# Patient Record
Sex: FEMALE | Race: WHITE | NOT HISPANIC OR LATINO | URBAN - METROPOLITAN AREA
[De-identification: names, ages, dates, MRNs, and addresses within clinical notes are randomized per-mention and may not be internally consistent; named-entity substitution may affect disease eponyms.]

---

## 2017-01-24 ENCOUNTER — OUTPATIENT (OUTPATIENT)
Dept: OUTPATIENT SERVICES | Facility: HOSPITAL | Age: 77
LOS: 1 days | End: 2017-01-24
Payer: MEDICARE

## 2017-01-24 DIAGNOSIS — Z90.710 ACQUIRED ABSENCE OF BOTH CERVIX AND UTERUS: Chronic | ICD-10-CM

## 2017-01-24 PROCEDURE — 71250 CT THORAX DX C-: CPT | Mod: 26

## 2017-01-24 PROCEDURE — 71250 CT THORAX DX C-: CPT

## 2017-01-27 ENCOUNTER — OUTPATIENT (OUTPATIENT)
Dept: OUTPATIENT SERVICES | Facility: HOSPITAL | Age: 77
LOS: 1 days | End: 2017-01-27
Payer: MEDICARE

## 2017-01-27 DIAGNOSIS — Z90.710 ACQUIRED ABSENCE OF BOTH CERVIX AND UTERUS: Chronic | ICD-10-CM

## 2017-01-27 DIAGNOSIS — R06.02 SHORTNESS OF BREATH: ICD-10-CM

## 2017-01-27 PROCEDURE — 93306 TTE W/DOPPLER COMPLETE: CPT

## 2017-01-27 PROCEDURE — 93306 TTE W/DOPPLER COMPLETE: CPT | Mod: 26

## 2017-04-18 ENCOUNTER — APPOINTMENT (OUTPATIENT)
Dept: PULMONOLOGY | Facility: CLINIC | Age: 77
End: 2017-04-18

## 2017-04-27 ENCOUNTER — RESULT REVIEW (OUTPATIENT)
Age: 77
End: 2017-04-27

## 2017-04-27 ENCOUNTER — INPATIENT (INPATIENT)
Facility: HOSPITAL | Age: 77
LOS: 5 days | Discharge: HOME | DRG: 810 | End: 2017-05-03
Attending: INTERNAL MEDICINE | Admitting: INTERNAL MEDICINE
Payer: MEDICARE

## 2017-04-27 VITALS
SYSTOLIC BLOOD PRESSURE: 119 MMHG | RESPIRATION RATE: 20 BRPM | OXYGEN SATURATION: 96 % | DIASTOLIC BLOOD PRESSURE: 82 MMHG | HEART RATE: 90 BPM | TEMPERATURE: 98 F

## 2017-04-27 DIAGNOSIS — R63.8 OTHER SYMPTOMS AND SIGNS CONCERNING FOOD AND FLUID INTAKE: ICD-10-CM

## 2017-04-27 DIAGNOSIS — Z90.710 ACQUIRED ABSENCE OF BOTH CERVIX AND UTERUS: Chronic | ICD-10-CM

## 2017-04-27 DIAGNOSIS — D46.9 MYELODYSPLASTIC SYNDROME, UNSPECIFIED: ICD-10-CM

## 2017-04-27 DIAGNOSIS — Z29.9 ENCOUNTER FOR PROPHYLACTIC MEASURES, UNSPECIFIED: ICD-10-CM

## 2017-04-27 DIAGNOSIS — R53.1 WEAKNESS: ICD-10-CM

## 2017-04-27 DIAGNOSIS — I26.99 OTHER PULMONARY EMBOLISM WITHOUT ACUTE COR PULMONALE: ICD-10-CM

## 2017-04-27 DIAGNOSIS — I10 ESSENTIAL (PRIMARY) HYPERTENSION: ICD-10-CM

## 2017-04-27 LAB
ALBUMIN SERPL ELPH-MCNC: 3.5 G/DL — SIGNIFICANT CHANGE UP (ref 3.4–5)
ALP SERPL-CCNC: 82 U/L — SIGNIFICANT CHANGE UP (ref 40–120)
ALT FLD-CCNC: 22 U/L — SIGNIFICANT CHANGE UP (ref 12–42)
ANION GAP SERPL CALC-SCNC: 11 MMOL/L — SIGNIFICANT CHANGE UP (ref 9–16)
APTT BLD: 27.1 SEC — LOW (ref 27.5–37.4)
AST SERPL-CCNC: 27 U/L — SIGNIFICANT CHANGE UP (ref 15–37)
BASOPHILS NFR BLD AUTO: 0.9 % — SIGNIFICANT CHANGE UP (ref 0–2)
BILIRUB DIRECT SERPL-MCNC: 0.26 MG/DL — HIGH
BILIRUB SERPL-MCNC: 1.8 MG/DL — HIGH (ref 0.2–1.2)
BLD GP AB SCN SERPL QL: NEGATIVE — SIGNIFICANT CHANGE UP
BUN SERPL-MCNC: 15 MG/DL — SIGNIFICANT CHANGE UP (ref 7–23)
CALCIUM SERPL-MCNC: 8.9 MG/DL — SIGNIFICANT CHANGE UP (ref 8.5–10.5)
CHLORIDE SERPL-SCNC: 104 MMOL/L — SIGNIFICANT CHANGE UP (ref 96–108)
CK MB CFR SERPL CALC: <1 NG/ML — SIGNIFICANT CHANGE UP (ref 0.5–3.6)
CO2 SERPL-SCNC: 25 MMOL/L — SIGNIFICANT CHANGE UP (ref 22–31)
CREAT SERPL-MCNC: 0.59 MG/DL — SIGNIFICANT CHANGE UP (ref 0.5–1.3)
D DIMER BLD IA.RAPID-MCNC: 443 NG/ML DDU — HIGH
EOSINOPHIL NFR BLD AUTO: 0.3 % — SIGNIFICANT CHANGE UP (ref 0–6)
GLUCOSE SERPL-MCNC: 132 MG/DL — HIGH (ref 70–99)
HAPTOGLOB SERPL-MCNC: <8 MG/DL — LOW (ref 31–207)
HCT VFR BLD CALC: 26.3 % — LOW (ref 34.5–45)
HGB BLD-MCNC: 9.1 G/DL — LOW (ref 11.5–15.5)
INR BLD: 1.11 — SIGNIFICANT CHANGE UP (ref 0.88–1.16)
LDH SERPL L TO P-CCNC: 418 U/L — HIGH (ref 84–246)
LYMPHOCYTES # BLD AUTO: 31.4 % — SIGNIFICANT CHANGE UP (ref 13–44)
MCHC RBC-ENTMCNC: 34.6 G/DL — SIGNIFICANT CHANGE UP (ref 32–36)
MCHC RBC-ENTMCNC: 39.4 PG — HIGH (ref 27–34)
MCV RBC AUTO: 113.9 FL — HIGH (ref 80–100)
MONOCYTES NFR BLD AUTO: 5.1 % — SIGNIFICANT CHANGE UP (ref 2–14)
NEUTROPHILS NFR BLD AUTO: 62.3 % — SIGNIFICANT CHANGE UP (ref 43–77)
PLATELET # BLD AUTO: 93 K/UL — LOW (ref 150–400)
POTASSIUM SERPL-MCNC: 3.3 MMOL/L — LOW (ref 3.5–5.3)
POTASSIUM SERPL-SCNC: 3.3 MMOL/L — LOW (ref 3.5–5.3)
PROT SERPL-MCNC: 6.9 G/DL — SIGNIFICANT CHANGE UP (ref 6.4–8.2)
PROTHROM AB SERPL-ACNC: 12.3 SEC — SIGNIFICANT CHANGE UP (ref 9.8–12.7)
RBC # BLD: 2.31 M/UL — LOW (ref 3.8–5.2)
RBC # BLD: 2.34 M/UL — LOW (ref 3.8–5.2)
RBC # FLD: 15.1 % — SIGNIFICANT CHANGE UP (ref 10.3–16.9)
RETICS/RBC NFR: 1.6 % — SIGNIFICANT CHANGE UP (ref 0.5–2.5)
RH IG SCN BLD-IMP: POSITIVE — SIGNIFICANT CHANGE UP
SODIUM SERPL-SCNC: 140 MMOL/L — SIGNIFICANT CHANGE UP (ref 135–145)
TROPONIN I SERPL-MCNC: <0.015 NG/ML — SIGNIFICANT CHANGE UP (ref 0.01–0.04)
TSH SERPL-MCNC: 0.86 UIU/ML — SIGNIFICANT CHANGE UP (ref 0.35–4.94)
WBC # BLD: 3.3 K/UL — LOW (ref 3.8–10.5)
WBC # FLD AUTO: 3.3 K/UL — LOW (ref 3.8–10.5)

## 2017-04-27 PROCEDURE — 93010 ELECTROCARDIOGRAM REPORT: CPT

## 2017-04-27 PROCEDURE — 71010: CPT | Mod: 26

## 2017-04-27 PROCEDURE — 99285 EMERGENCY DEPT VISIT HI MDM: CPT | Mod: 25

## 2017-04-27 RX ORDER — SODIUM CHLORIDE 9 MG/ML
500 INJECTION INTRAMUSCULAR; INTRAVENOUS; SUBCUTANEOUS ONCE
Qty: 0 | Refills: 0 | Status: COMPLETED | OUTPATIENT
Start: 2017-04-27 | End: 2017-04-27

## 2017-04-27 RX ORDER — POTASSIUM CHLORIDE 20 MEQ
10 PACKET (EA) ORAL
Qty: 0 | Refills: 0 | Status: COMPLETED | OUTPATIENT
Start: 2017-04-27 | End: 2017-04-28

## 2017-04-27 RX ORDER — POTASSIUM CHLORIDE 20 MEQ
40 PACKET (EA) ORAL ONCE
Qty: 0 | Refills: 0 | Status: COMPLETED | OUTPATIENT
Start: 2017-04-27 | End: 2017-04-27

## 2017-04-27 RX ORDER — POLYETHYLENE GLYCOL 3350 17 G/17G
17 POWDER, FOR SOLUTION ORAL AT BEDTIME
Qty: 0 | Refills: 0 | Status: DISCONTINUED | OUTPATIENT
Start: 2017-04-27 | End: 2017-05-03

## 2017-04-27 RX ADMIN — Medication 100 MILLIEQUIVALENT(S): at 22:54

## 2017-04-27 RX ADMIN — SODIUM CHLORIDE 500 MILLILITER(S): 9 INJECTION INTRAMUSCULAR; INTRAVENOUS; SUBCUTANEOUS at 15:55

## 2017-04-27 NOTE — ED ADULT NURSE NOTE - CHPI ED SYMPTOMS NEG
no tingling/no decreased eating/drinking/no chills/no pain/no fever/no vomiting/no nausea/no dizziness/no numbness

## 2017-04-27 NOTE — H&P ADULT - NSHPPHYSICALEXAM_GEN_ALL_CORE
VITAL SIGNS:  T(C): 36.3, Max: 36.8 (04-27 @ 15:32)  T(F): 97.3, Max: 98.3 (04-27 @ 15:32)  HR: 76 (74 - 90)  BP: 146/90 (119/82 - 146/90)  BP(mean): --  RR: 20 (18 - 20)  SpO2: 100% (96% - 100%)  Wt(kg): --    PHYSICAL EXAM:    Constitutional: WDWN resting comfortably in bed; NAD  Head: NC/AT, head bobbing tremor  Eyes: PERRL, EOMI, anicteric sclera  ENT: no nasal discharge; uvula midline, no oropharyngeal erythema or exudates; MMM, dental caries, no abscess or pus seen in teeth, no parotid gland tenderness  Neck: supple; no JVD or thyromegaly  Respiratory: CTA B/L; no W/R/R, no retractions  Cardiac: +S1/S2; RRR; no M/R/G  Gastrointestinal: soft, NT/ND; no rebound or guarding; +BSx4  Genitourinary: normal external genitalia  Back: spine midline, no bony tenderness or step-offs; no CVAT B/L  Extremities: WWP, no clubbing or cyanosis; no peripheral edema  Musculoskeletal: NROM x4; no joint swelling, tenderness or erythema  Vascular: 2+ radial DP/PT pulses B/L  Dermatologic: skin warm, dry and intact; no rashes, wounds, or scars  Lymphatic: no submandibular or cervical LAD  Neurologic: AAOx3; CNII-XII grossly intact; no focal deficits  Psychiatric: affect and characteristics of appearance, verbalizations, behaviors are appropriate

## 2017-04-27 NOTE — H&P ADULT - HISTORY OF PRESENT ILLNESS
· Chief Complaint: The patient is a 76y Female complaining of weakness.	  · HPI Objective Statement: feeling generally weak for a few weeks, last few days having a hard time getting up out of bed, does not have energy.  no pain.  no cough, fever, dysuria today some sob with exertion	  · Presenting Symptoms: WEAKNESS	  · Negative Findings: no fever, no nausea, no pain, no vomiting	  · Timing: gradual onset	  · Duration: week(s), 2	  · Severity: MODERATE	  · Aggravating Factors: walking	    PAST MEDICAL/SURGICAL/FAMILY/SOCIAL HISTORY:   Past Medical History:  Myelodysplasia (myelodysplastic syndrome)    Tremor    Vestibular disequilibrium.    Tobacco Usage:  · Tobacco Usage Never smoker		    · Attestation Comment: I have reviewed and confirmed nurses' notes for patient's medications, allergies, medical history, and surgical history.	    ALLERGIES AND HOME MEDICATIONS:   Allergies:        Allergies:  	No Known Allergies:     Home Medications:   * Patient Currently Takes Medications as of 29-Oct-2015 16:25 documented in Order   · 	rivaroxaban 15 mg oral tablet: 1 tab(s) orally 2 times a day  · 	hydroxyurea 500 mg oral capsule: 2 cap(s) orally Monday through Friday  · 	hydroxyurea 500 mg oral capsule: 3 cap(s) orally 2 times a week on saturday and sunday  · 	anagrelide 0.5 mg oral capsule: 1 cap(s) orally Monday, Wednesday, and Friday  · 	atorvastatin 20 mg oral tablet: 1 tab(s) orally once a day (at bedtime)  · 	mirtazapine 30 mg oral tablet: 1 tab(s) orally once a day (at bedtime)    REVIEW OF SYSTEMS:   Review of Systems:  · CONSTITUTIONAL: no fever and no chills.	  · CARDIOVASCULAR: normal rate and rhythm, no chest pain and no edema.	  · RESPIRATORY: - - -	  · Respiratory [+]: EXERTIONAL DYSPNEA	  · GASTROINTESTINAL: no abdominal pain, no bloating, no constipation, no diarrhea, no nausea and no vomiting.	  · GENITOURINARY: no dysuria, no frequency, and no hematuria.	  · MUSCULOSKELETAL: no back pain, no gout, no musculoskeletal pain, no neck pain, and no weakness.	  · SKIN: no abrasions, no jaundice, no lesions, no pruritis, and no rashes.	  · NEURO: no loss of consciousness, no gait abnormality, no headache, no sensory deficits, and no weakness.	  · ENDOCRINE: no diabetes and no thyroid trouble.	  · ALLERGIC/IMMUNOLOGIC: no dermatitis, no environmental allergies, no food allergies, no immunosuppressive disorder, and no pruritus.	    VITAL SIGNS( Pullset):   Vital Signs:  ,,ED ADULT Flow Sheet:    27-Apr-2017 13:36	  · Temp (F): 97.6	  · Temp (C) Temp (C): 36.4	  · Temp site Temp Site: oral	  · Heart Rate Heart Rate (beats/min): 90	  · BP Systolic Systolic: 119	  · BP Diastolic Diastolic (mm Hg): 82	  · Respiration Rate (breaths/min) Respiration Rate (breaths/min): 20	  · SpO2 (%) SpO2 (%): 96	  · O2 delivery Patient On: room air	  · Presence of Pain: denies pain/discomfort	  · Pain Rating (0-10): Rest: 0	  · Pain Rating (0-10): Activity: 0	  · SpO2 (%) SpO2 (%): 96	  · O2 delivery Patient On: room air	  · Preferred Language to Address Healthcare Preferred Language to Address Healthcare: English	    PHYSICAL EXAM:   · CONSTITUTIONAL: - - -	  · Appearance: ILL APPEARING, mildly ill appearing	  · Distress: no apparent	  · Mentation: awake, alert	  · CARDIAC: Normal rate, regular rhythm.  Heart sounds S1, S2.  No murmurs, rubs or gallops.	  · RESPIRATORY: - - -	  · Respiratory Distress: no, mild tachypnea	  · Breath Sounds: normal	  · GASTROINTESTINAL: Abdomen soft, non-tender, no guarding.	  · MUSCULOSKELETAL: Spine appears normal, range of motion is not limited, no muscle or joint tenderness	  · NEUROLOGICAL: Alert and oriented, no focal deficits, no motor or sensory deficits.	  · SKIN: Skin normal color for race, warm, dry and intact. No evidence of rash.	  · PSYCHIATRIC: Alert and oriented to person, place, time/situation. normal mood and affect. no apparent risk to self or others.	    DISPOSITION:   Care Plan - Instructions:  Principal Discharge DX:	Myelodysplasia (myelodysplastic syndrome)  Secondary Diagnosis:	Weakness generalizedPrincipal Discharge DX:	Myelodysplasia (myelodysplastic syndrome)  Secondary Diagnosis:	Weakness generalized.  Impression:  Principal Discharge Dx Myelodysplasia (myelodysplastic syndrome).     Secondary Discharge Dx Weakness generalized.    · Medical Decision Making Details: 75 yo female with hx of mds co general weakness for 2 weeks, getting worse over past 2 days.   	    - Hold Hydrea and Anegrelide @ this time  - Monitor CBC closely and transfuse PRBCs to keep Hb > 7 g/dl & platelets > 15k or if bleeding  - for possible bone marrow biopsy 77yo F with MDS, PE 2.5 years ago presents with weakness x 1 week. Weakness is generalized and insiduous in onset, associated with decreased appetite, reduced PO intake and SOB. Patient is normally able to walk 2- blocks with a cane/walker but has been feeling fatigued and SOB even when walking to the restroom. Patient reports she felt well about 10 days ago when she was able to go to the dentist for tooth pain in the L upper molar. The dentist referred her to an oral surgeon for tooth removal, but the surgeon said that she was too weak for the procedure and prescribed Amoxicillin 7 day course, which she completed on Tuesday. She denies fever, chills,chest pain, orthopnea, LE edema, nausea, vomiting, diarrhea, abdominal pain, dysuria, increased urinary frequency, joint pain or rashes. No sick contacts or recent travel.     In the ED, VS: 97.6 119/82 90 20 896% RA  Given NS 500cc bolus 77yo F with MDS, PE 2.5 years ago, HTN presents with weakness x 1 week. Weakness is generalized and insiduous in onset, associated with decreased appetite, reduced PO intake and SOB. Patient is normally able to walk 2- blocks with a cane/walker but has been feeling fatigued and SOB even when walking to the restroom. Patient reports she felt well about 10 days ago when she was able to go to the dentist for tooth pain in the L upper molar. The dentist referred her to an oral surgeon for tooth removal, but the surgeon said that she was too weak for the procedure and prescribed Amoxicillin 7 day course, which she completed on Tuesday. She denies fever, chills,chest pain, orthopnea, LE edema, nausea, vomiting, diarrhea, abdominal pain, dysuria, increased urinary frequency, joint pain or rashes. Patient denies melena or hematochezia, last c-scope 4 years ago, reportedly normal. Does report constipation with a small BM yesterday, which she attributes to decreased PO intake. No sick contacts or recent travel.     In the ED, VS: 97.6 119/82 90 20 896% RA  Given NS 500cc bolus 75yo F with MDS, PE 2.5 years ago, HTN presents with weakness x 1 week. Weakness is generalized and insiduous in onset, associated with decreased appetite, reduced PO intake and SOB. Patient is normally able to walk 2- blocks with a cane/walker but has been feeling fatigued and SOB even when walking to the restroom. Patient reports she felt well about 10 days ago when she was able to go to the dentist for tooth pain in the L upper molar. The dentist referred her to an oral surgeon for tooth removal, but the surgeon said that she was too weak for the procedure and prescribed Amoxicillin 7 day course, which she completed on Tuesday. She denies fever, chills,chest pain, orthopnea, LE edema, nausea, vomiting, diarrhea, abdominal pain, dysuria, increased urinary frequency, joint pain or rashes. Patient denies melena or hematochezia, last c-scope 4 years ago, reportedly normal. Does report constipation with a small BM yesterday, which she attributes to decreased PO intake. No sick contacts or recent travel.     In the ED, VS: 97.6 119/82 90 20 96% RA  Given NS 500cc bolus

## 2017-04-27 NOTE — H&P ADULT - NSHPLABSRESULTS_GEN_ALL_CORE
.  LABS:                         9.1    3.3   )-----------( 93       ( 27 Apr 2017 14:18 )             26.3     04-27    140  |  104  |  15  ----------------------------<  132<H>  3.3<L>   |  25  |  0.59    Ca    8.9      27 Apr 2017 14:18    TPro  6.9  /  Alb  3.5  /  TBili  1.8<H>  /  DBili  0.26<H>  /  AST  27  /  ALT  22  /  AlkPhos  82  04-27    PT/INR - ( 27 Apr 2017 14:18 )   PT: 12.3 sec;   INR: 1.11          PTT - ( 27 Apr 2017 14:18 )  PTT:27.1 sec    CARDIAC MARKERS ( 27 Apr 2017 14:18 )  <0.015 ng/mL / x     / 26 U/L / x     / <1.0 ng/mL            RADIOLOGY, EKG & ADDITIONAL TESTS: Reviewed.

## 2017-04-27 NOTE — H&P ADULT - PMH
Hypertension    Myelodysplasia (myelodysplastic syndrome)    PE (pulmonary thromboembolism)  2015  Tremor    Vestibular disequilibrium

## 2017-04-27 NOTE — ED ADULT TRIAGE NOTE - CHIEF COMPLAINT QUOTE
BIBA from home c/o generalized weakness x 2 dyas and unable to stand up since yesterday. Denies f/c or any other complaints.

## 2017-04-27 NOTE — H&P ADULT - NSHPSOCIALHISTORY_GEN_ALL_CORE
Lives alone, . Does not have any children.  Prior history of cigarette use from age [age] about 1ppd. No etoh use or illicit drug use.

## 2017-04-27 NOTE — H&P ADULT - NSHPOUTPATIENTPROVIDERS_GEN_ALL_CORE
Dr. Resendiz (onc), Dr. Groves (neuro), Dr. Lockett (pulm), Se. Parrish (PMD) Dr. Resendiz (onc), Dr. Groves (neuro), Dr. Lockett (pulm), Dr. Parrish (PMD)

## 2017-04-27 NOTE — H&P ADULT - NSHPREVIEWOFSYSTEMS_GEN_ALL_CORE
CONSTITUTIONAL: + weakness, no fevers or chills  EYES/ENT: No visual changes;  No vertigo or throat pain   NECK: No pain or stiffness  RESPIRATORY: No cough, wheezing, hemoptysis; + SOB  CARDIOVASCULAR: No chest pain or palpitations  GASTROINTESTINAL: No abdominal or epigastric pain. No nausea, vomiting, or hematemesis; No diarrhea No melena or hematochezia. + constipation  GENITOURINARY: No dysuria, frequency or hematuria  NEUROLOGICAL: No numbness  SKIN: No itching, burning, rashes, or lesions   All other review of systems is negative unless indicated above.

## 2017-04-27 NOTE — ED ADULT NURSE NOTE - OBJECTIVE STATEMENT
Pt came to ED complaining of weakness x2 days, worsening today. Pt states she feels too weak to walk today. Pt denies LOC, SOB, abd pain, /GI symptoms, chest pain, D/N/V.  Pt is alert and oriented x3, baseline tremors of hands. Lung sounds clear bilaterally, strong and equal handgrips, positive PMS x4 extremities. Abd is SSNTx4.

## 2017-04-27 NOTE — ED PROVIDER NOTE - MEDICAL DECISION MAKING DETAILS
77 yo female with hx of mds co general weakness for 2 weeks, getting worse over past 2 days.  no pain or fever.  will check labs, ekg, cxr and call Dr Resendiz 77 yo female with hx of mds co general weakness for 2 weeks, getting worse over past 2 days.  no pain or fever.  will check labs, ekg, cxr and call Dr Jacinto.  pt has pancytopenia, this is new since feb.  dr jacinto thinks migth be worsening mds

## 2017-04-27 NOTE — ED PROVIDER NOTE - CONSTITUTIONAL DISTRESS
[address] [facility], [address] [facility], [address] [place] [address] [address] ,[address], [place] [address] [facility] [phone]        Ira Ruelas MD  • Jimbo Escoto MD • Lakia Izquierdo MD • DO Pablito Becker PA-C • CARLEE Weems no apparent

## 2017-04-27 NOTE — H&P ADULT - PROBLEM SELECTOR PLAN 3
History of PE in 2015, treated with ? surgical clot removal and Xarelto for ~ 6 months. Patient is states she has been more SOB for the past week in the setting of weakness and thrombocytopenia. Low suspicion for PE as not tachycardic, no LE pain or swelling but does have a malignancy and prior PE as risk factors. Well's score = 1.5  -obtain collateral from Dr. Lockett in the AM  -will check D-dimer

## 2017-04-27 NOTE — ED PROVIDER NOTE - CARE PLAN
Principal Discharge DX:	Myelodysplasia (myelodysplastic syndrome)  Secondary Diagnosis:	Weakness generalized

## 2017-04-27 NOTE — H&P ADULT - PROBLEM SELECTOR PLAN 1
· Medical Decision Making Details: 75 yo female with hx of mds co general weakness for 2 weeks, getting worse over past 2 days.       - Hold Hydrea and Anegrelide @ this time  - Monitor CBC closely and transfuse PRBCs to keep Hb > 7 g/dl & platelets > 15k or if bleeding  - for possible bone marrow biopsy Generalized weakness of unclear etiology. May be 2/2 anemia (Hb was 13 in Feb was per Dr. Resendiz) vs. exacerbation of MDS vs. deconditioning.   -will f/u Dr. Resendiz for possible bone marrow biopsy  -PT consult Patient with MDS for 15 years, has been stable on Hydrea and Anergrelide. As per Dr. Resendiz, in February WBC 8.5, Hb 13, platelets 207. Currently with pancytopenia, unclear etiology but may be 2/2 MDS exacerbation.   -As per Dr. Turk, hold Hydroxyurea and Anegrelide at this time  -As per Dr. Turk: monitor CBC closely and transfuse PRBCs to keep Hb > 7 g/dl & platelets > 15k or if bleeding  -for possible bone marrow biopsy  -will check reticulocyte count to assess bone marrow function, LDH/haptoglobin for possible hemolysis, Vitamin B12 / folate / TSH (in the setting of macrocytic anemia), FOBT, iron studies

## 2017-04-27 NOTE — ED PROVIDER NOTE - OBJECTIVE STATEMENT
feeling generally weak for a few weeks, last few days having a hard time getting up out of bed, does not have energy.  no pain.  no cough, fever, dysuria  today some sob with exertion

## 2017-04-27 NOTE — H&P ADULT - PROBLEM SELECTOR PLAN 2
Patient with MDS for 15 years, has been stable on Hydrea and Anergrelide. As per Dr. Resendiz, in February WBC 8.5, Hb 13, platelets 207. Currently with pancytopenia, unclear etiology but may be 2/2 MDS exacerbation.   -As per Dr. Turk, hold Hydroxyurea and Anegrelide at this time  -As per Dr. Turk: monitor CBC closely and transfuse PRBCs to keep Hb > 7 g/dl & platelets > 15k or if bleeding  -for possible bone marrow biopsy  -will check reticulocyte count to assess bone marrow function, LDH/haptoglobin for possible hemolysis, Vitamin B12 / folate / TSH (in the setting of macrocytic anemia), FOBT, iron studies Generalized weakness of unclear etiology. May be 2/2 anemia (Hb was 13 in Feb was per Dr. Resendiz) vs. exacerbation of MDS vs. deconditioning.   -PT consult

## 2017-04-28 ENCOUNTER — RESULT REVIEW (OUTPATIENT)
Age: 77
End: 2017-04-28

## 2017-04-28 DIAGNOSIS — D61.818 OTHER PANCYTOPENIA: ICD-10-CM

## 2017-04-28 DIAGNOSIS — D47.1 CHRONIC MYELOPROLIFERATIVE DISEASE: ICD-10-CM

## 2017-04-28 LAB
ANION GAP SERPL CALC-SCNC: 10 MMOL/L — SIGNIFICANT CHANGE UP (ref 9–16)
APPEARANCE UR: CLEAR — SIGNIFICANT CHANGE UP
BACTERIA # UR AUTO: PRESENT /HPF
BILIRUB UR-MCNC: NEGATIVE — SIGNIFICANT CHANGE UP
BUN SERPL-MCNC: 10 MG/DL — SIGNIFICANT CHANGE UP (ref 7–23)
CALCIUM SERPL-MCNC: 8.9 MG/DL — SIGNIFICANT CHANGE UP (ref 8.5–10.5)
CHLORIDE SERPL-SCNC: 108 MMOL/L — SIGNIFICANT CHANGE UP (ref 96–108)
CO2 SERPL-SCNC: 24 MMOL/L — SIGNIFICANT CHANGE UP (ref 22–31)
COLOR SPEC: YELLOW — SIGNIFICANT CHANGE UP
COMMENT - URINE: SIGNIFICANT CHANGE UP
COMMENT - URINE: SIGNIFICANT CHANGE UP
CREAT SERPL-MCNC: 0.56 MG/DL — SIGNIFICANT CHANGE UP (ref 0.5–1.3)
DAT POLY-SP REAG RBC QL: NEGATIVE — SIGNIFICANT CHANGE UP
DIFF PNL FLD: NEGATIVE — SIGNIFICANT CHANGE UP
EPI CELLS # UR: (no result) /HPF
ERYTHROCYTE [SEDIMENTATION RATE] IN BLOOD: 65 MM/HR — HIGH
FERRITIN SERPL-MCNC: 512.3 NG/ML — HIGH (ref 8–252)
GLUCOSE SERPL-MCNC: 101 MG/DL — HIGH (ref 70–99)
GLUCOSE UR QL: NEGATIVE — SIGNIFICANT CHANGE UP
IRON SATN MFR SERPL: 144 UG/DL — SIGNIFICANT CHANGE UP (ref 50–170)
IRON SATN MFR SERPL: 53 % — HIGH (ref 20–38)
KETONES UR-MCNC: (no result) MG/DL
LEUKOCYTE ESTERASE UR-ACNC: (no result)
MAGNESIUM SERPL-MCNC: 2.4 MG/DL — SIGNIFICANT CHANGE UP (ref 1.6–2.4)
NITRITE UR-MCNC: NEGATIVE — SIGNIFICANT CHANGE UP
PH UR: 7 — SIGNIFICANT CHANGE UP (ref 5–8)
POTASSIUM SERPL-MCNC: 3.9 MMOL/L — SIGNIFICANT CHANGE UP (ref 3.5–5.3)
POTASSIUM SERPL-SCNC: 3.9 MMOL/L — SIGNIFICANT CHANGE UP (ref 3.5–5.3)
PROT UR-MCNC: NEGATIVE MG/DL — SIGNIFICANT CHANGE UP
RBC CASTS # UR COMP ASSIST: < 5 /HPF — SIGNIFICANT CHANGE UP
SODIUM SERPL-SCNC: 142 MMOL/L — SIGNIFICANT CHANGE UP (ref 135–145)
SP GR SPEC: 1.01 — SIGNIFICANT CHANGE UP (ref 1–1.03)
TIBC SERPL-MCNC: 271 UG/DL — SIGNIFICANT CHANGE UP (ref 250–450)
URATE SERPL-MCNC: 2.7 MG/DL — SIGNIFICANT CHANGE UP (ref 2.6–6)
UROBILINOGEN FLD QL: 1 E.U./DL — SIGNIFICANT CHANGE UP
VIT B12 SERPL-MCNC: 1298 PG/ML — HIGH (ref 243–894)
WBC UR QL: (no result) /HPF

## 2017-04-28 PROCEDURE — 99233 SBSQ HOSP IP/OBS HIGH 50: CPT | Mod: GC

## 2017-04-28 RX ORDER — POTASSIUM CHLORIDE 20 MEQ
20 PACKET (EA) ORAL ONCE
Qty: 0 | Refills: 0 | Status: COMPLETED | OUTPATIENT
Start: 2017-04-28 | End: 2017-04-28

## 2017-04-28 RX ORDER — LIDOCAINE HCL 20 MG/ML
50 VIAL (ML) INJECTION ONCE
Qty: 0 | Refills: 0 | Status: COMPLETED | OUTPATIENT
Start: 2017-04-28 | End: 2017-04-28

## 2017-04-28 RX ADMIN — Medication 50 MILLILITER(S): at 12:49

## 2017-04-28 RX ADMIN — Medication 100 MILLIEQUIVALENT(S): at 00:57

## 2017-04-28 RX ADMIN — Medication 100 MILLIEQUIVALENT(S): at 02:54

## 2017-04-28 NOTE — PHYSICAL THERAPY INITIAL EVALUATION ADULT - ADDITIONAL COMMENTS
Patient lives alone in an elevator apartment with a ramp to enter. Prior to admission patient was independent for all functional mobility, and states on a good day she would walk 20 blocks with a straight cane. Has a home health aide 7 hours a day, 1 day a week who assists patient with cleaning and shopping.

## 2017-04-28 NOTE — CONSULT NOTE ADULT - PROBLEM SELECTOR RECOMMENDATION 2
- Differential includes progression of P. Vera to worsening MPN (myelofibrosis/leukemia) vs. Medication (hydrea/angaralide w/ possible interaction with abx.)  - S/p bone marrow biopsy today will follow up results.   - check B12, Folate, SPEP, FLC, IF, US Spleen, Retic

## 2017-04-28 NOTE — CONSULT NOTE ADULT - SUBJECTIVE AND OBJECTIVE BOX
Hematology/Oncology Consult Note (Dr Resendiz)    Patient is a 76y male pmhx significant for PE and MPN (P. Vera) diagnosed in 2005 currently on Hydrea and Anagrelide (normal counts Feb 2017) now presenting with 1-2 week history of weakness, increase fatigue, decrease appetite and weight loss. The patient was recently seen by a dentist and was noted to have tooth infection, she was given abx. and since then has began to feel worse. The patient has otherwise denies any recent travel or sick contacts, has been compliant with her medications and has not taken any new medications. Otherwise she has no other complaints denies any SOB, CP, N/V/D    ROS is otherwise negative.    PAST MEDICAL & SURGICAL HISTORY:  Hypertension  PE (pulmonary thromboembolism): 2015  Tremor  Vestibular disequilibrium  S/P hysterectomy  MPN    Social History: No etoh/drugs/smoking    FAMILY HISTORY:  No pertinent family history in first degree relatives      MEDICATIONS  (STANDING):    MEDICATIONS  (PRN):  polyethylene glycol 3350 17Gram(s) Oral at bedtime PRN Constipation      PHYSICAL EXAM:    T(F): 98.3, Max: 98.5 (04-28 @ 05:45)  HR: 70 (70 - 90)  BP: 122/75 (119/82 - 146/90)  RR: 16 (16 - 20)  SpO2: 96% (96% - 100%)  Wt(kg): --    Daily     Daily     Gen: well developed, well nourished, comfortable  HEENT: no cervical adenopathy, or thrush  Neck: supple, no masses, no JVD  Cardiovascular: RR, nl S1S2, no murmurs/rubs/gallops  Respiratory: clear air entry b/l  Gastrointestinal: BS+, soft, NT/ND, no splenomegaly appreciated   Extremities: no clubbing/cyanosis, no edema, no calf tenderness  Vascular:  DP/PT 2+ b/l  Neurological: CN 2-12 grossly intact, no focal deficits  Skin: no rash on visible skin  Lymph Nodes:  no cervical/supraclavicular LAD, no axillary/groin LAD      CBC Full  -  ( 27 Apr 2017 14:18 )  WBC Count : 3.3 K/uL  Hemoglobin : 9.1 g/dL  Hematocrit : 26.3 %  Platelet Count - Automated : 93 K/uL  Mean Cell Volume : 113.9 fL  Mean Cell Hemoglobin : 39.4 pg  Mean Cell Hemoglobin Concentration : 34.6 g/dL  Auto Neutrophil # : x  Auto Lymphocyte # : x  Auto Monocyte # : x  Auto Eosinophil # : x  Auto Basophil # : x  Auto Neutrophil % : 62.3 %  Auto Lymphocyte % : 31.4 %  Auto Monocyte % : 5.1 %  Auto Eosinophil % : 0.3 %  Auto Basophil % : 0.9 %      04-28    142  |  108  |  10  ----------------------------<  101<H>  3.9   |  24  |  0.56    Ca    8.9      28 Apr 2017 07:09  Mg     2.4     04-28    TPro  6.9  /  Alb  3.5  /  TBili  1.8<H>  /  DBili  0.26<H>  /  AST  27  /  ALT  22  /  AlkPhos  82  04-27      PT/INR - ( 27 Apr 2017 14:18 )   PT: 12.3 sec;   INR: 1.11          PTT - ( 27 Apr 2017 14:18 )  PTT:27.1 sec

## 2017-04-28 NOTE — PHYSICAL THERAPY INITIAL EVALUATION ADULT - MANUAL MUSCLE TESTING RESULTS, REHAB EVAL
Strength grossly at least 3/5 based on functional assessment of bed mobility, transfers, and ambulation

## 2017-04-28 NOTE — PROGRESS NOTE ADULT - SUBJECTIVE AND OBJECTIVE BOX
History of Present Illness:  Chief Complaint/Reason for Admission: weakness	  History of Present Illness: 	  77yo F with MDS, PE 2.5 years ago, HTN presents with weakness x 1 week. Weakness is generalized and insiduous in onset, associated with decreased appetite, reduced PO intake and SOB. Patient is normally able to walk 2- blocks with a cane/walker but has been feeling fatigued and SOB even when walking to the restroom. Patient reports she felt well about 10 days ago when she was able to go to the dentist for tooth pain in the L upper molar. The dentist referred her to an oral surgeon for tooth removal, but the surgeon said that she was too weak for the procedure and prescribed Amoxicillin 7 day course, which she completed on Tuesday. She denies fever, chills,chest pain, orthopnea, LE edema, nausea, vomiting, diarrhea, abdominal pain, dysuria, increased urinary frequency, joint pain or rashes. Patient denies melena or hematochezia, last c-scope 4 years ago, reportedly normal. Does report constipation with a small BM yesterday, which she attributes to decreased PO intake. No sick contacts or recent travel.   77yo F with MDS, PE 2.5 years ago, HTN presents with weakness x 1 week. Weakness is generalized and insiduous in onset, associated with decreased appetite, reduced PO intake and SOB. Patient is normally able to walk 2- blocks with a cane/walker but has been feeling fatigued and SOB even when walking to the restroom. Patient reports she felt well about 10 days ago when she was able to go to the dentist for tooth pain in the L upper molar. The dentist referred her to an oral surgeon for tooth removal, but the surgeon said that she was too weak for the procedure and prescribed Amoxicillin 7 day course, which she completed on Tuesday. She denies fever, chills,chest pain, orthopnea, LE edema, nausea, vomiting, diarrhea, abdominal pain, dysuria, increased urinary frequency, joint pain or rashes. Patient denies melena or hematochezia, last c-scope 4 years ago, reportedly normal. Does report constipation with a small BM yesterday, which she attributes to decreased PO intake. No sick contacts or recent travel.     In the ED, VS: 97.6 119/82 90 20 896% RA  Given NS 500cc bolus    Review of Systems:  Review of Systems: CONSTITUTIONAL: + weakness, no fevers or chills EYES/ENT: No visual changes;  No vertigo or throat pain  NECK: No pain or stiffness RESPIRATORY: No cough, wheezing, hemoptysis; + SOB CARDIOVASCULAR: No chest pain or palpitations GASTROINTESTINAL: No abdominal or epigastric pain. No nausea, vomiting, or hematemesis; No diarrhea No melena or hematochezia. + constipation GENITOURINARY: No dysuria, frequency or hematuria NEUROLOGICAL: No numbness SKIN: No itching, burning, rashes, or lesions  All other review of systems is negative unless indicated above.	  Other Review of Systems: All other review of systems negative, except as noted in HPI	      Allergies and Intolerances:        Allergies:  	No Known Allergies:     Home Medications:   * Patient Currently Takes Medications as of 27-Apr-2017 18:50 documented in Prescription Writer  · 	hydroxyurea 500 mg oral capsule: 3 cap(s) orally Monday, Wednesday, and Friday, Last Dose Taken:    · 	hydroxyurea 500 mg oral capsule: 2 cap(s) orally tuesday, thursday, saturday and sunday, Last Dose Taken:    · 	Norvasc 5 mg oral tablet: 1 tab(s) orally once a day, Last Dose Taken:    · 	anagrelide 0.5 mg oral capsule: 1 cap(s) orally Monday, Wednesday, and Friday, Last Dose Taken:      Patient History:   Past Medical History:  Hypertension    Myelodysplasia (myelodysplastic syndrome)    PE (pulmonary thromboembolism)  2015  Tremor    Vestibular disequilibrium.    Past Surgical History:  S/P hysterectomy.    Family History:  No pertinent family history in first degree relatives.    Social History:  Social History (marital status, living situation, occupation, tobacco use, alcohol and drug use, and sexual history): Lives alone, . Does not have any children. Prior history of cigarette use from age [age] about 1ppd. No etoh use or illicit drug use.	    Tobacco Screening:  · Core Measure Site	No	  · Has the patient used tobacco in the past 30 days?	No	    Risk Assessment:   Present on Admission:  Deep Venous Thrombosis	no	  Pulmonary Embolus	no	  Urinary Catheter	no	  Central Venous Catheter/PICC Line	no	  Surgical Site Incision	no	  Pressure Ulcer(s)	no	    Heart Failure:  Does this patient have a history of or has been diagnosed with heart failure? no.      Physical Exam:  Physical Exam: VITAL SIGNS: T(C): 36.3, Max: 36.8 (04-27 @ 15:32) T(F): 97.3, Max: 98.3 (04-27 @ 15:32) HR: 76 (74 - 90) BP: 146/90 (119/82 - 146/90) BP(mean): -- RR: 20 (18 - 20) SpO2: 100% (96% - 100%) Wt(kg): --  PHYSICAL EXAM: Constitutional: WDWN resting comfortably in bed; NAD Head: NC/AT, head bobbing tremor Eyes: PERRL, EOMI, anicteric sclera ENT: no nasal discharge; uvula midline, no oropharyngeal erythema or exudates; MMM, dental caries, no abscess or pus seen in teeth, no parotid gland tenderness Neck: supple; no JVD or thyromegaly Respiratory: CTA B/L; no W/R/R, no retractions Cardiac: +S1/S2; RRR; no M/R/G Gastrointestinal: soft, NT/ND; no rebound or guarding; +BSx4 Genitourinary: normal external genitalia Back: spine midline, no bony tenderness or step-offs; no CVAT B/L Extremities: WWP, no clubbing or cyanosis; no peripheral edema Musculoskeletal: NROM x4; no joint swelling, tenderness or erythema Vascular: 2+ radial DP/PT pulses B/L Dermatologic: skin warm, dry and intact; no rashes, wounds, or scars Lymphatic: no submandibular or cervical LAD Neurologic: AAOx3; CNII-XII grossly intact; no focal deficits Psychiatric: affect and characteristics of appearance, verbalizations, behaviors are appropriate	      Labs and Results:  Labs, Radiology, Cardiology, and Other Results: . LABS:                       9.1   3.3   )-----------( 93       ( 27 Apr 2017 14:18 )            26.3   04-27  140  |  104  |  15 ----------------------------<  132<H> 3.3<L>   |  25  |  0.59  Ca    8.9      27 Apr 2017 14:18  TPro  6.9  /  Alb  3.5  /  TBili  1.8<H>  /  DBili  0.26<H>  /  AST  27  /  ALT  22  /  AlkPhos  82  04-27  PT/INR - ( 27 Apr 2017 14:18 )   PT: 12.3 sec;   INR: 1.11       PTT - ( 27 Apr 2017 14:18 )  PTT:27.1 sec  CARDIAC MARKERS ( 27 Apr 2017 14:18 ) <0.015 ng/mL / x     / 26 U/L / x     / <1.0 ng/mL  RADIOLOGY, EKG & ADDITIONAL TESTS: Reviewed.	    Assessment and Plan:   Assessment:  · Assessment		  77yo F with MDS, PE 2.5 years ago, HTN presents with weakness x 1 week admitted for pancytopenia.     Problem/Plan - 1:  ·  Problem: Myelodysplasia (myelodysplastic syndrome).  Plan: Patient with MDS for 15 years, has been stable on Hydrea and Anergrelide. In February WBC 8.5, Hb 13, platelets 207. Currently with pancytopenia, unclear etiology but may be 2/2 MDS or myelofibrosis.  -Hold Hydroxyurea and Anegrelide at this time  -Monitor CBC closely and transfuse PRBCs to keep Hb > 7 g/dl & platelets > 15k or if bleeding  -Await bone marrow biopsy results  -Check reticulocyte count to assess bone marrow function, LDH/haptoglobin for possible hemolysis, Vitamin B12 / folate / TSH (in the setting of macrocytic anemia), FOBT, iron studies.     Problem/Plan - 2:  ·  Problem: Weakness generalized.  Plan: Generalized weakness of unclear etiology. May be 2/2 anemia (Hb was 13 in Feb was per Dr. Resendiz) vs. exacerbation of MDS vs. deconditioning.   -PT consult.     Problem/Plan - 3:  ·  Problem: PE (pulmonary thromboembolism).  Plan: History of PE in 2015, treated with ? surgical clot removal and Xarelto for ~ 6 months. Patient is states she has been more SOB for the past week in the setting of weakness and thrombocytopenia. Low suspicion for PE as not tachycardic, no LE pain or swelling but does have a malignancy and prior PE as risk factors. Well's score = 1.5  -obtain collateral from Dr. Lockett in the AM  -will check D-dimer.     Problem/Plan - 4:  ·  Problem: Hypertension.  Plan: Normotensive, holding Norvasc.     Problem/Plan - 5:  ·  Problem: Nutrition, metabolism, and development symptoms.  Plan: regular diet.     Problem/Plan - 6:  Problem: Need for prophylactic measure. Plan: Miralax PRN for constipation  SCDs (no HSQ with thrombocytopenia).

## 2017-04-28 NOTE — PHYSICAL THERAPY INITIAL EVALUATION ADULT - GENERAL OBSERVATIONS, REHAB EVAL
Received semi-supine in bed with daughter present, +hep lock, on room air, in no apparent distress. Patient denies pain at rest.

## 2017-04-28 NOTE — CONSULT NOTE ADULT - PROBLEM SELECTOR RECOMMENDATION 9
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 Is now presenting with pancytopenia   - Concern for progression to either myelofibrosis and less likely leukemia   - S/p Bone marrow biopsy this morning to evaluate.   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 Is now presenting with pancytopenia   - Concern for progression to either myelofibrosis and less likely leukemia   - S/p Bone marrow biopsy this morning to evaluate.   - Will get U/S of the abdomen to evaluate for splenomegaly  - will check peripheral smear for signs of leukoerythoblasts picture (nucleated RBC and tear drops)  - Please hold Hydrea and Angaralide   - Continue with low dose aspirin 81mg

## 2017-04-28 NOTE — PROGRESS NOTE ADULT - SUBJECTIVE AND OBJECTIVE BOX
Interval Events: reviewed  Patient seen and examined at bedside.    Patient is a 76y old  Female who presents with a chief complaint of weakness (2017 17:37)  she is not feeling well and she came to the hospital    PAST MEDICAL & SURGICAL HISTORY:  Hypertension  PE (pulmonary thromboembolism):   Tremor  Vestibular disequilibrium  Myelodysplasia (myelodysplastic syndrome)  S/P hysterectomy      MEDICATIONS:  Pulmonary:    Antimicrobials:    Anticoagulants:    Cardiac:      Allergies    No Known Allergies    Intolerances        Vital Signs Last 24 Hrs  T(C): 36.6, Max: 36.7 ( @ 21:33)  T(F): 97.8, Max: 98 ( @ 21:33)  HR: 80 (80 - 80)  BP: 121/71 (120/78 - 128/81)  BP(mean): --  RR: 18 (15 - 18)  SpO2: 95% (94% - 95%)        LABS:      CBC Full  -  ( 2017 12:36 )  WBC Count : 3.4 K/uL  Hemoglobin : 8.2 g/dL  Hematocrit : 24.4 %  Platelet Count - Automated : 67 K/uL  Mean Cell Volume : 115.1 fL  Mean Cell Hemoglobin : 38.7 pg  Mean Cell Hemoglobin Concentration : 33.6 g/dL  Auto Neutrophil # : x  Auto Lymphocyte # : x  Auto Monocyte # : x  Auto Eosinophil # : x  Auto Basophil # : x  Auto Neutrophil % : x  Auto Lymphocyte % : x  Auto Monocyte % : x  Auto Eosinophil % : x  Auto Basophil % : x        141  |  109<H>  |  14  ----------------------------<  123<H>  3.7   |  24  |  0.59    Ca    9.2      2017 12:36  Mg     2.2         TPro  6.9  /  Alb  3.5  /  TBili  1.8<H>  /  DBili  0.26<H>  /  AST  27  /  ALT  22  /  AlkPhos  82      PT/INR - ( 2017 14:18 )   PT: 12.3 sec;   INR: 1.11          PTT - ( 2017 14:18 )  PTT:27.1 sec      Urinalysis Basic - ( 2017 06:40 )    Color: Yellow / Appearance: Clear / S.010 / pH: x  Gluc: x / Ketone: Trace mg/dL  / Bili: NEGATIVE / Urobili: 1.0 E.U./dL   Blood: x / Protein: NEGATIVE mg/dL / Nitrite: NEGATIVE     EXAM:  XR CHEST-FRONTAL                          PROCEDURE DATE:  2017                     INTERPRETATION:  Chest X-Ray dated 2017 2:46 PM    Indication: Shortness of Breath    Comparison studies: Chest dated 2016    A frontal view of the chest demonstrate the heart and mediastinal contour   to be normal. No consolidation is evident. The previously seen left upper   lobe density is faintly visualized over the left anterior second rib. No   large pleural effusions are noted.      IMPRESSION:  Left upper lobe density, decreased since 2016.    EXAM:  CT CHEST                           PROCEDURE DATE:  2017                 INTERPRETATION:  CT of the CHEST without intravenous contrast dated   2017 1:26 PM    INDICATION: submassive PE follow up/ f/u pulmoanry nodule    TECHNIQUE:CT of the chest was performed without intravenous contrast.    Intravenous contrast was not used     Axial and coronal and sagittal   images were produced and reviewed. CT pulmonary angiogram could not be   performed due to high creatinine.    PRIOR STUDIES: CT chest 2016.    FINDINGS: The heart is normal in size. Coronary artery calcification.   Aortic valve calcification. No pericardial effusion is seen.  Evaluation   of the vasculature is limited without intravenous contrast, but there is   borderline dilatation of the ascending aorta measuring 4.0 cm diameter..    Evaluation for adenopathy is limited without intravenous contrast. Within   that limitation, no mediastinal or hilar or axillary lymphadenopathy is   seen. The main pulmonary artery measures 2.8 cm diameter. No evidence of   pulmonary arterial hypertension.    No pleural effusions are seen. Evaluation of the pulmonary parenchyma   demonstrates centrilobular emphysema particularly in the bilateral upper   lobes. A prior 2.5 cm oval nodule/opacity in the anterior segment left   upper lobe has virtually resolved. However there is a contiguous 1.1 x   1.7 cm oval nodule/with a pleural tag which is unchanged. Unchanged 0.1   cm solid nodule left upper lobe image 89 series3. 0.3 cm solid nodule   left upper lobe image 97 series 3, not seen on prior study. Subsegmental   atelectasis right lower lobe as before. 0.3 cm solid nodule right upper   lobe image 138 series 3, unchanged. 0.8 cm groundglass nodule right upper   lobe image 119 series 3, unchanged. 0.3 cm solid nodule right upper lobe   image 83 series 3 unchanged. Subtle small airways disease in the anterior   segment right upper lobe similar to prior study. Subtle small airways   disease in the anterior segment left upper lobe similar to prior study.    Limited evaluation of the upper abdomen demonstrates unchanged 0.8 cm   hypodensity in segment 6 of liver. 0.6 cm hypodensity in segment 1 of   liver, unchanged.    Evaluation of the osseous structures demonstrates degenerative changes.      IMPRESSION:    1. Emphysema.  2. Prior 2.5 cm oval nodule/opacity in the anterior segment left upper   lobe has virtually resolved however a contiguous 1.7 x 1.1 cm solid   nodule associated with a pleural tag is unchanged. The majority of the   lung nodules are unchanged. Follow-up CT chest in one year is recommended   for the groundglass nodule in the right upper lobe. Recommend follow-up   CT chest for the 1.7 cm left upper lobe nodule in 6 months.  Leuk Esterase: Small / RBC: < 5 /HPF / WBC 5-10 /HPF   Sq Epi: x / Non Sq Epi: Moderate /HPF / Bacteria: Present /HPF                  RADIOLOGY & ADDITIONAL STUDIES (The following images were personally reviewed):  Resendiz:                                    No  Urine output:               Yes          DVT prophylaxis:         Yes         Flattus:                          Yes         Bowel movement:       Yes

## 2017-04-28 NOTE — PHYSICAL THERAPY INITIAL EVALUATION ADULT - DISCHARGE DISPOSITION, PT EVAL
patient's daughter state that they will pay for the [facility]HA to come 2x/week so that she can assist patient with getting to her outpatient PT appointments/home w/ assist/home/outpatient PT

## 2017-04-28 NOTE — PHYSICAL THERAPY INITIAL EVALUATION ADULT - PLANNED THERAPY INTERVENTIONS, PT EVAL
balance training/bed mobility training/strengthening/postural re-education/gait training/transfer training

## 2017-04-28 NOTE — PROGRESS NOTE ADULT - ASSESSMENT
Patient is a 75 yo F with MDS, PEx2 5 years ago, HTN and benign familial palsy (head bobbing tremor) presents with weakness x 1 week in setting of tooth infection s/p 7 day course of amoxicillin, admitted for pancytopenia

## 2017-04-28 NOTE — CONSULT NOTE ADULT - PROBLEM SELECTOR RECOMMENDATION 3
- Patient with increase LDH and Dec haptglobin   - concerned for myelofibrosis vs hemolytic anemia   - please check teresa, retic, will evaluate peripheral smear for spherocytes

## 2017-04-28 NOTE — PROGRESS NOTE ADULT - PROBLEM SELECTOR PLAN 2
Prior Hgb ~13 At Dr. Resendiz office, will vs. exacerbation of MDS vs. deconditioning.   -PT consult. Prior Hgb ~13 At Dr. Resendiz office, will vs. exacerbation of MDS vs. deconditioning.   -PT consult

## 2017-04-28 NOTE — PHYSICAL THERAPY INITIAL EVALUATION ADULT - CRITERIA FOR SKILLED THERAPEUTIC INTERVENTIONS
functional limitations in following categories/rehab potential/therapy frequency/risk reduction/prevention/impairments found/anticipated discharge recommendation

## 2017-04-28 NOTE — PROGRESS NOTE ADULT - SUBJECTIVE AND OBJECTIVE BOX
INTERVAL HPI/OVERNIGHT EVENTS:  Patient was seen and examined at bedside. As per nurse and patient, no o/n events, patient resting comfortably. Patient only states that potassium repletion kept her up due to burning in the IV. Patient furthermore states that she urinated more than usual last night, most likely due to IVF. Patient denies: fever, chills, dizziness, HA, Changes in vision, CP, palpitations, SOB, cough, N/V/D/C, dysuria, changes in bowel movements, LE edema. ROS otherwise negative.    VITAL SIGNS:  T(F): 98.5  HR: 72  BP: 124/83  RR: 16  SpO2: 96%  Wt(kg): --    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, PERRL, EOMI, sclera non-icteric, neck supple, trachea midline, no masses, no JVD, MMM, good dentition  Respiratory: CTA b/l, good air entry b/l, no wheezing, no rhonchi, no rales, without accessory muscle use and no intercostal retractions  Cardiovascular: RRR, normal S1S2, no M/R/G  Gastrointestinal: soft, NTND, no masses palpable, BS normal  Extremities: Warm, well perfused, pulses equal bilateral upper and lower extremities, no edema, no clubbing  Neurological: AAOx3, CN Grossly intact  Skin: Normal temperature, warm, dry    MEDICATIONS  (STANDING):  potassium chloride    Tablet ER 20milliEquivalent(s) Oral once    MEDICATIONS  (PRN):  polyethylene glycol 3350 17Gram(s) Oral at bedtime PRN Constipation      Allergies    No Known Allergies    Intolerances        LABS:                        9.1    3.3   )-----------( 93       ( 2017 14:18 )             26.3     -    142  |  108  |  10  ----------------------------<  101<H>  3.9   |  24  |  0.56    Ca    8.9      2017 07:09  Mg     2.4         TPro  6.9  /  Alb  3.5  /  TBili  1.8<H>  /  DBili  0.26<H>  /  AST  27  /  ALT  22  /  AlkPhos  82  04-27    PT/INR - ( 2017 14:18 )   PT: 12.3 sec;   INR: 1.11          PTT - ( 2017 14:18 )  PTT:27.1 sec  Urinalysis Basic - ( 2017 06:40 )    Color: Yellow / Appearance: Clear / S.010 / pH: x  Gluc: x / Ketone: Trace mg/dL  / Bili: NEGATIVE / Urobili: 1.0 E.U./dL   Blood: x / Protein: NEGATIVE mg/dL / Nitrite: NEGATIVE   Leuk Esterase: Small / RBC: < 5 /HPF / WBC 5-10 /HPF   Sq Epi: x / Non Sq Epi: Moderate /HPF / Bacteria: Present /HPF        RADIOLOGY & ADDITIONAL TESTS:  Reviewed

## 2017-04-28 NOTE — PROGRESS NOTE ADULT - SUBJECTIVE AND OBJECTIVE BOX
Heme-Onc note :    New onset of pancytopenia in a patient with longstanding myeloproiferative disease (MPD) on Hydrea in the setting of fatigue, malaise of recent onset.     Dr Dumont will see the patient and we will consider bone marrow aspiration and biopsy.      Dr Resendiz for Dr Dumont

## 2017-04-29 DIAGNOSIS — R91.1 SOLITARY PULMONARY NODULE: ICD-10-CM

## 2017-04-29 LAB
ALBUMIN SERPL ELPH-MCNC: 3.3 G/DL — LOW (ref 3.4–5)
ALP SERPL-CCNC: 76 U/L — SIGNIFICANT CHANGE UP (ref 40–120)
ALT FLD-CCNC: 21 U/L — SIGNIFICANT CHANGE UP (ref 12–42)
ANION GAP SERPL CALC-SCNC: 8 MMOL/L — LOW (ref 9–16)
AST SERPL-CCNC: 18 U/L — SIGNIFICANT CHANGE UP (ref 15–37)
BILIRUB DIRECT SERPL-MCNC: 0.27 MG/DL — HIGH
BILIRUB INDIRECT FLD-MCNC: 0.8 MG/DL — SIGNIFICANT CHANGE UP (ref 0.2–1)
BILIRUB SERPL-MCNC: 1.1 MG/DL — SIGNIFICANT CHANGE UP (ref 0.2–1.2)
BUN SERPL-MCNC: 14 MG/DL — SIGNIFICANT CHANGE UP (ref 7–23)
CALCIUM SERPL-MCNC: 9.2 MG/DL — SIGNIFICANT CHANGE UP (ref 8.5–10.5)
CHLORIDE SERPL-SCNC: 109 MMOL/L — HIGH (ref 96–108)
CO2 SERPL-SCNC: 24 MMOL/L — SIGNIFICANT CHANGE UP (ref 22–31)
CREAT SERPL-MCNC: 0.59 MG/DL — SIGNIFICANT CHANGE UP (ref 0.5–1.3)
FOLATE SERPL-MCNC: 6.8 NG/ML — SIGNIFICANT CHANGE UP (ref 4.8–24.2)
GLUCOSE SERPL-MCNC: 123 MG/DL — HIGH (ref 70–99)
HAPTOGLOB SERPL-MCNC: <8 MG/DL — LOW (ref 31–207)
HCT VFR BLD CALC: 24.4 % — LOW (ref 34.5–45)
HGB BLD-MCNC: 8.2 G/DL — LOW (ref 11.5–15.5)
LDH SERPL L TO P-CCNC: 369 U/L — HIGH (ref 84–246)
MAGNESIUM SERPL-MCNC: 2.2 MG/DL — SIGNIFICANT CHANGE UP (ref 1.6–2.4)
MCHC RBC-ENTMCNC: 33.6 G/DL — SIGNIFICANT CHANGE UP (ref 32–36)
MCHC RBC-ENTMCNC: 38.7 PG — HIGH (ref 27–34)
MCV RBC AUTO: 115.1 FL — HIGH (ref 80–100)
PLATELET # BLD AUTO: 67 K/UL — LOW (ref 150–400)
POTASSIUM SERPL-MCNC: 3.7 MMOL/L — SIGNIFICANT CHANGE UP (ref 3.5–5.3)
POTASSIUM SERPL-SCNC: 3.7 MMOL/L — SIGNIFICANT CHANGE UP (ref 3.5–5.3)
PROT SERPL-MCNC: 6.4 G/DL — SIGNIFICANT CHANGE UP (ref 6.4–8.2)
RBC # BLD: 2.12 M/UL — LOW (ref 3.8–5.2)
RBC # BLD: 2.12 M/UL — LOW (ref 3.8–5.2)
RBC # FLD: 15.6 % — SIGNIFICANT CHANGE UP (ref 10.3–16.9)
RETICS/RBC NFR: 1.8 % — SIGNIFICANT CHANGE UP (ref 0.5–2.5)
SODIUM SERPL-SCNC: 141 MMOL/L — SIGNIFICANT CHANGE UP (ref 135–145)
WBC # BLD: 3.4 K/UL — LOW (ref 3.8–10.5)
WBC # FLD AUTO: 3.4 K/UL — LOW (ref 3.8–10.5)

## 2017-04-29 PROCEDURE — 76700 US EXAM ABDOM COMPLETE: CPT | Mod: 26

## 2017-04-29 PROCEDURE — 99232 SBSQ HOSP IP/OBS MODERATE 35: CPT | Mod: GC

## 2017-04-29 RX ORDER — FOLIC ACID 0.8 MG
2 TABLET ORAL DAILY
Qty: 0 | Refills: 0 | Status: DISCONTINUED | OUTPATIENT
Start: 2017-04-29 | End: 2017-05-03

## 2017-04-29 RX ORDER — POTASSIUM CHLORIDE 20 MEQ
40 PACKET (EA) ORAL ONCE
Qty: 0 | Refills: 0 | Status: COMPLETED | OUTPATIENT
Start: 2017-04-29 | End: 2017-04-29

## 2017-04-29 RX ADMIN — Medication 40 MILLIEQUIVALENT(S): at 13:53

## 2017-04-29 RX ADMIN — Medication 2 MILLIGRAM(S): at 13:53

## 2017-04-29 NOTE — PROGRESS NOTE ADULT - SUBJECTIVE AND OBJECTIVE BOX
History of Present Illness:  Chief Complaint/Reason for Admission: weakness	  History of Present Illness: 	  75yo F with MDS, PE 2.5 years ago, HTN presents with weakness x 1 week. Weakness is generalized and insiduous in onset, associated with decreased appetite, reduced PO intake and SOB. Patient is normally able to walk 2- blocks with a cane/walker but has been feeling fatigued and SOB even when walking to the restroom. Patient reports she felt well about 10 days ago when she was able to go to the dentist for tooth pain in the L upper molar. The dentist referred her to an oral surgeon for tooth removal, but the surgeon said that she was too weak for the procedure and prescribed Amoxicillin 7 day course, which she completed on Tuesday. She denies fever, chills,chest pain, orthopnea, LE edema, nausea, vomiting, diarrhea, abdominal pain, dysuria, increased urinary frequency, joint pain or rashes. Patient denies melena or hematochezia, last c-scope 4 years ago, reportedly normal. Does report constipation with a small BM yesterday, which she attributes to decreased PO intake. No sick contacts or recent travel.   75yo F with MDS, PE 2.5 years ago, HTN presents with weakness x 1 week. Weakness is generalized and insiduous in onset, associated with decreased appetite, reduced PO intake and SOB. Patient is normally able to walk 2- blocks with a cane/walker but has been feeling fatigued and SOB even when walking to the restroom. Patient reports she felt well about 10 days ago when she was able to go to the dentist for tooth pain in the L upper molar. The dentist referred her to an oral surgeon for tooth removal, but the surgeon said that she was too weak for the procedure and prescribed Amoxicillin 7 day course, which she completed on Tuesday. She denies fever, chills,chest pain, orthopnea, LE edema, nausea, vomiting, diarrhea, abdominal pain, dysuria, increased urinary frequency, joint pain or rashes. Patient denies melena or hematochezia, last c-scope 4 years ago, reportedly normal. Does report constipation with a small BM yesterday, which she attributes to decreased PO intake. No sick contacts or recent travel.     In the ED, VS: 97.6 119/82 90 20 896% RA  Given NS 500cc bolus    Review of Systems:  Review of Systems: CONSTITUTIONAL: + weakness, no fevers or chills EYES/ENT: No visual changes;  No vertigo or throat pain  NECK: No pain or stiffness RESPIRATORY: No cough, wheezing, hemoptysis; + SOB CARDIOVASCULAR: No chest pain or palpitations GASTROINTESTINAL: No abdominal or epigastric pain. No nausea, vomiting, or hematemesis; No diarrhea No melena or hematochezia. + constipation GENITOURINARY: No dysuria, frequency or hematuria NEUROLOGICAL: No numbness SKIN: No itching, burning, rashes, or lesions  All other review of systems is negative unless indicated above.	  Other Review of Systems: All other review of systems negative, except as noted in HPI	      Allergies and Intolerances:        Allergies:  	No Known Allergies:     Home Medications:   * Patient Currently Takes Medications as of 27-Apr-2017 18:50 documented in Prescription Writer  · 	hydroxyurea 500 mg oral capsule: 3 cap(s) orally Monday, Wednesday, and Friday, Last Dose Taken:    · 	hydroxyurea 500 mg oral capsule: 2 cap(s) orally tuesday, thursday, saturday and sunday, Last Dose Taken:    · 	Norvasc 5 mg oral tablet: 1 tab(s) orally once a day, Last Dose Taken:    · 	anagrelide 0.5 mg oral capsule: 1 cap(s) orally Monday, Wednesday, and Friday, Last Dose Taken:      Patient History:   Past Medical History:  Hypertension    Myelodysplasia (myelodysplastic syndrome)    PE (pulmonary thromboembolism)  2015  Tremor    Vestibular disequilibrium.    Past Surgical History:  S/P hysterectomy.    Family History:  No pertinent family history in first degree relatives.    Social History:  Social History (marital status, living situation, occupation, tobacco use, alcohol and drug use, and sexual history): Lives alone, . Does not have any children. Prior history of cigarette use from age [age] about 1ppd. No etoh use or illicit drug use.	    Tobacco Screening:  · Core Measure Site	No	  · Has the patient used tobacco in the past 30 days?	No	    Risk Assessment:   Present on Admission:  Deep Venous Thrombosis	no	  Pulmonary Embolus	no	  Urinary Catheter	no	  Central Venous Catheter/PICC Line	no	  Surgical Site Incision	no	  Pressure Ulcer(s)	no	    Heart Failure:  Does this patient have a history of or has been diagnosed with heart failure? no.      Physical Exam:  Physical Exam: VITAL SIGNS: T(C): 36.3, Max: 36.8 (04-27 @ 15:32) T(F): 97.3, Max: 98.3 (04-27 @ 15:32) HR: 76 (74 - 90) BP: 146/90 (119/82 - 146/90) BP(mean): -- RR: 20 (18 - 20) SpO2: 100% (96% - 100%) Wt(kg): --  PHYSICAL EXAM: Constitutional: WDWN resting comfortably in bed; NAD Head: NC/AT, head bobbing tremor Eyes: PERRL, EOMI, anicteric sclera ENT: no nasal discharge; uvula midline, no oropharyngeal erythema or exudates; MMM, dental caries, no abscess or pus seen in teeth, no parotid gland tenderness Neck: supple; no JVD or thyromegaly Respiratory: CTA B/L; no W/R/R, no retractions Cardiac: +S1/S2; RRR; no M/R/G Gastrointestinal: soft, NT/ND; no rebound or guarding; +BSx4 Genitourinary: normal external genitalia Back: spine midline, no bony tenderness or step-offs; no CVAT B/L Extremities: WWP, no clubbing or cyanosis; no peripheral edema Musculoskeletal: NROM x4; no joint swelling, tenderness or erythema Vascular: 2+ radial DP/PT pulses B/L Dermatologic: skin warm, dry and intact; no rashes, wounds, or scars Lymphatic: no submandibular or cervical LAD Neurologic: AAOx3; CNII-XII grossly intact; no focal deficits Psychiatric: affect and characteristics of appearance, verbalizations, behaviors are appropriate	      Labs and Results:  Labs, Radiology, Cardiology, and Other Results: . LABS:                       9.1   3.3   )-----------( 93       ( 27 Apr 2017 14:18 )            26.3   04-27  140  |  104  |  15 ----------------------------<  132<H> 3.3<L>   |  25  |  0.59  Ca    8.9      27 Apr 2017 14:18  TPro  6.9  /  Alb  3.5  /  TBili  1.8<H>  /  DBili  0.26<H>  /  AST  27  /  ALT  22  /  AlkPhos  82  04-27  PT/INR - ( 27 Apr 2017 14:18 )   PT: 12.3 sec;   INR: 1.11       PTT - ( 27 Apr 2017 14:18 )  PTT:27.1 sec  CARDIAC MARKERS ( 27 Apr 2017 14:18 ) <0.015 ng/mL / x     / 26 U/L / x     / <1.0 ng/mL  RADIOLOGY, EKG & ADDITIONAL TESTS: Reviewed.	    Assessment and Plan:   Assessment:  · Assessment		  75yo F with MDS, PE 2.5 years ago, HTN presents with weakness x 1 week admitted for pancytopenia.     Problem/Plan - 1:  ·  Problem: Myelodysplasia (myelodysplastic syndrome).  Plan: Patient with MDS for 15 years, has been stable on Hydrea and Anergrelide. In February WBC 8.5, Hb 13, platelets 207. Currently with pancytopenia, unclear etiology but may be 2/2 MDS or myelofibrosis.  -Hold Hydroxyurea and Anegrelide at this time  -Monitor CBC closely and transfuse PRBCs to keep Hb > 7 g/dl & platelets > 15k or if bleeding  -Await bone marrow biopsy results  -Check reticulocyte count to assess bone marrow function, LDH/haptoglobin for possible hemolysis, Vitamin B12 / folate / TSH (in the setting of macrocytic anemia), FOBT, iron studies.     Problem/Plan - 2:  ·  Problem: Weakness generalized.  Plan: Generalized weakness of unclear etiology. May be 2/2 anemia (Hb was 13 in Feb was per Dr. Resendiz) vs. exacerbation of MDS vs. deconditioning.   -PT consult.     Problem/Plan - 3:  ·  Problem: PE (pulmonary thromboembolism).  Plan: History of PE in 2015, treated with ? surgical clot removal and Xarelto for ~ 6 months. Patient is states she has been more SOB for the past week in the setting of weakness and thrombocytopenia. Low suspicion for PE as not tachycardic, no LE pain or swelling but does have a malignancy and prior PE as risk factors. Well's score = 1.5  -obtain collateral from Dr. Lockett in the AM  -will check D-dimer.     Problem/Plan - 4:  ·  Problem: Hypertension.  Plan: Normotensive, holding Norvasc.     Problem/Plan - 5:  ·  Problem: Nutrition, metabolism, and development symptoms.  Plan: regular diet.     Problem/Plan - 6:  Problem: Need for prophylactic measure. Plan: Miralax PRN for constipation  SCDs (no HSQ with thrombocytopenia).

## 2017-04-30 DIAGNOSIS — K02.9 DENTAL CARIES, UNSPECIFIED: ICD-10-CM

## 2017-04-30 LAB
ANION GAP SERPL CALC-SCNC: 7 MMOL/L — LOW (ref 9–16)
BUN SERPL-MCNC: 15 MG/DL — SIGNIFICANT CHANGE UP (ref 7–23)
CALCIUM SERPL-MCNC: 8.9 MG/DL — SIGNIFICANT CHANGE UP (ref 8.5–10.5)
CHLORIDE SERPL-SCNC: 110 MMOL/L — HIGH (ref 96–108)
CO2 SERPL-SCNC: 24 MMOL/L — SIGNIFICANT CHANGE UP (ref 22–31)
CREAT SERPL-MCNC: 0.56 MG/DL — SIGNIFICANT CHANGE UP (ref 0.5–1.3)
GLUCOSE SERPL-MCNC: 107 MG/DL — HIGH (ref 70–99)
HCT VFR BLD CALC: 22.8 % — LOW (ref 34.5–45)
HGB BLD-MCNC: 7.8 G/DL — LOW (ref 11.5–15.5)
MAGNESIUM SERPL-MCNC: 2.2 MG/DL — SIGNIFICANT CHANGE UP (ref 1.6–2.4)
MCHC RBC-ENTMCNC: 34.2 G/DL — SIGNIFICANT CHANGE UP (ref 32–36)
MCHC RBC-ENTMCNC: 40 PG — HIGH (ref 27–34)
MCV RBC AUTO: 116.9 FL — HIGH (ref 80–100)
PLATELET # BLD AUTO: 63 K/UL — LOW (ref 150–400)
POTASSIUM SERPL-MCNC: 3.9 MMOL/L — SIGNIFICANT CHANGE UP (ref 3.5–5.3)
POTASSIUM SERPL-SCNC: 3.9 MMOL/L — SIGNIFICANT CHANGE UP (ref 3.5–5.3)
RBC # BLD: 1.95 M/UL — LOW (ref 3.8–5.2)
RBC # FLD: 16.1 % — SIGNIFICANT CHANGE UP (ref 10.3–16.9)
SODIUM SERPL-SCNC: 141 MMOL/L — SIGNIFICANT CHANGE UP (ref 135–145)
WBC # BLD: 3.1 K/UL — LOW (ref 3.8–10.5)
WBC # FLD AUTO: 3.1 K/UL — LOW (ref 3.8–10.5)

## 2017-04-30 RX ORDER — POTASSIUM CHLORIDE 20 MEQ
20 PACKET (EA) ORAL ONCE
Qty: 0 | Refills: 0 | Status: COMPLETED | OUTPATIENT
Start: 2017-04-30 | End: 2017-04-30

## 2017-04-30 RX ADMIN — Medication 20 MILLIEQUIVALENT(S): at 12:28

## 2017-04-30 RX ADMIN — Medication 2 MILLIGRAM(S): at 12:29

## 2017-04-30 NOTE — PROGRESS NOTE ADULT - PROBLEM SELECTOR PLAN 2
Prior Hgb ~13 At Dr. Resendiz office, will vs. exacerbation of MDS vs. deconditioning.   -PT consult

## 2017-04-30 NOTE — PROGRESS NOTE ADULT - SUBJECTIVE AND OBJECTIVE BOX
INTERVAL HPI/OVERNIGHT EVENTS:  Patient was seen and examined at bedside. As per nurse and patient, no o/n events, patient resting comfortably. No complaints at this time. Continues to preseverate on tooth as cause of her low blood count despite multiple explanations that a tooth could not cause all of what is going on. Patient denies: fever, chills, dizziness, weakness, HA, Changes in vision, CP, palpitations, SOB, cough, N/V/D/C, dysuria, changes in bowel movements, LE edema. ROS otherwise negative.    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, PERRL, EOMI, sclera non-icteric, neck supple, trachea midline, no masses, no JVD, MMM, good dentition  Respiratory: CTA b/l, good air entry b/l, no wheezing, no rhonchi, no rales, without accessory muscle use and no intercostal retractions  Cardiovascular: RRR, normal S1S2, no M/R/G  Gastrointestinal: soft, NTND, no masses palpable, BS normal  Extremities: Warm, well perfused, pulses equal bilateral upper and lower extremities, no edema, no clubbing  Neurological: AAOx3, CN Grossly intact  Skin: Normal temperature, warm, dry      MEDICATIONS  (STANDING):  folic acid 2milliGRAM(s) Oral daily  potassium chloride    Tablet ER 20milliEquivalent(s) Oral once    MEDICATIONS  (PRN):  polyethylene glycol 3350 17Gram(s) Oral at bedtime PRN Constipation      Allergies    No Known Allergies    Intolerances        LABS:                        7.8    3.1   )-----------( 63       ( 30 Apr 2017 07:33 )             22.8     04-30    141  |  110<H>  |  15  ----------------------------<  107<H>  3.9   |  24  |  0.56    Ca    8.9      30 Apr 2017 07:33  Mg     2.2     04-30    TPro  5.7<L>  /  Alb  x   /  TBili  x   /  DBili  x   /  AST  x   /  ALT  x   /  AlkPhos  x   04-29          RADIOLOGY & ADDITIONAL TESTS:  Reviewed

## 2017-04-30 NOTE — PROGRESS NOTE ADULT - SUBJECTIVE AND OBJECTIVE BOX
History of Present Illness:  Chief Complaint/Reason for Admission: weakness	  History of Present Illness: 	  77yo F with MDS, PE 2.5 years ago, HTN presents with weakness x 1 week. Weakness is generalized and insiduous in onset, associated with decreased appetite, reduced PO intake and SOB. Patient is normally able to walk 2- blocks with a cane/walker but has been feeling fatigued and SOB even when walking to the restroom. Patient reports she felt well about 10 days ago when she was able to go to the dentist for tooth pain in the L upper molar. The dentist referred her to an oral surgeon for tooth removal, but the surgeon said that she was too weak for the procedure and prescribed Amoxicillin 7 day course, which she completed on Tuesday. She denies fever, chills,chest pain, orthopnea, LE edema, nausea, vomiting, diarrhea, abdominal pain, dysuria, increased urinary frequency, joint pain or rashes. Patient denies melena or hematochezia, last c-scope 4 years ago, reportedly normal. Does report constipation with a small BM yesterday, which she attributes to decreased PO intake. No sick contacts or recent travel.   77yo F with MDS, PE 2.5 years ago, HTN presents with weakness x 1 week. Weakness is generalized and insiduous in onset, associated with decreased appetite, reduced PO intake and SOB. Patient is normally able to walk 2- blocks with a cane/walker but has been feeling fatigued and SOB even when walking to the restroom. Patient reports she felt well about 10 days ago when she was able to go to the dentist for tooth pain in the L upper molar. The dentist referred her to an oral surgeon for tooth removal, but the surgeon said that she was too weak for the procedure and prescribed Amoxicillin 7 day course, which she completed on Tuesday. She denies fever, chills,chest pain, orthopnea, LE edema, nausea, vomiting, diarrhea, abdominal pain, dysuria, increased urinary frequency, joint pain or rashes. Patient denies melena or hematochezia, last c-scope 4 years ago, reportedly normal. Does report constipation with a small BM yesterday, which she attributes to decreased PO intake. No sick contacts or recent travel.     In the ED, VS: 97.6 119/82 90 20 896% RA  Given NS 500cc bolus    Review of Systems:  Review of Systems: CONSTITUTIONAL: + weakness, no fevers or chills EYES/ENT: No visual changes;  No vertigo or throat pain  NECK: No pain or stiffness RESPIRATORY: No cough, wheezing, hemoptysis; + SOB CARDIOVASCULAR: No chest pain or palpitations GASTROINTESTINAL: No abdominal or epigastric pain. No nausea, vomiting, or hematemesis; No diarrhea No melena or hematochezia. + constipation GENITOURINARY: No dysuria, frequency or hematuria NEUROLOGICAL: No numbness SKIN: No itching, burning, rashes, or lesions  All other review of systems is negative unless indicated above.	  Other Review of Systems: All other review of systems negative, except as noted in HPI	      Allergies and Intolerances:        Allergies:  	No Known Allergies:     Home Medications:   * Patient Currently Takes Medications as of 27-Apr-2017 18:50 documented in Prescription Writer  · 	hydroxyurea 500 mg oral capsule: 3 cap(s) orally Monday, Wednesday, and Friday, Last Dose Taken:    · 	hydroxyurea 500 mg oral capsule: 2 cap(s) orally tuesday, thursday, saturday and sunday, Last Dose Taken:    · 	Norvasc 5 mg oral tablet: 1 tab(s) orally once a day, Last Dose Taken:    · 	anagrelide 0.5 mg oral capsule: 1 cap(s) orally Monday, Wednesday, and Friday, Last Dose Taken:      Patient History:   Past Medical History:  Hypertension    Myelodysplasia (myelodysplastic syndrome)    PE (pulmonary thromboembolism)  2015  Tremor    Vestibular disequilibrium.    Past Surgical History:  S/P hysterectomy.    Family History:  No pertinent family history in first degree relatives.    Social History:  Social History (marital status, living situation, occupation, tobacco use, alcohol and drug use, and sexual history): Lives alone, . Does not have any children. Prior history of cigarette use from age [age] about 1ppd. No etoh use or illicit drug use.	    Tobacco Screening:  · Core Measure Site	No	  · Has the patient used tobacco in the past 30 days?	No	    Risk Assessment:   Present on Admission:  Deep Venous Thrombosis	no	  Pulmonary Embolus	no	  Urinary Catheter	no	  Central Venous Catheter/PICC Line	no	  Surgical Site Incision	no	  Pressure Ulcer(s)	no	    Heart Failure:  Does this patient have a history of or has been diagnosed with heart failure? no.      Physical Exam:  Physical Exam: VITAL SIGNS: T(C): 36.3, Max: 36.8 (04-27 @ 15:32) T(F): 97.3, Max: 98.3 (04-27 @ 15:32) HR: 76 (74 - 90) BP: 146/90 (119/82 - 146/90) BP(mean): -- RR: 20 (18 - 20) SpO2: 100% (96% - 100%) Wt(kg): --  PHYSICAL EXAM: Constitutional: WDWN resting comfortably in bed; NAD Head: NC/AT, head bobbing tremor Eyes: PERRL, EOMI, anicteric sclera ENT: no nasal discharge; uvula midline, no oropharyngeal erythema or exudates; MMM, dental caries, no abscess or pus seen in teeth, no parotid gland tenderness Neck: supple; no JVD or thyromegaly Respiratory: CTA B/L; no W/R/R, no retractions Cardiac: +S1/S2; RRR; no M/R/G Gastrointestinal: soft, NT/ND; no rebound or guarding; +BSx4 Genitourinary: normal external genitalia Back: spine midline, no bony tenderness or step-offs; no CVAT B/L Extremities: WWP, no clubbing or cyanosis; no peripheral edema Musculoskeletal: NROM x4; no joint swelling, tenderness or erythema Vascular: 2+ radial DP/PT pulses B/L Dermatologic: skin warm, dry and intact; no rashes, wounds, or scars Lymphatic: no submandibular or cervical LAD Neurologic: AAOx3; CNII-XII grossly intact; no focal deficits Psychiatric: affect and characteristics of appearance, verbalizations, behaviors are appropriate	      Labs and Results:  Labs, Radiology, Cardiology, and Other Results: . LABS:                         7.8   3.1   )-----------( 63       ( 30 Apr 2017 07:33 )            22.8  RADIOLOGY, EKG & ADDITIONAL TESTS: Reviewed.	    Assessment and Plan:   Assessment:  · Assessment		  77yo F with MDS, PE 2.5 years ago, HTN presents with weakness x 1 week admitted for pancytopenia.     Problem/Plan - 1:  ·  Problem: Myelodysplasia (myelodysplastic syndrome).  Plan: Patient with MDS for 15 years, has been stable on Hydrea and Anergrelide. In February WBC 8.5, Hb 13, platelets 207. Currently with pancytopenia, unclear etiology but may be 2/2 MDS or myelofibrosis.  -Hold Hydroxyurea and Anegrelide at this time  -Monitor CBC closely and transfuse PRBCs to keep Hb > 7 g/dl & platelets > 15k or if bleeding  -Await bone marrow biopsy results  -Check reticulocyte count to assess bone marrow function, LDH/haptoglobin for possible hemolysis, Vitamin B12 / folate / TSH (in the setting of macrocytic anemia), FOBT, iron studies.     Problem/Plan - 2:  ·  Problem: Weakness generalized.  Plan: Generalized weakness of unclear etiology. May be 2/2 anemia (Hb was 13 in Feb was per Dr. Resendiz) vs. exacerbation of MDS vs. deconditioning.   -PT consult.     Problem/Plan - 3:  ·  Problem: PE (pulmonary thromboembolism).  Plan: History of PE in 2015, treated with ? surgical clot removal and Xarelto for ~ 6 months. Patient is states she has been more SOB for the past week in the setting of weakness and thrombocytopenia. Low suspicion for PE as not tachycardic, no LE pain or swelling but does have a malignancy and prior PE as risk factors. Well's score = 1.5  -obtain collateral from Dr. Lockett in the AM  -will check D-dimer.     Problem/Plan - 4:  ·  Problem: Hypertension.  Plan: Normotensive, holding Norvasc.     Problem/Plan - 5:  ·  Problem: Nutrition, metabolism, and development symptoms.  Plan: regular diet.     Problem/Plan - 6:  Problem: Need for prophylactic measure. Plan: Miralax PRN for constipation  SCDs (no HSQ with thrombocytopenia).

## 2017-04-30 NOTE — PROGRESS NOTE ADULT - ASSESSMENT
Patient is a 75 yo F with MDS, PEx2 5 years ago, HTN and benign familial palsy (head bobbing tremor) presents with weakness x 1 week in setting of tooth infection s/p 7 day course of amoxicillin, admitted for pancytopenia 4/27 no s/p bone marrow aspiration

## 2017-05-01 RX ORDER — GLYCERIN ADULT
1 SUPPOSITORY, RECTAL RECTAL ONCE
Qty: 0 | Refills: 0 | Status: COMPLETED | OUTPATIENT
Start: 2017-05-01 | End: 2017-05-01

## 2017-05-01 RX ORDER — POTASSIUM CHLORIDE 20 MEQ
20 PACKET (EA) ORAL ONCE
Qty: 0 | Refills: 0 | Status: COMPLETED | OUTPATIENT
Start: 2017-05-01 | End: 2017-05-01

## 2017-05-01 RX ADMIN — Medication 20 MILLIEQUIVALENT(S): at 11:29

## 2017-05-01 RX ADMIN — Medication 1 SUPPOSITORY(S): at 15:51

## 2017-05-01 RX ADMIN — Medication 2 MILLIGRAM(S): at 11:29

## 2017-05-01 NOTE — PROGRESS NOTE ADULT - ASSESSMENT
Patient is a 77 yo F with MDS, PEx2 5 years ago, HTN and benign familial palsy (head bobbing tremor) presents with weakness x 1 week in setting of tooth infection s/p 7 day course of amoxicillin, admitted for pancytopenia 4/27 no s/p bone marrow aspiration

## 2017-05-01 NOTE — PROGRESS NOTE ADULT - SUBJECTIVE AND OBJECTIVE BOX
Patient was seen and examined at bedside. As per nurse and patient, no o/n events, patient resting comfortably. No complaints at this time, awaiting results, awaiting oral surgeon. Patient denies: fever, chills, dizziness, weakness, HA, Changes in vision, CP, palpitations, SOB, cough, N/V/D/C, dysuria, changes in bowel movements, LE edema. ROS otherwise negative.  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, PERRL, EOMI, sclera non-icteric, neck supple, trachea midline, no masses, no JVD, MMM, good dentition  Respiratory: CTA b/l, good air entry b/l, no wheezing, no rhonchi, no rales, without accessory muscle use and no intercostal retractions  Cardiovascular: RRR, normal S1S2, no M/R/G  Gastrointestinal: soft, NTND, no masses palpable, BS normal  Extremities: Warm, well perfused, pulses equal bilateral upper and lower extremities, no edema, no clubbing  Neurological: AAOx3, CN Grossly intact  Skin: Normal temperature, warm, dry    ICU Vital Signs Last 24 Hrs  T(C): 36.3, Max: 36.9 (05-01 @ 05:43)  T(F): 97.4, Max: 98.4 (05-01 @ 05:43)  HR: 81 (76 - 81)  BP: 122/81 (116/70 - 137/86)  BP(mean): --  ABP: --  ABP(mean): --  RR: 17 (16 - 17)  SpO2: 98% (97% - 98%)          MEDICATIONS  (STANDING):  folic acid 2milliGRAM(s) Oral daily  potassium chloride    Tablet ER 20milliEquivalent(s) Oral once    MEDICATIONS  (PRN):  polyethylene glycol 3350 17Gram(s) Oral at bedtime PRN Constipation      Allergies    No Known Allergies    Intolerances        LABS:                        7.6    3.5   )-----------( 65       ( 01 May 2017 06:41 )             22.4     05-01    139  |  107  |  14  ----------------------------<  106<H>  3.8   |  22  |  0.60    Ca    8.5      01 May 2017 06:41  Mg     2.1     05-01    TPro  6.0<L>  /  Alb  3.0<L>  /  TBili  1.1  /  DBili  x   /  AST  16  /  ALT  19  /  AlkPhos  66  05-01          RADIOLOGY & ADDITIONAL TESTS:  Reviewed

## 2017-05-01 NOTE — PROGRESS NOTE ADULT - SUBJECTIVE AND OBJECTIVE BOX
INTERVAL HPI/OVERNIGHT EVENTS:  Patient was seen and examined at bedside. As per nurse and patient, no o/n events, patient resting comfortably. No complaints at this time, awaiting results, awaiting  Patient denies: fever, chills, dizziness, weakness, HA, Changes in vision, CP, palpitations, SOB, cough, N/V/D/C, dysuria, changes in bowel movements, LE edema. ROS otherwise negative.    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, PERRL, EOMI, sclera non-icteric, neck supple, trachea midline, no masses, no JVD, MMM, good dentition  Respiratory: CTA b/l, good air entry b/l, no wheezing, no rhonchi, no rales, without accessory muscle use and no intercostal retractions  Cardiovascular: RRR, normal S1S2, no M/R/G  Gastrointestinal: soft, NTND, no masses palpable, BS normal  Extremities: Warm, well perfused, pulses equal bilateral upper and lower extremities, no edema, no clubbing  Neurological: AAOx3, CN Grossly intact  Skin: Normal temperature, warm, dry    MEDICATIONS  (STANDING):  folic acid 2milliGRAM(s) Oral daily  potassium chloride    Tablet ER 20milliEquivalent(s) Oral once    MEDICATIONS  (PRN):  polyethylene glycol 3350 17Gram(s) Oral at bedtime PRN Constipation      Allergies    No Known Allergies    Intolerances        LABS:                        7.6    3.5   )-----------( 65       ( 01 May 2017 06:41 )             22.4     05-01    139  |  107  |  14  ----------------------------<  106<H>  3.8   |  22  |  0.60    Ca    8.5      01 May 2017 06:41  Mg     2.1     05-01    TPro  6.0<L>  /  Alb  3.0<L>  /  TBili  1.1  /  DBili  x   /  AST  16  /  ALT  19  /  AlkPhos  66  05-01          RADIOLOGY & ADDITIONAL TESTS:  Reviewed INTERVAL HPI/OVERNIGHT EVENTS:  Patient was seen and examined at bedside. As per nurse and patient, no o/n events, patient resting comfortably. No complaints at this time, awaiting results, awaiting oral surgeon. Patient denies: fever, chills, dizziness, weakness, HA, Changes in vision, CP, palpitations, SOB, cough, N/V/D/C, dysuria, changes in bowel movements, LE edema. ROS otherwise negative.    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, PERRL, EOMI, sclera non-icteric, neck supple, trachea midline, no masses, no JVD, MMM, good dentition  Respiratory: CTA b/l, good air entry b/l, no wheezing, no rhonchi, no rales, without accessory muscle use and no intercostal retractions  Cardiovascular: RRR, normal S1S2, no M/R/G  Gastrointestinal: soft, NTND, no masses palpable, BS normal  Extremities: Warm, well perfused, pulses equal bilateral upper and lower extremities, no edema, no clubbing  Neurological: AAOx3, CN Grossly intact  Skin: Normal temperature, warm, dry    MEDICATIONS  (STANDING):  folic acid 2milliGRAM(s) Oral daily  potassium chloride    Tablet ER 20milliEquivalent(s) Oral once    MEDICATIONS  (PRN):  polyethylene glycol 3350 17Gram(s) Oral at bedtime PRN Constipation      Allergies    No Known Allergies    Intolerances        LABS:                        7.6    3.5   )-----------( 65       ( 01 May 2017 06:41 )             22.4     05-01    139  |  107  |  14  ----------------------------<  106<H>  3.8   |  22  |  0.60    Ca    8.5      01 May 2017 06:41  Mg     2.1     05-01    TPro  6.0<L>  /  Alb  3.0<L>  /  TBili  1.1  /  DBili  x   /  AST  16  /  ALT  19  /  AlkPhos  66  05-01          RADIOLOGY & ADDITIONAL TESTS:  Reviewed

## 2017-05-01 NOTE — CONSULT NOTE ADULT - ASSESSMENT
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
Assessment:  non-restorable caries #15 with periodontitis    Plan:  No need for surgical intervention as an inpatient until workup for MDS completed and can follow-up as outpatient for extraction tooth #15.  Please call office at [phone] to follow-up as outpatient.    Horacio Howell DDS, MD  Oral Surgeon, [facility]  Address: [address] [place] [address]

## 2017-05-01 NOTE — CONSULT NOTE ADULT - SUBJECTIVE AND OBJECTIVE BOX
75 yo F admitted for work-up MDS consulted oral surgery 2/2 tooth pain for 10 days prior to admission to hospital, was placed on amoxicillin for 7 days which pt reports led to worsening fatigue and eventual hospitalization. Pt presented to Dr. Pham, her general dentist where an x-ray was taken and diagnosed tooth #15 required extraction. Pt was then subsequently referred to an oral surgeon who placed patient on a 7 day course of amoxicillin but denied extraction at the time. Pt denies any history of tooth pain from the same site, no fevers, chills, nausea/vomiting.    Exam:   No facial swelling, asymmetry, tooth #15 with mild mobility, visible carious lesion but no tenderness to palpation,  airway patent, uvula midline, no trismus, all other dentition in good condition

## 2017-05-01 NOTE — PROGRESS NOTE ADULT - SUBJECTIVE AND OBJECTIVE BOX
This note is written for procedure performed on 4/28/17    Hematology/Oncology Procedure Note (Dr Resendiz)    Bone Marrow Aspiration/Biopsy    Indication: pancytopenia    Bone marrow aspiration and biopsy procedure description, risks, and benefits were discussed in detail with the patient.  All questions were answered.  Informed consent was obtained and time-out performed.      The area of the LEFT posterior iliac crest was prepared with Chlorhexidine and lidocaine 2% was injected for local anesthesia.    Bone marrow aspiration was performed using sterile technique.  Specimens were obtained for microscopic evaluation/cytogenetics/flow cytometry/culture.    Bone marrow biopsy was also performed and specimen collected for pathologic evaluation.    The procedure was well tolerated and no local bleeding or other complications were observed.  Pressure was applied to the procedure site and a wound dressing was placed.  The patient and nursing staff were advised that the patient is to lie flat for 30 minutes.  Tylenol may be used if no contraindications for pain at the procedure site.

## 2017-05-02 ENCOUNTER — TRANSCRIPTION ENCOUNTER (OUTPATIENT)
Age: 77
End: 2017-05-02

## 2017-05-02 LAB
ANION GAP SERPL CALC-SCNC: 9 MMOL/L — SIGNIFICANT CHANGE UP (ref 9–16)
ANISOCYTOSIS BLD QL: SLIGHT — SIGNIFICANT CHANGE UP
BASOPHILS NFR BLD AUTO: 1.4 % — SIGNIFICANT CHANGE UP (ref 0–2)
BLD GP AB SCN SERPL QL: NEGATIVE — SIGNIFICANT CHANGE UP
BUN SERPL-MCNC: 15 MG/DL — SIGNIFICANT CHANGE UP (ref 7–23)
CALCIUM SERPL-MCNC: 9.1 MG/DL — SIGNIFICANT CHANGE UP (ref 8.5–10.5)
CHLORIDE SERPL-SCNC: 109 MMOL/L — HIGH (ref 96–108)
CO2 SERPL-SCNC: 23 MMOL/L — SIGNIFICANT CHANGE UP (ref 22–31)
CREAT SERPL-MCNC: 0.62 MG/DL — SIGNIFICANT CHANGE UP (ref 0.5–1.3)
DACRYOCYTES BLD QL SMEAR: SLIGHT — SIGNIFICANT CHANGE UP
EOSINOPHIL NFR BLD AUTO: 1.7 % — SIGNIFICANT CHANGE UP (ref 0–6)
GLUCOSE SERPL-MCNC: 101 MG/DL — HIGH (ref 70–99)
HAPTOGLOB SERPL-MCNC: <8 MG/DL — LOW (ref 31–207)
HCT VFR BLD CALC: 22.3 % — LOW (ref 34.5–45)
HEMATOPATHOLOGY REPORT: SIGNIFICANT CHANGE UP
HGB BLD-MCNC: 7.4 G/DL — LOW (ref 11.5–15.5)
HOWELL-JOLLY BOD BLD QL SMEAR: SIGNIFICANT CHANGE UP
HYPOCHROMIA BLD QL: SIGNIFICANT CHANGE UP
LDH SERPL L TO P-CCNC: 321 U/L — HIGH (ref 84–246)
LYMPHOCYTES # BLD AUTO: 29 % — SIGNIFICANT CHANGE UP (ref 13–44)
LYMPHOCYTES # BLD AUTO: 32.8 % — SIGNIFICANT CHANGE UP (ref 13–44)
MACROCYTES BLD QL: SIGNIFICANT CHANGE UP
MAGNESIUM SERPL-MCNC: 2.2 MG/DL — SIGNIFICANT CHANGE UP (ref 1.6–2.4)
MANUAL DIF COMMENT BLD-IMP: SIGNIFICANT CHANGE UP
MANUAL SMEAR VERIFICATION: SIGNIFICANT CHANGE UP
MCHC RBC-ENTMCNC: 33.2 G/DL — SIGNIFICANT CHANGE UP (ref 32–36)
MCHC RBC-ENTMCNC: 39.4 PG — HIGH (ref 27–34)
MCV RBC AUTO: 118.6 FL — HIGH (ref 80–100)
MICROCYTES BLD QL: SLIGHT — SIGNIFICANT CHANGE UP
MONOCYTES NFR BLD AUTO: 6 % — SIGNIFICANT CHANGE UP (ref 2–14)
MONOCYTES NFR BLD AUTO: 8 % — SIGNIFICANT CHANGE UP (ref 2–14)
NEUTROPHILS NFR BLD AUTO: 56.6 % — SIGNIFICANT CHANGE UP (ref 43–77)
NEUTROPHILS NFR BLD AUTO: 64 % — SIGNIFICANT CHANGE UP (ref 43–77)
NEUTS BAND # BLD: 1 % — SIGNIFICANT CHANGE UP
NEUTS HYPERSEG # BLD: PRESENT — SIGNIFICANT CHANGE UP
OVALOCYTES BLD QL SMEAR: SIGNIFICANT CHANGE UP
PLAT MORPH BLD: NORMAL — SIGNIFICANT CHANGE UP
PLATELET # BLD AUTO: 63 K/UL — LOW (ref 150–400)
PLATELET COUNT - ESTIMATE: (no result)
POTASSIUM SERPL-MCNC: 3.9 MMOL/L — SIGNIFICANT CHANGE UP (ref 3.5–5.3)
POTASSIUM SERPL-SCNC: 3.9 MMOL/L — SIGNIFICANT CHANGE UP (ref 3.5–5.3)
RBC # BLD: 1.88 M/UL — LOW (ref 3.8–5.2)
RBC # FLD: 18.2 % — HIGH (ref 10.3–16.9)
RBC BLD AUTO: (no result)
RETICS/RBC NFR: 5.1 % — HIGH (ref 0.5–2.5)
RH IG SCN BLD-IMP: POSITIVE — SIGNIFICANT CHANGE UP
SODIUM SERPL-SCNC: 141 MMOL/L — SIGNIFICANT CHANGE UP (ref 135–145)
SPHEROCYTES BLD QL SMEAR: SLIGHT — SIGNIFICANT CHANGE UP
WBC # BLD: 3.5 K/UL — LOW (ref 3.8–10.5)
WBC # FLD AUTO: 3.5 K/UL — LOW (ref 3.8–10.5)

## 2017-05-02 PROCEDURE — 99232 SBSQ HOSP IP/OBS MODERATE 35: CPT | Mod: GC

## 2017-05-02 RX ORDER — POTASSIUM CHLORIDE 20 MEQ
20 PACKET (EA) ORAL ONCE
Qty: 0 | Refills: 0 | Status: COMPLETED | OUTPATIENT
Start: 2017-05-02 | End: 2017-05-02

## 2017-05-02 RX ADMIN — Medication 20 MILLIEQUIVALENT(S): at 11:11

## 2017-05-02 RX ADMIN — Medication 2 MILLIGRAM(S): at 11:11

## 2017-05-02 NOTE — DISCHARGE NOTE ADULT - HOSPITAL COURSE
Patient is a 77 yo F with MDS, PEx2 5 years ago, HTN and benign familial palsy (head bobbing tremor) presents with weakness x 1 week in setting of tooth infection s/p 7 day course of amoxicillin, admitted on 4/27 for pancytopenia. Patient is s/p bone marrow biopsy, awaiting results while holding home medications as per Dr. Castanon. Dentist saw patient while inpatient, no current infection, will need tooth #15 extraction as an outpatient. No other acute events noted, vital signs stable. Patient will be discharged and follow up with Dr. Castanon as an outpaitent for results. Patient is a 77 yo F with MDS, PEx2 5 years ago, HTN and benign familial palsy (head bobbing tremor) presents with weakness x 1 week in setting of tooth infection s/p 7 day course of amoxicillin, admitted on 4/27 for pancytopenia. Patient is s/p bone marrow biopsy, awaiting results while holding home medications as per Dr. Castanon. Dentist saw patient while inpatient, no current infection, will need tooth #15 extraction as an outpatient. No other acute events noted, vital signs stable. Pancytopenia likely 2/2 to medications for myelofibrosis, will dc meds on discharge. Patient given 1 unit of pRBC prior to ddischarge for Hgb 7.8. Patient will be discharged and follow up with Dr. Castanon as an outpaitent for results.

## 2017-05-02 NOTE — DISCHARGE NOTE ADULT - CARE PLAN
Principal Discharge DX:	Pancytopenia  Goal:	You came in because of fatigue after tooth infection and antibiotic use and were found to have pancytopenia, meaning that the levels of the elements in your blood that are important for functioning are low. You will need to follow up with Dr. Castanon in her office to get the results of all of the tests performed in the hospital.  Instructions for follow-up, activity and diet:	Please follow up with Dr. Castanon in one to two weeks  If you have increasing fatigue, feel faint, or have a fall please return to the ED  Secondary Diagnosis:	Hypertension  Goal:	at goal Blood pressure  Instructions for follow-up, activity and diet:	Please continue on your previously prescribed medications   Please follow up with your PCP in one to two weeks  Secondary Diagnosis:	Tooth decay  Instructions for follow-up, activity and diet:	You had a tooth that needed to come out outside of the hospital, and a dentist saw you here in the hospital that you can follow up with if you so choose, or follow up with your previous dentist. The dentist's number is as below:   Please follow-up as outpatient for extraction tooth #15.  Please call office at [phone] to follow-up as outpatient.    Horacio Howell DDS, MD  Oral Surgeon, [facility]  Address: [address] [place] [address] Principal Discharge DX:	Pancytopenia  Goal:	You came in because of fatigue after tooth infection and antibiotic use and were found to have pancytopenia, meaning that the levels of the elements in your blood that are important for functioning are low. You will need to follow up with Dr. Castanon in her office to get the results of all of the tests performed in the hospital.  Instructions for follow-up, activity and diet:	Please follow up with Dr. Castanon in one to two weeks. Your low blood count resolved and you were given a unit of blood. PLEASE STOP HYDROXYUREA  AND ANAGRELIDE as these medications may have caused your blood counts to be low. Please speak with Dr. Castanon about restarting.   If you have increasing fatigue, feel faint, or have a fall please return to the ED  Secondary Diagnosis:	Hypertension  Goal:	at goal Blood pressure  Instructions for follow-up, activity and diet:	Please continue on your previously prescribed medications   Please follow up with your PCP in one to two weeks  Secondary Diagnosis:	Tooth decay  Instructions for follow-up, activity and diet:	You had a tooth that needed to come out outside of the hospital, and a dentist saw you here in the hospital that you can follow up with if you so choose, or follow up with your previous dentist. The dentist's number is as below:   Please follow-up as outpatient for extraction tooth #15.  Please call office at [phone] to follow-up as outpatient.    Horacio Howell DDS, MD  Oral Surgeon, [facility]  Address: [address] [place] [address] Principal Discharge DX:	Pancytopenia  Goal:	You came in because of fatigue after tooth infection and antibiotic use and were found to have pancytopenia, meaning that the levels of the elements in your blood that are important for functioning are low. You will need to follow up with Dr. Castanon in her office to get the results of all of the tests performed in the hospital.  Instructions for follow-up, activity and diet:	Please follow up with Dr. Castanon in one to two weeks. Your low blood count resolved and you were given a unit of blood. PLEASE STOP HYDROXYUREA  AND ANAGRELIDE as these medications may have caused your blood counts to be low. Please speak with Dr. Castanon about restarting.   If you have increasing fatigue, feel faint, or have a fall please return to the ED  Secondary Diagnosis:	Hypertension  Goal:	at goal Blood pressure  Instructions for follow-up, activity and diet:	Please continue on your previously prescribed medications   Please follow up with your PCP in one to two weeks  Secondary Diagnosis:	Tooth decay  Instructions for follow-up, activity and diet:	You had a tooth that needed to come out outside of the hospital, and a dentist saw you here in the hospital that you can follow up with if you so choose, or follow up with your previous dentist. The dentist's number is as below:   Please follow-up as outpatient for extraction tooth #15.  Please call office at [phone] to follow-up as outpatient.    Horacio Howell DDS, MD  Oral Surgeon, [facility]  Address: [address] [place] [address]

## 2017-05-02 NOTE — PROGRESS NOTE ADULT - PROBLEM SELECTOR PROBLEM 3
PE (pulmonary thromboembolism)
R/O PE (pulmonary thromboembolism)
PE (pulmonary thromboembolism)

## 2017-05-02 NOTE — PROGRESS NOTE ADULT - PROBLEM SELECTOR PROBLEM 2
Weakness generalized
PE (pulmonary thromboembolism)

## 2017-05-02 NOTE — DISCHARGE NOTE ADULT - NS AS ACTIVITY OBS
Driving allowed/Stairs allowed/Please increase your activity as tolerated/Walking-Indoors allowed/Bathing allowed/Sex allowed/Walking-Outdoors allowed

## 2017-05-02 NOTE — PROGRESS NOTE ADULT - PROBLEM SELECTOR PLAN 4
Normotensive, holding home Norvasc

## 2017-05-02 NOTE — PROGRESS NOTE ADULT - PROBLEM SELECTOR PLAN 7
SCDs, No HSQ due to thrombocytopenia  Miralax PRN for constipation, Patient requesting glycerin suppository    FULL CODE    Dispo: Pending clinical resolution, continue care/management on F
SCDs, No HSQ due to thrombocytopenia  Miralax PRN for constipation, Patient requesting glycerin suppository    FULL CODE    Dispo: Pending clinical resolution, continue care/management on F
SCDs, No HSQ due to thrombocytopenia  Miralax PRN for constipation, glycerin suppository    FULL CODE    Dispo: Pending clinical resolution, continue care/management on F
SCDs, No HSQ due to thrombocytopenia  Miralax PRN for constipation    FULL CODE    Dispo: Pending clinical resolution, continue care/management on RMF

## 2017-05-02 NOTE — DISCHARGE NOTE ADULT - PATIENT PORTAL LINK FT
“You can access the Follow[facility] Patient Portal, offered by [facility], by registering with the following website: [email or web address]”

## 2017-05-02 NOTE — PROGRESS NOTE ADULT - SUBJECTIVE AND OBJECTIVE BOX
INTERVAL HPI/OVERNIGHT EVENTS:  Patient was seen and examined at bedside. As per nurse and patient, no o/n events, patient resting comfortably. No complaints at this time, awaiting results. Patient denies: fever, chills, dizziness, weakness, HA, Changes in vision, CP, palpitations, SOB, cough, N/V/D/C, dysuria, changes in bowel movements, LE edema. ROS otherwise negative.    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, PERRL, EOMI, sclera non-icteric, neck supple, trachea midline, no masses, no JVD, MMM, good dentition  Respiratory: CTA b/l, good air entry b/l, no wheezing, no rhonchi, no rales, without accessory muscle use and no intercostal retractions  Cardiovascular: RRR, normal S1S2, no M/R/G  Gastrointestinal: soft, NTND, no masses palpable, BS normal  Extremities: Warm, well perfused, pulses equal bilateral upper and lower extremities, no edema, no clubbing  Neurological: AAOx3, CN Grossly intact  Skin: Normal temperature, warm, dry    MEDICATIONS  (STANDING):  folic acid 2milliGRAM(s) Oral daily    MEDICATIONS  (PRN):  polyethylene glycol 3350 17Gram(s) Oral at bedtime PRN Constipation      Allergies    No Known Allergies    Intolerances        LABS:                        7.4    3.5   )-----------( 63       ( 02 May 2017 06:22 )             22.3     05-02    141  |  109<H>  |  15  ----------------------------<  101<H>  3.9   |  23  |  0.62    Ca    9.1      02 May 2017 06:22  Mg     2.2     05-02    TPro  6.0<L>  /  Alb  3.0<L>  /  TBili  1.1  /  DBili  x   /  AST  16  /  ALT  19  /  AlkPhos  66  05-01          RADIOLOGY & ADDITIONAL TESTS:  Reviewed INTERVAL HPI/OVERNIGHT EVENTS:  Patient was seen and examined at bedside. As per nurse and patient, no o/n events, patient resting comfortably. No complaints at this time, awaiting results. Patient denies: fever, chills, dizziness, weakness, HA, Changes in vision, CP, palpitations, SOB, cough, N/V/D/C, dysuria, changes in bowel movements, LE edema. ROS otherwise negative.    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, PERRL, EOMI, sclera non-icteric, neck supple, trachea midline, no masses, no JVD, MMM, good dentition  Respiratory: CTA b/l, good air entry b/l, no wheezing, no rhonchi, no rales, without accessory muscle use and no intercostal retractions  Cardiovascular: RRR, normal S1S2, no M/R/G  Gastrointestinal: soft, NTND, no masses palpable, BS normal  Extremities: Warm, well perfused, pulses equal bilateral upper and lower extremities, no edema, no clubbing  Neurological: AAOx3, CN Grossly intact  Skin: Normal temperature, warm, dry    MEDICATIONS  (STANDING):  folic acid 2milliGRAM(s) Oral daily    MEDICATIONS  (PRN):  polyethylene glycol 3350 17Gram(s) Oral at bedtime PRN Constipation      Allergies    No Known Allergies    Intolerances        LABS:                        7.4    3.5   )-----------( 63       ( 02 May 2017 06:22 )             22.3     05-02    141  |  109<H>  |  15  ----------------------------<  101<H>  3.9   |  23  |  0.62    Ca    9.1      02 May 2017 06:22  Mg     2.2     05-02    TPro  6.0<L>  /  Alb  3.0<L>  /  TBili  1.1  /  DBili  x   /  AST  16  /  ALT  19  /  AlkPhos  66  05-01  Stable at present---await followup counts in the AM and this will help decide whether transfusion and/or repeat BM is to be performed.            RADIOLOGY & ADDITIONAL TESTS:  Reviewed

## 2017-05-02 NOTE — PROGRESS NOTE ADULT - PROBLEM SELECTOR PROBLEM 1
Pancytopenia
Myelodysplasia (myelodysplastic syndrome)
Pulmonary nodule

## 2017-05-02 NOTE — PROGRESS NOTE ADULT - SUBJECTIVE AND OBJECTIVE BOX
History of Present Illness:  Chief Complaint/Reason for Admission: weakness	  History of Present Illness: 	  75yo F with MDS, PE 2.5 years ago, HTN presents with weakness x 1 week. Weakness is generalized and insiduous in onset, associated with decreased appetite, reduced PO intake and SOB. Patient is normally able to walk 2- blocks with a cane/walker but has been feeling fatigued and SOB even when walking to the restroom. Patient reports she felt well about 10 days ago when she was able to go to the dentist for tooth pain in the L upper molar. The dentist referred her to an oral surgeon for tooth removal, but the surgeon said that she was too weak for the procedure and prescribed Amoxicillin 7 day course, which she completed on Tuesday. She denies fever, chills,chest pain, orthopnea, LE edema, nausea, vomiting, diarrhea, abdominal pain, dysuria, increased urinary frequency, joint pain or rashes. Patient denies melena or hematochezia, last c-scope 4 years ago, reportedly normal. Does report constipation with a small BM yesterday, which she attributes to decreased PO intake. No sick contacts or recent travel.   75yo F with MDS, PE 2.5 years ago, HTN presents with weakness x 1 week. Weakness is generalized and insiduous in onset, associated with decreased appetite, reduced PO intake and SOB. Patient is normally able to walk 2- blocks with a cane/walker but has been feeling fatigued and SOB even when walking to the restroom. Patient reports she felt well about 10 days ago when she was able to go to the dentist for tooth pain in the L upper molar. The dentist referred her to an oral surgeon for tooth removal, but the surgeon said that she was too weak for the procedure and prescribed Amoxicillin 7 day course, which she completed on Tuesday. She denies fever, chills,chest pain, orthopnea, LE edema, nausea, vomiting, diarrhea, abdominal pain, dysuria, increased urinary frequency, joint pain or rashes. Patient denies melena or hematochezia, last c-scope 4 years ago, reportedly normal. Does report constipation with a small BM yesterday, which she attributes to decreased PO intake. No sick contacts or recent travel.     In the ED, VS: 97.6 119/82 90 20 896% RA  Given NS 500cc bolus    Review of Systems:  Review of Systems: CONSTITUTIONAL: + weakness, no fevers or chills EYES/ENT: No visual changes;  No vertigo or throat pain  NECK: No pain or stiffness RESPIRATORY: No cough, wheezing, hemoptysis; + SOB CARDIOVASCULAR: No chest pain or palpitations GASTROINTESTINAL: No abdominal or epigastric pain. No nausea, vomiting, or hematemesis; No diarrhea No melena or hematochezia. + constipation GENITOURINARY: No dysuria, frequency or hematuria NEUROLOGICAL: No numbness SKIN: No itching, burning, rashes, or lesions  All other review of systems is negative unless indicated above.	  Other Review of Systems: All other review of systems negative, except as noted in HPI	      Allergies and Intolerances:        Allergies:  	No Known Allergies:     Home Medications:   * Patient Currently Takes Medications as of 27-Apr-2017 18:50 documented in Prescription Writer  · 	hydroxyurea 500 mg oral capsule: 3 cap(s) orally Monday, Wednesday, and Friday, Last Dose Taken:    · 	hydroxyurea 500 mg oral capsule: 2 cap(s) orally tuesday, thursday, saturday and sunday, Last Dose Taken:    · 	Norvasc 5 mg oral tablet: 1 tab(s) orally once a day, Last Dose Taken:    · 	anagrelide 0.5 mg oral capsule: 1 cap(s) orally Monday, Wednesday, and Friday, Last Dose Taken:      Patient History:   Past Medical History:  Hypertension    Myeloproliferative disorder  PE (pulmonary thromboembolism)  2015  Tremor    Vestibular disequilibrium.    Past Surgical History:  S/P hysterectomy.    Family History:  No pertinent family history in first degree relatives.    Social History:  Social History (marital status, living situation, occupation, tobacco use, alcohol and drug use, and sexual history): Lives alone, . Does not have any children. Prior history of cigarette use from age [age] about 1ppd. No etoh use or illicit drug use.	    Tobacco Screening:  · Core Measure Site	No	  · Has the patient used tobacco in the past 30 days?	No	    Risk Assessment:   Present on Admission:  Deep Venous Thrombosis	no	  Pulmonary Embolus	no	  Urinary Catheter	no	  Central Venous Catheter/PICC Line	no	  Surgical Site Incision	no	  Pressure Ulcer(s)	no	    Heart Failure:  Does this patient have a history of or has been diagnosed with heart failure? no.      Physical Exam:  Physical Exam: VITAL SIGNS: T(C): 36.3, Max: 36.8 (04-27 @ 15:32) T(F): 97.3, Max: 98.3 (04-27 @ 15:32) HR: 76 (74 - 90) BP: 146/90 (119/82 - 146/90) BP(mean): -- RR: 20 (18 - 20) SpO2: 100% (96% - 100%) Wt(kg): --  PHYSICAL EXAM: Constitutional: WDWN resting comfortably in bed; NAD Head: NC/AT, head bobbing tremor Eyes: PERRL, EOMI, anicteric sclera ENT: no nasal discharge; uvula midline, no oropharyngeal erythema or exudates; MMM, dental caries, no abscess or pus seen in teeth, no parotid gland tenderness Neck: supple; no JVD or thyromegaly Respiratory: CTA B/L; no W/R/R, no retractions Cardiac: +S1/S2; RRR; no M/R/G Gastrointestinal: soft, NT/ND; no rebound or guarding; +BSx4 Genitourinary: normal external genitalia Back: spine midline, no bony tenderness or step-offs; no CVAT B/L Extremities: WWP, no clubbing or cyanosis; no peripheral edema Musculoskeletal: NROM x4; no joint swelling, tenderness or erythema Vascular: 2+ radial DP/PT pulses B/L Dermatologic: skin warm, dry and intact; no rashes, wounds, or scars Lymphatic: no submandibular or cervical LAD Neurologic: AAOx3; CNII-XII grossly intact; no focal deficits Psychiatric: affect and characteristics of appearance, verbalizations, behaviors are appropriate	      Labs and Results:  Labs, Radiology, Cardiology, and Other Results: . LABS:                        7.4   3.5   )-----------( 63       ( 02 May 2017 06:22 )            22.3             RADIOLOGY, EKG & ADDITIONAL TESTS: Reviewed.	    Assessment and Plan:   Assessment:  · Assessment		  75yo F with MDS, PE 2.5 years ago, HTN presents with weakness x 1 week admitted for pancytopenia.     Problem/Plan - 1:  ·  Problem: Myeloproliferative disorder.  Plan: Patient with Myeloproliferative disorder for 15 years, has been stable on Hydrea and Anergrelide. In February WBC 8.5, Hb 13, platelets 207. Currently with pancytopenia, unclear etiology but may be 2/2 MDS or myelofibrosis.  -Hold Hydroxyurea and Anegrelide at this time  -Monitor CBC closely and transfuse PRBCs to keep Hb > 7 g/dl & platelets > 15k or if bleeding  -Await bone marrow biopsy results  -Check reticulocyte count to assess bone marrow function, LDH/haptoglobin for possible hemolysis, Vitamin B12 / folate / TSH (in the setting of macrocytic anemia), FOBT, iron studies.     Problem/Plan - 2:  ·  Problem: Weakness generalized.  Plan: Generalized weakness of unclear etiology. May be 2/2 anemia (Hb was 13 in Feb was per Dr. Resendiz) vs. deconditioning.   -PT consult.     Problem/Plan - 3:  ·  Problem: PE (pulmonary thromboembolism).  Plan: History of PE in 2015, treated with ? surgical clot removal and Xarelto for ~ 6 months. Patient is states she has been more SOB for the past week in the setting of weakness and thrombocytopenia. Low suspicion for PE as not tachycardic, no LE pain or swelling but does have a malignancy and prior PE as risk factors. Well's score = 1.5  -obtain collateral from Dr. Lockett in the AM  -will check D-dimer.     Problem/Plan - 4:  ·  Problem: Hypertension.  Plan: Normotensive, holding Norvasc.     Problem/Plan - 5:  ·  Problem: Nutrition, metabolism, and development symptoms.  Plan: regular diet.     Problem/Plan - 6:  Problem: Need for prophylactic measure. Plan: Miralax PRN for constipation  SCDs (no HSQ with thrombocytopenia).

## 2017-05-02 NOTE — PROGRESS NOTE ADULT - PROBLEM SELECTOR PLAN 3
Patient with SOB, fatigue, history of PE in 2015 treated with xarelto for 6 months  - D-Dimer WNL for age adjusted limit, however unclear how much MDS can affect levels  - Continue to monitor clinically, patient not SOB now saturating well on RA without tachycardia  - As per pulmonology, when stable from heme/onc perspective will need VTE prophylaxis given high risk of PE
Patient with SOB, fatigue, history of PE in 2015 treated with xarelto for 6 months  - D-Dimer WNL for age adjusted limit, however unclear how much MDS can affect levels  - Continue to monitor clinically, patient not SOB now saturating well on RA without tachycardia

## 2017-05-02 NOTE — PROGRESS NOTE ADULT - PROBLEM SELECTOR PLAN 5
As per patient, Dr. Castanon to consult oral surgery, will follow up consult and recs
Dentist saw patient, no acute infection noted will need outpatient follow up.
Regular Diet  Replete lytes PRN

## 2017-05-02 NOTE — DISCHARGE NOTE ADULT - MEDICATION SUMMARY - MEDICATIONS TO STOP TAKING
I will STOP taking the medications listed below when I get home from the hospital:    anagrelide 0.5 mg oral capsule  -- 1 cap(s) by mouth Monday, Wednesday, and Friday    hydroxyurea 500 mg oral capsule  -- 3 cap(s) by mouth Monday, Wednesday, and Friday    hydroxyurea 500 mg oral capsule  -- 2 cap(s) by mouth tuesday, thursday, saturday and sunday

## 2017-05-02 NOTE — DISCHARGE NOTE ADULT - PLAN OF CARE
You came in because of fatigue after tooth infection and antibiotic use and were found to have pancytopenia, meaning that the levels of the elements in your blood that are important for functioning are low. You will need to follow up with Dr. Castanon in her office to get the results of all of the tests performed in the hospital. Please follow up with Dr. Castanon in one to two weeks  If you have increasing fatigue, feel faint, or have a fall please return to the ED Please continue on your previously prescribed medications   Please follow up with your PCP in one to two weeks at goal Blood pressure You had a tooth that needed to come out outside of the hospital, and a dentist saw you here in the hospital that you can follow up with if you so choose, or follow up with your previous dentist. The dentist's number is as below:   Please follow-up as outpatient for extraction tooth #[id number].  Please call office at [phone] to follow-up as outpatient.    Horacio Howell DDS, MD  Oral Surgeon, [facility]  Address: [address] [place] [address] Please follow up with Dr. Castanon in one to two weeks. Your low blood count resolved and you were given a unit of blood. PLEASE STOP HYDROXYUREA  AND ANAGRELIDE as these medications may have caused your blood counts to be low. Please speak with Dr. Castanon about restarting.   If you have increasing fatigue, feel faint, or have a fall please return to the ED

## 2017-05-02 NOTE — PROGRESS NOTE ADULT - PROBLEM SELECTOR PLAN 6
Regular Diet  Replete lytes PRN
SCDs, No HSQ due to thrombocytopenia  Miralax PRN for constipation    FULL CODE    Dispo: Pending clinical resolution

## 2017-05-02 NOTE — DIETITIAN INITIAL EVALUATION ADULT. - OTHER INFO
Pt admitted with weakness; pancytopenia.  Pt endorses fair appetite at this time; improving from PTA.  ~30% meal intake observed.  Preferences obtained.  Pt denies oral nutrition supplementation.  Pt denies GI distress or pain.  NKFA.  Skin: intact.

## 2017-05-02 NOTE — PROGRESS NOTE ADULT - SUBJECTIVE AND OBJECTIVE BOX
Interval Events: reviewed  Patient seen and examined at bedside.    Patient is a 76y old  Female who presents with a chief complaint of weakness (02 May 2017 15:11)  she is doing well and was seen by hematology and might need repeat bone marrow    PAST MEDICAL & SURGICAL HISTORY:  Hypertension  PE (pulmonary thromboembolism): 2015  Tremor  Vestibular disequilibrium  Myelodysplasia (myelodysplastic syndrome)  S/P hysterectomy      MEDICATIONS:  Pulmonary:    Antimicrobials:    Anticoagulants:    Cardiac:      Allergies    No Known Allergies    Intolerances        Vital Signs Last 24 Hrs  T(C): 36.7, Max: 36.8 (05-02 @ 05:40)  T(F): 98.1, Max: 98.2 (05-02 @ 05:40)  HR: 82 (82 - 84)  BP: 112/77 (112/77 - 120/81)  BP(mean): --  RR: 15 (14 - 15)  SpO2: 97% (97% - 97%)        LABS:      CBC Full  -  ( 02 May 2017 10:17 )  WBC Count : x  Hemoglobin : x  Hematocrit : x  Platelet Count - Automated : x  Mean Cell Volume : x  Mean Cell Hemoglobin : x  Mean Cell Hemoglobin Concentration : x  Auto Neutrophil # : x  Auto Lymphocyte # : x  Auto Monocyte # : x  Auto Eosinophil # : x  Auto Basophil # : x  Auto Neutrophil % : 64.0 %  Auto Lymphocyte % : 29.0 %  Auto Monocyte % : 6.0 %  Auto Eosinophil % : x  Auto Basophil % : x    05-02    141  |  109<H>  |  15  ----------------------------<  101<H>  3.9   |  23  |  0.62    Ca    9.1      02 May 2017 06:22  Mg     2.2     05-02    TPro  6.0<L>  /  Alb  3.0<L>  /  TBili  1.1  /  DBili  x   /  AST  16  /  ALT  19  /  AlkPhos  66  05-01                        RADIOLOGY & ADDITIONAL STUDIES (The following images were personally reviewed):  Resendiz:                                   No  Urine output:               Yes          DVT prophylaxis:         Yes          Flattus:                          Yes          Bowel movement:       Yes

## 2017-05-02 NOTE — PROGRESS NOTE ADULT - PROBLEM SELECTOR PROBLEM 6
Nutrition, metabolism, and development symptoms
Need for prophylactic measure

## 2017-05-02 NOTE — PROGRESS NOTE ADULT - PROBLEM SELECTOR PLAN 1
Patient with new onset fatigue in setting of long standing MDS. 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 Is now presenting with pancytopenia   - f/u heme/onc, recs appreciated  - continue to monitor CBC  - holding home medications at this time as per mega  - As per Dr. Turk: monitor CBC closely and transfuse PRBCs to keep Hb > 7 g/dl & platelets > 15k or if bleeding  - Concern for progression to either myelofibrosis and less likely leukemia   - U/S of abdomen negative for splenomegaly
Patient with new onset fatigue in setting of long standing MDS. 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 Is now presenting with pancytopenia   - f/u heme/onc, recs appreciated  - continue to monitor CBC  - holding home medications at this time as per mega  - As per Dr. Turk: monitor CBC closely and transfuse PRBCs to keep Hb > 7 g/dl & platelets > 15k or if bleeding  - Concern for progression to either myelofibrosis and less likely leukemia   - U/S of abdomen negative for splenomegaly  - will check peripheral smear for signs of leukoerythoblasts picture (nucleated RBC and tear drops)
the low scan of the chest which did report was attached showed some improvement and stability of the pulmonary nodules. Patient is scheduled to have a repeat CT scan of the chest in July. no need to repeat a CT scan of the chest
the low scan of the chest which did report was attached showed some improvement and stability of the pulmonary nodules. Patient is scheduled to have a repeat CT scan of the chest in July. no need to repeat a CT scan of the chest dullness to patient is undergoing scanning for underlying acute
Patient with new onset fatigue in setting of long standing MDS. Dr. Hickey to see patient and evaluate need for bone marrow biopsy   - f/u heme/onc, recs appreciated  - continue to monitor CBC  - holding home medications at this time as per mega  - As per Dr. Turk: monitor CBC closely and transfuse PRBCs to keep Hb > 7 g/dl & platelets > 15k or if bleeding
the low scan of the chest which did report was attached showed some improvement and stability of the pulmonary nodules. Patient is scheduled to have a repeat CT scan of the chest in July.

## 2017-05-03 VITALS
RESPIRATION RATE: 16 BRPM | TEMPERATURE: 97 F | HEART RATE: 75 BPM | DIASTOLIC BLOOD PRESSURE: 81 MMHG | SYSTOLIC BLOOD PRESSURE: 143 MMHG | OXYGEN SATURATION: 95 %

## 2017-05-03 LAB
ALBUMIN SERPL ELPH-MCNC: 3 G/DL — LOW (ref 3.4–5)
ALP SERPL-CCNC: 72 U/L — SIGNIFICANT CHANGE UP (ref 40–120)
ALT FLD-CCNC: 18 U/L — SIGNIFICANT CHANGE UP (ref 12–42)
ANION GAP SERPL CALC-SCNC: 10 MMOL/L — SIGNIFICANT CHANGE UP (ref 9–16)
AST SERPL-CCNC: 17 U/L — SIGNIFICANT CHANGE UP (ref 15–37)
BASOPHILS NFR BLD AUTO: 1.3 % — SIGNIFICANT CHANGE UP (ref 0–2)
BILIRUB SERPL-MCNC: 1 MG/DL — SIGNIFICANT CHANGE UP (ref 0.2–1.2)
BUN SERPL-MCNC: 13 MG/DL — SIGNIFICANT CHANGE UP (ref 7–23)
CALCIUM SERPL-MCNC: 8.6 MG/DL — SIGNIFICANT CHANGE UP (ref 8.5–10.5)
CHLORIDE SERPL-SCNC: 107 MMOL/L — SIGNIFICANT CHANGE UP (ref 96–108)
CO2 SERPL-SCNC: 22 MMOL/L — SIGNIFICANT CHANGE UP (ref 22–31)
CREAT SERPL-MCNC: 0.55 MG/DL — SIGNIFICANT CHANGE UP (ref 0.5–1.3)
EOSINOPHIL NFR BLD AUTO: 1.6 % — SIGNIFICANT CHANGE UP (ref 0–6)
GLUCOSE SERPL-MCNC: 101 MG/DL — HIGH (ref 70–99)
HAPTOGLOB SERPL-MCNC: <8 MG/DL — LOW (ref 31–207)
HCT VFR BLD CALC: 23.2 % — LOW (ref 34.5–45)
HGB BLD-MCNC: 7.8 G/DL — LOW (ref 11.5–15.5)
LDH SERPL L TO P-CCNC: 362 U/L — HIGH (ref 84–246)
LYMPHOCYTES # BLD AUTO: 29.1 % — SIGNIFICANT CHANGE UP (ref 13–44)
MAGNESIUM SERPL-MCNC: 2.1 MG/DL — SIGNIFICANT CHANGE UP (ref 1.6–2.4)
MCHC RBC-ENTMCNC: 33.6 G/DL — SIGNIFICANT CHANGE UP (ref 32–36)
MCHC RBC-ENTMCNC: 41.1 PG — HIGH (ref 27–34)
MCV RBC AUTO: 122.1 FL — HIGH (ref 80–100)
MONOCYTES NFR BLD AUTO: 10.9 % — SIGNIFICANT CHANGE UP (ref 2–14)
NEUTROPHILS NFR BLD AUTO: 57.1 % — SIGNIFICANT CHANGE UP (ref 43–77)
PLATELET # BLD AUTO: 79 K/UL — LOW (ref 150–400)
POTASSIUM SERPL-MCNC: 3.8 MMOL/L — SIGNIFICANT CHANGE UP (ref 3.5–5.3)
POTASSIUM SERPL-SCNC: 3.8 MMOL/L — SIGNIFICANT CHANGE UP (ref 3.5–5.3)
PROT SERPL-MCNC: 6.1 G/DL — LOW (ref 6.4–8.2)
RBC # BLD: 1.89 M/UL — LOW (ref 3.8–5.2)
RBC # BLD: 1.9 M/UL — LOW (ref 3.8–5.2)
RBC # FLD: 17.5 % — HIGH (ref 10.3–16.9)
RETICS/RBC NFR: 6.2 % — HIGH (ref 0.5–2.5)
SODIUM SERPL-SCNC: 139 MMOL/L — SIGNIFICANT CHANGE UP (ref 135–145)
WBC # BLD: 3.8 K/UL — SIGNIFICANT CHANGE UP (ref 3.8–10.5)
WBC # FLD AUTO: 3.8 K/UL — SIGNIFICANT CHANGE UP (ref 3.8–10.5)

## 2017-05-03 PROCEDURE — 76700 US EXAM ABDOM COMPLETE: CPT

## 2017-05-03 PROCEDURE — 85379 FIBRIN DEGRADATION QUANT: CPT

## 2017-05-03 PROCEDURE — 86850 RBC ANTIBODY SCREEN: CPT

## 2017-05-03 PROCEDURE — 88313 SPECIAL STAINS GROUP 2: CPT

## 2017-05-03 PROCEDURE — 97162 PT EVAL MOD COMPLEX 30 MIN: CPT

## 2017-05-03 PROCEDURE — 83010 ASSAY OF HAPTOGLOBIN QUANT: CPT

## 2017-05-03 PROCEDURE — 88300 SURGICAL PATH GROSS: CPT

## 2017-05-03 PROCEDURE — 97116 GAIT TRAINING THERAPY: CPT

## 2017-05-03 PROCEDURE — 86334 IMMUNOFIX E-PHORESIS SERUM: CPT

## 2017-05-03 PROCEDURE — 83550 IRON BINDING TEST: CPT

## 2017-05-03 PROCEDURE — 85045 AUTOMATED RETICULOCYTE COUNT: CPT

## 2017-05-03 PROCEDURE — 88341 IMHCHEM/IMCYTCHM EA ADD ANTB: CPT

## 2017-05-03 PROCEDURE — 85652 RBC SED RATE AUTOMATED: CPT

## 2017-05-03 PROCEDURE — 80053 COMPREHEN METABOLIC PANEL: CPT

## 2017-05-03 PROCEDURE — 82746 ASSAY OF FOLIC ACID SERUM: CPT

## 2017-05-03 PROCEDURE — 82607 VITAMIN B-12: CPT

## 2017-05-03 PROCEDURE — 80048 BASIC METABOLIC PNL TOTAL CA: CPT

## 2017-05-03 PROCEDURE — 86923 COMPATIBILITY TEST ELECTRIC: CPT

## 2017-05-03 PROCEDURE — 81001 URINALYSIS AUTO W/SCOPE: CPT

## 2017-05-03 PROCEDURE — 86880 COOMBS TEST DIRECT: CPT

## 2017-05-03 PROCEDURE — 80076 HEPATIC FUNCTION PANEL: CPT

## 2017-05-03 PROCEDURE — 84550 ASSAY OF BLOOD/URIC ACID: CPT

## 2017-05-03 PROCEDURE — 83615 LACTATE (LD) (LDH) ENZYME: CPT

## 2017-05-03 PROCEDURE — 99285 EMERGENCY DEPT VISIT HI MDM: CPT | Mod: 25

## 2017-05-03 PROCEDURE — 82550 ASSAY OF CK (CPK): CPT

## 2017-05-03 PROCEDURE — 84443 ASSAY THYROID STIM HORMONE: CPT

## 2017-05-03 PROCEDURE — 84165 PROTEIN E-PHORESIS SERUM: CPT

## 2017-05-03 PROCEDURE — 83735 ASSAY OF MAGNESIUM: CPT

## 2017-05-03 PROCEDURE — 86901 BLOOD TYPING SEROLOGIC RH(D): CPT

## 2017-05-03 PROCEDURE — 82728 ASSAY OF FERRITIN: CPT

## 2017-05-03 PROCEDURE — 85027 COMPLETE CBC AUTOMATED: CPT

## 2017-05-03 PROCEDURE — 85730 THROMBOPLASTIN TIME PARTIAL: CPT

## 2017-05-03 PROCEDURE — 85025 COMPLETE CBC W/AUTO DIFF WBC: CPT

## 2017-05-03 PROCEDURE — P9016: CPT

## 2017-05-03 PROCEDURE — 82248 BILIRUBIN DIRECT: CPT

## 2017-05-03 PROCEDURE — 82553 CREATINE MB FRACTION: CPT

## 2017-05-03 PROCEDURE — 88305 TISSUE EXAM BY PATHOLOGIST: CPT

## 2017-05-03 PROCEDURE — 86900 BLOOD TYPING SEROLOGIC ABO: CPT

## 2017-05-03 PROCEDURE — 84484 ASSAY OF TROPONIN QUANT: CPT

## 2017-05-03 PROCEDURE — 84155 ASSAY OF PROTEIN SERUM: CPT

## 2017-05-03 PROCEDURE — 93005 ELECTROCARDIOGRAM TRACING: CPT

## 2017-05-03 PROCEDURE — 71045 X-RAY EXAM CHEST 1 VIEW: CPT

## 2017-05-03 PROCEDURE — 36415 COLL VENOUS BLD VENIPUNCTURE: CPT

## 2017-05-03 PROCEDURE — 36430 TRANSFUSION BLD/BLD COMPNT: CPT

## 2017-05-03 PROCEDURE — 85097 BONE MARROW INTERPRETATION: CPT

## 2017-05-03 PROCEDURE — 85610 PROTHROMBIN TIME: CPT

## 2017-05-03 RX ORDER — POTASSIUM CHLORIDE 20 MEQ
20 PACKET (EA) ORAL ONCE
Qty: 0 | Refills: 0 | Status: COMPLETED | OUTPATIENT
Start: 2017-05-03 | End: 2017-05-03

## 2017-05-03 RX ORDER — POLYETHYLENE GLYCOL 3350 17 G/17G
17 POWDER, FOR SOLUTION ORAL
Qty: 0 | Refills: 0 | Status: DISCONTINUED | OUTPATIENT
Start: 2017-05-03 | End: 2017-05-03

## 2017-05-03 RX ORDER — HYDROXYUREA 500 MG/1
2 CAPSULE ORAL
Qty: 0 | Refills: 0 | COMMUNITY

## 2017-05-03 RX ORDER — FOLIC ACID 0.8 MG
2 TABLET ORAL
Qty: 60 | Refills: 0 | OUTPATIENT
Start: 2017-05-03 | End: 2017-06-02

## 2017-05-03 RX ORDER — HYDROXYUREA 500 MG/1
3 CAPSULE ORAL
Qty: 0 | Refills: 0 | COMMUNITY

## 2017-05-03 RX ADMIN — Medication 60 MILLIGRAM(S): at 11:49

## 2017-05-03 RX ADMIN — Medication 2 MILLIGRAM(S): at 11:19

## 2017-05-08 DIAGNOSIS — K05.30 CHRONIC PERIODONTITIS, UNSPECIFIED: ICD-10-CM

## 2017-05-08 DIAGNOSIS — T50.995A ADVERSE EFFECT OF OTHER DRUGS, MEDICAMENTS AND BIOLOGICAL SUBSTANCES, INITIAL ENCOUNTER: ICD-10-CM

## 2017-05-08 DIAGNOSIS — R91.8 OTHER NONSPECIFIC ABNORMAL FINDING OF LUNG FIELD: ICD-10-CM

## 2017-05-08 DIAGNOSIS — Z86.711 PERSONAL HISTORY OF PULMONARY EMBOLISM: ICD-10-CM

## 2017-05-08 DIAGNOSIS — I10 ESSENTIAL (PRIMARY) HYPERTENSION: ICD-10-CM

## 2017-05-08 DIAGNOSIS — Z87.891 PERSONAL HISTORY OF NICOTINE DEPENDENCE: ICD-10-CM

## 2017-05-08 DIAGNOSIS — G83.9 PARALYTIC SYNDROME, UNSPECIFIED: ICD-10-CM

## 2017-05-08 DIAGNOSIS — D46.9 MYELODYSPLASTIC SYNDROME, UNSPECIFIED: ICD-10-CM

## 2017-05-08 DIAGNOSIS — R53.1 WEAKNESS: ICD-10-CM

## 2017-05-08 DIAGNOSIS — R62.7 ADULT FAILURE TO THRIVE: ICD-10-CM

## 2017-05-08 DIAGNOSIS — K02.9 DENTAL CARIES, UNSPECIFIED: ICD-10-CM

## 2017-05-08 DIAGNOSIS — D61.811 OTHER DRUG-INDUCED PANCYTOPENIA: ICD-10-CM

## 2017-06-07 ENCOUNTER — APPOINTMENT (OUTPATIENT)
Dept: NEUROLOGY | Facility: CLINIC | Age: 77
End: 2017-06-07

## 2017-06-07 VITALS
WEIGHT: 112 LBS | DIASTOLIC BLOOD PRESSURE: 98 MMHG | HEART RATE: 95 BPM | HEIGHT: 65 IN | TEMPERATURE: 97.4 F | SYSTOLIC BLOOD PRESSURE: 142 MMHG | BODY MASS INDEX: 18.66 KG/M2 | OXYGEN SATURATION: 94 %

## 2017-06-07 DIAGNOSIS — M54.16 RADICULOPATHY, LUMBAR REGION: ICD-10-CM

## 2017-06-16 ENCOUNTER — APPOINTMENT (OUTPATIENT)
Dept: NEUROLOGY | Facility: CLINIC | Age: 77
End: 2017-06-16

## 2017-06-16 ENCOUNTER — EMERGENCY (EMERGENCY)
Facility: HOSPITAL | Age: 77
LOS: 1 days | Discharge: HOME | End: 2017-06-16
Attending: EMERGENCY MEDICINE | Admitting: EMERGENCY MEDICINE
Payer: MEDICARE

## 2017-06-16 VITALS
SYSTOLIC BLOOD PRESSURE: 145 MMHG | RESPIRATION RATE: 18 BRPM | TEMPERATURE: 98 F | HEART RATE: 88 BPM | WEIGHT: 113.98 LBS | DIASTOLIC BLOOD PRESSURE: 92 MMHG | OXYGEN SATURATION: 96 %

## 2017-06-16 VITALS
OXYGEN SATURATION: 95 % | HEIGHT: 65 IN | TEMPERATURE: 97.7 F | BODY MASS INDEX: 19.16 KG/M2 | HEART RATE: 89 BPM | DIASTOLIC BLOOD PRESSURE: 96 MMHG | SYSTOLIC BLOOD PRESSURE: 143 MMHG | WEIGHT: 115 LBS

## 2017-06-16 DIAGNOSIS — Z90.710 ACQUIRED ABSENCE OF BOTH CERVIX AND UTERUS: Chronic | ICD-10-CM

## 2017-06-16 DIAGNOSIS — I82.411 ACUTE EMBOLISM AND THROMBOSIS OF RIGHT FEMORAL VEIN: ICD-10-CM

## 2017-06-16 DIAGNOSIS — Z79.01 LONG TERM (CURRENT) USE OF ANTICOAGULANTS: ICD-10-CM

## 2017-06-16 DIAGNOSIS — Z79.899 OTHER LONG TERM (CURRENT) DRUG THERAPY: ICD-10-CM

## 2017-06-16 DIAGNOSIS — M79.661 PAIN IN RIGHT LOWER LEG: ICD-10-CM

## 2017-06-16 DIAGNOSIS — I10 ESSENTIAL (PRIMARY) HYPERTENSION: ICD-10-CM

## 2017-06-16 DIAGNOSIS — I80.9 PHLEBITIS AND THROMBOPHLEBITIS OF UNSPECIFIED SITE: ICD-10-CM

## 2017-06-16 DIAGNOSIS — M79.604 PAIN IN RIGHT LEG: ICD-10-CM

## 2017-06-16 LAB
ALBUMIN SERPL ELPH-MCNC: 4.2 G/DL — SIGNIFICANT CHANGE UP (ref 3.3–5)
ALP SERPL-CCNC: 95 U/L — SIGNIFICANT CHANGE UP (ref 40–120)
ALT FLD-CCNC: 11 U/L — SIGNIFICANT CHANGE UP (ref 10–45)
ANION GAP SERPL CALC-SCNC: 14 MMOL/L — SIGNIFICANT CHANGE UP (ref 5–17)
APTT BLD: 29.4 SEC — SIGNIFICANT CHANGE UP (ref 27.5–37.4)
AST SERPL-CCNC: 17 U/L — SIGNIFICANT CHANGE UP (ref 10–40)
BILIRUB SERPL-MCNC: 0.6 MG/DL — SIGNIFICANT CHANGE UP (ref 0.2–1.2)
BUN SERPL-MCNC: 12 MG/DL — SIGNIFICANT CHANGE UP (ref 7–23)
CALCIUM SERPL-MCNC: 9.8 MG/DL — SIGNIFICANT CHANGE UP (ref 8.4–10.5)
CHLORIDE SERPL-SCNC: 102 MMOL/L — SIGNIFICANT CHANGE UP (ref 96–108)
CO2 SERPL-SCNC: 24 MMOL/L — SIGNIFICANT CHANGE UP (ref 22–31)
CREAT SERPL-MCNC: 0.7 MG/DL — SIGNIFICANT CHANGE UP (ref 0.5–1.3)
GLUCOSE SERPL-MCNC: 105 MG/DL — HIGH (ref 70–99)
HCT VFR BLD CALC: 47 % — HIGH (ref 34.5–45)
HGB BLD-MCNC: 15.5 G/DL — SIGNIFICANT CHANGE UP (ref 11.5–15.5)
INR BLD: 1.11 — SIGNIFICANT CHANGE UP (ref 0.88–1.16)
MCHC RBC-ENTMCNC: 31.2 PG — SIGNIFICANT CHANGE UP (ref 27–34)
MCHC RBC-ENTMCNC: 33 G/DL — SIGNIFICANT CHANGE UP (ref 32–36)
MCV RBC AUTO: 94.6 FL — SIGNIFICANT CHANGE UP (ref 80–100)
PLATELET # BLD AUTO: 294 K/UL — SIGNIFICANT CHANGE UP (ref 150–400)
POTASSIUM SERPL-MCNC: 3.2 MMOL/L — LOW (ref 3.5–5.3)
POTASSIUM SERPL-SCNC: 3.2 MMOL/L — LOW (ref 3.5–5.3)
PROT SERPL-MCNC: 7.4 G/DL — SIGNIFICANT CHANGE UP (ref 6–8.3)
PROTHROM AB SERPL-ACNC: 12.3 SEC — SIGNIFICANT CHANGE UP (ref 9.8–12.7)
RBC # BLD: 4.97 M/UL — SIGNIFICANT CHANGE UP (ref 3.8–5.2)
RBC # FLD: 15.7 % — SIGNIFICANT CHANGE UP (ref 10.3–16.9)
SODIUM SERPL-SCNC: 140 MMOL/L — SIGNIFICANT CHANGE UP (ref 135–145)
WBC # BLD: 13.3 K/UL — HIGH (ref 3.8–10.5)
WBC # FLD AUTO: 13.3 K/UL — HIGH (ref 3.8–10.5)

## 2017-06-16 PROCEDURE — 85730 THROMBOPLASTIN TIME PARTIAL: CPT

## 2017-06-16 PROCEDURE — 99284 EMERGENCY DEPT VISIT MOD MDM: CPT | Mod: 25

## 2017-06-16 PROCEDURE — 99284 EMERGENCY DEPT VISIT MOD MDM: CPT

## 2017-06-16 PROCEDURE — 80053 COMPREHEN METABOLIC PANEL: CPT

## 2017-06-16 PROCEDURE — 85610 PROTHROMBIN TIME: CPT

## 2017-06-16 PROCEDURE — 36415 COLL VENOUS BLD VENIPUNCTURE: CPT

## 2017-06-16 PROCEDURE — 85027 COMPLETE CBC AUTOMATED: CPT

## 2017-06-16 PROCEDURE — 93971 EXTREMITY STUDY: CPT | Mod: 26,RT

## 2017-06-16 PROCEDURE — 93971 EXTREMITY STUDY: CPT

## 2017-06-16 RX ORDER — FONDAPARINUX SODIUM 2.5 MG/.5ML
1 INJECTION, SOLUTION SUBCUTANEOUS
Qty: 42 | Refills: 0 | OUTPATIENT
Start: 2017-06-16 | End: 2017-07-07

## 2017-06-16 RX ORDER — RIVAROXABAN 15 MG-20MG
15 KIT ORAL ONCE
Qty: 0 | Refills: 0 | Status: COMPLETED | OUTPATIENT
Start: 2017-06-16 | End: 2017-06-16

## 2017-06-16 RX ORDER — POTASSIUM CHLORIDE 20 MEQ
40 PACKET (EA) ORAL ONCE
Qty: 0 | Refills: 0 | Status: COMPLETED | OUTPATIENT
Start: 2017-06-16 | End: 2017-06-16

## 2017-06-16 RX ADMIN — RIVAROXABAN 15 MILLIGRAM(S): KIT at 20:34

## 2017-06-16 RX ADMIN — Medication 40 MILLIEQUIVALENT(S): at 19:37

## 2017-06-16 NOTE — ED PROVIDER NOTE - MUSCULOSKELETAL, MLM
Range of motion is not limited. asymmetric edema of the RLE compared to the LLE. + calf ttp. + isolated area of erythema / warmth to anterior-medial calf, possible superificial phlebitis

## 2017-06-16 NOTE — ED PROVIDER NOTE - MEDICAL DECISION MAKING DETAILS
Pt with PMHx PE, not currently AC p/w asymmetric leg swelling, spotting erythema. superficial phlebitis, r/o DVT. Doppler study. Dispo pending w/u and clinical status

## 2017-06-16 NOTE — ED PROVIDER NOTE - OBJECTIVE STATEMENT
Pt with PMHx HTN, PE s/p thrombectomy not currently on AC, MDS p/w 5 days of RLE swelling, pain, and redness. No CP/SOB. No f/c. Pt was seeing her neurologist Dr Ruff today, who sent pt in for doppler study to r/o DVT Pt with PMHx HTN, PE s/p thrombectomy not currently on AC, MDS p/w 5 days of RLE swelling, pain, and redness. No CP/SOB. No f/c. Pt was seeing her neurologist Dr Ruff today, who sent pt in for doppler study to r/o DVT. No f/c. No recent travel. No other complaints.

## 2017-06-16 NOTE — ED PROVIDER NOTE - CARE PLAN
Principal Discharge DX:	Acute deep vein thrombosis (DVT) of femoral vein of right lower extremity  Secondary Diagnosis:	Superficial phlebitis

## 2017-06-29 ENCOUNTER — APPOINTMENT (OUTPATIENT)
Dept: NEUROLOGY | Facility: CLINIC | Age: 77
End: 2017-06-29

## 2017-07-26 ENCOUNTER — APPOINTMENT (OUTPATIENT)
Dept: NEUROLOGY | Facility: CLINIC | Age: 77
End: 2017-07-26

## 2017-07-26 VITALS
OXYGEN SATURATION: 96 % | HEIGHT: 65 IN | WEIGHT: 113 LBS | TEMPERATURE: 97.5 F | HEART RATE: 92 BPM | DIASTOLIC BLOOD PRESSURE: 92 MMHG | BODY MASS INDEX: 18.83 KG/M2 | SYSTOLIC BLOOD PRESSURE: 132 MMHG

## 2017-07-27 RX ORDER — ASPIRIN 81 MG
81 TABLET,CHEWABLE ORAL DAILY
Refills: 0 | Status: ACTIVE | COMMUNITY

## 2017-07-27 RX ORDER — FOLIC ACID 1 MG/1
1 TABLET ORAL
Qty: 90 | Refills: 0 | Status: ACTIVE | COMMUNITY
Start: 2017-05-04

## 2017-07-27 RX ORDER — RIVAROXABAN 20 MG/1
20 TABLET, FILM COATED ORAL
Refills: 0 | Status: ACTIVE | COMMUNITY

## 2017-08-03 LAB — ZINC SERPL-MCNC: 71 UG/DL

## 2017-08-07 DIAGNOSIS — G62.9 POLYNEUROPATHY, UNSPECIFIED: ICD-10-CM

## 2017-08-07 DIAGNOSIS — R53.1 WEAKNESS: ICD-10-CM

## 2017-08-07 DIAGNOSIS — Z00.00 ENCOUNTER FOR GENERAL ADULT MEDICAL EXAMINATION W/OUT ABNORMAL FINDINGS: ICD-10-CM

## 2017-08-07 DIAGNOSIS — G72.9 MYOPATHY, UNSPECIFIED: ICD-10-CM

## 2017-08-07 LAB
MISCELLANEOUS TEST: NORMAL
PROC NAME: NORMAL

## 2017-08-10 PROBLEM — G72.9 MYOPATHY: Status: ACTIVE | Noted: 2017-06-07

## 2017-08-10 PROBLEM — R53.1 WEAKNESS: Status: ACTIVE | Noted: 2017-06-07

## 2017-08-10 PROBLEM — G62.9 NEUROPATHY: Status: ACTIVE | Noted: 2017-06-07

## 2017-08-16 ENCOUNTER — LABORATORY RESULT (OUTPATIENT)
Age: 77
End: 2017-08-16

## 2017-08-17 LAB
24R-OH-CALCIDIOL SERPL-MCNC: 86.7 PG/ML
25(OH)D3 SERPL-MCNC: 20 NG/ML
ALBUMIN SERPL ELPH-MCNC: 4 G/DL
ALP BLD-CCNC: 71 U/L
ALT SERPL-CCNC: 7 U/L
ANION GAP SERPL CALC-SCNC: 17 MMOL/L
AST SERPL-CCNC: 11 U/L
BASOPHILS # BLD AUTO: 0.01 K/UL
BASOPHILS NFR BLD AUTO: 0.3 %
BILIRUB SERPL-MCNC: 0.9 MG/DL
BUN SERPL-MCNC: 26 MG/DL
CALCIUM SERPL-MCNC: 9.1 MG/DL
CHLORIDE SERPL-SCNC: 103 MMOL/L
CK SERPL-CCNC: 31 U/L
CO2 SERPL-SCNC: 22 MMOL/L
CREAT SERPL-MCNC: 0.75 MG/DL
EOSINOPHIL # BLD AUTO: 0.02 K/UL
EOSINOPHIL NFR BLD AUTO: 0.6 %
ERYTHROCYTE [SEDIMENTATION RATE] IN BLOOD BY WESTERGREN METHOD: 11 MM/HR
FOLATE SERPL-MCNC: 9.3 NG/ML
GLUCOSE SERPL-MCNC: 111 MG/DL
HCT VFR BLD CALC: 30.7 %
HGB BLD-MCNC: 9.8 G/DL
HIV1+2 AB SPEC QL IA.RAPID: NONREACTIVE
IMM GRANULOCYTES NFR BLD AUTO: 0.3 %
LYMPHOCYTES # BLD AUTO: 0.89 K/UL
LYMPHOCYTES NFR BLD AUTO: 27.6 %
MAN DIFF?: NORMAL
MCHC RBC-ENTMCNC: 28.8 PG
MCHC RBC-ENTMCNC: 31.9 GM/DL
MCV RBC AUTO: 90.3 FL
MONOCYTES # BLD AUTO: 0.22 K/UL
MONOCYTES NFR BLD AUTO: 6.8 %
NEUTROPHILS # BLD AUTO: 2.07 K/UL
NEUTROPHILS NFR BLD AUTO: 64.4 %
PLATELET # BLD AUTO: 98 K/UL
POTASSIUM SERPL-SCNC: 3.2 MMOL/L
PROT SERPL-MCNC: 6.1 G/DL
RBC # BLD: 3.4 M/UL
RBC # FLD: 20.5 %
SODIUM SERPL-SCNC: 142 MMOL/L
TSH SERPL-ACNC: 1.06 UIU/ML
VIT B12 SERPL-MCNC: 677 PG/ML
WBC # FLD AUTO: 3.22 K/UL

## 2017-08-18 LAB
ACE BLD-CCNC: 36 U/L
ALBUMIN MFR SERPL ELPH: 64.2 %
ALBUMIN SERPL-MCNC: 3.9 G/DL
ALBUMIN/GLOB SERPL: 1.8 RATIO
ALDOLASE SERPL-CCNC: 6.2 U/L
ALPHA1 GLOB MFR SERPL ELPH: 4.7 %
ALPHA1 GLOB SERPL ELPH-MCNC: 0.3 G/DL
ALPHA2 GLOB MFR SERPL ELPH: 9.3 %
ALPHA2 GLOB SERPL ELPH-MCNC: 0.6 G/DL
ANA PAT FLD IF-IMP: ABNORMAL
ANACR T: ABNORMAL
B-GLOBULIN MFR SERPL ELPH: 11.6 %
B-GLOBULIN SERPL ELPH-MCNC: 0.7 G/DL
GAMMA GLOB FLD ELPH-MCNC: 0.6 G/DL
GAMMA GLOB MFR SERPL ELPH: 10.2 %
INTERPRETATION SERPL IEP-IMP: NORMAL
M PROTEIN SPEC IFE-MCNC: NORMAL
PROT SERPL-MCNC: 6.1 G/DL
PROT SERPL-MCNC: 6.1 G/DL

## 2017-08-21 LAB
ACRM BINDING ANTIBODY: 0 NMOL/L
COPPER SERPL-MCNC: 152 UG/DL
ENA SS-A AB SER IA-ACNC: <0.2 AL
ENA SS-B AB SER IA-ACNC: <0.2 AL
HTLV I+II AB SER QL: NORMAL

## 2017-08-22 ENCOUNTER — OTHER (OUTPATIENT)
Age: 77
End: 2017-08-22

## 2017-08-25 LAB
HOMOCYSTEINE LEVEL: 13.8 UMOL/L
METHYLMALONIC ACID LEVEL: 160 NMOL/L

## 2017-09-06 ENCOUNTER — INPATIENT (INPATIENT)
Facility: HOSPITAL | Age: 77
LOS: 11 days | Discharge: NURSING, REHAB OR LONG-TERM CARE | DRG: 811 | End: 2017-09-18
Attending: INTERNAL MEDICINE | Admitting: INTERNAL MEDICINE
Payer: MEDICARE

## 2017-09-06 VITALS
OXYGEN SATURATION: 99 % | TEMPERATURE: 98 F | RESPIRATION RATE: 16 BRPM | SYSTOLIC BLOOD PRESSURE: 120 MMHG | DIASTOLIC BLOOD PRESSURE: 79 MMHG | HEART RATE: 83 BPM

## 2017-09-06 DIAGNOSIS — Z90.710 ACQUIRED ABSENCE OF BOTH CERVIX AND UTERUS: Chronic | ICD-10-CM

## 2017-09-06 DIAGNOSIS — R63.8 OTHER SYMPTOMS AND SIGNS CONCERNING FOOD AND FLUID INTAKE: ICD-10-CM

## 2017-09-06 DIAGNOSIS — D47.1 CHRONIC MYELOPROLIFERATIVE DISEASE: ICD-10-CM

## 2017-09-06 DIAGNOSIS — I82.511 CHRONIC EMBOLISM AND THROMBOSIS OF RIGHT FEMORAL VEIN: ICD-10-CM

## 2017-09-06 DIAGNOSIS — Z29.9 ENCOUNTER FOR PROPHYLACTIC MEASURES, UNSPECIFIED: ICD-10-CM

## 2017-09-06 DIAGNOSIS — E87.6 HYPOKALEMIA: ICD-10-CM

## 2017-09-06 DIAGNOSIS — I10 ESSENTIAL (PRIMARY) HYPERTENSION: ICD-10-CM

## 2017-09-06 LAB
ALBUMIN SERPL ELPH-MCNC: 4 G/DL — SIGNIFICANT CHANGE UP (ref 3.3–5)
ALP SERPL-CCNC: 69 U/L — SIGNIFICANT CHANGE UP (ref 40–120)
ALT FLD-CCNC: 8 U/L — LOW (ref 10–45)
ANION GAP SERPL CALC-SCNC: 17 MMOL/L — SIGNIFICANT CHANGE UP (ref 5–17)
APPEARANCE UR: CLEAR — SIGNIFICANT CHANGE UP
AST SERPL-CCNC: 13 U/L — SIGNIFICANT CHANGE UP (ref 10–40)
BASOPHILS NFR BLD AUTO: 0.4 % — SIGNIFICANT CHANGE UP (ref 0–2)
BILIRUB SERPL-MCNC: 1.4 MG/DL — HIGH (ref 0.2–1.2)
BILIRUB UR-MCNC: NEGATIVE — SIGNIFICANT CHANGE UP
BLD GP AB SCN SERPL QL: NEGATIVE — SIGNIFICANT CHANGE UP
BUN SERPL-MCNC: 19 MG/DL — SIGNIFICANT CHANGE UP (ref 7–23)
CALCIUM SERPL-MCNC: 9.4 MG/DL — SIGNIFICANT CHANGE UP (ref 8.4–10.5)
CHLORIDE SERPL-SCNC: 101 MMOL/L — SIGNIFICANT CHANGE UP (ref 96–108)
CO2 SERPL-SCNC: 22 MMOL/L — SIGNIFICANT CHANGE UP (ref 22–31)
COLOR SPEC: YELLOW — SIGNIFICANT CHANGE UP
CREAT SERPL-MCNC: 0.7 MG/DL — SIGNIFICANT CHANGE UP (ref 0.5–1.3)
DIFF PNL FLD: (no result)
EOSINOPHIL NFR BLD AUTO: 0.4 % — SIGNIFICANT CHANGE UP (ref 0–6)
EXTRA BLUE TOP TUBE: SIGNIFICANT CHANGE UP
GLUCOSE SERPL-MCNC: 118 MG/DL — HIGH (ref 70–99)
GLUCOSE UR QL: NEGATIVE — SIGNIFICANT CHANGE UP
HAPTOGLOB SERPL-MCNC: <10 MG/DL — LOW (ref 34–200)
HCT VFR BLD CALC: 17.6 % — CRITICAL LOW (ref 34.5–45)
HGB BLD-MCNC: 6 G/DL — CRITICAL LOW (ref 11.5–15.5)
KETONES UR-MCNC: NEGATIVE — SIGNIFICANT CHANGE UP
LEUKOCYTE ESTERASE UR-ACNC: (no result)
LYMPHOCYTES # BLD AUTO: 47 % — HIGH (ref 13–44)
MAGNESIUM SERPL-MCNC: 2.3 MG/DL — SIGNIFICANT CHANGE UP (ref 1.6–2.6)
MCHC RBC-ENTMCNC: 30.8 PG — SIGNIFICANT CHANGE UP (ref 27–34)
MCHC RBC-ENTMCNC: 34.1 G/DL — SIGNIFICANT CHANGE UP (ref 32–36)
MCV RBC AUTO: 90.3 FL — SIGNIFICANT CHANGE UP (ref 80–100)
MONOCYTES NFR BLD AUTO: 5.9 % — SIGNIFICANT CHANGE UP (ref 2–14)
NEUTROPHILS NFR BLD AUTO: 46.3 % — SIGNIFICANT CHANGE UP (ref 43–77)
NITRITE UR-MCNC: POSITIVE
PH UR: 7 — SIGNIFICANT CHANGE UP (ref 5–8)
PHOSPHATE SERPL-MCNC: 3.8 MG/DL — SIGNIFICANT CHANGE UP (ref 2.5–4.5)
PLATELET # BLD AUTO: 60 K/UL — LOW (ref 150–400)
POTASSIUM SERPL-MCNC: 3.1 MMOL/L — LOW (ref 3.5–5.3)
POTASSIUM SERPL-SCNC: 3.1 MMOL/L — LOW (ref 3.5–5.3)
PROT SERPL-MCNC: 6.5 G/DL — SIGNIFICANT CHANGE UP (ref 6–8.3)
PROT UR-MCNC: (no result) MG/DL
RBC # BLD: 1.95 M/UL — LOW (ref 3.8–5.2)
RBC # BLD: 1.97 M/UL — LOW (ref 3.8–5.2)
RBC # FLD: 26.3 % — HIGH (ref 10.3–16.9)
RETICS/RBC NFR: 2.4 % — SIGNIFICANT CHANGE UP (ref 0.5–2.5)
RH IG SCN BLD-IMP: POSITIVE — SIGNIFICANT CHANGE UP
SODIUM SERPL-SCNC: 140 MMOL/L — SIGNIFICANT CHANGE UP (ref 135–145)
SP GR SPEC: 1.01 — SIGNIFICANT CHANGE UP (ref 1–1.03)
UROBILINOGEN FLD QL: 1 E.U./DL — SIGNIFICANT CHANGE UP
WBC # BLD: 2.5 K/UL — LOW (ref 3.8–10.5)
WBC # FLD AUTO: 2.5 K/UL — LOW (ref 3.8–10.5)

## 2017-09-06 PROCEDURE — 71010: CPT | Mod: 26

## 2017-09-06 PROCEDURE — 93010 ELECTROCARDIOGRAM REPORT: CPT

## 2017-09-06 PROCEDURE — 99285 EMERGENCY DEPT VISIT HI MDM: CPT | Mod: 25

## 2017-09-06 RX ORDER — CEFTRIAXONE 500 MG/1
1 INJECTION, POWDER, FOR SOLUTION INTRAMUSCULAR; INTRAVENOUS EVERY 24 HOURS
Qty: 0 | Refills: 0 | Status: DISCONTINUED | OUTPATIENT
Start: 2017-09-07 | End: 2017-09-07

## 2017-09-06 RX ORDER — CEFTRIAXONE 500 MG/1
INJECTION, POWDER, FOR SOLUTION INTRAMUSCULAR; INTRAVENOUS
Qty: 0 | Refills: 0 | Status: DISCONTINUED | OUTPATIENT
Start: 2017-09-06 | End: 2017-09-07

## 2017-09-06 RX ORDER — RIVAROXABAN 15 MG-20MG
20 KIT ORAL EVERY 24 HOURS
Qty: 0 | Refills: 0 | Status: DISCONTINUED | OUTPATIENT
Start: 2017-09-06 | End: 2017-09-06

## 2017-09-06 RX ORDER — POTASSIUM CHLORIDE 20 MEQ
40 PACKET (EA) ORAL ONCE
Qty: 0 | Refills: 0 | Status: COMPLETED | OUTPATIENT
Start: 2017-09-06 | End: 2017-09-06

## 2017-09-06 RX ORDER — SODIUM CHLORIDE 9 MG/ML
1000 INJECTION INTRAMUSCULAR; INTRAVENOUS; SUBCUTANEOUS
Qty: 0 | Refills: 0 | Status: DISCONTINUED | OUTPATIENT
Start: 2017-09-06 | End: 2017-09-09

## 2017-09-06 RX ORDER — CEFTRIAXONE 500 MG/1
1 INJECTION, POWDER, FOR SOLUTION INTRAMUSCULAR; INTRAVENOUS ONCE
Qty: 0 | Refills: 0 | Status: COMPLETED | OUTPATIENT
Start: 2017-09-06 | End: 2017-09-06

## 2017-09-06 RX ORDER — SODIUM CHLORIDE 9 MG/ML
500 INJECTION INTRAMUSCULAR; INTRAVENOUS; SUBCUTANEOUS ONCE
Qty: 0 | Refills: 0 | Status: COMPLETED | OUTPATIENT
Start: 2017-09-06 | End: 2017-09-06

## 2017-09-06 RX ORDER — RIVAROXABAN 15 MG-20MG
20 KIT ORAL EVERY 24 HOURS
Qty: 0 | Refills: 0 | Status: DISCONTINUED | OUTPATIENT
Start: 2017-09-06 | End: 2017-09-08

## 2017-09-06 RX ORDER — FOLIC ACID 0.8 MG
1 TABLET ORAL DAILY
Qty: 0 | Refills: 0 | Status: DISCONTINUED | OUTPATIENT
Start: 2017-09-06 | End: 2017-09-18

## 2017-09-06 RX ADMIN — Medication 1 MILLIGRAM(S): at 19:10

## 2017-09-06 RX ADMIN — CEFTRIAXONE 100 GRAM(S): 500 INJECTION, POWDER, FOR SOLUTION INTRAMUSCULAR; INTRAVENOUS at 23:40

## 2017-09-06 RX ADMIN — RIVAROXABAN 20 MILLIGRAM(S): KIT at 19:10

## 2017-09-06 RX ADMIN — SODIUM CHLORIDE 125 MILLILITER(S): 9 INJECTION INTRAMUSCULAR; INTRAVENOUS; SUBCUTANEOUS at 16:52

## 2017-09-06 RX ADMIN — SODIUM CHLORIDE 1000 MILLILITER(S): 9 INJECTION INTRAMUSCULAR; INTRAVENOUS; SUBCUTANEOUS at 15:00

## 2017-09-06 RX ADMIN — SODIUM CHLORIDE 125 MILLILITER(S): 9 INJECTION INTRAMUSCULAR; INTRAVENOUS; SUBCUTANEOUS at 15:57

## 2017-09-06 RX ADMIN — Medication 40 MILLIEQUIVALENT(S): at 16:10

## 2017-09-06 NOTE — ED PROVIDER NOTE - PROGRESS NOTE DETAILS
D/w Dr Dumont. Transfuse pRBCs and she will see the pt. Admit to [facility]. D/w w/ Dr Tapia, aware of admission

## 2017-09-06 NOTE — H&P ADULT - NSHPREVIEWOFSYSTEMS_GEN_ALL_CORE
CONSTITUTIONAL: No fevers or chills  EYES/ENT: No visual changes;  No vertigo or throat pain   NECK: No pain or stiffness  RESPIRATORY: No cough, wheezing, hemoptysis; No shortness of breath  CARDIOVASCULAR: No chest pain or palpitations  GASTROINTESTINAL: No abdominal or epigastric pain. No nausea, vomiting, or hematemesis; No diarrhea  No melena or hematochezia.  GENITOURINARY: No dysuria, frequency or hematuria  NEUROLOGICAL: No numbness   SKIN: No itching, burning, rashes, or lesions   All other review of systems is negative unless indicated above.

## 2017-09-06 NOTE — ED PROVIDER NOTE - OBJECTIVE STATEMENT
Pt with PMHx HTN, myeloproliferative disorder (on hydroxyurea and anagrelide), HTN, PE s/p thrombectomy, pancytopenia, anemia, tremor p/w LE weakness. Pt reports the weakness is limited to the legs and has been for the past few days. Pt reports when she attempts to walk she is able to bear weight on her legs, but feels too shaky, and states it has worsened in the past 24 hours. No UE weakness. No back pain. No paresthesias. No hx spinal stenosis or herniated disks. No fecal or urinary incontinence or retention. No f/c. No URI, , or  sx. No HA, or confusion. No dizziness, CP, or SOB. No falls. Pt normally ambulates w/ a cane in her home, and w/ a walker when going out. Pt reports she lives alone, has no family nearby, and has HHA services once a week only. Pt with PMHx HTN, myeloproliferative disorder (on hydroxyurea 500 mg BID), HTN, PE s/p thrombectomy, recent DVT dx on 6/16 restarted on Xarelto, pancytopenia, anemia, tremor p/w LE weakness. Pt reports the weakness is limited to the legs and has been for the past few days. Pt reports when she attempts to walk she is able to bear weight on her legs, but feels too shaky, and states it has worsened in the past 24 hours. No UE weakness. No back pain. No paresthesias. No hx spinal stenosis or herniated disks. No fecal or urinary incontinence or retention. No f/c. No URI, , or  sx. No HA, or confusion. No dizziness, CP, or SOB. No falls. Pt normally ambulates w/ a cane in her home, and w/ a walker when going out. Pt reports she lives alone, has no family nearby, and has HHA services once a week only.

## 2017-09-06 NOTE — ED PROVIDER NOTE - MEDICAL DECISION MAKING DETAILS
Pt p/w weakness w/o other complaints. no neuro deficits on exam. Pt with mild weakness against resistance only, full strength against gravity, normal sensation, normal rectal tone, no back pain. Pt previously admitted w/ similar sx and pancytopenic at that time. Likely same etiology. DDx includes anemia, pancytopenia, electrolyte imbalance, dehydration, infection, other pathology. Check labs, UA, CXR. Dispo pending w/u and clinical status

## 2017-09-06 NOTE — H&P ADULT - PROBLEM SELECTOR PLAN 3
Patient currently normotensive, does not take any medications at home.  -Will continue to monitor, question as to whether she takes Norvasc 5mg at home, if patient becomes HTN will add Norvasc

## 2017-09-06 NOTE — H&P ADULT - NSHPPHYSICALEXAM_GEN_ALL_CORE
VITALS  Vital Signs Last 24 Hrs  T(C): 36.8 (06 Sep 2017 16:32), Max: 36.8 (06 Sep 2017 16:32)  T(F): 98.3 (06 Sep 2017 16:32), Max: 98.3 (06 Sep 2017 16:32)  HR: 83 (06 Sep 2017 16:32) (80 - 83)  BP: 131/71 (06 Sep 2017 16:32) (100/64 - 131/71)  BP(mean): --  RR: 16 (06 Sep 2017 16:32) (16 - 16)  SpO2: 96% (06 Sep 2017 16:32) (95% - 99%)    I&O's Summary      CAPILLARY BLOOD GLUCOSE  117 (06 Sep 2017 14:55)    PHYSICAL EXAM  General: A&Ox3; NAD  Head: NC/AT; PERRL; EOMI; anicteric sclera  Neck: Supple; no JVD  Respiratory: CTA B/L; no wheezes/crackles/rales auscultated w/ good air movement  Cardiovascular: Regular rhythm/rate; S1/S2; no gallops or murmurs auscultated  Gastrointestinal: Normal Bowel Sounds, Non-distended. Tender in epigastric region  Extremities: WWP; no edema or cyanosis; radial/pedal pulses palpable  Neurological:  CNII-XII grossly intact; no obvious focal deficits

## 2017-09-06 NOTE — H&P ADULT - PROBLEM SELECTOR PLAN 5
F: Patient will get 1 unit pRBC and NS bolus, no fluids indicated following  E: Replete K>4, Mg>2, s/p 40mEq KCl  N: Reg. Diet

## 2017-09-06 NOTE — H&P ADULT - ASSESSMENT
77F with PMH of MDS, previous PE, HTN who presents with weakness and fatigue for 1 week duration. Found to have pancytopenia on CBC most likely 2/2 to chronic MDS.

## 2017-09-06 NOTE — H&P ADULT - PROBLEM SELECTOR PLAN 4
Patient was seen in ED in June and found to have right femoral and right popiteal DVT. Started on Xarelto then.   -Will continue Xarelto as inpatient

## 2017-09-06 NOTE — ED ADULT NURSE NOTE - OBJECTIVE STATEMENT
Pt BIBA to ED A&Ox3 accompanied by home aide, c/o b/l lower leg weakness x 4 days. pt states she has been feeling weak over the past few months but worsening. pt states she normally ambulates with a cane or walker, but has only been able to get OOB to the bathroom the past 4 days. pt denies fever, dizziness, cp/sob, numbness, n/v/d.

## 2017-09-06 NOTE — H&P ADULT - HISTORY OF PRESENT ILLNESS
77F with PMH of MDS, previous PE, HTN who presents with weakness and fatigue for 1 week duration. Weakness and fatigue insidious in onset, unable to give a definitive time she last felt well. States lately she has not had energy to even get out of bed to eat or get her medications. It has been getting progressively worse.  At baseline patient is able to walk with use of cane about 1-2blocks. Currently she feels SOB and fatigued when she stands from bed.     She denies fever, chills, CP, orthopnea, LE edema, nausea, vomiting, diarrhea, abdominal pain, dysuria, increased urinary frequency. Patient denies melena or hematochezia. Patient states she has not had a BM since Monday. Her typical schedule is every day to every other day. She denies any recent illness or sick contacts.    In ED T97.7, HR83, /79, RR19 99%. Patient was found to be pancytopenic on CBC, WBC 2.5, H/H 6/17.6, Plt 60. BMP showed hypokalemia with K 3.1. She was given 40mEq KCl, and a bolus of NS. She was ordered for 1 unit pRBC.

## 2017-09-06 NOTE — ED ADULT NURSE NOTE - PMH
Hypertension    Myeloproliferative disorder    PE (pulmonary thromboembolism)  2015  Tremor    Vestibular disequilibrium

## 2017-09-06 NOTE — ED ADULT NURSE REASSESSMENT NOTE - NS ED NURSE REASSESS COMMENT FT1
type and screen sent x 2, no results at this time. 7wjennifer goins aware. pt awaiting transport to floor.

## 2017-09-06 NOTE — H&P ADULT - PROBLEM SELECTOR PLAN 6
P: Patient High Risk, currently on Xarelto, will continue Xarelto  C: FULL CODE  D: Admit patient for pancytopenia work up under Dr. Tapia

## 2017-09-06 NOTE — ED ADULT TRIAGE NOTE - CHIEF COMPLAINT QUOTE
Pt BIBA from home c/o weakness to both legs for the past 4 months, states she's been unable to walk for the past 3 days. Denies pain.

## 2017-09-06 NOTE — H&P ADULT - PROBLEM SELECTOR PLAN 1
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 Last CBC in June showed WBC 13.3, H/H 15.5/47.0, . Patient current pancytopenia most likely exacerbation of MPD as she has been admitted in past for this.  -Currently awaiting 1 unit pRBC, will follow up AM CBC to determine H/H  -Follow up on labs: Reticulocyte Count, Peripheral Smear, LDH, Haptoglobin, Folate, B12, TSH  -Transfuse Hgb <7.0  -Transfuse Plt <15k unless actively bleeding  -Dr. Dumont is OPD Heme/Onc, she is aware of patient, will follow up with rec's

## 2017-09-06 NOTE — ED ADULT NURSE NOTE - CHPI ED SYMPTOMS NEG
no fever/no tingling/no nausea/no numbness/no dizziness/no pain/no vomiting/no chills/no decreased eating/drinking

## 2017-09-06 NOTE — ED PROVIDER NOTE - MUSCULOSKELETAL, MLM
Spine appears normal, range of motion is not limited, no midline or paraspinal mm ttp throughout the spine. pelvis stable, non tender. FROM all joints in b/l LE w/o pain, dec ROM, or signs of trauma

## 2017-09-06 NOTE — ED PROVIDER NOTE - CRANIAL NERVE AND PUPILLARY EXAM
4/5 muscle strength in b/l LE. neg Babinski. normal sensation in b/l LE. no saddle anesthesia. normal rectal tone/central and peripheral vision intact/tongue is midline/cranial nerves 2-12 intact/extra-ocular movements intact

## 2017-09-06 NOTE — ED PROVIDER NOTE - DIAGNOSTIC INTERPRETATION
ER Physician: Karime Gregorio, DO  CHEST XRAY INTERPRETATION: lungs clear, heart shadow normal, bony structures intact

## 2017-09-06 NOTE — H&P ADULT - NSHPLABSRESULTS_GEN_ALL_CORE
LABS                        6.0    2.5   )-----------( 60       ( 06 Sep 2017 14:38 )             17.6     09-06    140  |  101  |  19  ----------------------------<  118<H>  3.1<L>   |  22  |  0.70    Ca    9.4      06 Sep 2017 14:38  Phos  3.8     09-06  Mg     2.3     09-06    TPro  6.5  /  Alb  4.0  /  TBili  1.4<H>  /  DBili  x   /  AST  13  /  ALT  8<L>  /  AlkPhos  69  09-06

## 2017-09-06 NOTE — ED PROVIDER NOTE - GASTROINTESTINAL, MLM
Abd soft, NT, ND, NABS. No guarding, rebound, or rigidity. No pulsatile abdominal masses. No organomegaly appreciated. Stool color normal. Guaiac neg.

## 2017-09-06 NOTE — H&P ADULT - PROBLEM SELECTOR PLAN 2
Patient K on admission 3.1, given 40mEq in ED. Repeat BMP in morning, will include Phos. Determine need for further repletion at that time  -Follow up AM BMP

## 2017-09-06 NOTE — H&P ADULT - NSHPSOCIALHISTORY_GEN_ALL_CORE
Lives alone, . Does not have any children.  Prior history of cigarette use 35 pack year history. No alcohol use or illicit drug use

## 2017-09-07 LAB
ANION GAP SERPL CALC-SCNC: 12 MMOL/L — SIGNIFICANT CHANGE UP (ref 5–17)
ANION GAP SERPL CALC-SCNC: 15 MMOL/L — SIGNIFICANT CHANGE UP (ref 5–17)
BASOPHILS NFR BLD AUTO: 0.5 % — SIGNIFICANT CHANGE UP (ref 0–2)
BUN SERPL-MCNC: 13 MG/DL — SIGNIFICANT CHANGE UP (ref 7–23)
BUN SERPL-MCNC: 14 MG/DL — SIGNIFICANT CHANGE UP (ref 7–23)
CALCIUM SERPL-MCNC: 8.7 MG/DL — SIGNIFICANT CHANGE UP (ref 8.4–10.5)
CALCIUM SERPL-MCNC: 9 MG/DL — SIGNIFICANT CHANGE UP (ref 8.4–10.5)
CHLORIDE SERPL-SCNC: 104 MMOL/L — SIGNIFICANT CHANGE UP (ref 96–108)
CHLORIDE SERPL-SCNC: 105 MMOL/L — SIGNIFICANT CHANGE UP (ref 96–108)
CO2 SERPL-SCNC: 21 MMOL/L — LOW (ref 22–31)
CO2 SERPL-SCNC: 22 MMOL/L — SIGNIFICANT CHANGE UP (ref 22–31)
CREAT SERPL-MCNC: 0.6 MG/DL — SIGNIFICANT CHANGE UP (ref 0.5–1.3)
CREAT SERPL-MCNC: 0.6 MG/DL — SIGNIFICANT CHANGE UP (ref 0.5–1.3)
EOSINOPHIL NFR BLD AUTO: 0.5 % — SIGNIFICANT CHANGE UP (ref 0–6)
GLUCOSE SERPL-MCNC: 108 MG/DL — HIGH (ref 70–99)
GLUCOSE SERPL-MCNC: 128 MG/DL — HIGH (ref 70–99)
HCT VFR BLD CALC: 21.5 % — LOW (ref 34.5–45)
HGB BLD-MCNC: 7.2 G/DL — LOW (ref 11.5–15.5)
LYMPHOCYTES # BLD AUTO: 41.2 % — SIGNIFICANT CHANGE UP (ref 13–44)
MAGNESIUM SERPL-MCNC: 2.1 MG/DL — SIGNIFICANT CHANGE UP (ref 1.6–2.6)
MCHC RBC-ENTMCNC: 30 PG — SIGNIFICANT CHANGE UP (ref 27–34)
MCHC RBC-ENTMCNC: 33.5 G/DL — SIGNIFICANT CHANGE UP (ref 32–36)
MCV RBC AUTO: 89.6 FL — SIGNIFICANT CHANGE UP (ref 80–100)
MONOCYTES NFR BLD AUTO: 5.5 % — SIGNIFICANT CHANGE UP (ref 2–14)
NEUTROPHILS NFR BLD AUTO: 52.3 % — SIGNIFICANT CHANGE UP (ref 43–77)
PHOSPHATE SERPL-MCNC: 2.9 MG/DL — SIGNIFICANT CHANGE UP (ref 2.5–4.5)
PLATELET # BLD AUTO: 58 K/UL — LOW (ref 150–400)
POTASSIUM SERPL-MCNC: 3.1 MMOL/L — LOW (ref 3.5–5.3)
POTASSIUM SERPL-MCNC: 3.9 MMOL/L — SIGNIFICANT CHANGE UP (ref 3.5–5.3)
POTASSIUM SERPL-SCNC: 3.1 MMOL/L — LOW (ref 3.5–5.3)
POTASSIUM SERPL-SCNC: 3.9 MMOL/L — SIGNIFICANT CHANGE UP (ref 3.5–5.3)
RBC # BLD: 2.4 M/UL — LOW (ref 3.8–5.2)
RBC # FLD: 22.2 % — HIGH (ref 10.3–16.9)
SODIUM SERPL-SCNC: 139 MMOL/L — SIGNIFICANT CHANGE UP (ref 135–145)
SODIUM SERPL-SCNC: 140 MMOL/L — SIGNIFICANT CHANGE UP (ref 135–145)
TSH SERPL-MCNC: 1.92 UIU/ML — SIGNIFICANT CHANGE UP (ref 0.35–4.94)
WBC # BLD: 2 K/UL — LOW (ref 3.8–10.5)
WBC # FLD AUTO: 2 K/UL — LOW (ref 3.8–10.5)

## 2017-09-07 RX ORDER — POTASSIUM CHLORIDE 20 MEQ
40 PACKET (EA) ORAL EVERY 4 HOURS
Qty: 0 | Refills: 0 | Status: DISCONTINUED | OUTPATIENT
Start: 2017-09-07 | End: 2017-09-07

## 2017-09-07 RX ORDER — POTASSIUM CHLORIDE 20 MEQ
40 PACKET (EA) ORAL EVERY 4 HOURS
Qty: 0 | Refills: 0 | Status: COMPLETED | OUTPATIENT
Start: 2017-09-07 | End: 2017-09-07

## 2017-09-07 RX ADMIN — Medication 40 MILLIEQUIVALENT(S): at 13:15

## 2017-09-07 RX ADMIN — Medication 40 MILLIEQUIVALENT(S): at 17:39

## 2017-09-07 RX ADMIN — RIVAROXABAN 20 MILLIGRAM(S): KIT at 17:39

## 2017-09-07 RX ADMIN — Medication 1 MILLIGRAM(S): at 11:20

## 2017-09-07 NOTE — PHYSICAL THERAPY INITIAL EVALUATION ADULT - MODALITIES TREATMENT COMMENTS
FIM: 1| Patient initially reported no SOB or dizziness at start of ambulation, however after 5 ft before beginning to hyperventilate and reported difficulty breathing, sp02 was 72% on RA. Once returned to sitting and with deep breathing exercises, sp02 improved to 93% on RA. However once attempted to stand up with deep breathing exercises, sp02 decreased to 82 on RA. Patient returned to semi-supine where sp02 increased to 95% on RA, however patient reported feeling mild dyspnea. RN and MD made and were present at time. Patient reports that the shortness of breath during ambulate was new.

## 2017-09-07 NOTE — PHYSICAL THERAPY INITIAL EVALUATION ADULT - ADDITIONAL COMMENTS
Patient lives in elevator apartment, no steps to enter. Patient denies history of falls in past 6 months. Patient has  who assists with cleaning and shopping 1x a week. Patient denies home health assistance.

## 2017-09-07 NOTE — PHYSICAL THERAPY INITIAL EVALUATION ADULT - PERTINENT HX OF CURRENT PROBLEM, REHAB EVAL
77F with PMH of MDS, previous PE, HTN who presents with weakness and fatigue for 1 week duration. Weakness and fatigue insidious in onset, unable to give a definitive time she last felt well. States lately she has not had energy to even get out of bed to eat or get her medications. It has been getting progressively worse

## 2017-09-07 NOTE — PHYSICAL THERAPY INITIAL EVALUATION ADULT - GAIT DEVIATIONS NOTED, PT EVAL
decreased stride length/decreased step length/decreased gait speed, unsteady, BUE tremors. Balance worsened with desaturation.

## 2017-09-07 NOTE — PROGRESS NOTE ADULT - SUBJECTIVE AND OBJECTIVE BOX
77F with PMH of MDS, previous PE, HTN who presents with weakness and fatigue for 1 week duration. Found to have pancytopenia on CBC most likely 2/2 to chronic MDS.     O/N Events: KAREN  Subjective/ROS: Denies HA, CP, SOB, n/v, changes in bowel/urinary habits.  12pt ROS otherwise negative.    VITALS  Vital Signs Last 24 Hrs  T(C): 36.7 (07 Sep 2017 05:42), Max: 36.8 (06 Sep 2017 16:32)  T(F): 98.1 (07 Sep 2017 05:42), Max: 98.3 (06 Sep 2017 16:32)  HR: 77 (07 Sep 2017 05:42) (77 - 83)  BP: 173/91 (07 Sep 2017 05:42) (100/64 - 173/91)  BP(mean): --  RR: 16 (07 Sep 2017 05:42) (16 - 16)  SpO2: 95% (07 Sep 2017 05:42) (95% - 99%)    CAPILLARY BLOOD GLUCOSE  117 (06 Sep 2017 14:55)          PHYSICAL EXAM  General: A&Ox3; NAD  Head: NC/AT; MMM; PERRL; EOMI;  Neck: Supple; no JVD  Respiratory: CTA B/L; no wheezes/crackles   Cardiovascular: Regular rhythm/rate; S1/S2   Gastrointestinal: Soft; NTND; normoactive BS  Extremities: WWP; no edema/cyanosis  Neurological:  CNII-XII grossly intact; no obvious focal deficits    MEDICATIONS  (STANDING):  sodium chloride 0.9%. 1000 milliLiter(s) (125 mL/Hr) IV Continuous <Continuous>  folic acid 1 milliGRAM(s) Oral daily  rivaroxaban 20 milliGRAM(s) Oral every 24 hours  potassium chloride   Powder 40 milliEquivalent(s) Oral every 4 hours    MEDICATIONS  (PRN):      No Known Allergies      LABS                        7.2    2.0   )-----------( 58       ( 07 Sep 2017 06:37 )             21.5     09-07    140  |  104  |  14  ----------------------------<  108<H>  3.1<L>   |  21<L>  |  0.60    Ca    9.0      07 Sep 2017 06:37  Phos  2.9     09-07  Mg     2.1         TPro  6.5  /  Alb  4.0  /  TBili  1.4<H>  /  DBili  x   /  AST  13  /  ALT  8<L>  /  AlkPhos  69        Urinalysis Basic - ( 06 Sep 2017 22:01 )    Color: Yellow / Appearance: Hazy / S.015 / pH: x  Gluc: x / Ketone: NEGATIVE  / Bili: NEGATIVE / Urobili: 1.0 E.U./dL   Blood: x / Protein: Trace mg/dL / Nitrite: POSITIVE   Leuk Esterase: Small / RBC: > 10 /HPF / WBC Many /HPF   Sq Epi: x / Non Sq Epi: Few /HPF / Bacteria: x      Haptoglobin, Serum: <10 mg/dL (17 @ 14:38)    Reticulocyte Count (17 @ 14:38)    RBC Count: 1.97 M/uL    Reticulocyte Percent: 2.4 %    Reticulocyte Count (17 @ 14:38)    RBC Count: 1.97 M/uL    Reticulocyte Percent: 2.4 %

## 2017-09-08 DIAGNOSIS — R91.1 SOLITARY PULMONARY NODULE: ICD-10-CM

## 2017-09-08 DIAGNOSIS — R06.00 DYSPNEA, UNSPECIFIED: ICD-10-CM

## 2017-09-08 DIAGNOSIS — I26.99 OTHER PULMONARY EMBOLISM WITHOUT ACUTE COR PULMONALE: ICD-10-CM

## 2017-09-08 DIAGNOSIS — I82.409 ACUTE EMBOLISM AND THROMBOSIS OF UNSPECIFIED DEEP VEINS OF UNSPECIFIED LOWER EXTREMITY: ICD-10-CM

## 2017-09-08 LAB
ANION GAP SERPL CALC-SCNC: 10 MMOL/L — SIGNIFICANT CHANGE UP (ref 5–17)
BUN SERPL-MCNC: 15 MG/DL — SIGNIFICANT CHANGE UP (ref 7–23)
CALCIUM SERPL-MCNC: 9.2 MG/DL — SIGNIFICANT CHANGE UP (ref 8.4–10.5)
CHLORIDE SERPL-SCNC: 107 MMOL/L — SIGNIFICANT CHANGE UP (ref 96–108)
CO2 SERPL-SCNC: 23 MMOL/L — SIGNIFICANT CHANGE UP (ref 22–31)
CREAT SERPL-MCNC: 0.7 MG/DL — SIGNIFICANT CHANGE UP (ref 0.5–1.3)
FOLATE SERPL-MCNC: 6 NG/ML — SIGNIFICANT CHANGE UP (ref 4.8–24.2)
GLUCOSE SERPL-MCNC: 106 MG/DL — HIGH (ref 70–99)
HCT VFR BLD CALC: 23 % — LOW (ref 34.5–45)
HGB BLD-MCNC: 8 G/DL — LOW (ref 11.5–15.5)
MAGNESIUM SERPL-MCNC: 2.2 MG/DL — SIGNIFICANT CHANGE UP (ref 1.6–2.6)
MCHC RBC-ENTMCNC: 30.4 PG — SIGNIFICANT CHANGE UP (ref 27–34)
MCHC RBC-ENTMCNC: 34.8 G/DL — SIGNIFICANT CHANGE UP (ref 32–36)
MCV RBC AUTO: 87.5 FL — SIGNIFICANT CHANGE UP (ref 80–100)
PLATELET # BLD AUTO: 39 K/UL — LOW (ref 150–400)
POTASSIUM SERPL-MCNC: 4.6 MMOL/L — SIGNIFICANT CHANGE UP (ref 3.5–5.3)
POTASSIUM SERPL-SCNC: 4.6 MMOL/L — SIGNIFICANT CHANGE UP (ref 3.5–5.3)
RBC # BLD: 2.63 M/UL — LOW (ref 3.8–5.2)
RBC # BLD: 2.63 M/UL — LOW (ref 3.8–5.2)
RBC # FLD: 21 % — HIGH (ref 10.3–16.9)
RETICS/RBC NFR: 1.6 % — SIGNIFICANT CHANGE UP (ref 0.5–2.5)
SODIUM SERPL-SCNC: 140 MMOL/L — SIGNIFICANT CHANGE UP (ref 135–145)
VIT B12 SERPL-MCNC: 607 PG/ML — SIGNIFICANT CHANGE UP (ref 243–894)
WBC # BLD: 2.3 K/UL — LOW (ref 3.8–10.5)
WBC # FLD AUTO: 2.3 K/UL — LOW (ref 3.8–10.5)

## 2017-09-08 PROCEDURE — 78582 LUNG VENTILAT&PERFUS IMAGING: CPT | Mod: 26

## 2017-09-08 PROCEDURE — 99223 1ST HOSP IP/OBS HIGH 75: CPT | Mod: GC

## 2017-09-08 RX ADMIN — Medication 1 MILLIGRAM(S): at 17:36

## 2017-09-08 NOTE — PROGRESS NOTE ADULT - SUBJECTIVE AND OBJECTIVE BOX
History of Present Illness:  Chief Complaint/Reason for Admission: Weakness and Fatigue	  History of Present Illness: 	  77F with PMH of MDS, previous PE, HTN who presents with weakness and fatigue for 1 week duration. Weakness and fatigue insidious in onset, unable to give a definitive time she last felt well. States lately she has not had energy to even get out of bed to eat or get her medications. It has been getting progressively worse.  At baseline patient is able to walk with use of cane about 1-2 blocks. Currently she feels SOB and fatigued when she stands from bed.     She denies fever, chills, CP, orthopnea, LE edema, nausea, vomiting, diarrhea, abdominal pain, dysuria, increased urinary frequency. Patient denies melena or hematochezia. Patient states she has not had a BM since Monday. Her typical schedule is every day to every other day. She denies any recent illness or sick contacts.  Now s/p 2 units PRBCs    Review of Systems:  Review of Systems: CONSTITUTIONAL: No fevers or chills EYES/ENT: No visual changes;  No vertigo or throat pain  NECK: No pain or stiffness RESPIRATORY: No cough, wheezing, hemoptysis; No shortness of breath CARDIOVASCULAR: No chest pain or palpitations GASTROINTESTINAL: No abdominal or epigastric pain. No nausea, vomiting, or hematemesis; No diarrhea  No melena or hematochezia. GENITOURINARY: No dysuria, frequency or hematuria NEUROLOGICAL: No numbness  SKIN: No itching, burning, rashes, or lesions  All other review of systems is negative unless indicated above.	      Allergies and Intolerances:        Allergies:  	No Known Allergies:     Home Medications:   * Patient Currently Takes Medications as of 06-Sep-2017 16:44 documented in Prescription Writer  · 	Xarelto 15 mg oral tablet: 1 tab(s) orally twice, Last Dose Taken:    · 	folic acid 1 mg oral tablet: 2 tab(s) orally once a day, Last Dose Taken:    · 	Aspirin Enteric Coated 81 mg oral delayed release tablet: 1 tab(s) orally once a day, Last Dose Taken:    · 	hydroxyurea 500 mg oral capsule: 3 cap(s) orally Monday, Wednesday, and Friday, Last Dose Taken:      . .  Patient History:   Past Medical History:  Hypertension    Myeloproliferative disorder    PE (pulmonary thromboembolism)  2015  Tremor    Vestibular disequilibrium.    Past Surgical History:  S/P hysterectomy.    Family History:  No pertinent family history in first degree relatives.    Social History:  Social History (marital status, living situation, occupation, tobacco use, alcohol and drug use, and sexual history): Lives alone, . Does not have any children. Prior history of cigarette use 35 pack year history. No alcohol use or illicit drug use	    Tobacco Screening:  · Core Measure Site	No	  · Has the patient used tobacco in the past 30 days?	No	    Risk Assessment:   Present on Admission:  Deep Venous Thrombosis	no	  Pulmonary Embolus	no	  Urinary Catheter	no	  Central Venous Catheter/PICC Line	no	  Surgical Site Incision	no	  Pressure Ulcer(s)	no	      Physical Exam:  Physical Exam: VITALS Vital Signs Last 24 Hrs T(C): 37.1 (07 Sep 2017 16:37), Max: 37.1 (07 Sep 2017 16:37) T(F): 98.7 (07 Sep 2017 16:37), Max: 98.7 (07 Sep 2017 16:37) HR: 88 (07 Sep 2017 16:37) (77 - 88) BP: 106/71 (07 Sep 2017 16:37) (106/71 - 173/91) BP(mean): -- RR: 15 (07 Sep 2017 16:37) (15 - 16) SpO2: 99% (07 Sep 2017 16:37) (94% - 99%)  PHYSICAL EXAM General: A&Ox3; NAD Head: NC/AT; PERRL; EOMI; anicteric sclera Neck: Supple; no JVD Respiratory: CTA B/L; no wheezes/crackles/rales auscultated w/ good air movement Cardiovascular: Regular rhythm/rate; S1/S2; no gallops or murmurs auscultated Gastrointestinal: Normal Bowel Sounds, Non-distended. Tender in epigastric region Extremities: WWP; no edema or cyanosis; radial/pedal pulses palpable Neurological:  CNII-XII grossly intact; no obvious focal deficits	      Labs and Results:  Labs, Radiology, Cardiology, and Other Results: LABS                             7.2   2.0   )-----------( 58       ( 07 Sep 2017 06:37 )            21.5    	    Assessment and Plan:   Assessment:  · Assessment		  77F with PMH of MDS, previous PE, DVT, HTN who presents with weakness and fatigue for 1 week duration. Found to have pancytopenia on CBC most likely 2/2 to chronic MDS.     Problem/Plan - 1:  ·  Problem: Myeloproliferative disorder.  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 Last CBC in June showed WBC 13.3, H/H 15.5/47.0, . Patient current pancytopenia most likely exacerbation of MPD as she has been admitted in past for this.  -Currently awaiting 1 unit pRBC, will follow up AM CBC to determine H/H  -Follow up on labs: Reticulocyte Count, Peripheral Smear, LDH, Haptoglobin, Folate, B12, TSH  -Transfuse Hgb <7.0  -Transfuse Plt <15k unless actively bleeding  -Hold Hydroxyurea for now    Problem/Plan - 2:  ·  Problem: Hypokalemia.  Plan: Patient K on admission 3.1, given 40mEq in ED. Repeat BMP in morning, will include Phos. Determine need for further repletion at that time  -Follow up AM BMP.     Problem/Plan - 3:  ·  Problem: Essential hypertension.  Plan: Patient currently normotensive, does not take any medications at home.  -Will continue to monitor, question as to whether she takes Norvasc 5mg at home, if patient becomes HTN will add Norvasc.     Problem/Plan - 4:  ·  Problem: Chronic deep vein thrombosis (DVT) of femoral vein of right lower extremity.  Plan: Patient was seen in ED in June and found to have right femoral and right popiteal DVT. Started on Xarelto then.   -Will continue Xarelto as inpatient.     Problem/Plan - 5:  ·  Problem: Nutrition, metabolism, and development symptoms.  Plan: F: Patient will get 1 unit pRBC and NS bolus, no fluids indicated following  E: Replete K>4, Mg>2, s/p 40mEq KCl  N: Reg. Diet.     Problem/Plan - 6:  Problem: Need for prophylactic measure. Plan: P: Patient High Risk, currently on Xarelto, will continue Xarelto  -f/u LE Dopplers

## 2017-09-08 NOTE — CONSULT NOTE ADULT - ASSESSMENT
77F with PMH of MDS, previous PE, HTN who presents with weakness and fatigue for 1 week duration. Found to have pancytopenia on CBC most likely 2/2 to chronic MDS.     Problem/Plan - 1:  ·  Problem: Myeloproliferative disorder.  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 Last CBC in June showed WBC 13.3, H/H 15.5/47.0, . Patient current pancytopenia most likely exacerbation of MPD as she has been admitted in past for this.  -S/P Transfusion of 1 unit pRBC, Hgb up to 7.2. Will hold any future transfusions pending Heme/Onc Recommendations.  -Patient has increased Reticulocyte count, Decreased Haptoglobin and Increased bilirubin most likely showing hemolytic picture. Will continue to monitor anemia labs as they come in.  -Transfuse Hgb <7.0  -Transfuse Plt <15k unless actively bleeding  -Dr. Dumnot is OPD Heme/Onc, she is aware of patient, will follow up with rec's.     Problem/Plan - 2:  ·  Problem: Hypokalemia.  Plan: Patient K on admission 3.1, given 40mEq in ED. Repeat BMP in morning showed continued hypokalemia. Given 2 runs of 40mEq KLOR.  -Follow up BMP.

## 2017-09-08 NOTE — PROGRESS NOTE ADULT - SUBJECTIVE AND OBJECTIVE BOX
77F with PMH of MDS, previous PE, HTN who presents with weakness and fatigue for 1 week duration. Found to have pancytopenia on CBC most likely 2/2 to chronic MDS.     O/N Events: Patient had episode of desaturation down to 75% last night while working with PT. After lying in bed saturation returned to normal.   Subjective/ROS: Patient is agitated today and refusing any intervention. She states she was told she has new clots in her leg and was unaware of these clots previously. Denies HA, CP, SOB, n/v, changes in bowel/urinary habits.  12pt ROS otherwise negative.    VITALS  Vital Signs Last 24 Hrs  T(C): 36.9 (08 Sep 2017 09:51), Max: 37.1 (07 Sep 2017 16:37)  T(F): 98.4 (08 Sep 2017 09:51), Max: 98.7 (07 Sep 2017 16:37)  HR: 71 (08 Sep 2017 09:51) (71 - 88)  BP: 125/78 (08 Sep 2017 09:51) (106/71 - 132/78)  BP(mean): --  RR: 16 (08 Sep 2017 09:51) (15 - 16)  SpO2: 96% (08 Sep 2017 09:51) (95% - 99%)    CAPILLARY BLOOD GLUCOSE    PHYSICAL EXAM  General: A&Ox3; NAD  Head: NC/AT; MMM; PERRL; EOMI;  Neck: Supple; no JVD  Respiratory: CTA B/L; no wheezes/crackles   Cardiovascular: Regular rhythm/rate; S1/S2   Gastrointestinal: Soft; NTND; normoactive BS  Extremities: WWP; no edema/cyanosis  Neurological:  CNII-XII grossly intact; no obvious focal deficits    MEDICATIONS  (STANDING):  sodium chloride 0.9%. 1000 milliLiter(s) (125 mL/Hr) IV Continuous <Continuous>  folic acid 1 milliGRAM(s) Oral daily    MEDICATIONS  (PRN):      No Known Allergies      LABS                        8.0    2.3   )-----------( 39       ( 08 Sep 2017 08:25 )             23.0     09-08    140  |  107  |  15  ----------------------------<  106<H>  4.6   |  23  |  0.70    Ca    9.2      08 Sep 2017 08:25  Phos  2.9     09-  Mg     2.2     -08    TPro  6.5  /  Alb  4.0  /  TBili  1.4<H>  /  DBili  x   /  AST  13  /  ALT  8<L>  /  AlkPhos  69  -      Urinalysis Basic - ( 06 Sep 2017 22:01 )    Color: Yellow / Appearance: Hazy / S.015 / pH: x  Gluc: x / Ketone: NEGATIVE  / Bili: NEGATIVE / Urobili: 1.0 E.U./dL   Blood: x / Protein: Trace mg/dL / Nitrite: POSITIVE   Leuk Esterase: Small / RBC: > 10 /HPF / WBC Many /HPF   Sq Epi: x / Non Sq Epi: Few /HPF / Bacteria: x            IMAGING/EKG/ETC  EKG:  Xray:  CT:  MRI:

## 2017-09-08 NOTE — CONSULT NOTE ADULT - PROBLEM SELECTOR RECOMMENDATION 4
patient had a CT scan biopsy previously and was consistent with granuloma. She is due for repeat CT scan of the chest in November

## 2017-09-09 ENCOUNTER — TRANSCRIPTION ENCOUNTER (OUTPATIENT)
Age: 77
End: 2017-09-09

## 2017-09-09 DIAGNOSIS — R33.9 RETENTION OF URINE, UNSPECIFIED: ICD-10-CM

## 2017-09-09 LAB
-  AMPICILLIN/SULBACTAM: SIGNIFICANT CHANGE UP
-  AMPICILLIN: SIGNIFICANT CHANGE UP
-  CEFAZOLIN: SIGNIFICANT CHANGE UP
-  CEFTRIAXONE: SIGNIFICANT CHANGE UP
-  CIPROFLOXACIN: SIGNIFICANT CHANGE UP
-  GENTAMICIN: SIGNIFICANT CHANGE UP
-  NITROFURANTOIN: SIGNIFICANT CHANGE UP
-  PIPERACILLIN/TAZOBACTAM: SIGNIFICANT CHANGE UP
-  TOBRAMYCIN: SIGNIFICANT CHANGE UP
-  TRIMETHOPRIM/SULFAMETHOXAZOLE: SIGNIFICANT CHANGE UP
ANION GAP SERPL CALC-SCNC: 12 MMOL/L — SIGNIFICANT CHANGE UP (ref 5–17)
BUN SERPL-MCNC: 14 MG/DL — SIGNIFICANT CHANGE UP (ref 7–23)
CALCIUM SERPL-MCNC: 9.1 MG/DL — SIGNIFICANT CHANGE UP (ref 8.4–10.5)
CHLORIDE SERPL-SCNC: 106 MMOL/L — SIGNIFICANT CHANGE UP (ref 96–108)
CO2 SERPL-SCNC: 22 MMOL/L — SIGNIFICANT CHANGE UP (ref 22–31)
CREAT SERPL-MCNC: 0.6 MG/DL — SIGNIFICANT CHANGE UP (ref 0.5–1.3)
CULTURE RESULTS: SIGNIFICANT CHANGE UP
GLUCOSE SERPL-MCNC: 99 MG/DL — SIGNIFICANT CHANGE UP (ref 70–99)
HCT VFR BLD CALC: 23.2 % — LOW (ref 34.5–45)
HGB BLD-MCNC: 7.8 G/DL — LOW (ref 11.5–15.5)
MAGNESIUM SERPL-MCNC: 2.1 MG/DL — SIGNIFICANT CHANGE UP (ref 1.6–2.6)
MCHC RBC-ENTMCNC: 29.7 PG — SIGNIFICANT CHANGE UP (ref 27–34)
MCHC RBC-ENTMCNC: 33.6 G/DL — SIGNIFICANT CHANGE UP (ref 32–36)
MCV RBC AUTO: 88.2 FL — SIGNIFICANT CHANGE UP (ref 80–100)
METHOD TYPE: SIGNIFICANT CHANGE UP
ORGANISM # SPEC MICROSCOPIC CNT: SIGNIFICANT CHANGE UP
ORGANISM # SPEC MICROSCOPIC CNT: SIGNIFICANT CHANGE UP
PLATELET # BLD AUTO: 46 K/UL — LOW (ref 150–400)
POTASSIUM SERPL-MCNC: 3.8 MMOL/L — SIGNIFICANT CHANGE UP (ref 3.5–5.3)
POTASSIUM SERPL-SCNC: 3.8 MMOL/L — SIGNIFICANT CHANGE UP (ref 3.5–5.3)
RBC # BLD: 2.63 M/UL — LOW (ref 3.8–5.2)
RBC # FLD: 21.4 % — HIGH (ref 10.3–16.9)
SODIUM SERPL-SCNC: 140 MMOL/L — SIGNIFICANT CHANGE UP (ref 135–145)
SPECIMEN SOURCE: SIGNIFICANT CHANGE UP
WBC # BLD: 2.1 K/UL — LOW (ref 3.8–10.5)
WBC # FLD AUTO: 2.1 K/UL — LOW (ref 3.8–10.5)

## 2017-09-09 PROCEDURE — 99232 SBSQ HOSP IP/OBS MODERATE 35: CPT

## 2017-09-09 RX ORDER — RIVAROXABAN 15 MG-20MG
20 KIT ORAL EVERY 24 HOURS
Qty: 0 | Refills: 0 | Status: DISCONTINUED | OUTPATIENT
Start: 2017-09-09 | End: 2017-09-18

## 2017-09-09 RX ORDER — SENNA PLUS 8.6 MG/1
2 TABLET ORAL AT BEDTIME
Qty: 0 | Refills: 0 | Status: DISCONTINUED | OUTPATIENT
Start: 2017-09-09 | End: 2017-09-18

## 2017-09-09 RX ORDER — DOCUSATE SODIUM 100 MG
100 CAPSULE ORAL THREE TIMES A DAY
Qty: 0 | Refills: 0 | Status: DISCONTINUED | OUTPATIENT
Start: 2017-09-09 | End: 2017-09-18

## 2017-09-09 RX ADMIN — SENNA PLUS 2 TABLET(S): 8.6 TABLET ORAL at 21:39

## 2017-09-09 RX ADMIN — Medication 1 MILLIGRAM(S): at 12:05

## 2017-09-09 RX ADMIN — Medication 100 MILLIGRAM(S): at 14:20

## 2017-09-09 RX ADMIN — RIVAROXABAN 20 MILLIGRAM(S): KIT at 17:57

## 2017-09-09 RX ADMIN — Medication 100 MILLIGRAM(S): at 21:39

## 2017-09-09 NOTE — DISCHARGE NOTE ADULT - CARE PROVIDERS DIRECT ADDRESSES
,[email or web address]

## 2017-09-09 NOTE — DISCHARGE NOTE ADULT - PATIENT PORTAL LINK FT
“You can access the Follow[employer] Patient Portal, offered by [facility], by registering with the following website: [email or web address]”

## 2017-09-09 NOTE — DISCHARGE NOTE ADULT - CARE PLAN
Principal Discharge DX:	Pancytopenia  Goal:	To control  Instructions for follow-up, activity and diet:	You were admitted due to a condition called pancytopenia, or having low blood cell counts. you were given 2 units of packed red blood cells. During your admission your counts stabilized. Please continue to follow up with Dr. Dumont as an outpatient for continued work up of possible causes.  Secondary Diagnosis:	Acute deep vein thrombosis (DVT) of femoral vein of right lower extremity  Instructions for follow-up, activity and diet:	During your admission you had a lower extremity doppler. This doppler showed no change to previous DVT and formation of new DVTs. You should continue Xarelto as an outpatient and get a referral from your PCP for a vascular surgeon to place an IVC filter.  Secondary Diagnosis:	Acute cystitis without hematuria  Instructions for follow-up, activity and diet:	During your admission you were found to have a complicated urinary tract infection. You were started on Bactrim 1 pill twice a day. Please continue to take this antibiotic for 3 more days as an outpatient. Principal Discharge DX:	Pancytopenia  Goal:	To control  Instructions for follow-up, activity and diet:	You were admitted due to a condition called pancytopenia, or having low blood cell counts. you were given 2 units of packed red blood cells. During your admission your counts stabilized. Please continue to follow up with Dr. Dumont as an outpatient for continued work up of possible causes.  Secondary Diagnosis:	Acute deep vein thrombosis (DVT) of femoral vein of right lower extremity  Instructions for follow-up, activity and diet:	During your admission you had a lower extremity doppler. This doppler showed no change to previous DVT and formation of new DVTs. You should continue Xarelto as an outpatient and get a referral from your PCP for a vascular surgeon to place an IVC filter.  Secondary Diagnosis:	Acute cystitis without hematuria  Instructions for follow-up, activity and diet:	During your admission you were found to have a complicated urinary tract infection. You were started on Bactrim 1 pill twice a day. Please continue to take this antibiotic for 3 more days as an outpatient.  Secondary Diagnosis:	Sepsis due to undetermined organism  Instructions for follow-up, activity and diet:	During your stay you developed signs of sepsis most likely secondary to decreased neutrophil count (a type of cell in your blood). You were treated with Filgrastim, Cefepime, Vancomycin, and Flagyl with resolution of your fevers and signs of infection. Please follow up with Dr. Dumont as an outpatient. Principal Discharge DX:	Pancytopenia  Goal:	To control  Instructions for follow-up, activity and diet:	You were admitted due to a condition called pancytopenia, or having low blood cell counts. you were given 2 units of packed red blood cells. During your admission your counts stabilized. Please continue to follow up with Dr. Dumont as an outpatient for continued work up of possible causes.  Secondary Diagnosis:	Acute deep vein thrombosis (DVT) of femoral vein of right lower extremity  Instructions for follow-up, activity and diet:	During your admission you had a lower extremity doppler. This doppler showed no change to previous DVT and formation of new DVTs. You should continue Xarelto as an outpatient and get a referral from your PCP for a vascular surgeon to place an IVC filter.  Secondary Diagnosis:	Acute cystitis without hematuria  Instructions for follow-up, activity and diet:	During your admission you were found to have a complicated urinary tract infection. You were started on Bactrim 1 pill twice a day. Please continue to take this antibiotic for 3 more days as an outpatient.  Secondary Diagnosis:	Sepsis due to undetermined organism  Instructions for follow-up, activity and diet:	During your stay you developed signs of sepsis most likely secondary to decreased neutrophil count (a type of cell in your blood). You were treated with Filgrastim, Cefepime, Vancomycin, and Flagyl with resolution of your fevers and signs of infection. Please follow up with Dr. Dumont as an outpatient.  Secondary Diagnosis:	Depression, unspecified depression type  Instructions for follow-up, activity and diet:	You were initially started on Escitalopram as an inpatient but due to risks for it to lower Platelet counts you were changed to Buproprion. Please continue Buproprion 37.5 mg BID as an outpatient.

## 2017-09-09 NOTE — DISCHARGE NOTE ADULT - CARE PROVIDER_API CALL
Carmen Dumont (MD), Medicine  [address] [address]  Phone: [phone]  Fax: [phone] Carmen Dumont (MD), Medicine  [address] [address]  Phone: [phone]  Fax: [phone]    Mike Ireland (MD), Urology  [address] [place] [address]  Phone: [phone]  Fax: [phone]

## 2017-09-09 NOTE — DISCHARGE NOTE ADULT - MEDICATION SUMMARY - MEDICATIONS TO TAKE
I will START or STAY ON the medications listed below when I get home from the hospital:    Aspirin Enteric Coated 81 mg oral delayed release tablet  -- 1 tab(s) by mouth once a day  -- Indication: For DVT (deep venous thrombosis)    Xarelto 15 mg oral tablet  -- 1 tab(s) by mouth twice  -- Check with your doctor before becoming pregnant.  It is very important that you take or use this exactly as directed.  Do not skip doses or discontinue unless directed by your doctor.  Obtain medical advice before taking any non-prescription drugs as some may affect the action of this medication.  Take with food.    -- Indication: For DVT (deep venous thrombosis)    hydroxyurea 500 mg oral capsule  -- 3 cap(s) by mouth Monday, Wednesday, and Friday  -- Indication: For Pancytopenia    Norvasc 5 mg oral tablet  -- 1 tab(s) by mouth once a day  -- Indication: For Essential hypertension    sulfamethoxazole-trimethoprim 800 mg-160 mg oral tablet  -- 1 tab(s) by mouth 2 times a day  -- Indication: For Acute cystitis without hematuria    folic acid 1 mg oral tablet  -- 2 tab(s) by mouth once a day  -- Indication: For Pancytopenia I will START or STAY ON the medications listed below when I get home from the hospital:    Aspirin Enteric Coated 81 mg oral delayed release tablet  -- 1 tab(s) by mouth once a day  -- Indication: For DVT (deep venous thrombosis)    Xarelto 15 mg oral tablet  -- 1 tab(s) by mouth twice  -- Check with your doctor before becoming pregnant.  It is very important that you take or use this exactly as directed.  Do not skip doses or discontinue unless directed by your doctor.  Obtain medical advice before taking any non-prescription drugs as some may affect the action of this medication.  Take with food.    -- Indication: For DVT (deep venous thrombosis)    escitalopram 5 mg oral tablet  -- 1 tab(s) by mouth once a day  -- Indication: For Depression    hydroxyurea 500 mg oral capsule  -- 3 cap(s) by mouth Monday, Wednesday, and Friday  -- Indication: For Pancytopenia    Norvasc 5 mg oral tablet  -- 1 tab(s) by mouth once a day  -- Indication: For Essential hypertension    sulfamethoxazole-trimethoprim 800 mg-160 mg oral tablet  -- 1 tab(s) by mouth 2 times a day  -- Indication: For Acute cystitis without hematuria    folic acid 1 mg oral tablet  -- 2 tab(s) by mouth once a day  -- Indication: For Pancytopenia I will START or STAY ON the medications listed below when I get home from the hospital:    Aspirin Enteric Coated 81 mg oral delayed release tablet  -- 1 tab(s) by mouth once a day  -- Indication: For DVT (deep venous thrombosis)    Xarelto 15 mg oral tablet  -- 1 tab(s) by mouth twice  -- Check with your doctor before becoming pregnant.  It is very important that you take or use this exactly as directed.  Do not skip doses or discontinue unless directed by your doctor.  Obtain medical advice before taking any non-prescription drugs as some may affect the action of this medication.  Take with food.    -- Indication: For DVT (deep venous thrombosis)    hydroxyurea 500 mg oral capsule  -- 3 cap(s) by mouth Monday, Wednesday, and Friday  -- Indication: For Pancytopenia    Norvasc 5 mg oral tablet  -- 1 tab(s) by mouth once a day  -- Indication: For Essential hypertension    sulfamethoxazole-trimethoprim 800 mg-160 mg oral tablet  -- 1 tab(s) by mouth 2 times a day  -- Indication: For Acute cystitis without hematuria    buPROPion 75 mg oral tablet  -- 1 tab(s) by mouth once a day  -- Indication: For Depression    folic acid 1 mg oral tablet  -- 2 tab(s) by mouth once a day  -- Indication: For Pancytopenia

## 2017-09-09 NOTE — DISCHARGE NOTE ADULT - ADDITIONAL INSTRUCTIONS
Please follow up with Dr. Dumont within 2 weeks of discharge for follow up regarding your pancytopenia. Please follow up with your PCP within 7-10 days.

## 2017-09-09 NOTE — DISCHARGE NOTE ADULT - HOSPITAL COURSE
77F with PMH of MDS, previous PE, HTN who presents with weakness and fatigue for 1 week duration. She was found to be pancytopenic. She was given 1 unit pRBC with an appropriate response in hemoglobin. Repeat Lower Extremity Doppler showed formation of new DVTs in right leg while patient was on Xarelto. Due to DVT on OAC and thrombocytopenia patient was instructed to get IVC filter but refused treatment at [facility]. Patient also had episode of desaturation when initially worked with PT. Decision was made to give an additional unit of blood as patient was borderline at Hgb 7.0 and having continued symptoms. Following that unit patient was able to ambulate with PT. On day of discharge patient was hemodynamically stable, with stable blood counts, was able to ambulate with aid. The decision was made to discharge her to home with PT for further treatment with plans to follow up with Dr. Dumont. 77F with PMH of MDS, previous PE, HTN who presents with weakness and fatigue for 1 week duration. She was found to be pancytopenic. She was given 1 unit pRBC with an appropriate response in hemoglobin. Repeat Lower Extremity Doppler showed formation of new DVTs in right leg while patient was on Xarelto. Due to DVT on OAC and thrombocytopenia patient was instructed to get IVC filter but refused treatment at [facility]. Patient also had episode of desaturation when initially worked with PT. Decision was made to give an additional unit of blood as patient was borderline at Hgb 7.0 and having continued symptoms. Following that unit patient was able to ambulate with PT. Patient also had urinary retention secondary to UTI and deconditioning. Two attempts at TOV failed and plans were made for discharge with [facility] and plans to follow up with Urology as an outpatient. On day of discharge patient was hemodynamically stable, with stable blood counts, was able to ambulate with aid. The decision was made to discharge her to home with PT for further treatment with plans to follow up with Dr. Dumotn. 77F with PMH of MDS, previous PE, HTN who presents with weakness and fatigue for 1 week duration. She was found to be pancytopenic. She was given 1 unit pRBC with an appropriate response in hemoglobin. Repeat Lower Extremity Doppler showed formation of new DVTs in right leg while patient was on Xarelto. Due to DVT on OAC and thrombocytopenia patient was instructed to get IVC filter but refused treatment at [facility]. Patient also had episode of desaturation when initially worked with PT. Decision was made to give an additional unit of blood as patient was borderline at Hgb 7.0 and having continued symptoms. Following that unit patient was able to ambulate with PT. Patient also had urinary retention secondary to UTI and deconditioning. Two attempts at TOV failed and plans were made for discharge with [facility] and plans to follow up with Urology as an outpatient.     Subsequently patient developed neutropenic fever/sepsis from unknown organism. She was started on Flagyl 500mg q8h, Cefepime 2g q12h, Vancomycin q24h. She was given 1 dose of 480mcg Filgrastim. Her white count improved and her ANC was up trending on day of discharge. On day of discharge patient was hemodynamically stable, with stable blood counts, was able to ambulate with aid. The decision was made to discharge her to [facility] with plans to follow up with Dr. Dumont.

## 2017-09-09 NOTE — DISCHARGE NOTE ADULT - SECONDARY DIAGNOSIS.
Acute deep vein thrombosis (DVT) of femoral vein of right lower extremity Acute cystitis without hematuria Sepsis due to undetermined organism Depression, unspecified depression type

## 2017-09-09 NOTE — DISCHARGE NOTE ADULT - PLAN OF CARE
To control You were admitted due to a condition called pancytopenia, or having low blood cell counts. you were given 2 units of packed red blood cells. During your admission your counts stabilized. Please continue to follow up with Dr. Dumont as an outpatient for continued work up of possible causes. During your admission you had a lower extremity doppler. This doppler showed no change to previous DVT and formation of new DVTs. You should continue Xarelto as an outpatient and get a referral from your PCP for a vascular surgeon to place an IVC filter. During your admission you were found to have a complicated urinary tract infection. You were started on Bactrim 1 pill twice a day. Please continue to take this antibiotic for 3 more days as an outpatient. During your stay you developed signs of sepsis most likely secondary to decreased neutrophil count (a type of cell in your blood). You were treated with Filgrastim, Cefepime, Vancomycin, and Flagyl with resolution of your fevers and signs of infection. Please follow up with Dr. Dumont as an outpatient. You were initially started on Escitalopram as an inpatient but due to risks for it to lower Platelet counts you were changed to Buproprion. Please continue Buproprion 37.5 mg BID as an outpatient.

## 2017-09-09 NOTE — PROGRESS NOTE ADULT - SUBJECTIVE AND OBJECTIVE BOX
History of Present Illness:  Chief Complaint/Reason for Admission: Weakness and Fatigue	  History of Present Illness: 	  77F with PMH of MDS, previous PE, HTN who presents with weakness and fatigue for 1 week duration. Weakness and fatigue insidious in onset, unable to give a definitive time she last felt well. States lately she has not had energy to even get out of bed to eat or get her medications. It has been getting progressively worse.  At baseline patient is able to walk with use of cane about 1-2 blocks. Currently she feels SOB and fatigued when she stands from bed.     She denies fever, chills, CP, orthopnea, LE edema, nausea, vomiting, diarrhea, abdominal pain, dysuria, increased urinary frequency. Patient denies melena or hematochezia. Patient states she has not had a BM since Monday. Her typical schedule is every day to every other day. She denies any recent illness or sick contacts.  Now s/p 2 units PRBCs    Review of Systems:  Review of Systems: CONSTITUTIONAL: No fevers or chills EYES/ENT: No visual changes;  No vertigo or throat pain  NECK: No pain or stiffness RESPIRATORY: No cough, wheezing, hemoptysis; No shortness of breath CARDIOVASCULAR: No chest pain or palpitations GASTROINTESTINAL: No abdominal or epigastric pain. No nausea, vomiting, or hematemesis; No diarrhea  No melena or hematochezia. GENITOURINARY: No dysuria, frequency or hematuria NEUROLOGICAL: No numbness  SKIN: No itching, burning, rashes, or lesions  All other review of systems is negative unless indicated above.	      Allergies and Intolerances:        Allergies:  	No Known Allergies:     Home Medications:   * Patient Currently Takes Medications as of 06-Sep-2017 16:44 documented in Prescription Writer  · 	Xarelto 15 mg oral tablet: 1 tab(s) orally twice, Last Dose Taken:    · 	folic acid 1 mg oral tablet: 2 tab(s) orally once a day, Last Dose Taken:    · 	Aspirin Enteric Coated 81 mg oral delayed release tablet: 1 tab(s) orally once a day, Last Dose Taken:    · 	hydroxyurea 500 mg oral capsule: 3 cap(s) orally Monday, Wednesday, and Friday, Last Dose Taken:      . .  Patient History:   Past Medical History:  Hypertension    Myeloproliferative disorder    PE (pulmonary thromboembolism)  2015  Tremor    Vestibular disequilibrium.    Past Surgical History:  S/P hysterectomy.    Family History:  No pertinent family history in first degree relatives.    Social History:  Social History (marital status, living situation, occupation, tobacco use, alcohol and drug use, and sexual history): Lives alone, . Does not have any children. Prior history of cigarette use 35 pack year history. No alcohol use or illicit drug use	    Tobacco Screening:  · Core Measure Site	No	  · Has the patient used tobacco in the past 30 days?	No	    Risk Assessment:   Present on Admission:  Deep Venous Thrombosis	no	  Pulmonary Embolus	no	  Urinary Catheter	no	  Central Venous Catheter/PICC Line	no	  Surgical Site Incision	no	  Pressure Ulcer(s)	no	      Physical Exam:  Physical Exam: VITALS Vital Signs Last 24 Hrs T(C): 37.1 (07 Sep 2017 16:37), Max: 37.1 (07 Sep 2017 16:37) T(F): 98.7 (07 Sep 2017 16:37), Max: 98.7 (07 Sep 2017 16:37) HR: 88 (07 Sep 2017 16:37) (77 - 88) BP: 106/71 (07 Sep 2017 16:37) (106/71 - 173/91) BP(mean): -- RR: 15 (07 Sep 2017 16:37) (15 - 16) SpO2: 99% (07 Sep 2017 16:37) (94% - 99%)  PHYSICAL EXAM General: A&Ox3; NAD Head: NC/AT; PERRL; EOMI; anicteric sclera Neck: Supple; no JVD Respiratory: CTA B/L; no wheezes/crackles/rales auscultated w/ good air movement Cardiovascular: Regular rhythm/rate; S1/S2; no gallops or murmurs auscultated Gastrointestinal: Normal Bowel Sounds, Non-distended. Tender in epigastric region Extremities: WWP; no edema or cyanosis; radial/pedal pulses palpable Neurological:  CNII-XII grossly intact; no obvious focal deficits	      Labs and Results:  Labs, Radiology, Cardiology, and Other Results: LABS                         7.8   2.1   )-----------( 46       ( 09 Sep 2017 07:34 )            23.2                   	    Assessment and Plan:   Assessment:  · Assessment		  77F with PMH of MDS, previous PE, DVT, HTN who presents with weakness and fatigue for 1 week duration. Found to have pancytopenia on CBC most likely 2/2 to chronic MDS.     Problem/Plan - 1:  ·  Problem: Myeloproliferative disorder.  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 Last CBC in June showed WBC 13.3, H/H 15.5/47.0, . Patient current pancytopenia most likely exacerbation of MPD as she has been admitted in past for this.  -Currently awaiting 1 unit pRBC, will follow up AM CBC to determine H/H  -Follow up on labs: Reticulocyte Count, Peripheral Smear, LDH, Haptoglobin, Folate, B12, TSH  -Transfuse Hgb <7.0  -Transfuse Plt <15k unless actively bleeding  -Hold Hydroxyurea for now    Problem/Plan - 2:  ·  Problem: Hypokalemia.  Plan: Patient K on admission 3.1, given 40mEq in ED. Repeat BMP in morning, will include Phos. Determine need for further repletion at that time  -Follow up AM BMP.     Problem/Plan - 3:  ·  Problem: Essential hypertension.  Plan: Patient currently normotensive, does not take any medications at home.  -Will continue to monitor, question as to whether she takes Norvasc 5mg at home, if patient becomes HTN will add Norvasc.     Problem/Plan - 4:  ·  Problem: Chronic deep vein thrombosis (DVT) of femoral vein of right lower extremity.  Plan: Patient was seen in ED in June and found to have right femoral and right popiteal DVT. Started on Xarelto then.   -Will continue Xarelto as inpatient.   -Await LE Dopplers    Problem/Plan - 5:  ·  Problem: Nutrition, metabolism, and development symptoms.  Plan: F: Patient will get 1 unit pRBC and NS bolus, no fluids indicated following  E: Replete K>4, Mg>2, s/p 40mEq KCl  N: Reg. Diet.     Problem/Plan - 6:  Problem: Need for prophylactic measure. Plan: P: Patient High Risk, currently on Xarelto, will continue Xarelto  -f/u LE Dopplers

## 2017-09-09 NOTE — PROGRESS NOTE ADULT - SUBJECTIVE AND OBJECTIVE BOX
Interval Events:  Patient seen and examined at bedside. Feels well, tired and is w/o SOB.    MEDICATIONS:  Pulmonary:    Antimicrobials:    Anticoagulants:  rivaroxaban 20 milliGRAM(s) Oral every 24 hours    Cardiac:      Allergies    No Known Allergies    Intolerances        Vital Signs Last 24 Hrs  T(C): 36.7 (09 Sep 2017 20:22), Max: 36.7 (09 Sep 2017 06:00)  T(F): 98.1 (09 Sep 2017 20:22), Max: 98.1 (09 Sep 2017 06:00)  HR: 78 (09 Sep 2017 20:22) (70 - 84)  BP: 130/74 (09 Sep 2017 20:22) (120/74 - 133/82)  BP(mean): --  RR: 15 (09 Sep 2017 20:22) (14 - 16)  SpO2: 99% (09 Sep 2017 20:22) (95% - 99%)    09-08 @ 07:01  -  09-09 @ 07:00  --------------------------------------------------------  IN: 250 mL / OUT: 900 mL / NET: -650 mL    09-09 @ 07:01  -  09-09 @ 21:59  --------------------------------------------------------  IN: 0 mL / OUT: 300 mL / NET: -300 mL          PHYSICAL EXAM:  General: Well developed; well nourished; in no acute distress  HEENT: PERRL, EOMI, non icteric  Neck: Supple; no masses  Respiratory: Clear to auscultation bilaterally, no wheezing or crackles  Cardiovascular: Regular rate and rhythm. S1 and S2 Normal; 3.6 systolic murmur over LSB   Gastrointestinal: Soft non-tender non-distended;  Extremities:WWP, no edema, no cyanosis  Neurological: Alert and oriented x3  Skin: Warm and dry. No obvious rash    LABS:      CBC Full  -  ( 09 Sep 2017 07:34 )  WBC Count : 2.1 K/uL  Hemoglobin : 7.8 g/dL  Hematocrit : 23.2 %  Platelet Count - Automated : 46 K/uL  Mean Cell Volume : 88.2 fL  Mean Cell Hemoglobin : 29.7 pg  Mean Cell Hemoglobin Concentration : 33.6 g/dL  Auto Neutrophil # : x  Auto Lymphocyte # : x  Auto Monocyte # : x  Auto Eosinophil # : x  Auto Basophil # : x  Auto Neutrophil % : x  Auto Lymphocyte % : x  Auto Monocyte % : x  Auto Eosinophil % : x  Auto Basophil % : x    09-09    140  |  106  |  14  ----------------------------<  99  3.8   |  22  |  0.60    Ca    9.1      09 Sep 2017 07:34  Mg     2.1     09-09                        RADIOLOGY imaging reviewed

## 2017-09-09 NOTE — PROGRESS NOTE ADULT - SUBJECTIVE AND OBJECTIVE BOX
77F with PMH of MDS, previous PE, HTN who presents with weakness and fatigue for 1 week duration. Found to have pancytopenia on CBC most likely 2/2 to chronic MDS.     O/N Events: KAREN  Subjective/ROS: Patient states she was able to walk yesterday with PT and felt better. Still adamant that she is refusing IVC filter placement. Discussed risks and benefits again with her. States understanding but still does not wish to have filter placed. Denies HA, CP, SOB, n/v, changes in bowel/urinary habits.  12pt ROS otherwise negative.    VITALS  Vital Signs Last 24 Hrs  T(C): 36.5 (08 Sep 2017 21:00), Max: 36.5 (08 Sep 2017 21:00)  T(F): 97.7 (08 Sep 2017 21:00), Max: 97.7 (08 Sep 2017 21:00)  HR: 82 (08 Sep 2017 21:00) (82 - 82)  BP: 114/75 (08 Sep 2017 21:00) (114/75 - 114/75)  BP(mean): --  RR: 15 (08 Sep 2017 21:00) (15 - 15)  SpO2: 95% (08 Sep 2017 21:00) (95% - 95%)    CAPILLARY BLOOD GLUCOSE    PHYSICAL EXAM  General: A&Ox3; NAD  Head: NC/AT; MMM; PERRL; EOMI;  Neck: Supple; no JVD  Respiratory: CTA B/L; no wheezes/crackles   Cardiovascular: Regular rhythm/rate; S1/S2   Gastrointestinal: Soft; NTND; normoactive BS  Extremities: WWP; no edema/cyanosis  Neurological:  CNII-XII grossly intact; no obvious focal deficits    MEDICATIONS  (STANDING):  sodium chloride 0.9%. 1000 milliLiter(s) (125 mL/Hr) IV Continuous <Continuous>  folic acid 1 milliGRAM(s) Oral daily    MEDICATIONS  (PRN):      No Known Allergies      LABS                        7.8    2.1   )-----------( 46       ( 09 Sep 2017 07:34 )             23.2     09-09    140  |  106  |  14  ----------------------------<  99  3.8   |  22  |  0.60    Ca    9.1      09 Sep 2017 07:34  Mg     2.1     09-09              NM Pulmonary Ventilation/Perfusion Scan (09.08.17 @ 16:14)  Numerous matching and mismatching defects throughout both lungs, similar to prior study dated 10/27/2015. This could represent chronic changes but acute pulmonary emboli cannot be ruled out. Recommend a CT PE protocol if patient is symptomatic.

## 2017-09-10 LAB
ANION GAP SERPL CALC-SCNC: 11 MMOL/L — SIGNIFICANT CHANGE UP (ref 5–17)
BUN SERPL-MCNC: 20 MG/DL — SIGNIFICANT CHANGE UP (ref 7–23)
CALCIUM SERPL-MCNC: 9.2 MG/DL — SIGNIFICANT CHANGE UP (ref 8.4–10.5)
CHLORIDE SERPL-SCNC: 105 MMOL/L — SIGNIFICANT CHANGE UP (ref 96–108)
CO2 SERPL-SCNC: 23 MMOL/L — SIGNIFICANT CHANGE UP (ref 22–31)
CREAT SERPL-MCNC: 0.6 MG/DL — SIGNIFICANT CHANGE UP (ref 0.5–1.3)
GLUCOSE SERPL-MCNC: 103 MG/DL — HIGH (ref 70–99)
HCT VFR BLD CALC: 23.1 % — LOW (ref 34.5–45)
HGB BLD-MCNC: 7.8 G/DL — LOW (ref 11.5–15.5)
MAGNESIUM SERPL-MCNC: 2.2 MG/DL — SIGNIFICANT CHANGE UP (ref 1.6–2.6)
MCHC RBC-ENTMCNC: 29.7 PG — SIGNIFICANT CHANGE UP (ref 27–34)
MCHC RBC-ENTMCNC: 33.8 G/DL — SIGNIFICANT CHANGE UP (ref 32–36)
MCV RBC AUTO: 87.8 FL — SIGNIFICANT CHANGE UP (ref 80–100)
PLATELET # BLD AUTO: 44 K/UL — LOW (ref 150–400)
POTASSIUM SERPL-MCNC: 3.7 MMOL/L — SIGNIFICANT CHANGE UP (ref 3.5–5.3)
POTASSIUM SERPL-SCNC: 3.7 MMOL/L — SIGNIFICANT CHANGE UP (ref 3.5–5.3)
RBC # BLD: 2.63 M/UL — LOW (ref 3.8–5.2)
RBC # FLD: 21.4 % — HIGH (ref 10.3–16.9)
SODIUM SERPL-SCNC: 139 MMOL/L — SIGNIFICANT CHANGE UP (ref 135–145)
WBC # BLD: 2.2 K/UL — LOW (ref 3.8–10.5)
WBC # FLD AUTO: 2.2 K/UL — LOW (ref 3.8–10.5)

## 2017-09-10 PROCEDURE — 93970 EXTREMITY STUDY: CPT | Mod: 26

## 2017-09-10 RX ADMIN — Medication 1 MILLIGRAM(S): at 11:21

## 2017-09-10 RX ADMIN — Medication 100 MILLIGRAM(S): at 17:26

## 2017-09-10 RX ADMIN — Medication 1 TABLET(S): at 11:20

## 2017-09-10 RX ADMIN — RIVAROXABAN 20 MILLIGRAM(S): KIT at 17:26

## 2017-09-10 RX ADMIN — SENNA PLUS 2 TABLET(S): 8.6 TABLET ORAL at 21:37

## 2017-09-10 RX ADMIN — Medication 1 TABLET(S): at 21:37

## 2017-09-10 RX ADMIN — Medication 100 MILLIGRAM(S): at 21:37

## 2017-09-10 RX ADMIN — Medication 100 MILLIGRAM(S): at 11:20

## 2017-09-10 NOTE — PROGRESS NOTE ADULT - SUBJECTIVE AND OBJECTIVE BOX
77F with PMH of MDS, previous PE, HTN who presents with weakness and fatigue for 1 week duration. Weakness and fatigue insidious in onset, unable to give a definitive time she last felt well. States lately she has not had energy to even get out of bed to eat or get her medications. It has been getting progressively worse.  At baseline patient is able to walk with use of cane about 1-2blocks. Currently she feels SOB and fatigued when she stands from bed.     She denies fever, chills, CP, orthopnea, LE edema, nausea, vomiting, diarrhea, abdominal pain, dysuria, increased urinary frequency. Patient denies melena or hematochezia. Patient states she has not had a BM since Monday. Her typical schedule is every day to every other day. She denies any recent illness or sick contacts.    In ED T97.7, HR83, /79, RR19 99%. Patient was found to be pancytopenic on CBC, WBC 2.5, H/H 6/17.6, Plt 60. BMP showed hypokalemia with K 3.1. She was given 40mEq KCl, and a bolus of NS. She was ordered for 1 unit pRBC.          	  MEDICATIONS:    trimethoprim  160 mG/sulfamethoxazole 800 mG 1 Tablet(s) Oral two times a day        docusate sodium 100 milliGRAM(s) Oral three times a day  senna 2 Tablet(s) Oral at bedtime      folic acid 1 milliGRAM(s) Oral daily  rivaroxaban 20 milliGRAM(s) Oral every 24 hours      Complaint:     Otherwise 12 point review of systems is normal.    PHYSICAL EXAM:  ICU Vital Signs Last 24 Hrs  T(C): 36.6 (10 Sep 2017 21:09), Max: 36.6 (10 Sep 2017 21:09)  T(F): 97.9 (10 Sep 2017 21:09), Max: 97.9 (10 Sep 2017 21:09)  HR: 80 (10 Sep 2017 21:09) (74 - 82)  BP: 128/83 (10 Sep 2017 21:09) (109/76 - 148/89)  BP(mean): --  ABP: --  ABP(mean): --  RR: 16 (10 Sep 2017 21:09) (14 - 16)  SpO2: 96% (10 Sep 2017 21:09) (96% - 97%)      ECG:      CHEST X RAY    CT    MRI    MRA    CT ANGIO    CAROTID DUPLEX    DUPLEX   < from: US Duplex Venous Lower Ext Complete, Bilateral (09.10.17 @ 13:44) >  EXAM:  US DPLX LWR EXT VEINS COMPL BI                          PROCEDURE DATE:  09/10/2017               ******PRELIMINARY REPORT******    ******PRELIMINARY REPORT******          INTERPRETATION:    VENOUS DUPLEX DOPPLER OF BOTH LOWER EXTREMITIES dated 9/10/2017 1:44 PM    INDICATION: History of DVT. Rule out DVT.    TECHNIQUE: Duplex Doppler evaluation including gray-scale ultrasound   imaging, color flow Doppler imaging, and Doppler spectral analysis of the   veins of both lower extremities was performed.     COMPARISON: Right lower venous extremity duplex ultrasound 6/16/2017.    FINDINGS:    Thigh veins: The previously visualized deep venous stenosis of the   proximal femoral and popliteal veins is again noted.   New deep venous thrombosis involving the mid and distal femoral vein. The   left common femoral, femoral, proximal greater saphenous, and proximal   deep femoral veins are patent and free of thrombus.    Calf veins: The paired peroneal and posterior tibial calf veins are   patent bilaterally.    Thrombosed mid calf right varicocele veins.      IMPRESSION:  Previously visualized deep venous thrombosis involving the right proximal   femoral andpopliteal vein are again noted. New deep venous thrombosis   involving the right mid and distal femoral vein. Thrombosed mid calf   right varicose veins also noted.          Echocardiogram    Catheterization:    Stress Test:     LABS:	 	  CARDIAC MARKERS:                              7.8    2.2   )-----------( 44       ( 10 Sep 2017 06:45 )             23.1     09-10    139  |  105  |  20  ----------------------------<  103<H>  3.7   |  23  |  0.60    Ca    9.2      10 Sep 2017 06:45  Mg     2.2     09-10        ASSESSMENT/PLAN: 	  77F with PMH of MDS, previous PE, DVT, HTN who presents with weakness and fatigue for 1 week duration. Found to have pancytopenia on CBC most likely 2/2 to chronic MDS.     Problem/Plan - 1:  ·  Problem: Myeloproliferative disorder.  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 Last CBC in June showed WBC 13.3, H/H 15.5/47.0, . Patient current pancytopenia most likely exacerbation of MPD as she has been admitted in past for this.  -Currently awaiting 1 unit pRBC, will follow up AM CBC to determine H/H  -Follow up on labs: Reticulocyte Count, Peripheral Smear, LDH, Haptoglobin, Folate, B12, TSH  -Transfuse Hgb <7.0  -Transfuse Plt <15k unless actively bleeding  -Hold Hydroxyurea for now    Problem/Plan - 2:  ·  Problem: Hypokalemia.  Plan: Patient K on admission 3.1, given 40mEq in ED. Repeat BMP in morning, will include Phos. Determine need for further repletion at that time  -Follow up AM BMP.     Problem/Plan - 3:  ·  Problem: Essential hypertension.  Plan: Patient currently normotensive, does not take any medications at home.  -Will continue to monitor, question as to whether she takes Norvasc 5mg at home, if patient becomes HTN will add Norvasc.     Problem/Plan - 4:  ·  Problem: Chronic deep vein thrombosis (DVT) of femoral vein of right lower extremity.  Plan: Patient was seen in ED in June and found to have right femoral and right popiteal DVT. Started on Xarelto then.   -Will continue Xarelto as inpatient.   -Await LE Dopplers    Problem/Plan - 5:  ·  Problem: Nutrition, metabolism, and development symptoms.  Plan: F: Patient will get 1 unit pRBC and NS bolus, no fluids indicated following  E: Replete K>4, Mg>2, s/p 40mEq KCl  N: Reg. Diet.     Problem/Plan - 6:  Problem: Need for prophylactic measure. Plan: P: Patient High Risk, currently on Xarelto, will continue Xarelto

## 2017-09-10 NOTE — PROGRESS NOTE ADULT - SUBJECTIVE AND OBJECTIVE BOX
History of Present Illness:  Chief Complaint/Reason for Admission: Weakness and Fatigue	  History of Present Illness: 	  77F with PMH of MDS, previous PE, HTN who presents with weakness and fatigue for 1 week duration. Weakness and fatigue insidious in onset, unable to give a definitive time she last felt well. States lately she has not had energy to even get out of bed to eat or get her medications. It has been getting progressively worse.  At baseline patient is able to walk with use of cane about 1-2 blocks. Currently she feels SOB and fatigued when she stands from bed.     She denies fever, chills, CP, orthopnea, LE edema, nausea, vomiting, diarrhea, abdominal pain, dysuria, increased urinary frequency. Patient denies melena or hematochezia. Patient states she has not had a BM since Monday. Her typical schedule is every day to every other day. She denies any recent illness or sick contacts.  Now s/p 2 units PRBCs    Review of Systems:  Review of Systems: CONSTITUTIONAL: No fevers or chills EYES/ENT: No visual changes;  No vertigo or throat pain  NECK: No pain or stiffness RESPIRATORY: No cough, wheezing, hemoptysis; No shortness of breath CARDIOVASCULAR: No chest pain or palpitations GASTROINTESTINAL: No abdominal or epigastric pain. No nausea, vomiting, or hematemesis; No diarrhea  No melena or hematochezia. GENITOURINARY: No dysuria, frequency or hematuria NEUROLOGICAL: No numbness  SKIN: No itching, burning, rashes, or lesions  All other review of systems is negative unless indicated above.	      Allergies and Intolerances:        Allergies:  	No Known Allergies:     Home Medications:   * Patient Currently Takes Medications as of 06-Sep-2017 16:44 documented in Prescription Writer  · 	Xarelto 15 mg oral tablet: 1 tab(s) orally twice, Last Dose Taken:    · 	folic acid 1 mg oral tablet: 2 tab(s) orally once a day, Last Dose Taken:    · 	Aspirin Enteric Coated 81 mg oral delayed release tablet: 1 tab(s) orally once a day, Last Dose Taken:    · 	hydroxyurea 500 mg oral capsule: 3 cap(s) orally Monday, Wednesday, and Friday, Last Dose Taken:      . .  Patient History:   Past Medical History:  Hypertension    Myeloproliferative disorder    PE (pulmonary thromboembolism)  2015  Tremor    Vestibular disequilibrium.    Past Surgical History:  S/P hysterectomy.    Family History:  No pertinent family history in first degree relatives.    Social History:  Social History (marital status, living situation, occupation, tobacco use, alcohol and drug use, and sexual history): Lives alone, . Does not have any children. Prior history of cigarette use 35 pack year history. No alcohol use or illicit drug use	    Tobacco Screening:  · Core Measure Site	No	  · Has the patient used tobacco in the past 30 days?	No	    Risk Assessment:   Present on Admission:  Deep Venous Thrombosis	no	  Pulmonary Embolus	no	  Urinary Catheter	no	  Central Venous Catheter/PICC Line	no	  Surgical Site Incision	no	  Pressure Ulcer(s)	no	      Physical Exam:  Physical Exam: VITALS Vital Signs Last 24 Hrs T(C): 37.1 (07 Sep 2017 16:37), Max: 37.1 (07 Sep 2017 16:37) T(F): 98.7 (07 Sep 2017 16:37), Max: 98.7 (07 Sep 2017 16:37) HR: 88 (07 Sep 2017 16:37) (77 - 88) BP: 106/71 (07 Sep 2017 16:37) (106/71 - 173/91) BP(mean): -- RR: 15 (07 Sep 2017 16:37) (15 - 16) SpO2: 99% (07 Sep 2017 16:37) (94% - 99%)  PHYSICAL EXAM General: A&Ox3; NAD Head: NC/AT; PERRL; EOMI; anicteric sclera Neck: Supple; no JVD Respiratory: CTA B/L; no wheezes/crackles/rales auscultated w/ good air movement Cardiovascular: Regular rhythm/rate; S1/S2; no gallops or murmurs auscultated Gastrointestinal: Normal Bowel Sounds, Non-distended. Tender in epigastric region Extremities: WWP; no edema or cyanosis; radial/pedal pulses palpable Neurological:  CNII-XII grossly intact; no obvious focal deficits	      Labs and Results:  Labs, Radiology, Cardiology, and Other Results: LABS                           7.8   2.2   )-----------( 44       ( 10 Sep 2017 06:45 )            23.1             	    Assessment and Plan:   Assessment:  · Assessment		  77F with PMH of MDS, previous PE, DVT, HTN who presents with weakness and fatigue for 1 week duration. Found to have pancytopenia on CBC most likely 2/2 to chronic MDS.     Problem/Plan - 1:  ·  Problem: Myeloproliferative disorder.  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 Last CBC in June showed WBC 13.3, H/H 15.5/47.0, . Patient current pancytopenia most likely exacerbation of MPD as she has been admitted in past for this.  -Currently awaiting 1 unit pRBC, will follow up AM CBC to determine H/H  -Follow up on labs: Reticulocyte Count, Peripheral Smear, LDH, Haptoglobin, Folate, B12, TSH  -Transfuse Hgb <7.0  -Transfuse Plt <15k unless actively bleeding  -Hold Hydroxyurea for now    Problem/Plan - 2:  ·  Problem: Hypokalemia.  Plan: Patient K on admission 3.1, given 40mEq in ED. Repeat BMP in morning, will include Phos. Determine need for further repletion at that time  -Follow up AM BMP.     Problem/Plan - 3:  ·  Problem: Essential hypertension.  Plan: Patient currently normotensive, does not take any medications at home.  -Will continue to monitor, question as to whether she takes Norvasc 5mg at home, if patient becomes HTN will add Norvasc.     Problem/Plan - 4:  ·  Problem: Chronic deep vein thrombosis (DVT) of femoral vein of right lower extremity.  Plan: Patient was seen in ED in June and found to have right femoral and right popiteal DVT. Started on Xarelto then.   -Will continue Xarelto as inpatient.   -Await LE Dopplers    Problem/Plan - 5:  ·  Problem: Nutrition, metabolism, and development symptoms.  Plan: F: Patient will get 1 unit pRBC and NS bolus, no fluids indicated following  E: Replete K>4, Mg>2, s/p 40mEq KCl  N: Reg. Diet.     Problem/Plan - 6:  Problem: Need for prophylactic measure. Plan: P: Patient High Risk, currently on Xarelto, will continue Xarelto  -f/u LE Dopplers

## 2017-09-11 DIAGNOSIS — N30.00 ACUTE CYSTITIS WITHOUT HEMATURIA: ICD-10-CM

## 2017-09-11 LAB
HCT VFR BLD CALC: 23.2 % — LOW (ref 34.5–45)
HGB BLD-MCNC: 7.9 G/DL — LOW (ref 11.5–15.5)
MCHC RBC-ENTMCNC: 30.4 PG — SIGNIFICANT CHANGE UP (ref 27–34)
MCHC RBC-ENTMCNC: 34.1 G/DL — SIGNIFICANT CHANGE UP (ref 32–36)
MCV RBC AUTO: 89.2 FL — SIGNIFICANT CHANGE UP (ref 80–100)
PLATELET # BLD AUTO: 53 K/UL — LOW (ref 150–400)
RBC # BLD: 2.6 M/UL — LOW (ref 3.8–5.2)
RBC # FLD: 22.4 % — HIGH (ref 10.3–16.9)
T4 AB SER-ACNC: 6.09 UG/DL — SIGNIFICANT CHANGE UP (ref 3.17–11.72)
TSH SERPL-MCNC: 2.25 UIU/ML — SIGNIFICANT CHANGE UP (ref 0.35–4.94)
WBC # BLD: 2.2 K/UL — LOW (ref 3.8–10.5)
WBC # FLD AUTO: 2.2 K/UL — LOW (ref 3.8–10.5)

## 2017-09-11 PROCEDURE — 99232 SBSQ HOSP IP/OBS MODERATE 35: CPT

## 2017-09-11 RX ORDER — MINERAL OIL
133 OIL (ML) MISCELLANEOUS ONCE
Qty: 0 | Refills: 0 | Status: COMPLETED | OUTPATIENT
Start: 2017-09-11 | End: 2017-09-11

## 2017-09-11 RX ADMIN — Medication 133 MILLILITER(S): at 22:39

## 2017-09-11 RX ADMIN — Medication 1 MILLIGRAM(S): at 11:50

## 2017-09-11 RX ADMIN — Medication 10 MILLIGRAM(S): at 14:43

## 2017-09-11 RX ADMIN — SENNA PLUS 2 TABLET(S): 8.6 TABLET ORAL at 21:37

## 2017-09-11 RX ADMIN — RIVAROXABAN 20 MILLIGRAM(S): KIT at 17:57

## 2017-09-11 RX ADMIN — Medication 100 MILLIGRAM(S): at 14:43

## 2017-09-11 RX ADMIN — Medication 1 TABLET(S): at 06:06

## 2017-09-11 RX ADMIN — Medication 100 MILLIGRAM(S): at 06:06

## 2017-09-11 RX ADMIN — Medication 1 TABLET(S): at 17:57

## 2017-09-11 RX ADMIN — Medication 100 MILLIGRAM(S): at 21:37

## 2017-09-11 NOTE — PROGRESS NOTE ADULT - SUBJECTIVE AND OBJECTIVE BOX
Neurology Follow up note    Name  ANDREW STUART    HPI:  77F with PMH of MDS, previous PE, HTN who presents with weakness and fatigue for 1 week duration. Weakness and fatigue insidious in onset, unable to give a definitive time she last felt well. States lately she has not had energy to even get out of bed to eat or get her medications. It has been getting progressively worse.  At baseline patient is able to walk with use of cane about 1-2blocks. Currently she feels SOB and fatigued when she stands from bed.     She denies fever, chills, CP, orthopnea, LE edema, nausea, vomiting, diarrhea, abdominal pain, dysuria, increased urinary frequency. Patient denies melena or hematochezia. Patient states she has not had a BM since Monday. Her typical schedule is every day to every other day. She denies any recent illness or sick contacts.    In ED T97.7, HR83, /79, RR19 99%. Patient was found to be pancytopenic on CBC, WBC 2.5, H/H 6/17.6, Plt 60. BMP showed hypokalemia with K 3.1. She was given 40mEq KCl, and a bolus of NS. She was ordered for 1 unit pRBC. (06 Sep 2017 16:34)      Interval History - no new neuro deficits- no headaches or dizziness        REVIEW OF SYSTEMS    Vital Signs Last 24 Hrs  T(C): 36.8 (11 Sep 2017 06:05), Max: 36.8 (11 Sep 2017 06:05)  T(F): 98.3 (11 Sep 2017 06:05), Max: 98.3 (11 Sep 2017 06:05)  HR: 66 (11 Sep 2017 06:05) (66 - 82)  BP: 121/76 (11 Sep 2017 06:05) (109/76 - 128/83)  BP(mean): --  RR: 15 (11 Sep 2017 06:05) (15 - 16)  SpO2: 97% (11 Sep 2017 06:05) (96% - 97%)    Physical Exam-  awake and alert    Mental Status- gross intact    Cranial Nerves- full    Gait and station- unsteady    Motor- moves all 4 extremities     decreased sensation      Coordination- no tremors    Vascular -    Medications  folic acid 1 milliGRAM(s) Oral daily  rivaroxaban 20 milliGRAM(s) Oral every 24 hours  docusate sodium 100 milliGRAM(s) Oral three times a day  senna 2 Tablet(s) Oral at bedtime  trimethoprim  160 mG/sulfamethoxazole 800 mG 1 Tablet(s) Oral two times a day      Lab      Radiology    Assessment-  No evidence of new neuro deficits    Plan- will follow- no neuro testing indicated

## 2017-09-11 NOTE — PROGRESS NOTE ADULT - ASSESSMENT
77F with PMH of MDS, previous PE, HTN who presents with weakness and fatigue for 1 week duration. Found to have pancytopenia on CBC most likely 2/2 to chronic MDS.     Problem/Plan - 1:  ·  Problem: Myeloproliferative disorder.  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 Last CBC in June showed WBC 13.3, H/H 15.5/47.0, . Patient current pancytopenia most likely exacerbation of MPD as she has been admitted in past for this.  -S/P Transfusion of 2 unit pRBC, Hgb up to 8.0. Will hold any future transfusions pending Heme/Onc Recommendations.  -Patient has increased Reticulocyte count, Decreased Haptoglobin and Increased bilirubin most likely showing hemolytic picture. Will continue to monitor anemia labs as they come in.  -Transfuse Hgb <7.0  -Transfuse Plt <15k unless actively bleeding  -Patient was scheduled for Vascular intervention with placement of IVC Filter 2/2 to thrombocytopenia. Would want to stop Xarelto during thrombocytopenia.   -Dr. Lockett: Requested Lower Extremity Doppler and V/Q Scan to determine if there is resolution or improvement in clots. V/Q scan shows no interval change from previous scan. Still awaiting Lower Extremity Doppler.   -Dr. Dumont is [facility] Heme/Onc, she is aware of patient, will follow up with rec's.     Problem/Plan - 2:  ·  Problem: Acute cystitis without hematuria.  Plan: Patient came in with positive UA, UCx sent showing growth of Klebsiella. In setting of urinary retention plan is to treat for uncomplicated acute cystitis.   -Continue Bactrim BID for total of 5-7d.     Problem/Plan - 3:  ·  Problem: Hypokalemia.  Plan: Patient K on admission 3.1, given 40mEq in ED. Repeat BMP showed K 3.9.  -Resolved.     Problem/Plan - 4:  ·  Problem: Urinary retention.  Plan: Most likely 2/2 to constipation.  -Will start Colace 100mg TID and Senna Qd today. If patient does not have BM today will consider adding Miralax tomorrow to further aid in constipation  -Patient to be OOB and ambulating with aid as much as possible  -Treat UTI as above

## 2017-09-11 NOTE — PROGRESS NOTE ADULT - SUBJECTIVE AND OBJECTIVE BOX
77F with PMH of MDS, previous PE, HTN who presents with weakness and fatigue for 1 week duration. Found to have pancytopenia on CBC most likely 2/2 to chronic MDS.     O/N Events: KAREN  Subjective/ROS: Patient states she is doing well. Has anxiety about not knowing the cause of what is happening to her. Denies HA, CP, SOB, n/v, changes in bowel/urinary habits.  12pt ROS otherwise negative.    VITALS  Vital Signs Last 24 Hrs  T(C): 36.8 (11 Sep 2017 06:05), Max: 36.8 (11 Sep 2017 06:05)  T(F): 98.3 (11 Sep 2017 06:05), Max: 98.3 (11 Sep 2017 06:05)  HR: 66 (11 Sep 2017 06:05) (66 - 82)  BP: 121/76 (11 Sep 2017 06:05) (109/76 - 128/83)  BP(mean): --  RR: 15 (11 Sep 2017 06:05) (15 - 16)  SpO2: 97% (11 Sep 2017 06:05) (96% - 97%)    CAPILLARY BLOOD GLUCOSE    PHYSICAL EXAM  General: A&Ox3; NAD  Head: NC/AT; MMM; PERRL; EOMI;  Neck: Supple; no JVD  Respiratory: CTA B/L; no wheezes/crackles   Cardiovascular: Regular rhythm/rate; S1/S2   Gastrointestinal: Soft; NTND; normoactive BS  Extremities: WWP; no edema/cyanosis  Neurological:  CNII-XII grossly intact; no obvious focal deficits    MEDICATIONS  (STANDING):  folic acid 1 milliGRAM(s) Oral daily  rivaroxaban 20 milliGRAM(s) Oral every 24 hours  docusate sodium 100 milliGRAM(s) Oral three times a day  senna 2 Tablet(s) Oral at bedtime  trimethoprim  160 mG/sulfamethoxazole 800 mG 1 Tablet(s) Oral two times a day    MEDICATIONS  (PRN):      No Known Allergies      LABS                        7.9    2.2   )-----------( 53       ( 11 Sep 2017 07:13 )             23.2     09-10    139  |  105  |  20  ----------------------------<  103<H>  3.7   |  23  |  0.60    Ca    9.2      10 Sep 2017 06:45  Mg     2.2     09-10        Imaging:  NM Pulmonary Ventilation/Perfusion Scan (09.08.17 @ 16:14)  Numerous matching and mismatching defects throughout both lungs, similar to prior study dated 10/27/2015. This could represent chronic changes but acute pulmonary emboli cannot be ruled out. Recommend a CT PE protocol if patient is symptomatic.    US Duplex Venous Lower Ext Complete, Bilateral (09.10.17 @ 13:44)   Previously visualized deep venous thrombosis involving the right proximal femoral and popliteal vein is again noted. New deep venous thrombosis involving the right mid and distal femoral vein. No deep venous thrombosis visualized in the left lower extremity.

## 2017-09-11 NOTE — PROGRESS NOTE ADULT - SUBJECTIVE AND OBJECTIVE BOX
Interval Events:  Patient seen and examined at bedside. No acute events overnight. She does not report shortness of breath and chest pain.    MEDICATIONS:  Pulmonary:    Antimicrobials:  trimethoprim  160 mG/sulfamethoxazole 800 mG 1 Tablet(s) Oral two times a day    Anticoagulants:  rivaroxaban 20 milliGRAM(s) Oral every 24 hours    Cardiac:    Endocrine:    Allergies    No Known Allergies    Intolerances        Vital Signs Last 24 Hrs  T(C): 36.4 (11 Sep 2017 11:53), Max: 36.8 (11 Sep 2017 06:05)  T(F): 97.6 (11 Sep 2017 11:53), Max: 98.3 (11 Sep 2017 06:05)  HR: 80 (11 Sep 2017 11:53) (66 - 82)  BP: 105/71 (11 Sep 2017 11:53) (105/71 - 128/83)  BP(mean): --  RR: 16 (11 Sep 2017 11:53) (15 - 16)  SpO2: 95% (11 Sep 2017 11:53) (95% - 97%)    09-10 @ 07:01  -  09-11 @ 07:00  --------------------------------------------------------  IN: 0 mL / OUT: 850 mL / NET: -850 mL    PHYSICAL EXAM:  General: Well developed; well nourished; in no acute distress  HEENT: PERRL, EOMI, non icteric  Neck: Supple; no masses  Respiratory: Clear to auscultation bilaterally, no wheezing or crackles  Cardiovascular: Regular rate and rhythm. S1 and S2 Normal; 3.6 systolic murmur over LSB   Gastrointestinal: Soft non-tender non-distended;  Extremities:WWP, no edema, no cyanosis  Neurological: Alert and oriented x3  Skin: Warm and dry. No obvious rash    LABS:      CBC Full  -  ( 11 Sep 2017 07:13 )  WBC Count : 2.2 K/uL  Hemoglobin : 7.9 g/dL  Hematocrit : 23.2 %  Platelet Count - Automated : 53 K/uL  Mean Cell Volume : 89.2 fL  Mean Cell Hemoglobin : 30.4 pg  Mean Cell Hemoglobin Concentration : 34.1 g/dL  Auto Neutrophil # : x  Auto Lymphocyte # : x  Auto Monocyte # : x  Auto Eosinophil # : x  Auto Basophil # : x  Auto Neutrophil % : x  Auto Lymphocyte % : x  Auto Monocyte % : x  Auto Eosinophil % : x  Auto Basophil % : x    09-10    139  |  105  |  20  ----------------------------<  103<H>  3.7   |  23  |  0.60    Ca    9.2      10 Sep 2017 06:45  Mg     2.2     09-10                        RADIOLOGY & ADDITIONAL STUDIES (The following images were personally reviewed):

## 2017-09-11 NOTE — PROGRESS NOTE ADULT - SUBJECTIVE AND OBJECTIVE BOX
Physical Medicine and Rehabilitation Progress Note:    Patient is a 77y old  Female who presents with a chief complaint of Weakness and Fatigue (09 Sep 2017 14:55)      HPI:  77F with PMH of MDS, previous PE, HTN who presents with weakness and fatigue for 1 week duration. Weakness and fatigue insidious in onset, unable to give a definitive time she last felt well. States lately she has not had energy to even get out of bed to eat or get her medications. It has been getting progressively worse.  At baseline patient is able to walk with use of cane about 1-2blocks. Currently she feels SOB and fatigued when she stands from bed.     She denies fever, chills, CP, orthopnea, LE edema, nausea, vomiting, diarrhea, abdominal pain, dysuria, increased urinary frequency. Patient denies melena or hematochezia. Patient states she has not had a BM since Monday. Her typical schedule is every day to every other day. She denies any recent illness or sick contacts.    In ED T97.7, HR83, /79, RR19 99%. Patient was found to be pancytopenic on CBC, WBC 2.5, H/H 6/17.6, Plt 60. BMP showed hypokalemia with K 3.1. She was given 40mEq KCl, and a bolus of NS. She was ordered for 1 unit pRBC. (06 Sep 2017 16:34)                            7.9    2.2   )-----------( 53       ( 11 Sep 2017 07:13 )             23.2       09-10    139  |  105  |  20  ----------------------------<  103<H>  3.7   |  23  |  0.60    Ca    9.2      10 Sep 2017 06:45  Mg     2.2     09-10      Vital Signs Last 24 Hrs  T(C): 36.4 (11 Sep 2017 11:53), Max: 36.8 (11 Sep 2017 06:05)  T(F): 97.6 (11 Sep 2017 11:53), Max: 98.3 (11 Sep 2017 06:05)  HR: 80 (11 Sep 2017 11:53) (66 - 82)  BP: 105/71 (11 Sep 2017 11:53) (105/71 - 128/83)  BP(mean): --  RR: 16 (11 Sep 2017 11:53) (15 - 16)  SpO2: 95% (11 Sep 2017 11:53) (95% - 97%)    MEDICATIONS  (STANDING):  folic acid 1 milliGRAM(s) Oral daily  rivaroxaban 20 milliGRAM(s) Oral every 24 hours  docusate sodium 100 milliGRAM(s) Oral three times a day  senna 2 Tablet(s) Oral at bedtime  trimethoprim  160 mG/sulfamethoxazole 800 mG 1 Tablet(s) Oral two times a day    MEDICATIONS  (PRN):  bisacodyl Suppository 10 milliGRAM(s) Rectal daily PRN Constipation    Currently Undergoing Physical Therapy at bedside.    Initial Functional Status Assessment:    Previous Level of Function:   · Ambulation Skills	independent; needs device; Straight cane	  · Transfer Skills	independent	  · ADL Skills	independent	  · Work/Leisure Activity	independent	  · Additional Comments	Patient lives in elevator apartment, no steps to enter. Patient denies history of falls in past 6 months. Patient has  who assists with cleaning and shopping 1x a week. Patient denies home health assistance.	    Cognitive Status Examination:   · Orientation	oriented to person, place, time and situation	  · Level of Consciousness	alert	  · Follows Commands and Answers Questions	100% of the time	  · Personal Safety and Judgment	intact	    Range of Motion Exam:   · Active Range of Motion Examination	bilateral upper extremity Active ROM was WFL (within functional limits); bilateral  lower extremity Active ROM was WFL (within functional limits)	    Manual Muscle Testing:   · Manual Muscle Testing Results	B Elbow flexion,  strength: 4/5	    MMT Hip:   · Hip Flexion	(4) good, left, (4) good, right	    MMT Knee:   · Knee Flexion	(4) good, left, (4) good, right	  · Knee Extension	(4) good, left, (4) good, right	    MMT Ankle:   · Ankle Dorsiflexion	(5) normal, left, (5) normal, right	    Bed Mobility: Sit to Supine:   · Level of [place]	independent	    Bed Mobility: Supine to Sit:   · Level of [place]	independent	    Transfer: Sit to Stand:   · Level of [place]	contact guard	  · Physical Assist/Nonphysical Assist	1 person assist	  · Assistive Device	straight cane	    Transfer: Stand to Sit:   · Level of [place]	contact guard	  · Physical Assist/Nonphysical Assist	1 person assist	  · Assistive Device	straight cane	    Sit/Stand Transfer Safety Analysis:   · Transfer Safety Concerns Noted	losing balance	  · Impairments Contributing to Impaired Transfers	impaired balance; decreased strength	    Gait Skills:   · Level of [place]	contact guard	  · Physical Assist/Nonphysical Assist	1 person assist	  · Assistive Device	straight cane	  · Gait Distance	5 ft x 2 secondary to desaturation	    Gait Analysis:   · Gait Pattern Used	swing-through gait	  · Gait Deviations Noted	decreased step length; decreased stride length; decreased gait speed, unsteady, BUE tremors. Balance worsened with desaturation.	  · Impairments Contributing to Gait Deviations	impaired balance; decreased strength	    Balance Skills Assessment:   · Sitting Balance: Static	good balance	  · Sitting Balance: Dynamic	good balance	  · Sit-to-Stand Balance	good balance	  · Standing Balance: Static	poor balance	  · Standing Balance: Dynamic	poor balance	  · Systems Impairment Contributing to Balance Disturbance	musculoskeletal	  · Identified Impairments Contributing to Balance Disturbance	decreased strength; endurance	    Sensory Examination:   Sensory Examination:  Light Touch Sensation:   · Left LE	within normal limits	  · Right LE	within normal limits	      Treatment Location:   · Comments	FIM: 1| Patient initially reported no SOB or dizziness at start of ambulation, however after 5 ft before beginning to hyperventilate and reported difficulty breathing, sp02 was 72% on RA. Once returned to sitting and with deep breathing exercises, sp02 improved to 93% on RA. However once attempted to stand up with deep breathing exercises, sp02 decreased to 82 on RA. Patient returned to semi-supine where sp02 increased to 95% on RA, however patient reported feeling mild dyspnea. RN and MD made and were present at time. Patient reports that the shortness of breath during ambulate was new.	    Clinical Impressions:   · Criteria for Skilled Therapeutic Interventions	impairments found; functional limitations in following categories; rehab potential; therapy frequency	  · Impairments Found (describe specific impairments)	aerobic capacity/endurance; gait, locomotion, and balance; muscle strength	  · Functional Limitations in Following Categories (describe specific limitations)	self-care; home management; community/leisure	  · Rehab Potential	good, to achieve stated therapy goals	  · Therapy Frequency	2-3x/week	    PM&R Impression: as above    Disposition Plan Recommendations:  pending functional progress

## 2017-09-11 NOTE — PROGRESS NOTE ADULT - SUBJECTIVE AND OBJECTIVE BOX
History of Present Illness:  Chief Complaint/Reason for Admission: Weakness and Fatigue	  History of Present Illness: 	  77F with PMH of MDS, previous PE, HTN who presents with weakness and fatigue for 1 week duration. Weakness and fatigue insidious in onset, unable to give a definitive time she last felt well. States lately she has not had energy to even get out of bed to eat or get her medications. It has been getting progressively worse.  At baseline patient is able to walk with use of cane about 1-2 blocks. Currently she feels SOB and fatigued when she stands from bed.     She denies fever, chills, CP, orthopnea, LE edema, nausea, vomiting, diarrhea, abdominal pain, dysuria, increased urinary frequency. Patient denies melena or hematochezia. Patient states she has not had a BM since Monday. Her typical schedule is every day to every other day. She denies any recent illness or sick contacts.  Now s/p 2 units PRBCs.  Results of V/Q scan and LE U/S noted.    Review of Systems:  Review of Systems: CONSTITUTIONAL: No fevers or chills EYES/ENT: No visual changes;  No vertigo or throat pain  NECK: No pain or stiffness RESPIRATORY: No cough, wheezing, hemoptysis; No shortness of breath CARDIOVASCULAR: No chest pain or palpitations GASTROINTESTINAL: No abdominal or epigastric pain. No nausea, vomiting, or hematemesis; No diarrhea  No melena or hematochezia. GENITOURINARY: No dysuria, frequency or hematuria NEUROLOGICAL: No numbness  SKIN: No itching, burning, rashes, or lesions  All other review of systems is negative unless indicated above.	      Allergies and Intolerances:        Allergies:  	No Known Allergies:     Home Medications:   * Patient Currently Takes Medications as of 06-Sep-2017 16:44 documented in Prescription Writer  · 	Xarelto 15 mg oral tablet: 1 tab(s) orally twice, Last Dose Taken:    · 	folic acid 1 mg oral tablet: 2 tab(s) orally once a day, Last Dose Taken:    · 	Aspirin Enteric Coated 81 mg oral delayed release tablet: 1 tab(s) orally once a day, Last Dose Taken:    · 	hydroxyurea 500 mg oral capsule: 3 cap(s) orally Monday, Wednesday, and Friday, Last Dose Taken:      . .  Patient History:   Past Medical History:  Hypertension    Myeloproliferative disorder    PE (pulmonary thromboembolism)  2015  Tremor    Vestibular disequilibrium.    Past Surgical History:  S/P hysterectomy.    Family History:  No pertinent family history in first degree relatives.    Social History:  Social History (marital status, living situation, occupation, tobacco use, alcohol and drug use, and sexual history): Lives alone, . Does not have any children. Prior history of cigarette use 35 pack year history. No alcohol use or illicit drug use	    Tobacco Screening:  · Core Measure Site	No	  · Has the patient used tobacco in the past 30 days?	No	    Risk Assessment:   Present on Admission:  Deep Venous Thrombosis	no	  Pulmonary Embolus	no	  Urinary Catheter	no	  Central Venous Catheter/PICC Line	no	  Surgical Site Incision	no	  Pressure Ulcer(s)	no	      Physical Exam:  Physical Exam: VITALS Vital Signs Last 24 Hrs T(C): 36.7 (11 Sep 2017 20:51), Max: 36.8 (11 Sep 2017 06:05) T(F): 98 (11 Sep 2017 20:51), Max: 98.3 (11 Sep 2017 06:05) HR: 78 (11 Sep 2017 20:51) (66 - 80) BP: 132/70 (11 Sep 2017 20:51) (105/71 - 132/70) RR: 15 (11 Sep 2017 20:51) (15 - 16) SpO2: 96% (11 Sep 2017 20:51) (95% - 97%)  PHYSICAL EXAM General: A&Ox3; NAD Head: NC/AT; PERRL; EOMI; anicteric sclera Neck: Supple; no JVD Respiratory: CTA B/L; no wheezes/crackles/rales auscultated w/ good air movement Cardiovascular: Regular rhythm/rate; S1/S2; no gallops or murmurs auscultated Gastrointestinal: Normal Bowel Sounds, Non-distended. Tender in epigastric region Extremities: WWP; no edema or cyanosis; radial/pedal pulses palpable Neurological:  CNII-XII grossly intact; no obvious focal deficits	      Labs and Results:  Labs, Radiology, Cardiology, and Other Results: LABS                              7.9   2.2   )-----------( 53       ( 11 Sep 2017 07:13 )            23.2                       	    Assessment and Plan:   Assessment:  · Assessment		  77F with PMH of MDS, previous PE, DVT, HTN who presents with weakness and fatigue for 1 week duration. Found to have pancytopenia on CBC most likely 2/2 to chronic MDS.     Problem/Plan - 1:  ·  Problem: Myeloproliferative disorder.  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 Last CBC in June showed WBC 13.3, H/H 15.5/47.0, . Patient current pancytopenia most likely exacerbation of MPD as she has been admitted in past for this.  -Currently awaiting 1 unit pRBC, will follow up AM CBC to determine H/H  -Follow up on labs: Reticulocyte Count, Peripheral Smear, LDH, Haptoglobin, Folate, B12, TSH  -Transfuse Hgb <7.0  -Transfuse Plt <15k unless actively bleeding  -Hold Hydroxyurea for now    Problem/Plan - 2:  ·  Problem: Hypokalemia.  Plan: Patient K on admission 3.1, given 40mEq in ED. Repeat BMP in morning, will include Phos. Determine need for further repletion at that time  -Follow up AM BMP.     Problem/Plan - 3:  ·  Problem: Essential hypertension.  Plan: Patient currently normotensive, does not take any medications at home.  -Will continue to monitor, question as to whether she takes Norvasc 5mg at home, if patient becomes HTN will add Norvasc.     Problem/Plan - 4:  ·  Problem: Chronic deep vein thrombosis (DVT) of femoral vein of right lower extremity.  Plan: Patient was seen in ED in June and found to have right femoral and right popiteal DVT. Started on Xarelto then.   -Will continue Xarelto as inpatient.     Problem/Plan - 5:  ·  Problem: Nutrition, metabolism, and development symptoms.  Plan: F: Patient will get 1 unit pRBC and NS bolus, no fluids indicated following  E: Replete K>4, Mg>2, s/p 40mEq KCl  N: Reg. Diet.     Problem/Plan - 6:  Problem: Need for prophylactic measure. Plan: P: Patient High Risk, currently on Xarelto, will continue Xarelto

## 2017-09-11 NOTE — DIETITIAN INITIAL EVALUATION ADULT. - OTHER INFO
Pt seen for length of stay. Pt reports appetite has been fair ~50-75% of meals. Suspect poor oral intake PTA secondary to fatigue. Skin is intact.

## 2017-09-12 PROCEDURE — 71010: CPT | Mod: 26

## 2017-09-12 RX ADMIN — Medication 1 TABLET(S): at 17:22

## 2017-09-12 RX ADMIN — Medication 1 MILLIGRAM(S): at 11:51

## 2017-09-12 RX ADMIN — RIVAROXABAN 20 MILLIGRAM(S): KIT at 17:22

## 2017-09-12 RX ADMIN — Medication 100 MILLIGRAM(S): at 05:38

## 2017-09-12 RX ADMIN — Medication 1 TABLET(S): at 05:38

## 2017-09-12 NOTE — PROGRESS NOTE ADULT - SUBJECTIVE AND OBJECTIVE BOX
O/N Events: Patient failed TOV overnight.   Subjective/ROS: Patient is concerned about being discharged to Rehab Facility and does not wish to be discharged home with [facility]. Discussed at length that [facility] is only temporary measure. Patient states she does not feel able to go home and take care of herself. Denies HA, CP, SOB, n/v, changes in bowel/urinary habits.  12pt ROS otherwise negative.    VITALS  Vital Signs Last 24 Hrs  T(C): 36.9 (12 Sep 2017 11:36), Max: 36.9 (12 Sep 2017 05:09)  T(F): 98.4 (12 Sep 2017 11:36), Max: 98.4 (12 Sep 2017 05:09)  HR: 83 (12 Sep 2017 11:36) (77 - 83)  BP: 113/77 (12 Sep 2017 11:36) (113/77 - 132/70)  BP(mean): --  RR: 16 (12 Sep 2017 11:36) (15 - 16)  SpO2: 98% (12 Sep 2017 11:36) (96% - 98%)    CAPILLARY BLOOD GLUCOSE          PHYSICAL EXAM  General: A&Ox3; NAD  Head: NC/AT; MMM; PERRL; EOMI;  Neck: Supple; no JVD  Respiratory: CTA B/L; no wheezes/crackles   Cardiovascular: Regular rhythm/rate; S1/S2   Gastrointestinal: Soft; NTND; normoactive BS  Extremities: WWP; no edema/cyanosis  Neurological:  CNII-XII grossly intact; no obvious focal deficits    MEDICATIONS  (STANDING):  folic acid 1 milliGRAM(s) Oral daily  rivaroxaban 20 milliGRAM(s) Oral every 24 hours  docusate sodium 100 milliGRAM(s) Oral three times a day  senna 2 Tablet(s) Oral at bedtime  trimethoprim  160 mG/sulfamethoxazole 800 mG 1 Tablet(s) Oral two times a day    MEDICATIONS  (PRN):  bisacodyl Suppository 10 milliGRAM(s) Rectal every 12 hours PRN Constipation      No Known Allergies      LABS                        7.9    2.2   )-----------( 53       ( 11 Sep 2017 07:13 )             23.2

## 2017-09-12 NOTE — PROGRESS NOTE ADULT - SUBJECTIVE AND OBJECTIVE BOX
77F with PMH of MDS, previous PE, HTN who presents with weakness and fatigue for 1 week duration. Weakness and fatigue insidious in onset, unable to give a definitive time she last felt well. States lately she has not had energy to even get out of bed to eat or get her medications. It has been getting progressively worse.  At baseline patient is able to walk with use of cane about 1-2blocks. Currently she feels SOB and fatigued when she stands from bed.     She denies fever, chills, CP, orthopnea, LE edema, nausea, vomiting, diarrhea, abdominal pain, dysuria, increased urinary frequency. Patient denies melena or hematochezia. Patient states she has not had a BM since Monday. Her typical schedule is every day to every other day. She denies any recent illness or sick contacts.     : Patient is concerned about being discharged to Rehab Facility and does not wish to be discharged home with [facility]. Discussed at length that [facility] is only temporary measure. Patient states she does not feel able to go home and take care of herself. Denies HA, CP, SOB, n/v, changes in bowel/urinary habits.  12pt ROS otherwise negative.  ICU Vital Signs Last 24 Hrs  T(C): 36.9 (12 Sep 2017 11:36), Max: 36.9 (12 Sep 2017 05:09)  T(F): 98.4 (12 Sep 2017 11:36), Max: 98.4 (12 Sep 2017 05:09)  HR: 83 (12 Sep 2017 11:36) (77 - 83)  BP: 113/77 (12 Sep 2017 11:36) (113/77 - 132/70)  BP(mean): --  ABP: --  ABP(mean): --  RR: 16 (12 Sep 2017 11:36) (15 - 16)  SpO2: 98% (12 Sep 2017 11:36) (96% - 98%)    CAPILLARY BLOOD GLUCOSE          PHYSICAL EXAM  General: A&Ox3; NAD  Head: NC/AT; MMM; PERRL; EOMI;  Neck: Supple; no JVD  Respiratory: CTA B/L; no wheezes/crackles   Cardiovascular: Regular rhythm/rate; S1/S2   Gastrointestinal: Soft; NTND; normoactive BS  Extremities: WWP; no edema/cyanosis      MEDICATIONS  (STANDING):  folic acid 1 milliGRAM(s) Oral daily  rivaroxaban 20 milliGRAM(s) Oral every 24 hours  docusate sodium 100 milliGRAM(s) Oral three times a day  senna 2 Tablet(s) Oral at bedtime  trimethoprim  160 mG/sulfamethoxazole 800 mG 1 Tablet(s) Oral two times a day    MEDICATIONS  (PRN):  bisacodyl Suppository 10 milliGRAM(s) Rectal every 12 hours PRN Constipation      No Known Allergies      LABS                        7.9    2.2   )-----------( 53       ( 11 Sep 2017 07:13 )             23.2

## 2017-09-13 DIAGNOSIS — A41.9 SEPSIS, UNSPECIFIED ORGANISM: ICD-10-CM

## 2017-09-13 LAB
ALBUMIN SERPL ELPH-MCNC: 3.9 G/DL — SIGNIFICANT CHANGE UP (ref 3.3–5)
ALP SERPL-CCNC: 66 U/L — SIGNIFICANT CHANGE UP (ref 40–120)
ALT FLD-CCNC: 7 U/L — LOW (ref 10–45)
ANION GAP SERPL CALC-SCNC: 16 MMOL/L — SIGNIFICANT CHANGE UP (ref 5–17)
ANISOCYTOSIS BLD QL: SLIGHT — SIGNIFICANT CHANGE UP
APPEARANCE UR: (no result)
AST SERPL-CCNC: 15 U/L — SIGNIFICANT CHANGE UP (ref 10–40)
BASOPHILS NFR BLD AUTO: 1 % — SIGNIFICANT CHANGE UP (ref 0–2)
BILIRUB SERPL-MCNC: 0.8 MG/DL — SIGNIFICANT CHANGE UP (ref 0.2–1.2)
BILIRUB UR-MCNC: NEGATIVE — SIGNIFICANT CHANGE UP
BUN SERPL-MCNC: 15 MG/DL — SIGNIFICANT CHANGE UP (ref 7–23)
CALCIUM SERPL-MCNC: 8.7 MG/DL — SIGNIFICANT CHANGE UP (ref 8.4–10.5)
CHLORIDE SERPL-SCNC: 96 MMOL/L — SIGNIFICANT CHANGE UP (ref 96–108)
CO2 SERPL-SCNC: 20 MMOL/L — LOW (ref 22–31)
COLOR SPEC: YELLOW — SIGNIFICANT CHANGE UP
CREAT SERPL-MCNC: 0.8 MG/DL — SIGNIFICANT CHANGE UP (ref 0.5–1.3)
CRP SERPL-MCNC: 6.6 MG/DL — HIGH (ref 0–0.4)
DIFF PNL FLD: (no result)
EOSINOPHIL NFR BLD AUTO: 2 % — SIGNIFICANT CHANGE UP (ref 0–6)
GLUCOSE SERPL-MCNC: 117 MG/DL — HIGH (ref 70–99)
GLUCOSE UR QL: NEGATIVE — SIGNIFICANT CHANGE UP
HCT VFR BLD CALC: 23 % — LOW (ref 34.5–45)
HGB BLD-MCNC: 7.8 G/DL — LOW (ref 11.5–15.5)
KETONES UR-MCNC: (no result) MG/DL
LEUKOCYTE ESTERASE UR-ACNC: NEGATIVE — SIGNIFICANT CHANGE UP
LYMPHOCYTES # BLD AUTO: 14 % — SIGNIFICANT CHANGE UP (ref 13–44)
MACROCYTES BLD QL: SLIGHT — SIGNIFICANT CHANGE UP
MANUAL DIF COMMENT BLD-IMP: SIGNIFICANT CHANGE UP
MANUAL SMEAR VERIFICATION: SIGNIFICANT CHANGE UP
MCHC RBC-ENTMCNC: 30.8 PG — SIGNIFICANT CHANGE UP (ref 27–34)
MCHC RBC-ENTMCNC: 33.9 G/DL — SIGNIFICANT CHANGE UP (ref 32–36)
MCV RBC AUTO: 90.9 FL — SIGNIFICANT CHANGE UP (ref 80–100)
MICROCYTES BLD QL: SLIGHT — SIGNIFICANT CHANGE UP
MONOCYTES NFR BLD AUTO: 21 % — HIGH (ref 2–14)
NEUTROPHILS NFR BLD AUTO: 55 % — SIGNIFICANT CHANGE UP (ref 43–77)
NEUTS BAND # BLD: 7 % — SIGNIFICANT CHANGE UP
NITRITE UR-MCNC: NEGATIVE — SIGNIFICANT CHANGE UP
NRBC # BLD: 2 /100 WBCS — HIGH
OVALOCYTES BLD QL SMEAR: SLIGHT — SIGNIFICANT CHANGE UP
PH UR: 6 — SIGNIFICANT CHANGE UP (ref 5–8)
PLAT MORPH BLD: NORMAL — SIGNIFICANT CHANGE UP
PLATELET # BLD AUTO: 64 K/UL — LOW (ref 150–400)
POLYCHROMASIA BLD QL SMEAR: SLIGHT — SIGNIFICANT CHANGE UP
POTASSIUM SERPL-MCNC: 3.6 MMOL/L — SIGNIFICANT CHANGE UP (ref 3.5–5.3)
POTASSIUM SERPL-SCNC: 3.6 MMOL/L — SIGNIFICANT CHANGE UP (ref 3.5–5.3)
PROT SERPL-MCNC: 6.3 G/DL — SIGNIFICANT CHANGE UP (ref 6–8.3)
PROT UR-MCNC: NEGATIVE MG/DL — SIGNIFICANT CHANGE UP
RAPID RVP RESULT: SIGNIFICANT CHANGE UP
RBC # BLD: 2.53 M/UL — LOW (ref 3.8–5.2)
RBC # FLD: 25.3 % — HIGH (ref 10.3–16.9)
RBC BLD AUTO: (no result)
SODIUM SERPL-SCNC: 132 MMOL/L — LOW (ref 135–145)
SP GR SPEC: >=1.03 — SIGNIFICANT CHANGE UP (ref 1–1.03)
SPHEROCYTES BLD QL SMEAR: SLIGHT — SIGNIFICANT CHANGE UP
UROBILINOGEN FLD QL: 1 E.U./DL — SIGNIFICANT CHANGE UP
WBC # BLD: 2 K/UL — LOW (ref 3.8–10.5)
WBC # FLD AUTO: 2 K/UL — LOW (ref 3.8–10.5)

## 2017-09-13 PROCEDURE — 99232 SBSQ HOSP IP/OBS MODERATE 35: CPT | Mod: GC

## 2017-09-13 RX ORDER — CEFEPIME 1 G/1
1000 INJECTION, POWDER, FOR SOLUTION INTRAMUSCULAR; INTRAVENOUS EVERY 12 HOURS
Qty: 0 | Refills: 0 | Status: DISCONTINUED | OUTPATIENT
Start: 2017-09-14 | End: 2017-09-14

## 2017-09-13 RX ORDER — ACETAMINOPHEN 500 MG
650 TABLET ORAL ONCE
Qty: 0 | Refills: 0 | Status: COMPLETED | OUTPATIENT
Start: 2017-09-13 | End: 2017-09-13

## 2017-09-13 RX ORDER — VANCOMYCIN HCL 1 G
1000 VIAL (EA) INTRAVENOUS ONCE
Qty: 0 | Refills: 0 | Status: COMPLETED | OUTPATIENT
Start: 2017-09-13 | End: 2017-09-13

## 2017-09-13 RX ORDER — CEFEPIME 1 G/1
1000 INJECTION, POWDER, FOR SOLUTION INTRAMUSCULAR; INTRAVENOUS ONCE
Qty: 0 | Refills: 0 | Status: COMPLETED | OUTPATIENT
Start: 2017-09-13 | End: 2017-09-13

## 2017-09-13 RX ORDER — ACETAMINOPHEN 500 MG
650 TABLET ORAL EVERY 6 HOURS
Qty: 0 | Refills: 0 | Status: DISCONTINUED | OUTPATIENT
Start: 2017-09-13 | End: 2017-09-15

## 2017-09-13 RX ORDER — VANCOMYCIN HCL 1 G
1000 VIAL (EA) INTRAVENOUS EVERY 24 HOURS
Qty: 0 | Refills: 0 | Status: DISCONTINUED | OUTPATIENT
Start: 2017-09-13 | End: 2017-09-15

## 2017-09-13 RX ADMIN — Medication 100 MILLIGRAM(S): at 22:21

## 2017-09-13 RX ADMIN — Medication 1 MILLIGRAM(S): at 12:40

## 2017-09-13 RX ADMIN — CEFEPIME 100 MILLIGRAM(S): 1 INJECTION, POWDER, FOR SOLUTION INTRAMUSCULAR; INTRAVENOUS at 15:12

## 2017-09-13 RX ADMIN — Medication 250 MILLIGRAM(S): at 12:41

## 2017-09-13 RX ADMIN — RIVAROXABAN 20 MILLIGRAM(S): KIT at 17:41

## 2017-09-13 RX ADMIN — SENNA PLUS 2 TABLET(S): 8.6 TABLET ORAL at 22:21

## 2017-09-13 RX ADMIN — Medication 1 TABLET(S): at 05:31

## 2017-09-13 RX ADMIN — Medication 650 MILLIGRAM(S): at 00:51

## 2017-09-13 RX ADMIN — Medication 650 MILLIGRAM(S): at 17:41

## 2017-09-13 NOTE — CONSULT NOTE ADULT - CONSULT REQUESTED DATE/TIME
08-Sep-2017 06:20
08-Sep-2017 12:32
13-Sep-2017 20:26
06-Sep-2017 20:08
08-Sep-2017
08-Sep-2017 12:03

## 2017-09-13 NOTE — CONSULT NOTE ADULT - ATTENDING COMMENTS
Patient seen and examined and evaluation completed by me in person
I discussed the case with the team and Dr. Tapia.  There is concern about thrombocytopenia. He is no indication for inferior vena cava filter unless hematology indicates contraindications for anticoagulation for recurrent thromboembolic disease. Option is to decrease the dose to prophylaxis if there is resolution of the pulmonary emboli and a venous Doppler. base line oxygen saturation is 98%

## 2017-09-13 NOTE — PROGRESS NOTE ADULT - SUBJECTIVE AND OBJECTIVE BOX
O/N Events: Patient had a 102.6 Rectal Temp overnight. BCX, UA, CXR were done.   Subjective/ROS: Patient states she is not feeling well. She affirms to filling weak, chills, and feverish. Denies HA, CP, SOB, n/v, changes in bowel/urinary habits.  12pt ROS otherwise negative.    VITALS  Vital Signs Last 24 Hrs  T(C): 37.7 (13 Sep 2017 10:06), Max: 39.2 (12 Sep 2017 22:27)  T(F): 99.9 (13 Sep 2017 10:06), Max: 102.6 (12 Sep 2017 22:27)  HR: 94 (13 Sep 2017 10:06) (83 - 94)  BP: 112/78 (13 Sep 2017 10:06) (103/66 - 136/65)  BP(mean): --  RR: 16 (13 Sep 2017 10:06) (15 - 17)  SpO2: 97% (13 Sep 2017 10:06) (94% - 98%)    CAPILLARY BLOOD GLUCOSE    PHYSICAL EXAM  General: A&Ox3; NAD  Head: NC/AT; MMM; PERRL; EOMI;  Neck: Supple; no JVD  Respiratory: CTA B/L; no wheezes/crackles   Cardiovascular: Regular rhythm/rate; S1/S2   Gastrointestinal: Soft; NTND; normoactive BS  Extremities: WWP; no edema/cyanosis  Neurological:  CNII-XII grossly intact; no obvious focal deficits    MEDICATIONS  (STANDING):  folic acid 1 milliGRAM(s) Oral daily  rivaroxaban 20 milliGRAM(s) Oral every 24 hours  docusate sodium 100 milliGRAM(s) Oral three times a day  senna 2 Tablet(s) Oral at bedtime  trimethoprim  160 mG/sulfamethoxazole 800 mG 1 Tablet(s) Oral two times a day    MEDICATIONS  (PRN):  bisacodyl Suppository 10 milliGRAM(s) Rectal every 12 hours PRN Constipation      No Known Allergies      LABS                        7.8    2.0   )-----------( 64       ( 13 Sep 2017 07:32 )             23.0             Urinalysis Basic - ( 13 Sep 2017 00:33 )    Color: Yellow / Appearance: Hazy / SG: >=1.030 / pH: x  Gluc: x / Ketone: Trace mg/dL  / Bili: NEGATIVE / Urobili: 1.0 E.U./dL   Blood: x / Protein: NEGATIVE mg/dL / Nitrite: NEGATIVE   Leuk Esterase: NEGATIVE / RBC: < 5 /HPF / WBC < 5 /HPF   Sq Epi: x / Non Sq Epi: Few /HPF / Bacteria: Present /HPF

## 2017-09-13 NOTE — PROGRESS NOTE ADULT - SUBJECTIVE AND OBJECTIVE BOX
Physical Medicine and Rehabilitation Progress Note:    Patient is a 77y old  Female who presents with a chief complaint of Weakness and Fatigue (09 Sep 2017 14:55)      HPI:  77F with PMH of MDS, previous PE, HTN who presents with weakness and fatigue for 1 week duration. Weakness and fatigue insidious in onset, unable to give a definitive time she last felt well. States lately she has not had energy to even get out of bed to eat or get her medications. It has been getting progressively worse.  At baseline patient is able to walk with use of cane about 1-2blocks. Currently she feels SOB and fatigued when she stands from bed.     She denies fever, chills, CP, orthopnea, LE edema, nausea, vomiting, diarrhea, abdominal pain, dysuria, increased urinary frequency. Patient denies melena or hematochezia. Patient states she has not had a BM since Monday. Her typical schedule is every day to every other day. She denies any recent illness or sick contacts.    In ED T97.7, HR83, /79, RR19 99%. Patient was found to be pancytopenic on CBC, WBC 2.5, H/H 6/17.6, Plt 60. BMP showed hypokalemia with K 3.1. She was given 40mEq KCl, and a bolus of NS. She was ordered for 1 unit pRBC. (06 Sep 2017 16:34)                            7.8    2.0   )-----------( 64       ( 13 Sep 2017 07:32 )             23.0           132<L>  |  96  |  15  ----------------------------<  117<H>  3.6   |  20<L>  |  0.80    Ca    8.7      13 Sep 2017 13:10    TPro  6.3  /  Alb  3.9  /  TBili  0.8  /  DBili  x   /  AST  15  /  ALT  7<L>  /  AlkPhos  66      Vital Signs Last 24 Hrs  T(C): 37.7 (13 Sep 2017 10:06), Max: 39.2 (12 Sep 2017 22:27)  T(F): 99.9 (13 Sep 2017 10:06), Max: 102.6 (12 Sep 2017 22:27)  HR: 94 (13 Sep 2017 10:06) (86 - 94)  BP: 112/78 (13 Sep 2017 10:06) (103/66 - 136/65)  BP(mean): --  RR: 16 (13 Sep 2017 10:06) (15 - 17)  SpO2: 97% (13 Sep 2017 10:06) (94% - 97%)    MEDICATIONS  (STANDING):  folic acid 1 milliGRAM(s) Oral daily  rivaroxaban 20 milliGRAM(s) Oral every 24 hours  docusate sodium 100 milliGRAM(s) Oral three times a day  senna 2 Tablet(s) Oral at bedtime  cefepime  IVPB 1000 milliGRAM(s) IV Intermittent once    MEDICATIONS  (PRN):  bisacodyl Suppository 10 milliGRAM(s) Rectal every 12 hours PRN Constipation    Currently Undergoing Physical Therapy at bedside.    Initial Functional Status Assessment:    Bed Mobility Training:                                                                                                                                                  - Rolling/Turnin person minimum assist  - Scootin person moderate assist                                                                     - Sit to Supine:              1 person minimum assist                                                                   - Supine to Sit:              1 person moderate assist    Transfer Training:  - Sit to Stand:                1 person minimum assist                                                                     - Stand to Sit:                1 person contact guard assist    Gait Training:                    PM&R Impression: as above    Disposition Plan Recommendations: refusing [facility]

## 2017-09-13 NOTE — PROGRESS NOTE ADULT - SUBJECTIVE AND OBJECTIVE BOX
Neurology Follow up note    Name  ANDREW STUART    HPI:  77F with PMH of MDS, previous PE, HTN who presents with weakness and fatigue for 1 week duration. Weakness and fatigue insidious in onset, unable to give a definitive time she last felt well. States lately she has not had energy to even get out of bed to eat or get her medications. It has been getting progressively worse.  At baseline patient is able to walk with use of cane about 1-2blocks. Currently she feels SOB and fatigued when she stands from bed.     She denies fever, chills, CP, orthopnea, LE edema, nausea, vomiting, diarrhea, abdominal pain, dysuria, increased urinary frequency. Patient denies melena or hematochezia. Patient states she has not had a BM since Monday. Her typical schedule is every day to every other day. She denies any recent illness or sick contacts.    In ED T97.7, HR83, /79, RR19 99%. Patient was found to be pancytopenic on CBC, WBC 2.5, H/H 6/17.6, Plt 60. BMP showed hypokalemia with K 3.1. She was given 40mEq KCl, and a bolus of NS. She was ordered for 1 unit pRBC. (06 Sep 2017 16:34)      Interval History - no headaches- no new numbness or tingling        REVIEW OF SYSTEMS    Vital Signs Last 24 Hrs  T(C): 37.2 (13 Sep 2017 05:32), Max: 39.2 (12 Sep 2017 22:27)  T(F): 98.9 (13 Sep 2017 05:32), Max: 102.6 (12 Sep 2017 22:27)  HR: 87 (13 Sep 2017 05:32) (83 - 94)  BP: 118/70 (13 Sep 2017 05:32) (103/66 - 136/65)  BP(mean): --  RR: 15 (13 Sep 2017 05:32) (15 - 17)  SpO2: 94% (13 Sep 2017 05:32) (94% - 98%)    Physical Exam-     Mental Status- awake    Cranial Nerves-no diplopia    Gait and station-n/a    Motor-no focal weakness    Reflexes- decreased    Sensation-    Coordination- tremors    Vascular -    Medications  folic acid 1 milliGRAM(s) Oral daily  rivaroxaban 20 milliGRAM(s) Oral every 24 hours  docusate sodium 100 milliGRAM(s) Oral three times a day  senna 2 Tablet(s) Oral at bedtime  trimethoprim  160 mG/sulfamethoxazole 800 mG 1 Tablet(s) Oral two times a day  bisacodyl Suppository 10 milliGRAM(s) Rectal every 12 hours PRN      Lab      Radiology    Assessment- No new focal findings    Plan Mild peripheral neuropathy

## 2017-09-13 NOTE — CONSULT NOTE ADULT - ASSESSMENT
MDS with pancytopenia and neutropenia  Fever without clear focus  At risk for invasive infection but also drug fever due to TMP-SMX in ddx  Cultures pending    RECOMMEND  D/C Bactrim  Start IV Vancomycin 1 gm daily and Cefepime 1 gm q 12 hours while awaiting cultures report  Check procalcitonin level  Check CMV PCR

## 2017-09-13 NOTE — PROGRESS NOTE ADULT - SUBJECTIVE AND OBJECTIVE BOX
Interval Events: reviewed  Patient seen and examined at bedside.    Patient is a 77y old  Female who presents with a chief complaint of Weakness and Fatigue (09 Sep 2017 14:55)    she is ired and not getting OOB.  She is SOB  PAST MEDICAL & SURGICAL HISTORY:  Myeloproliferative disorder  Hypertension  PE (pulmonary thromboembolism): 2015  Tremor  Vestibular disequilibrium  S/P hysterectomy      MEDICATIONS:  Pulmonary:    Antimicrobials:  vancomycin  IVPB 1000 milliGRAM(s) IV Intermittent every 24 hours    Anticoagulants:  rivaroxaban 20 milliGRAM(s) Oral every 24 hours    Cardiac:      Allergies    No Known Allergies    Intolerances        Vital Signs Last 24 Hrs  T(C): 37.3 (13 Sep 2017 19:08), Max: 39.2 (12 Sep 2017 22:27)  T(F): 99.1 (13 Sep 2017 19:08), Max: 102.6 (12 Sep 2017 22:27)  HR: 87 (13 Sep 2017 19:08) (86 - 97)  BP: 97/62 (13 Sep 2017 19:08) (97/62 - 136/65)  BP(mean): --  RR: 16 (13 Sep 2017 19:08) (15 - 17)  SpO2: 95% (13 Sep 2017 19:08) (94% - 97%)    09-12 @ 07:01 - 09-13 @ 07:00  --------------------------------------------------------  IN: 400 mL / OUT: 1350 mL / NET: -950 mL    09-13 @ 07:01 - 09-13 @ 20:47  --------------------------------------------------------  IN: 0 mL / OUT: 200 mL / NET: -200 mL          LABS:      CBC Full  -  ( 13 Sep 2017 07:32 )  WBC Count : 2.0 K/uL  Hemoglobin : 7.8 g/dL  Hematocrit : 23.0 %  Platelet Count - Automated : 64 K/uL  Mean Cell Volume : 90.9 fL  Mean Cell Hemoglobin : 30.8 pg  Mean Cell Hemoglobin Concentration : 33.9 g/dL  Auto Neutrophil # : x  Auto Lymphocyte # : x  Auto Monocyte # : x  Auto Eosinophil # : x  Auto Basophil # : x  Auto Neutrophil % : 55.0 %  Auto Lymphocyte % : 14.0 %  Auto Monocyte % : 21.0 %  Auto Eosinophil % : 2.0 %  Auto Basophil % : 1.0 %    09-13    132<L>  |  96  |  15  ----------------------------<  117<H>  3.6   |  20<L>  |  0.80    Ca    8.7      13 Sep 2017 13:10    TPro  6.3  /  Alb  3.9  /  TBili  0.8  /  DBili  x   /  AST  15  /  ALT  7<L>  /  AlkPhos  66  09-13          Urinalysis Basic - ( 13 Sep 2017 00:33 )    Color: Yellow / Appearance: Hazy / SG: >=1.030 / pH: x  Gluc: x / Ketone: Trace mg/dL  / Bili: NEGATIVE / Urobili: 1.0 E.U./dL   Blood: x / Protein: NEGATIVE mg/dL / Nitrite: NEGATIVE   Leuk Esterase: NEGATIVE / RBC: < 5 /HPF / WBC < 5 /HPF   Sq Epi: x / Non Sq Epi: Few /HPF / Bacteria: Present /HPF                  RADIOLOGY & ADDITIONAL STUDIES (The following images were personally reviewed):  Resendiz:                                 No  Urine output:               Yes          DVT prophylaxis:         Yes        Flattus:                          Yes          Bowel movement:       Yes

## 2017-09-13 NOTE — PROGRESS NOTE ADULT - ASSESSMENT
77F with PMH of MDS, previous PE, HTN who presents with weakness and fatigue for 1 week duration. Found to have pancytopenia on CBC most likely 2/2 to chronic MDS.     Problem/Plan - 1:  ·  Problem: Sepsis due to undetermined organism.  Plan: Patient had elevated T102.6, HR in the 90s, with low WBC meeting criteria for sepsis. No signs of poor perfusion or end organ damage. Patient recently being treated for UTI with Bactrim.   -Low WBC chronic most likely 2/2 to pancytopenia. Cannot rule out Neutropenic causes of infection. Will follow up manual dif.   -Bactrim was started 2 days ago, patient spiked a fever while on bactrim will need to consider broadening coverage or source control.  -UA now negative for signs of bacteria less likely to be 2/2 to UTI  -CXR is clear  -Consulted Dr. Mercado for ID consult  -Pending results of BCx and/or ID recs would consider starting patient on Cefepime +/- Vancomycin.     Problem/Plan - 2:  ·  Problem: Myeloproliferative disorder.  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 Last CBC in June showed WBC 13.3, H/H 15.5/47.0, . Patient current pancytopenia most likely exacerbation of MPD as she has been admitted in past for this.  -S/P Transfusion of 2 unit pRBC, Hgb up to 8.0. Will hold any future transfusions pending Heme/Onc Recommendations.  -Patient has increased Reticulocyte count, Decreased Haptoglobin and Increased bilirubin most likely showing hemolytic picture. Will continue to monitor anemia labs as they come in.  -Transfuse Hgb <7.0  -Transfuse Plt <15k unless actively bleeding  -Patient was scheduled for Vascular intervention with placement of IVC Filter 2/2 to thrombocytopenia. Would want to stop Xarelto during thrombocytopenia.   -Dr. Lockett: Requested Lower Extremity Doppler and V/Q Scan to determine if there is resolution or improvement in clots. V/Q scan shows no interval change from previous scan. Lower extremity doppler shows new signs of DVTs  -Dr. Dumont is [facility] Heme/Onc, she is aware of patient, will follow up with rec's.     Problem/Plan - 3:  ·  Problem: Acute cystitis without hematuria.  Plan: Patient came in with positive UA, UCx sent showing growth of Klebsiella. In setting of urinary retention plan is to treat for uncomplicated acute cystitis.   -Continue Bactrim BID for total of 5-7d.     Problem/Plan - 4:  ·  Problem: Hypokalemia.  Plan: Patient K on admission 3.1, given 40mEq in ED. Repeat BMP showed K 3.9.  -Resolved.     Problem/Plan - 5:  ·  Problem: Urinary retention.  Plan: Most likely 2/2 to constipation.  -Will start Colace 100mg TID and Senna Qd today. If patient does not have BM today will consider adding Miralax tomorrow to further aid in constipation  -Patient to be OOB and ambulating with aid as much as possible  -Treat UTI as above  -Patient failed TOV today, replaced Resendiz. Patient will follow up with Dr. Ireland as an outpatient.

## 2017-09-13 NOTE — CONSULT NOTE ADULT - SUBJECTIVE AND OBJECTIVE BOX
History of Present Illness:  Chief Complaint/Reason for Admission: Weakness and Fatigue	  History of Present Illness: 	  77F with PMH of MDS, previous PE, HTN who presents with weakness and fatigue for 1 week duration. Weakness and fatigue insidious in onset, unable to give a definitive time she last felt well. States lately she has not had energy to even get out of bed to eat or get her medications. It has been getting progressively worse.  At baseline patient is able to walk with use of cane about 1-2blocks. Currently she feels SOB and fatigued when she stands from bed.     She denies fever, chills, CP, orthopnea, LE edema, nausea, vomiting, diarrhea, abdominal pain, dysuria, increased urinary frequency. Patient denies melena or hematochezia. Patient states she has not had a BM since Monday. Her typical schedule is every day to every other day. She denies any recent illness or sick contacts.    In ED T97.7, HR83, /79, RR19 99%. Patient was found to be pancytopenic on CBC, WBC 2.5, H/H 6/17.6, Plt 60. BMP showed hypokalemia with K 3.1. She was given 40mEq KCl, and a bolus of NS. She was ordered for 1 unit pRBC.      Review of Systems:  Review of Systems: CONSTITUTIONAL: No fevers or chills EYES/ENT: No visual changes;  No vertigo or throat pain  NECK: No pain or stiffness RESPIRATORY: No cough, wheezing, hemoptysis; No shortness of breath CARDIOVASCULAR: No chest pain or palpitations GASTROINTESTINAL: No abdominal or epigastric pain. No nausea, vomiting, or hematemesis; No diarrhea  No melena or hematochezia. GENITOURINARY: No dysuria, frequency or hematuria NEUROLOGICAL: No numbness  SKIN: No itching, burning, rashes, or lesions  All other review of systems is negative unless indicated above.	      Allergies and Intolerances:        Allergies:  	No Known Allergies:     Home Medications:   * Patient Currently Takes Medications as of 06-Sep-2017 16:44 documented in Prescription Writer  · 	Xarelto 15 mg oral tablet: 1 tab(s) orally twice, Last Dose Taken:    · 	folic acid 1 mg oral tablet: 2 tab(s) orally once a day, Last Dose Taken:    · 	Aspirin Enteric Coated 81 mg oral delayed release tablet: 1 tab(s) orally once a day, Last Dose Taken:    · 	hydroxyurea 500 mg oral capsule: 3 cap(s) orally Monday, Wednesday, and Friday, Last Dose Taken:      . .    Patient History:   Past Medical History:  Hypertension    Myeloproliferative disorder    PE (pulmonary thromboembolism)  2015  Tremor    Vestibular disequilibrium.    Past Surgical History:  S/P hysterectomy.    Family History:  No pertinent family history in first degree relatives.    Social History:  Social History (marital status, living situation, occupation, tobacco use, alcohol and drug use, and sexual history): Lives alone, . Does not have any children. Prior history of cigarette use 35 pack year history. No alcohol use or illicit drug use	    Tobacco Screening:  · Core Measure Site	No	  · Has the patient used tobacco in the past 30 days?	No	    Risk Assessment:   Present on Admission:  Deep Venous Thrombosis	no	  Pulmonary Embolus	no	  Urinary Catheter	no	  Central Venous Catheter/PICC Line	no	  Surgical Site Incision	no	  Pressure Ulcer(s)	no	    Heart Failure:  Does this patient have a history of or has been diagnosed with heart failure? no.      Physical Exam:  Physical Exam: VITALS Vital Signs Last 24 Hrs T(C): 36.8 (06 Sep 2017 16:32), Max: 36.8 (06 Sep 2017 16:32) T(F): 98.3 (06 Sep 2017 16:32), Max: 98.3 (06 Sep 2017 16:32) HR: 83 (06 Sep 2017 16:32) (80 - 83) BP: 131/71 (06 Sep 2017 16:32) (100/64 - 131/71) BP(mean): -- RR: 16 (06 Sep 2017 16:32) (16 - 16) SpO2: 96% (06 Sep 2017 16:32) (95% - 99%)  I&O's Summary   CAPILLARY BLOOD GLUCOSE 117 (06 Sep 2017 14:55)  PHYSICAL EXAM General: A&Ox3; NAD Head: NC/AT; PERRL; EOMI; anicteric sclera Neck: Supple; no JVD Respiratory: CTA B/L; no wheezes/crackles/rales auscultated w/ good air movement Cardiovascular: Regular rhythm/rate; S1/S2; no gallops or murmurs auscultated Gastrointestinal: Normal Bowel Sounds, Non-distended. Tender in epigastric region Extremities: WWP; no edema or cyanosis; radial/pedal pulses palpable Neurological:  CNII-XII grossly intact; no obvious focal deficits	      Labs and Results:  Labs, Radiology, Cardiology, and Other Results: LABS                      6.0   2.5   )-----------( 60       ( 06 Sep 2017 14:38 )            17.6   09-06  140  |  101  |  19 ----------------------------<  118<H> 3.1<L>   |  22  |  0.70  Ca    9.4      06 Sep 2017 14:38 Phos  3.8     09-06 Mg     2.3     09-06  TPro  6.5  /  Alb  4.0  /  TBili  1.4<H>  /  DBili  x   /  AST  13  /  ALT  8<L>  /  AlkPhos  69  09-06	    Assessment and Plan:   Assessment:  · Assessment		  77F with PMH of MDS, previous PE, HTN who presents with weakness and fatigue for 1 week duration. Found to have pancytopenia on CBC most likely 2/2 to chronic MDS.     Problem/Plan - 1:  ·  Problem: Myeloproliferative disorder.  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 Last CBC in June showed WBC 13.3, H/H 15.5/47.0, . Patient current pancytopenia most likely exacerbation of MPD as she has been admitted in past for this.  -Currently awaiting 1 unit pRBC, will follow up AM CBC to determine H/H  -Follow up on labs: Reticulocyte Count, Peripheral Smear, LDH, Haptoglobin, Folate, B12, TSH  -Transfuse Hgb <7.0  -Transfuse Plt <15k unless actively bleeding  -Hold Hydroxyurea for now    Problem/Plan - 2:  ·  Problem: Hypokalemia.  Plan: Patient K on admission 3.1, given 40mEq in ED. Repeat BMP in morning, will include Phos. Determine need for further repletion at that time  -Follow up AM BMP.     Problem/Plan - 3:  ·  Problem: Essential hypertension.  Plan: Patient currently normotensive, does not take any medications at home.  -Will continue to monitor, question as to whether she takes Norvasc 5mg at home, if patient becomes HTN will add Norvasc.     Problem/Plan - 4:  ·  Problem: Chronic deep vein thrombosis (DVT) of femoral vein of right lower extremity.  Plan: Patient was seen in ED in June and found to have right femoral and right popiteal DVT. Started on Xarelto then.   -Will continue Xarelto as inpatient.     Problem/Plan - 5:  ·  Problem: Nutrition, metabolism, and development symptoms.  Plan: F: Patient will get 1 unit pRBC and NS bolus, no fluids indicated following  E: Replete K>4, Mg>2, s/p 40mEq KCl  N: Reg. Diet.     Problem/Plan - 6:  Problem: Need for prophylactic measure. Plan: P: Patient High Risk, currently on Xarelto, will continue Xarelto
Patient is a 77y old  Female who presents with a chief complaint of Weakness and Fatigue (06 Sep 2017 16:34)      HPI:  77F with PMH of MDS, previous PE, HTN who presents with weakness and fatigue for 1 week duration. Weakness and fatigue insidious in onset, unable to give a definitive time she last felt well. States lately she has not had energy to even get out of bed to eat or get her medications. It has been getting progressively worse.  At baseline patient is able to walk with use of cane about 1-2blocks. Currently she feels SOB and fatigued when she stands from bed.     She denies fever, chills, CP, orthopnea, LE edema, nausea, vomiting, diarrhea, abdominal pain, dysuria, increased urinary frequency. Patient denies melena or hematochezia. Patient states she has not had a BM since Monday. Her typical schedule is every day to every other day. She denies any recent illness or sick contacts.    In ED T97.7, HR83, /79, RR19 99%. Patient was found to be pancytopenic on CBC, WBC 2.5, H/H 6/17.6, Plt 60. BMP showed hypokalemia with K 3.1. She was given 40mEq KCl, and a bolus of NS. She was ordered for 1 unit pRBC. (06 Sep 2017 16:34)      PAST MEDICAL & SURGICAL HISTORY:  Myeloproliferative disorder  Hypertension  PE (pulmonary thromboembolism):   Tremor  Vestibular disequilibrium  S/P hysterectomy      FAMILY HISTORY:  No pertinent family history in first degree relatives      SOCIAL HISTORY:  Smoking Status: [ ] Current, [ ] Former, [ ] Never  Pack Years:    MEDICATIONS:  Pulmonary:    Antimicrobials:    Anticoagulants:    Onc:    GI/:    Endocrine:    Cardiac:    Other Medications:  sodium chloride 0.9%. 1000 milliLiter(s) IV Continuous <Continuous>  folic acid 1 milliGRAM(s) Oral daily      Allergies    No Known Allergies    Intolerances        Vital Signs Last 24 Hrs  T(C): 36.9 (08 Sep 2017 09:51), Max: 37.1 (07 Sep 2017 16:37)  T(F): 98.4 (08 Sep 2017 09:51), Max: 98.7 (07 Sep 2017 16:37)  HR: 71 (08 Sep 2017 09:51) (71 - 88)  BP: 125/78 (08 Sep 2017 09:51) (106/71 - 132/78)  BP(mean): --  RR: 16 (08 Sep 2017 09:51) (15 - 16)  SpO2: 96% (08 Sep 2017 09:51) (95% - 99%)     @ 07:01  -  09 @ 07:00  --------------------------------------------------------  IN: 2000 mL / OUT: 1200 mL / NET: 800 mL          LABS:      CBC Full  -  ( 08 Sep 2017 08:25 )  WBC Count : 2.3 K/uL  Hemoglobin : 8.0 g/dL  Hematocrit : 23.0 %  Platelet Count - Automated : 39 K/uL  Mean Cell Volume : 87.5 fL  Mean Cell Hemoglobin : 30.4 pg  Mean Cell Hemoglobin Concentration : 34.8 g/dL  Auto Neutrophil # : x  Auto Lymphocyte # : x  Auto Monocyte # : x  Auto Eosinophil # : x  Auto Basophil # : x  Auto Neutrophil % : x  Auto Lymphocyte % : x  Auto Monocyte % : x  Auto Eosinophil % : x  Auto Basophil % : x        140  |  107  |  15  ----------------------------<  106<H>  4.6   |  23  |  0.70    Ca    9.2      08 Sep 2017 08:25  Phos  2.9     -07  Mg     2.2     -08    TPro  6.5  /  Alb  4.0  /  TBili  1.4<H>  /  DBili  x   /  AST  13  /  ALT  8<L>  /  AlkPhos  69  09-06          Urinalysis Basic - ( 06 Sep 2017 22:01 )    Color: Yellow / Appearance: Hazy / S.015 / pH: x  Gluc: x / Ketone: NEGATIVE  / Bili: NEGATIVE / Urobili: 1.0 E.U./dL   Blood: x / Protein: Trace mg/dL / Nitrite: POSITIVE   Leuk Esterase: Small / RBC: > 10 /HPF / WBC Many /HPF   Sq Epi: x / Non Sq Epi: Few /HPF / Bacteria: x                  RADIOLOGY & ADDITIONAL STUDIES (The following images were personally reviewed):
 CONSULT NOTE:    HPI:  77F with PMHx of MDS, previous PE, HTN who presented to [facility] ER 2 days ago with weakness and fatigue for 1 week duration. Asked to see patient secondary to urinary retention. She denies fever, chills, nausea, vomiting, abdominal pain, flank pain, dysuria, increased urinary frequency or hematuria. Patient states she has never had any episodes of urinary retention in the past. Patient does complain of constipation, states she has not had a BM since Monday. Her typical schedule is every day to every other day. Patient further states she has not been ambulating as much as she normally does. .      Vital Signs Last 24 Hrs  T(C): 36.9 (08 Sep 2017 09:51), Max: 37.1 (07 Sep 2017 16:37)  T(F): 98.4 (08 Sep 2017 09:51), Max: 98.7 (07 Sep 2017 16:37)  HR: 71 (08 Sep 2017 09:51) (71 - 88)  BP: 125/78 (08 Sep 2017 09:51) (106/71 - 132/78)  BP(mean): --  RR: 16 (08 Sep 2017 09:51) (15 - 16)  SpO2: 96% (08 Sep 2017 09:51) (95% - 99%)  I&O's Summary    07 Sep 2017 07:01  -  08 Sep 2017 07:00  --------------------------------------------------------  IN: 2000 mL / OUT: 1200 mL / NET: 800 mL        PE:  Gen: NAD  Abd: soft, ND, NT, no CVA TTP   : hutson catheter draining clear urine      LABS:                        8.0    2.3   )-----------( 39       ( 08 Sep 2017 08:25 )             23.0     09-08    140  |  107  |  15  ----------------------------<  106<H>  4.6   |  23  |  0.70    Ca    9.2      08 Sep 2017 08:25  Phos  2.9     09-07  Mg     2.2     09-08    TPro  6.5  /  Alb  4.0  /  TBili  1.4<H>  /  DBili  x   /  AST  13  /  ALT  8<L>  /  AlkPhos  69  09-06      Cultures      A/P 78 yo female with urinary retention and UTI  1) f/u cultures  2) cont abx  3) ambulation  4) bowel reg  5) Can TOV after above are met  6) discussed with  attending
HPI:  77F with PMH of MDS, previous PE, HTN who presents with weakness and fatigue for 1 week duration. Weakness and fatigue insidious in onset, unable to give a definitive time she last felt well. States lately she has not had energy to even get out of bed to eat or get her medications. It has been getting progressively worse.  At baseline patient is able to walk with use of cane about 1-2blocks. Currently she feels SOB and fatigued when she stands from bed.     She denies fever, chills, CP, orthopnea, LE edema, nausea, vomiting, diarrhea, abdominal pain, dysuria, increased urinary frequency. Patient denies melena or hematochezia. Patient states she has not had a BM since Monday. Her typical schedule is every day to every other day. She denies any recent illness or sick contacts.    In ED T97.7, HR83, /79, RR19 99%. Patient was found to be pancytopenic on CBC, WBC 2.5, H/H 6/17.6, Plt 60. BMP showed hypokalemia with K 3.1. She was given 40mEq KCl, and a bolus of NS. She was ordered for 1 unit pRBC. (06 Sep 2017 16:34)    Interim HX  Patient has been on Bactrim for UTI  New fever last 24 hours without localizing symptoms or signs   ROS negative otherwise    PAST MEDICAL & SURGICAL HISTORY:  Myeloproliferative disorder  Hypertension  PE (pulmonary thromboembolism): 2015  Tremor  Vestibular disequilibrium  S/P hysterectomy  	    MEDICATIONS  (STANDING):  folic acid 1 milliGRAM(s) Oral daily  rivaroxaban 20 milliGRAM(s) Oral every 24 hours  docusate sodium 100 milliGRAM(s) Oral three times a day  senna 2 Tablet(s) Oral at bedtime  vancomycin  IVPB 1000 milliGRAM(s) IV Intermittent every 24 hours    MEDICATIONS  (PRN):  bisacodyl Suppository 10 milliGRAM(s) Rectal every 12 hours PRN Constipation  acetaminophen   Tablet 650 milliGRAM(s) Oral every 6 hours PRN For Temp greater than 38 C (100.4 F)      Allergies    No Known Allergies    SOCIAL HISTORY:  Lives at home    FAMILY HISTORY:  No pertinent family history in first degree relatives    EXAM  Vital Signs Last 24 Hrs  T(C): 37.3 (13 Sep 2017 19:08), Max: 39.2 (12 Sep 2017 22:27)  T(F): 99.1 (13 Sep 2017 19:08), Max: 102.6 (12 Sep 2017 22:27)  HR: 87 (13 Sep 2017 19:08) (86 - 97)  BP: 97/62 (13 Sep 2017 19:08) (97/62 - 136/65)  BP(mean): --  RR: 16 (13 Sep 2017 19:08) (15 - 17)  SpO2: 95% (13 Sep 2017 19:08) (94% - 97%)  Awakens but groggy. Frail  On RA without respiratory distress  Pale  No rash  Neck Supple  No cervical adenopathy  Oral mucos dry  RRR  Chest clear  Abd soft NT  LE no edema  Resendiz cath in place    LABS:                        7.8    2.0   )-----------( 64       ( 13 Sep 2017 07:32 )             23.0       Manual Differential (09.13.17 @ 07:32)    Spherocytes: Slight    Polychromasia: Slight    Auto Neutrophil %: 55.0: Please note that absolute numbers are not reported at [facility]. %    Auto Monocyte %: 21.0: Please note that absolute numbers are not reported at [facility]. %    Ovalocytes: Slight    Microcytosis: Slight    Auto Lymphocyte %: 14.0: Please note that absolute numbers are not reported at [facility]. %    Macrocytosis: Slight    Anisocytosis: Slight    Auto Basophil %: 1.0: Please note that absolute numbers are not reported at [facility]. %    Auto Eosinophil %: 2.0: Please note that absolute numbers are not reported at [facility]. %    Red Cell Morphology: Abnormal    Platelet Morphology: Normal    Comment - Hematology: manual diff performed    Manual Smear Verification: Performed    Band Neutrophils %: 7.0 %    Nucleated RBC: 2 /100 WBCs      09-13    132<L>  |  96  |  15  ----------------------------<  117<H>  3.6   |  20<L>  |  0.80    Ca    8.7      13 Sep 2017 13:10    TPro  6.3  /  Alb  3.9  /  TBili  0.8  /  DBili  x   /  AST  15  /  ALT  7<L>  /  AlkPhos  66  09-13      Urinalysis Basic - ( 13 Sep 2017 00:33 )    Color: Yellow / Appearance: Hazy / SG: >=1.030 / pH: x  Gluc: x / Ketone: Trace mg/dL  / Bili: NEGATIVE / Urobili: 1.0 E.U./dL   Blood: x / Protein: NEGATIVE mg/dL / Nitrite: NEGATIVE   Leuk Esterase: NEGATIVE / RBC: < 5 /HPF / WBC < 5 /HPF   Sq Epi: x / Non Sq Epi: Few /HPF / Bacteria: Present /HPF    Culture - Blood (09.13.17 @ 01:26)    Specimen Source: .Blood Blood    Culture Results:   No growth at 12 hours    Culture - Blood (09.13.17 @ 01:26)    Specimen Source: .Blood Blood    Culture Results:   No growth at 12 hours    Culture - Urine (09.07.17 @ 09:01)    -  Ampicillin: R >16    -  Ampicillin/Sulbactam: S <=8/4    -  Cefazolin: S <=8    -  Ceftriaxone: S <=1    -  Ciprofloxacin: S <=1    -  Gentamicin: S <=4    -  Nitrofurantoin: S <=32    -  Piperacillin/Tazobactam: S <=16    -  Tobramycin: S <=4    -  Trimethoprim/Sulfamethoxazole: S <=2/38    Specimen Source: .Urine Clean Catch (Midstream)    Culture Results:   >100,000 CFU/ml Klebsiella pneumoniae    Organism Identification: Klebsiella pneumoniae    Organism: Klebsiella pneumoniae    Method Type: [facility]      Rapid Respiratory Viral Panel (09.13.17 @ 14:23)    Rapid RVP Result: NotDetec: The [employer] RVP Rapid uses polymerase chain reaction (PCR) and melt  curve analysis to screen for adenovirus; coronavirus HKU1, NL63, 229E,  OC43; human metapneumovirus (hMPV); human enterovirus/rhinovirus  (Entero/RV); influenza A; influenza A/H1;NotDetec: influenza A/H3; influenza  A/H1-2009; influenza B; parainfluenza viruses 1, 2, 3, 4; respiratory  syncytial virus; Bordetella pertussis; Mycoplasma pneumoniae; and  Chlamydophila pneumoniae.          RADIOLOGY & ADDITIONAL STUDIES:    Xray Chest 1 View AP- PORTABLE-Urgent (09.12.17 @ 23:15) >    EXAM:  XR CHEST 1 VIEW PORT URGENT                          PROCEDURE DATE:  09/12/2017         INTERPRETATION:  CLINICAL INDICATION: 77-year-old with weakness.      FINDINGS: Portable view of the chest is compared to 9/6/2017 and   demonstrates mild hyperaeration. Midline trachea. Normal heart size.   Discoid atelectasis within the right lower lobe. No consolidation. No   pleural effusion. Osteopenia.
Patient is a 77y old  Female who presents with a chief complaint of Weakness and Fatigue (06 Sep 2017 16:34)      HPI:  77F with PMH of MDS, previous PE, HTN who presents with weakness and fatigue for 1 week duration. Weakness and fatigue insidious in onset, unable to give a definitive time she last felt well. States lately she has not had energy to even get out of bed to eat or get her medications. It has been getting progressively worse.  At baseline patient is able to walk with use of cane about 1-2blocks. Currently she feels SOB and fatigued when she stands from bed.     She denies fever, chills, CP, orthopnea, LE edema, nausea, vomiting, diarrhea, abdominal pain, dysuria, increased urinary frequency. Patient denies melena or hematochezia. Patient states she has not had a BM since Monday. Her typical schedule is every day to every other day. She denies any recent illness or sick contacts.    In ED T97.7, HR83, /79, RR19 99%. Patient was found to be pancytopenic on CBC, WBC 2.5, H/H 6/17.6, Plt 60. BMP showed hypokalemia with K 3.1. She was given 40mEq KCl, and a bolus of NS. She was ordered for 1 unit pRBC. (06 Sep 2017 16:34)      PAST MEDICAL & SURGICAL HISTORY:  Myeloproliferative disorder  Hypertension  PE (pulmonary thromboembolism):   Tremor  Vestibular disequilibrium  S/P hysterectomy      MEDICATIONS  (STANDING):  sodium chloride 0.9%. 1000 milliLiter(s) (125 mL/Hr) IV Continuous <Continuous>  folic acid 1 milliGRAM(s) Oral daily  rivaroxaban 20 milliGRAM(s) Oral every 24 hours    MEDICATIONS  (PRN):      Social History: , no children, lives alone in an elevator accessible apartment building, no home care services    Functional Level Prior to Admission: ADL independent, walks with a cane    FAMILY HISTORY:  No pertinent family history in first degree relatives      CBC Full  -  ( 08 Sep 2017 08:25 )  WBC Count : 2.3 K/uL  Hemoglobin : 8.0 g/dL  Hematocrit : 23.0 %  Platelet Count - Automated : 39 K/uL  Mean Cell Volume : 87.5 fL  Mean Cell Hemoglobin : 30.4 pg  Mean Cell Hemoglobin Concentration : 34.8 g/dL  Auto Neutrophil # : x  Auto Lymphocyte # : x  Auto Monocyte # : x  Auto Eosinophil # : x  Auto Basophil # : x  Auto Neutrophil % : x  Auto Lymphocyte % : x  Auto Monocyte % : x  Auto Eosinophil % : x  Auto Basophil % : x          140  |  107  |  15  ----------------------------<  106<H>  4.6   |  23  |  0.70    Ca    9.2      08 Sep 2017 08:25  Phos  2.9       Mg     2.2         TPro  6.5  /  Alb  4.0  /  TBili  1.4<H>  /  DBili  x   /  AST  13  /  ALT  8<L>  /  AlkPhos  69        Urinalysis Basic - ( 06 Sep 2017 22:01 )    Color: Yellow / Appearance: Hazy / S.015 / pH: x  Gluc: x / Ketone: NEGATIVE  / Bili: NEGATIVE / Urobili: 1.0 E.U./dL   Blood: x / Protein: Trace mg/dL / Nitrite: POSITIVE   Leuk Esterase: Small / RBC: > 10 /HPF / WBC Many /HPF   Sq Epi: x / Non Sq Epi: Few /HPF / Bacteria: x          Radiology:    < from: Xray Chest 1 View AP-PORTABLE IMMEDIATE (17 @ 14:41) >  EXAM:  XR CHEST 1 VIEW PORT IMMEDIATE                          PROCEDURE DATE:  2017                     INTERPRETATION:  CLINICAL INDICATION: 77-year-old with weakness.      FINDINGS: Portable frontal view of the chest is compared to 2017 and   demonstrates mild hyperaeration. Midline trachea. Normal heart size. No   consolidation. No pleural effusion. Osteopenia. No active chest disease.   No significant change.               Vital Signs Last 24 Hrs  T(C): 36.9 (08 Sep 2017 06:16), Max: 37.1 (07 Sep 2017 16:37)  T(F): 98.4 (08 Sep 2017 06:16), Max: 98.7 (07 Sep 2017 16:37)  HR: 80 (08 Sep 2017 06:16) (80 - 88)  BP: 132/78 (08 Sep 2017 06:16) (106/71 - 146/84)  BP(mean): --  RR: 15 (08 Sep 2017 06:16) (15 - 16)  SpO2: 95% (08 Sep 2017 06:16) (94% - 99%)    REVIEW OF SYSTEMS:    CONSTITUTIONAL: No fever, weight loss, or fatigue  EYES: No eye pain, visual disturbances, or discharge  ENMT:  No difficulty hearing, tinnitus, vertigo; No sinus or throat pain  NECK: No pain or stiffness  BREASTS: No pain, masses, or nipple discharge  RESPIRATORY: No cough, wheezing, chills or hemoptysis; No shortness of breath  CARDIOVASCULAR: No chest pain, palpitations, dizziness, or leg swelling  GASTROINTESTINAL: No abdominal or epigastric pain. No nausea, vomiting, or hematemesis; No diarrhea or constipation. No melena or hematochezia.  GENITOURINARY: No dysuria, frequency, hematuria, or incontinence  NEUROLOGICAL: No headaches, memory loss, loss of strength, numbness, or tremors  SKIN: No itching, burning, rashes, or lesions   LYMPH NODES: No enlarged glands  ENDOCRINE: No heat or cold intolerance; No hair loss  MUSCULOSKELETAL: No joint pain or swelling; No muscle, back, or extremity pain  PSYCHIATRIC: No depression, anxiety, mood swings, or difficulty sleeping  HEME/LYMPH: No easy bruising, or bleeding gums  ALLERGY AND IMMUNOLOGIC: No hives or eczema      Physical Exam: WDWN  woman lying in bed, feels "better"    HEENT: normocephalic/ atraumatic, anicteric    Neck: supple, negative JVD, negative carotid bruits,    Chest: CTA bilaterally, neg wheeze, rhonchi, rales, crackles, egophany    Cardiovascular: regular rate and rhythm, neg murmurs/rubs/gallops    Abdomen: soft, non distended, non tender, negative rebound/guarding, normal bowel sounds, neg hepatosplenomegaly    Extremities: WWP, neg cyanosis/clubbing/edema, negative calf tenderness to palpation, negative Benita's sign    :     Neurologic Exam:    Alert and oriented to person, place, date/year, speech fluent w/o dysarthria, repetition intact, comprehension intact,     Cranial Nerves:     II:                       pupils equal, round and reactive to light, visual fields intact   III/ IV/VI:             extraocular movements intact, neg nystagmus, ptosis  V:                      facial sensation intact, V1-3 normal  VII:                     face symmetric, no droop, normal eye closure and smile  VIII:                    hearing intact to finger rub bilaterally  IX/ X:                  soft palate rise symmetrical  XI:                      head turning, shoulder shrug normal  XII:                     tongue midline    Motor Exam:    Bilateral UE:        5/5 /intrinsics                            5/5 biceps/triceps/wrist extensors-flexors/deltoid                            negative pronator drift      Bilateral LE:        4+/5 hip flexors/adductors/abductors                            5/5 quadriceps/hamstrings                            5/5 dorsiflexors/plantar flexors/invertors-evertors    Sensory: intact to LT/PP in all UE/LE dermatomes    DTR:        = biceps/     triceps/     brachioradialis                 = patella/   medial hamstring/    ankle                 neg clonus                 neg Babinski                 neg Hoffmans    Finger to Nose: wnl    Heel to Shin:      wnl    Rapid Alternating movements:  wnl    Joint Position Sense:  intact    Romberg: NT    Tandem Walking: NT    Gait:  NT        PM&R Impression:    1) deconditioned  2) no focal weakness      Recommendations:    1) Physical therapy focusing on therapeutic exercises, bed mobility/transfer out of bed evaluation, progressive ambulation with assistive devices.    2) Disposition Plan: anticipate d/c home
Attending:  Dr. Garza    HPI:  77F with PMH of MDS, previous PE, HTN who presents with weakness and fatigue for 1 week duration. Weakness and fatigue insidious in onset, unable to give a definitive time she last felt well. States lately she has not had energy to even get out of bed to eat or get her medications. It has been getting progressively worse.  At baseline patient is able to walk with use of cane about 1-2blocks. Currently she feels SOB and fatigued when she stands from bed.     She denies fever, chills, CP, orthopnea, LE edema, nausea, vomiting, diarrhea, abdominal pain, dysuria, increased urinary frequency. Patient denies melena or hematochezia. Patient states she has not had a BM since Monday. Her typical schedule is every day to every other day. She denies any recent illness or sick contacts.    In ED T97.7, HR83, /79, RR19 99%. Patient was found to be pancytopenic on CBC, WBC 2.5, H/H 6/17.6, Plt 60. BMP showed hypokalemia with K 3.1. She was given 40mEq KCl, and a bolus of NS. She was ordered for 1 unit pRBC. (06 Sep 2017 16:34)    Vascular addendum:  Consulted for possible IVC filter in setting of anemia while being on Xarelto for chronic RLE DVT and h/o PE. Patient is frustrated with care she has been receiving and also with doctors "not knowing what is going on with me."    Unable to gather RoS due to patient refusal.      PAST MEDICAL & SURGICAL HISTORY:  Myeloproliferative disorder  Hypertension  PE (pulmonary thromboembolism): 2015  Tremor  Vestibular disequilibrium  S/P hysterectomy      MEDICATIONS  (STANDING):  folic acid 1 milliGRAM(s) Oral daily  rivaroxaban 20 milliGRAM(s) Oral every 24 hours  docusate sodium 100 milliGRAM(s) Oral three times a day  senna 2 Tablet(s) Oral at bedtime    MEDICATIONS  (PRN):      Allergies    No Known Allergies    Intolerances          FAMILY HISTORY:  No pertinent family history in first degree relatives        Vital Signs Last 24 Hrs  T(C): 36.1 (09 Sep 2017 11:00), Max: 36.7 (09 Sep 2017 06:00)  T(F): 97 (09 Sep 2017 11:00), Max: 98.1 (09 Sep 2017 06:00)  HR: 70 (09 Sep 2017 11:00) (70 - 82)  BP: 133/82 (09 Sep 2017 11:00) (114/75 - 133/82)  BP(mean): --  RR: 14 (09 Sep 2017 11:00) (14 - 15)  SpO2: 95% (09 Sep 2017 11:00) (95% - 96%)    I&O's Summary    08 Sep 2017 07:01  -  09 Sep 2017 07:00  --------------------------------------------------------  IN: 250 mL / OUT: 900 mL / NET: -650 mL        Physical Exam: patient refused    LABS:                        7.8    2.1   )-----------( 46       ( 09 Sep 2017 07:34 )             23.2     09-09    140  |  106  |  14  ----------------------------<  99  3.8   |  22  |  0.60    Ca    9.1      09 Sep 2017 07:34  Mg     2.1     09-09          RADIOLOGY & ADDITIONAL STUDIES:  < from: US Duplex Venous Lower Ext Ltd, Right (06.16.17 @ 18:50) >  IMPRESSION:  Occlusive deep venous thrombosis of the proximal right femoral vein and   right popliteal vein.    Thrombosed varicose veins in the right calf compatible with superficial   vein thrombosis.    < end of copied text >

## 2017-09-13 NOTE — CONSULT NOTE ADULT - CONSULT REASON
Fever  Pancytopenia
PM&R evaluation
urinary retention
Contraindication to anticoagulation, possible IVC filter
Pancytopenia
sob

## 2017-09-13 NOTE — PROGRESS NOTE ADULT - SUBJECTIVE AND OBJECTIVE BOX
History of Present Illness:  Chief Complaint/Reason for Admission: Weakness and Fatigue	  History of Present Illness: 	  77F with PMH of MDS, previous PE, HTN who presents with weakness and fatigue for 1 week duration. Weakness and fatigue insidious in onset, unable to give a definitive time she last felt well. States lately she has not had energy to even get out of bed to eat or get her medications. It has been getting progressively worse.  At baseline patient is able to walk with use of cane about 1-2 blocks. Currently she feels SOB and fatigued when she stands from bed.     She denies fever, chills, CP, orthopnea, LE edema, nausea, vomiting, diarrhea, abdominal pain, dysuria, increased urinary frequency. Patient denies melena or hematochezia. Patient states she has not had a BM since Monday. Her typical schedule is every day to every other day. She denies any recent illness or sick contacts.  Now s/p 2 units PRBCs.  Results of V/Q scan and LE U/S noted.    Review of Systems:  Review of Systems: CONSTITUTIONAL: No fevers or chills EYES/ENT: No visual changes;  No vertigo or throat pain  NECK: No pain or stiffness RESPIRATORY: No cough, wheezing, hemoptysis; No shortness of breath CARDIOVASCULAR: No chest pain or palpitations GASTROINTESTINAL: No abdominal or epigastric pain. No nausea, vomiting, or hematemesis; No diarrhea  No melena or hematochezia. GENITOURINARY: No dysuria, frequency or hematuria NEUROLOGICAL: No numbness  SKIN: No itching, burning, rashes, or lesions  All other review of systems is negative unless indicated above.	      Allergies and Intolerances:        Allergies:  	No Known Allergies:     Home Medications:   * Patient Currently Takes Medications as of 06-Sep-2017 16:44 documented in Prescription Writer  · 	Xarelto 15 mg oral tablet: 1 tab(s) orally twice, Last Dose Taken:    · 	folic acid 1 mg oral tablet: 2 tab(s) orally once a day, Last Dose Taken:    · 	Aspirin Enteric Coated 81 mg oral delayed release tablet: 1 tab(s) orally once a day, Last Dose Taken:    · 	hydroxyurea 500 mg oral capsule: 3 cap(s) orally Monday, Wednesday, and Friday, Last Dose Taken:      . .  Patient History:   Past Medical History:  Hypertension    Myeloproliferative disorder    PE (pulmonary thromboembolism)  2015  Tremor    Vestibular disequilibrium.    Past Surgical History:  S/P hysterectomy.    Family History:  No pertinent family history in first degree relatives.    Social History:  Social History (marital status, living situation, occupation, tobacco use, alcohol and drug use, and sexual history): Lives alone, . Does not have any children. Prior history of cigarette use 35 pack year history. No alcohol use or illicit drug use	    Tobacco Screening:  · Core Measure Site	No	  · Has the patient used tobacco in the past 30 days?	No	    Risk Assessment:   Present on Admission:  Deep Venous Thrombosis	no	  Pulmonary Embolus	no	  Urinary Catheter	no	  Central Venous Catheter/PICC Line	no	  Surgical Site Incision	no	  Pressure Ulcer(s)	no	      Physical Exam:  Physical Exam: VITALS Vital Signs Last 24 Hrs T(C): 39.2 (13 Sep 2017 17:17), Max: 39.2 (12 Sep 2017 22:27) T(F): 102.5 (13 Sep 2017 17:17), Max: 102.6 (12 Sep 2017 22:27) HR: 95 (13 Sep 2017 17:17) (86 - 97) BP: 124/66 (13 Sep 2017 17:17) (103/66 - 136/65) BP(mean): -- RR: 16 (13 Sep 2017 17:17) (15 - 17) SpO2: 96% (13 Sep 2017 17:17) (94% - 97%)  PHYSICAL EXAM General: A&Ox3; NAD Head: NC/AT; PERRL; EOMI; anicteric sclera Neck: Supple; no JVD Respiratory: CTA B/L; no wheezes/crackles/rales auscultated w/ good air movement Cardiovascular: Regular rhythm/rate; S1/S2; no gallops or murmurs auscultated Gastrointestinal: Normal Bowel Sounds, Non-distended. Tender in epigastric region Extremities: WWP; no edema or cyanosis; radial/pedal pulses palpable Neurological:  CNII-XII grossly intact; no obvious focal deficits	      Labs and Results:  Labs, Radiology, Cardiology, and Other Results: LABS              7.8   2.0   )-----------( 64       ( 13 Sep 2017 07:32 )            23.0   132<L>  |  96  |  15 ----------------------------<  117<H> 3.6   |  20<L>  |  0.80  Ca    8.7      13 Sep 2017 13:10  TPro  6.3  /  Alb  3.9  /  TBili  0.8  /  DBili  x   /  AST  15  /  ALT  7<L>  /  AlkPhos  66  09-13                            	    Assessment and Plan:   Assessment:  · Assessment		  77F with PMH of MDS, previous PE, DVT, HTN who presents with weakness and fatigue for 1 week duration. Found to have pancytopenia on CBC most likely 2/2 to chronic MDS.     Problem/Plan - 1:  ·  Problem: Myeloproliferative disorder.  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 Last CBC in June showed WBC 13.3, H/H 15.5/47.0, . Patient current pancytopenia most likely exacerbation of MPD as she has been admitted in past for this.  -Currently awaiting 1 unit pRBC, will follow up AM CBC to determine H/H  -Follow up on labs: Reticulocyte Count, Peripheral Smear, LDH, Haptoglobin, Folate, B12, TSH  -Transfuse Hgb <7.0  -Transfuse Plt <15k unless actively bleeding  -Hold Hydroxyurea for now    Problem/Plan - 2:  ·  Problem: Hypokalemia.  Plan: Patient K on admission 3.1, given 40mEq in ED. Repeat BMP in morning, will include Phos. Determine need for further repletion at that time  -Follow up AM BMP.     Problem/Plan - 3:  ·  Problem: Essential hypertension.  Plan: Patient currently normotensive, does not take any medications at home.  -Will continue to monitor, question as to whether she takes Norvasc 5mg at home, if patient becomes HTN will add Norvasc.     Problem/Plan - 4:  ·  Problem: Chronic deep vein thrombosis (DVT) of femoral vein of right lower extremity.  Plan: Patient was seen in ED in June and found to have right femoral and right popiteal DVT. Started on Xarelto then.   -Will continue Xarelto as inpatient.     Problem/Plan - 5:  ·  Problem: Nutrition, metabolism, and development symptoms.  Plan: F: Patient will get 1 unit pRBC and NS bolus, no fluids indicated following  E: Replete K>4, Mg>2, s/p 40mEq KCl  N: Reg. Diet.     Problem/Plan - 6:  Problem: Need for prophylactic measure. Plan: P: Patient High Risk, currently on Xarelto, will continue Xarelto

## 2017-09-14 LAB
ANION GAP SERPL CALC-SCNC: 16 MMOL/L — SIGNIFICANT CHANGE UP (ref 5–17)
ANISOCYTOSIS BLD QL: SLIGHT — SIGNIFICANT CHANGE UP
BASOPHILS NFR BLD AUTO: 0 % — SIGNIFICANT CHANGE UP (ref 0–2)
BUN SERPL-MCNC: 16 MG/DL — SIGNIFICANT CHANGE UP (ref 7–23)
CALCIUM SERPL-MCNC: 8.6 MG/DL — SIGNIFICANT CHANGE UP (ref 8.4–10.5)
CHLORIDE SERPL-SCNC: 96 MMOL/L — SIGNIFICANT CHANGE UP (ref 96–108)
CO2 SERPL-SCNC: 19 MMOL/L — LOW (ref 22–31)
CREAT SERPL-MCNC: 0.7 MG/DL — SIGNIFICANT CHANGE UP (ref 0.5–1.3)
EOSINOPHIL NFR BLD AUTO: 5 % — SIGNIFICANT CHANGE UP (ref 0–6)
GLUCOSE SERPL-MCNC: 104 MG/DL — HIGH (ref 70–99)
HCT VFR BLD CALC: 21.4 % — LOW (ref 34.5–45)
HGB BLD-MCNC: 7.2 G/DL — LOW (ref 11.5–15.5)
HYPOCHROMIA BLD QL: SIGNIFICANT CHANGE UP
LYMPHOCYTES # BLD AUTO: 16 % — SIGNIFICANT CHANGE UP (ref 13–44)
MAGNESIUM SERPL-MCNC: 2.2 MG/DL — SIGNIFICANT CHANGE UP (ref 1.6–2.6)
MANUAL SMEAR VERIFICATION: SIGNIFICANT CHANGE UP
MCHC RBC-ENTMCNC: 30.6 PG — SIGNIFICANT CHANGE UP (ref 27–34)
MCHC RBC-ENTMCNC: 33.6 G/DL — SIGNIFICANT CHANGE UP (ref 32–36)
MCV RBC AUTO: 91.1 FL — SIGNIFICANT CHANGE UP (ref 80–100)
MONOCYTES NFR BLD AUTO: 14 % — SIGNIFICANT CHANGE UP (ref 2–14)
NEUTROPHILS NFR BLD AUTO: 53 % — SIGNIFICANT CHANGE UP (ref 43–77)
NEUTS BAND # BLD: 12 % — SIGNIFICANT CHANGE UP
OVALOCYTES BLD QL SMEAR: SLIGHT — SIGNIFICANT CHANGE UP
PLAT MORPH BLD: NORMAL — SIGNIFICANT CHANGE UP
PLATELET # BLD AUTO: 74 K/UL — LOW (ref 150–400)
POTASSIUM SERPL-MCNC: 3.3 MMOL/L — LOW (ref 3.5–5.3)
POTASSIUM SERPL-SCNC: 3.3 MMOL/L — LOW (ref 3.5–5.3)
PROCALCITONIN SERPL-MCNC: <0.05 NG/ML — SIGNIFICANT CHANGE UP (ref 0–0.04)
RBC # BLD: 2.35 M/UL — LOW (ref 3.8–5.2)
RBC # FLD: 26.4 % — HIGH (ref 10.3–16.9)
RBC BLD AUTO: (no result)
SODIUM SERPL-SCNC: 131 MMOL/L — LOW (ref 135–145)
SPHEROCYTES BLD QL SMEAR: SLIGHT — SIGNIFICANT CHANGE UP
WBC # BLD: 1.1 K/UL — LOW (ref 3.8–10.5)
WBC # FLD AUTO: 1.1 K/UL — LOW (ref 3.8–10.5)

## 2017-09-14 PROCEDURE — 71260 CT THORAX DX C+: CPT | Mod: 26

## 2017-09-14 PROCEDURE — 74177 CT ABD & PELVIS W/CONTRAST: CPT | Mod: 26

## 2017-09-14 RX ORDER — IBUPROFEN 200 MG
400 TABLET ORAL ONCE
Qty: 0 | Refills: 0 | Status: COMPLETED | OUTPATIENT
Start: 2017-09-14 | End: 2017-09-14

## 2017-09-14 RX ORDER — CEFEPIME 1 G/1
2000 INJECTION, POWDER, FOR SOLUTION INTRAMUSCULAR; INTRAVENOUS EVERY 12 HOURS
Qty: 0 | Refills: 0 | Status: DISCONTINUED | OUTPATIENT
Start: 2017-09-14 | End: 2017-09-15

## 2017-09-14 RX ORDER — IOHEXOL 300 MG/ML
50 INJECTION, SOLUTION INTRAVENOUS ONCE
Qty: 0 | Refills: 0 | Status: COMPLETED | OUTPATIENT
Start: 2017-09-14 | End: 2017-09-14

## 2017-09-14 RX ORDER — SODIUM CHLORIDE 9 MG/ML
500 INJECTION INTRAMUSCULAR; INTRAVENOUS; SUBCUTANEOUS ONCE
Qty: 0 | Refills: 0 | Status: COMPLETED | OUTPATIENT
Start: 2017-09-14 | End: 2017-09-14

## 2017-09-14 RX ORDER — FILGRASTIM 480MCG/1.6
480 VIAL (ML) INJECTION ONCE
Qty: 0 | Refills: 0 | Status: COMPLETED | OUTPATIENT
Start: 2017-09-14 | End: 2017-09-14

## 2017-09-14 RX ORDER — POTASSIUM CHLORIDE 20 MEQ
40 PACKET (EA) ORAL EVERY 4 HOURS
Qty: 0 | Refills: 0 | Status: COMPLETED | OUTPATIENT
Start: 2017-09-14 | End: 2017-09-14

## 2017-09-14 RX ADMIN — SODIUM CHLORIDE 2000 MILLILITER(S): 9 INJECTION INTRAMUSCULAR; INTRAVENOUS; SUBCUTANEOUS at 22:08

## 2017-09-14 RX ADMIN — Medication 650 MILLIGRAM(S): at 06:01

## 2017-09-14 RX ADMIN — Medication 400 MILLIGRAM(S): at 19:15

## 2017-09-14 RX ADMIN — Medication 650 MILLIGRAM(S): at 15:41

## 2017-09-14 RX ADMIN — SODIUM CHLORIDE 2000 MILLILITER(S): 9 INJECTION INTRAMUSCULAR; INTRAVENOUS; SUBCUTANEOUS at 22:30

## 2017-09-14 RX ADMIN — Medication 40 MILLIEQUIVALENT(S): at 15:03

## 2017-09-14 RX ADMIN — Medication 400 MILLIGRAM(S): at 18:41

## 2017-09-14 RX ADMIN — RIVAROXABAN 20 MILLIGRAM(S): KIT at 18:41

## 2017-09-14 RX ADMIN — Medication 250 MILLIGRAM(S): at 12:48

## 2017-09-14 RX ADMIN — Medication 1 MILLIGRAM(S): at 11:38

## 2017-09-14 RX ADMIN — IOHEXOL 50 MILLILITER(S): 300 INJECTION, SOLUTION INTRAVENOUS at 13:25

## 2017-09-14 RX ADMIN — Medication 100 MILLIGRAM(S): at 06:02

## 2017-09-14 RX ADMIN — CEFEPIME 100 MILLIGRAM(S): 1 INJECTION, POWDER, FOR SOLUTION INTRAMUSCULAR; INTRAVENOUS at 15:15

## 2017-09-14 RX ADMIN — Medication 480 MICROGRAM(S): at 13:28

## 2017-09-14 RX ADMIN — CEFEPIME 100 MILLIGRAM(S): 1 INJECTION, POWDER, FOR SOLUTION INTRAMUSCULAR; INTRAVENOUS at 03:37

## 2017-09-14 NOTE — PROGRESS NOTE ADULT - SUBJECTIVE AND OBJECTIVE BOX
77F with PMH of MDS, previous PE, HTN who presents with weakness and fatigue for 1 week duration. Found to have pancytopenia on CBC most likely 2/2 to chronic MDS. Now with new onset of fevers and decreasing Absolute Neutrophil Count.     O/N Events: Patient spike a fever of 101.3 overnight.   Subjective/ROS: Denies HA, CP, SOB, n/v, changes in bowel/urinary habits.  12pt ROS otherwise negative.    VITALS  Vital Signs Last 24 Hrs  T(C): 36.6 (14 Sep 2017 10:06), Max: 39.2 (13 Sep 2017 17:17)  T(F): 97.9 (14 Sep 2017 10:06), Max: 102.5 (13 Sep 2017 17:17)  HR: 73 (14 Sep 2017 10:06) (73 - 97)  BP: 96/66 (14 Sep 2017 10:06) (96/66 - 132/76)  BP(mean): --  RR: 15 (14 Sep 2017 10:06) (15 - 16)  SpO2: 96% (14 Sep 2017 10:06) (95% - 98%)    CAPILLARY BLOOD GLUCOSE          PHYSICAL EXAM  General: A&Ox3; NAD  Head: NC/AT; MMM; PERRL; EOMI;  Neck: Supple; no JVD  Respiratory: CTA B/L; no wheezes/crackles   Cardiovascular: Regular rhythm/rate; S1/S2   Gastrointestinal: Soft; NTND; normoactive BS  Extremities: WWP; no edema/cyanosis  Neurological:  CNII-XII grossly intact; no obvious focal deficits    MEDICATIONS  (STANDING):  folic acid 1 milliGRAM(s) Oral daily  rivaroxaban 20 milliGRAM(s) Oral every 24 hours  docusate sodium 100 milliGRAM(s) Oral three times a day  senna 2 Tablet(s) Oral at bedtime  cefepime  IVPB 1000 milliGRAM(s) IV Intermittent every 12 hours  vancomycin  IVPB 1000 milliGRAM(s) IV Intermittent every 24 hours    MEDICATIONS  (PRN):  bisacodyl Suppository 10 milliGRAM(s) Rectal every 12 hours PRN Constipation  acetaminophen   Tablet 650 milliGRAM(s) Oral every 6 hours PRN For Temp greater than 38 C (100.4 F)      No Known Allergies      LABS                        7.2    1.1   )-----------( 74       ( 14 Sep 2017 06:56 )             21.4     Complete Blood Count in AM (09.14.17 @ 06:56)    WBC Count: 1.1 K/uL    RBC Count: 2.35 M/uL    Hemoglobin: 7.2 g/dL    Hematocrit: 21.4 %    Mean Cell Volume: 91.1 fL    Mean Cell Hemoglobin: 30.6 pg    Mean Cell Hemoglobin Conc: 33.6 g/dL    Red Cell Distrib Width: 26.4 %    Platelet Count - Automated: 74 K/uL    Differential (09.14.17 @ 06:56)    Auto Eosinophil %: 5.0: Please note that absolute numbers are not reported at [facility]. %    Auto Basophil %: 0.0: Please note that absolute numbers are not reported at [facility]. %    Auto Lymphocyte %: 16.0: Please note that absolute numbers are not reported at [facility]. %    Auto Neutrophil %: 53.0: Please note that absolute numbers are not reported at [facility]. %    Auto Monocyte %: 14.0: Please note that absolute numbers are not reported at [facility]. %    Manual Differential (09.14.17 @ 06:56)    Spherocytes: Slight    Ovalocytes: Slight    Hypochromia: Moderate    Anisocytosis: Slight    Red Cell Morphology: Abnormal    Platelet Morphology: Normal    Manual Smear Verification: Performed    Band Neutrophils %: 12.0 %    ANC: 583      09-14    131<L>  |  96  |  16  ----------------------------<  104<H>  3.3<L>   |  19<L>  |  0.70    Ca    8.6      14 Sep 2017 06:56  Mg     2.2     09-14    TPro  6.3  /  Alb  3.9  /  TBili  0.8  /  DBili  x   /  AST  15  /  ALT  7<L>  /  AlkPhos  66  09-13      Urinalysis Basic - ( 13 Sep 2017 00:33 )    Color: Yellow / Appearance: Hazy / SG: >=1.030 / pH: x  Gluc: x / Ketone: Trace mg/dL  / Bili: NEGATIVE / Urobili: 1.0 E.U./dL   Blood: x / Protein: NEGATIVE mg/dL / Nitrite: NEGATIVE   Leuk Esterase: NEGATIVE / RBC: < 5 /HPF / WBC < 5 /HPF   Sq Epi: x / Non Sq Epi: Few /HPF / Bacteria: Present /HPF

## 2017-09-14 NOTE — PROGRESS NOTE ADULT - SUBJECTIVE AND OBJECTIVE BOX
History of Present Illness:  Chief Complaint/Reason for Admission: Weakness and Fatigue	  History of Present Illness: 	  77F with PMH of MDS, previous PE, HTN who presents with weakness and fatigue for 1 week duration. Weakness and fatigue insidious in onset, unable to give a definitive time she last felt well. States lately she has not had energy to even get out of bed to eat or get her medications. It has been getting progressively worse.  At baseline patient is able to walk with use of cane about 1-2 blocks. Currently she feels SOB and fatigued when she stands from bed. Febrile overnight. Poor po intake.    She denies fever, chills, CP, orthopnea, LE edema, nausea, vomiting, diarrhea, abdominal pain, dysuria, increased urinary frequency. Patient denies melena or hematochezia. Patient states she has not had a BM since Monday. Her typical schedule is every day to every other day. She denies any recent illness or sick contacts.  Now s/p 2 units PRBCs.  Results of V/Q scan and LE U/S noted.    Review of Systems:  Review of Systems:  EYES/ENT: No visual changes;  No vertigo or throat pain  NECK: No pain or stiffness RESPIRATORY: No cough, wheezing, hemoptysis; No shortness of breath CARDIOVASCULAR: No chest pain or palpitations GASTROINTESTINAL: No abdominal or epigastric pain. No nausea, vomiting, or hematemesis; No diarrhea  No melena or hematochezia. GENITOURINARY: No dysuria, frequency or hematuria NEUROLOGICAL: No numbness  SKIN: No itching, burning, rashes, or lesions  All other review of systems is negative unless indicated above.	      Allergies and Intolerances:        Allergies:  	No Known Allergies:     Home Medications:   * Patient Currently Takes Medications as of 06-Sep-2017 16:44 documented in Prescription Writer  · 	Xarelto 15 mg oral tablet: 1 tab(s) orally twice, Last Dose Taken:    · 	folic acid 1 mg oral tablet: 2 tab(s) orally once a day, Last Dose Taken:    · 	Aspirin Enteric Coated 81 mg oral delayed release tablet: 1 tab(s) orally once a day, Last Dose Taken:    · 	hydroxyurea 500 mg oral capsule: 3 cap(s) orally Monday, Wednesday, and Friday, Last Dose Taken:      . .Patient History:   Past Medical History:  Hypertension    Myeloproliferative disorder    PE (pulmonary thromboembolism)  2015  Tremor    Vestibular disequilibrium.    Past Surgical History:  S/P hysterectomy.    Family History:  No pertinent family history in first degree relatives.      Risk Assessment:   Present on Admission:  Deep Venous Thrombosis	no	  Pulmonary Embolus	no	  Urinary Catheter	no	  Central Venous Catheter/PICC Line	no	  Surgical Site Incision	no	  Pressure Ulcer(s)	no	      Physical Exam:  Physical Exam: VITALS Vital Signs Last 24 Hrs T(C): 36.8 (14 Sep 2017 18:37), Max: 38.9 (14 Sep 2017 15:32) T(F): 98.3 (14 Sep 2017 18:37), Max: 102.1 (14 Sep 2017 15:32) HR: 81 (14 Sep 2017 18:37) (73 - 92) BP: 103/67 (14 Sep 2017 18:37) (96/66 - 118/72) BP(mean): -- RR: 16 (14 Sep 2017 18:37) (15 - 16) SpO2: 95% (14 Sep 2017 18:37) (93% - 98%)  PHYSICAL EXAM General: A&Ox3; NAD Head: NC/AT; PERRL; EOMI; anicteric sclera Neck: Supple; no JVD Respiratory: CTA B/L; no wheezes/crackles/rales auscultated w/ good air movement Cardiovascular: Regular rhythm/rate; S1/S2; no gallops or murmurs auscultated Gastrointestinal: Normal Bowel Sounds, Non-distended. Tender in epigastric region Extremities: WWP; no edema or cyanosis; radial/pedal pulses palpable Neurological:  CNII-XII grossly intact; no obvious focal deficits	      Labs and Results:  Labs, Radiology, Cardiology, and Other Results: LABS                      7.2   1.1   )-----------( 74       ( 14 Sep 2017 06:56 )            21.4   131<L>  |  96  |  16 ----------------------------<  104<H> 3.3<L>   |  19<L>  |  0.70  Ca    8.6      14 Sep 2017 06:56 Mg     2.2     09-14  TPro  6.3  /  Alb  3.9  /  TBili  0.8  /  DBili  x   /  AST  15  /  ALT  7<L>  /  AlkPhos  66  09-13                         	    Assessment and Plan:   Assessment:  · Assessment		  77F with PMH of MDS, previous PE, DVT, HTN who presents with weakness and fatigue for 1 week duration. Found to have pancytopenia on CBC most likely 2/2 to chronic MDS.     Problem/Plan - 1:  ·  Problem: Myeloproliferative disorder.  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 Last CBC in June showed WBC 13.3, H/H 15.5/47.0, . Patient current pancytopenia most likely exacerbation of MPD as she has been admitted in past for this.  -Follow up on labs: Reticulocyte Count, Peripheral Smear, LDH, Haptoglobin, Folate, B12, TSH  -Transfuse Hgb <7.0  -Transfuse Plt <15k unless actively bleeding  -Hold Hydroxyurea for now    Problem/Plan - 2:  ·  Problem: Neutropenic fever     - sq filgrastim  - Abx as per ID    Problem/Plan - 3:  ·  Problem: Essential hypertension.  Plan: Patient currently normotensive, does not take any medications at home.  -Will continue to monitor, question as to whether she takes Norvasc 5mg at home, if patient becomes HTN will add Norvasc.     Problem/Plan - 4:  ·  Problem: Chronic deep vein thrombosis (DVT) of femoral vein of right lower extremity.  Plan: Patient was seen in ED in June and found to have right femoral and right popiteal DVT. Started on Xarelto then.   -Will continue Xarelto as inpatient.     Problem/Plan - 5:  ·  Problem: Nutrition, metabolism, and development symptoms.    E: Replete K>4, Mg>2, s/p 40mEq KCl  N: Reg. Diet.     Problem/Plan - 6:  Problem: Need for prophylactic measure. Plan: P: Patient High Risk, currently on Xarelto, will continue Xarelto

## 2017-09-14 NOTE — PROGRESS NOTE ADULT - ASSESSMENT
77F with PMH of MDS, previous PE, HTN who presents with weakness and fatigue for 1 week duration. Found to have pancytopenia on CBC most likely 2/2 to chronic MDS. Now with new onset of fevers and decreasing Absolute Neutrophil Count.

## 2017-09-14 NOTE — PROGRESS NOTE ADULT - SUBJECTIVE AND OBJECTIVE BOX
INTERVAL HPI/OVERNIGHT EVENTS:    ANTIBIOTICS/RELEVANT:    MEDICATIONS  (STANDING):  folic acid 1 milliGRAM(s) Oral daily  rivaroxaban 20 milliGRAM(s) Oral every 24 hours  docusate sodium 100 milliGRAM(s) Oral three times a day  senna 2 Tablet(s) Oral at bedtime  vancomycin  IVPB 1000 milliGRAM(s) IV Intermittent every 24 hours  potassium chloride    Tablet ER 40 milliEquivalent(s) Oral every 4 hours  cefepime  IVPB 2000 milliGRAM(s) IV Intermittent every 12 hours  ibuprofen  Tablet 400 milliGRAM(s) Oral once    MEDICATIONS  (PRN):  bisacodyl Suppository 10 milliGRAM(s) Rectal every 12 hours PRN Constipation  acetaminophen   Tablet 650 milliGRAM(s) Oral every 6 hours PRN For Temp greater than 38 C (100.4 F)      Allergies    No Known Allergies    Intolerances        Vital Signs Last 24 Hrs  T(C): 38.9 (14 Sep 2017 15:32), Max: 38.9 (14 Sep 2017 15:32)  T(F): 102.1 (14 Sep 2017 15:32), Max: 102.1 (14 Sep 2017 15:32)  HR: 79 (14 Sep 2017 15:32) (73 - 92)  BP: 107/69 (14 Sep 2017 15:32) (96/66 - 118/72)  BP(mean): --  RR: 15 (14 Sep 2017 15:32) (15 - 16)  SpO2: 93% (14 Sep 2017 15:32) (93% - 98%)    PHYSICAL EXAM:      Constitutional:    Eyes:    ENMT:    Neck:    Breasts:    Back:    Respiratory:    Cardiovascular:    Gastrointestinal:    Genitourinary:    Rectal:    Extremities:    Vascular:    Neurological:    Skin:    Lymph Nodes:    Musculoskeletal:    Psychiatric:        LABS:                        7.2    1.1   )-----------( 74       ( 14 Sep 2017 06:56 )             21.4     09-14    131<L>  |  96  |  16  ----------------------------<  104<H>  3.3<L>   |  19<L>  |  0.70    Ca    8.6      14 Sep 2017 06:56  Mg     2.2     09-14    TPro  6.3  /  Alb  3.9  /  TBili  0.8  /  DBili  x   /  AST  15  /  ALT  7<L>  /  AlkPhos  66  09-13      Urinalysis Basic - ( 13 Sep 2017 00:33 )    Color: Yellow / Appearance: Hazy / SG: >=1.030 / pH: x  Gluc: x / Ketone: Trace mg/dL  / Bili: NEGATIVE / Urobili: 1.0 E.U./dL   Blood: x / Protein: NEGATIVE mg/dL / Nitrite: NEGATIVE   Leuk Esterase: NEGATIVE / RBC: < 5 /HPF / WBC < 5 /HPF   Sq Epi: x / Non Sq Epi: Few /HPF / Bacteria: Present /HPF        MICROBIOLOGY:    RADIOLOGY & ADDITIONAL STUDIES: INTERVAL HPI/OVERNIGHT EVENTS:  Reviewed    MEDICATIONS  (STANDING):  folic acid 1 milliGRAM(s) Oral daily  rivaroxaban 20 milliGRAM(s) Oral every 24 hours  docusate sodium 100 milliGRAM(s) Oral three times a day  senna 2 Tablet(s) Oral at bedtime  vancomycin  IVPB 1000 milliGRAM(s) IV Intermittent every 24 hours  potassium chloride    Tablet ER 40 milliEquivalent(s) Oral every 4 hours  cefepime  IVPB 2000 milliGRAM(s) IV Intermittent every 12 hours  ibuprofen  Tablet 400 milliGRAM(s) Oral once    MEDICATIONS  (PRN):  bisacodyl Suppository 10 milliGRAM(s) Rectal every 12 hours PRN Constipation  acetaminophen   Tablet 650 milliGRAM(s) Oral every 6 hours PRN For Temp greater than 38 C (100.4 F)      Allergies    No Known Allergies    EXAM  Vital Signs Last 24 Hrs  T(C): 38.9 (14 Sep 2017 15:32), Max: 38.9 (14 Sep 2017 15:32)  T(F): 102.1 (14 Sep 2017 15:32), Max: 102.1 (14 Sep 2017 15:32)  HR: 79 (14 Sep 2017 15:32) (73 - 92)  BP: 107/69 (14 Sep 2017 15:32) (96/66 - 118/72)  BP(mean): --  RR: 15 (14 Sep 2017 15:32) (15 - 16)  SpO2: 93% (14 Sep 2017 15:32) (93% - 98%)  Sleepy but responds appropriately  No rash  Otherwise unchanged      LABS:                        7.2    1.1   )-----------( 74       ( 14 Sep 2017 06:56 )             21.4     09-14    131<L>  |  96  |  16  ----------------------------<  104<H>  3.3<L>   |  19<L>  |  0.70    Ca    8.6      14 Sep 2017 06:56  Mg     2.2     09-14    TPro  6.3  /  Alb  3.9  /  TBili  0.8  /  DBili  x   /  AST  15  /  ALT  7<L>  /  AlkPhos  66  09-13      Urinalysis Basic - ( 13 Sep 2017 00:33 )    Color: Yellow / Appearance: Hazy / SG: >=1.030 / pH: x  Gluc: x / Ketone: Trace mg/dL  / Bili: NEGATIVE / Urobili: 1.0 E.U./dL   Blood: x / Protein: NEGATIVE mg/dL / Nitrite: NEGATIVE   Leuk Esterase: NEGATIVE / RBC: < 5 /HPF / WBC < 5 /HPF   Sq Epi: x / Non Sq Epi: Few /HPF / Bacteria: Present /HPF        MICROBIOLOGY:  Cultures negative

## 2017-09-14 NOTE — CHART NOTE - NSCHARTNOTEFT_GEN_A_CORE
Admitting Diagnosis:   Patient is a 77y old  Female who presents with a chief complaint of Weakness and Fatigue (09 Sep 2017 14:55)    PAST MEDICAL & SURGICAL HISTORY:  Myeloproliferative disorder  Hypertension  PE (pulmonary thromboembolism): 2015  Tremor  Vestibular disequilibrium  S/P hysterectomy    Current Nutrition Order: Diet, Regular        PO Intake: Good (%) [   ]  Fair (50-75%) [   ] Poor (<25%) [ x  ]    GI Issues: none    Pain: no    Skin Integrity: WNL    Labs:   09-14    131<L>  |  96  |  16  ----------------------------<  104<H>  3.3<L>   |  19<L>  |  0.70    Ca    8.6      14 Sep 2017 06:56  Mg     2.2     09-14    TPro  6.3  /  Alb  3.9  /  TBili  0.8  /  DBili  x   /  AST  15  /  ALT  7<L>  /  AlkPhos  66  09-13    CAPILLARY BLOOD GLUCOSE    Medications:  MEDICATIONS  (STANDING):  folic acid 1 milliGRAM(s) Oral daily  rivaroxaban 20 milliGRAM(s) Oral every 24 hours  docusate sodium 100 milliGRAM(s) Oral three times a day  senna 2 Tablet(s) Oral at bedtime  vancomycin  IVPB 1000 milliGRAM(s) IV Intermittent every 24 hours  iohexol 350 mG (iodine)/mL Oral Solution 50 milliLiter(s) Oral once  potassium chloride    Tablet ER 40 milliEquivalent(s) Oral every 4 hours  [id number]-sndz Injectable 480 MICROGram(s) SubCutaneous once  cefepime  IVPB 2000 milliGRAM(s) IV Intermittent every 12 hours    MEDICATIONS  (PRN):  bisacodyl Suppository 10 milliGRAM(s) Rectal every 12 hours PRN Constipation  acetaminophen   Tablet 650 milliGRAM(s) Oral every 6 hours PRN For Temp greater than 38 C (100.4 F)    Weight: 49.9kg    Estimated energy needs:    Estimated Energy Needs (calories/kg):  · Weight Used for Calculation	admission: 49.9kg      Estimated Energy Needs (25-30 calories/kg):  · Weight  (lbs)	110 lb  · Weight (kg)	49.9 kg  · From (25cal/kg)	1247  · To (30cal/kg)	1497     Other Calculation:  · Other Calculation	IBW: 125#  %IBW: 88%; BMI: 18.3kg/m2     Estimated Protein Needs (1.0-1.2 gm/kg):  · Weight  (lbs)	110  · Weight (kg)	49.8 kg  · From (1 g/kg)	49.8  · To (1.2 g/kg)	59.76     Estimated Fluid Needs (25-30 ml/kg):  · Weight  (lbs)	110  · Weight (kg)	49.8  · From (25 ml/kg)	1245  · To (30 ml/kg)	1494    Subjective: Pt reported poor intake of meals due to lack of appetite and weakess. Denies n/v/d/c or pain.     Previous Nutrition Diagnosis:   Weight: Underweight  Etiology: suspected inadequate oral intake PTA  Signs/Symptoms: BMI 18.3kg/m2    Active [  x ]  Resolved [   ]    Goal:  Meet >75% of EER via PO x 5 days    Recommendations: continue Regular diet; Order Ensure Enlive BID     Education: No. Encouraged pt to improve intake as tolerated. Offered snacks/supplements, pt did not have any food preferences at this time.     Risk Level: High [  x ] Moderate [   ] Low [   ]

## 2017-09-14 NOTE — PROGRESS NOTE ADULT - ASSESSMENT
Full note to follow    RECOMMEND  C/A/P CT  Neupogen  Continue IV Vanco and Cefepime MDS  Worse leukopenia  Acute fever  Possible increased BM suppression and fever due to Bactrim    RECOMMEND  C/A/P CT  Neupogen  Continue IV Vanco and Cefepime

## 2017-09-15 DIAGNOSIS — F32.9 MAJOR DEPRESSIVE DISORDER, SINGLE EPISODE, UNSPECIFIED: ICD-10-CM

## 2017-09-15 LAB
ANION GAP SERPL CALC-SCNC: 11 MMOL/L — SIGNIFICANT CHANGE UP (ref 5–17)
BLD GP AB SCN SERPL QL: NEGATIVE — SIGNIFICANT CHANGE UP
BUN SERPL-MCNC: 15 MG/DL — SIGNIFICANT CHANGE UP (ref 7–23)
CALCIUM SERPL-MCNC: 8.3 MG/DL — LOW (ref 8.4–10.5)
CHLORIDE SERPL-SCNC: 103 MMOL/L — SIGNIFICANT CHANGE UP (ref 96–108)
CO2 SERPL-SCNC: 21 MMOL/L — LOW (ref 22–31)
CREAT SERPL-MCNC: 0.73 MG/DL — SIGNIFICANT CHANGE UP (ref 0.5–1.3)
GLUCOSE SERPL-MCNC: 116 MG/DL — HIGH (ref 70–99)
HAPTOGLOB SERPL-MCNC: <10 MG/DL — LOW (ref 34–200)
HCT VFR BLD CALC: 21.1 % — LOW (ref 34.5–45)
HCT VFR BLD CALC: 24.9 % — LOW (ref 34.5–45)
HGB BLD-MCNC: 6.9 G/DL — CRITICAL LOW (ref 11.5–15.5)
HGB BLD-MCNC: 8.3 G/DL — LOW (ref 11.5–15.5)
LYMPHOCYTES # BLD AUTO: 13 % — SIGNIFICANT CHANGE UP (ref 13–44)
MAGNESIUM SERPL-MCNC: 2.1 MG/DL — SIGNIFICANT CHANGE UP (ref 1.6–2.6)
MCHC RBC-ENTMCNC: 30.5 PG — SIGNIFICANT CHANGE UP (ref 27–34)
MCHC RBC-ENTMCNC: 30.6 PG — SIGNIFICANT CHANGE UP (ref 27–34)
MCHC RBC-ENTMCNC: 32.7 G/DL — SIGNIFICANT CHANGE UP (ref 32–36)
MCHC RBC-ENTMCNC: 33.3 G/DL — SIGNIFICANT CHANGE UP (ref 32–36)
MCV RBC AUTO: 91.9 FL — SIGNIFICANT CHANGE UP (ref 80–100)
MCV RBC AUTO: 93.4 FL — SIGNIFICANT CHANGE UP (ref 80–100)
MONOCYTES NFR BLD AUTO: 5 % — SIGNIFICANT CHANGE UP (ref 2–14)
NEUTROPHILS NFR BLD AUTO: 51 % — SIGNIFICANT CHANGE UP (ref 43–77)
PLATELET # BLD AUTO: 57 K/UL — LOW (ref 150–400)
PLATELET # BLD AUTO: 68 K/UL — LOW (ref 150–400)
POTASSIUM SERPL-MCNC: 3.8 MMOL/L — SIGNIFICANT CHANGE UP (ref 3.5–5.3)
POTASSIUM SERPL-SCNC: 3.8 MMOL/L — SIGNIFICANT CHANGE UP (ref 3.5–5.3)
RBC # BLD: 2.26 M/UL — LOW (ref 3.8–5.2)
RBC # BLD: 2.71 M/UL — LOW (ref 3.8–5.2)
RBC # FLD: 23 % — HIGH (ref 10.3–16.9)
RBC # FLD: 26.5 % — HIGH (ref 10.3–16.9)
RH IG SCN BLD-IMP: POSITIVE — SIGNIFICANT CHANGE UP
SODIUM SERPL-SCNC: 135 MMOL/L — SIGNIFICANT CHANGE UP (ref 135–145)
WBC # BLD: 2.1 K/UL — LOW (ref 3.8–10.5)
WBC # BLD: 2.5 K/UL — LOW (ref 3.8–10.5)
WBC # FLD AUTO: 2.1 K/UL — LOW (ref 3.8–10.5)
WBC # FLD AUTO: 2.5 K/UL — LOW (ref 3.8–10.5)

## 2017-09-15 PROCEDURE — 99223 1ST HOSP IP/OBS HIGH 75: CPT

## 2017-09-15 RX ORDER — VANCOMYCIN HCL 1 G
1000 VIAL (EA) INTRAVENOUS EVERY 24 HOURS
Qty: 0 | Refills: 0 | Status: DISCONTINUED | OUTPATIENT
Start: 2017-09-15 | End: 2017-09-15

## 2017-09-15 RX ORDER — METRONIDAZOLE 500 MG
500 TABLET ORAL ONCE
Qty: 0 | Refills: 0 | Status: COMPLETED | OUTPATIENT
Start: 2017-09-15 | End: 2017-09-15

## 2017-09-15 RX ORDER — METRONIDAZOLE 500 MG
TABLET ORAL
Qty: 0 | Refills: 0 | Status: DISCONTINUED | OUTPATIENT
Start: 2017-09-15 | End: 2017-09-17

## 2017-09-15 RX ORDER — SODIUM CHLORIDE 9 MG/ML
1000 INJECTION INTRAMUSCULAR; INTRAVENOUS; SUBCUTANEOUS
Qty: 0 | Refills: 0 | Status: DISCONTINUED | OUTPATIENT
Start: 2017-09-15 | End: 2017-09-18

## 2017-09-15 RX ORDER — ESCITALOPRAM OXALATE 10 MG/1
5 TABLET, FILM COATED ORAL DAILY
Qty: 0 | Refills: 0 | Status: DISCONTINUED | OUTPATIENT
Start: 2017-09-15 | End: 2017-09-18

## 2017-09-15 RX ORDER — ACETAMINOPHEN 500 MG
650 TABLET ORAL EVERY 6 HOURS
Qty: 0 | Refills: 0 | Status: DISCONTINUED | OUTPATIENT
Start: 2017-09-15 | End: 2017-09-18

## 2017-09-15 RX ORDER — ACETAMINOPHEN 500 MG
650 TABLET ORAL ONCE
Qty: 0 | Refills: 0 | Status: COMPLETED | OUTPATIENT
Start: 2017-09-15 | End: 2017-09-15

## 2017-09-15 RX ORDER — CEFEPIME 1 G/1
1000 INJECTION, POWDER, FOR SOLUTION INTRAMUSCULAR; INTRAVENOUS EVERY 12 HOURS
Qty: 0 | Refills: 0 | Status: DISCONTINUED | OUTPATIENT
Start: 2017-09-15 | End: 2017-09-17

## 2017-09-15 RX ORDER — VANCOMYCIN HCL 1 G
1000 VIAL (EA) INTRAVENOUS EVERY 24 HOURS
Qty: 0 | Refills: 0 | Status: DISCONTINUED | OUTPATIENT
Start: 2017-09-16 | End: 2017-09-17

## 2017-09-15 RX ORDER — METRONIDAZOLE 500 MG
500 TABLET ORAL EVERY 8 HOURS
Qty: 0 | Refills: 0 | Status: DISCONTINUED | OUTPATIENT
Start: 2017-09-15 | End: 2017-09-17

## 2017-09-15 RX ADMIN — Medication 100 MILLIGRAM(S): at 22:05

## 2017-09-15 RX ADMIN — SENNA PLUS 2 TABLET(S): 8.6 TABLET ORAL at 21:11

## 2017-09-15 RX ADMIN — CEFEPIME 100 MILLIGRAM(S): 1 INJECTION, POWDER, FOR SOLUTION INTRAMUSCULAR; INTRAVENOUS at 01:03

## 2017-09-15 RX ADMIN — Medication 100 MILLIGRAM(S): at 13:13

## 2017-09-15 RX ADMIN — Medication 100 MILLIGRAM(S): at 14:31

## 2017-09-15 RX ADMIN — ESCITALOPRAM OXALATE 5 MILLIGRAM(S): 10 TABLET, FILM COATED ORAL at 18:56

## 2017-09-15 RX ADMIN — Medication 650 MILLIGRAM(S): at 22:10

## 2017-09-15 RX ADMIN — Medication 650 MILLIGRAM(S): at 21:10

## 2017-09-15 RX ADMIN — Medication 650 MILLIGRAM(S): at 06:23

## 2017-09-15 RX ADMIN — CEFEPIME 100 MILLIGRAM(S): 1 INJECTION, POWDER, FOR SOLUTION INTRAMUSCULAR; INTRAVENOUS at 15:53

## 2017-09-15 RX ADMIN — Medication 1 MILLIGRAM(S): at 13:13

## 2017-09-15 RX ADMIN — Medication 100 MILLIGRAM(S): at 21:11

## 2017-09-15 RX ADMIN — RIVAROXABAN 20 MILLIGRAM(S): KIT at 18:57

## 2017-09-15 RX ADMIN — Medication 250 MILLIGRAM(S): at 15:43

## 2017-09-15 RX ADMIN — Medication 10 MILLIGRAM(S): at 21:11

## 2017-09-15 RX ADMIN — Medication 100 MILLIGRAM(S): at 06:23

## 2017-09-15 NOTE — PROGRESS NOTE ADULT - SUBJECTIVE AND OBJECTIVE BOX
Physical Medicine and Rehabilitation Progress Note:    Patient is a 77y old  Female who presents with a chief complaint of Weakness and Fatigue (09 Sep 2017 14:55)      HPI:  77F with PMH of MDS, previous PE, HTN who presents with weakness and fatigue for 1 week duration. Weakness and fatigue insidious in onset, unable to give a definitive time she last felt well. States lately she has not had energy to even get out of bed to eat or get her medications. It has been getting progressively worse.  At baseline patient is able to walk with use of cane about 1-2blocks. Currently she feels SOB and fatigued when she stands from bed.     She denies fever, chills, CP, orthopnea, LE edema, nausea, vomiting, diarrhea, abdominal pain, dysuria, increased urinary frequency. Patient denies melena or hematochezia. Patient states she has not had a BM since Monday. Her typical schedule is every day to every other day. She denies any recent illness or sick contacts.    In ED T97.7, HR83, /79, RR19 99%. Patient was found to be pancytopenic on CBC, WBC 2.5, H/H 6/17.6, Plt 60. BMP showed hypokalemia with K 3.1. She was given 40mEq KCl, and a bolus of NS. She was ordered for 1 unit pRBC. (06 Sep 2017 16:34)                            6.9    2.1   )-----------( 68       ( 15 Sep 2017 06:24 )             21.1       09-15    135  |  103  |  15  ----------------------------<  116<H>  3.8   |  21<L>  |  0.73    Ca    8.3<L>      15 Sep 2017 06:24  Mg     2.1     09-15      Vital Signs Last 24 Hrs  T(C): 36.6 (15 Sep 2017 11:40), Max: 37.2 (14 Sep 2017 23:14)  T(F): 97.8 (15 Sep 2017 11:40), Max: 98.9 (14 Sep 2017 23:14)  HR: 70 (15 Sep 2017 11:40) (62 - 81)  BP: 97/60 (15 Sep 2017 11:40) (78/52 - 103/67)  BP(mean): --  RR: 16 (15 Sep 2017 11:40) (16 - 17)  SpO2: 96% (15 Sep 2017 11:40) (94% - 96%)    MEDICATIONS  (STANDING):  folic acid 1 milliGRAM(s) Oral daily  rivaroxaban 20 milliGRAM(s) Oral every 24 hours  docusate sodium 100 milliGRAM(s) Oral three times a day  senna 2 Tablet(s) Oral at bedtime  vancomycin  IVPB 1000 milliGRAM(s) IV Intermittent every 24 hours  sodium chloride 0.9%. 1000 milliLiter(s) (75 mL/Hr) IV Continuous <Continuous>  cefepime  IVPB 1000 milliGRAM(s) IV Intermittent every 12 hours  metroNIDAZOLE  IVPB      metroNIDAZOLE  IVPB 500 milliGRAM(s) IV Intermittent every 8 hours  escitalopram 5 milliGRAM(s) Oral daily    MEDICATIONS  (PRN):  acetaminophen   Tablet 650 milliGRAM(s) Oral every 6 hours PRN For Temp greater than 38 C (100.4 F), or Moderate Pain  bisacodyl Suppository 10 milliGRAM(s) Rectal daily PRN Constipation    Currently Undergoing Physical Therapy at bedside.    Functional Status Assessment:    Bed Mobility Training:                                                                                                                                                  - Rolling/Turnin person minimum assistance  - Scootin person moderate assistance                                                                      - Sit to Supine:             1 person minimum assistance                                                                     - Supine to Sit:             1 person moderate assistance    Transfer Training:  - Sit to Stand:               1 person minimum assistance                                                                      - Stand to Sit:               1 peson contact guard assistance    Gait Training:                    PM&R Impression: as above    Disposition Plan Recommendations: accepted to [facility]

## 2017-09-15 NOTE — BEHAVIORAL HEALTH ASSESSMENT NOTE - CASE SUMMARY
77-year-old female with remote hx of psychotherapy, sadness since death of  4 years ago, now intensified with exacerbation of medical problems. No need for C.O. Pt amenable to trial of antidepressant medication, and would also benefit from outpt psychotherapy.

## 2017-09-15 NOTE — BEHAVIORAL HEALTH ASSESSMENT NOTE - NSBHCHARTREVIEWVS_PSY_A_CORE FT
Vital Signs Last 24 Hrs  T(C): 36.5 (15 Sep 2017 14:27), Max: 37.2 (14 Sep 2017 23:14)  T(F): 97.7 (15 Sep 2017 14:27), Max: 98.9 (14 Sep 2017 23:14)  HR: 65 (15 Sep 2017 14:27) (62 - 81)  BP: 96/56 (15 Sep 2017 14:27) (78/52 - 103/67)  BP(mean): --  RR: 16 (15 Sep 2017 14:27) (16 - 17)  SpO2: 96% (15 Sep 2017 14:27) (94% - 96%)

## 2017-09-15 NOTE — PROGRESS NOTE ADULT - SUBJECTIVE AND OBJECTIVE BOX
INTERVAL HPI/OVERNIGHT EVENTS:  c/o profound weakness - generalized  Admits to some rectal discomfort and stool leaking  Neck discomfort due to a "kink"    MEDICATIONS  (STANDING):  folic acid 1 milliGRAM(s) Oral daily  rivaroxaban 20 milliGRAM(s) Oral every 24 hours  docusate sodium 100 milliGRAM(s) Oral three times a day  senna 2 Tablet(s) Oral at bedtime  vancomycin  IVPB 1000 milliGRAM(s) IV Intermittent every 24 hours  sodium chloride 0.9%. 1000 milliLiter(s) (75 mL/Hr) IV Continuous <Continuous>  cefepime  IVPB 1000 milliGRAM(s) IV Intermittent every 12 hours  metroNIDAZOLE  IVPB      metroNIDAZOLE  IVPB 500 milliGRAM(s) IV Intermittent once  metroNIDAZOLE  IVPB 500 milliGRAM(s) IV Intermittent every 8 hours    MEDICATIONS  (PRN):  bisacodyl Suppository 10 milliGRAM(s) Rectal every 12 hours PRN Constipation  acetaminophen   Tablet 650 milliGRAM(s) Oral every 6 hours PRN For Temp greater than 38 C (100.4 F)      Allergies    No Known Allergies    EXAM  Vital Signs Last 24 Hrs  T(C): 36.6 (15 Sep 2017 11:40), Max: 38.9 (14 Sep 2017 15:32)  T(F): 97.8 (15 Sep 2017 11:40), Max: 102.1 (14 Sep 2017 15:32)  HR: 70 (15 Sep 2017 11:40) (62 - 81)  BP: 97/60 (15 Sep 2017 11:40) (78/52 - 107/69)  BP(mean): --  RR: 16 (15 Sep 2017 11:40) (15 - 17)  SpO2: 96% (15 Sep 2017 11:40) (93% - 96%)  On RA  More awake  Neck supple  No oral lesions  No respiratory distress  No rash  RRR  Chest CTA   Abd soft NT  Perianal area with irritation; small amount of liquid stool but pt s/p bowel regimen      LABS:                        6.9    2.1   )-----------( 68       ( 15 Sep 2017 06:24 )             21.1     09-15    135  |  103  |  15  ----------------------------<  116<H>  3.8   |  21<L>  |  0.73    Ca    8.3<L>      15 Sep 2017 06:24  Mg     2.1     09-15    TPro  6.3  /  Alb  3.9  /  TBili  0.8  /  DBili  x   /  AST  15  /  ALT  7<L>  /  AlkPhos  66  09-13    MICROBIOLOGY:    Culture - Blood (09.14.17 @ 16:00)    Specimen Source: .Blood Blood    Culture Results:   No growth at 12 hours    Culture - Blood (09.13.17 @ 01:26)    Specimen Source: .Blood Blood    Culture Results:   No growth at 2 days.    Culture - Blood (09.13.17 @ 01:26)    Specimen Source: .Blood Blood    Culture Results:   No growth at 2 days.      RADIOLOGY & ADDITIONAL STUDIES:    < from: CT Chest w/ IV Cont (09.14.17 @ 19:56) >    EXAM:  CT CHEST IC                          EXAM:  CT ABDOMEN AND PELVIS OC IC                          PROCEDURE DATE:  09/14/2017     100  [facility] 350   7       INTERPRETATION:  CT of the CHEST, ABDOMEN and PELVIS with intravenous   contrast dated 9/14/2017 7:56 PM    INDICATION: Fever unknown source. Thrombocytopenia. History of lung   nodules.    TECHNIQUE: CT of the chest, abdomen and pelvis was performed using oral   and intravenous contrast. Axial, sagittal, and coronal images were   produced and reviewed.    PRIOR STUDIES: Multiple prior chest CTs. CT of the abdomen and pelvis   dated 7/29/2011.    FINDINGS: The heart is normal in size. Coronary artery calcifications   (especially in the left anterior descending) are noted. Mitral annulus   calcifications are noted. Aortic valvular calcifications are noted. Mild   to moderate atherosclerotic changes are seen in the thoracic aorta. Mild   stenosis of the origin of the left subclavian artery is noted. No   mediastinal, hilar or axillary lymphadenopathy is seen. Two small right   thyroid nodules are noted, largest with maximum diameter of 1.2 cm.    Emphysema is again noted. Multiple areas of scarring are noted.   Bronchiectasis, small airways disease, and mucous plugging is noted.   Several pulmonary micronodules are noted without change. Several new lung   nodules are noted including a somewhat fusiform nodule in the anterior   basal segment of the right lower lobe seen on series 4 image 165 with a   mean diameter of 0.3 cm, new since 1/12/2016; a new pulmonary micronodule   at the left apex on series 4 image 46; new pulmonary micronodules noted   at the left apex on series 4 image 59; and a new 0.4 cm lingular nodule   seen on series 4 image 208.. The previously biopsied focal masslike area   in the anterior aspect of the left upper lobe seen on series 4 image 65   has decreased slightly since 1/24/2017. A cluster of pulmonary   micronodules is well visualized on axial MIP image 33, increased since   prior study, consistent with small airways disease. No pleural effusions   are seen.    Images of the upper abdomen demonstrate several subcentimeter hepatic   hypodensities that are too small to characterize. No radiopaque stones   are seen in the gallbladder.  The CT dated 7/29/2011 demonstrate a   hypodensity in the pancreatic body. That abnormality is difficult to   visualize and characterize on the current study due to respiratory   motion.  No splenic abnormalities are seen. The spleen is normal in size,   measuring 12.3 cm AP.    The adrenal glands are unremarkable. No hydronephrosis is evident.   Subcentimeter hypodensities are noted on both kidneys that are too small   to characterize. Two probable right renal cysts are noted. Density   measurements of the cyst are inaccurate due to patient respiratory   motion.     A bilobed infrarenal abdominal aortic aneurysm is noted. The upper   component measures 2.9 cm diameter, increased from 2.5 cm on 7/29/2011.   The caudal component has a diameter 3.6 cm, previously 2.7 cm. The left   common iliac is ectatic at 1.4 cm without change. On coronal view images   28 and 29, a linear filling defect is seen in the left common iliac,   likely representing a small focal dissection without change. No  lymphadenopathy is seen.     Evaluation of the bowel demonstrate a small hiatal hernia. Mild colonic   diverticulosis is noted. Moderate circumferential wall thickening is   noted involving the rectum with marked infiltration of the perirectal   fat. These findings are consistent with proctitis. A small amount of   stool is seen in the rectum. Differential diagnosis includes infectious   proctitis or inflammatory bowel disease or stercoral proctitis if the   patient has recently been disimpacted. Trace ascites is noted.    Images of the pelvis demonstrate a Resendiz catheter in a decompressed   urinary bladder.  The uterus and ovaries are not visualized consistent   with prior hysterectomy and salpingo-oophorectomy.    Evaluation of the osseous structures demonstrates moderate multilevel   degenerative changes.      IMPRESSION:  1.  Moderate wall thickening of the rectum with infiltration of   perirectal fat consistent with proctitis. Differential diagnosis includes   infectious proctitis,inflammatory bowel disease, or stercoral proctitis.  2.  Bilobed infrarenal abdominal aortic aneurysm, increased in size since   7/29/2011. 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 Follow-up CT of the abdomen and pelvis in two years is   recommended to ensure stability.  3.  Several new pulmonary nodules measuring up to 0.4 cm. Follow-up chest   CT in one year is recommended to ensure stability.

## 2017-09-15 NOTE — PROGRESS NOTE ADULT - SUBJECTIVE AND OBJECTIVE BOX
77F with PMH of MDS, previous PE, HTN who presents with weakness and fatigue for 1 week duration. Found to have pancytopenia on CBC most likely 2/2 to chronic MDS. Now with new onset of fevers and decreasing Absolute Neutrophil Count.     O/N Events: Patient had episodes of hypotension. She was given a total of 1L NS bolus  Subjective/ROS: Patient has no complaints this morning. Denies HA, CP, SOB, n/v, changes in bowel/urinary habits.  12pt ROS otherwise negative.    VITALS  Vital Signs Last 24 Hrs  T(C): 36.3 (15 Sep 2017 05:33), Max: 38.9 (14 Sep 2017 15:32)  T(F): 97.4 (15 Sep 2017 05:33), Max: 102.1 (14 Sep 2017 15:32)  HR: 77 (15 Sep 2017 05:33) (62 - 81)  BP: 96/56 (15 Sep 2017 05:33) (78/52 - 107/69)  BP(mean): --  RR: 16 (15 Sep 2017 05:33) (15 - 17)  SpO2: 94% (15 Sep 2017 05:33) (93% - 95%)    CAPILLARY BLOOD GLUCOSE    PHYSICAL EXAM  General: A&Ox3; NAD  Head: NC/AT; MMM; PERRL; EOMI;  Neck: Supple; no JVD  Respiratory: CTA B/L; no wheezes/crackles   Cardiovascular: Regular rhythm/rate; S1/S2   Gastrointestinal: Soft; NTND; normoactive BS  Extremities: WWP; no edema/cyanosis  Neurological:  CNII-XII grossly intact; no obvious focal deficits    MEDICATIONS  (STANDING):  folic acid 1 milliGRAM(s) Oral daily  rivaroxaban 20 milliGRAM(s) Oral every 24 hours  docusate sodium 100 milliGRAM(s) Oral three times a day  senna 2 Tablet(s) Oral at bedtime  vancomycin  IVPB 1000 milliGRAM(s) IV Intermittent every 24 hours  sodium chloride 0.9%. 1000 milliLiter(s) (75 mL/Hr) IV Continuous <Continuous>  cefepime  IVPB 1000 milliGRAM(s) IV Intermittent every 12 hours  metroNIDAZOLE  IVPB        MEDICATIONS  (PRN):  bisacodyl Suppository 10 milliGRAM(s) Rectal every 12 hours PRN Constipation  acetaminophen   Tablet 650 milliGRAM(s) Oral every 6 hours PRN For Temp greater than 38 C (100.4 F)      No Known Allergies      LABS                        6.9    2.1   )-----------( 68       ( 15 Sep 2017 06:24 )             21.1     09-15    135  |  103  |  15  ----------------------------<  116<H>  3.8   |  21<L>  |  0.73    Ca    8.3<L>      15 Sep 2017 06:24  Mg     2.1     09-15    TPro  6.3  /  Alb  3.9  /  TBili  0.8  /  DBili  x   /  AST  15  /  ALT  7<L>  /  AlkPhos  66  09-13          < from: CT Abdomen and Pelvis w/ Oral Cont and w/ IV Cont (09.14.17 @ 19:56) >  1.  Moderate wall thickening of the rectum with infiltration of perirectal fat consistent with proctitis. Differential diagnosis includes infectious proctitis,inflammatory bowel disease, or stercoral proctitis.  2.  Bilobed infrarenal abdominal aortic aneurysm, increased in size since 7/29/2011. 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 Follow-up CT of the abdomen and pelvis in two years is recommended to ensure stability.  3.  Several new pulmonary nodules measuring up to 0.4 cm. Follow-up chest CT in one year is recommended to ensure stability.

## 2017-09-15 NOTE — BEHAVIORAL HEALTH ASSESSMENT NOTE - HPI (INCLUDE ILLNESS QUALITY, SEVERITY, DURATION, TIMING, CONTEXT, MODIFYING FACTORS, ASSOCIATED SIGNS AND SYMPTOMS)
76 y/o CF, , domiciled alone, retired, with no reported PPH, PMH of MDS, previous PE, and HTN who initially presented to  for evaluation of weakness and fatigue x 1 week and was found to have pancytopenia, most likely 2/2 to chronic MDS. While pt was planned for discharge earlier in the week, she began spiking fevers and is now receiving Abx. Psychiatry consult placed as pt expressed feelings of depression today to primary team, as well as feelings of amotivation and decreased desire. 78 y/o CF, , domiciled alone, retired, with no reported PPH, PMH of MDS, previous PE, and HTN who initially presented to  for evaluation of weakness and fatigue x 1 week and was found to have pancytopenia, most likely 2/2 to chronic MDS. While pt was planned for discharge earlier in the week, she began spiking fevers and is now receiving Abx. Psychiatry consult placed as pt expressed feelings of depression today to primary team, as well as feelings of amotivation and decreased desire. On evaluation, pt calm and cooperative, AAO x 3. Reports baseline levels of anhedonia prior to admission as well as multiple other neurovegetative sx of depression including amotivation, decreased energy, depressed mood, poor appetite. Denies SI. Pt states that these sx began after her  passed away 4 years ago. However, in the setting of acute medical illness without a clear source of infection currently, pt has felt as though "my health is just fading." She additionally reports worry about her cognition fading, stating her memory "isn't as sharp as it used to be." Pt reports significant social isolation, with her only contact being a friend whom she sees infrequently and her maid who comes once per week. Does not have children. Pt denies sx of anxiety, maycol, psychosis, or AH/VH/HI. She reports being amendable to both pharmacotherapy and psychotherapy, stating that she saw a therapist 20 years ago for "some sadness, and it was really helpful to talk to someone." 78 y/o CF, , domiciled alone, retired, with PPH of 5 year course of psychotherapy for "sadness," PMH of MDS, previous PE, and HTN who initially presented to  for evaluation of weakness and fatigue x 1 week and was found to have pancytopenia, most likely 2/2 to chronic MDS. While pt was planned for discharge earlier in the week, she began spiking fevers and is now receiving Abx. Psychiatry consult placed as pt expressed feelings of depression today to primary team, as well as feelings of amotivation and decreased desire. On evaluation, pt calm and cooperative, AAO x 3. Reports baseline levels of anhedonia prior to admission as well as multiple other neurovegetative sx of depression including amotivation, decreased energy, depressed mood, poor appetite. Denies SI. Pt states that these sx began after her  passed away 4 years ago. However, in the setting of acute medical illness without a clear source of infection currently, pt has felt as though "my health is just fading." She additionally reports worry about her cognition fading, stating her memory "isn't as sharp as it used to be." Pt reports significant social isolation, with her only contact being a friend whom she sees infrequently and her maid who comes once per week. Does not have children. Pt denies sx of anxiety, maycol, psychosis, or AH/VH/HI. She reports being amendable to both pharmacotherapy and psychotherapy, stating that she saw a therapist 20 years ago for "sadness, and it was really helpful to talk to someone." 78 y/o CF, , domiciled alone, retired, with PPH of 5 year course of psychotherapy for "sadness," PMH of MDS, previous PE, and HTN who initially presented to  for evaluation of weakness and fatigue x 1 week and was found to have pancytopenia, most likely 2/2 to chronic MDS. While pt was planned for discharge earlier in the week, she began spiking fevers and is now receiving Abx. Psychiatry consult placed as pt expressed feelings of depression today to primary team, as well as feelings of amotivation and decreased desire. On evaluation, pt calm and cooperative, AAO x 3. Reports baseline levels of anhedonia prior to admission as well as multiple other neurovegetative sx of depression including amotivation, decreased energy, depressed mood, poor appetite. Denies SI. Pt states that these sx began after her  passed away 4 years ago. However, in the setting of acute medical illness without a clear source of infection currently, pt has felt as though "my health is just fading." She additionally reports worry about her cognition fading, stating her memory "isn't as sharp as it used to be." Pt reports significant social isolation, with her only contact being a friend whom she sees infrequently and her maid who comes once per week. Does not have children. Pt denies sx of anxiety, maycol, psychosis, or AH/VH/HI. She reports being amenable to both pharmacotherapy and psychotherapy, stating that she saw a therapist 20 years ago for "sadness, and it was really helpful to talk to someone."

## 2017-09-15 NOTE — BEHAVIORAL HEALTH ASSESSMENT NOTE - RISK ASSESSMENT
Pt presents low imminent risk to self/others requiring either higher level of care or enhanced observation level. However, she presents chronically elevated risk in the setting of multiple risk factors including being elderly, , socially isolated, and chronic medical conditions. Protective factors include stable housing and one social connection noted to be a friend. Modifiable risk factors include mood symptoms.

## 2017-09-15 NOTE — BEHAVIORAL HEALTH ASSESSMENT NOTE - SUMMARY
76 y/o F with PMH of MDS and PPH of 76 y/o F with PMH of MDS and remote PPH of receiving individual psychotherapy for reports of "sadness" in life, now being treated for pancytopenia 2/2 MDS and sepsis. Pt's psychiatric constellation of sx are most consistent with a baseline diagnosis of MDD, sx starting after the death of pt's , with a current exacerbation of sx in the setting of acute medical illness. Pt would benefit from treatment with both an antidepressant and psychotherapy. Would avoid SSRIs given the concern for platelet dysfunction in this pt with MDS. Alternatively, given lethargy, she might benefit from an activating antidepressant such as Wellbutrin. Risks, benefits, and SE have been explained to pt and she is agreeable to tx.

## 2017-09-15 NOTE — PROGRESS NOTE ADULT - ASSESSMENT
MDS   Worse leukopenia   neutropenia  Fever  Possibly due to Bactrim  No infectious focus identified so far  Proctaitis possibly due to constipation and then bowel regimen to promote defacation    RECOMMEND  Continue abx but due to low suspicion for Pseudomonas infection and improving WBC, would keep lower dose of Cefepime at 1 gm twice a day  Continue IV Vanco  Add Metronidazole 500 mg IV q 8 hours for proctitis  Might also consider metronidazole gel (appropriate for mucosal treatment)  Check CPK  ?Redose Neupogen - would like WBC to increase to 4-5k range at least  If loose stools continue, check for Cdiff

## 2017-09-15 NOTE — PROGRESS NOTE ADULT - ASSESSMENT
77F with PMH of MDS, previous PE, HTN who presents with weakness and fatigue for 1 week duration. Found to have pancytopenia on CBC most likely 2/2 to chronic MDS. Now with new onset of fevers and uptrending Absolute Neutrophil Count.     Problem/Plan - 1:  ·  Problem: Sepsis due to undetermined organism.  Plan: Patient had elevated T102.6, HR in the 90s, with low WBC meeting criteria for sepsis. No signs of poor perfusion or end organ damage. Patient recently being treated for UTI with Bactrim.   -Low WBC chronic most likely 2/2 to pancytopenia. Cannot rule out Neutropenic causes of infection. Will follow up manual dif.   -Continue Cefepime 1g q12h, Vancomycin 1g q24h  -UA now negative for signs of bacteria less likely to be 2/2 to UTI  -CXR is clear  -CT only shows proctitis unlikely to be infectious but will add Flagyl 500mg IVPB q8h to cover for anerobes.   -Consulted Dr. Mercado for ID consult  -Pending results of BCx - Surveillance cultures today.     Problem/Plan - 2:  ·  Problem: Myeloproliferative disorder.  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 Last CBC in June showed WBC 13.3, H/H 15.5/47.0, . Patient current pancytopenia most likely exacerbation of MPD as she has been admitted in past for this.  -Patient Hgb 6.9 this morning, will transfuse 1 unit pRBC.   -Patient has increased Reticulocyte count, Decreased Haptoglobin and Increased bilirubin most likely showing hemolytic picture. Will continue to monitor anemia labs as they come in.  -Transfuse Hgb <7.0  -Transfuse Plt <15k unless actively bleeding  -Patient was scheduled for Vascular intervention with placement of IVC Filter 2/2 to thrombocytopenia. Would want to stop Xarelto during thrombocytopenia.   -Dr. Dumont is [facility] Heme/Onc, she is aware of patient, will follow up with rec's.     Problem/Plan - 3:  ·  Problem: Acute cystitis without hematuria.  Plan: Patient came in with positive UA, UCx sent showing growth of Klebsiella. In setting of urinary retention plan is to treat for uncomplicated acute cystitis.   -Continue Cefepime.     Problem/Plan - 4:  ·  Problem: Hypokalemia.  Plan: Patient K on admission 3.1, given 40mEq in ED. Repeat BMP showed K 3.8.  -Resolved.     Problem/Plan - 5:  ·  Problem: Urinary retention.  Plan: Most likely 2/2 to constipation.  -Will start Colace 100mg TID and Senna Qd today. If patient does not have BM today will consider adding Miralax tomorrow to further aid in constipation  -Patient to be OOB and ambulating with aid as much as possible  -Treat UTI as above

## 2017-09-15 NOTE — PROGRESS NOTE ADULT - ASSESSMENT
77F with PMH of MDS, previous PE, HTN who presents with weakness and fatigue for 1 week duration. Found to have pancytopenia on CBC most likely 2/2 to chronic MDS. Now with new onset of fevers and uptrending Absolute Neutrophil Count.

## 2017-09-15 NOTE — PROGRESS NOTE ADULT - SUBJECTIVE AND OBJECTIVE BOX
History of Present Illness:  Chief Complaint/Reason for Admission: Weakness and Fatigue	  History of Present Illness: 	  77F with PMH of MDS, previous PE, HTN who presents with weakness and fatigue for 1 week duration. Weakness and fatigue insidious in onset, unable to give a definitive time she last felt well. States lately she has not had energy to even get out of bed to eat or get her medications. It has been getting progressively worse.  At baseline patient is able to walk with use of cane about 1-2 blocks. Currently she feels SOB and fatigued when she stands from bed. Febrile overnight. Poor po intake.    She denies fever, chills, CP, orthopnea, LE edema, nausea, vomiting, diarrhea, abdominal pain, dysuria, increased urinary frequency. Patient denies melena or hematochezia. Patient states she has not had a BM since Monday. Her typical schedule is every day to every other day. She denies any recent illness or sick contacts.  Results of V/Q scan and LE U/S noted. Received 1 unit PRBCs today.    Review of Systems:  Review of Systems:  EYES/ENT: No visual changes;  No vertigo or throat pain  NECK: No pain or stiffness RESPIRATORY: No cough, wheezing, hemoptysis; No shortness of breath CARDIOVASCULAR: No chest pain or palpitations GASTROINTESTINAL: No abdominal or epigastric pain. No nausea, vomiting, or hematemesis; No diarrhea  No melena or hematochezia. GENITOURINARY: No dysuria, frequency or hematuria NEUROLOGICAL: No numbness  SKIN: No itching, burning, rashes, or lesions  All other review of systems is negative unless indicated above.	      Allergies and Intolerances:        Allergies:  	No Known Allergies:     Home Medications:   * Patient Currently Takes Medications as of 06-Sep-2017 16:44 documented in Prescription Writer  · 	Xarelto 15 mg oral tablet: 1 tab(s) orally twice, Last Dose Taken:    · 	folic acid 1 mg oral tablet: 2 tab(s) orally once a day, Last Dose Taken:    · 	Aspirin Enteric Coated 81 mg oral delayed release tablet: 1 tab(s) orally once a day, Last Dose Taken:    · 	hydroxyurea 500 mg oral capsule: 3 cap(s) orally Monday, Wednesday, and Friday, Last Dose Taken:      . .Patient History:   Past Medical History:  Hypertension    Myeloproliferative disorder    PE (pulmonary thromboembolism)  2015  Tremor    Vestibular disequilibrium.    Past Surgical History:  S/P hysterectomy.    Family History:  No pertinent family history in first degree relatives.      Physical Exam:  Physical Exam: VITALS Vital Signs Last 24 Hrs T(C): 36.8 (14 Sep 2017 18:37), Max: 38.9 (14 Sep 2017 15:32) T(F): 98.3 (14 Sep 2017 18:37), Max: 102.1 (14 Sep 2017 15:32) HR: 81 (14 Sep 2017 18:37) (73 - 92) BP: 103/67 (14 Sep 2017 18:37) (96/66 - 118/72) BP(mean): -- RR: 16 (14 Sep 2017 18:37) (15 - 16) SpO2: 95% (14 Sep 2017 18:37) (93% - 98%)  PHYSICAL EXAM General: A&Ox3; NAD Head: NC/AT; PERRL; EOMI; anicteric sclera Neck: Supple; no JVD Respiratory: CTA B/L; no wheezes/crackles/rales auscultated w/ good air movement Cardiovascular: Regular rhythm/rate; S1/S2; no gallops or murmurs auscultated Gastrointestinal: Normal Bowel Sounds, Non-distended. Tender in epigastric region Extremities: WWP; no edema or cyanosis; radial/pedal pulses palpable Neurological:  CNII-XII grossly intact; no obvious focal deficits	      Labs and Results:  Labs, Radiology, Cardiology, and Other Results: LABS                       8.3   2.5   )-----------( 57       ( 15 Sep 2017 16:54 )            24.9   135  |  103  |  15 ----------------------------<  116<H> 3.8   |  21<L>  |  0.73  Ca    8.3<L>      15 Sep 2017 06:24 Mg     2.1     09-15                        	    Assessment and Plan:   Assessment:  · Assessment		  77F with PMH of MDS, previous PE, DVT, HTN who presents with weakness and fatigue for 1 week duration. Found to have pancytopenia on CBC most likely 2/2 to chronic MDS.     Problem/Plan - 1:  ·  Problem: Myeloproliferative disorder.  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 Last CBC in June showed WBC 13.3, H/H 15.5/47.0, . Patient current pancytopenia most likely exacerbation of MPD as she has been admitted in past for this.  -Follow up on labs: Reticulocyte Count, Peripheral Smear, LDH, Haptoglobin, Folate, B12, TSH  -Transfuse Hgb <7.0  -Transfuse Plt <15k unless actively bleeding  -Hold Hydroxyurea for now    Problem/Plan - 2:  ·  Problem: Neutropenic fever    - Abx as per ID    Problem/Plan - 3:  ·  Problem: Essential hypertension.  Plan: Patient currently normotensive, does not take any medications at home.  -Will continue to monitor, question as to whether she takes Norvasc 5mg at home, if patient becomes HTN will add Norvasc.     Problem/Plan - 4:  ·  Problem: Chronic deep vein thrombosis (DVT) of femoral vein of right lower extremity.  Plan: Patient was seen in ED in June and found to have right femoral and right popiteal DVT. Started on Xarelto then.   -Will continue Xarelto as inpatient.     Problem/Plan - 5:  ·  Problem: Nutrition, metabolism, and development symptoms.    E: Replete K>4, Mg>2, s/p 40mEq KCl  N: Reg. Diet.     Problem/Plan - 6:  Problem: Need for prophylactic measure. Plan: P: Patient High Risk, currently on Xarelto, will continue Xarelto

## 2017-09-15 NOTE — BEHAVIORAL HEALTH ASSESSMENT NOTE - NSBHCHARTREVIEWLAB_PSY_A_CORE FT
CBC Full  -  ( 15 Sep 2017 06:24 )  WBC Count : 2.1 K/uL  Hemoglobin : 6.9 g/dL  Hematocrit : 21.1 %  Platelet Count - Automated : 68 K/uL  Mean Cell Volume : 93.4 fL  Mean Cell Hemoglobin : 30.5 pg  Mean Cell Hemoglobin Concentration : 32.7 g/dL  Auto Neutrophil # : x  Auto Lymphocyte # : x  Auto Monocyte # : x  Auto Eosinophil # : x  Auto Basophil # : x  Auto Neutrophil % : 51.0 %  Auto Lymphocyte % : 13.0 %  Auto Monocyte % : 5.0 %  Auto Eosinophil % : x  Auto Basophil % : x  09-15    135  |  103  |  15  ----------------------------<  116<H>  3.8   |  21<L>  |  0.73    Ca    8.3<L>      15 Sep 2017 06:24  Mg     2.1     09-15

## 2017-09-15 NOTE — BEHAVIORAL HEALTH ASSESSMENT NOTE - OTHER PAST PSYCHIATRIC HISTORY (INCLUDE DETAILS REGARDING ONSET, COURSE OF ILLNESS, INPATIENT/OUTPATIENT TREATMENT)
h/o of seeing a therapist for unknown duration about 20 years ago. Denies h/o psychotropic medication trials. Denies h/o SA/ SI. h/o of seeing a therapist for 5 years about 20 years ago. Discontinued treatment when pt reports "feeling better." Denies h/o psychotropic medication trials. Denies h/o SA/ SI.

## 2017-09-15 NOTE — PROGRESS NOTE ADULT - SUBJECTIVE AND OBJECTIVE BOX
Neurology Follow up note    Name  ANDREW STUART    HPI:  77F with PMH of MDS, previous PE, HTN who presents with weakness and fatigue for 1 week duration. Weakness and fatigue insidious in onset, unable to give a definitive time she last felt well. States lately she has not had energy to even get out of bed to eat or get her medications. It has been getting progressively worse.  At baseline patient is able to walk with use of cane about 1-2blocks. Currently she feels SOB and fatigued when she stands from bed.     She denies fever, chills, CP, orthopnea, LE edema, nausea, vomiting, diarrhea, abdominal pain, dysuria, increased urinary frequency. Patient denies melena or hematochezia. Patient states she has not had a BM since Monday. Her typical schedule is every day to every other day. She denies any recent illness or sick contacts.    In ED T97.7, HR83, /79, RR19 99%. Patient was found to be pancytopenic on CBC, WBC 2.5, H/H 6/17.6, Plt 60. BMP showed hypokalemia with K 3.1. She was given 40mEq KCl, and a bolus of NS. She was ordered for 1 unit pRBC. (06 Sep 2017 16:34)      Interval History - no new neuro symptoms- no headaches        REVIEW OF SYSTEMS    Vital Signs Last 24 Hrs  T(C): 36.3 (15 Sep 2017 05:33), Max: 38.9 (14 Sep 2017 15:32)  T(F): 97.4 (15 Sep 2017 05:33), Max: 102.1 (14 Sep 2017 15:32)  HR: 77 (15 Sep 2017 05:33) (62 - 81)  BP: 96/56 (15 Sep 2017 05:33) (78/52 - 107/69)  BP(mean): --  RR: 16 (15 Sep 2017 05:33) (15 - 17)  SpO2: 94% (15 Sep 2017 05:33) (93% - 96%)    Physical Exam-     Mental Status- awake and alert    Cranial Nerves-full EOM    Gait and station-n/a    Motor- generalized weakness    Reflexes- decreased    Sensation-    Coordination- no new tremors    Vascular -    Medications  folic acid 1 milliGRAM(s) Oral daily  rivaroxaban 20 milliGRAM(s) Oral every 24 hours  docusate sodium 100 milliGRAM(s) Oral three times a day  senna 2 Tablet(s) Oral at bedtime  bisacodyl Suppository 10 milliGRAM(s) Rectal every 12 hours PRN  acetaminophen   Tablet 650 milliGRAM(s) Oral every 6 hours PRN  vancomycin  IVPB 1000 milliGRAM(s) IV Intermittent every 24 hours  cefepime  IVPB 2000 milliGRAM(s) IV Intermittent every 12 hours  sodium chloride 0.9%. 1000 milliLiter(s) IV Continuous <Continuous>      Lab      Radiology    Assessment- Essential tremor- voice tremor    Plan- no neuro testing indicated

## 2017-09-16 LAB
ANION GAP SERPL CALC-SCNC: 13 MMOL/L — SIGNIFICANT CHANGE UP (ref 5–17)
BUN SERPL-MCNC: 11 MG/DL — SIGNIFICANT CHANGE UP (ref 7–23)
CALCIUM SERPL-MCNC: 8.5 MG/DL — SIGNIFICANT CHANGE UP (ref 8.4–10.5)
CHLORIDE SERPL-SCNC: 100 MMOL/L — SIGNIFICANT CHANGE UP (ref 96–108)
CO2 SERPL-SCNC: 22 MMOL/L — SIGNIFICANT CHANGE UP (ref 22–31)
CREAT SERPL-MCNC: 0.57 MG/DL — SIGNIFICANT CHANGE UP (ref 0.5–1.3)
CRP SERPL-MCNC: 1.8 MG/DL — HIGH (ref 0–0.4)
CULTURE RESULTS: NO GROWTH — SIGNIFICANT CHANGE UP
GLUCOSE SERPL-MCNC: 96 MG/DL — SIGNIFICANT CHANGE UP (ref 70–99)
HCT VFR BLD CALC: 25.6 % — LOW (ref 34.5–45)
HGB BLD-MCNC: 8.6 G/DL — LOW (ref 11.5–15.5)
MAGNESIUM SERPL-MCNC: 2 MG/DL — SIGNIFICANT CHANGE UP (ref 1.6–2.6)
MCHC RBC-ENTMCNC: 30.6 PG — SIGNIFICANT CHANGE UP (ref 27–34)
MCHC RBC-ENTMCNC: 33.6 G/DL — SIGNIFICANT CHANGE UP (ref 32–36)
MCV RBC AUTO: 91.1 FL — SIGNIFICANT CHANGE UP (ref 80–100)
PLATELET # BLD AUTO: 59 K/UL — LOW (ref 150–400)
POTASSIUM SERPL-MCNC: 3.8 MMOL/L — SIGNIFICANT CHANGE UP (ref 3.5–5.3)
POTASSIUM SERPL-SCNC: 3.8 MMOL/L — SIGNIFICANT CHANGE UP (ref 3.5–5.3)
RBC # BLD: 2.81 M/UL — LOW (ref 3.8–5.2)
RBC # FLD: 23.2 % — HIGH (ref 10.3–16.9)
SODIUM SERPL-SCNC: 135 MMOL/L — SIGNIFICANT CHANGE UP (ref 135–145)
SPECIMEN SOURCE: SIGNIFICANT CHANGE UP
VANCOMYCIN TROUGH SERPL-MCNC: 6.2 UG/ML — LOW (ref 10–20)
WBC # BLD: 2.3 K/UL — LOW (ref 3.8–10.5)
WBC # FLD AUTO: 2.3 K/UL — LOW (ref 3.8–10.5)

## 2017-09-16 RX ADMIN — Medication 250 MILLIGRAM(S): at 12:34

## 2017-09-16 RX ADMIN — RIVAROXABAN 20 MILLIGRAM(S): KIT at 18:51

## 2017-09-16 RX ADMIN — Medication 100 MILLIGRAM(S): at 05:55

## 2017-09-16 RX ADMIN — ESCITALOPRAM OXALATE 5 MILLIGRAM(S): 10 TABLET, FILM COATED ORAL at 12:34

## 2017-09-16 RX ADMIN — Medication 100 MILLIGRAM(S): at 21:54

## 2017-09-16 RX ADMIN — Medication 100 MILLIGRAM(S): at 14:26

## 2017-09-16 RX ADMIN — Medication 650 MILLIGRAM(S): at 13:47

## 2017-09-16 RX ADMIN — CEFEPIME 100 MILLIGRAM(S): 1 INJECTION, POWDER, FOR SOLUTION INTRAMUSCULAR; INTRAVENOUS at 12:35

## 2017-09-16 RX ADMIN — CEFEPIME 100 MILLIGRAM(S): 1 INJECTION, POWDER, FOR SOLUTION INTRAMUSCULAR; INTRAVENOUS at 00:54

## 2017-09-16 RX ADMIN — Medication 100 MILLIGRAM(S): at 05:56

## 2017-09-16 RX ADMIN — Medication 650 MILLIGRAM(S): at 19:51

## 2017-09-16 RX ADMIN — Medication 1 MILLIGRAM(S): at 12:34

## 2017-09-16 NOTE — PROGRESS NOTE ADULT - ASSESSMENT
MDS  Pancytopenia  Neutropenia  Fever  Possible worse leukopenia and fever due to Bactrim  Hx of Klebsiella bacteriuria and urinary retention  Proctitis on CT - S/P bowel regimen for constipation  No evidence of infection    RECOMMEND  Redose Neupogen and increase WBC/neutrophils  Recheck manual diff tomorrow  Hope to D/C antibiotics tomorrow  Check ESR and CRP - ?PMR  Check stool for Cdiff if diarrhea

## 2017-09-16 NOTE — PROGRESS NOTE ADULT - SUBJECTIVE AND OBJECTIVE BOX
History of Present Illness:  Chief Complaint/Reason for Admission: Weakness and Fatigue	  History of Present Illness: 	  77F with PMH of MDS, previous PE, HTN who presents with weakness and fatigue for 1 week duration. Weakness and fatigue insidious in onset, unable to give a definitive time she last felt well. States lately she has not had energy to even get out of bed to eat or get her medications. It has been getting progressively worse.  At baseline patient is able to walk with use of cane about 1-2 blocks. Currently she feels SOB and fatigued when she stands from bed. Febrile overnight. Poor po intake.    She denies fever, chills, CP, orthopnea, LE edema, nausea, vomiting, diarrhea, abdominal pain, dysuria, increased urinary frequency. Patient denies melena or hematochezia. Patient states she has not had a BM since Monday. Her typical schedule is every day to every other day. She denies any recent illness or sick contacts.  Results of V/Q scan and LE U/S noted. Received 1 unit PRBCs yesterday.    Review of Systems:  Review of Systems:  EYES/ENT: No visual changes;  No vertigo or throat pain  NECK: No pain or stiffness RESPIRATORY: No cough, wheezing, hemoptysis; No shortness of breath CARDIOVASCULAR: No chest pain or palpitations GASTROINTESTINAL: No abdominal or epigastric pain. No nausea, vomiting, or hematemesis; No diarrhea  No melena or hematochezia. GENITOURINARY: No dysuria, frequency or hematuria NEUROLOGICAL: No numbness  SKIN: No itching, burning, rashes, or lesions  All other review of systems is negative unless indicated above.	      Allergies and Intolerances:        Allergies:  	No Known Allergies:     Home Medications:   * Patient Currently Takes Medications as of 06-Sep-2017 16:44 documented in Prescription Writer  · 	Xarelto 15 mg oral tablet: 1 tab(s) orally twice, Last Dose Taken:    · 	folic acid 1 mg oral tablet: 2 tab(s) orally once a day, Last Dose Taken:    · 	Aspirin Enteric Coated 81 mg oral delayed release tablet: 1 tab(s) orally once a day, Last Dose Taken:    · 	hydroxyurea 500 mg oral capsule: 3 cap(s) orally Monday, Wednesday, and Friday, Last Dose Taken:      . .Patient History:   Past Medical History:  Hypertension    Myeloproliferative disorder    PE (pulmonary thromboembolism)  2015  Tremor    Vestibular disequilibrium.    Past Surgical History:  S/P hysterectomy.    Family History:  No pertinent family history in first degree relatives.      Physical Exam:  Physical Exam: VITALS Vital Signs Last 24 Hrs T(C): 36.6 (16 Sep 2017 12:01), Max: 36.7 (15 Sep 2017 21:00) T(F): 97.8 (16 Sep 2017 12:01), Max: 98 (15 Sep 2017 21:00) HR: 69 (16 Sep 2017 12:01) (63 - 71) BP: 118/73 (16 Sep 2017 12:01) (103/62 - 118/73) RR: 16 (16 Sep 2017 12:01) (15 - 16) SpO2: 94% (16 Sep 2017 12:01) (94% - 97%)  PHYSICAL EXAM General: A&Ox3; NAD Head: NC/AT; PERRL; EOMI; anicteric sclera Neck: Supple; no JVD Respiratory: CTA B/L; no wheezes/crackles/rales auscultated w/ good air movement Cardiovascular: Regular rhythm/rate; S1/S2; no gallops or murmurs auscultated Gastrointestinal: Normal Bowel Sounds, Non-distended. Tender in epigastric region Extremities: WWP; no edema or cyanosis; radial/pedal pulses palpable Neurological:  CNII-XII grossly intact; no obvious focal deficits	      Labs and Results:  Labs, Radiology, Cardiology, and Other Results: LABS                         8.6   2.3   )-----------( 59       ( 16 Sep 2017 06:43 )            25.6        135  |  100  |  11 ----------------------------<  96 3.8   |  22  |  0.57  Ca    8.5      16 Sep 2017 06:43 Mg     2.0     09-16                        	    Assessment and Plan:   Assessment:  · Assessment		  77F with PMH of MDS, previous PE, DVT, HTN who presents with weakness and fatigue for 1 week duration. Found to have pancytopenia on CBC most likely 2/2 to chronic MDS.     Problem/Plan - 1:  ·  Problem: Myeloproliferative disorder.  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 Last CBC in June showed WBC 13.3, H/H 15.5/47.0, . Patient current pancytopenia most likely exacerbation of MPD as she has been admitted in past for this.  -Follow up on labs: Reticulocyte Count, Peripheral Smear, LDH, Haptoglobin, Folate, B12, TSH  -Transfuse Hgb <7.0  -Transfuse Plt <15k unless actively bleeding  -Hold Hydroxyurea for now    Problem/Plan - 2:  ·  Problem: Neutropenic fever    - Abx as per ID    Problem/Plan - 3:  ·  Problem: Essential hypertension.  Plan: Patient currently normotensive, does not take any medications at home.  -Will continue to monitor, question as to whether she takes Norvasc 5mg at home, if patient becomes HTN will add Norvasc.     Problem/Plan - 4:  ·  Problem: Chronic deep vein thrombosis (DVT) of femoral vein of right lower extremity.  Plan: Patient was seen in ED in June and found to have right femoral and right popiteal DVT. Started on Xarelto then.   -Will continue Xarelto as inpatient.     Problem/Plan - 5:  ·  Problem: Nutrition, metabolism, and development symptoms.    E: Replete K>4, Mg>2, s/p 40mEq KCl  N: Reg. Diet.     Problem/Plan - 6:  Problem: Need for prophylactic measure. Plan: P: Patient High Risk, currently on Xarelto, will continue Xarelto

## 2017-09-16 NOTE — PROGRESS NOTE ADULT - SUBJECTIVE AND OBJECTIVE BOX
INTERVAL HISTORY:77yFemalePatient is a 77y old  Female who presents with a chief complaint of Weakness and Fatigue (09 Sep 2017 14:55)    	  MEDICATIONS:    cefepime  IVPB 1000 milliGRAM(s) IV Intermittent every 12 hours  metroNIDAZOLE  IVPB      metroNIDAZOLE  IVPB 500 milliGRAM(s) IV Intermittent every 8 hours  vancomycin  IVPB 1000 milliGRAM(s) IV Intermittent every 24 hours      acetaminophen   Tablet 650 milliGRAM(s) Oral every 6 hours PRN  escitalopram 5 milliGRAM(s) Oral daily    docusate sodium 100 milliGRAM(s) Oral three times a day  senna 2 Tablet(s) Oral at bedtime  bisacodyl Suppository 10 milliGRAM(s) Rectal daily PRN      folic acid 1 milliGRAM(s) Oral daily  rivaroxaban 20 milliGRAM(s) Oral every 24 hours  sodium chloride 0.9%. 1000 milliLiter(s) IV Continuous <Continuous>      Complaint:     Otherwise 12 point review of systems is normal.    PHYSICAL EXAM:    Constitutional: good  Eyes: PERRL, EOMI, sclera non-icteric  Neck: supple, no masses, no JVD  Respiratory: CTA b/l, good air entry b/l, no wheezing, rhonchi, rales, with normal respiratory effort and no intercostal retractions  Cardiovascular: RRR, normal S1S2, no M/R/G  Gastrointestinal: soft, NTND, no masses palpable, BS normal in all four quadrants,   Extremities:  no c/c/e  Neurological: AAOx3  Temp:Afebrile  ICU Vital Signs Last 24 Hrs  T(C): 36.5 (16 Sep 2017 05:27), Max: 36.7 (15 Sep 2017 21:00)  T(F): 97.7 (16 Sep 2017 05:27), Max: 98 (15 Sep 2017 21:00)  HR: 63 (16 Sep 2017 05:27) (63 - 76)  BP: 105/67 (16 Sep 2017 05:27) (92/56 - 105/67)  BP(mean): --  ABP: --  ABP(mean): --  RR: 15 (16 Sep 2017 05:27) (15 - 16)  SpO2: 96% (16 Sep 2017 05:27) (96% - 97%)      ECG:      CHEST X RAY    CT    MRI    MRA    CT ANGIO    CAROTID DUPLEX    DUPLEX     Echocardiogram    Catheterization:    Stress Test:     LABS:	 	  CARDIAC MARKERS:                              8.6    2.3   )-----------( 59       ( 16 Sep 2017 06:43 )             25.6     09-16    135  |  100  |  11  ----------------------------<  96  3.8   |  22  |  0.57    Ca    8.5      16 Sep 2017 06:43  Mg     2.0     09-16      proBNP:   Lipid Profile:   HgA1c:   TSH:           ASSESSMENT/PLAN:

## 2017-09-16 NOTE — PROGRESS NOTE ADULT - SUBJECTIVE AND OBJECTIVE BOX
INTERVAL HPI/OVERNIGHT EVENTS:    Still very weak  No specific issues  Not sure whether has diarrhea    MEDICATIONS  (STANDING):  folic acid 1 milliGRAM(s) Oral daily  rivaroxaban 20 milliGRAM(s) Oral every 24 hours  docusate sodium 100 milliGRAM(s) Oral three times a day  senna 2 Tablet(s) Oral at bedtime  sodium chloride 0.9%. 1000 milliLiter(s) (75 mL/Hr) IV Continuous <Continuous>  cefepime  IVPB 1000 milliGRAM(s) IV Intermittent every 12 hours  metroNIDAZOLE  IVPB      metroNIDAZOLE  IVPB 500 milliGRAM(s) IV Intermittent every 8 hours  escitalopram 5 milliGRAM(s) Oral daily  vancomycin  IVPB 1000 milliGRAM(s) IV Intermittent every 24 hours    MEDICATIONS  (PRN):  acetaminophen   Tablet 650 milliGRAM(s) Oral every 6 hours PRN For Temp greater than 38 C (100.4 F), or Moderate Pain  bisacodyl Suppository 10 milliGRAM(s) Rectal daily PRN Constipation      Allergies    No Known Allergies    EXAM  Vital Signs Last 24 Hrs  T(C): 36.6 (16 Sep 2017 12:01), Max: 36.7 (15 Sep 2017 21:00)  T(F): 97.8 (16 Sep 2017 12:01), Max: 98 (15 Sep 2017 21:00)  HR: 69 (16 Sep 2017 12:01) (63 - 71)  BP: 118/73 (16 Sep 2017 12:01) (96/56 - 118/73)  BP(mean): --  RR: 16 (16 Sep 2017 12:01) (15 - 16)  SpO2: 94% (16 Sep 2017 12:01) (94% - 97%)  More awake and alert  No rash  Pale  RRR  Chest CTA  Abd soft NT  LE no edema  With Resendiz cath    LABS:                        8.6    2.3   )-----------( 59       ( 16 Sep 2017 06:43 )             25.6     09-16    135  |  100  |  11  ----------------------------<  96  3.8   |  22  |  0.57      Ca    8.5      16 Sep 2017 06:43  Mg     2.0     09-16      Manual Differential (09.15.17 @ 06:24)    Spherocytes: Slight    Ovalocytes: Slight    Hypochromia: Slight    Anisocytosis: Slight    Red Cell Morphology: Abnormal    Platelet Morphology: Normal    Manual Smear Verification: Performed    Band Neutrophils %: 28.0 %    Metamyelocytes %: 3.0 %      MICROBIOLOGY:  Culture - Urine (09.15.17 @ 08:44)    Specimen Source: .Urine Catheterized    Culture Results:   No growth    Culture - Blood (09.14.17 @ 16:00)    Specimen Source: .Blood Blood    Culture Results:   No growth at 1 day.    Culture - Blood (09.13.17 @ 01:26)    Specimen Source: .Blood Blood    Culture Results:   No growth at 3 days.    Culture - Urine (09.07.17 @ 09:01)    -  Ampicillin: R >16    -  Ampicillin/Sulbactam: S <=8/4    -  Cefazolin: S <=8    -  Ceftriaxone: S <=1    -  Ciprofloxacin: S <=1    -  Gentamicin: S <=4    -  Nitrofurantoin: S <=32    -  Piperacillin/Tazobactam: S <=16    -  Tobramycin: S <=4    -  Trimethoprim/Sulfamethoxazole: S <=2/38    Specimen Source: .Urine Clean Catch (Midstream)    Culture Results:   >100,000 CFU/ml Klebsiella pneumoniae    Organism Identification: Klebsiella pneumoniae    Organism: Klebsiella pneumoniae    Method Type: NIKITA

## 2017-09-16 NOTE — PROGRESS NOTE ADULT - SUBJECTIVE AND OBJECTIVE BOX
Neurology Follow up note    Name  ANDREW STUART    HPI:  77F with PMH of MDS, previous PE, HTN who presents with weakness and fatigue for 1 week duration. Weakness and fatigue insidious in onset, unable to give a definitive time she last felt well. States lately she has not had energy to even get out of bed to eat or get her medications. It has been getting progressively worse.  At baseline patient is able to walk with use of cane about 1-2blocks. Currently she feels SOB and fatigued when she stands from bed.     She denies fever, chills, CP, orthopnea, LE edema, nausea, vomiting, diarrhea, abdominal pain, dysuria, increased urinary frequency. Patient denies melena or hematochezia. Patient states she has not had a BM since Monday. Her typical schedule is every day to every other day. She denies any recent illness or sick contacts.    In ED T97.7, HR83, /79, RR19 99%. Patient was found to be pancytopenic on CBC, WBC 2.5, H/H 6/17.6, Plt 60. BMP showed hypokalemia with K 3.1. She was given 40mEq KCl, and a bolus of NS. She was ordered for 1 unit pRBC. (06 Sep 2017 16:34)      Interval History - generalized weakness and fatigue        REVIEW OF SYSTEMS    Vital Signs Last 24 Hrs  T(C): 36.5 (16 Sep 2017 05:27), Max: 36.7 (15 Sep 2017 21:00)  T(F): 97.7 (16 Sep 2017 05:27), Max: 98 (15 Sep 2017 21:00)  HR: 63 (16 Sep 2017 05:27) (63 - 76)  BP: 105/67 (16 Sep 2017 05:27) (92/56 - 105/67)  BP(mean): --  RR: 15 (16 Sep 2017 05:27) (15 - 16)  SpO2: 96% (16 Sep 2017 05:27) (96% - 97%)    Physical Exam-     Mental Status- awake    Cranial Nerves- full EOM    Gait and station-n/a    Motor-no new focal weakness    Reflexes- decreased    Sensation- no sensory level    Coordination- tremors unchanged    Vascular -    Medications  folic acid 1 milliGRAM(s) Oral daily  rivaroxaban 20 milliGRAM(s) Oral every 24 hours  docusate sodium 100 milliGRAM(s) Oral three times a day  senna 2 Tablet(s) Oral at bedtime  sodium chloride 0.9%. 1000 milliLiter(s) IV Continuous <Continuous>  cefepime  IVPB 1000 milliGRAM(s) IV Intermittent every 12 hours  metroNIDAZOLE  IVPB      metroNIDAZOLE  IVPB 500 milliGRAM(s) IV Intermittent every 8 hours  acetaminophen   Tablet 650 milliGRAM(s) Oral every 6 hours PRN  bisacodyl Suppository 10 milliGRAM(s) Rectal daily PRN  escitalopram 5 milliGRAM(s) Oral daily  vancomycin  IVPB 1000 milliGRAM(s) IV Intermittent every 24 hours      Lab      Radiology    Assessment- Essential tremor    Plan- no neuro testing indicated

## 2017-09-17 LAB
ANION GAP SERPL CALC-SCNC: 13 MMOL/L — SIGNIFICANT CHANGE UP (ref 5–17)
BASOPHILS NFR BLD AUTO: 0.4 % — SIGNIFICANT CHANGE UP (ref 0–2)
BUN SERPL-MCNC: 8 MG/DL — SIGNIFICANT CHANGE UP (ref 7–23)
CALCIUM SERPL-MCNC: 8.3 MG/DL — LOW (ref 8.4–10.5)
CHLORIDE SERPL-SCNC: 102 MMOL/L — SIGNIFICANT CHANGE UP (ref 96–108)
CO2 SERPL-SCNC: 22 MMOL/L — SIGNIFICANT CHANGE UP (ref 22–31)
CREAT SERPL-MCNC: 0.48 MG/DL — LOW (ref 0.5–1.3)
EOSINOPHIL NFR BLD AUTO: 1.8 % — SIGNIFICANT CHANGE UP (ref 0–6)
GLUCOSE SERPL-MCNC: 98 MG/DL — SIGNIFICANT CHANGE UP (ref 70–99)
HCT VFR BLD CALC: 26.3 % — LOW (ref 34.5–45)
HGB BLD-MCNC: 8.7 G/DL — LOW (ref 11.5–15.5)
LYMPHOCYTES # BLD AUTO: 36.9 % — SIGNIFICANT CHANGE UP (ref 13–44)
MAGNESIUM SERPL-MCNC: 1.8 MG/DL — SIGNIFICANT CHANGE UP (ref 1.6–2.6)
MCHC RBC-ENTMCNC: 30.2 PG — SIGNIFICANT CHANGE UP (ref 27–34)
MCHC RBC-ENTMCNC: 33.1 G/DL — SIGNIFICANT CHANGE UP (ref 32–36)
MCV RBC AUTO: 91.3 FL — SIGNIFICANT CHANGE UP (ref 80–100)
MONOCYTES NFR BLD AUTO: 12.8 % — SIGNIFICANT CHANGE UP (ref 2–14)
NEUTROPHILS NFR BLD AUTO: 48.1 % — SIGNIFICANT CHANGE UP (ref 43–77)
PLATELET # BLD AUTO: 63 K/UL — LOW (ref 150–400)
POTASSIUM SERPL-MCNC: 3.3 MMOL/L — LOW (ref 3.5–5.3)
POTASSIUM SERPL-SCNC: 3.3 MMOL/L — LOW (ref 3.5–5.3)
RBC # BLD: 2.88 M/UL — LOW (ref 3.8–5.2)
RBC # FLD: 23.9 % — HIGH (ref 10.3–16.9)
SODIUM SERPL-SCNC: 137 MMOL/L — SIGNIFICANT CHANGE UP (ref 135–145)
WBC # BLD: 2.7 K/UL — LOW (ref 3.8–10.5)
WBC # FLD AUTO: 2.7 K/UL — LOW (ref 3.8–10.5)

## 2017-09-17 RX ORDER — POTASSIUM CHLORIDE 20 MEQ
10 PACKET (EA) ORAL
Qty: 0 | Refills: 0 | Status: COMPLETED | OUTPATIENT
Start: 2017-09-17 | End: 2017-09-17

## 2017-09-17 RX ORDER — ONDANSETRON 8 MG/1
4 TABLET, FILM COATED ORAL ONCE
Qty: 0 | Refills: 0 | Status: COMPLETED | OUTPATIENT
Start: 2017-09-17 | End: 2017-09-17

## 2017-09-17 RX ORDER — POTASSIUM CHLORIDE 20 MEQ
40 PACKET (EA) ORAL EVERY 4 HOURS
Qty: 0 | Refills: 0 | Status: DISCONTINUED | OUTPATIENT
Start: 2017-09-17 | End: 2017-09-17

## 2017-09-17 RX ORDER — POTASSIUM CHLORIDE 20 MEQ
40 PACKET (EA) ORAL
Qty: 0 | Refills: 0 | Status: DISCONTINUED | OUTPATIENT
Start: 2017-09-17 | End: 2017-09-17

## 2017-09-17 RX ADMIN — Medication 100 MILLIEQUIVALENT(S): at 10:29

## 2017-09-17 RX ADMIN — Medication 100 MILLIGRAM(S): at 21:51

## 2017-09-17 RX ADMIN — Medication 100 MILLIEQUIVALENT(S): at 15:41

## 2017-09-17 RX ADMIN — RIVAROXABAN 20 MILLIGRAM(S): KIT at 18:01

## 2017-09-17 RX ADMIN — CEFEPIME 100 MILLIGRAM(S): 1 INJECTION, POWDER, FOR SOLUTION INTRAMUSCULAR; INTRAVENOUS at 12:14

## 2017-09-17 RX ADMIN — SODIUM CHLORIDE 75 MILLILITER(S): 9 INJECTION INTRAMUSCULAR; INTRAVENOUS; SUBCUTANEOUS at 00:39

## 2017-09-17 RX ADMIN — Medication 100 MILLIGRAM(S): at 05:37

## 2017-09-17 RX ADMIN — Medication 100 MILLIGRAM(S): at 14:07

## 2017-09-17 RX ADMIN — Medication 650 MILLIGRAM(S): at 05:52

## 2017-09-17 RX ADMIN — Medication 250 MILLIGRAM(S): at 13:17

## 2017-09-17 RX ADMIN — CEFEPIME 100 MILLIGRAM(S): 1 INJECTION, POWDER, FOR SOLUTION INTRAMUSCULAR; INTRAVENOUS at 00:36

## 2017-09-17 RX ADMIN — Medication 100 MILLIEQUIVALENT(S): at 18:01

## 2017-09-17 RX ADMIN — Medication 1 MILLIGRAM(S): at 12:14

## 2017-09-17 RX ADMIN — SENNA PLUS 2 TABLET(S): 8.6 TABLET ORAL at 21:51

## 2017-09-17 RX ADMIN — ESCITALOPRAM OXALATE 5 MILLIGRAM(S): 10 TABLET, FILM COATED ORAL at 12:14

## 2017-09-17 NOTE — PROGRESS NOTE ADULT - PROBLEM SELECTOR PLAN 4
Most likely 2/2 to constipation.  -Will start Colace 100mg TID and Senna Qd today. If patient does not have BM today will consider adding Miralax tomorrow to further aid in constipation  -Patient to be OOB and ambulating with aid as much as possible  -Treat UTI as above  -Patient failed TOV today, replaced Resendiz. Patient will follow up with Dr. Ireland as an outpatient.
Patient was seen in ED in June and found to have right femoral and right popiteal DVT. Started on Xarelto then.   -Stopped Xarelto in setting of thrombocytopenia.
Improving. Patient K on admission 3.1, given 40mEq in ED. K of 3.3 today.   -Replete K > 4
Improving. Patient K on admission 3.1, given 40mEq in ED. K of 3.3 today.   -Replete K > 4
Most likely 2/2 to constipation.  -Will start Colace 100mg TID and Senna Qd today. If patient does not have BM today will consider adding Miralax tomorrow to further aid in constipation  -Patient to be OOB and ambulating with aid as much as possible  -Treat UTI as above  -TOV today
Patient K on admission 3.1, given 40mEq in ED. Repeat BMP showed K 3.8.  -Resolved
Patient K on admission 3.1, given 40mEq in ED. Repeat BMP showed K 3.9.  -Resolved
Patient currently normotensive, does not take any medications at home.  -Will continue to monitor, question as to whether she takes Norvasc 5mg at home, if patient becomes HTN will add Norvasc
Patient was seen in ED in June and found to have right femoral and right popiteal DVT. Started on Xarelto then.   -Will continue Xarelto as inpatient
Patient K on admission 3.1, given 40mEq in ED. Repeat BMP showed K 3.9.  -Resolved
Most likely 2/2 to constipation.  -Will start Colace 100mg TID and Senna Qd today. If patient does not have BM today will consider adding Miralax tomorrow to further aid in constipation  -Patient to be OOB and ambulating with aid as much as possible  -Treat UTI as above  -Patient failed TOV today, replaced Resnediz. Patient will follow up with Dr. Ireland as an outpatient.
follow for now

## 2017-09-17 NOTE — PROGRESS NOTE ADULT - SUBJECTIVE AND OBJECTIVE BOX
Overnight Events: KAREN  Subjective/ROS: Patient has no complaints other than her arms being cold. Patient denies headache, dizziness, lightheadedness, chest pain, shortness of breath, palpitations, abdominal pain, nausea, vomiting, diarrhea, fevers, and chills. Later in the AM, patient had nausea and 1 episode of emesis and was given zofran for her nausea.     VITAL SIGNS:  Vital Signs Last 24 Hrs  T(C): 36.4 (17 Sep 2017 10:39), Max: 36.6 (16 Sep 2017 12:01)  T(F): 97.5 (17 Sep 2017 10:39), Max: 97.8 (16 Sep 2017 12:01)  HR: 64 (17 Sep 2017 10:39) (64 - 72)  BP: 110/70 (17 Sep 2017 10:39) (106/68 - 130/80)  BP(mean): --  RR: 16 (17 Sep 2017 10:39) (14 - 16)  SpO2: 95% (17 Sep 2017 10:39) (94% - 97%)    PHYSICAL EXAM:    General: Elderly lady asleep in bed  HEENT: NC/AT; EOMI, anicteric sclera; dry mucous membranes  Neck: supple, no JVD  Cardiovascular: +S1/S2; RRR, no m/r/g  Respiratory: CTA B/L; no W/R/R  Gastrointestinal: soft, NT/ND; +BS  Extremities: WWP; no edema, clubbing or cyanosis  Vascular: 2+ radial, DP pulses B/L  Neurological: Awake, alert, and conversant; no focal deficits    MEDICATIONS:  MEDICATIONS  (STANDING):  folic acid 1 milliGRAM(s) Oral daily  rivaroxaban 20 milliGRAM(s) Oral every 24 hours  docusate sodium 100 milliGRAM(s) Oral three times a day  senna 2 Tablet(s) Oral at bedtime  sodium chloride 0.9%. 1000 milliLiter(s) (75 mL/Hr) IV Continuous <Continuous>  cefepime  IVPB 1000 milliGRAM(s) IV Intermittent every 12 hours  metroNIDAZOLE  IVPB      metroNIDAZOLE  IVPB 500 milliGRAM(s) IV Intermittent every 8 hours  escitalopram 5 milliGRAM(s) Oral daily  vancomycin  IVPB 1000 milliGRAM(s) IV Intermittent every 24 hours  ondansetron Injectable 4 milliGRAM(s) IV Push once  potassium chloride  10 mEq/100 mL IVPB 10 milliEquivalent(s) IV Intermittent every 1 hour    MEDICATIONS  (PRN):  acetaminophen   Tablet 650 milliGRAM(s) Oral every 6 hours PRN For Temp greater than 38 C (100.4 F), or Moderate Pain  bisacodyl Suppository 10 milliGRAM(s) Rectal daily PRN Constipation      ALLERGIES:  Allergies    No Known Allergies    Intolerances        LABS:                        8.7    2.7   )-----------( 63       ( 17 Sep 2017 06:17 )             26.3     09-17    137  |  102  |  8   ----------------------------<  98  3.3<L>   |  22  |  0.48<L>    Ca    8.3<L>      17 Sep 2017 06:15  Mg     1.8     09-17          CAPILLARY BLOOD GLUCOSE          RADIOLOGY & ADDITIONAL TESTS: Reviewed.

## 2017-09-17 NOTE — PROGRESS NOTE ADULT - PROBLEM SELECTOR PLAN 1
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 Last CBC in June showed WBC 13.3, H/H 15.5/47.0, . Patient current pancytopenia most likely exacerbation of MPD as she has been admitted in past for this.  -S/P Transfusion of 2 unit pRBC, Hgb up to 8.0. Will hold any future transfusions pending Heme/Onc Recommendations.  -Patient has increased Reticulocyte count, Decreased Haptoglobin and Increased bilirubin most likely showing hemolytic picture. Will continue to monitor anemia labs as they come in.  -Transfuse Hgb <7.0  -Transfuse Plt <15k unless actively bleeding  -Patient was scheduled for Vascular intervention with placement of IVC Filter 2/2 to thrombocytopenia. Would want to stop Xarelto during thrombocytopenia.   -Dr. Lockett: Requested Lower Extremity Doppler and V/Q Scan to determine if there is resolution or improvement in clots.   -Dr. Dumont is [facility] Heme/Onc, she is aware of patient, will follow up with rec's
Patient had elevated T102.6, HR in the 90s, with low WBC meeting criteria for sepsis. No signs of poor perfusion or end organ damage. Patient recently being treated for UTI with Bactrim.   -Low WBC chronic most likely 2/2 to pancytopenia. Cannot rule out Neutropenic causes of infection. Will follow up manual dif.   -Continue Cefepime 1g q12h, Vancomycin 1g q24h  -UA now negative for signs of bacteria less likely to be 2/2 to UTI  -CXR is clear  -CT only shows proctitis unlikely to be infectious but will add Flagyl 500mg IVPB q8h to cover for anerobes.   -Consulted Dr. Mercado for ID consult  -Pending results of BCx - Surveillance cultures today
Patient had elevated T102.6, HR in the 90s, with low WBC meeting criteria for sepsis. No signs of poor perfusion or end organ damage. Patient recently being treated for UTI with Bactrim.   -Low WBC chronic most likely 2/2 to pancytopenia. Cannot rule out Neutropenic causes of infection. Will follow up manual dif.   -Continue Cefepime 1g q12h, Vancomycin 1g q24h  -UA now negative for signs of bacteria less likely to be 2/2 to UTI  -CXR is clear  -Consulted Dr. Mercado for ID consult  -Pending results of BCx - Surveillance cultures today
Patient had elevated T102.6, HR in the 90s, with low WBC meeting criteria for sepsis. No signs of poor perfusion or end organ damage. Patient recently being treated for UTI with Bactrim.   -Low WBC chronic most likely 2/2 to pancytopenia. Cannot rule out Neutropenic causes of infection. Will follow up manual dif.   -Continue Cefepime 1g q12h, Vancomycin 1g q24h, and metronidazole 500mg q8h  -UA now negative for signs of bacteria less likely to be 2/2 to UTI  -CXR is clear  -CT only shows proctitis unlikely to be infectious but will add Flagyl 500mg IVPB q8h to cover for anerobes.   -Consulted Dr. Mercado for ID consult  -BCx w/NGTD
Patient had elevated T102.6, HR in the 90s, with low WBC meeting criteria for sepsis. No signs of poor perfusion or end organ damage. Patient recently being treated for UTI with Bactrim.   -Low WBC chronic most likely 2/2 to pancytopenia. Cannot rule out Neutropenic causes of infection. Will follow up manual dif.   -Continue Cefepime 1g q12h, Vancomycin 1g q24h, and metronidazole 500mg q8h  -UA now negative for signs of bacteria less likely to be 2/2 to UTI  -CXR is clear  -CT only shows proctitis unlikely to be infectious but will add Flagyl 500mg IVPB q8h to cover for anerobes.   -Consulted Dr. Mercado for ID consult  -BCx w/NGTD
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 Last CBC in June showed WBC 13.3, H/H 15.5/47.0, . Patient current pancytopenia most likely exacerbation of MPD as she has been admitted in past for this.  -S/P Transfusion of 1 unit pRBC, Hgb up to 7.2. Will hold any future transfusions pending Heme/Onc Recommendations.  -Patient has increased Reticulocyte count, Decreased Haptoglobin and Increased bilirubin most likely showing hemolytic picture. Will continue to monitor anemia labs as they come in.  -Transfuse Hgb <7.0  -Transfuse Plt <15k unless actively bleeding  -Dr. Dumont is [facility]e/Onc, she is aware of patient, will follow up with rec's
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 Last CBC in June showed WBC 13.3, H/H 15.5/47.0, . Patient current pancytopenia most likely exacerbation of MPD as she has been admitted in past for this.  -S/P Transfusion of 2 unit pRBC, Hgb up to 8.0. Will hold any future transfusions pending Heme/Onc Recommendations.  -Patient has increased Reticulocyte count, Decreased Haptoglobin and Increased bilirubin most likely showing hemolytic picture. Will continue to monitor anemia labs as they come in.  -Transfuse Hgb <7.0  -Transfuse Plt <15k unless actively bleeding  -Patient was scheduled for Vascular intervention with placement of IVC Filter 2/2 to thrombocytopenia. Would want to stop Xarelto during thrombocytopenia.   -Dr. Lockett: Requested Lower Extremity Doppler and V/Q Scan to determine if there is resolution or improvement in clots. V/Q scan shows no interval change from previous scan. Still awaiting Lower Extremity Doppler.   -Dr. Dumont is [facility]e/Onc, she is aware of patient, will follow up with rec's
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 Last CBC in June showed WBC 13.3, H/H 15.5/47.0, . Patient current pancytopenia most likely exacerbation of MPD as she has been admitted in past for this.  -S/P Transfusion of 2 unit pRBC, Hgb up to 8.0. Will hold any future transfusions pending Heme/Onc Recommendations.  -Patient has increased Reticulocyte count, Decreased Haptoglobin and Increased bilirubin most likely showing hemolytic picture. Will continue to monitor anemia labs as they come in.  -Transfuse Hgb <7.0  -Transfuse Plt <15k unless actively bleeding  -Patient was scheduled for Vascular intervention with placement of IVC Filter 2/2 to thrombocytopenia. Would want to stop Xarelto during thrombocytopenia.   -Dr. Lockett: Requested Lower Extremity Doppler and V/Q Scan to determine if there is resolution or improvement in clots. V/Q scan shows no interval change from previous scan. Still awaiting Lower Extremity Doppler.   -Dr. Dumont is [facility]e/Onc, she is aware of patient, will follow up with rec's
in the past, compliant with AC.  V/Q scan performed this admission shows an improvement in the VQ mismatch.  Dopplers show new DVT along with persistent old ones.  - Likely needs OAC.  - Can consider repeating Echo to evaluate PA pressures. Echo from 01/2017 no elevation in PA pressures.
in the past, compliant with AC.  V/Q scan performed yesterday unchanged from prior study from 2015, however unable to r/o an acute PE.   Dopplers pending.  Main concern with AC is her thrombocytopenia and risk of bleeding, however in light of abnormal V/Q, unable to r/o  - awaiting final report on V/Q - if still inconclusive, will have to discuss with radiologist as well as hematologist the probability of clots as well as the risk-benefit of AC  - f/u dopplers
Patient had elevated T102.6, HR in the 90s, with low WBC meeting criteria for sepsis. No signs of poor perfusion or end organ damage. Patient recently being treated for UTI with Bactrim.   -Low WBC chronic most likely 2/2 to pancytopenia. Cannot rule out Neutropenic causes of infection. Will follow up manual dif.   -Bactrim was started 2 days ago, patient spiked a fever while on bactrim will need to consider broadening coverage or source control.  -UA now negative for signs of bacteria less likely to be 2/2 to UTI  -CXR is clear  -Consulted Dr. Mercaod for ID consult  -Pending results of BCx and/or ID recs would consider starting patient on Cefepime +/- Vancomycin
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 Last CBC in June showed WBC 13.3, H/H 15.5/47.0, . Patient current pancytopenia most likely exacerbation of MPD as she has been admitted in past for this.  -S/P Transfusion of 2 unit pRBC, Hgb up to 8.0. Will hold any future transfusions pending Heme/Onc Recommendations.  -Patient has increased Reticulocyte count, Decreased Haptoglobin and Increased bilirubin most likely showing hemolytic picture. Will continue to monitor anemia labs as they come in.  -Transfuse Hgb <7.0  -Transfuse Plt <15k unless actively bleeding  -Patient was scheduled for Vascular intervention with placement of IVC Filter 2/2 to thrombocytopenia. Would want to stop Xarelto during thrombocytopenia.   -Dr. Lockett: Requested Lower Extremity Doppler and V/Q Scan to determine if there is resolution or improvement in clots. V/Q scan shows no interval change from previous scan. Still awaiting Lower Extremity Doppler.   -Dr. Dumont is [facility]On[facility], she is aware of patient, will follow up with rec's
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 Last CBC in June showed WBC 13.3, H/H 15.5/47.0, . Patient current pancytopenia most likely exacerbation of MPD as she has been admitted in past for this.  -S/P Transfusion of 2 unit pRBC, Hgb up to 8.0. Will hold any future transfusions pending Heme/Onc Recommendations.  -Patient has increased Reticulocyte count, Decreased Haptoglobin and Increased bilirubin most likely showing hemolytic picture. Will continue to monitor anemia labs as they come in.  -Transfuse Hgb <7.0  -Transfuse Plt <15k unless actively bleeding  -Patient was scheduled for Vascular intervention with placement of IVC Filter 2/2 to thrombocytopenia. Would want to stop Xarelto during thrombocytopenia.   -Dr. Lockett: Requested Lower Extremity Doppler and V/Q Scan to determine if there is resolution or improvement in clots. V/Q scan shows no interval change from previous scan. Still awaiting Lower Extremity Doppler.   -Dr. Dumont is [facility]e/Onc, she is aware of patient, will follow up with rec's
Repeat VQ scan revealed improvement in the perfusion indicated resolving pulmonary emboli no new defects. Patient is to continue on anticoagulation.  NO bleeding

## 2017-09-17 NOTE — PROGRESS NOTE ADULT - PROBLEM SELECTOR PLAN 5
Patient currently normotensive, does not take any medications at home.  -Will continue to monitor, question as to whether she takes Norvasc 5mg at home, if patient becomes HTN will add Norvasc
Patient currently normotensive, does not take any medications at home.  -Will continue to monitor, question as to whether she takes Norvasc 5mg at home, if patient becomes HTN will add Norvasc
F: Patient refusing fluids at this time  E: Replete K>4, Mg>2  N: Reg. Diet
F: Patient will get 1 unit pRBC and NS bolus, no fluids indicated following  E: Replete K>4, Mg>2, s/p 40mEq KCl  N: Reg. Diet
Most likely 2/2 to constipation.  -C/w colace, senna, and ducolax suppository PRN for constipation   -Patient to be OOB and ambulating with aid as much as possible  -Treat UTI as above  -Patient failed TOV. Resendiz currently in place. Patient will follow up with Dr. Ireland as an outpatient.
Most likely 2/2 to constipation.  -C/w colace, senna, and ducolax suppository PRN for constipation   -Patient to be OOB and ambulating with aid as much as possible  -Treat UTI as above  -Patient failed TOV. Resendiz currently in place. Patient will follow up with Dr. Ireland as an outpatient.
Most likely 2/2 to constipation.  -Will start Colace 100mg TID and Senna Qd today. If patient does not have BM today will consider adding Miralax tomorrow to further aid in constipation  -Patient to be OOB and ambulating with aid as much as possible  -Treat UTI as above  -Patient failed TOV today, replaced Resendiz. Patient will follow up with Dr. Ireland as an outpatient.
Most likely 2/2 to constipation.  -Will start Colace 100mg TID and Senna Qd today. If patient does not have BM today will consider adding Miralax tomorrow to further aid in constipation  -Patient to be OOB and ambulating with aid as much as possible  -Treat UTI as above  -Patient failed TOV today, replaced Resendiz. Patient will follow up with Dr. Ireland as an outpatient.
Patient currently normotensive, does not take any medications at home.  -Will continue to monitor, question as to whether she takes Norvasc 5mg at home, if patient becomes HTN will add Norvasc
Patient was seen in ED in June and found to have right femoral and right popiteal DVT. Started on Xarelto then.   -Stopped Xarelto in setting of thrombocytopenia.
Most likely 2/2 to constipation.  -Will start Colace 100mg TID and Senna Qd today. If patient does not have BM today will consider adding Miralax tomorrow to further aid in constipation  -Patient to be OOB and ambulating with aid as much as possible  -Treat UTI as above  -Patient failed TOV today, replaced Resendiz. Patient will follow up with Dr. Ireland as an outpatient.

## 2017-09-17 NOTE — PROGRESS NOTE ADULT - PROBLEM SELECTOR PROBLEM 5
Essential hypertension
Nutrition, metabolism, and development symptoms
Chronic deep vein thrombosis (DVT) of femoral vein of right lower extremity
Essential hypertension
Urinary retention
Nutrition, metabolism, and development symptoms
Urinary retention
Essential hypertension

## 2017-09-17 NOTE — PROGRESS NOTE ADULT - SUBJECTIVE AND OBJECTIVE BOX
INTERVAL HPI/OVERNIGHT EVENTS:  Overall better but still weak and depressed    MEDICATIONS  (STANDING):  folic acid 1 milliGRAM(s) Oral daily  rivaroxaban 20 milliGRAM(s) Oral every 24 hours  docusate sodium 100 milliGRAM(s) Oral three times a day  senna 2 Tablet(s) Oral at bedtime  sodium chloride 0.9%. 1000 milliLiter(s) (75 mL/Hr) IV Continuous <Continuous>  cefepime  IVPB 1000 milliGRAM(s) IV Intermittent every 12 hours  metroNIDAZOLE  IVPB      metroNIDAZOLE  IVPB 500 milliGRAM(s) IV Intermittent every 8 hours  escitalopram 5 milliGRAM(s) Oral daily  vancomycin  IVPB 1000 milliGRAM(s) IV Intermittent every 24 hours    MEDICATIONS  (PRN):  acetaminophen   Tablet 650 milliGRAM(s) Oral every 6 hours PRN For Temp greater than 38 C (100.4 F), or Moderate Pain  bisacodyl Suppository 10 milliGRAM(s) Rectal daily PRN Constipation      Allergies    No Known Allergies    EXAM  Vital Signs Last 24 Hrs  T(C): 36.4 (17 Sep 2017 10:39), Max: 36.4 (16 Sep 2017 21:30)  T(F): 97.5 (17 Sep 2017 10:39), Max: 97.5 (16 Sep 2017 21:30)  HR: 64 (17 Sep 2017 10:39) (64 - 72)  BP: 110/70 (17 Sep 2017 10:39) (106/68 - 130/80)  BP(mean): --  RR: 16 (17 Sep 2017 10:39) (14 - 16)  SpO2: 95% (17 Sep 2017 10:39) (95% - 97%)  On RA  More awake and alert  Pale but no rash  RRR  Chest clear  Resendiz cath in place  Otherwise unchanged      LABS:                        8.7    2.7   )-----------( 63       ( 17 Sep 2017 06:17 )             26.3     09-17    137  |  102  |  8   ----------------------------<  98  3.3<L>   |  22  |  0.48<L>    Ca    8.3<L>      17 Sep 2017 06:15  Mg     1.8     09-17      MICROBIOLOGY:  Culture - Urine (09.15.17 @ 08:44)    Specimen Source: .Urine Catheterized    Culture Results:   No growth    Culture - Blood (09.14.17 @ 16:00)    Specimen Source: .Blood Blood    Culture Results:   No growth at 3 days.    Culture - Blood (09.13.17 @ 01:26)    Specimen Source: .Blood Blood    Culture Results:   No growth at 4 days.

## 2017-09-17 NOTE — PROGRESS NOTE ADULT - ATTENDING COMMENTS
Pt seen and examined and evaluation completed by me in person
77 year old lady who has a h/o DVT in the past and is on Xarelto. She had a V/Q scan in 2015 which resulted in perfusion defects. A follow up V/Q scan showed significant defects. I reviewed the scan and will review it with Dr. Ochoa.   In the meantime, we will continue Xarelto.  The ECHO showed no evidence of pulmonary hypertension. We will request a repeat ECHO. If there is no evidence of PH at rest, we will consider a stress ECHO. We will also repeat a hypercoagulable w/u (aPL, Lupus anticoagulant, Factor V Leydin, homocysteine levels)
Abnormal V/Q scan, not helpful to r/o recent PE. Ideally would continue anticoagulation if her hematologic condition permits.

## 2017-09-17 NOTE — PROGRESS NOTE ADULT - PROBLEM SELECTOR PLAN 9
P: Patient High Risk, on Xarelto  C: FULL CODE  D: Patient discharge deferred as she now spiked new fever. Dispo pending source control for probable new infection.

## 2017-09-17 NOTE — PROGRESS NOTE ADULT - PROBLEM SELECTOR PROBLEM 7
Nutrition, metabolism, and development symptoms
Nutrition, metabolism, and development symptoms
Chronic deep vein thrombosis (DVT) of femoral vein of right lower extremity
Need for prophylactic measure
Nutrition, metabolism, and development symptoms
Chronic deep vein thrombosis (DVT) of femoral vein of right lower extremity

## 2017-09-17 NOTE — PROGRESS NOTE ADULT - PROBLEM SELECTOR PLAN 8
P: Patient High Risk, currently holding Xarelto pending IVC filter placement  C: FULL CODE  D: Patient is stable for Discharge with plans to follow up with Dr. Ireland and Dr. Dumont as an outpatient; pending Discharge Appeal
P: Patient High Risk, currently holding Xarelto pending IVC filter placement  C: FULL CODE  D: Patient is stable for Discharge with plans to follow up with Dr. Ireland and Dr. Dumont as an outpatient; pending Discharge Appeal
F: 75cc/h  E: Replete K>4, Mg>2  N: Reg. Diet
F: Patient refusing fluids at this time  E: Replete K>4, Mg>2  N: Reg. Diet
P: Patient High Risk, currently holding Xarelto pending IVC filter placement  C: FULL CODE  D: Continue the patient on the floor for pancytopenia work up under Dr. Tapia
F: Patient refusing fluids at this time  E: Replete K>4, Mg>2  N: Reg. Diet

## 2017-09-17 NOTE — PROGRESS NOTE ADULT - PROBLEM SELECTOR PROBLEM 6
Chronic deep vein thrombosis (DVT) of femoral vein of right lower extremity
Chronic deep vein thrombosis (DVT) of femoral vein of right lower extremity
Need for prophylactic measure
Chronic deep vein thrombosis (DVT) of femoral vein of right lower extremity
Essential hypertension
Need for prophylactic measure
Nutrition, metabolism, and development symptoms
Essential hypertension

## 2017-09-17 NOTE — PROGRESS NOTE ADULT - NSHPATTENDINGPLANDISCUSS_GEN_ALL_CORE
Dr [email or web address]
Intern
Covering Intern
Dr [email or web address]
Resident
medical team and pulmonary team
Dr [email or web address].HS
Dr Dumont
as above

## 2017-09-17 NOTE — PROGRESS NOTE ADULT - PROBLEM SELECTOR PLAN 6
Patient was seen in ED in June and found to have right femoral and right popiteal DVT. Started on Xarelto then. Has presence of new DVT on Doppler.  -Patient currently on Xarelto  -Presence of new DVT indication for IVC filter, discussed with patient again and she refuses to have filter placed in this hospital.
Patient was seen in ED in June and found to have right femoral and right popiteal DVT. Started on Xarelto then. Has presence of new DVT on Doppler.  -Patient currently on Xarelto  -Presence of new DVT indication for IVC filter, discussed with patient again and she refuses to have filter placed in this hospital.
P: Patient High Risk, currently holding Xarelto pending IVC filter placement  C: FULL CODE  D: Continue the patient on the floor for pancytopenia work up under Dr. Tapia
F: Patient refusing fluids at this time  E: Replete K>4, Mg>2  N: Reg. Diet
P: Patient High Risk, currently on Xarelto, will continue Xarelto  C: FULL CODE  D: Continue the patient on the floor for pancytopenia work up under Dr. Tapia
Patient currently normotensive, does not take any medications at home.  -Will continue to monitor, question as to whether she takes Norvasc 5mg at home, if patient becomes HTN will add Norvasc
Patient was seen in ED in June and found to have right femoral and right popiteal DVT. Started on Xarelto then. Has presence of new DVT on Doppler.  -Patient currently on Xarelto  -Presence of new DVT indication for IVC filter, discussed with patient again and she refuses to have filter placed in this hospital.
Patient currently normotensive, does not take any medications at home.  -Will continue to monitor, question as to whether she takes Norvasc 5mg at home, if patient becomes HTN will add Norvasc

## 2017-09-17 NOTE — PROGRESS NOTE ADULT - SUBJECTIVE AND OBJECTIVE BOX
History of Present Illness:  Chief Complaint/Reason for Admission: Weakness and Fatigue	  History of Present Illness: 	  77F with PMH of MDS, previous PE, HTN who presents with weakness and fatigue for 1 week duration. Weakness and fatigue insidious in onset, unable to give a definitive time she last felt well. States lately she has not had energy to even get out of bed to eat or get her medications. It has been getting progressively worse.  At baseline patient is able to walk with use of cane about 1-2 blocks. Currently she feels SOB and fatigued when she stands from bed. Febrile overnight. Poor po intake.    She denies fever, chills, CP, orthopnea, LE edema, nausea, vomiting, diarrhea, abdominal pain, dysuria, increased urinary frequency. Patient denies melena or hematochezia. Patient states she has not had a BM since Monday. Her typical schedule is every day to every other day. She denies any recent illness or sick contacts.  Results of V/Q scan and LE U/S noted. Received 1 unit PRBCs yesterday.    Review of Systems:  Review of Systems:  EYES/ENT: No visual changes;  No vertigo or throat pain  NECK: No pain or stiffness RESPIRATORY: No cough, wheezing, hemoptysis; No shortness of breath CARDIOVASCULAR: No chest pain or palpitations GASTROINTESTINAL: No abdominal or epigastric pain. No nausea, vomiting, or hematemesis; No diarrhea  No melena or hematochezia. GENITOURINARY: No dysuria, frequency or hematuria NEUROLOGICAL: No numbness  SKIN: No itching, burning, rashes, or lesions  All other review of systems is negative unless indicated above.	      Allergies and Intolerances:        Allergies:  	No Known Allergies:     Home Medications:   * Patient Currently Takes Medications as of 06-Sep-2017 16:44 documented in Prescription Writer  · 	Xarelto 15 mg oral tablet: 1 tab(s) orally twice, Last Dose Taken:    · 	folic acid 1 mg oral tablet: 2 tab(s) orally once a day, Last Dose Taken:    · 	Aspirin Enteric Coated 81 mg oral delayed release tablet: 1 tab(s) orally once a day, Last Dose Taken:    · 	hydroxyurea 500 mg oral capsule: 3 cap(s) orally Monday, Wednesday, and Friday, Last Dose Taken:      . .Patient History:   Past Medical History:  Hypertension    Myeloproliferative disorder    PE (pulmonary thromboembolism)  2015  Tremor    Vestibular disequilibrium.    Past Surgical History:  S/P hysterectomy.    Family History:  No pertinent family history in first degree relatives.      Physical Exam:  Physical Exam: VITALS Vital Signs Last 24 Hrs T(C): 36.4 (17 Sep 2017 10:39), Max: 36.4 (17 Sep 2017 10:39) T(F): 97.5 (17 Sep 2017 10:39), Max: 97.5 (17 Sep 2017 10:39) HR: 64 (17 Sep 2017 10:39) (64 - 72) BP: 110/70 (17 Sep 2017 10:39) (110/70 - 130/80) RR: 16 (17 Sep 2017 10:39) (14 - 16) SpO2: 95% (17 Sep 2017 10:39) (95% - 96%)  PHYSICAL EXAM General: A&Ox3; NAD Head: NC/AT; PERRL; EOMI; anicteric sclera Neck: Supple; no JVD Respiratory: CTA B/L; no wheezes/crackles/rales auscultated w/ good air movement Cardiovascular: Regular rhythm/rate; S1/S2; no gallops or murmurs auscultated Gastrointestinal: Normal Bowel Sounds, Non-distended. Tender in epigastric region Extremities: WWP; no edema or cyanosis; radial/pedal pulses palpable Neurological:  CNII-XII grossly intact; no obvious focal deficits	      Labs and Results:  Labs, Radiology, Cardiology, and Other Results: LABS                         8.7   2.7   )-----------( 63       ( 17 Sep 2017 06:17 )            26.3   137  |  102  |  8  ----------------------------<  98 3.3<L>   |  22  |  0.48<L>  Ca    8.3<L>      17 Sep 2017 06:15 Mg     1.8     09-17                             	    Assessment and Plan:   Assessment:  · Assessment		  77F with PMH of MDS, previous PE, DVT, HTN who presents with weakness and fatigue for 1 week duration. Found to have pancytopenia on CBC most likely 2/2 to chronic MDS.     Problem/Plan - 1:  ·  Problem: Myeloproliferative disorder.  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 Last CBC in June showed WBC 13.3, H/H 15.5/47.0, . Patient current pancytopenia most likely exacerbation of MPD as she has been admitted in past for this.  -Follow up on labs: Reticulocyte Count, Peripheral Smear, LDH, Haptoglobin, Folate, B12, TSH  -Transfuse Hgb <7.0  -Transfuse Plt <15k unless actively bleeding  -Hold Hydroxyurea for now    Problem/Plan - 2:  ·  Problem: Neutropenic fever    - Abx as per ID    Problem/Plan - 3:  ·  Problem: Essential hypertension.  Plan: Patient currently normotensive, does not take any medications at home.  -Will continue to monitor, question as to whether she takes Norvasc 5mg at home, if patient becomes HTN will add Norvasc.     Problem/Plan - 4:  ·  Problem: Chronic deep vein thrombosis (DVT) of femoral vein of right lower extremity.  Plan: Patient was seen in ED in June and found to have right femoral and right popiteal DVT. Started on Xarelto then.   -Will continue Xarelto as inpatient.     Problem/Plan - 5:  ·  Problem: Nutrition, metabolism, and development symptoms.    E: Replete K>4, Mg>2, s/p 40mEq KCl  N: Reg. Diet.     Problem/Plan - 6:  Problem: Need for prophylactic measure. Plan: P: Patient High Risk, currently on Xarelto, will continue Xarelto

## 2017-09-17 NOTE — PROGRESS NOTE ADULT - SUBJECTIVE AND OBJECTIVE BOX
Neurology Follow up note    Name  ANDREW STUART    HPI:  77F with PMH of MDS, previous PE, HTN who presents with weakness and fatigue for 1 week duration. Weakness and fatigue insidious in onset, unable to give a definitive time she last felt well. States lately she has not had energy to even get out of bed to eat or get her medications. It has been getting progressively worse.  At baseline patient is able to walk with use of cane about 1-2blocks. Currently she feels SOB and fatigued when she stands from bed.     She denies fever, chills, CP, orthopnea, LE edema, nausea, vomiting, diarrhea, abdominal pain, dysuria, increased urinary frequency. Patient denies melena or hematochezia. Patient states she has not had a BM since Monday. Her typical schedule is every day to every other day. She denies any recent illness or sick contacts.    In ED T97.7, HR83, /79, RR19 99%. Patient was found to be pancytopenic on CBC, WBC 2.5, H/H 6/17.6, Plt 60. BMP showed hypokalemia with K 3.1. She was given 40mEq KCl, and a bolus of NS. She was ordered for 1 unit pRBC. (06 Sep 2017 16:34)      Interval History - no change in neuro symptoms        REVIEW OF SYSTEMS    Vital Signs Last 24 Hrs  T(C): 36.3 (17 Sep 2017 06:23), Max: 36.6 (16 Sep 2017 12:01)  T(F): 97.4 (17 Sep 2017 06:23), Max: 97.8 (16 Sep 2017 12:01)  HR: 64 (17 Sep 2017 06:23) (64 - 72)  BP: 130/80 (17 Sep 2017 06:23) (106/68 - 130/80)  BP(mean): --  RR: 14 (17 Sep 2017 06:23) (14 - 16)  SpO2: 95% (17 Sep 2017 06:23) (94% - 97%)    Physical Exam-     Mental Status- fartigue    Cranial Nerves-no diplopia    Gait and station-n/a    Motor-no foot drop    Reflexes- decreased    Sensation-    Coordination-no change in tremors    Vascular -    Medications  folic acid 1 milliGRAM(s) Oral daily  rivaroxaban 20 milliGRAM(s) Oral every 24 hours  docusate sodium 100 milliGRAM(s) Oral three times a day  senna 2 Tablet(s) Oral at bedtime  sodium chloride 0.9%. 1000 milliLiter(s) IV Continuous <Continuous>  cefepime  IVPB 1000 milliGRAM(s) IV Intermittent every 12 hours  metroNIDAZOLE  IVPB      metroNIDAZOLE  IVPB 500 milliGRAM(s) IV Intermittent every 8 hours  acetaminophen   Tablet 650 milliGRAM(s) Oral every 6 hours PRN  bisacodyl Suppository 10 milliGRAM(s) Rectal daily PRN  escitalopram 5 milliGRAM(s) Oral daily  vancomycin  IVPB 1000 milliGRAM(s) IV Intermittent every 24 hours      Lab      Radiology    Assessment- Essential tremors    Plan- no neuro testing needed

## 2017-09-17 NOTE — PROGRESS NOTE ADULT - PROBLEM SELECTOR PROBLEM 1
Myeloproliferative disorder
PE (pulmonary thromboembolism)
PE (pulmonary thromboembolism)
Sepsis due to undetermined organism
Myeloproliferative disorder
Myeloproliferative disorder
PE (pulmonary thromboembolism)

## 2017-09-17 NOTE — PROGRESS NOTE ADULT - PROBLEM SELECTOR PLAN 2
Patient K on admission 3.1, given 40mEq in ED. Repeat BMP showed K 3.9.  -Resolved  -Follow up BMP
Patient K on admission 3.1, given 40mEq in ED. Repeat BMP in morning showed continued hypokalemia. Given 2 runs of 40mEq KLOR.  -Follow up BMP
Patient K on admission 3.1, given 40mEq in ED. Repeat BMP showed K 3.9.  -Resolved  -Follow up BMP
Patient came in with positive UA, UCx sent showing growth of Klebsiella. In setting of urinary retention plan is to treat for uncomplicated acute cystitis.   -Continue Bactrim BID for total of 5-7d
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 Last CBC in June showed WBC 13.3, H/H 15.5/47.0, . Patient current pancytopenia most likely exacerbation of MPD as she has been admitted in past for this.  -Patient Hgb 6.9 this morning, will transfuse 1 unit pRBC.   -Patient has increased Reticulocyte count, Decreased Haptoglobin and Increased bilirubin most likely showing hemolytic picture. Will continue to monitor anemia labs as they come in.  -Transfuse Hgb <7.0  -Transfuse Plt <15k unless actively bleeding  -Patient was scheduled for Vascular intervention with placement of IVC Filter 2/2 to thrombocytopenia. Would want to stop Xarelto during thrombocytopenia.   -Dr. Dumont is [facility] Heme/Onc, she is aware of patient, will follow up with rec's
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 Last CBC in June showed WBC 13.3, H/H 15.5/47.0, . Patient current pancytopenia most likely exacerbation of MPD as she has been admitted in past for this.  -S/P Transfusion of 2 unit pRBC, Hgb up to 8.0. Will hold any future transfusions pending Heme/Onc Recommendations.  -Patient has increased Reticulocyte count, Decreased Haptoglobin and Increased bilirubin most likely showing hemolytic picture. Will continue to monitor anemia labs as they come in.  -Transfuse Hgb <7.0  -Transfuse Plt <15k unless actively bleeding  -Patient was scheduled for Vascular intervention with placement of IVC Filter 2/2 to thrombocytopenia. Would want to stop Xarelto during thrombocytopenia.   -Dr. Dumont is [facility] Heme/Onc, she is aware of patient, will follow up with rec's
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 Last CBC in June showed WBC 13.3, H/H 15.5/47.0, . Patient current pancytopenia most likely exacerbation of MPD as she has been admitted in past for this. Patient Hgb 8.7 this morning  -Patient has increased Reticulocyte count, Decreased Haptoglobin and Increased bilirubin most likely showing hemolytic picture. Will continue to monitor anemia labs as they come in.  -Transfuse Hgb <7.0  -Transfuse Plt <15k unless actively bleeding  -Patient was scheduled for Vascular intervention with placement of IVC Filter 2/2 to thrombocytopenia. Would want to stop Xarelto during thrombocytopenia.   -Dr. Dumont is [facility] Heme/Onc, she is aware of patient, will follow up with rec's
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 Last CBC in June showed WBC 13.3, H/H 15.5/47.0, . Patient current pancytopenia most likely exacerbation of MPD as she has been admitted in past for this. Patient Hgb 8.7 this morning  -Patient has increased Reticulocyte count, Decreased Haptoglobin and Increased bilirubin most likely showing hemolytic picture. Will continue to monitor anemia labs as they come in.  -Transfuse Hgb <7.0  -Transfuse Plt <15k unless actively bleeding  -Patient was scheduled for Vascular intervention with placement of IVC Filter 2/2 to thrombocytopenia. Would want to stop Xarelto during thrombocytopenia.   -Dr. Dumont is [facility] Heme/Onc, she is aware of patient, will follow up with rec's
as above
as above
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 Last CBC in June showed WBC 13.3, H/H 15.5/47.0, . Patient current pancytopenia most likely exacerbation of MPD as she has been admitted in past for this.  -S/P Transfusion of 2 unit pRBC, Hgb up to 8.0. Will hold any future transfusions pending Heme/Onc Recommendations.  -Patient has increased Reticulocyte count, Decreased Haptoglobin and Increased bilirubin most likely showing hemolytic picture. Will continue to monitor anemia labs as they come in.  -Transfuse Hgb <7.0  -Transfuse Plt <15k unless actively bleeding  -Patient was scheduled for Vascular intervention with placement of IVC Filter 2/2 to thrombocytopenia. Would want to stop Xarelto during thrombocytopenia.   -Dr. Lockett: Requested Lower Extremity Doppler and V/Q Scan to determine if there is resolution or improvement in clots. V/Q scan shows no interval change from previous scan. Lower extremity doppler shows new signs of DVTs  -Dr. Dumont is [facility] Heme/Onc, she is aware of patient, will follow up with rec's
Patient came in with positive UA, UCx sent showing growth of Klebsiella. In setting of urinary retention plan is to treat for uncomplicated acute cystitis.   -Continue Bactrim BID for total of 5-7d
Patient came in with positive UA, UCx sent showing growth of Klebsiella. In setting of urinary retention plan is to treat for uncomplicated acute cystitis.   -Continue Bactrim BID for total of 5-7d
the repeat US  revealed new DVT. Continue anticoagulation, and is most likely to her hypercoagulable state

## 2017-09-17 NOTE — PROGRESS NOTE ADULT - ASSESSMENT
MDS  Pancytopenia/Neutropenia with fever - concern due to Bactrim  Urinary retention and prior UTI  No apparent other infectious process    RECOMMEND  D/C antibiotics  ?voiding trial   Investigate origin of urinary retention

## 2017-09-17 NOTE — PROGRESS NOTE ADULT - PROBLEM SELECTOR PROBLEM 3
Essential hypertension
Acute cystitis without hematuria
Dyspnea
Dyspnea
Essential hypertension
Hypokalemia
Urinary retention
Acute cystitis without hematuria
Hypokalemia
Hypokalemia
Dyspnea

## 2017-09-17 NOTE — PROGRESS NOTE ADULT - PROBLEM SELECTOR PROBLEM 2
Hypokalemia
Acute cystitis without hematuria
DVT (deep venous thrombosis)
DVT (deep venous thrombosis)
Hypokalemia
Hypokalemia
Myeloproliferative disorder
Acute cystitis without hematuria
Acute cystitis without hematuria
DVT (deep venous thrombosis)

## 2017-09-17 NOTE — PROGRESS NOTE ADULT - PROBLEM SELECTOR PLAN 7
F: Patient refusing fluids at this time  E: Replete K>4, Mg>2  N: Reg. Diet
P: Patient High Risk, currently holding Xarelto pending IVC filter placement  C: FULL CODE  D: Continue the patient on the floor for pancytopenia work up under Dr. Tapia
Patient was seen in ED in June and found to have right femoral and right popiteal DVT. Started on Xarelto then. Has presence of new DVT on Doppler.  -Patient currently on Xarelto  -Presence of new DVT indication for IVC filter, discussed with patient again and she refuses to have filter placed in this hospital.

## 2017-09-17 NOTE — PROGRESS NOTE ADULT - PROBLEM SELECTOR PLAN 3
Patient currently normotensive, does not take any medications at home.  -Will continue to monitor, question as to whether she takes Norvasc 5mg at home, if patient becomes HTN will add Norvasc
Most likely 2/2 to constipation.  -Will start Colace 100mg TID and Senna Qd today. If patient does not have BM today will consider adding Miralax tomorrow to further aid in constipation  -Patient to be OOB and ambulating with aid as much as possible  -TOV tomorrow pending ambulation and BM
Patient K on admission 3.1, given 40mEq in ED. Repeat BMP showed K 3.9.  -Resolved
Patient came in with positive UA, UCx sent showing growth of Klebsiella. In setting of urinary retention plan is to treat for uncomplicated acute cystitis.   -Continue Cefepime
Patient currently normotensive, does not take any medications at home.  -Will continue to monitor, question as to whether she takes Norvasc 5mg at home, if patient becomes HTN will add Norvasc
as above
as above
Patient came in with positive UA, UCx sent showing growth of Klebsiella. In setting of urinary retention plan is to treat for uncomplicated acute cystitis.   -Continue Bactrim BID for total of 5-7d
Patient K on admission 3.1, given 40mEq in ED. Repeat BMP showed K 3.9.  -Resolved
Patient K on admission 3.1, given 40mEq in ED. Repeat BMP showed K 3.9.  -Resolved
most likely related to her my dysplastic state

## 2017-09-17 NOTE — PROGRESS NOTE ADULT - PROBLEM SELECTOR PROBLEM 8
Need for prophylactic measure
Nutrition, metabolism, and development symptoms

## 2017-09-17 NOTE — PROGRESS NOTE ADULT - SUBJECTIVE AND OBJECTIVE BOX
77F with PMH of MDS, previous PE, HTN who presents with weakness and fatigue for 1 week duration. Weakness and fatigue insidious in onset, unable to give a definitive time she last felt well. States lately she has not had energy to even get out of bed to eat or get her medications. It has been getting progressively worse.  At baseline patient is able to walk with use of cane about 1-2blocks. Currently she feels SOB and fatigued when she stands from bed.     She denies fever, chills, CP, orthopnea, LE edema, nausea, vomiting, diarrhea, abdominal pain, dysuria, increased urinary frequency. Patient denies melena or hematochezia. Patient states she has not had a BM since Monday. Her typical schedule is every day to every other day. She denies any recent illness or sick contacts.    In ED T97.7, HR83, /79, RR19 99%. Patient was found to be pancytopenic on CBC, WBC 2.5, H/H 6/17.6, Plt 60. BMP showed hypokalemia with K 3.1. She was given 40mEq KCl, and a bolus of NS. She was ordered for 1 unit pRBC.         Patient has no complaints other than her arms being cold. Patient denies headache, dizziness, lightheadedness, chest pain, shortness of breath, palpitations, abdominal pain, nausea, vomiting, diarrhea, fevers, and chills. Later in the AM, patient had nausea and 1 episode of emesis and was given zofran for her nausea.     ICU Vital Signs Last 24 Hrs  T(C): 36.4 (17 Sep 2017 10:39), Max: 36.4 (16 Sep 2017 21:30)  T(F): 97.5 (17 Sep 2017 10:39), Max: 97.5 (16 Sep 2017 21:30)  HR: 64 (17 Sep 2017 10:39) (64 - 72)  BP: 110/70 (17 Sep 2017 10:39) (106/68 - 130/80)  BP(mean): --  ABP: --  ABP(mean): --  RR: 16 (17 Sep 2017 10:39) (14 - 16)  SpO2: 95% (17 Sep 2017 10:39) (95% - 97%)      PHYSICAL EXAM:    General: Depressed  HEENT: NC/AT; EOMI, anicteric sclera; dry mucous membranes  Neck: supple, no JVD  Cardiovascular: +S1/S2; RRR, no m/r/g  Respiratory: CTA B/L; no W/R/R  Gastrointestinal: soft, NT/ND; +BS  Extremities: WWP; no edema, clubbing or cyanosis  Vascular: 2+ radial, DP pulses B/L  Neurological: Awake, alert, and conversant; no focal deficits    MEDICATIONS:  MEDICATIONS  (STANDING):  folic acid 1 milliGRAM(s) Oral daily  rivaroxaban 20 milliGRAM(s) Oral every 24 hours  docusate sodium 100 milliGRAM(s) Oral three times a day  senna 2 Tablet(s) Oral at bedtime  sodium chloride 0.9%. 1000 milliLiter(s) (75 mL/Hr) IV Continuous <Continuous>  cefepime  IVPB 1000 milliGRAM(s) IV Intermittent every 12 hours  metroNIDAZOLE  IVPB      metroNIDAZOLE  IVPB 500 milliGRAM(s) IV Intermittent every 8 hours  escitalopram 5 milliGRAM(s) Oral daily  vancomycin  IVPB 1000 milliGRAM(s) IV Intermittent every 24 hours  ondansetron Injectable 4 milliGRAM(s) IV Push once  potassium chloride  10 mEq/100 mL IVPB 10 milliEquivalent(s) IV Intermittent every 1 hour    MEDICATIONS  (PRN):  acetaminophen   Tablet 650 milliGRAM(s) Oral every 6 hours PRN For Temp greater than 38 C (100.4 F), or Moderate Pain  bisacodyl Suppository 10 milliGRAM(s) Rectal daily PRN Constipation      ALLERGIES:  Allergies    No Known Allergies    Intolerances        LABS:                        8.7    2.7   )-----------( 63       ( 17 Sep 2017 06:17 )             26.3     09-17    137  |  102  |  8   ----------------------------<  98  3.3<L>   |  22  |  0.48<L>    Ca    8.3<L>      17 Sep 2017 06:15  Mg     1.8     09-17          CAPILLARY BLOOD GLUCOSE          RADIOLOGY & ADDITIONAL TESTS: Reviewed.

## 2017-09-18 VITALS
RESPIRATION RATE: 14 BRPM | DIASTOLIC BLOOD PRESSURE: 74 MMHG | SYSTOLIC BLOOD PRESSURE: 128 MMHG | OXYGEN SATURATION: 95 % | TEMPERATURE: 98 F | HEART RATE: 77 BPM

## 2017-09-18 LAB
ANION GAP SERPL CALC-SCNC: 12 MMOL/L — SIGNIFICANT CHANGE UP (ref 5–17)
BASOPHILS NFR BLD AUTO: 0.3 % — SIGNIFICANT CHANGE UP (ref 0–2)
BUN SERPL-MCNC: 6 MG/DL — LOW (ref 7–23)
CALCIUM SERPL-MCNC: 8.6 MG/DL — SIGNIFICANT CHANGE UP (ref 8.4–10.5)
CHLORIDE SERPL-SCNC: 104 MMOL/L — SIGNIFICANT CHANGE UP (ref 96–108)
CMV IGG FLD QL: <0.2 U/ML — SIGNIFICANT CHANGE UP
CMV IGG SERPL-IMP: NEGATIVE — SIGNIFICANT CHANGE UP
CMV IGM FLD-ACNC: <8 AU/ML — SIGNIFICANT CHANGE UP
CMV IGM SERPL QL: NEGATIVE — SIGNIFICANT CHANGE UP
CO2 SERPL-SCNC: 22 MMOL/L — SIGNIFICANT CHANGE UP (ref 22–31)
CREAT SERPL-MCNC: 0.53 MG/DL — SIGNIFICANT CHANGE UP (ref 0.5–1.3)
CULTURE RESULTS: SIGNIFICANT CHANGE UP
CULTURE RESULTS: SIGNIFICANT CHANGE UP
EOSINOPHIL NFR BLD AUTO: 1 % — SIGNIFICANT CHANGE UP (ref 0–6)
GLUCOSE SERPL-MCNC: 102 MG/DL — HIGH (ref 70–99)
HCT VFR BLD CALC: 26.2 % — LOW (ref 34.5–45)
HGB BLD-MCNC: 8.6 G/DL — LOW (ref 11.5–15.5)
LYMPHOCYTES # BLD AUTO: 38.1 % — SIGNIFICANT CHANGE UP (ref 13–44)
MAGNESIUM SERPL-MCNC: 1.9 MG/DL — SIGNIFICANT CHANGE UP (ref 1.6–2.6)
MCHC RBC-ENTMCNC: 30.3 PG — SIGNIFICANT CHANGE UP (ref 27–34)
MCHC RBC-ENTMCNC: 32.8 G/DL — SIGNIFICANT CHANGE UP (ref 32–36)
MCV RBC AUTO: 92.3 FL — SIGNIFICANT CHANGE UP (ref 80–100)
MONOCYTES NFR BLD AUTO: 16.1 % — HIGH (ref 2–14)
NEUTROPHILS NFR BLD AUTO: 44.5 % — SIGNIFICANT CHANGE UP (ref 43–77)
PLATELET # BLD AUTO: 75 K/UL — LOW (ref 150–400)
POTASSIUM SERPL-MCNC: 3.5 MMOL/L — SIGNIFICANT CHANGE UP (ref 3.5–5.3)
POTASSIUM SERPL-SCNC: 3.5 MMOL/L — SIGNIFICANT CHANGE UP (ref 3.5–5.3)
RBC # BLD: 2.84 M/UL — LOW (ref 3.8–5.2)
RBC # FLD: 25 % — HIGH (ref 10.3–16.9)
SODIUM SERPL-SCNC: 138 MMOL/L — SIGNIFICANT CHANGE UP (ref 135–145)
SPECIMEN SOURCE: SIGNIFICANT CHANGE UP
SPECIMEN SOURCE: SIGNIFICANT CHANGE UP
WBC # BLD: 3.1 K/UL — LOW (ref 3.8–10.5)
WBC # FLD AUTO: 3.1 K/UL — LOW (ref 3.8–10.5)

## 2017-09-18 RX ORDER — BUPROPION HYDROCHLORIDE 150 MG/1
1 TABLET, EXTENDED RELEASE ORAL
Qty: 0 | Refills: 0 | DISCHARGE
Start: 2017-09-18

## 2017-09-18 RX ORDER — ESCITALOPRAM OXALATE 10 MG/1
1 TABLET, FILM COATED ORAL
Qty: 0 | Refills: 0 | COMMUNITY
Start: 2017-09-18

## 2017-09-18 RX ORDER — BUPROPION HYDROCHLORIDE 150 MG/1
75 TABLET, EXTENDED RELEASE ORAL DAILY
Qty: 0 | Refills: 0 | Status: DISCONTINUED | OUTPATIENT
Start: 2017-09-18 | End: 2017-09-18

## 2017-09-18 RX ADMIN — ESCITALOPRAM OXALATE 5 MILLIGRAM(S): 10 TABLET, FILM COATED ORAL at 12:28

## 2017-09-18 RX ADMIN — Medication 1 MILLIGRAM(S): at 12:27

## 2017-09-18 RX ADMIN — SODIUM CHLORIDE 75 MILLILITER(S): 9 INJECTION INTRAMUSCULAR; INTRAVENOUS; SUBCUTANEOUS at 06:55

## 2017-09-18 NOTE — CHART NOTE - NSCHARTNOTEFT_GEN_A_CORE
Admitting Diagnosis:   Patient is a 77y old  Female who presents with a chief complaint of Weakness and Fatigue (09 Sep 2017 14:55)      PAST MEDICAL & SURGICAL HISTORY:  Myeloproliferative disorder  Hypertension  PE (pulmonary thromboembolism): 2015  Tremor  Vestibular disequilibrium  S/P hysterectomy      Current Nutrition Order: Regular        PO Intake: Good (%) [   ]  Fair (50-75%) [   ] Poor (<25%) [ X ]    GI Issues:  No n/v/d/c     Pain: Pain controlled     Skin Integrity: WNL    Labs:   09-18    138  |  104  |  6<L>  ----------------------------<  102<H>  3.5   |  22  |  0.53    Ca    8.6      18 Sep 2017 07:33  Mg     1.9     09-18      CAPILLARY BLOOD GLUCOSE          Medications:  MEDICATIONS  (STANDING):  folic acid 1 milliGRAM(s) Oral daily  rivaroxaban 20 milliGRAM(s) Oral every 24 hours  docusate sodium 100 milliGRAM(s) Oral three times a day  senna 2 Tablet(s) Oral at bedtime  sodium chloride 0.9%. 1000 milliLiter(s) (75 mL/Hr) IV Continuous <Continuous>  buPROPion . 75 milliGRAM(s) Oral daily    MEDICATIONS  (PRN):  acetaminophen   Tablet 650 milliGRAM(s) Oral every 6 hours PRN For Temp greater than 38 C (100.4 F), or Moderate Pain  bisacodyl Suppository 10 milliGRAM(s) Rectal daily PRN Constipation      Weight:49.9 kg  Daily     Daily     Weight Change:  No new wt to assess    Estimated energy needs:   Weight: 49.9kg    Estimated energy needs:    Estimated Energy Needs (calories/kg):  · Weight Used for Calculation	admission: 49.9kg      Estimated Energy Needs (25-30 calories/kg):  · Weight  (lbs)	110 lb  · Weight (kg)	49.9 kg  · From (25cal/kg)	1247  · To (30cal/kg)	1497     Other Calculation:  · Other Calculation	IBW: 125#  %IBW: 88%; BMI: 18.3kg/m2     Estimated Protein Needs (1.0-1.2 gm/kg):  · Weight  (lbs)	110  · Weight (kg)	49.8 kg  · From (1 g/kg)	49.8  · To (1.2 g/kg)	59.76     Estimated Fluid Needs (25-30 ml/kg):  · Weight  (lbs)	110  · Weight (kg)	49.8  · From (25 ml/kg)	1245  · To (30 ml/kg)	1494       Subjective: Pt reported poor intake of meals due to lack of appetite and weakness. Denies n/v/d/c or pain. Encouraged adequate PO intake and consumption of nutrient/calorie dense foods. Pt reports ~4 lb wt loss as per reported UBW of 111-112 lbs     Previous Nutrition Diagnosis: Underweight r/t suspected inadequate oral intake PTA AEB BMI 18.3kg/m2    Active [ X  ]  Resolved [   ]    If resolved, new PES:       Goal: 1.) Pt to meet 75% of needs PO     Recommendations: Rec. Ensure Enlive BID     Education: Encouraged adequate PO intake and consumption of nutrient/calorie dense foods.     Risk Level: High [ X  ] Moderate [   ] Low [   ]

## 2017-09-18 NOTE — PROGRESS NOTE ADULT - SUBJECTIVE AND OBJECTIVE BOX
Neurology Follow up note    Name  ANDREW STUART    HPI:  77F with PMH of MDS, previous PE, HTN who presents with weakness and fatigue for 1 week duration. Weakness and fatigue insidious in onset, unable to give a definitive time she last felt well. States lately she has not had energy to even get out of bed to eat or get her medications. It has been getting progressively worse.  At baseline patient is able to walk with use of cane about 1-2blocks. Currently she feels SOB and fatigued when she stands from bed.     She denies fever, chills, CP, orthopnea, LE edema, nausea, vomiting, diarrhea, abdominal pain, dysuria, increased urinary frequency. Patient denies melena or hematochezia. Patient states she has not had a BM since Monday. Her typical schedule is every day to every other day. She denies any recent illness or sick contacts.    In ED T97.7, HR83, /79, RR19 99%. Patient was found to be pancytopenic on CBC, WBC 2.5, H/H 6/17.6, Plt 60. BMP showed hypokalemia with K 3.1. She was given 40mEq KCl, and a bolus of NS. She was ordered for 1 unit pRBC. (06 Sep 2017 16:34)      Interval History - no new neuro symptoms        REVIEW OF SYSTEMS    Vital Signs Last 24 Hrs  T(C): 36.4 (18 Sep 2017 05:46), Max: 36.6 (17 Sep 2017 22:00)  T(F): 97.6 (18 Sep 2017 05:46), Max: 97.9 (17 Sep 2017 22:00)  HR: 77 (18 Sep 2017 05:46) (64 - 77)  BP: 128/74 (18 Sep 2017 05:46) (110/70 - 128/74)  BP(mean): --  RR: 14 (18 Sep 2017 05:46) (14 - 16)  SpO2: 95% (18 Sep 2017 05:46) (95% - 96%)    Physical Exam-     Mental Status- awake and alert    Cranial Nerves- full EOM    Gait and station- unsteady    Motor- moves all 4 extremities     Reflexes- decreased    Sensation-decreased    Coordination-mild tremors    Vascular -    Medications  folic acid 1 milliGRAM(s) Oral daily  rivaroxaban 20 milliGRAM(s) Oral every 24 hours  docusate sodium 100 milliGRAM(s) Oral three times a day  senna 2 Tablet(s) Oral at bedtime  sodium chloride 0.9%. 1000 milliLiter(s) IV Continuous <Continuous>  acetaminophen   Tablet 650 milliGRAM(s) Oral every 6 hours PRN  bisacodyl Suppository 10 milliGRAM(s) Rectal daily PRN  escitalopram 5 milliGRAM(s) Oral daily      Lab      Radiology    Assessment- Essential tremors     Plan- no further treatment for tremors

## 2017-09-18 NOTE — CHART NOTE - NSCHARTNOTEFT_GEN_A_CORE
Upon Nutritional Assessment by the Registered Dietitian your patient was determined to meet criteria / has evidence of the following diagnosis/diagnoses:          [ ]  Mild Protein Calorie Malnutrition        [ ]  Moderate Protein Calorie Malnutrition        [ ] Severe Protein Calorie Malnutrition        [ ] Unspecified Protein Calorie Malnutrition        [X] Underweight / BMI <19        [ ] Morbid Obesity / BMI > 40      Findings as based on:  •  Comprehensive nutrition assessment and consultation  •  Calorie counts (nutrient intake analysis)  •  Food acceptance and intake status from observations by staff  •  Follow up  •  Patient education  •  Intervention secondary to interdisciplinary rounds  •   concerns      Treatment:    The following diet has been recommended:      PROVIDER Section:     By signing this assessment you are acknowledging and agree with the diagnosis/diagnoses assigned by the Registered Dietitian    Comments:

## 2017-09-18 NOTE — PROGRESS NOTE ADULT - PROVIDER SPECIALTY LIST ADULT
Heme/Onc
Infectious Disease
Internal Medicine
Neurology
Pulmonology
Rehab Medicine
Neurology
Internal Medicine
Infectious Disease
Internal Medicine

## 2017-09-19 LAB
CULTURE RESULTS: SIGNIFICANT CHANGE UP
SPECIMEN SOURCE: SIGNIFICANT CHANGE UP

## 2017-09-20 DIAGNOSIS — I10 ESSENTIAL (PRIMARY) HYPERTENSION: ICD-10-CM

## 2017-09-20 DIAGNOSIS — A41.9 SEPSIS, UNSPECIFIED ORGANISM: ICD-10-CM

## 2017-09-20 DIAGNOSIS — E87.6 HYPOKALEMIA: ICD-10-CM

## 2017-09-20 DIAGNOSIS — D61.818 OTHER PANCYTOPENIA: ICD-10-CM

## 2017-09-20 DIAGNOSIS — I82.519 CHRONIC EMBOLISM AND THROMBOSIS OF UNSPECIFIED FEMORAL VEIN: ICD-10-CM

## 2017-09-20 DIAGNOSIS — Z86.711 PERSONAL HISTORY OF PULMONARY EMBOLISM: ICD-10-CM

## 2017-09-20 DIAGNOSIS — C94.6 MYELODYSPLASTIC DISEASE, NOT ELSEWHERE CLASSIFIED: ICD-10-CM

## 2017-09-20 DIAGNOSIS — R33.9 RETENTION OF URINE, UNSPECIFIED: ICD-10-CM

## 2017-09-20 DIAGNOSIS — R91.1 SOLITARY PULMONARY NODULE: ICD-10-CM

## 2017-09-20 DIAGNOSIS — Z90.710 ACQUIRED ABSENCE OF BOTH CERVIX AND UTERUS: ICD-10-CM

## 2017-09-20 DIAGNOSIS — H81.8X9 OTHER DISORDERS OF VESTIBULAR FUNCTION, UNSPECIFIED EAR: ICD-10-CM

## 2017-09-20 DIAGNOSIS — N30.00 ACUTE CYSTITIS WITHOUT HEMATURIA: ICD-10-CM

## 2017-09-20 DIAGNOSIS — F17.200 NICOTINE DEPENDENCE, UNSPECIFIED, UNCOMPLICATED: ICD-10-CM

## 2017-09-20 DIAGNOSIS — F32.9 MAJOR DEPRESSIVE DISORDER, SINGLE EPISODE, UNSPECIFIED: ICD-10-CM

## 2017-09-20 DIAGNOSIS — R53.1 WEAKNESS: ICD-10-CM

## 2017-09-20 DIAGNOSIS — D46.9 MYELODYSPLASTIC SYNDROME, UNSPECIFIED: ICD-10-CM

## 2017-09-21 DIAGNOSIS — Z90.710 ACQUIRED ABSENCE OF BOTH CERVIX AND UTERUS: ICD-10-CM

## 2017-09-21 DIAGNOSIS — F17.200 NICOTINE DEPENDENCE, UNSPECIFIED, UNCOMPLICATED: ICD-10-CM

## 2017-09-21 DIAGNOSIS — R33.9 RETENTION OF URINE, UNSPECIFIED: ICD-10-CM

## 2017-10-19 PROCEDURE — 97530 THERAPEUTIC ACTIVITIES: CPT

## 2017-10-19 PROCEDURE — 80053 COMPREHEN METABOLIC PANEL: CPT

## 2017-10-19 PROCEDURE — 93970 EXTREMITY STUDY: CPT

## 2017-10-19 PROCEDURE — 99285 EMERGENCY DEPT VISIT HI MDM: CPT | Mod: 25

## 2017-10-19 PROCEDURE — 97116 GAIT TRAINING THERAPY: CPT

## 2017-10-19 PROCEDURE — 87086 URINE CULTURE/COLONY COUNT: CPT

## 2017-10-19 PROCEDURE — 87186 SC STD MICRODIL/AGAR DIL: CPT

## 2017-10-19 PROCEDURE — 84145 PROCALCITONIN (PCT): CPT

## 2017-10-19 PROCEDURE — 82746 ASSAY OF FOLIC ACID SERUM: CPT

## 2017-10-19 PROCEDURE — 86140 C-REACTIVE PROTEIN: CPT

## 2017-10-19 PROCEDURE — 36430 TRANSFUSION BLD/BLD COMPNT: CPT

## 2017-10-19 PROCEDURE — 83010 ASSAY OF HAPTOGLOBIN QUANT: CPT

## 2017-10-19 PROCEDURE — 81001 URINALYSIS AUTO W/SCOPE: CPT

## 2017-10-19 PROCEDURE — 86900 BLOOD TYPING SEROLOGIC ABO: CPT

## 2017-10-19 PROCEDURE — 87798 DETECT AGENT NOS DNA AMP: CPT

## 2017-10-19 PROCEDURE — 86923 COMPATIBILITY TEST ELECTRIC: CPT

## 2017-10-19 PROCEDURE — 86850 RBC ANTIBODY SCREEN: CPT

## 2017-10-19 PROCEDURE — 93005 ELECTROCARDIOGRAM TRACING: CPT

## 2017-10-19 PROCEDURE — 84443 ASSAY THYROID STIM HORMONE: CPT

## 2017-10-19 PROCEDURE — 85045 AUTOMATED RETICULOCYTE COUNT: CPT

## 2017-10-19 PROCEDURE — 85025 COMPLETE CBC W/AUTO DIFF WBC: CPT

## 2017-10-19 PROCEDURE — 97161 PT EVAL LOW COMPLEX 20 MIN: CPT

## 2017-10-19 PROCEDURE — 86644 CMV ANTIBODY: CPT

## 2017-10-19 PROCEDURE — 83735 ASSAY OF MAGNESIUM: CPT

## 2017-10-19 PROCEDURE — 87581 M.PNEUMON DNA AMP PROBE: CPT

## 2017-10-19 PROCEDURE — P9016: CPT

## 2017-10-19 PROCEDURE — 71045 X-RAY EXAM CHEST 1 VIEW: CPT

## 2017-10-19 PROCEDURE — 87486 CHLMYD PNEUM DNA AMP PROBE: CPT

## 2017-10-19 PROCEDURE — 80202 ASSAY OF VANCOMYCIN: CPT

## 2017-10-19 PROCEDURE — 84436 ASSAY OF TOTAL THYROXINE: CPT

## 2017-10-19 PROCEDURE — 71260 CT THORAX DX C+: CPT

## 2017-10-19 PROCEDURE — 87633 RESP VIRUS 12-25 TARGETS: CPT

## 2017-10-19 PROCEDURE — 78582 LUNG VENTILAT&PERFUS IMAGING: CPT

## 2017-10-19 PROCEDURE — 80048 BASIC METABOLIC PNL TOTAL CA: CPT

## 2017-10-19 PROCEDURE — 87040 BLOOD CULTURE FOR BACTERIA: CPT

## 2017-10-19 PROCEDURE — 84100 ASSAY OF PHOSPHORUS: CPT

## 2017-10-19 PROCEDURE — 86901 BLOOD TYPING SEROLOGIC RH(D): CPT

## 2017-10-19 PROCEDURE — A9540: CPT

## 2017-10-19 PROCEDURE — 36415 COLL VENOUS BLD VENIPUNCTURE: CPT

## 2017-10-19 PROCEDURE — 85027 COMPLETE CBC AUTOMATED: CPT

## 2017-10-19 PROCEDURE — A9567: CPT

## 2017-10-19 PROCEDURE — 86645 CMV ANTIBODY IGM: CPT

## 2017-10-19 PROCEDURE — 74177 CT ABD & PELVIS W/CONTRAST: CPT

## 2017-10-19 PROCEDURE — 82607 VITAMIN B-12: CPT

## 2017-10-20 PROBLEM — D47.1 CHRONIC MYELOPROLIFERATIVE DISEASE: Chronic | Status: ACTIVE | Noted: 2017-09-06

## 2017-10-24 ENCOUNTER — APPOINTMENT (OUTPATIENT)
Dept: INFUSION THERAPY | Facility: HOSPITAL | Age: 77
End: 2017-10-24

## 2017-10-25 ENCOUNTER — APPOINTMENT (OUTPATIENT)
Dept: INFUSION THERAPY | Facility: HOSPITAL | Age: 77
End: 2017-10-25

## 2017-10-25 ENCOUNTER — APPOINTMENT (OUTPATIENT)
Dept: HEMATOLOGY ONCOLOGY | Facility: CLINIC | Age: 77
End: 2017-10-25

## 2018-01-26 VITALS
TEMPERATURE: 98 F | WEIGHT: 104.06 LBS | RESPIRATION RATE: 18 BRPM | DIASTOLIC BLOOD PRESSURE: 100 MMHG | HEART RATE: 72 BPM | SYSTOLIC BLOOD PRESSURE: 182 MMHG | OXYGEN SATURATION: 95 %

## 2018-01-26 LAB
ALBUMIN SERPL ELPH-MCNC: 3.9 G/DL — SIGNIFICANT CHANGE UP (ref 3.3–5)
ALP SERPL-CCNC: 99 U/L — SIGNIFICANT CHANGE UP (ref 40–120)
ALT FLD-CCNC: 8 U/L — LOW (ref 10–45)
ANION GAP SERPL CALC-SCNC: 13 MMOL/L — SIGNIFICANT CHANGE UP (ref 5–17)
APPEARANCE UR: (no result)
AST SERPL-CCNC: 23 U/L — SIGNIFICANT CHANGE UP (ref 10–40)
BASOPHILS NFR BLD AUTO: 0.6 % — SIGNIFICANT CHANGE UP (ref 0–2)
BILIRUB SERPL-MCNC: 0.4 MG/DL — SIGNIFICANT CHANGE UP (ref 0.2–1.2)
BILIRUB UR-MCNC: NEGATIVE — SIGNIFICANT CHANGE UP
BUN SERPL-MCNC: 13 MG/DL — SIGNIFICANT CHANGE UP (ref 7–23)
CALCIUM SERPL-MCNC: 9.3 MG/DL — SIGNIFICANT CHANGE UP (ref 8.4–10.5)
CHLORIDE SERPL-SCNC: 102 MMOL/L — SIGNIFICANT CHANGE UP (ref 96–108)
CO2 SERPL-SCNC: 24 MMOL/L — SIGNIFICANT CHANGE UP (ref 22–31)
COLOR SPEC: YELLOW — SIGNIFICANT CHANGE UP
CREAT SERPL-MCNC: 0.74 MG/DL — SIGNIFICANT CHANGE UP (ref 0.5–1.3)
DIFF PNL FLD: (no result)
EOSINOPHIL NFR BLD AUTO: 1.8 % — SIGNIFICANT CHANGE UP (ref 0–6)
GLUCOSE SERPL-MCNC: 97 MG/DL — SIGNIFICANT CHANGE UP (ref 70–99)
GLUCOSE UR QL: NEGATIVE — SIGNIFICANT CHANGE UP
HCT VFR BLD CALC: 52.2 % — HIGH (ref 34.5–45)
HCT VFR BLD CALC: 54.9 % — HIGH (ref 34.5–45)
HGB BLD-MCNC: 16.5 G/DL — HIGH (ref 11.5–15.5)
HGB BLD-MCNC: 17.9 G/DL — HIGH (ref 11.5–15.5)
KETONES UR-MCNC: NEGATIVE — SIGNIFICANT CHANGE UP
LEUKOCYTE ESTERASE UR-ACNC: (no result)
LYMPHOCYTES # BLD AUTO: 10 % — LOW (ref 13–44)
MCHC RBC-ENTMCNC: 28.4 PG — SIGNIFICANT CHANGE UP (ref 27–34)
MCHC RBC-ENTMCNC: 29.2 PG — SIGNIFICANT CHANGE UP (ref 27–34)
MCHC RBC-ENTMCNC: 31.6 G/DL — LOW (ref 32–36)
MCHC RBC-ENTMCNC: 32.6 G/DL — SIGNIFICANT CHANGE UP (ref 32–36)
MCV RBC AUTO: 89.6 FL — SIGNIFICANT CHANGE UP (ref 80–100)
MCV RBC AUTO: 89.8 FL — SIGNIFICANT CHANGE UP (ref 80–100)
MONOCYTES NFR BLD AUTO: 4.8 % — SIGNIFICANT CHANGE UP (ref 2–14)
NEUTROPHILS NFR BLD AUTO: 82.8 % — HIGH (ref 43–77)
NITRITE UR-MCNC: NEGATIVE — SIGNIFICANT CHANGE UP
PH UR: 6 — SIGNIFICANT CHANGE UP (ref 5–8)
PLATELET # BLD AUTO: 353 K/UL — SIGNIFICANT CHANGE UP (ref 150–400)
PLATELET # BLD AUTO: 367 K/UL — SIGNIFICANT CHANGE UP (ref 150–400)
POTASSIUM SERPL-MCNC: 4.4 MMOL/L — SIGNIFICANT CHANGE UP (ref 3.5–5.3)
POTASSIUM SERPL-SCNC: 4.4 MMOL/L — SIGNIFICANT CHANGE UP (ref 3.5–5.3)
PROT SERPL-MCNC: 6.7 G/DL — SIGNIFICANT CHANGE UP (ref 6–8.3)
PROT UR-MCNC: 30 MG/DL
RBC # BLD: 5.81 M/UL — HIGH (ref 3.8–5.2)
RBC # BLD: 6.13 M/UL — HIGH (ref 3.8–5.2)
RBC # FLD: 16.2 % — SIGNIFICANT CHANGE UP (ref 10.3–16.9)
RBC # FLD: 16.6 % — SIGNIFICANT CHANGE UP (ref 10.3–16.9)
SODIUM SERPL-SCNC: 139 MMOL/L — SIGNIFICANT CHANGE UP (ref 135–145)
SP GR SPEC: 1.02 — SIGNIFICANT CHANGE UP (ref 1–1.03)
UROBILINOGEN FLD QL: 0.2 E.U./DL — SIGNIFICANT CHANGE UP
WBC # BLD: 19 K/UL — HIGH (ref 3.8–10.5)
WBC # BLD: 21 K/UL — HIGH (ref 3.8–10.5)
WBC # FLD AUTO: 19 K/UL — HIGH (ref 3.8–10.5)
WBC # FLD AUTO: 21 K/UL — HIGH (ref 3.8–10.5)

## 2018-01-26 PROCEDURE — 71045 X-RAY EXAM CHEST 1 VIEW: CPT | Mod: 26

## 2018-01-26 PROCEDURE — 99285 EMERGENCY DEPT VISIT HI MDM: CPT | Mod: 25

## 2018-01-26 PROCEDURE — 93970 EXTREMITY STUDY: CPT | Mod: 26

## 2018-01-26 PROCEDURE — 93010 ELECTROCARDIOGRAM REPORT: CPT

## 2018-01-26 RX ORDER — PHENAZOPYRIDINE HCL 100 MG
1 TABLET ORAL
Qty: 6 | Refills: 0 | OUTPATIENT
Start: 2018-01-26 | End: 2018-01-27

## 2018-01-26 RX ORDER — CEFUROXIME AXETIL 250 MG
500 TABLET ORAL ONCE
Qty: 0 | Refills: 0 | Status: COMPLETED | OUTPATIENT
Start: 2018-01-26 | End: 2018-01-26

## 2018-01-26 RX ORDER — PHENAZOPYRIDINE HCL 100 MG
200 TABLET ORAL ONCE
Qty: 0 | Refills: 0 | Status: COMPLETED | OUTPATIENT
Start: 2018-01-26 | End: 2018-01-26

## 2018-01-26 RX ORDER — SODIUM CHLORIDE 9 MG/ML
1500 INJECTION INTRAMUSCULAR; INTRAVENOUS; SUBCUTANEOUS ONCE
Qty: 0 | Refills: 0 | Status: COMPLETED | OUTPATIENT
Start: 2018-01-26 | End: 2018-01-26

## 2018-01-26 RX ORDER — CEFUROXIME AXETIL 250 MG
1 TABLET ORAL
Qty: 14 | Refills: 0 | OUTPATIENT
Start: 2018-01-26 | End: 2018-02-01

## 2018-01-26 RX ORDER — CEFUROXIME AXETIL 250 MG
250 TABLET ORAL ONCE
Qty: 0 | Refills: 0 | Status: DISCONTINUED | OUTPATIENT
Start: 2018-01-26 | End: 2018-01-26

## 2018-01-26 RX ORDER — HYDROXYUREA 500 MG/1
1 CAPSULE ORAL
Qty: 7 | Refills: 0 | OUTPATIENT
Start: 2018-01-26 | End: 2018-02-01

## 2018-01-26 RX ORDER — ACETAMINOPHEN 500 MG
975 TABLET ORAL ONCE
Qty: 0 | Refills: 0 | Status: COMPLETED | OUTPATIENT
Start: 2018-01-26 | End: 2018-01-26

## 2018-01-26 RX ADMIN — Medication 200 MILLIGRAM(S): at 21:59

## 2018-01-26 RX ADMIN — Medication 500 MILLIGRAM(S): at 20:53

## 2018-01-26 RX ADMIN — Medication 975 MILLIGRAM(S): at 21:59

## 2018-01-26 RX ADMIN — SODIUM CHLORIDE 1500 MILLILITER(S): 9 INJECTION INTRAMUSCULAR; INTRAVENOUS; SUBCUTANEOUS at 18:15

## 2018-01-26 NOTE — ED ADULT NURSE NOTE - OBJECTIVE STATEMENT
77y F, A&ox3, presents to ED c/o intermittent generalized weakness. PT states was seeing pcp and had difficulty ambulating, thus went to ER. No cp, no sob, no n/v, no fever, no chills. No urinary s/s, LUngs clear bilateral. No jvd, no edema. NAD. Will continue to monitor.

## 2018-01-26 NOTE — ED PROVIDER NOTE - DIAGNOSTIC INTERPRETATION
ER Physician:  Alesia Director  CHEST XRAY INTERPRETATION: lungs clear, heart shadow normal, old clavicle fx bilat

## 2018-01-26 NOTE — PHYSICAL THERAPY INITIAL EVALUATION ADULT - ADDITIONAL COMMENTS
Ramp access to elevator building. Prior use of RW. Pt had near fall in past month having fallen into the wall but without going to floor and able to recover. Pt did also have a fall 3-4 years ago as she would go into a quick shuffle to catch her balance with no predisposing reason with a fx to her wrist.

## 2018-01-26 NOTE — ED PROVIDER NOTE - OBJECTIVE STATEMENT
77y female h/o myeloproliferative disorder, DVT/PE on xarelto, with progressively worsening lower extremity weakness causing trouble walking. No falls. Uses walker at baseline. No weakness to UEs. Was out for scheduled LE Dopplers (h/o DVTs on xarelto, no symptoms) and told her friend she could not walk anymore, came to ED. No recent fever, chills, headache, neck pain, chest pain, cough, shortness of breath, abd pain, back pain, dysuria, rash, or travel.       Progress note from 11/10/2017 from her neurologist Yobani Zhu reports h/o MRI c-spine from 10/26/16 showing C3/4, C4/5 cervical cord compression; was deemed poor surgical candidate so no intervention made. 77y female h/o myeloproliferative disorder, anemia with pancytopenia, DVT/PE on xarelto, with progressively worsening lower extremity weakness causing trouble walking since 11/2017. No falls. Uses walker at baseline. No weakness to UEs. Was out for scheduled LE Dopplers (h/o DVTs on xarelto, PMD wanted to reassess) and told her friend she could not walk anymore, came to ED. No recent fever, chills, headache, neck pain, chest pain, cough, shortness of breath, abd pain, back pain, dysuria, rash, or travel.       Progress note from 11/10/2017 from her neurologist Yobani Zhu reports h/o MRI c-spine from 10/26/16 showing C3/4, C4/5 cervical cord compression; was deemed poor surgical candidate so no intervention made.

## 2018-01-26 NOTE — PHYSICAL THERAPY INITIAL EVALUATION ADULT - MODIFIED CLINICAL TEST OF SENSORY INTEGRATION IN BALANCE TEST
Pt demonstrates a decreased enoc and poor reaction skills when challenged with addition of visual impairments all leading pt to be a risk for falling. In addition, as per health care proxy pt spends most of her time in bed and has a diagnosis of spinal stenosis. Pt is likely developing weakness as a result of prolonged immobility which may exacerbate her stenosis. Thus pt is a good candidate for outpatient skilled PT to address deficits to optimize safety with ADLs.

## 2018-01-26 NOTE — ED PROVIDER NOTE - MEDICAL DECISION MAKING DETAILS
77y female with worsening b/l LE weakness 2/2 myeloproliferative d/o, with trouble walking. HDS, well appearing with grossly normal motor exam. D/w Dr Tapia. Will obtain the LE Dopplers here at pt poorly mobile. PT consult placed, d/w case mgmt. 77y female with worsening b/l LE weakness 2/2 myeloproliferative d/o with trouble walking since 11/2017. HDS, well appearing with grossly normal motor exam. D/w Dr Tapia. Will obtain the LE Dopplers here at pt poorly mobile. PT consult placed, d/w case mgmt. Labs show leukocytosis with elevated H&H, possible hemoconcentration, given not c/w her baseilne, infectious w/up ordered with UA, CXR, RVP.

## 2018-01-26 NOTE — ED PROVIDER NOTE - PROGRESS NOTE DETAILS
Pt received from Dr Coleman; pt in u/s at time of signout.  Case, labs reviewed. Await ua, cxr, rvp, pt consult.  Repeat cbc added for after ivf for ? hemoconcentration 2/2 dehydration.  Will reassess. Pt received from Dr Coleman; pt in u/s at time of signout.  Case, labs reviewed. Await ua, cxr, pt consult.  Repeat cbc added for after ivf for ? hemoconcentration 2/2 dehydration.  Will reassess. Eval by pt - cleared for outpt pt; rx written for pt 2/9-18 per CM request.  CXR, ekg wnl.  UA pos - given abx.  DVT more extensive on L, new on R.  Repeat cbc pending ivf.  PMD paged to discuss. Pt discussed w Dr Tapia - feels pt can be discharged and fu as outpt w him; no change in dvt mgmt at this time.  Repeat cbc pending. Repeat cbc similar to initial w slight elevations in wbc, hgb.  Dr Tapia paged to discuss. Repeat cbc similar to initial w slight elevations in wbc, hgb.  Dr Tapia and pt's hematologist, Dr Dumont, paged to discuss. Pt c/o pelvic pain/cramping - will give pyridium and tylenol.  No reply from Dr Dumont Pt c/o pelvic pain/cramping - will give pyridium and tylenol.  No reply from Dr Dumont as yet.  Per Dr Tapia, if Dr Dumont recommends admit or we feel pt needs admit, pt can be admitted to him. Pt discussed w Dr Dumont who states pt swings bt low and high numbers. She feels pt can be discharged and she can see her in the office to perform pheresis, recommends pt restart her hydroxyuria 500 mg once a day and aspirin.  Pt c/o cont pain but just received meds - will sign pt out to Dr Browning and POWER Allen - if pt's pain poorly controlled, pt can be admitted to Dr Tapia. pt s/o to me pending reassessment.    pt reevaluated, reports still w/ lower abd pain, on exam, tender, given clinical history and labs, CT ordered to eval for abd pathology CT w/ colitis and possible perf, surg consulted, d/w PMD, requested Dr. Campos    d/w surg team, agree w/ abx, admitted to surg to tele

## 2018-01-26 NOTE — ED PROVIDER NOTE - NEUROLOGICAL, MLM
Alert and oriented, no focal deficits, no motor or sensory deficits. full and equal b/l LE and b/l UE motor strength.

## 2018-01-26 NOTE — PHYSICAL THERAPY INITIAL EVALUATION ADULT - PERTINENT HX OF CURRENT PROBLEM, REHAB EVAL
77 year old female with progressively worsening lower extremity weakness causing trouble walking since 11/2017. No falls. Uses walker at baseline. No weakness to UEs.

## 2018-01-27 ENCOUNTER — INPATIENT (INPATIENT)
Facility: HOSPITAL | Age: 78
LOS: 5 days | Discharge: NURSING, REHAB OR LONG-TERM CARE | DRG: 391 | End: 2018-02-02
Attending: INTERNAL MEDICINE | Admitting: INTERNAL MEDICINE
Payer: MEDICARE

## 2018-01-27 DIAGNOSIS — Z90.710 ACQUIRED ABSENCE OF BOTH CERVIX AND UTERUS: Chronic | ICD-10-CM

## 2018-01-27 DIAGNOSIS — R34 ANURIA AND OLIGURIA: ICD-10-CM

## 2018-01-27 LAB
ANION GAP SERPL CALC-SCNC: 16 MMOL/L — SIGNIFICANT CHANGE UP (ref 5–17)
BUN SERPL-MCNC: 13 MG/DL — SIGNIFICANT CHANGE UP (ref 7–23)
CALCIUM SERPL-MCNC: 9.5 MG/DL — SIGNIFICANT CHANGE UP (ref 8.4–10.5)
CHLORIDE SERPL-SCNC: 104 MMOL/L — SIGNIFICANT CHANGE UP (ref 96–108)
CO2 SERPL-SCNC: 24 MMOL/L — SIGNIFICANT CHANGE UP (ref 22–31)
CREAT SERPL-MCNC: 0.83 MG/DL — SIGNIFICANT CHANGE UP (ref 0.5–1.3)
CULTURE RESULTS: SIGNIFICANT CHANGE UP
GLUCOSE SERPL-MCNC: 113 MG/DL — HIGH (ref 70–99)
POTASSIUM SERPL-MCNC: 3.3 MMOL/L — LOW (ref 3.5–5.3)
POTASSIUM SERPL-SCNC: 3.3 MMOL/L — LOW (ref 3.5–5.3)
SODIUM SERPL-SCNC: 144 MMOL/L — SIGNIFICANT CHANGE UP (ref 135–145)
SPECIMEN SOURCE: SIGNIFICANT CHANGE UP

## 2018-01-27 PROCEDURE — 74177 CT ABD & PELVIS W/CONTRAST: CPT | Mod: 26

## 2018-01-27 RX ORDER — PIPERACILLIN AND TAZOBACTAM 4; .5 G/20ML; G/20ML
3.38 INJECTION, POWDER, LYOPHILIZED, FOR SOLUTION INTRAVENOUS EVERY 6 HOURS
Qty: 0 | Refills: 0 | Status: DISCONTINUED | OUTPATIENT
Start: 2018-01-27 | End: 2018-02-02

## 2018-01-27 RX ORDER — HYDROMORPHONE HYDROCHLORIDE 2 MG/ML
0.5 INJECTION INTRAMUSCULAR; INTRAVENOUS; SUBCUTANEOUS EVERY 4 HOURS
Qty: 0 | Refills: 0 | Status: DISCONTINUED | OUTPATIENT
Start: 2018-01-27 | End: 2018-01-31

## 2018-01-27 RX ORDER — PIPERACILLIN AND TAZOBACTAM 4; .5 G/20ML; G/20ML
3.38 INJECTION, POWDER, LYOPHILIZED, FOR SOLUTION INTRAVENOUS ONCE
Qty: 0 | Refills: 0 | Status: COMPLETED | OUTPATIENT
Start: 2018-01-27 | End: 2018-01-27

## 2018-01-27 RX ORDER — METRONIDAZOLE 500 MG
1000 TABLET ORAL EVERY 12 HOURS
Qty: 0 | Refills: 0 | Status: COMPLETED | OUTPATIENT
Start: 2018-01-27 | End: 2018-01-28

## 2018-01-27 RX ORDER — SODIUM CHLORIDE 9 MG/ML
1000 INJECTION, SOLUTION INTRAVENOUS
Qty: 0 | Refills: 0 | Status: DISCONTINUED | OUTPATIENT
Start: 2018-01-27 | End: 2018-01-29

## 2018-01-27 RX ORDER — POTASSIUM PHOSPHATE, MONOBASIC POTASSIUM PHOSPHATE, DIBASIC 236; 224 MG/ML; MG/ML
20 INJECTION, SOLUTION INTRAVENOUS ONCE
Qty: 0 | Refills: 0 | Status: COMPLETED | OUTPATIENT
Start: 2018-01-27 | End: 2018-01-28

## 2018-01-27 RX ORDER — METRONIDAZOLE 500 MG
2000 TABLET ORAL ONCE
Qty: 0 | Refills: 0 | Status: DISCONTINUED | OUTPATIENT
Start: 2018-01-27 | End: 2018-01-27

## 2018-01-27 RX ORDER — POTASSIUM PHOSPHATE, MONOBASIC POTASSIUM PHOSPHATE, DIBASIC 236; 224 MG/ML; MG/ML
15 INJECTION, SOLUTION INTRAVENOUS ONCE
Qty: 0 | Refills: 0 | Status: COMPLETED | OUTPATIENT
Start: 2018-01-27 | End: 2018-01-27

## 2018-01-27 RX ORDER — SODIUM CHLORIDE 9 MG/ML
500 INJECTION, SOLUTION INTRAVENOUS
Qty: 0 | Refills: 0 | Status: DISCONTINUED | OUTPATIENT
Start: 2018-01-27 | End: 2018-01-27

## 2018-01-27 RX ORDER — ONDANSETRON 8 MG/1
4 TABLET, FILM COATED ORAL EVERY 6 HOURS
Qty: 0 | Refills: 0 | Status: DISCONTINUED | OUTPATIENT
Start: 2018-01-27 | End: 2018-02-02

## 2018-01-27 RX ADMIN — POTASSIUM PHOSPHATE, MONOBASIC POTASSIUM PHOSPHATE, DIBASIC 62.5 MILLIMOLE(S): 236; 224 INJECTION, SOLUTION INTRAVENOUS at 22:14

## 2018-01-27 RX ADMIN — SODIUM CHLORIDE 110 MILLILITER(S): 9 INJECTION, SOLUTION INTRAVENOUS at 14:33

## 2018-01-27 RX ADMIN — PIPERACILLIN AND TAZOBACTAM 200 GRAM(S): 4; .5 INJECTION, POWDER, LYOPHILIZED, FOR SOLUTION INTRAVENOUS at 21:08

## 2018-01-27 RX ADMIN — SODIUM CHLORIDE 110 MILLILITER(S): 9 INJECTION, SOLUTION INTRAVENOUS at 05:46

## 2018-01-27 RX ADMIN — PIPERACILLIN AND TAZOBACTAM 200 GRAM(S): 4; .5 INJECTION, POWDER, LYOPHILIZED, FOR SOLUTION INTRAVENOUS at 14:33

## 2018-01-27 RX ADMIN — PIPERACILLIN AND TAZOBACTAM 200 GRAM(S): 4; .5 INJECTION, POWDER, LYOPHILIZED, FOR SOLUTION INTRAVENOUS at 09:10

## 2018-01-27 RX ADMIN — PIPERACILLIN AND TAZOBACTAM 200 GRAM(S): 4; .5 INJECTION, POWDER, LYOPHILIZED, FOR SOLUTION INTRAVENOUS at 04:19

## 2018-01-27 NOTE — H&P ADULT - ASSESSMENT
78 yo F with h/o MDS, HTN, DVT/PE on xarelto with colitis with small microperforation  -admit to telemetry  -NPO/IVF  -Zosyn  -serial abdominal exams  -HSQ/SCDs  -discussed with chief on call

## 2018-01-27 NOTE — H&P ADULT - NSHPLABSRESULTS_GEN_ALL_CORE
INTERPRETATION:  CLINICAL STATEMENT: Lower abdominal pain. Evaluate for   colitis.    TECHNIQUE: CT of the abdomen and pelvis with intravenous and oral   contrast. Axial, sagittal, and coronal images were obtained and reviewed.    COMPARISON: CT abdomen pelvis from 9/14/2017 and CT abdomen from 7/29/2011    FINDINGS:    Lower chest: Right basilar bronchiectasis and trace atelectasis.. Mitral   annular calcification    Liver: Smooth in contour. No hypodense lesions are seen in the liver   which are too small to characterize.. Portal and hepatic veins are patent.    Biliary system: Gallbladder is normal in size. No calcified gallstones.   No biliary ductal dilatation.    Pancreas: Since 7/29/2011, there is again a 12 mm hypodense lesion in the   pancreatic body with a now a new peripheral calcification anteriorly.    Spleen: Unremarkable.    Adrenal glands: Unremarkable.    Kidneys: Symmetric parenchymal enhancement. There is a 4.3 cm right renal   cyst. Multiple hypodense lesions are seen in bilateral kidneys which are   too small to characterize.. Moderate bilateral hydronephrosis. No renal   or ureteral stone.     Urinary Bladder: Moderately distended urinary bladder with several   bladder diverticula are noted. The urinary bladder wall is thickened   which may be related to chronic bladder outlet obstruction versus   cystitis. Small bladder diverticula along its left posterior wall.    Reproductive organs: Status post hysterectomy. No adnexal masses.    Bowel/Peritoneum: There is circumferential wall thickening extending from   the mid descending colon through the distal sigmoid colon, where wall   thickening is most pronounced with associated intraluminal narrowing.   There is mild adjacent pericolic fat stranding. There are scattered   colonic diverticula within this region however the long segment   involvement is most suggestive for a colitis as oppose to a   diverticulitis. Few scattered foci of gas which appear extraluminal   location within the pelvis may indicate a small microperforation. No   large intraperitoneal free air appreciated. No evidence of bowel   obstruction. Appendix is not visualized however no inflammatory changes   appreciated at the right lower quadrant. No ascites or extraluminal gas.     Lymph nodes: No lymphadenopathy.    Aorta/IVC: There is a large amount of calcified and noncalcified plaque   involving the abdominal aorta. The abdominal aorta is aneurysmal   measuring 3.4 cm in diameter, unchanged. Abdominal wall: No hernia.    Bones/Soft tissues: Moderate degenerative changes.      IMPRESSION:     1.  Circumferential wall thickening extending from the mid descending   colon through the distal sigmoid colon, where wall thickening is most   pronounced with associated intraluminal narrowing. There is mild adjacent   pericolic fat stranding. There are scattered colonic diverticula within   this region however the long segment involvement is most suggestive for a   colitis as oppose to a diverticulitis. Few scattered foci of gas which   appear extraluminal location within the pelvis may indicate a small   microperforation.  2.    3.  Distended urinary bladder with mild bilateral hydronephrosis. Bladder   wall thickening with prominent adjacent perivesicular fat stranding.   Correlate clinically for cystitis/renal infection.  4.    5.  Unchanged abdominal aortic aneurysm measuring 3.4 cm.

## 2018-01-27 NOTE — PATIENT PROFILE ADULT. - PRO ARRIVE FROM
Pt mother given discharge instructions and medication delivered to room via meds to beds, no questions at this time, verbalized understanding regarding follow up. home

## 2018-01-27 NOTE — H&P ADULT - HISTORY OF PRESENT ILLNESS
78 yo F with PMH of myelodysplastic syndrome, anemia, DVT/PE on xarelto, HTN presented to ED with 1 day h/o weakness and lower abdominal pain that began. Pt reports she was having difficulty walking and 76 yo F with PMH of myelodysplastic syndrome, DVT/PE on xarelto, HTN presented to ED with 1 day h/o weakness and lower abdominal pain that began. Pt reports she was having difficulty walking and was scheduled to see her PCP but came to ED due to weakness. Had some nausea yesterday but no vomiting and reports some loose stools yesterday. No fevers or chills. Cannot recall last colonoscopy.

## 2018-01-27 NOTE — CONSULT NOTE ADULT - PROBLEM SELECTOR RECOMMENDATION 9
Urine electrolytes  FENa (fractional excretion of sodium)  Recommend renal consult BMP  Urine electrolytes  FENa (fractional excretion of sodium)  Recommend renal consult

## 2018-01-28 DIAGNOSIS — K52.9 NONINFECTIVE GASTROENTERITIS AND COLITIS, UNSPECIFIED: ICD-10-CM

## 2018-01-28 DIAGNOSIS — N13.30 UNSPECIFIED HYDRONEPHROSIS: ICD-10-CM

## 2018-01-28 DIAGNOSIS — E86.0 DEHYDRATION: ICD-10-CM

## 2018-01-28 LAB
ANION GAP SERPL CALC-SCNC: 17 MMOL/L — SIGNIFICANT CHANGE UP (ref 5–17)
APPEARANCE UR: (no result)
BILIRUB UR-MCNC: NEGATIVE — SIGNIFICANT CHANGE UP
BUN SERPL-MCNC: 10 MG/DL — SIGNIFICANT CHANGE UP (ref 7–23)
CALCIUM SERPL-MCNC: 8.9 MG/DL — SIGNIFICANT CHANGE UP (ref 8.4–10.5)
CHLORIDE SERPL-SCNC: 108 MMOL/L — SIGNIFICANT CHANGE UP (ref 96–108)
CHLORIDE UR-SCNC: 77 MMOL/L — SIGNIFICANT CHANGE UP
CO2 SERPL-SCNC: 18 MMOL/L — LOW (ref 22–31)
COLOR SPEC: YELLOW — SIGNIFICANT CHANGE UP
CREAT ?TM UR-MCNC: 126 MG/DL — SIGNIFICANT CHANGE UP
CREAT SERPL-MCNC: 0.76 MG/DL — SIGNIFICANT CHANGE UP (ref 0.5–1.3)
DIFF PNL FLD: (no result)
GLUCOSE BLDC GLUCOMTR-MCNC: 89 MG/DL — SIGNIFICANT CHANGE UP (ref 70–99)
GLUCOSE SERPL-MCNC: 94 MG/DL — SIGNIFICANT CHANGE UP (ref 70–99)
GLUCOSE UR QL: NEGATIVE — SIGNIFICANT CHANGE UP
HCT VFR BLD CALC: 50.2 % — HIGH (ref 34.5–45)
HGB BLD-MCNC: 15.4 G/DL — SIGNIFICANT CHANGE UP (ref 11.5–15.5)
KETONES UR-MCNC: (no result) MG/DL
LEUKOCYTE ESTERASE UR-ACNC: (no result)
MAGNESIUM SERPL-MCNC: 2 MG/DL — SIGNIFICANT CHANGE UP (ref 1.6–2.6)
MCHC RBC-ENTMCNC: 28.4 PG — SIGNIFICANT CHANGE UP (ref 27–34)
MCHC RBC-ENTMCNC: 30.7 G/DL — LOW (ref 32–36)
MCV RBC AUTO: 92.6 FL — SIGNIFICANT CHANGE UP (ref 80–100)
NITRITE UR-MCNC: POSITIVE
OSMOLALITY UR: 514 MOSMOL/KG — SIGNIFICANT CHANGE UP (ref 100–650)
PH UR: 5.5 — SIGNIFICANT CHANGE UP (ref 5–8)
PHOSPHATE SERPL-MCNC: 4.9 MG/DL — HIGH (ref 2.5–4.5)
PLATELET # BLD AUTO: 300 K/UL — SIGNIFICANT CHANGE UP (ref 150–400)
POTASSIUM SERPL-MCNC: 3.6 MMOL/L — SIGNIFICANT CHANGE UP (ref 3.5–5.3)
POTASSIUM SERPL-SCNC: 3.6 MMOL/L — SIGNIFICANT CHANGE UP (ref 3.5–5.3)
PROT UR-MCNC: NEGATIVE MG/DL — SIGNIFICANT CHANGE UP
RBC # BLD: 5.42 M/UL — HIGH (ref 3.8–5.2)
RBC # FLD: 16.7 % — SIGNIFICANT CHANGE UP (ref 10.3–16.9)
SODIUM SERPL-SCNC: 143 MMOL/L — SIGNIFICANT CHANGE UP (ref 135–145)
SODIUM UR-SCNC: 94 MMOL/L — SIGNIFICANT CHANGE UP
SP GR SPEC: 1.02 — SIGNIFICANT CHANGE UP (ref 1–1.03)
UROBILINOGEN FLD QL: 0.2 E.U./DL — SIGNIFICANT CHANGE UP
UUN UR-MCNC: 398 MG/DL — SIGNIFICANT CHANGE UP
WBC # BLD: 22.1 K/UL — HIGH (ref 3.8–10.5)
WBC # FLD AUTO: 22.1 K/UL — HIGH (ref 3.8–10.5)

## 2018-01-28 RX ORDER — ENOXAPARIN SODIUM 100 MG/ML
50 INJECTION SUBCUTANEOUS
Qty: 0 | Refills: 0 | Status: COMPLETED | OUTPATIENT
Start: 2018-01-28 | End: 2018-01-28

## 2018-01-28 RX ORDER — ENOXAPARIN SODIUM 100 MG/ML
50 INJECTION SUBCUTANEOUS
Qty: 0 | Refills: 0 | Status: DISCONTINUED | OUTPATIENT
Start: 2018-01-28 | End: 2018-01-28

## 2018-01-28 RX ORDER — SODIUM CHLORIDE 9 MG/ML
1000 INJECTION INTRAMUSCULAR; INTRAVENOUS; SUBCUTANEOUS ONCE
Qty: 0 | Refills: 0 | Status: COMPLETED | OUTPATIENT
Start: 2018-01-28 | End: 2018-01-28

## 2018-01-28 RX ORDER — ENOXAPARIN SODIUM 100 MG/ML
50 INJECTION SUBCUTANEOUS
Qty: 0 | Refills: 0 | Status: DISCONTINUED | OUTPATIENT
Start: 2018-01-28 | End: 2018-01-30

## 2018-01-28 RX ORDER — POTASSIUM CHLORIDE 20 MEQ
10 PACKET (EA) ORAL
Qty: 0 | Refills: 0 | Status: COMPLETED | OUTPATIENT
Start: 2018-01-28 | End: 2018-01-28

## 2018-01-28 RX ADMIN — PIPERACILLIN AND TAZOBACTAM 200 GRAM(S): 4; .5 INJECTION, POWDER, LYOPHILIZED, FOR SOLUTION INTRAVENOUS at 22:46

## 2018-01-28 RX ADMIN — SODIUM CHLORIDE 3603.6 MILLILITER(S): 9 INJECTION INTRAMUSCULAR; INTRAVENOUS; SUBCUTANEOUS at 09:13

## 2018-01-28 RX ADMIN — SODIUM CHLORIDE 1000 MILLILITER(S): 9 INJECTION INTRAMUSCULAR; INTRAVENOUS; SUBCUTANEOUS at 02:02

## 2018-01-28 RX ADMIN — Medication 1000 MILLIGRAM(S): at 17:54

## 2018-01-28 RX ADMIN — ENOXAPARIN SODIUM 50 MILLIGRAM(S): 100 INJECTION SUBCUTANEOUS at 17:54

## 2018-01-28 RX ADMIN — PIPERACILLIN AND TAZOBACTAM 200 GRAM(S): 4; .5 INJECTION, POWDER, LYOPHILIZED, FOR SOLUTION INTRAVENOUS at 17:53

## 2018-01-28 RX ADMIN — ENOXAPARIN SODIUM 50 MILLIGRAM(S): 100 INJECTION SUBCUTANEOUS at 07:04

## 2018-01-28 RX ADMIN — PIPERACILLIN AND TAZOBACTAM 200 GRAM(S): 4; .5 INJECTION, POWDER, LYOPHILIZED, FOR SOLUTION INTRAVENOUS at 11:14

## 2018-01-28 RX ADMIN — PIPERACILLIN AND TAZOBACTAM 200 GRAM(S): 4; .5 INJECTION, POWDER, LYOPHILIZED, FOR SOLUTION INTRAVENOUS at 03:05

## 2018-01-28 RX ADMIN — POTASSIUM PHOSPHATE, MONOBASIC POTASSIUM PHOSPHATE, DIBASIC 64.17 MILLIMOLE(S): 236; 224 INJECTION, SOLUTION INTRAVENOUS at 06:50

## 2018-01-28 RX ADMIN — Medication 1000 MILLIGRAM(S): at 06:42

## 2018-01-28 RX ADMIN — Medication 100 MILLIEQUIVALENT(S): at 12:33

## 2018-01-28 NOTE — CONSULT NOTE ADULT - PROBLEM SELECTOR RECOMMENDATION 2
- now NPO for the colitis   - not with good oral intake at home in the past days (not clear for what reason)  - continue with 75 ml/hour of LR

## 2018-01-28 NOTE — PROGRESS NOTE ADULT - ASSESSMENT
78 yo F with h/o MDS, HTN, DVT/PE on xarelto with colitis with small microperforation  -admit to telemetry  -NPO/IVF  -Zosyn  -serial abdominal exams  -HSQ/SCDs  -therapeutic lovenox

## 2018-01-28 NOTE — PROGRESS NOTE ADULT - SUBJECTIVE AND OBJECTIVE BOX
o/n: cont. urine low urine open; urine electrolytes collected; ordered gonorrhea / chlamydia screen (recommended for +trich); FENA: 0.43% pre-renal; gave another bolus given  1/27: bladder scan 700; hutson placed; low uop throughout the day; 30 cc/hr; concenctrated urine; urology (Car) consulted for bilateral hydronephrosis, bladder filling, urology recommended consulting renal; repeat bmp at 8PM, small inc in Cr. 0.85 [0.74]; 500 cc bolus given w/ potassium o/n: cont. urine low urine open; urine electrolytes collected; ordered gonorrhea / chlamydia screen (recommended for +trich); FENA: 0.43% pre-renal; gave another bolus given  : bladder scan 700; hutson placed; low uop throughout the day; 30 cc/hr; concenctrated urine; urology (Car) consulted for bilateral hydronephrosis, bladder filling, urology recommended consulting renal; repeat bmp at 8PM, small inc in Cr. 0.85 [0.74]; 500 cc bolus given w/ potassium       SUBJECTIVE: Patient seen and examined bedside by chief resident. 1L NS bolus this am. Chlamydia/Gono PCR sent. Renal consulted regarding b/l hydronephrosis and low UOP. BCx collected and sent. Placed on NPO + sips of clear -- patient is tolerating w/o n/v.     enoxaparin Injectable 50 milliGRAM(s) SubCutaneous two times a day  metroNIDAZOLE    Tablet 1000 milliGRAM(s) Oral every 12 hours  piperacillin/tazobactam IVPB. 3.375 Gram(s) IV Intermittent every 6 hours      Vital Signs Last 24 Hrs  T(C): 36.6 (2018 14:27), Max: 37.6 (2018 17:21)  T(F): 97.8 (2018 14:27), Max: 99.7 (2018 17:21)  HR: 66 (2018 12:37) (64 - 86)  BP: 139/71 (2018 12:37) (122/62 - 155/75)  BP(mean): 99 (2018 12:37) (86 - 110)  RR: 17 (2018 12:37) (16 - 18)  SpO2: 94% (2018 12:37) (91% - 94%)  I&O's Detail    2018 07:01  -  2018 07:00  --------------------------------------------------------  IN:    IV PiggyBack: 300 mL    lactated ringers.: 1540 mL    Sodium Chloride 0.9% IV Bolus: 1000 mL  Total IN: 2840 mL    OUT:    Indwelling Catheter - Urethral: 1325 mL  Total OUT: 1325 mL    Total NET: 1515 mL      2018 07:  -  2018 15:07  --------------------------------------------------------  IN:    IV PiggyBack: 292 mL    lactated ringers.: 770 mL    Sodium Chloride 0.9% IV Bolus: 1000 mL  Total IN: 2062 mL    OUT:    Indwelling Catheter - Urethral: 750 mL  Total OUT: 750 mL    Total NET: 1312 mL          General: NAD, resting comfortably in bed  C/V: NSR  Pulm: Nonlabored breathing, no respiratory distress  Abd: soft, NT/ND.  Extrem: WWP, no edema, SCDs in place        LABS:                        15.4   22.1  )-----------( 300      ( 2018 07:21 )             50.2         143  |  108  |  10  ----------------------------<  94  3.6   |  18<L>  |  0.76    Ca    8.9      2018 07:21  Phos  4.9       Mg     2.0         TPro  6.7  /  Alb  3.9  /  TBili  0.4  /  DBili  x   /  AST  23  /  ALT  8<L>  /  AlkPhos  99        Urinalysis Basic - ( 2018 18:41 )    Color: Yellow / Appearance: Cloudy / S.020 / pH: x  Gluc: x / Ketone: NEGATIVE  / Bili: Negative / Urobili: 0.2 E.U./dL   Blood: x / Protein: 30 mg/dL / Nitrite: NEGATIVE   Leuk Esterase: Small / RBC: < 5 /HPF / WBC Many /HPF   Sq Epi: x / Non Sq Epi: 0-5 /HPF / Bacteria: Many /HPF        RADIOLOGY & ADDITIONAL STUDIES:

## 2018-01-28 NOTE — PROVIDER CONTACT NOTE (OTHER) - ACTION/TREATMENT ORDERED:
Dr. Gongora notified about task stop. Awaiting further orders. Will continue to monitor.
No further orders given. Continue to assess.

## 2018-01-28 NOTE — PROVIDER CONTACT NOTE (OTHER) - SITUATION
Pt received potassium phosphate, then received one run of potassium chloride. When trying to scan the second run it was after task stop so the system wouldn't allow the scan.

## 2018-01-28 NOTE — PROGRESS NOTE ADULT - SUBJECTIVE AND OBJECTIVE BOX
NOTE:    76 yo f with oliguria, FeNa 0.4, pre renal and not from obstructive uropathy.    Vital Signs Last 24 Hrs  T(C): 36.7 (28 Jan 2018 09:00), Max: 37.6 (27 Jan 2018 17:21)  T(F): 98 (28 Jan 2018 09:00), Max: 99.7 (27 Jan 2018 17:21)  HR: 66 (28 Jan 2018 12:37) (64 - 86)  BP: 139/71 (28 Jan 2018 12:37) (122/62 - 155/75)  BP(mean): 99 (28 Jan 2018 12:37) (86 - 110)  RR: 17 (28 Jan 2018 12:37) (16 - 18)  SpO2: 94% (28 Jan 2018 12:37) (91% - 94%)    I&O's Summary    27 Jan 2018 07:01  -  28 Jan 2018 07:00  --------------------------------------------------------  IN: 2840 mL / OUT: 1325 mL / NET: 1515 mL    28 Jan 2018 07:01  -  28 Jan 2018 12:55  --------------------------------------------------------  IN: 1842 mL / OUT: 750 mL / NET: 1092 mL                          15.4   22.1  )-----------( 300      ( 28 Jan 2018 07:21 )             50.2     01-28    143  |  108  |  10  ----------------------------<  94  3.6   |  18<L>  |  0.76    Ca    8.9      28 Jan 2018 07:21  Phos  4.9     01-28  Mg     2.0     01-28

## 2018-01-28 NOTE — CONSULT NOTE ADULT - PROBLEM SELECTOR RECOMMENDATION 9
- asymptomatic  - denies stress or urge incontinence (no incontinence at all)  - also with bladder retention on the admission   - hutson placed from  service   - normal renal function   - no obvious obstruction from the CT with contrast   - please consider follow up from  service more experienced in hydronephrosis  - please consider renal scan   - please consider urodynamic studies   - the patient also with cervical myelopathy (documented in the previous notes) can cause urinary retention - please consider neurology evaluation   - one of her medications bupropion as a rare side effect may cause urinary retention  -please consider repeating the UA

## 2018-01-28 NOTE — CONSULT NOTE ADULT - ASSESSMENT
This is 77 year old female with PMH stated above. Now treated for colitis conservatively from surgery service. Renal called for bilateral mild hydronephrosis for further recommendation

## 2018-01-29 DIAGNOSIS — E87.8 OTHER DISORDERS OF ELECTROLYTE AND FLUID BALANCE, NOT ELSEWHERE CLASSIFIED: ICD-10-CM

## 2018-01-29 LAB
ANION GAP SERPL CALC-SCNC: 14 MMOL/L — SIGNIFICANT CHANGE UP (ref 5–17)
BUN SERPL-MCNC: 6 MG/DL — LOW (ref 7–23)
C TRACH RRNA SPEC QL NAA+PROBE: SIGNIFICANT CHANGE UP
CALCIUM SERPL-MCNC: 8.9 MG/DL — SIGNIFICANT CHANGE UP (ref 8.4–10.5)
CHLORIDE SERPL-SCNC: 102 MMOL/L — SIGNIFICANT CHANGE UP (ref 96–108)
CO2 SERPL-SCNC: 25 MMOL/L — SIGNIFICANT CHANGE UP (ref 22–31)
CREAT SERPL-MCNC: 0.64 MG/DL — SIGNIFICANT CHANGE UP (ref 0.5–1.3)
GLUCOSE SERPL-MCNC: 91 MG/DL — SIGNIFICANT CHANGE UP (ref 70–99)
HCT VFR BLD CALC: 50.2 % — HIGH (ref 34.5–45)
HGB BLD-MCNC: 15.3 G/DL — SIGNIFICANT CHANGE UP (ref 11.5–15.5)
MAGNESIUM SERPL-MCNC: 1.8 MG/DL — SIGNIFICANT CHANGE UP (ref 1.6–2.6)
MCHC RBC-ENTMCNC: 28 PG — SIGNIFICANT CHANGE UP (ref 27–34)
MCHC RBC-ENTMCNC: 30.5 G/DL — LOW (ref 32–36)
MCV RBC AUTO: 91.8 FL — SIGNIFICANT CHANGE UP (ref 80–100)
N GONORRHOEA RRNA SPEC QL NAA+PROBE: SIGNIFICANT CHANGE UP
PHOSPHATE SERPL-MCNC: 3 MG/DL — SIGNIFICANT CHANGE UP (ref 2.5–4.5)
PLATELET # BLD AUTO: 292 K/UL — SIGNIFICANT CHANGE UP (ref 150–400)
POTASSIUM SERPL-MCNC: 3.3 MMOL/L — LOW (ref 3.5–5.3)
POTASSIUM SERPL-SCNC: 3.3 MMOL/L — LOW (ref 3.5–5.3)
RBC # BLD: 5.47 M/UL — HIGH (ref 3.8–5.2)
RBC # FLD: 16 % — SIGNIFICANT CHANGE UP (ref 10.3–16.9)
SODIUM SERPL-SCNC: 141 MMOL/L — SIGNIFICANT CHANGE UP (ref 135–145)
SPECIMEN SOURCE: SIGNIFICANT CHANGE UP
WBC # BLD: 17.8 K/UL — HIGH (ref 3.8–10.5)
WBC # FLD AUTO: 17.8 K/UL — HIGH (ref 3.8–10.5)

## 2018-01-29 RX ORDER — MAGNESIUM SULFATE 500 MG/ML
2 VIAL (ML) INJECTION ONCE
Qty: 0 | Refills: 0 | Status: COMPLETED | OUTPATIENT
Start: 2018-01-29 | End: 2018-01-29

## 2018-01-29 RX ORDER — SODIUM CHLORIDE 9 MG/ML
1000 INJECTION, SOLUTION INTRAVENOUS
Qty: 0 | Refills: 0 | Status: DISCONTINUED | OUTPATIENT
Start: 2018-01-29 | End: 2018-01-30

## 2018-01-29 RX ORDER — POTASSIUM CHLORIDE 20 MEQ
10 PACKET (EA) ORAL
Qty: 0 | Refills: 0 | Status: COMPLETED | OUTPATIENT
Start: 2018-01-29 | End: 2018-01-29

## 2018-01-29 RX ADMIN — Medication 100 MILLIEQUIVALENT(S): at 08:48

## 2018-01-29 RX ADMIN — HYDROMORPHONE HYDROCHLORIDE 0.5 MILLIGRAM(S): 2 INJECTION INTRAMUSCULAR; INTRAVENOUS; SUBCUTANEOUS at 04:32

## 2018-01-29 RX ADMIN — ENOXAPARIN SODIUM 50 MILLIGRAM(S): 100 INJECTION SUBCUTANEOUS at 06:45

## 2018-01-29 RX ADMIN — PIPERACILLIN AND TAZOBACTAM 200 GRAM(S): 4; .5 INJECTION, POWDER, LYOPHILIZED, FOR SOLUTION INTRAVENOUS at 06:21

## 2018-01-29 RX ADMIN — Medication 100 MILLIEQUIVALENT(S): at 11:40

## 2018-01-29 RX ADMIN — PIPERACILLIN AND TAZOBACTAM 200 GRAM(S): 4; .5 INJECTION, POWDER, LYOPHILIZED, FOR SOLUTION INTRAVENOUS at 16:54

## 2018-01-29 RX ADMIN — HYDROMORPHONE HYDROCHLORIDE 0.5 MILLIGRAM(S): 2 INJECTION INTRAMUSCULAR; INTRAVENOUS; SUBCUTANEOUS at 13:45

## 2018-01-29 RX ADMIN — PIPERACILLIN AND TAZOBACTAM 200 GRAM(S): 4; .5 INJECTION, POWDER, LYOPHILIZED, FOR SOLUTION INTRAVENOUS at 22:37

## 2018-01-29 RX ADMIN — ENOXAPARIN SODIUM 50 MILLIGRAM(S): 100 INJECTION SUBCUTANEOUS at 17:29

## 2018-01-29 RX ADMIN — PIPERACILLIN AND TAZOBACTAM 200 GRAM(S): 4; .5 INJECTION, POWDER, LYOPHILIZED, FOR SOLUTION INTRAVENOUS at 11:40

## 2018-01-29 RX ADMIN — HYDROMORPHONE HYDROCHLORIDE 0.5 MILLIGRAM(S): 2 INJECTION INTRAMUSCULAR; INTRAVENOUS; SUBCUTANEOUS at 04:02

## 2018-01-29 RX ADMIN — Medication 50 GRAM(S): at 16:55

## 2018-01-29 RX ADMIN — SODIUM CHLORIDE 90 MILLILITER(S): 9 INJECTION, SOLUTION INTRAVENOUS at 10:04

## 2018-01-29 RX ADMIN — Medication 100 MILLIEQUIVALENT(S): at 14:55

## 2018-01-29 RX ADMIN — HYDROMORPHONE HYDROCHLORIDE 0.5 MILLIGRAM(S): 2 INJECTION INTRAMUSCULAR; INTRAVENOUS; SUBCUTANEOUS at 14:00

## 2018-01-29 NOTE — PROGRESS NOTE ADULT - PROBLEM SELECTOR PLAN 2
Mild Hypokalemia with K level 3.3 at present, Mag 1.8 at present, Phosphorus 3.0 at present.  Replete Kcl with goal K level >4.0 and <5.0  Replete Mag with goal Mag>2.0 at present. Mild Hypokalemia with K level 3.3 at present, Mag 1.8 at present, Phosphorus 3.0 at present likely due to relief of obstructive uropathy and/or Gi losses.  Replete Kcl with goal K level >4.0 and <5.0  Replete Mag with goal Mag>2.0 at present.  Monitor BMP every 12 hrly for now.

## 2018-01-29 NOTE — PROGRESS NOTE ADULT - PROBLEM SELECTOR PLAN 1
Bilateral hydronephrosis due to urinary outlet obstruction with normal renal function at present.  Continue hutson's catheter   TOV per urology when possible  Monitor strict I/o  Avoid nephrotoxic agents  Continue antibiotics for now colitis and probable UTI. Bilateral hydronephrosis due to urinary outlet obstruction with normal renal function at present.  Continue hutson's catheter   TOV per urology   Monitor strict I/o  Avoid nephrotoxic agents  Continue antibiotics for now colitis and probable UTI.  F/u cultures.

## 2018-01-29 NOTE — PROGRESS NOTE ADULT - PROBLEM SELECTOR PLAN 3
Colitis on CT findings. Patient asymptomatic at present.  Continue antibiotics per primary/ID team. Colitis on CT findings. Patient asymptomatic at present.  Continue antibiotics per primary/ID team.  Will sign off for now. Re consult as needed.

## 2018-01-29 NOTE — PROGRESS NOTE ADULT - SUBJECTIVE AND OBJECTIVE BOX
78 yo F with PMH of myelodysplastic syndrome, DVT/PE on xarelto, HTN presented to ED with 1 day h/o weakness and lower abdominal pain that began. Pt reports she was having difficulty walking and was scheduled to see her PCP but came to ED due to weakness. Had some nausea yesterday but no vomiting and reports some loose stools yesterday. No fevers or chills. Cannot recall last colonoscopy.    Past Medical History:  Hypertension    Myeloproliferative disorder    PE (pulmonary thromboembolism)  2015  Tremor    Vestibular disequilibrium.    Past Surgical History:  S/P hysterectomy.          	  MEDICATIONS:    piperacillin/tazobactam IVPB. 3.375 Gram(s) IV Intermittent every 6 hours      HYDROmorphone  Injectable 0.5 milliGRAM(s) IV Push every 4 hours PRN  ondansetron Injectable 4 milliGRAM(s) IV Push every 6 hours PRN        dextrose 5% + sodium chloride 0.45% 1000 milliLiter(s) IV Continuous <Continuous>  enoxaparin Injectable 50 milliGRAM(s) SubCutaneous two times a day      Complaint:     Otherwise 12 point review of systems is normal.    PHYSICAL EXAM:    Constitutional:NAD  Eyes: PERRL, EOMI, sclera non-icteric  Neck: supple, no masses, no JVD  Respiratory: CTA b/l, good air entry b/l, no wheezing, rhonchi, rales, Cardiovascular: RRR, normal S1S2, no M/R/G  Gastrointestinal: soft, NTND, no masses palpable, BS normal in all four quadrants,   Extremities:  no c/c/e  Neurological: AAOx3    ICU Vital Signs Last 24 Hrs  T(C): 36.9 (29 Jan 2018 17:36), Max: 37.3 (29 Jan 2018 14:04)  T(F): 98.5 (29 Jan 2018 17:36), Max: 99.2 (29 Jan 2018 14:04)  HR: 74 (29 Jan 2018 17:00) (74 - 76)  BP: 164/84 (29 Jan 2018 17:00) (143/72 - 164/84)  BP(mean): 117 (29 Jan 2018 04:41) (102 - 117)  ABP: --  ABP(mean): --  RR: 12 (29 Jan 2018 17:00) (12 - 17)  SpO2: 94% (29 Jan 2018 17:00) (92% - 95%)        ECG:    < from: 12 Lead ECG (01.26.18 @ 18:34) >    Ventricular Rate 71 BPM    Atrial Rate 71 BPM    P-R Interval 178 ms    QRS Duration 70 ms    Q-T Interval 428 ms    QTC Calculation(Bezet) 465 ms    P Axis 67 degrees    R Axis -51 degrees    T Axis 25 degrees    Diagnosis Line Normal sinus rhythm  Left axis deviation  Septal infarct , age undetermined  CHEST X RAY  < from: Xray Chest 1 View AP- PORTABLE-Urgent (01.26.18 @ 17:52) >  Impression: No acute infiltrates    CT  < from: CT Abdomen and Pelvis w/ IV Cont (01.27.18 @ 00:56) >    IMPRESSION:     1.  Circumferential wall thickening extending from the mid descending   colon through the distal sigmoid colon, where wall thickening is most   pronounced with associated intraluminal narrowing. There is mild adjacent   pericolic fat stranding. There are scattered colonic diverticula within   this region however the long segment involvement is most suggestive for a   colitis as oppose to a diverticulitis. Few scattered foci of gas which   appear extraluminal location within the pelvis may indicate a small   microperforation.  2.    3.  Distended urinary bladder with mild bilateral hydronephrosis. Bladder   wall thickening with prominent adjacent perivesicular fat stranding.   Correlate clinically for cystitis/renal infection.  4.    5.  Unchanged abdominal aortic aneurysm measuring 3.4 cm.      < from: US Duplex Venous Lower Ext Complete, Bilateral (01.26.18 @ 17:31) >    IMPRESSION:  Nonocclusive deep vein thrombosis of the right common femoral vein, not   previously identified. Interval decrease in flow about the deep vein   thrombosis in the right proximal femoral vein, now demonstrating minimal   blood flow. No significant change in right mid and distal femoral vein   deep vein thrombosis. No significant change in deep vein thrombosis of   the right popliteal vein.                  MRI    MRA    CT ANGIO    CAROTID DUPLEX        Echocardiogram    Catheterization:    Stress Test:     LABS:	 	  CARDIAC MARKERS:                              15.3   17.8  )-----------( 292      ( 29 Jan 2018 06:16 )             50.2     01-29    141  |  102  |  6<L>  ----------------------------<  91  3.3<L>   |  25  |  0.64    Ca    8.9      29 Jan 2018 06:16  Phos  3.0     01-29  Mg     1.8     01-29          ASSESSMENT/PLAN: 	    This is 77 year old female with PMH stated above. Now treated for colitis conservatively from surgery service. Renal called for bilateral mild hydronephrosis for further recommendation     Problem/Plan - 1:  ·  Problem: Hydronephrosis, bilateral.  Plan: Bilateral hydronephrosis due to urinary outlet obstruction with normal renal function at present.  Continue hutson's catheter   TOV per urology   Monitor strict I/o  Avoid nephrotoxic agents  Continue antibiotics for now colitis and probable UTI.  F/u cultures.     Problem/Plan - 2:  ·  Problem: Electrolyte abnormality.  Plan: Mild Hypokalemia with K level 3.3 at present, Mag 1.8 at present, Phosphorus 3.0 at present likely due to relief of obstructive uropathy and/or Gi losses.  Replete Kcl with goal K level >4.0 and <5.0  Replete Mag with goal Mag>2.0 at present.  Monitor BMP every 12 hrly for now.     Problem/Plan - 3:  ·  Problem: Colitis.  Plan: Colitis on CT findings. Patient asymptomatic at present.  Continue antibiotics per primary/ID team.

## 2018-01-29 NOTE — PROGRESS NOTE ADULT - ASSESSMENT
78 yo F with h/o MDS, HTN, DVT/PE on xarelto with colitis with small microperforation and UTI.    -NPO/IVF  -IV Zosyn for colitis and microperforation.   -serial abdominal exams  -HSQ/SCDs  -therapeutic lovenox for DVT/PE  - Blood culture in AM  - Strict I/O  - Follow up urine culture   - PT eval

## 2018-01-29 NOTE — CONSULT NOTE ADULT - ASSESSMENT
Segmental colitis with microperforation.  Unclear whether diverticulitis or other etiology  Diarrhea - hx of antibiotic exposure  Hx of dental infection and need to rule out dissemination of the infection into the blood stream  MDS   Hx of hospitalization and Rehab stay in the recent 4 months - possible colonization/infection with more resistant kait    RECOMMEND  Blood cultures x 2  Check procalcitonin level, ESR and CRP  Ryan for GI PCR panel and Cdiff  OK to continue Pip-Tazo for now  If feasible, dental/oral surgery evaluation

## 2018-01-29 NOTE — PROGRESS NOTE ADULT - SUBJECTIVE AND OBJECTIVE BOX
ON : Refused blood culture tonight, UA positive for nitrite. Culture pending. Urine output:700 Having 2 loose BM.  1/28: 1L NS bolus this am. Chlamydia/Gono PCR sent. Renal consulted regarding b/l hydronephrosis and low UOP. Placed on NPO + sips of clear -- patient is tolerating w/o n/v. Dr. Castanon: NPO/IV Zosyn. cont loveno. ON : Refused blood culture tonight, UA positive for nitrite. Culture pending. Urine output:700 Having 2 loose BM.  : 1L NS bolus this am. Chlamydia/Gono PCR sent. Renal consulted regarding b/l hydronephrosis and low UOP. Placed on NPO + sips of clear -- patient is tolerating w/o n/v. Dr. Castanon: NPO/IV Zosyn. cont loveno.         SUBJECTIVE: Patient seen and examined bedside by surgery team.  Refusing blood draws and blood culture collection.  Otherwise no complaints. Pain well-controlled. Denies nausea, vomiting, chest pain, shortness of breath, fevers, or chills.      enoxaparin Injectable 50 milliGRAM(s) SubCutaneous two times a day  piperacillin/tazobactam IVPB. 3.375 Gram(s) IV Intermittent every 6 hours      Vital Signs Last 24 Hrs  T(C): 36.9 (2018 06:22), Max: 37.3 (2018 18:14)  T(F): 98.4 (2018 06:22), Max: 99.1 (2018 18:14)  HR: 76 (2018 04:41) (64 - 76)  BP: 162/84 (2018 04:41) (122/62 - 162/84)  BP(mean): 117 (2018 04:41) (86 - 117)  RR: 17 (2018 04:41) (16 - 17)  SpO2: 93% (2018 04:41) (92% - 94%)  I&O's Detail    2018 07:01  -  2018 07:00  --------------------------------------------------------  IN:    IV PiggyBack: 392 mL    lactated ringers.: 1650 mL    Oral Fluid: 50 mL    Sodium Chloride 0.9% IV Bolus: 1000 mL  Total IN: 3092 mL    OUT:    Indwelling Catheter - Urethral: 2450 mL  Total OUT: 2450 mL    Total NET: 642 mL            Physical Exam  General: NAD, alert, interactive, appears comfortable  C/V: NSR  Pulm: Nonlabored breathing, no respiratory distress  Abd: softly distended, NT/ND.  Extrem: WWP, no edema, SCDs in place        LABS:                        15.3   17.8  )-----------( 292      ( 2018 06:16 )             50.2         141  |  102  |  6<L>  ----------------------------<  91  3.3<L>   |  25  |  0.64    Ca    8.9      2018 06:16  Phos  3.0       Mg     1.8             Urinalysis Basic - ( 2018 20:01 )    Color: Yellow / Appearance: SL Cloudy / S.020 / pH: x  Gluc: x / Ketone: Trace mg/dL  / Bili: Negative / Urobili: 0.2 E.U./dL   Blood: x / Protein: NEGATIVE mg/dL / Nitrite: POSITIVE   Leuk Esterase: Small / RBC: 5-10 /HPF / WBC Many /HPF   Sq Epi: x / Non Sq Epi: 0-5 /HPF / Bacteria: Present /HPF        RADIOLOGY & ADDITIONAL STUDIES:

## 2018-01-29 NOTE — PROGRESS NOTE ADULT - SUBJECTIVE AND OBJECTIVE BOX
Patient is a 77y Female seen and evaluated at bedside. Patient lying in bed in no acute distress denies any abdominal pain, nausea, vomiting, diarrhea at present. Denies any chest pain, shortness of breath, palpitations, dizziness at present. CT findings of colitis and urinary retention with hutson's catheter placement. Renal function normal at present with mild hypokalemia. Patient with 1.7 Liter urine output in past 24 hours with net positive 600 cc.      dextrose 5% + sodium chloride 0.45% 1000 <Continuous>  enoxaparin Injectable 50 two times a day  HYDROmorphone  Injectable 0.5 every 4 hours PRN  ondansetron Injectable 4 every 6 hours PRN  piperacillin/tazobactam IVPB. 3.375 every 6 hours  potassium chloride  10 mEq/100 mL IVPB 10 every 1 hour      Allergies    No Known Allergies    Intolerances        T(C): , Max: 37.3 (18 @ 18:14)  T(F): , Max: 99.1 (18 @ 18:14)  HR: 76 (18 @ 04:41)  BP: 159/79 (18 @ 09:01)  BP(mean): 117 (18 @ 04:41)  RR: 16 (18 @ 09:01)  SpO2: 94% (18 @ 09:01)  Wt(kg): --     @ 07:01  -   @ 07:00  --------------------------------------------------------  IN: 3092 mL / OUT: 2450 mL / NET: 642 mL          Review of Systems:  CONSTITUTIONAL: No fever or chills, No fatigue or tiredness.  EYES: No blurred or double vision.  RESPIRATORY: No shortness of breath, cough, hemoptysis  CARDIOVASCULAR: No Chest pain or shortness of breath  GASTROINTESTINAL: NO abdominal or flank pain, No nausea or vomiting, No diarrhea  GENITOURINARY: No dysuria or urinary burning, No difficulty passing urine, No hematuria  NEUROLOGICAL: No headaches or blurred vision  SKIN: No skin rashes   MUSCULOSKELETAL: No arthralgia, Joint pain, leg edema, No muscle pains      PHYSICAL EXAM:  GENERAL: NAD, well-developed, well nourished, alert, awake, no acute distress at present  HEAD:  Atraumatic, Normocephalic,   EYES: Bilateral conjuctiva and sclera normal   Oral cavity: Oral mucosa dry and pink  NECK: Neck supple, No JVD  CHEST/LUNG: Clear to auscultation bilaterally; No wheeze, no rales, no crepitations  HEART: Regular rate and rhythm. ERNESTINA II/VI at LPSB, No gallop, no rub   ABDOMEN: Soft, Nontender, BS+nt, No flank tenderness.   EXTREMITIES: No clubbing, cyanosis, or edema  Neurology: AAOx3, no focal neurological deficit  SKIN: No rashes or lesions          ACCESS:     LABS:                        15.3   17.8  )-----------( 292      ( 2018 06:16 )             50.2         141  |  102  |  6<L>  ----------------------------<  91  3.3<L>   |  25  |  0.64    Ca    8.9      2018 06:16  Phos  3.0       Mg     1.8               Urinalysis Basic - ( 2018 20:01 )    Color: Yellow / Appearance: SL Cloudy / S.020 / pH: x  Gluc: x / Ketone: Trace mg/dL  / Bili: Negative / Urobili: 0.2 E.U./dL   Blood: x / Protein: NEGATIVE mg/dL / Nitrite: POSITIVE   Leuk Esterase: Small / RBC: 5-10 /HPF / WBC Many /HPF   Sq Epi: x / Non Sq Epi: 0-5 /HPF / Bacteria: Present /HPF      Osmolality, Random Urine: 514 mosmol/kg ( @ 00:35)  Sodium, Random Urine: 94 mmol/L ( @ 00:35)  Creatinine, Random Urine: 126 mg/dL ( @ 00:35)  Chloride, Random Urine: 77 mmol/L ( @ 00:35)        RADIOLOGY & ADDITIONAL STUDIES:    < from: CT Abdomen and Pelvis w/ IV Cont (18 @ 00:56) >    EXAM:  CT ABDOMEN AND PELVIS IC                          PROCEDURE DATE:  2018    Quantity of Contrast in Vial in ml: 100 Contrast Used: Optiray 350  Quantity of Contrast Wasted in ml: 25           INTERPRETATION:  CLINICAL STATEMENT: Lower abdominal pain. Evaluate for   colitis.    TECHNIQUE: CT of the abdomen and pelvis with intravenous and oral   contrast. Axial, sagittal, and coronal images were obtained and reviewed.    COMPARISON: CT abdomen pelvis from 2017 and CT abdomen from2011    FINDINGS:    Lower chest: Right basilar bronchiectasis and trace atelectasis.. Mitral   annular calcification    Liver: Smooth in contour. No hypodense lesions are seen in the liver   which are too small to characterize.. Portal and hepatic veins are patent.    Biliary system: Gallbladder is normal in size. No calcified gallstones.   No biliary ductal dilatation.    Pancreas: Since 2011, there is again a 12 mm hypodense lesion in the   pancreatic body with a now a new peripheralcalcification anteriorly.    Spleen: Unremarkable.    Adrenal glands: Unremarkable.    Kidneys: Symmetric parenchymal enhancement. There is a 4.3 cm right renal   cyst. Multiple hypodense lesions are seen in bilateral kidneys which are   too small tocharacterize.. Moderate bilateral hydronephrosis. No renal   or ureteral stone.     Urinary Bladder: Moderately distended urinary bladder with several   bladder diverticula are noted. The urinary bladder wall is thickened   which may be related to chronic bladder outlet obstruction versus   cystitis. Small bladder diverticula along its left posterior wall.    Reproductive organs: Status post hysterectomy. No adnexal masses.    Bowel/Peritoneum: There is circumferential wall thickening extending from   the mid descending colon through the distal sigmoid colon, where wall   thickening is most pronounced with associated intraluminal narrowing.   There is mild adjacent pericolic fat stranding. There are scattered   colonic diverticula within thisregion however the long segment   involvement is most suggestive for a colitis as oppose to a   diverticulitis. Few scattered foci of gas which appear extraluminal   location within the pelvis may indicate a small microperforation. No   large intraperitoneal free air appreciated. No evidence of bowel   obstruction. Appendix is not visualized however no inflammatory changes   appreciated at the right lower quadrant. No ascites or extraluminal gas.     Lymph nodes: No lymphadenopathy.    Aorta/IVC:There is a large amount of calcified and noncalcified plaque   involving the abdominal aorta. The abdominal aorta is aneurysmal   measuring 3.4 cm in diameter, unchanged. Abdominal wall: No hernia.    Bones/Soft tissues: Moderate degenerative changes.      IMPRESSION:     1.  Circumferential wall thickening extending from the mid descending   colon through the distal sigmoid colon, where wall thickening is most   pronounced with associated intraluminal narrowing. There is mild adjacent   pericolic fat stranding. There are scattered colonic diverticula within   this region however the long segment involvement is most suggestive for a   colitis as oppose to a diverticulitis. Few scattered foci of gas which   appear extraluminal location within the pelvis may indicate a small   microperforation.  2.    3.  Distended urinary bladder with mild bilateral hydronephrosis. Bladder   wall thickening with prominent adjacent perivesicular fat stranding.   Correlate clinically for cystitis/renal infection.  4.    5.  Unchanged abdominal aortic aneurysm measuring 3.4 cm.            "Thank you for the opportunity to participate in the care of this   patient."    BURAK TROY M.D., RADIOLOGY RESIDENT  This document has been electronically signed.  JEAN DEL CID M.D., ATTENDING RADIOLOGIST  This document has been electronically signed. 2018  1:55AM                  < end of copied text >

## 2018-01-29 NOTE — PROGRESS NOTE ADULT - SUBJECTIVE AND OBJECTIVE BOX
History of Present Illness:  Chief Complaint/Reason for Admission: colitis with microperforation	  History of Present Illness: 	  78 yo F with PMH of myelodysplastic syndrome, DVT/PE on xarelto, HTN presented to ED with 1 day h/o weakness and lower abdominal pain that began. Pt reports she was having difficulty walking and was scheduled to see her PCP but came to ED due to weakness. Had some nausea yesterday but no vomiting and reports some loose stools yesterday. No fevers or chills. Now admitted for treatment of colitis with microperforation.  On hydroxyurea until recently when she had episode of pancytopenia - approved for [facility] and will begin as outpatient.     Review of Systems:  Other Review of Systems: All other review of systems negative, except as noted in HPI 	      Allergies and Intolerances:        Allergies:  	No Known Allergies:     Home Medications:   * Patient Currently Takes Medications as of 26-Jan-2018 14:50 documented in Structured Notes  · 	Pyridium 200 mg oral tablet: 1 tab(s) orally 3 times a day   · 	Outpatient Physical Therapy: 2 week(s) treatment 2/9-2/23/18  	Diagnosis: spinal stenosis  · 	Ceftin 250 mg oral tablet: 1 tab(s) orally 2 times a day   · 	buPROPion 75 mg oral tablet: 1 tab(s) orally once a day  · 	sulfamethoxazole-trimethoprim 800 mg-160 mg oral tablet: 1 tab(s) orally 2 times a day  · 	Xarelto 15 mg oral tablet: 1 tab(s) orally twice  · 	folic acid 1 mg oral tablet: 2 tab(s) orally once a day  · 	Norvasc 5 mg oral tablet: 1 tab(s) orally once a day  · 	Aspirin Enteric Coated 81 mg oral delayed release tablet: 1 tab(s) orally once a day  · 	hydroxyurea 500 mg oral capsule: 3 cap(s) orally Monday, Wednesday, and Friday    Patient History:    Past Medical History:  Hypertension    Myeloproliferative disorder    PE (pulmonary thromboembolism)  2015  Tremor    Vestibular disequilibrium.     Past Surgical History:  S/P hysterectomy.     Tobacco Screening:  · Core Measure Site	No	    Risk Assessment:    Present on Admission:  Deep Venous Thrombosis	no 	  Pulmonary Embolus	no 	     Heart Failure:  Does this patient have a history of or has been diagnosed with heart failure? no.       Physical Exam:  Physical Exam: Vital Signs Last 24 Hrs T(C): 36.9 (29 Jan 2018 17:36), Max: 37.3 (29 Jan 2018 14:04) T(F): 98.5 (29 Jan 2018 17:36), Max: 99.2 (29 Jan 2018 14:04) HR: 76 (29 Jan 2018 04:41) (72 - 76) BP: 152/- (29 Jan 2018 14:04) (138/65 - 162/84) BP(mean): 117 (29 Jan 2018 04:41) (93 - 117) RR: 14 (29 Jan 2018 14:04) (14 - 17) SpO2: 95% (29 Jan 2018 14:04) (92% - 95%)  Gen: AOx3, NAD HEENT: anicteric, pink conj Neck: supple, no JVD, no INDRA CV: RRR, no m/r/g Pulm: CTABL, no resp distress Abd: soft, tender in lower abdomen with some guarding more in LLQ, nondistended Ext: WWP, no edema	       Labs and Results:  Labs, Radiology, Cardiology, and Other Results:                  15.3  17.8  )-----------( 292      ( 29 Jan 2018 06:16 )            50.2        TECHNIQUE: CT of the abdomen and pelvis with intravenous and oral   contrast. Axial, sagittal, and coronal images were obtained and reviewed.   COMPARISON: CT abdomen pelvis from 9/14/2017 and CT abdomen from 7/29/2011   FINDINGS:   Lower chest: Right basilar bronchiectasis and trace atelectasis.. Mitral   annular calcification   Liver: Smooth in contour. No hypodense lesions are seen in the liver   which are too small to characterize.. Portal and hepatic veins are patent.   Biliary system: Gallbladder is normal in size. No calcified gallstones.   No biliary ductal dilatation.   Pancreas: Since 7/29/2011, there is again a 12 mm hypodense lesion in the   pancreatic body with a now a new peripheral calcification anteriorly.   Spleen: Unremarkable.   Adrenal glands: Unremarkable.   Kidneys: Symmetric parenchymal enhancement. There is a 4.3 cm right renal   cyst. Multiple hypodense lesions are seen in bilateral kidneys which are   too small to characterize.. Moderate bilateral hydronephrosis. No renal   or ureteral stone.    Urinary Bladder: Moderately distended urinary bladder with several   bladder diverticula are noted. The urinary bladder wall is thickened   which may be related to chronic bladder outlet obstruction versus   cystitis. Small bladder diverticula along its left posterior wall.   Reproductive organs: Status post hysterectomy. No adnexal masses.   Bowel/Peritoneum: There is circumferential wall thickening extending from   the mid descending colon through the distal sigmoid colon, where wall   thickening is most pronounced with associated intraluminal narrowing.   There is mild adjacent pericolic fat stranding. There are scattered   colonic diverticula within this region however the long segment   involvement is most suggestive for a colitis as oppose to a   diverticulitis. Few scattered foci of gas which appear extraluminal   location within the pelvis may indicate a small microperforation. No   large intraperitoneal free air appreciated. No evidence of bowel   obstruction. Appendix is not visualized however no inflammatory changes   appreciated at the right lower quadrant. No ascites or extraluminal gas.    Lymph nodes: No lymphadenopathy.   Aorta/IVC: There is a large amount of calcified and noncalcified plaque   involving the abdominal aorta. The abdominal aorta is aneurysmal   measuring 3.4 cm in diameter, unchanged. Abdominal wall: No hernia.   Bones/Soft tissues: Moderate degenerative changes.   IMPRESSION:   1.  Circumferential wall thickening extending from the mid descending   colon through the distal sigmoid colon, where wall thickening is most   pronounced with associated intraluminal narrowing. There is mild adjacent   pericolic fat stranding. There are scattered colonic diverticula within   this region however the long segment involvement is most suggestive for a   colitis as oppose to a diverticulitis. Few scattered foci of gas which   appear extraluminal location within the pelvis may indicate a small   microperforation.  2.    3.  Distended urinary bladder with mild bilateral hydronephrosis. Bladder   wall thickening with prominent adjacent perivesicular fat stranding.   Correlate clinically for cystitis/renal infection.  4.   5.  Unchanged abdominal aortic aneurysm measuring 3.4 cm.	    Assessment and Plan:    Assessment:  · Assessment		  78 yo F with h/o MDS, HTN, DVT/PE on xarelto with colitis with small microperforation  -NPO/IVF  -Zosyn  -sq Lovenox  -ASA on hold   -HSQ/SCDs  -

## 2018-01-30 DIAGNOSIS — D47.1 CHRONIC MYELOPROLIFERATIVE DISEASE: ICD-10-CM

## 2018-01-30 DIAGNOSIS — I82.409 ACUTE EMBOLISM AND THROMBOSIS OF UNSPECIFIED DEEP VEINS OF UNSPECIFIED LOWER EXTREMITY: ICD-10-CM

## 2018-01-30 DIAGNOSIS — N39.0 URINARY TRACT INFECTION, SITE NOT SPECIFIED: ICD-10-CM

## 2018-01-30 DIAGNOSIS — Z29.9 ENCOUNTER FOR PROPHYLACTIC MEASURES, UNSPECIFIED: ICD-10-CM

## 2018-01-30 DIAGNOSIS — I10 ESSENTIAL (PRIMARY) HYPERTENSION: ICD-10-CM

## 2018-01-30 LAB
ANION GAP SERPL CALC-SCNC: 11 MMOL/L — SIGNIFICANT CHANGE UP (ref 5–17)
BUN SERPL-MCNC: 3 MG/DL — LOW (ref 7–23)
C DIFF GDH STL QL: NEGATIVE — SIGNIFICANT CHANGE UP
C DIFF GDH STL QL: SIGNIFICANT CHANGE UP
CALCIUM SERPL-MCNC: 9.1 MG/DL — SIGNIFICANT CHANGE UP (ref 8.4–10.5)
CHLORIDE SERPL-SCNC: 98 MMOL/L — SIGNIFICANT CHANGE UP (ref 96–108)
CO2 SERPL-SCNC: 28 MMOL/L — SIGNIFICANT CHANGE UP (ref 22–31)
CREAT SERPL-MCNC: 0.6 MG/DL — SIGNIFICANT CHANGE UP (ref 0.5–1.3)
CRP SERPL-MCNC: 1.9 MG/DL — HIGH (ref 0–0.4)
CULTURE RESULTS: NO GROWTH — SIGNIFICANT CHANGE UP
ERYTHROCYTE [SEDIMENTATION RATE] IN BLOOD: 2 MM/HR — SIGNIFICANT CHANGE UP
GLUCOSE SERPL-MCNC: 115 MG/DL — HIGH (ref 70–99)
HCT VFR BLD CALC: 53.4 % — HIGH (ref 34.5–45)
HGB BLD-MCNC: 16.5 G/DL — HIGH (ref 11.5–15.5)
MAGNESIUM SERPL-MCNC: 2.4 MG/DL — SIGNIFICANT CHANGE UP (ref 1.6–2.6)
MCHC RBC-ENTMCNC: 27.6 PG — SIGNIFICANT CHANGE UP (ref 27–34)
MCHC RBC-ENTMCNC: 30.9 G/DL — LOW (ref 32–36)
MCV RBC AUTO: 89.4 FL — SIGNIFICANT CHANGE UP (ref 80–100)
PHOSPHATE SERPL-MCNC: 1.9 MG/DL — LOW (ref 2.5–4.5)
PLATELET # BLD AUTO: 305 K/UL — SIGNIFICANT CHANGE UP (ref 150–400)
POTASSIUM SERPL-MCNC: 3.4 MMOL/L — LOW (ref 3.5–5.3)
POTASSIUM SERPL-SCNC: 3.4 MMOL/L — LOW (ref 3.5–5.3)
PROCALCITONIN SERPL-MCNC: 0.22 NG/ML — HIGH (ref 0–0.04)
RBC # BLD: 5.97 M/UL — HIGH (ref 3.8–5.2)
RBC # FLD: 16.3 % — SIGNIFICANT CHANGE UP (ref 10.3–16.9)
SODIUM SERPL-SCNC: 137 MMOL/L — SIGNIFICANT CHANGE UP (ref 135–145)
SPECIMEN SOURCE: SIGNIFICANT CHANGE UP
WBC # BLD: 16.1 K/UL — HIGH (ref 3.8–10.5)
WBC # FLD AUTO: 16.1 K/UL — HIGH (ref 3.8–10.5)

## 2018-01-30 RX ORDER — POTASSIUM PHOSPHATE, MONOBASIC POTASSIUM PHOSPHATE, DIBASIC 236; 224 MG/ML; MG/ML
15 INJECTION, SOLUTION INTRAVENOUS ONCE
Qty: 0 | Refills: 0 | Status: COMPLETED | OUTPATIENT
Start: 2018-01-30 | End: 2018-01-30

## 2018-01-30 RX ORDER — RIVAROXABAN 15 MG-20MG
15 KIT ORAL EVERY 12 HOURS
Qty: 0 | Refills: 0 | Status: DISCONTINUED | OUTPATIENT
Start: 2018-01-30 | End: 2018-02-02

## 2018-01-30 RX ORDER — POTASSIUM CHLORIDE 20 MEQ
40 PACKET (EA) ORAL ONCE
Qty: 0 | Refills: 0 | Status: COMPLETED | OUTPATIENT
Start: 2018-01-30 | End: 2018-01-30

## 2018-01-30 RX ORDER — HYDRALAZINE HCL 50 MG
10 TABLET ORAL ONCE
Qty: 0 | Refills: 0 | Status: COMPLETED | OUTPATIENT
Start: 2018-01-30 | End: 2018-01-30

## 2018-01-30 RX ORDER — TAMSULOSIN HYDROCHLORIDE 0.4 MG/1
0.4 CAPSULE ORAL AT BEDTIME
Qty: 0 | Refills: 0 | Status: DISCONTINUED | OUTPATIENT
Start: 2018-01-30 | End: 2018-02-02

## 2018-01-30 RX ORDER — TAMSULOSIN HYDROCHLORIDE 0.4 MG/1
0.4 CAPSULE ORAL ONCE
Qty: 0 | Refills: 0 | Status: COMPLETED | OUTPATIENT
Start: 2018-01-30 | End: 2018-01-30

## 2018-01-30 RX ORDER — AMLODIPINE BESYLATE 2.5 MG/1
5 TABLET ORAL DAILY
Qty: 0 | Refills: 0 | Status: DISCONTINUED | OUTPATIENT
Start: 2018-01-30 | End: 2018-02-02

## 2018-01-30 RX ADMIN — PIPERACILLIN AND TAZOBACTAM 200 GRAM(S): 4; .5 INJECTION, POWDER, LYOPHILIZED, FOR SOLUTION INTRAVENOUS at 23:23

## 2018-01-30 RX ADMIN — PIPERACILLIN AND TAZOBACTAM 200 GRAM(S): 4; .5 INJECTION, POWDER, LYOPHILIZED, FOR SOLUTION INTRAVENOUS at 18:07

## 2018-01-30 RX ADMIN — TAMSULOSIN HYDROCHLORIDE 0.4 MILLIGRAM(S): 0.4 CAPSULE ORAL at 21:27

## 2018-01-30 RX ADMIN — PIPERACILLIN AND TAZOBACTAM 200 GRAM(S): 4; .5 INJECTION, POWDER, LYOPHILIZED, FOR SOLUTION INTRAVENOUS at 07:12

## 2018-01-30 RX ADMIN — POTASSIUM PHOSPHATE, MONOBASIC POTASSIUM PHOSPHATE, DIBASIC 62.5 MILLIMOLE(S): 236; 224 INJECTION, SOLUTION INTRAVENOUS at 09:25

## 2018-01-30 RX ADMIN — RIVAROXABAN 15 MILLIGRAM(S): KIT at 18:08

## 2018-01-30 RX ADMIN — TAMSULOSIN HYDROCHLORIDE 0.4 MILLIGRAM(S): 0.4 CAPSULE ORAL at 18:27

## 2018-01-30 RX ADMIN — SODIUM CHLORIDE 90 MILLILITER(S): 9 INJECTION, SOLUTION INTRAVENOUS at 19:55

## 2018-01-30 RX ADMIN — ENOXAPARIN SODIUM 50 MILLIGRAM(S): 100 INJECTION SUBCUTANEOUS at 06:29

## 2018-01-30 RX ADMIN — PIPERACILLIN AND TAZOBACTAM 200 GRAM(S): 4; .5 INJECTION, POWDER, LYOPHILIZED, FOR SOLUTION INTRAVENOUS at 12:29

## 2018-01-30 RX ADMIN — Medication 10 MILLIGRAM(S): at 07:02

## 2018-01-30 NOTE — PROGRESS NOTE ADULT - ASSESSMENT
78 yo F with h/o MDS, HTN, DVT/PE on xarelto with colitis with small microperforation and UTI.    - Follow up on ESR/CRP/Cdiff  -CLD  -IV Zosyn for colitis and microperforation.   -serial abdominal exams  -HSQ/SCDs  -therapeutic lovenox for DVT/PE  - Blood culture 1/29  - Strict I/O  - Follow up urine culture   - PT eval

## 2018-01-30 NOTE — PROGRESS NOTE ADULT - PROBLEM SELECTOR PLAN 2
CT can with distended urinary bladder with mild bilateral hydronephrosis.  Bilateral hydronephrosis due to urinary outlet obstruction with normal renal function at present.  - consulted urology: bladder filling, no intervention indicated signed off  -FeNA 0.43 suggesting pre-renal component, and Crt downtrended with IFV   - urinary retention on HOD #1, hutson placed. Hutson removed HOD3, failed TOV ---Monitor strict I/o and bladder scan  -Avoid nephrotoxic agents  -Continue antibiotics for now colitis and probable UTI.  -F/u cultures.   -Consider retroperitoneal US CT can with distended urinary bladder with mild bilateral hydronephrosis.  Bilateral hydronephrosis due to urinary outlet obstruction with normal renal function at present.  - consulted urology: bladder filling, no intervention indicated signed off  -FeNA 0.43 suggesting pre-renal component, and Crt downtrended with IFV   - urinary retention on HOD #1, hutson placed. Hutson removed HOD3, failed TOV ---Monitor strict I/o and bladder scan  -Avoid nephrotoxic agents  -Continue antibiotics for now colitis and probable UTI.  -Urine Cxs NGTD   -Consider retroperitoneal US  -c/w flomax 0.4 mg at bedtime CT can with distended urinary bladder with mild bilateral hydronephrosis.  Bilateral hydronephrosis due to urinary outlet obstruction with normal renal function at present.  - consulted urology: bladder filling, no intervention indicated signed off  -FeNA 0.43 suggesting pre-renal component, and Crt downtrended with IFV   - urinary retention on HOD #1, hutson placed. Hutson removed HOD3, failed TOV ---Monitor strict I/o and bladder scan  -Avoid nephrotoxic agents  -Continue antibiotics for now colitis and probable UTI.  -Urine Cxs NGTD   -Consider retroperitoneal US if crt worsening to evaluate for stones   -c/w flomax 0.4 mg at bedtime

## 2018-01-30 NOTE — CHART NOTE - NSCHARTNOTEFT_GEN_A_CORE
Upon Nutritional Assessment by the Registered Dietitian your patient was determined to meet criteria / has evidence of the following diagnosis/diagnoses:          [ ]  Mild Protein Calorie Malnutrition        [X ]  Moderate Protein Calorie Malnutrition        [ ] Severe Protein Calorie Malnutrition        [ ] Unspecified Protein Calorie Malnutrition        [X ] Underweight / BMI <19        [ ] Morbid Obesity / BMI > 40      Findings as based on:  •  Comprehensive nutrition assessment and consultation  •  Calorie counts (nutrient intake analysis)  •  Food acceptance and intake status from observations by staff  •  Follow up  •  Patient education  •  Intervention secondary to interdisciplinary rounds  •   concerns      Treatment:  Please refer to dietitian initial assessment and flow sheet notes for recommendations   The following diet has been recommended: advancement of diet to full liquids and as tolerated to Regular diet; please honor pt's food preferences, pt refusing ONS at this time.       PROVIDER Section:     By signing this assessment you are acknowledging and agree with the diagnosis/diagnoses assigned by the Registered Dietitian    Comments:

## 2018-01-30 NOTE — CHART NOTE - NSCHARTNOTEFT_GEN_A_CORE
ANDREW STUART   MRN-[id number]  77y/F    HPI:  76 yo F with PMH of myeloproliferative syndrome (followed by Dr. 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 Pt reports she was having difficulty walking and was scheduled to see her PCP but came to ED due to weakness. Had some nausea yesterday but no vomiting and reports some loose stools yesterday. No fevers or chills. Cannot recall last colonoscopy.     On admission she was afebrile, on exam had lower abdominal tenderness with mild voluntary guarding in the LLQ, and had a WBC of 19.   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     She was admitted to the general surgery service for colitis with small microperforation and the plan was for non-operative management with NPO, antibiotics, serial abdominal exams. Her medical issues in addition to her colitis included the following:    # leukocytosis / thrombocytosis / worsening DVT on xarelto  - found to have worsening nonocclusive DVT of R common femoral vein; interval decrease in flow in R. proximal femoral vein, now with minimal flow; no leg swelling, pain or cutaneous changes in the interim  - was started on Lovenox BID; then restarted home dose xarelto  - consulted Dr. Dumont regarding possible coagulopathy causing worsening DVT on full anticoagulation; also consulted for leukocytosis to compare to patient's baseline  - f/u Julia recs     # bilateral hydronephrosis / urinary retention  - consulted urology: bladder filling, no intervention indicated signed off  - urinary retention on HOD #1, hutson placed     # trichomonas / cystitis  - + trichomonas on admission / 1 dose of Metronidazole  - GC / chlamydia negative    # multiple episodes of loose stools:   - c. diff collected --> negative      HOD #3. Patient doing well. Tolerating clear liquid diet. Hutson was DC'd this morning and currently awaiting TOV. WBC downtrending. Has been afebrile during her hospital course.           PHYSICAL EXAM: (most recent)  General: NAD, alert, interactive, appears comfortable  C/V: NSR, hypertensive 170  Pulm: Nonlabored breathing, no respiratory distress  Abd: softly distended, NT/ND.  Extrem: WWP, no edema, SCDs in place        LABS:  CAPILLARY BLOOD GLUCOSE                              16.5   16.1  )-----------( 305      ( 30 Jan 2018 06:50 )             53.4     01-30    137  |  98  |  3<L>  ----------------------------<  115<H>  3.4<L>   |  28  |  0.60    Ca    9.1      30 Jan 2018 06:50  Phos  1.9     01-30  Mg     2.4     01-30 01-29 @ 07:01 - 01-30 @ 07:00  --------------------------------------------------------  IN: 2570 mL / OUT: 3950 mL / NET: -1380 mL    01-30 @ 07:01 - 01-30 @ 16:03  --------------------------------------------------------  IN: 186 mL / OUT: 900 mL / NET: -714 mL        Bacteriology:      Imaging:    MEDICATIONS:  MEDICATIONS  (STANDING):  dextrose 5% + sodium chloride 0.45% 1000 milliLiter(s) (90 mL/Hr) IV Continuous <Continuous>  piperacillin/tazobactam IVPB. 3.375 Gram(s) IV Intermittent every 6 hours  rivaroxaban 15 milliGRAM(s) Oral every 12 hours    MEDICATIONS  (PRN):  HYDROmorphone  Injectable 0.5 milliGRAM(s) IV Push every 4 hours PRN Moderate Pain  ondansetron Injectable 4 milliGRAM(s) IV Push every 6 hours PRN Nausea          Wounds:    CODE STATUS:  full code ANDREW STUART   MRN-[id number]  77y/F    HPI:  76 yo F with PMH of myeloproliferative syndrome (followed by Dr. 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 Pt reports she was having difficulty walking and was scheduled to see her PCP but came to ED due to weakness. Had some nausea yesterday but no vomiting and reports some loose stools yesterday. No fevers or chills. Cannot recall last colonoscopy.     On admission she was afebrile, on exam had lower abdominal tenderness with mild voluntary guarding in the LLQ, and had a WBC of 19.   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     She was admitted to the general surgery service for colitis with small microperforation and the plan was for non-operative management with NPO, antibiotics, serial abdominal exams. Her medical issues in addition to her colitis included the following:    # leukocytosis / thrombocytosis / worsening DVT on xarelto  - found to have worsening nonocclusive DVT of R common femoral vein; interval decrease in flow in R. proximal femoral vein, now with minimal flow; no leg swelling, pain or cutaneous changes in the interim  - was started on Lovenox BID; then restarted home dose xarelto  - consulted Dr. Dumont regarding possible coagulopathy causing worsening DVT on full anticoagulation; also consulted for leukocytosis to compare to patient's baseline  - f/u Julia recs     # bilateral hydronephrosis / urinary retention  - consulted urology: bladder filling, no intervention indicated signed off  - urinary retention on HOD #1, hutson placed  - hutson removed HOD3, TOV pending    # trichomonas / cystitis  - + trichomonas on admission / 1 dose of Metronidazole  - GC / chlamydia negative    # multiple episodes of loose stools:   - c. diff collected --> negative      HOD #3. Patient doing well. Tolerating clear liquid diet. Hutson was DC'd this morning and currently awaiting TOV. WBC downtrending. Has been afebrile during her hospital course. Continuing IV abx for sigmoid colitis          PHYSICAL EXAM: (most recent)  General: NAD, alert, interactive, appears comfortable  C/V: NSR, hypertensive 170  Pulm: Nonlabored breathing, no respiratory distress  Abd: softly distended, NT/ND.  Extrem: WWP, no edema, SCDs in place        LABS:  CAPILLARY BLOOD GLUCOSE                              16.5   16.1  )-----------( 305      ( 30 Jan 2018 06:50 )             53.4     01-30    137  |  98  |  3<L>  ----------------------------<  115<H>  3.4<L>   |  28  |  0.60    Ca    9.1      30 Jan 2018 06:50  Phos  1.9     01-30  Mg     2.4     01-30 01-29 @ 07:01 - 01-30 @ 07:00  --------------------------------------------------------  IN: 2570 mL / OUT: 3950 mL / NET: -1380 mL    01-30 @ 07:01 - 01-30 @ 16:03  --------------------------------------------------------  IN: 186 mL / OUT: 900 mL / NET: -714 mL        Bacteriology:      Imaging:    MEDICATIONS:  MEDICATIONS  (STANDING):  dextrose 5% + sodium chloride 0.45% 1000 milliLiter(s) (90 mL/Hr) IV Continuous <Continuous>  piperacillin/tazobactam IVPB. 3.375 Gram(s) IV Intermittent every 6 hours  rivaroxaban 15 milliGRAM(s) Oral every 12 hours    MEDICATIONS  (PRN):  HYDROmorphone  Injectable 0.5 milliGRAM(s) IV Push every 4 hours PRN Moderate Pain  ondansetron Injectable 4 milliGRAM(s) IV Push every 6 hours PRN Nausea        CODE STATUS:  full code        76 yo F with h/o myeloproliferative disorder, HTN, DVT/PE on xarelto with colitis with small microperforation and UTI.    - Follow up on ESR/CRP/Cdiff  -CLD  -IV Zosyn for colitis and microperforation.   -serial abdominal exams  -HSQ/SCDs  -therapeutic lovenox for DVT/PE  - Blood culture 1/29  - Strict I/O  - Follow up urine culture   - PT eval - for SASHA

## 2018-01-30 NOTE — PROGRESS NOTE ADULT - SUBJECTIVE AND OBJECTIVE BOX
78 yo F with PMH of myelodysplastic syndrome, DVT/PE on xarelto, HTN presented to ED with 1 day h/o weakness and lower abdominal pain that began. Pt reports she was having difficulty walking and was scheduled to see her PCP but came to ED due to weakness. Had some nausea yesterday but no vomiting and reports some loose stools yesterday. No fevers or chills. Now admitted for treatment of colitis with microperforation.  On hydroxyurea until recently when she had episode of pancytopenia - [facility] and will [facility] as [facility]        	  MEDICATIONS:  tamsulosin 0.4 milliGRAM(s) Oral at bedtime    piperacillin/tazobactam IVPB. 3.375 Gram(s) IV Intermittent every 6 hours      HYDROmorphone  Injectable 0.5 milliGRAM(s) IV Push every 4 hours PRN  ondansetron Injectable 4 milliGRAM(s) IV Push every 6 hours PRN        dextrose 5% + sodium chloride 0.45% 1000 milliLiter(s) IV Continuous <Continuous>  rivaroxaban 15 milliGRAM(s) Oral every 12 hours      Complaint:     Otherwise 12 point review of systems is normal.    PHYSICAL EXAM:    Constitutional: NAD  Eyes: PERRL, EOMI, sclera non-icteric  Neck: supple, no masses, no JVD  Respiratory: CTA b/l, good air entry b/l, no wheezing, rhonchi, rales, with normal respiratory effort and no intercostal retractions  Cardiovascular: RRR, normal S1S2, no M/R/G  Gastrointestinal: soft, tender, no masses palpable, BS normal in all four quadrants,   Extremities:  no c/c/e  Neurological: AAOx3            ICU Vital Signs Last 24 Hrs  T(C): 36.1 (30 Jan 2018 17:32), Max: 36.9 (30 Jan 2018 06:53)  T(F): 97 (30 Jan 2018 17:32), Max: 98.4 (30 Jan 2018 06:53)  HR: 92 (30 Jan 2018 18:06) (76 - 92)  BP: 167/97 (30 Jan 2018 18:06) (130/90 - 198/102)  BP(mean): 125 (30 Jan 2018 18:06) (119 - 139)  ABP: --  ABP(mean): --  RR: 14 (30 Jan 2018 18:06) (12 - 18)  SpO2: 92% (30 Jan 2018 18:06) (91% - 95%)        ECG:      CHEST X RAY    CT    MRI    MRA    CT ANGIO    CAROTID DUPLEX    DUPLEX     Echocardiogram    Catheterization:    Stress Test:     LABS:	 	  CARDIAC MARKERS:                              16.5   16.1  )-----------( 305      ( 30 Jan 2018 06:50 )             53.4     01-30    137  |  98  |  3<L>  ----------------------------<  115<H>  3.4<L>   |  28  |  0.60    Ca    9.1      30 Jan 2018 06:50  Phos  1.9     01-30  Mg     2.4     01-30          ASSESSMENT/PLAN: 	    78 yo F with PMH of myeloproliferative syndrome (followed by Dr. 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 Pt reports she was having difficulty walking and was scheduled to see her PCP but came to ED due to weakness. Had some nausea yesterday but no vomiting and reports some loose stools yesterday. No fevers or chills. Cannot recall last colonoscopy.     On admission she was afebrile, on exam had lower abdominal tenderness with mild voluntary guarding in the LLQ, and had a WBC of 19.   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     She was admitted to the general surgery service for colitis with small microperforation and the plan was for non-operative management with NPO, antibiotics, serial abdominal exams. Her medical issues in addition to her colitis included the following:    # leukocytosis / thrombocytosis / worsening DVT on xarelto  - found to have worsening nonocclusive DVT of R common femoral vein; interval decrease in flow in R. proximal femoral vein, now with minimal flow; no leg swelling, pain or cutaneous changes in the interim  - was started on Lovenox BID; then restarted home dose xarelto  - consulted Dr. Dumont regarding possible coagulopathy causing worsening DVT on full anticoagulation; also consulted for leukocytosis to compare to patient's baseline  - f/u Voudouris recs     # bilateral hydronephrosis / urinary retention  - consulted urology: bladder filling, no intervention indicated signed off  - urinary retention on HOD #1, hutson placed  - hutson removed HOD3, TOV pending    # trichomonas / cystitis  - + trichomonas on admission / 1 dose of Metronidazole  - GC / chlamydia negative    # multiple episodes of loose stools:   - c. diff collected --> negative

## 2018-01-30 NOTE — PROGRESS NOTE ADULT - SUBJECTIVE AND OBJECTIVE BOX
ON : 2 loose BM, minimal pain and nausea, no vomiting, advance to CLD as per Hon. good urine output. Cdiff, ESR,CRP and precalcitonin as per ID.   1/29: WBC 17.8 (22.1) Dr. Dumont ([facility]) states that elevated WBC is new for pt and likely representative of infection, MIVF, GC/clamydia-negative. adequate UO ON : 2 loose BM, minimal pain and nausea, no vomiting, advance to CLD as per Hon. good urine output. Cdiff, ESR,CRP and precalcitonin as per ID.   : WBC 17.8 (22.1) [facility] that elevated WBC is new for pt and likely representative of infection, MIVF, GC/clamydia-negative. adequate UO      SUBJECTIVE:  pt seen at bedside, states improvement in her abdominal pain. denies nausea and vomiting.    MEDICATIONS  (STANDING):  dextrose 5% + sodium chloride 0.45% 1000 milliLiter(s) (90 mL/Hr) IV Continuous <Continuous>  enoxaparin Injectable 50 milliGRAM(s) SubCutaneous two times a day  piperacillin/tazobactam IVPB. 3.375 Gram(s) IV Intermittent every 6 hours    MEDICATIONS  (PRN):  HYDROmorphone  Injectable 0.5 milliGRAM(s) IV Push every 4 hours PRN Moderate Pain  ondansetron Injectable 4 milliGRAM(s) IV Push every 6 hours PRN Nausea      Vital Signs Last 24 Hrs  T(C): 36.9 (2018 06:53), Max: 37.3 (2018 14:04)  T(F): 98.4 (2018 06:53), Max: 99.2 (2018 14:04)  HR: 80 (2018 05:40) (74 - 80)  BP: 198/102 (2018 05:40) (130/90 - 198/102)  BP(mean): 139 (2018 05:40) (124 - 139)  RR: 12 (2018 05:40) (12 - 16)  SpO2: 94% (2018 05:40) (91% - 95%)    PHYSICAL EXAM:      Constitutional: A&Ox3    Respiratory: non labored breathing, no respiratory distress    Cardiovascular: NSR, RRR    Gastrointestinal: soft, nontender, nondistended, without masses or organomegaly    Extremities: (-) edema        I&O's Detail    2018 07:01  -  2018 07:00  --------------------------------------------------------  IN:    dextrose 5% + sodium chloride 0.45%: 1620 mL    IV PiggyBack: 500 mL  Total IN: 2120 mL    OUT:    Indwelling Catheter - Urethral: 3950 mL  Total OUT: 3950 mL    Total NET: -1830 mL          LABS:                        16.5   16.1  )-----------( 305      ( 2018 06:50 )             53.4         141  |  102  |  6<L>  ----------------------------<  91  3.3<L>   |  25  |  0.64    Ca    8.9      2018 06:16  Phos  3.0       Mg     1.8             Urinalysis Basic - ( 2018 20:01 )    Color: Yellow / Appearance: SL Cloudy / S.020 / pH: x  Gluc: x / Ketone: Trace mg/dL  / Bili: Negative / Urobili: 0.2 E.U./dL   Blood: x / Protein: NEGATIVE mg/dL / Nitrite: POSITIVE   Leuk Esterase: Small / RBC: 5-10 /HPF / WBC Many /HPF   Sq Epi: x / Non Sq Epi: 0-5 /HPF / Bacteria: Present /HPF

## 2018-01-30 NOTE — PROGRESS NOTE ADULT - SUBJECTIVE AND OBJECTIVE BOX
History of Present Illness:  Chief Complaint/Reason for Admission: colitis with microperforation	  History of Present Illness: 	  78 yo F with PMH of myelodysplastic syndrome, DVT/PE on xarelto, HTN presented to ED with 1 day h/o weakness and lower abdominal pain that began. Pt reports she was having difficulty walking and was scheduled to see her PCP but came to ED due to weakness. Had some nausea yesterday but no vomiting and reports some loose stools yesterday. No fevers or chills. Now admitted for treatment of colitis with microperforation.  On hydroxyurea until recently when she had episode of pancytopenia - approved for [facility] and will begin as outpatient.     Review of Systems:  Other Review of Systems: All other review of systems negative, except as noted in HPI 	      Allergies and Intolerances:        Allergies:  	No Known Allergies:     Home Medications:   * Patient Currently Takes Medications as of 26-Jan-2018 14:50 documented in Structured Notes  · 	Pyridium 200 mg oral tablet: 1 tab(s) orally 3 times a day   · 	Outpatient Physical Therapy: 2 week(s) treatment 2/9-2/23/18  	Diagnosis: spinal stenosis  · 	Ceftin 250 mg oral tablet: 1 tab(s) orally 2 times a day   · 	buPROPion 75 mg oral tablet: 1 tab(s) orally once a day  · 	sulfamethoxazole-trimethoprim 800 mg-160 mg oral tablet: 1 tab(s) orally 2 times a day  · 	Xarelto 15 mg oral tablet: 1 tab(s) orally twice  · 	folic acid 1 mg oral tablet: 2 tab(s) orally once a day  · 	Norvasc 5 mg oral tablet: 1 tab(s) orally once a day  · 	Aspirin Enteric Coated 81 mg oral delayed release tablet: 1 tab(s) orally once a day  · 	hydroxyurea 500 mg oral capsule: 3 cap(s) orally Monday, Wednesday, and Friday    Patient History:    Past Medical History:  Hypertension    Myeloproliferative disorder    PE (pulmonary thromboembolism)  2015  Tremor    Vestibular disequilibrium.     Past Surgical History:  S/P hysterectomy.     Tobacco Screening:  · Core Measure Site	No	    Risk Assessment:    Present on Admission:  Deep Venous Thrombosis	no 	  Pulmonary Embolus	no 	     Heart Failure:  Does this patient have a history of or has been diagnosed with heart failure? no.       Physical Exam:  Physical Exam: Vital Signs Last 24 Hrs T(C): 36.3 (30 Jan 2018 19:56), Max: 36.9 (30 Jan 2018 06:53) T(F): 97.4 (30 Jan 2018 19:56), Max: 98.4 (30 Jan 2018 06:53) HR: 88 (30 Jan 2018 19:56) (76 - 92) BP: 154/90 (30 Jan 2018 19:56) (154/90 - 198/102) BP(mean): 112 (30 Jan 2018 19:56) (112 - 139) RR: 16 (30 Jan 2018 19:56) (12 - 18) SpO2: 96% (30 Jan 2018 19:56) (91% - 96%)  Gen: AOx3, NAD HEENT: anicteric, pink conj Neck: supple, no JVD, no INDRA    CV: RRR, no m/r/g Pulm: CTABL, no resp distress Abd: soft, tender in lower abdomen with some guarding more in LLQ, nondistended Ext: WWP, no edema	       Labs and Results:  Labs, Radiology, Cardiology, and Other Results:              16.5  16.1  )-----------( 305      ( 30 Jan 2018 06:50 )            53.4       TECHNIQUE: CT of the abdomen and pelvis with intravenous and oral   contrast. Axial, sagittal, and coronal images were obtained and reviewed.   COMPARISON: CT abdomen pelvis from 9/14/2017 and CT abdomen from 7/29/2011   FINDINGS:   Lower chest: Right basilar bronchiectasis and trace atelectasis.. Mitral   annular calcification   Liver: Smooth in contour. No hypodense lesions are seen in the liver   which are too small to characterize.. Portal and hepatic veins are patent.   Biliary system: Gallbladder is normal in size. No calcified gallstones.   No biliary ductal dilatation.   Pancreas: Since 7/29/2011, there is again a 12 mm hypodense lesion in the   pancreatic body with a now a new peripheral calcification anteriorly.   Spleen: Unremarkable.   Adrenal glands: Unremarkable.   Kidneys: Symmetric parenchymal enhancement. There is a 4.3 cm right renal   cyst. Multiple hypodense lesions are seen in bilateral kidneys which are   too small to characterize.. Moderate bilateral hydronephrosis. No renal   or ureteral stone.    Urinary Bladder: Moderately distended urinary bladder with several   bladder diverticula are noted. The urinary bladder wall is thickened   which may be related to chronic bladder outlet obstruction versus   cystitis. Small bladder diverticula along its left posterior wall.   Reproductive organs: Status post hysterectomy. No adnexal masses.   Bowel/Peritoneum: There is circumferential wall thickening extending from   the mid descending colon through the distal sigmoid colon, where wall   thickening is most pronounced with associated intraluminal narrowing.   There is mild adjacent pericolic fat stranding. There are scattered   colonic diverticula within this region however the long segment   involvement is most suggestive for a colitis as oppose to a   diverticulitis. Few scattered foci of gas which appear extraluminal   location within the pelvis may indicate a small microperforation. No   large intraperitoneal free air appreciated. No evidence of bowel   obstruction. Appendix is not visualized however no inflammatory changes   appreciated at the right lower quadrant. No ascites or extraluminal gas.    Lymph nodes: No lymphadenopathy.   Aorta/IVC: There is a large amount of calcified and noncalcified plaque   involving the abdominal aorta. The abdominal aorta is aneurysmal   measuring 3.4 cm in diameter, unchanged. Abdominal wall: No hernia.   Bones/Soft tissues: Moderate degenerative changes.   IMPRESSION:   1.  Circumferential wall thickening extending from the mid descending   colon through the distal sigmoid colon, where wall thickening is most   pronounced with associated intraluminal narrowing. There is mild adjacent   pericolic fat stranding. There are scattered colonic diverticula within   this region however the long segment involvement is most suggestive for a   colitis as oppose to a diverticulitis. Few scattered foci of gas which   appear extraluminal location within the pelvis may indicate a small   microperforation.  2.    3.  Distended urinary bladder with mild bilateral hydronephrosis. Bladder   wall thickening with prominent adjacent perivesicular fat stranding.   Correlate clinically for cystitis/renal infection.  4.   5.  Unchanged abdominal aortic aneurysm measuring 3.4 cm.	    Assessment and Plan:    Assessment:  · Assessment		  78 yo F with h/o MDS, HTN, DVT/PE on xarelto with colitis with small microperforation  -NPO/IVF  -Zosyn  -sq Lovenox  -ASA on hold   -HSQ/SCDs  -

## 2018-01-30 NOTE — PROGRESS NOTE ADULT - SUBJECTIVE AND OBJECTIVE BOX
HOSPITAL COURSE:  78 yo F with PMH of myeloproliferative syndrome (followed by Dr. Dumont as outpatient), hx of DVT/PE (on xarelto), and HTN presented to ED on 1/27/2018 with 1 day h/o weakness, lower abdominal pain, nausea and loose stool. Pt reports she was having difficulty walking and was scheduled to see her PCP but came to ED due to weakness. She denied any fevers or chills. On admission she was afebrile, on exam had lower abdominal tenderness with mild voluntary guarding in the LLQ, and CBC showed leukocytosis with WBC of 19. 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 Pt was admitted to the general surgery service for colitis with small microperforation and the plan was for non-operative management with NPO, antibiotics, serial abdominal exams. She was started on zosyn (1-27-). URology was consulted for B/L hydro, no intervention pursued as bladder is filling. LE duplex showed nonocclusive DVT of the right common femoral vein, not previously identified; interval decrease in flow about the DVT in the right proximal femoral vein; no significant change in right mid and distal femoral vein and right popliteal vein DVT. UA + for trichomonas on admission, given 1 dose of Metronidazole, DCed to negative Urine cxs. Pt had multiple episodes of loose stools, c. diff negative. On HOD 3, pt was tolerating clear liquid diet, hutson was DC'd, pt failed trial of void. Pt was stable and transfered to medicine for further management.    INTERVAL HPI/OVERNIGHT EVENTS: Hutson DCed this AM, pt retainingurine 700 cc on blader scan, straight cathed with 830 cc drained. Pt with no new complaints. Denies N/V, diarrhea, abdominal pain, CP/SOB/palpitations, dysuria/back or flank pain     ROS  CV: Denies chest pain, palpitations  RESP: Denies SOB  GI: Denies abdominal pain, constipation, diarrhea, nausea, vomiting  : Denies dysuria, hematuria, flank or back pain  ID: Denies fevers, chills  MSK: Denies joint pain   DERM: Denies any rashes, bruising, pruritis  NEURO: No headaches, blurry vision, double vision    VITAL SIGNS:  T(F): 98 (01-30-18 @ 21:00), Max: 98.4 (01-30-18 @ 06:53)  HR: 85 (01-30-18 @ 21:00) (76 - 92)  BP: 151/93 (01-30-18 @ 21:00) (151/93 - 198/102)  BP(mean): 112 (01-30-18 @ 19:56) (112 - 139)  RR: 18 (01-30-18 @ 21:00) (12 - 18)  SpO2: 96% (01-30-18 @ 21:00) (91% - 96%)    01-29-18 @ 07:01  -  01-30-18 @ 07:00  --------------------------------------------------------  IN: 2570 mL / OUT: 3950 mL / NET: -1380 mL    01-30-18 @ 07:01  -  01-30-18 @ 22:37  --------------------------------------------------------  IN: 888 mL / OUT: 900 mL / NET: -12 mL      PHYSICAL EXAM:  Constitutional: Cachectic, NAD, resting comfortably   Head: NC/AT  Eyes: PERRL, EOMI, anicteric sclera, no mucosal pallor  ENT: no nasal discharge; uvula midline, no oropharyngeal erythema or exudates; MMM  Neck: supple; no JVD or thyromegaly, no lymphadenopathy  Respiratory: LLL crackles; no W/R/R, no retractions  Cardiac: +S1/S2; RRR; no M/R/G; PMI non-displaced  Gastrointestinal: abdomen soft, slightly distended, tympanic; no rebound or guarding; +BSx4  Back: spine midline, no bony tenderness or step-offs; no CVAT B/L  Extremities: warm; no calf tenderness, no pedal edema, no cyanosis, no clubbing  Musculoskeletal: NROM x4; no joint swelling, tenderness or erythema  Vascular: 2+ radial; DP/PT pulses B/L  Dermatologic: no rash, no petechia   Lymphatic: no submandibular or cervical LAD  Neurologic: AAOx3; CNII-XII grossly intact; 5/5 strength throughout, sensory symmetrically intact in B/L LE   Psychiatric: pleasant and conversive; affect and characteristics of appearance, verbalizations, behaviors are appropriate    MEDICATIONS  (STANDING):  piperacillin/tazobactam IVPB. 3.375 Gram(s) IV Intermittent every 6 hours  rivaroxaban 15 milliGRAM(s) Oral every 12 hours  tamsulosin 0.4 milliGRAM(s) Oral at bedtime    MEDICATIONS  (PRN):  HYDROmorphone  Injectable 0.5 milliGRAM(s) IV Push every 4 hours PRN Moderate Pain  ondansetron Injectable 4 milliGRAM(s) IV Push every 6 hours PRN Nausea    Allergies    No Known Allergies    Intolerances    LABS:                        16.5   16.1  )-----------( 305      ( 30 Jan 2018 06:50 )             53.4     01-30    137  |  98  |  3<L>  ----------------------------<  115<H>  3.4<L>   |  28  |  0.60    Ca    9.1      30 Jan 2018 06:50  Phos  1.9     01-30  Mg     2.4     01-30    RADIOLOGY & ADDITIONAL TESTS:    < from: CT Abdomen and Pelvis w/ IV Cont (01.27.18 @ 00:56) >  1.  Circumferential wall thickening extending from the mid descending   colon through the distal sigmoid colon, where wall thickening is most   pronounced with associated intraluminal narrowing. There is mild adjacent   pericolic fat stranding. There are scattered colonic diverticula within   this region however the long segment involvement is most suggestive for a   colitis as oppose to a diverticulitis. Few scattered foci of gas which   appear extraluminal location within the pelvis may indicate a small   microperforation.  2.    3.  Distended urinary bladder with mild bilateral hydronephrosis. Bladder   wall thickening with prominent adjacent perivesicular fat stranding.   Correlate clinically for cystitis/renal infection.  4.    5.  Unchanged abdominal aortic aneurysm measuring 3.4 cm.    < end of copied text >    < from: Xray Chest 1 View AP- PORTABLE-Urgent (01.26.18 @ 17:52) >  Impression: No acute infiltrates    < end of copied text >    < from: US Duplex Venous Lower Ext Complete, Bilateral (01.26.18 @ 17:31) >  IMPRESSION:  Nonocclusive deep vein thrombosis of the right common femoral vein, not   previously identified. Interval decrease in flow about the deep vein   thrombosis in the right proximal femoral vein, now demonstrating minimal   blood flow. No significant change in right mid and distal femoral vein   deep vein thrombosis. No significant change in deep vein thrombosis of   the right popliteal vein.    < end of copied text > HOSPITAL COURSE:  76 yo F with PMH of myeloproliferative syndrome (followed by Dr. Dumont as outpatient), hx of DVT/PE (on xarelto), and HTN presented to ED on 1/27/2018 with 1 day h/o weakness, lower abdominal pain, nausea and loose stool. Pt reports she was having difficulty walking and was scheduled to see her PCP but came to ED due to weakness. She denied any fevers or chills. On admission she was afebrile, on exam had lower abdominal tenderness with mild voluntary guarding in the LLQ, and CBC showed leukocytosis with WBC of 19. 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 Pt was admitted to the general surgery service for colitis with small microperforation and the plan was for non-operative management with NPO, antibiotics, serial abdominal exams. She was started on zosyn (1-27-). URology was consulted for B/L hydro, no intervention pursued as bladder is filling. LE duplex showed nonocclusive DVT of the right common femoral vein, not previously identified; interval decrease in flow about the DVT in the right proximal femoral vein; no significant change in right mid and distal femoral vein and right popliteal vein DVT. UA + for trichomonas on admission, given 1 dose of Metronidazole, DCed to negative Urine cxs. Pt had multiple episodes of loose stools, c. diff negative. On HOD 3, pt was tolerating clear liquid diet, hutson was DC'd, pt failed trial of void. Pt was stable and transfered to medicine for further management.    INTERVAL HPI/OVERNIGHT EVENTS: Hutson DCed this AM, pt retainingurine 700 cc on blader scan, straight cathed with 830 cc drained. Pt with no new complaints. Denies N/V, diarrhea, abdominal pain, CP/SOB/palpitations, dysuria/back or flank pain     ROS  CV: Denies chest pain, palpitations  RESP: Denies SOB  GI: Denies abdominal pain, constipation, diarrhea, nausea, vomiting  : Denies dysuria, hematuria, flank or back pain  ID: Denies fevers, chills  MSK: Denies joint pain   DERM: Denies any rashes, bruising, pruritis  NEURO: No headaches, blurry vision, double vision    VITAL SIGNS:  T(F): 98 (01-30-18 @ 21:00), Max: 98.4 (01-30-18 @ 06:53)  HR: 85 (01-30-18 @ 21:00) (76 - 92)  BP: 151/93 (01-30-18 @ 21:00) (151/93 - 198/102)  BP(mean): 112 (01-30-18 @ 19:56) (112 - 139)  RR: 18 (01-30-18 @ 21:00) (12 - 18)  SpO2: 96% (01-30-18 @ 21:00) (91% - 96%)    01-29-18 @ 07:01  -  01-30-18 @ 07:00  --------------------------------------------------------  IN: 2570 mL / OUT: 3950 mL / NET: -1380 mL    01-30-18 @ 07:01  -  01-30-18 @ 22:37  --------------------------------------------------------  IN: 888 mL / OUT: 900 mL / NET: -12 mL      PHYSICAL EXAM:  Constitutional: Cachectic, NAD, resting comfortably   Head: NC/AT  Eyes: PERRL, EOMI, anicteric sclera, no mucosal pallor  ENT: no nasal discharge; uvula midline, no oropharyngeal erythema or exudates; MMM  Neck: supple; no JVD or thyromegaly, no lymphadenopathy  Respiratory: LLL crackles; no W/R/R, no retractions  Cardiac: +S1/S2; RRR; no M/R/G; PMI non-displaced  Gastrointestinal: abdomen soft, slightly distended, tympanic; no rebound or guarding; +BSx4  : Mild suprapubic tenderness   Back: spine midline, no bony tenderness or step-offs; no CVAT B/L  Extremities: warm; no calf tenderness, no pedal edema, no cyanosis, no clubbing  Musculoskeletal: NROM x4; no joint swelling, tenderness or erythema  Vascular: 2+ radial; DP/PT pulses B/L  Dermatologic: no rash, no petechia   Lymphatic: no submandibular or cervical LAD  Neurologic: AAOx3; CNII-XII grossly intact; 5/5 strength throughout, sensory symmetrically intact in B/L LE   Psychiatric: pleasant and conversive; affect and characteristics of appearance, verbalizations, behaviors are appropriate    MEDICATIONS  (STANDING):  piperacillin/tazobactam IVPB. 3.375 Gram(s) IV Intermittent every 6 hours  rivaroxaban 15 milliGRAM(s) Oral every 12 hours  tamsulosin 0.4 milliGRAM(s) Oral at bedtime    MEDICATIONS  (PRN):  HYDROmorphone  Injectable 0.5 milliGRAM(s) IV Push every 4 hours PRN Moderate Pain  ondansetron Injectable 4 milliGRAM(s) IV Push every 6 hours PRN Nausea    Allergies    No Known Allergies    Intolerances    LABS:                        16.5   16.1  )-----------( 305      ( 30 Jan 2018 06:50 )             53.4     01-30    137  |  98  |  3<L>  ----------------------------<  115<H>  3.4<L>   |  28  |  0.60    Ca    9.1      30 Jan 2018 06:50  Phos  1.9     01-30  Mg     2.4     01-30    RADIOLOGY & ADDITIONAL TESTS:    < from: CT Abdomen and Pelvis w/ IV Cont (01.27.18 @ 00:56) >  1.  Circumferential wall thickening extending from the mid descending   colon through the distal sigmoid colon, where wall thickening is most   pronounced with associated intraluminal narrowing. There is mild adjacent   pericolic fat stranding. There are scattered colonic diverticula within   this region however the long segment involvement is most suggestive for a   colitis as oppose to a diverticulitis. Few scattered foci of gas which   appear extraluminal location within the pelvis may indicate a small   microperforation.  2.    3.  Distended urinary bladder with mild bilateral hydronephrosis. Bladder   wall thickening with prominent adjacent perivesicular fat stranding.   Correlate clinically for cystitis/renal infection.  4.    5.  Unchanged abdominal aortic aneurysm measuring 3.4 cm.    < end of copied text >    < from: Xray Chest 1 View AP- PORTABLE-Urgent (01.26.18 @ 17:52) >  Impression: No acute infiltrates    < end of copied text >    < from: US Duplex Venous Lower Ext Complete, Bilateral (01.26.18 @ 17:31) >  IMPRESSION:  Nonocclusive deep vein thrombosis of the right common femoral vein, not   previously identified. Interval decrease in flow about the deep vein   thrombosis in the right proximal femoral vein, now demonstrating minimal   blood flow. No significant change in right mid and distal femoral vein   deep vein thrombosis. No significant change in deep vein thrombosis of   the right popliteal vein.    < end of copied text >

## 2018-01-30 NOTE — PROGRESS NOTE ADULT - PROBLEM SELECTOR PLAN 3
UA psiitve on admission with opostitive nitrites, small leuk esterase, many WBC and +trichomonas on admission; s/p 1 dose of Metronidazole  - GC / chlamydia negative  -Urine cxs NGTD

## 2018-01-30 NOTE — DIETITIAN INITIAL EVALUATION ADULT. - ENERGY NEEDS
stated ht: 65"; wt (1/26) 47.2kg, IBW: 56.8kg  %IBW:  83%  BMI: 17.3; ABW utilized for nutritional needs as ABW within % of IBW; increased needs per unintentional wt loss

## 2018-01-30 NOTE — DIETITIAN INITIAL EVALUATION ADULT. - OTHER INFO
76y/o F w/ c/o h/o weakness and lower abdominal pain, pt w/colitis with small microperforation and UTI, w/pending C-diff result. On Clear liquids, consumed 2/3 of Jello and some water this am; deniesd N/V/D/C; lower abdominal pain is manageable now; unintentional sig. wt loss noted; pt does not like ONS; encouraged to increase po intake and emphasized adequate protein intake w/ diet advancement; team contacted for diet advancement; pt receptive to education; skin it w/ 2+ L arm edema.

## 2018-01-30 NOTE — PROGRESS NOTE ADULT - PROBLEM SELECTOR PLAN 1
CT scan with circumferential wall thickening extending from the mid descending   colon through the distal sigmoid colon, few scattered foci of gas which may indicate a small microperforation. Pt now tolerating clear diet   -c/w zosyn (1/27-)  -multiple episodes of loose stools: c. diff negative. Monitor BMs and avoid laxative CT scan with circumferential wall thickening extending from the mid descending   colon through the distal sigmoid colon, few scattered foci of gas which may indicate a small microperforation. Infectious colitis most likely given elevated CRP and procalcitonin. Ischemic colitis unlikely as pt with no significant abdominal pain on exam (no pain out of proportion to exam). Pt now tolerating clear diet   -c/w zosyn (1/27-)    -multiple episodes of loose stools: c. diff negative. Monitor BMs and avoid laxative

## 2018-01-30 NOTE — PROGRESS NOTE ADULT - ASSESSMENT
76 yo F with PMH of myeloproliferative syndrome (followed by Dr. Dumont as outpatient), hx of DVT/PE (on xarelto), and HTN presented to ED on 1/27/2018 with 1 day h/o weakness, lower abdominal pain, nausea and loose stool admitted for colitis

## 2018-01-30 NOTE — PROGRESS NOTE ADULT - PROBLEM SELECTOR PLAN 7
Lytes: Replete lytes with goal K>4 and Mg >2  IVF: DCed to to LLL crackles and as pt tolerating PO  DVT ppx: On xeralto   Code: Full code Lytes: Replete lytes with goal K>4 and Mg >2  IVF: DCed to to LLL crackles and as pt tolerating PO  Pain: Hydromorphone 0.5 mg IV push PRN Q4   Nausea; Zofran 4 mg IV push PRN Q6  DVT ppx: On xeralto   Code: Full code Lytes: Replete lytes with goal K>4 and Mg >2  Diet: Full liquid diet, advance as tolerated   IVF: DCed to to LLL crackles and as pt tolerating PO  Pain: Hydromorphone 0.5 mg IV push PRN Q4   Nausea; Zofran 4 mg IV push PRN Q6  DVT ppx: On xeralto   Code: Full code

## 2018-01-30 NOTE — DIETITIAN INITIAL EVALUATION ADULT. - NS AS NUTRI INTERV MEALS SNACK
please advance diet to full liquids and regular diet as tolerated; pt refuses ONS at this time;  encourage po intake w/ meals and snacks; provide pt's food preferences./General/healthful diet

## 2018-01-31 LAB
ANION GAP SERPL CALC-SCNC: 12 MMOL/L — SIGNIFICANT CHANGE UP (ref 5–17)
BUN SERPL-MCNC: 5 MG/DL — LOW (ref 7–23)
CALCIUM SERPL-MCNC: 9.5 MG/DL — SIGNIFICANT CHANGE UP (ref 8.4–10.5)
CHLORIDE SERPL-SCNC: 100 MMOL/L — SIGNIFICANT CHANGE UP (ref 96–108)
CO2 SERPL-SCNC: 25 MMOL/L — SIGNIFICANT CHANGE UP (ref 22–31)
CREAT SERPL-MCNC: 0.68 MG/DL — SIGNIFICANT CHANGE UP (ref 0.5–1.3)
GLUCOSE SERPL-MCNC: 101 MG/DL — HIGH (ref 70–99)
HCT VFR BLD CALC: 53.1 % — HIGH (ref 34.5–45)
HGB BLD-MCNC: 16.7 G/DL — HIGH (ref 11.5–15.5)
MAGNESIUM SERPL-MCNC: 2.2 MG/DL — SIGNIFICANT CHANGE UP (ref 1.6–2.6)
MCHC RBC-ENTMCNC: 28.4 PG — SIGNIFICANT CHANGE UP (ref 27–34)
MCHC RBC-ENTMCNC: 31.5 G/DL — LOW (ref 32–36)
MCV RBC AUTO: 90.5 FL — SIGNIFICANT CHANGE UP (ref 80–100)
PHOSPHATE SERPL-MCNC: 3 MG/DL — SIGNIFICANT CHANGE UP (ref 2.5–4.5)
PLATELET # BLD AUTO: 289 K/UL — SIGNIFICANT CHANGE UP (ref 150–400)
POTASSIUM SERPL-MCNC: 3.6 MMOL/L — SIGNIFICANT CHANGE UP (ref 3.5–5.3)
POTASSIUM SERPL-SCNC: 3.6 MMOL/L — SIGNIFICANT CHANGE UP (ref 3.5–5.3)
RBC # BLD: 5.87 M/UL — HIGH (ref 3.8–5.2)
RBC # FLD: 16.1 % — SIGNIFICANT CHANGE UP (ref 10.3–16.9)
SODIUM SERPL-SCNC: 137 MMOL/L — SIGNIFICANT CHANGE UP (ref 135–145)
WBC # BLD: 15.3 K/UL — HIGH (ref 3.8–10.5)
WBC # FLD AUTO: 15.3 K/UL — HIGH (ref 3.8–10.5)

## 2018-01-31 RX ORDER — POTASSIUM CHLORIDE 20 MEQ
40 PACKET (EA) ORAL ONCE
Qty: 0 | Refills: 0 | Status: COMPLETED | OUTPATIENT
Start: 2018-01-31 | End: 2018-01-31

## 2018-01-31 RX ADMIN — PIPERACILLIN AND TAZOBACTAM 200 GRAM(S): 4; .5 INJECTION, POWDER, LYOPHILIZED, FOR SOLUTION INTRAVENOUS at 12:40

## 2018-01-31 RX ADMIN — AMLODIPINE BESYLATE 5 MILLIGRAM(S): 2.5 TABLET ORAL at 05:38

## 2018-01-31 RX ADMIN — TAMSULOSIN HYDROCHLORIDE 0.4 MILLIGRAM(S): 0.4 CAPSULE ORAL at 21:44

## 2018-01-31 RX ADMIN — RIVAROXABAN 15 MILLIGRAM(S): KIT at 17:12

## 2018-01-31 RX ADMIN — PIPERACILLIN AND TAZOBACTAM 200 GRAM(S): 4; .5 INJECTION, POWDER, LYOPHILIZED, FOR SOLUTION INTRAVENOUS at 05:37

## 2018-01-31 RX ADMIN — Medication 40 MILLIEQUIVALENT(S): at 12:12

## 2018-01-31 RX ADMIN — PIPERACILLIN AND TAZOBACTAM 200 GRAM(S): 4; .5 INJECTION, POWDER, LYOPHILIZED, FOR SOLUTION INTRAVENOUS at 17:29

## 2018-01-31 RX ADMIN — RIVAROXABAN 15 MILLIGRAM(S): KIT at 05:38

## 2018-01-31 RX ADMIN — PIPERACILLIN AND TAZOBACTAM 200 GRAM(S): 4; .5 INJECTION, POWDER, LYOPHILIZED, FOR SOLUTION INTRAVENOUS at 23:30

## 2018-01-31 NOTE — PROGRESS NOTE ADULT - PROBLEM SELECTOR PLAN 7
Lytes: Replete lytes with goal K>4 and Mg >2  Diet: Full liquid diet, advance as tolerated   IVF: DC'd due to crackles in lower lung fields and as pt tolerating PO  Pain: Hydromorphone 0.5 mg IV push PRN Q4   Nausea; Zofran 4 mg IV push PRN Q6  DVT ppx: On xeralto   Code: Full code

## 2018-01-31 NOTE — PROGRESS NOTE ADULT - SUBJECTIVE AND OBJECTIVE BOX
SUBJECTIVE: Pt seen and examined at bedside. No overnight events. Passing gas, had small BM, does not feel hungry although reports drinking water without nausea or emesis    Vital Signs Last 24 Hrs  T(C): 36.5 (31 Jan 2018 09:07), Max: 36.9 (31 Jan 2018 05:09)  T(F): 97.7 (31 Jan 2018 09:07), Max: 98.5 (31 Jan 2018 05:09)  HR: 83 (31 Jan 2018 09:07) (78 - 98)  BP: 126/82 (31 Jan 2018 09:07) (126/82 - 178/86)  BP(mean): 112 (30 Jan 2018 19:56) (112 - 125)  RR: 17 (31 Jan 2018 09:07) (14 - 18)  SpO2: 95% (31 Jan 2018 09:07) (92% - 96%)    PHYSICAL EXAM  General: NAD, in no distress  C/V: normocardic  Pulm: Nonlabored breathing, on RA  Abd: softly, mildly distended, tender to deep palpation in LLQ, no rebound or guarding  Extrem: [facility]    LABS:                        16.7   15.3  )-----------( 289      ( 31 Jan 2018 13:07 )             53.1     01-31    137  |  100  |  5<L>  ----------------------------<  101<H>  3.6   |  25  |  0.68    Ca    9.5      31 Jan 2018 13:07  Phos  3.0     01-31  Mg     2.2     01-31      ASSESSMENT AND PLAN  76 yo F with h/o myeloproliferative disorder, HTN, DVT/PE on xarelto with colitis with small microperforation and UTI.  Condition improving. Bcx & Ucx with no growth to date, C.diff negative.    - regular diet  - continue IV Zosyn, switch to PO Augmentin on discharge   - serial abdominal exams  - rest of care per primary team  - surgery team 4 following, please call with any questions  - discussed with attending

## 2018-01-31 NOTE — PROGRESS NOTE ADULT - SUBJECTIVE AND OBJECTIVE BOX
INTERVAL HPI/OVERNIGHT EVENTS: Pt seen and examined at the bedside. No events overnight. Pt passed TOV this morning per nursing. Pt notes some pain in her L ankle yesterday afternoon, which resolved in the evening. Pt denies abdominal pain, loose stools, bloody stools, melena, chest pain, N/V, SOB, chills.     VITAL SIGNS:  T(F): 98.5 (01-31-18 @ 05:09)  HR: 98 (01-31-18 @ 05:09)  BP: 132/86 (01-31-18 @ 05:09)  RR: 18 (01-31-18 @ 05:09)  SpO2: 96% (01-31-18 @ 05:09)  Wt(kg): --    I: 1088  O: 1150    PHYSICAL EXAM:  General: Frail appearing elderly woman. Pt resting comfortably in bed on 3L O2 NC  HEENT: Normocephalic, Atraumtic. PEERL. MMM  Respiratory: Breath sounds CTA in upper lung fields. Crackles in b/l lower lung fields. No wheezes or rhonchi.  Cardiovascular: Normal S1 and S2. RRR. No rubs, murmurs.   Gastrointestinal: Soft, non-distended. Minimal tenderness to palpation in RLQ. Positive bowel sounds.   Extremities: No LE edema, tenderness or erythema. No palpable cord.   Skin: No rashes or ulcers. No clubbing/cyanosis.   Vascular: 2+ dorsalis pedis pulses b/l  Neurological: No focal deficits. AAOx3.       MEDICATIONS  (STANDING):  amLODIPine   Tablet 5 milliGRAM(s) Oral daily  piperacillin/tazobactam IVPB. 3.375 Gram(s) IV Intermittent every 6 hours  rivaroxaban 15 milliGRAM(s) Oral every 12 hours  tamsulosin 0.4 milliGRAM(s) Oral at bedtime    MEDICATIONS  (PRN):  HYDROmorphone  Injectable 0.5 milliGRAM(s) IV Push every 4 hours PRN Moderate Pain  ondansetron Injectable 4 milliGRAM(s) IV Push every 6 hours PRN Nausea      Allergies    No Known Allergies    Intolerances        LABS:                        16.5   16.1  )-----------( 305      ( 30 Jan 2018 06:50 )             53.4     01-30    137  |  98  |  3<L>  ----------------------------<  115<H>  3.4<L>   |  28  |  0.60    Ca    9.1      30 Jan 2018 06:50  Phos  1.9     01-30  Mg     2.4     01-30    Procalcitonin 0.22             RADIOLOGY & ADDITIONAL TESTS:

## 2018-01-31 NOTE — PROGRESS NOTE ADULT - SUBJECTIVE AND OBJECTIVE BOX
History of Present Illness:  Chief Complaint/Reason for Admission: colitis with microperforation	  History of Present Illness: 	  76 yo F with PMH of myelodysplastic syndrome, DVT/PE on xarelto, HTN presented to ED with 1 day h/o weakness and lower abdominal pain that began. Pt reports she was having difficulty walking and was scheduled to see her PCP but came to ED due to weakness. Had some nausea yesterday but no vomiting and reports some loose stools yesterday. No fevers or chills. Now admitted for treatment of colitis with microperforation.  On hydroxyurea until recently when she had episode of pancytopenia - approved for Jakafi and will begin as outpatient.     Review of Systems:  Other Review of Systems: All other review of systems negative, except as noted in HPI 	      Allergies and Intolerances:        Allergies:  	No Known Allergies:     Home Medications:   * Patient Currently Takes Medications as of 26-Jan-2018 14:50 documented in Structured Notes  · 	Pyridium 200 mg oral tablet: 1 tab(s) orally 3 times a day   · 	Outpatient Physical Therapy: 2 week(s) treatment 2/9-2/23/18  	Diagnosis: spinal stenosis  · 	Ceftin 250 mg oral tablet: 1 tab(s) orally 2 times a day   · 	buPROPion 75 mg oral tablet: 1 tab(s) orally once a day  · 	sulfamethoxazole-trimethoprim 800 mg-160 mg oral tablet: 1 tab(s) orally 2 times a day  · 	Xarelto 15 mg oral tablet: 1 tab(s) orally twice  · 	folic acid 1 mg oral tablet: 2 tab(s) orally once a day  · 	Norvasc 5 mg oral tablet: 1 tab(s) orally once a day  · 	Aspirin Enteric Coated 81 mg oral delayed release tablet: 1 tab(s) orally once a day  · 	hydroxyurea 500 mg oral capsule: 3 cap(s) orally Monday, Wednesday, and Friday    Patient History:    Past Medical History:  Hypertension    Myeloproliferative disorder    PE (pulmonary thromboembolism)  2015  Tremor    Vestibular disequilibrium.     Past Surgical History:  S/P hysterectomy.     Tobacco Screening:  · Core Measure Site	No	    Risk Assessment:    Present on Admission:  Deep Venous Thrombosis	no 	  Pulmonary Embolus	no 	     Heart Failure:  Does this patient have a history of or has been diagnosed with heart failure? no.       Physical Exam:  Physical Exam: Vital Signs Last 24 Hrs T(C): 36.4 (31 Jan 2018 21:10), Max: 36.9 (31 Jan 2018 05:09) T(F): 97.6 (31 Jan 2018 21:10), Max: 98.5 (31 Jan 2018 05:09) HR: 86 (31 Jan 2018 21:10) (80 - 98) BP: 126/81 (31 Jan 2018 21:10) (121/84 - 132/86) BP(mean): 96 (31 Jan 2018 15:05) (96 - 96) RR: 18 (31 Jan 2018 21:10) (17 - 18) SpO2: 95% (31 Jan 2018 21:10) (95% - 98%)  Gen: AOx3, NAD HEENT: anicteric, pink conj Neck: supple, no JVD, no INDRA    CV: RRR, no m/r/g Pulm: CTABL, no resp distress Abd: soft, tender in lower abdomen with some guarding more in LLQ, nondistended Ext: WWP, no edema	       Labs and Results:  Labs, Radiology, Cardiology, and Other Results:                      16.7  15.3  )-----------( 289      ( 31 Jan 2018 13:07 )            53.1    TECHNIQUE: CT of the abdomen and pelvis with intravenous and oral   contrast. Axial, sagittal, and coronal images were obtained and reviewed.   COMPARISON: CT abdomen pelvis from 9/14/2017 and CT abdomen from 7/29/2011   FINDINGS:   Lower chest: Right basilar bronchiectasis and trace atelectasis.. Mitral   annular calcification   Liver: Smooth in contour. No hypodense lesions are seen in the liver   which are too small to characterize.. Portal and hepatic veins are patent.   Biliary system: Gallbladder is normal in size. No calcified gallstones.   No biliary ductal dilatation.   Pancreas: Since 7/29/2011, there is again a 12 mm hypodense lesion in the   pancreatic body with a now a new peripheral calcification anteriorly.   Spleen: Unremarkable.   Adrenal glands: Unremarkable.   Kidneys: Symmetric parenchymal enhancement. There is a 4.3 cm right renal   cyst. Multiple hypodense lesions are seen in bilateral kidneys which are   too small to characterize.. Moderate bilateral hydronephrosis. No renal   or ureteral stone.    Urinary Bladder: Moderately distended urinary bladder with several   bladder diverticula are noted. The urinary bladder wall is thickened   which may be related to chronic bladder outlet obstruction versus   cystitis. Small bladder diverticula along its left posterior wall.   Reproductive organs: Status post hysterectomy. No adnexal masses.   Bowel/Peritoneum: There is circumferential wall thickening extending from   the mid descending colon through the distal sigmoid colon, where wall   thickening is most pronounced with associated intraluminal narrowing.   There is mild adjacent pericolic fat stranding. There are scattered   colonic diverticula within this region however the long segment   involvement is most suggestive for a colitis as oppose to a   diverticulitis. Few scattered foci of gas which appear extraluminal   location within the pelvis may indicate a small microperforation. No   large intraperitoneal free air appreciated. No evidence of bowel   obstruction. Appendix is not visualized however no inflammatory changes   appreciated at the right lower quadrant. No ascites or extraluminal gas.    Lymph nodes: No lymphadenopathy.   Aorta/IVC: There is a large amount of calcified and noncalcified plaque   involving the abdominal aorta. The abdominal aorta is aneurysmal   measuring 3.4 cm in diameter, unchanged. Abdominal wall: No hernia.   Bones/Soft tissues: Moderate degenerative changes.   IMPRESSION:   1.  Circumferential wall thickening extending from the mid descending   colon through the distal sigmoid colon, where wall thickening is most   pronounced with associated intraluminal narrowing. There is mild adjacent   pericolic fat stranding. There are scattered colonic diverticula within   this region however the long segment involvement is most suggestive for a   colitis as oppose to a diverticulitis. Few scattered foci of gas which   appear extraluminal location within the pelvis may indicate a small   microperforation.  2.    3.  Distended urinary bladder with mild bilateral hydronephrosis. Bladder   wall thickening with prominent adjacent perivesicular fat stranding.   Correlate clinically for cystitis/renal infection.  4.   5.  Unchanged abdominal aortic aneurysm measuring 3.4 cm.	    Assessment and Plan:    Assessment:  · Assessment		  76 yo F with h/o MDS, HTN, DVT/PE on xarelto with colitis with small microperforation  -NPO/IVF  -Zosyn  -sq Lovenox  -ASA on hold   -HSQ/SCDs  -

## 2018-01-31 NOTE — PROGRESS NOTE ADULT - SUBJECTIVE AND OBJECTIVE BOX
INTERVAL HPI/OVERNIGHT EVENTS:    ANTIBIOTICS/RELEVANT:    MEDICATIONS  (STANDING):  amLODIPine   Tablet 5 milliGRAM(s) Oral daily  piperacillin/tazobactam IVPB. 3.375 Gram(s) IV Intermittent every 6 hours  rivaroxaban 15 milliGRAM(s) Oral every 12 hours  tamsulosin 0.4 milliGRAM(s) Oral at bedtime    MEDICATIONS  (PRN):  ondansetron Injectable 4 milliGRAM(s) IV Push every 6 hours PRN Nausea      Allergies    No Known Allergies    Intolerances        Vital Signs Last 24 Hrs  T(C): 36.5 (31 Jan 2018 15:05), Max: 36.9 (31 Jan 2018 05:09)  T(F): 97.7 (31 Jan 2018 15:05), Max: 98.5 (31 Jan 2018 05:09)  HR: 80 (31 Jan 2018 15:05) (80 - 98)  BP: 121/84 (31 Jan 2018 15:05) (121/84 - 154/90)  BP(mean): 96 (31 Jan 2018 15:05) (96 - 112)  RR: 18 (31 Jan 2018 15:05) (16 - 18)  SpO2: 98% (31 Jan 2018 15:05) (95% - 98%)    PHYSICAL EXAM:      Constitutional:    Eyes:    ENMT:    Neck:    Breasts:    Back:    Respiratory:    Cardiovascular:    Gastrointestinal:    Genitourinary:    Rectal:    Extremities:    Vascular:    Neurological:    Skin:    Lymph Nodes:    Musculoskeletal:    Psychiatric:        LABS:                        16.7   15.3  )-----------( 289      ( 31 Jan 2018 13:07 )             53.1     01-31    137  |  100  |  5<L>  ----------------------------<  101<H>  3.6   |  25  |  0.68    Ca    9.5      31 Jan 2018 13:07  Phos  3.0     01-31  Mg     2.2     01-31            MICROBIOLOGY:    RADIOLOGY & ADDITIONAL STUDIES: INTERVAL HPI/OVERNIGHT EVENTS:    Subjectively better and eating solid food  BMs still loose    MEDICATIONS  (STANDING):  amLODIPine   Tablet 5 milliGRAM(s) Oral daily  piperacillin/tazobactam IVPB. 3.375 Gram(s) IV Intermittent every 6 hours  rivaroxaban 15 milliGRAM(s) Oral every 12 hours  tamsulosin 0.4 milliGRAM(s) Oral at bedtime    MEDICATIONS  (PRN):  ondansetron Injectable 4 milliGRAM(s) IV Push every 6 hours PRN Nausea      Allergies    No Known Allergies    EXAM  Vital Signs Last 24 Hrs  T(C): 36.5 (31 Jan 2018 15:05), Max: 36.9 (31 Jan 2018 05:09)  T(F): 97.7 (31 Jan 2018 15:05), Max: 98.5 (31 Jan 2018 05:09)  HR: 80 (31 Jan 2018 15:05) (80 - 98)  BP: 121/84 (31 Jan 2018 15:05) (121/84 - 154/90)  BP(mean): 96 (31 Jan 2018 15:05) (96 - 112)  RR: 18 (31 Jan 2018 15:05) (16 - 18)  SpO2: 98% (31 Jan 2018 15:05) (95% - 98%)  On RA  Awake and alert  No rash  RR  Chest clear  Abd soft and minimal lower abd tenderness  LE no edema      LABS:                        16.7   15.3  )-----------( 289      ( 31 Jan 2018 13:07 )             53.1     01-31    137  |  100  |  5<L>  ----------------------------<  101<H>  3.6   |  25  |  0.68    Ca    9.5      31 Jan 2018 13:07  Phos  3.0     01-31  Mg     2.2     01-31    C. difficile GDH &amp; toxins A/B by EIA (01.30.18 @ 11:36)    Clostridium difficile GDH Toxins A&amp;B, EIA:   Negative    Clostridium difficile GDH Interpretation: Negative for toxigenic C. Difficile.  This specimen is negative for C.  Difficile glutamate dehydrogenase (GDH) antigen and negative for C.  Difficile Toxins A & B, by EIA.  GDH is a highly sensitive screening  marker for C. Difficile that is produced in large amounts by all C.  Difficile strains, both toxigenic and nontoxigenic.  This assay has not  been validated as a test of cure.  Repeat testing during the same episode  of diarrhea is of limited value and is discouraged.  The results of this  assay should always be interpreted in conjunction with pateint's clinical  history.        MICROBIOLOGY:    Culture - Blood (01.29.18 @ 15:43)    Specimen Source: .Blood None    Culture Results:   No growth at 2 days.    Culture - Blood (01.29.18 @ 15:43)    Specimen Source: .Blood None    Culture Results:   No growth at 2 days.    Culture - Urine (01.29.18 @ 10:03)    Specimen Source: .Urine None    Culture Results:   No growth      RADIOLOGY & ADDITIONAL STUDIES:    Xray Chest 1 View AP- PORTABLE-Urgent (01.26.18 @ 17:52) >    EXAM:  XR CHEST PORTABLE URGENT 1V                          PROCEDURE DATE:  01/26/2018        INTERPRETATION:  Clinical History: Infiltrate    Portable examination of chest demonstrates the heart to be within normal   limits in transverse diameter. No acute infiltrates. Dextro scoliosis   thoracic spine. Calcification aortic knob.    Impression: No acute infiltrates

## 2018-01-31 NOTE — PROGRESS NOTE ADULT - PROBLEM SELECTOR PLAN 3
UA positive on admission with +nitrites, small leuk esterase, many WBC.   +trichomonas on admission; s/p 1 dose of Metronidazole  - GC / chlamydia negative  -Urine cxs NGTD

## 2018-01-31 NOTE — PROGRESS NOTE ADULT - PROBLEM SELECTOR PLAN 2
CT can with distended urinary bladder with mild bilateral hydronephrosis.  Bilateral hydronephrosis due to urinary outlet obstruction with normal renal function at present.  - consulted urology: bladder filling, no intervention indicated signed off  -FeNA 0.43 suggesting pre-renal component, and Crt downtrended with IFV   - urinary retention on HOD #1, hutson placed. Hutson removed HOD3, failed TOV --- HOD4 Passed TOV. Monitor strict I/O and bladder scan  -Avoid nephrotoxic agents  -Continue antibiotics for colitis and probable UTI.  -Urine Cxs negative x2. F/u repeat culture  -Consider retroperitoneal US if crt worsening to evaluate for stones   -c/w flomax 0.4 mg at bedtime

## 2018-01-31 NOTE — PROGRESS NOTE ADULT - ASSESSMENT
Segmental colitis with microperforation  Unsure as to the etiology of the colonic pathology  Possible diverticulitis  MDS  Leukocytosis - improving  Overall improving on empiric abx  Recent hospitalization and stay at long term care facility - justifying broader- spectrum empiric antimicrobial (Pip-Tazo) initially.  However, as patient improving clinically and leukocytosis resolving, could try to change to PO abx - hope within next 24-48 hours    RECOMMEND  Continue Pip-Tazo for now  As stools loose before antimicrobials (and continue), would send stool for gastrointestinal PCR

## 2018-01-31 NOTE — PROGRESS NOTE ADULT - ASSESSMENT
78 yo F with PMH of myelodysplastic syndrome (followed by Dr. Dumont as outpatient), hx of DVT/PE (on xarelto), and HTN presented to ED on 1/27/2018 with 1 day h/o weakness, lower abdominal pain, nausea and loose stool admitted for colitis. VSS and pt currently w/o loose or bloody stools. On exam, minimal tenderness to palpation in RLQ w/o rebound or guarding.

## 2018-01-31 NOTE — PROGRESS NOTE ADULT - SUBJECTIVE AND OBJECTIVE BOX
76 yo F with PMH of myelodysplastic syndrome, DVT/PE on xarelto, HTN presented to ED with 1 day h/o weakness and lower abdominal pain that began. Pt reports she was having difficulty walking and was scheduled to see her PCP but came to ED due to weakness. Had some nausea yesterday but no vomiting and reports some loose stools yesterday. No fevers or chills. Now admitted for treatment of colitis with microperforation.  On hydroxyurea until recently when she had episode of pancytopenia - approved for JakafErly and will begin as outpatient.    Pt seen and examined at the bedside. No events overnight.       ICU Vital Signs Last 24 Hrs  T(C): 36.5 (31 Jan 2018 15:05), Max: 36.9 (31 Jan 2018 05:09)  T(F): 97.7 (31 Jan 2018 15:05), Max: 98.5 (31 Jan 2018 05:09)  HR: 80 (31 Jan 2018 15:05) (80 - 98)  BP: 121/84 (31 Jan 2018 15:05) (121/84 - 151/93)  BP(mean): 96 (31 Jan 2018 15:05) (96 - 96)  ABP: --  ABP(mean): --  RR: 18 (31 Jan 2018 15:05) (17 - 18)  SpO2: 98% (31 Jan 2018 15:05) (95% - 98%)          PHYSICAL EXAM:  General: NAD  HEENT: Normocephalic, Atraumtic. PEERL. MMM  Respiratory: Breath sounds CTA in upper lung fields. Crackles in b/l lower lung fields. No wheezes or rhonchi.  Cardiovascular: Normal S1 and S2. RRR. No rubs, murmurs.   Gastrointestinal: Soft, non-distended. Minimal tenderness to palpation in RLQ. Positive bowel sounds.   Extremities: No LE edema, tenderness or erythema. No palpable cord.   Neurological: AAOx3.       MEDICATIONS  (STANDING):  amLODIPine   Tablet 5 milliGRAM(s) Oral daily  piperacillin/tazobactam IVPB. 3.375 Gram(s) IV Intermittent every 6 hours  rivaroxaban 15 milliGRAM(s) Oral every 12 hours  tamsulosin 0.4 milliGRAM(s) Oral at bedtime    MEDICATIONS  (PRN):  HYDROmorphone  Injectable 0.5 milliGRAM(s) IV Push every 4 hours PRN Moderate Pain  ondansetron Injectable 4 milliGRAM(s) IV Push every 6 hours PRN Nausea      Allergies    No Known Allergies    Intolerances        LABS:                        16.5   16.1  )-----------( 305      ( 30 Jan 2018 06:50 )             53.4     01-30    137  |  98  |  3<L>  ----------------------------<  115<H>  3.4<L>   |  28  |  0.60    Ca    9.1      30 Jan 2018 06:50  Phos  1.9     01-30  Mg     2.4     01-30    Procalcitonin 0.22     · Assessment		  76 yo F with PMH of myelodysplastic syndrome (followed by Dr. Dumont as outpatient), hx of DVT/PE (on xarelto), and HTN presented to ED on 1/27/2018 with 1 day h/o weakness, lower abdominal pain, nausea and loose stool admitted for colitis. VSS and pt currently w/o loose or bloody stools. On exam, minimal tenderness to palpation in RLQ w/o rebound or guarding.     Problem/Plan - 1:  ·  Problem: Colitis.  Plan: CT scan with circumferential wall thickening extending from the mid descending   colon through the distal sigmoid colon, few scattered foci of gas which may indicate a small microperforation. Infectious colitis most likely given elevated CRP and procalcitonin. Ischemic colitis unlikely as pt with no significant abdominal pain on exam (no pain out of proportion to exam). Pt now tolerating clear diet   - WBCs trending down. C/w zosyn (1/27-)    -multiple episodes of loose stools: c. diff negative. No loose or bloody stools overnight. Continue to monitor BMs and avoid laxative.     Problem/Plan - 2:  ·  Problem: Hydronephrosis, bilateral.  Plan: CT can with distended urinary bladder with mild bilateral hydronephrosis.  Bilateral hydronephrosis due to urinary outlet obstruction with normal renal function at present.  - consulted urology: bladder filling, no intervention indicated signed off  -FeNA 0.43 suggesting pre-renal component, and Crt downtrended with IFV   - urinary retention on HOD #1, hutson placed. Hutson removed HOD3, failed TOV --- HOD4 Passed TOV. Monitor strict I/O and bladder scan  -Avoid nephrotoxic agents  -Continue antibiotics for colitis and probable UTI.  -Urine Cxs negative x2. F/u repeat culture  -Consider retroperitoneal US if crt worsening to evaluate for stones   -c/w flomax 0.4 mg at bedtime.     Problem/Plan - 3:  ·  Problem: UTI (urinary tract infection).  Plan: UA positive on admission with +nitrites, small leuk esterase, many WBC.   +trichomonas on admission; s/p 1 dose of Metronidazole  - GC / chlamydia negative  -Urine cxs NGTD.     Problem/Plan - 4:  ·  Problem: DVT (deep venous thrombosis).  Plan: found to have worsening nonocclusive DVT of R common femoral vein; interval decrease in flow in R. proximal femoral vein, now with minimal flow; no leg swelling, pain or cutaneous changes in the interim  - was started on Lovenox BID for possible surgical intervention for colitis; restarted home dose xarelto. c/w xeralto 15 mg Q12  - consulted Dr. Dumont regarding possible coagulopathy causing worsening DVT on full anticoagulation; and regarding leukocytosis to compare to patient's baseline. WBCs trending down.   - f/u Voudouris recs.     Problem/Plan - 5:  ·  Problem: Hypertension.  Plan: Pt with hypertensive episodes with SBP in 180s. Restarted home Norvasc 5 mg. SBP now downtrending to 130s.     Problem/Plan - 6:  Problem: Myeloproliferative disorder. Plan: Pt on hydroxyurea until recently when she had episode of pancytopenia, will begin as outpatient.  -f/u Voudouris recs.    Problem/Plan - 7:  ·  Problem: Need for prophylactic measure.  Plan: Lytes: Replete lytes with goal K>4 and Mg >2  Diet: Full liquid diet, advance as tolerated   IVF: DC'd due to crackles in lower lung fields and as pt tolerating PO  Pain: Hydromorphone 0.5 mg IV push PRN Q4   Nausea; Zofran 4 mg IV push PRN Q6  DVT ppx: On xeralto   Code: Full code.

## 2018-01-31 NOTE — PROGRESS NOTE ADULT - PROBLEM SELECTOR PLAN 1
CT scan with circumferential wall thickening extending from the mid descending   colon through the distal sigmoid colon, few scattered foci of gas which may indicate a small microperforation. Infectious colitis most likely given elevated CRP and procalcitonin. Ischemic colitis unlikely as pt with no significant abdominal pain on exam (no pain out of proportion to exam). Pt now tolerating clear diet   - WBCs trending down. C/w zosyn (1/27-)    -multiple episodes of loose stools: c. diff negative. No loose or bloody stools overnight. Continue to monitor BMs and avoid laxative

## 2018-02-01 LAB
BASOPHILS NFR BLD AUTO: 1.1 % — SIGNIFICANT CHANGE UP (ref 0–2)
EOSINOPHIL NFR BLD AUTO: 1.6 % — SIGNIFICANT CHANGE UP (ref 0–6)
HCT VFR BLD CALC: 53.6 % — HIGH (ref 34.5–45)
HGB BLD-MCNC: 16.8 G/DL — HIGH (ref 11.5–15.5)
LYMPHOCYTES # BLD AUTO: 11.1 % — LOW (ref 13–44)
MCHC RBC-ENTMCNC: 28.2 PG — SIGNIFICANT CHANGE UP (ref 27–34)
MCHC RBC-ENTMCNC: 31.3 G/DL — LOW (ref 32–36)
MCV RBC AUTO: 90.1 FL — SIGNIFICANT CHANGE UP (ref 80–100)
MONOCYTES NFR BLD AUTO: 5.6 % — SIGNIFICANT CHANGE UP (ref 2–14)
NEUTROPHILS NFR BLD AUTO: 80.6 % — HIGH (ref 43–77)
PLATELET # BLD AUTO: 331 K/UL — SIGNIFICANT CHANGE UP (ref 150–400)
RBC # BLD: 5.95 M/UL — HIGH (ref 3.8–5.2)
RBC # FLD: 16.1 % — SIGNIFICANT CHANGE UP (ref 10.3–16.9)
WBC # BLD: 17.7 K/UL — HIGH (ref 3.8–10.5)
WBC # FLD AUTO: 17.7 K/UL — HIGH (ref 3.8–10.5)

## 2018-02-01 RX ADMIN — PIPERACILLIN AND TAZOBACTAM 200 GRAM(S): 4; .5 INJECTION, POWDER, LYOPHILIZED, FOR SOLUTION INTRAVENOUS at 11:49

## 2018-02-01 RX ADMIN — AMLODIPINE BESYLATE 5 MILLIGRAM(S): 2.5 TABLET ORAL at 06:06

## 2018-02-01 RX ADMIN — PIPERACILLIN AND TAZOBACTAM 200 GRAM(S): 4; .5 INJECTION, POWDER, LYOPHILIZED, FOR SOLUTION INTRAVENOUS at 23:24

## 2018-02-01 RX ADMIN — TAMSULOSIN HYDROCHLORIDE 0.4 MILLIGRAM(S): 0.4 CAPSULE ORAL at 21:58

## 2018-02-01 RX ADMIN — RIVAROXABAN 15 MILLIGRAM(S): KIT at 06:06

## 2018-02-01 RX ADMIN — RIVAROXABAN 15 MILLIGRAM(S): KIT at 17:29

## 2018-02-01 RX ADMIN — PIPERACILLIN AND TAZOBACTAM 200 GRAM(S): 4; .5 INJECTION, POWDER, LYOPHILIZED, FOR SOLUTION INTRAVENOUS at 17:30

## 2018-02-01 RX ADMIN — PIPERACILLIN AND TAZOBACTAM 200 GRAM(S): 4; .5 INJECTION, POWDER, LYOPHILIZED, FOR SOLUTION INTRAVENOUS at 06:06

## 2018-02-01 NOTE — CONSULT NOTE ADULT - ASSESSMENT
A/P:  78y/o female presents with left lateral ankle instability.   - Recommend applying an ACE wrap to the left lower extremity prior to any attempt to ambulate.    - Discussed with patient that she may need an ASO brace in the future.   - Patient to followup with Dr. Espitia as an outpatient within 2 weeks of discharge.   - No further podiatric intervention is warranted at this time.

## 2018-02-01 NOTE — PROGRESS NOTE ADULT - PROBLEM SELECTOR PLAN 3
UA positive on admission with +nitrites, small leuk esterase, many WBC.   +trichomonas on admission; s/p 1 dose of Metronidazole  - GC / chlamydia negative  -Urine cxs NGTD UA positive on admission with +nitrites, small leuk esterase, many WBC.   +trichomonas on admission; s/p 1 dose of Metronidazole  - GC / chlamydia negative  -Urine cxs NGTD  - C/w abx

## 2018-02-01 NOTE — PROGRESS NOTE ADULT - ATTENDING COMMENTS
,
Patient seen and examined and evaluation completed by me in person
Patient seen and examined and evaluation completed by me in person
Cr normal and stable, hutson for mild bilateral hydronephrosis and urinary retention, non oliguric. TOV as per urology.   Will sign off, please feel free to reconsult if there are any other questions.

## 2018-02-01 NOTE — PROGRESS NOTE ADULT - ASSESSMENT
76 yo F with PMH of myelodysplastic syndrome (followed by Dr. Dumont as outpatient), hx of DVT/PE (on xarelto), and HTN presented to ED on 1/27/2018 with 1 day h/o weakness, lower abdominal pain, nausea and loose stool admitted for colitis. VSS and pt currently w/o loose or bloody stools. Exam is unremarkable. 76 yo F with PMH of myelodysplastic syndrome (followed by Dr. Dumont as outpatient), hx of DVT/PE (on xarelto), and HTN presented to ED on 1/27/2018 with 1 day h/o weakness, lower abdominal pain, nausea and loose stool admitted for colitis. VSS and pt currently w/o loose or bloody stools. Exam is unremarkable. Labs are notable for leukocytosis to 15.3 and trending down.

## 2018-02-01 NOTE — PROGRESS NOTE ADULT - ASSESSMENT
RECOMMEND  Continue IV abx  Check manual diff/blood smear  If leukocytosis not improving/worse, will need a follow up A/P CT scan Segmental colitis of unclear etiology.  Radigraphicaly not typical diverticulitis.  Concern for microperforation  Leukocytosis - not typical for this patient and therefore concern still and indicator of infection.  MDS    RECOMMEND  Continue IV abx for now  Check manual diff/blood smear to rule out left shift  If leukocytosis not improving/worse, will need a follow up A/P CT scan  If better, can convert to PO abx - Amoxicillin- Clav (Augmentin) 875 mg twice a day

## 2018-02-01 NOTE — PROGRESS NOTE ADULT - SUBJECTIVE AND OBJECTIVE BOX
INTERVAL HPI/OVERNIGHT EVENTS: Pt seen and examined at the bedside. No events overnight. Pt endorsed L ankle pain since PT session yesterday. Pt is voiding appropriately. Denies CP, abdominal pain, N/V, F/C.     VITAL SIGNS:  T(F): 99   HR: 86   BP: 130/83   RR: 17   SpO2: 95%     I: 200  O: 250    PHYSICAL EXAM:  General: Elderly frail appearing woman resting comfortably in bed on O2.   HEENT: Normocephalic, Atraumtic. PEERL. MMM  Respiratory: CTA on R lungs fields. Minimal crackles in LL lung field. No wheezes, crackles, rhonchi.  Cardiovascular: Normal S1 and S2. RRR. No rubs, murmurs.   Gastrointestinal: Soft, non-distended, non-tender. Positive bowel sounds.   Extremities: No LE edema. Ankles non-tender to palpation b/l w/ full ROM  Skin: No rashes or ulcers. No clubbing/cyanosis.   Neurological: No focal deficits. AAOx3. L ankle 5/5      MEDICATIONS  (STANDING):  amLODIPine   Tablet 5 milliGRAM(s) Oral daily  piperacillin/tazobactam IVPB. 3.375 Gram(s) IV Intermittent every 6 hours  rivaroxaban 15 milliGRAM(s) Oral every 12 hours  tamsulosin 0.4 milliGRAM(s) Oral at bedtime    MEDICATIONS  (PRN):  ondansetron Injectable 4 milliGRAM(s) IV Push every 6 hours PRN Nausea      Allergies    No Known Allergies    Intolerances        LABS:                        16.7   15.3  )-----------( 289      ( 31 Jan 2018 13:07 )             53.1     01-31    137  |  100  |  5<L>  ----------------------------<  101<H>  3.6   |  25  |  0.68    Ca    9.5      31 Jan 2018 13:07  Phos  3.0     01-31  Mg     2.2     01-31            RADIOLOGY & ADDITIONAL TESTS: INTERVAL HPI/OVERNIGHT EVENTS: Pt seen and examined at the bedside. No events overnight. Pt endorsed L ankle pain since PT session yesterday. Pt is voiding appropriately. Denies CP, abdominal pain, loose stools, N/V, Chills.     VITAL SIGNS:  T(F): 99   HR: 86   BP: 130/83   RR: 17   SpO2: 95%     I: 200  O: 250    PHYSICAL EXAM:  General: Elderly frail appearing woman resting comfortably in bed on O2.   HEENT: Normocephalic, Atraumtic. PEERL. MMM  Respiratory: CTA on R lungs fields. Minimal crackles in LL lung field. No wheezes, crackles, rhonchi.  Cardiovascular: Normal S1 and S2. RRR. No rubs, murmurs.   Gastrointestinal: Soft, non-distended, non-tender. Positive bowel sounds.   Extremities: No LE edema. Ankles non-tender to palpation b/l w/ full ROM  Skin: No rashes or ulcers. No clubbing/cyanosis.   Neurological: No focal deficits. AAOx3. L ankle 5/5      MEDICATIONS  (STANDING):  amLODIPine   Tablet 5 milliGRAM(s) Oral daily  piperacillin/tazobactam IVPB. 3.375 Gram(s) IV Intermittent every 6 hours  rivaroxaban 15 milliGRAM(s) Oral every 12 hours  tamsulosin 0.4 milliGRAM(s) Oral at bedtime    MEDICATIONS  (PRN):  ondansetron Injectable 4 milliGRAM(s) IV Push every 6 hours PRN Nausea      Allergies    No Known Allergies    Intolerances        LABS:                        16.7   15.3  )-----------( 289      ( 31 Jan 2018 13:07 )             53.1     01-31    137  |  100  |  5<L>  ----------------------------<  101<H>  3.6   |  25  |  0.68    Ca    9.5      31 Jan 2018 13:07  Phos  3.0     01-31  Mg     2.2     01-31            RADIOLOGY & ADDITIONAL TESTS:

## 2018-02-01 NOTE — PROGRESS NOTE ADULT - NSHPATTENDINGPLANDISCUSS_GEN_ALL_CORE
Valentino Mayorga.  HS
Tash Toribio Voudouris.   HS.
[facility].  
House staff
medical team

## 2018-02-01 NOTE — PROGRESS NOTE ADULT - PROBLEM SELECTOR PLAN 2
CT can with distended urinary bladder with mild bilateral hydronephrosis.  Bilateral hydronephrosis due to urinary outlet obstruction with normal renal function at present.  - consulted urology: bladder filling, no intervention indicated signed off  -FeNA 0.43 suggesting pre-renal component, and Crt downtrended with IFV   - urinary retention on HOD #1, hutson placed. Hutson removed HOD3, failed TOV --- HOD4 Passed TOV. Monitor strict I/O and bladder scan  - Avoid nephrotoxic agents  - C/w abx  -Urine Cxs negative x2. F/u repeat culture  -Consider retroperitoneal US if crt worsening to evaluate for stones   -c/w flomax 0.4 mg at bedtime

## 2018-02-01 NOTE — PROGRESS NOTE ADULT - SUBJECTIVE AND OBJECTIVE BOX
INTERVAL HPI/OVERNIGHT EVENTS:    ANTIBIOTICS/RELEVANT:    MEDICATIONS  (STANDING):  amLODIPine   Tablet 5 milliGRAM(s) Oral daily  piperacillin/tazobactam IVPB. 3.375 Gram(s) IV Intermittent every 6 hours  rivaroxaban 15 milliGRAM(s) Oral every 12 hours  tamsulosin 0.4 milliGRAM(s) Oral at bedtime    MEDICATIONS  (PRN):  ondansetron Injectable 4 milliGRAM(s) IV Push every 6 hours PRN Nausea      Allergies    No Known Allergies    Intolerances        Vital Signs Last 24 Hrs  T(C): 36.6 (01 Feb 2018 20:58), Max: 37.2 (01 Feb 2018 08:46)  T(F): 97.9 (01 Feb 2018 20:58), Max: 99 (01 Feb 2018 08:46)  HR: 76 (01 Feb 2018 20:58) (76 - 90)  BP: 134/74 (01 Feb 2018 20:58) (117/72 - 151/93)  BP(mean): 87 (01 Feb 2018 15:16) (87 - 87)  RR: 16 (01 Feb 2018 20:58) (16 - 18)  SpO2: 95% (01 Feb 2018 20:58) (95% - 98%)    PHYSICAL EXAM:      Constitutional:    Eyes:    ENMT:    Neck:    Breasts:    Back:    Respiratory:    Cardiovascular:    Gastrointestinal:    Genitourinary:    Rectal:    Extremities:    Vascular:    Neurological:    Skin:    Lymph Nodes:    Musculoskeletal:    Psychiatric:        LABS:                        16.8   17.7  )-----------( 331      ( 01 Feb 2018 15:57 )             53.6     01-31    137  |  100  |  5<L>  ----------------------------<  101<H>  3.6   |  25  |  0.68    Ca    9.5      31 Jan 2018 13:07  Phos  3.0     01-31  Mg     2.2     01-31            MICROBIOLOGY:    RADIOLOGY & ADDITIONAL STUDIES: INTERVAL HPI/OVERNIGHT EVENTS:    Feels better and able to eat more  No more diarrhea    MEDICATIONS  (STANDING):  amLODIPine   Tablet 5 milliGRAM(s) Oral daily  piperacillin/tazobactam IVPB. 3.375 Gram(s) IV Intermittent every 6 hours  rivaroxaban 15 milliGRAM(s) Oral every 12 hours  tamsulosin 0.4 milliGRAM(s) Oral at bedtime    MEDICATIONS  (PRN):  ondansetron Injectable 4 milliGRAM(s) IV Push every 6 hours PRN Nausea      Allergies    No Known Allergies    EXAM  Vital Signs Last 24 Hrs  T(C): 36.6 (01 Feb 2018 20:58), Max: 37.2 (01 Feb 2018 08:46)  T(F): 97.9 (01 Feb 2018 20:58), Max: 99 (01 Feb 2018 08:46)  HR: 76 (01 Feb 2018 20:58) (76 - 90)  BP: 134/74 (01 Feb 2018 20:58) (117/72 - 151/93)  BP(mean): 87 (01 Feb 2018 15:16) (87 - 87)  RR: 16 (01 Feb 2018 20:58) (16 - 18)  SpO2: 95% (01 Feb 2018 20:58) (95% - 98%)  On RA  No distress  Awake and alert  No rash  RR  Abd soft, mild tenderness to palpation L>R lower abdomen  LE no edema    LABS:                        16.8   17.7  )-----------( 331      ( 01 Feb 2018 15:57 )             53.6     01-31    137  |  100  |  5<L>  ----------------------------<  101<H>  3.6   |  25  |  0.68    Ca    9.5      31 Jan 2018 13:07  Phos  3.0     01-31  Mg     2.2     01-31      MICROBIOLOGY:    Blood cultures negative

## 2018-02-01 NOTE — PROGRESS NOTE ADULT - SUBJECTIVE AND OBJECTIVE BOX
History of Present Illness:  Chief Complaint/Reason for Admission: colitis with microperforation	  History of Present Illness: 	  76 yo F with PMH of myelodysplastic syndrome, DVT/PE on xarelto, HTN presented to ED with 1 day h/o weakness and lower abdominal pain that began. Pt reports she was having difficulty walking and was scheduled to see her PCP but came to ED due to weakness. Had some nausea yesterday but no vomiting and reports some loose stools yesterday. No fevers or chills. Now admitted for treatment of colitis with microperforation.  On hydroxyurea until recently when she had episode of pancytopenia - approved for Jakafi and will begin as outpatient.     Review of Systems:  Other Review of Systems: All other review of systems negative, except as noted in HPI 	      Allergies and Intolerances:        Allergies:  	No Known Allergies:     Home Medications:   * Patient Currently Takes Medications as of 26-Jan-2018 14:50 documented in Structured Notes  · 	Pyridium 200 mg oral tablet: 1 tab(s) orally 3 times a day   · 	Outpatient Physical Therapy: 2 week(s) treatment 2/9-2/23/18  	Diagnosis: spinal stenosis  · 	Ceftin 250 mg oral tablet: 1 tab(s) orally 2 times a day   · 	buPROPion 75 mg oral tablet: 1 tab(s) orally once a day  · 	sulfamethoxazole-trimethoprim 800 mg-160 mg oral tablet: 1 tab(s) orally 2 times a day  · 	Xarelto 15 mg oral tablet: 1 tab(s) orally twice  · 	folic acid 1 mg oral tablet: 2 tab(s) orally once a day  · 	Norvasc 5 mg oral tablet: 1 tab(s) orally once a day  · 	Aspirin Enteric Coated 81 mg oral delayed release tablet: 1 tab(s) orally once a day  · 	hydroxyurea 500 mg oral capsule: 3 cap(s) orally Monday, Wednesday, and Friday    Patient History:    Past Medical History:  Hypertension    Myeloproliferative disorder    PE (pulmonary thromboembolism)  2015  Tremor    Vestibular disequilibrium.     Past Surgical History:  S/P hysterectomy.     Tobacco Screening:  · Core Measure Site	No	    Risk Assessment:    Present on Admission:  Deep Venous Thrombosis	no 	  Pulmonary Embolus	no 	     Heart Failure:  Does this patient have a history of or has been diagnosed with heart failure? no.       Physical Exam:  Physical Exam: Vital Signs Last 24 Hrs T(C): 36.6 (01 Feb 2018 20:58), Max: 37.2 (01 Feb 2018 08:46) T(F): 97.9 (01 Feb 2018 20:58), Max: 99 (01 Feb 2018 08:46) HR: 76 (01 Feb 2018 20:58) (76 - 90) BP: 134/74 (01 Feb 2018 20:58) (117/72 - 151/93) BP(mean): 87 (01 Feb 2018 15:16) (87 - 87) RR: 16 (01 Feb 2018 20:58) (16 - 18) SpO2: 95% (01 Feb 2018 20:58) (95% - 98%)  Gen: AOx3, NAD HEENT: anicteric, pink conj Neck: supple, no JVD, no INDRA    CV: RRR, no m/r/g Pulm: CTABL, no resp distress Abd: soft, tender in lower abdomen with some guarding more in LLQ, nondistended Ext: WWP, no edema	       Labs and Results:  Labs, Radiology, Cardiology, and Other Results:                          16.8  17.7  )-----------( 331      ( 01 Feb 2018 15:57 )            53.6              TECHNIQUE: CT of the abdomen and pelvis with intravenous and oral   contrast. Axial, sagittal, and coronal images were obtained and reviewed.   COMPARISON: CT abdomen pelvis from 9/14/2017 and CT abdomen from 7/29/2011   FINDINGS:   Lower chest: Right basilar bronchiectasis and trace atelectasis.. Mitral   annular calcification   Liver: Smooth in contour. No hypodense lesions are seen in the liver   which are too small to characterize.. Portal and hepatic veins are patent.   Biliary system: Gallbladder is normal in size. No calcified gallstones.   No biliary ductal dilatation.   Pancreas: Since 7/29/2011, there is again a 12 mm hypodense lesion in the   pancreatic body with a now a new peripheral calcification anteriorly.   Spleen: Unremarkable.   Adrenal glands: Unremarkable.   Kidneys: Symmetric parenchymal enhancement. There is a 4.3 cm right renal   cyst. Multiple hypodense lesions are seen in bilateral kidneys which are   too small to characterize.. Moderate bilateral hydronephrosis. No renal   or ureteral stone.    Urinary Bladder: Moderately distended urinary bladder with several   bladder diverticula are noted. The urinary bladder wall is thickened   which may be related to chronic bladder outlet obstruction versus   cystitis. Small bladder diverticula along its left posterior wall.   Reproductive organs: Status post hysterectomy. No adnexal masses.   Bowel/Peritoneum: There is circumferential wall thickening extending from   the mid descending colon through the distal sigmoid colon, where wall   thickening is most pronounced with associated intraluminal narrowing.   There is mild adjacent pericolic fat stranding. There are scattered   colonic diverticula within this region however the long segment   involvement is most suggestive for a colitis as oppose to a   diverticulitis. Few scattered foci of gas which appear extraluminal   location within the pelvis may indicate a small microperforation. No   large intraperitoneal free air appreciated. No evidence of bowel   obstruction. Appendix is not visualized however no inflammatory changes   appreciated at the right lower quadrant. No ascites or extraluminal gas.    Lymph nodes: No lymphadenopathy.   Aorta/IVC: There is a large amount of calcified and noncalcified plaque   involving the abdominal aorta. The abdominal aorta is aneurysmal   measuring 3.4 cm in diameter, unchanged. Abdominal wall: No hernia.   Bones/Soft tissues: Moderate degenerative changes.   IMPRESSION:   1.  Circumferential wall thickening extending from the mid descending   colon through the distal sigmoid colon, where wall thickening is most   pronounced with associated intraluminal narrowing. There is mild adjacent   pericolic fat stranding. There are scattered colonic diverticula within   this region however the long segment involvement is most suggestive for a   colitis as oppose to a diverticulitis. Few scattered foci of gas which   appear extraluminal location within the pelvis may indicate a small   microperforation.  2.    3.  Distended urinary bladder with mild bilateral hydronephrosis. Bladder   wall thickening with prominent adjacent perivesicular fat stranding.   Correlate clinically for cystitis/renal infection.  4.   5.  Unchanged abdominal aortic aneurysm measuring 3.4 cm.	    Assessment and Plan:    Assessment:  · Assessment		  76 yo F with h/o MDS, HTN, DVT/PE on xarelto with colitis with small microperforation  -NPO/IVF  -Zosyn  -PO Xarelto  -ASA on hold     -

## 2018-02-02 ENCOUNTER — TRANSCRIPTION ENCOUNTER (OUTPATIENT)
Age: 78
End: 2018-02-02

## 2018-02-02 VITALS
TEMPERATURE: 98 F | HEART RATE: 90 BPM | RESPIRATION RATE: 17 BRPM | SYSTOLIC BLOOD PRESSURE: 146 MMHG | OXYGEN SATURATION: 95 % | DIASTOLIC BLOOD PRESSURE: 89 MMHG

## 2018-02-02 LAB
ANION GAP SERPL CALC-SCNC: 15 MMOL/L — SIGNIFICANT CHANGE UP (ref 5–17)
APTT BLD: 38.3 SEC — HIGH (ref 27.5–37.4)
BUN SERPL-MCNC: 10 MG/DL — SIGNIFICANT CHANGE UP (ref 7–23)
CALCIUM SERPL-MCNC: 9.2 MG/DL — SIGNIFICANT CHANGE UP (ref 8.4–10.5)
CHLORIDE SERPL-SCNC: 101 MMOL/L — SIGNIFICANT CHANGE UP (ref 96–108)
CO2 SERPL-SCNC: 23 MMOL/L — SIGNIFICANT CHANGE UP (ref 22–31)
CREAT SERPL-MCNC: 0.65 MG/DL — SIGNIFICANT CHANGE UP (ref 0.5–1.3)
EOSINOPHIL NFR BLD AUTO: 2 % — SIGNIFICANT CHANGE UP (ref 0–6)
GLUCOSE SERPL-MCNC: 95 MG/DL — SIGNIFICANT CHANGE UP (ref 70–99)
HCT VFR BLD CALC: 51.2 % — HIGH (ref 34.5–45)
HGB BLD-MCNC: 15.9 G/DL — HIGH (ref 11.5–15.5)
INR BLD: 1.79 — HIGH (ref 0.88–1.16)
LYMPHOCYTES # BLD AUTO: 13 % — SIGNIFICANT CHANGE UP (ref 13–44)
MAGNESIUM SERPL-MCNC: 2.1 MG/DL — SIGNIFICANT CHANGE UP (ref 1.6–2.6)
MCHC RBC-ENTMCNC: 28.1 PG — SIGNIFICANT CHANGE UP (ref 27–34)
MCHC RBC-ENTMCNC: 31.1 G/DL — LOW (ref 32–36)
MCV RBC AUTO: 90.5 FL — SIGNIFICANT CHANGE UP (ref 80–100)
MONOCYTES NFR BLD AUTO: 2 % — SIGNIFICANT CHANGE UP (ref 2–14)
NEUTROPHILS NFR BLD AUTO: 78 % — HIGH (ref 43–77)
PLATELET # BLD AUTO: 294 K/UL — SIGNIFICANT CHANGE UP (ref 150–400)
POTASSIUM SERPL-MCNC: 2.8 MMOL/L — CRITICAL LOW (ref 3.5–5.3)
POTASSIUM SERPL-SCNC: 2.8 MMOL/L — CRITICAL LOW (ref 3.5–5.3)
PROTHROM AB SERPL-ACNC: 20.1 SEC — HIGH (ref 9.8–12.7)
RBC # BLD: 5.66 M/UL — HIGH (ref 3.8–5.2)
RBC # FLD: 16.2 % — SIGNIFICANT CHANGE UP (ref 10.3–16.9)
SODIUM SERPL-SCNC: 139 MMOL/L — SIGNIFICANT CHANGE UP (ref 135–145)
WBC # BLD: 15 K/UL — HIGH (ref 3.8–10.5)
WBC # FLD AUTO: 15 K/UL — HIGH (ref 3.8–10.5)

## 2018-02-02 PROCEDURE — 84300 ASSAY OF URINE SODIUM: CPT

## 2018-02-02 PROCEDURE — 85652 RBC SED RATE AUTOMATED: CPT

## 2018-02-02 PROCEDURE — 80048 BASIC METABOLIC PNL TOTAL CA: CPT

## 2018-02-02 PROCEDURE — 85730 THROMBOPLASTIN TIME PARTIAL: CPT

## 2018-02-02 PROCEDURE — 83935 ASSAY OF URINE OSMOLALITY: CPT

## 2018-02-02 PROCEDURE — 82962 GLUCOSE BLOOD TEST: CPT

## 2018-02-02 PROCEDURE — 97164 PT RE-EVAL EST PLAN CARE: CPT

## 2018-02-02 PROCEDURE — 87493 C DIFF AMPLIFIED PROBE: CPT

## 2018-02-02 PROCEDURE — 93005 ELECTROCARDIOGRAM TRACING: CPT

## 2018-02-02 PROCEDURE — 85025 COMPLETE CBC W/AUTO DIFF WBC: CPT

## 2018-02-02 PROCEDURE — 85027 COMPLETE CBC AUTOMATED: CPT

## 2018-02-02 PROCEDURE — 97116 GAIT TRAINING THERAPY: CPT

## 2018-02-02 PROCEDURE — 81001 URINALYSIS AUTO W/SCOPE: CPT

## 2018-02-02 PROCEDURE — 97161 PT EVAL LOW COMPLEX 20 MIN: CPT

## 2018-02-02 PROCEDURE — 87040 BLOOD CULTURE FOR BACTERIA: CPT

## 2018-02-02 PROCEDURE — 87449 NOS EACH ORGANISM AG IA: CPT

## 2018-02-02 PROCEDURE — 84540 ASSAY OF URINE/UREA-N: CPT

## 2018-02-02 PROCEDURE — 86140 C-REACTIVE PROTEIN: CPT

## 2018-02-02 PROCEDURE — 74177 CT ABD & PELVIS W/CONTRAST: CPT

## 2018-02-02 PROCEDURE — 85610 PROTHROMBIN TIME: CPT

## 2018-02-02 PROCEDURE — 93970 EXTREMITY STUDY: CPT

## 2018-02-02 PROCEDURE — 87324 CLOSTRIDIUM AG IA: CPT

## 2018-02-02 PROCEDURE — 80053 COMPREHEN METABOLIC PANEL: CPT

## 2018-02-02 PROCEDURE — 82436 ASSAY OF URINE CHLORIDE: CPT

## 2018-02-02 PROCEDURE — 84100 ASSAY OF PHOSPHORUS: CPT

## 2018-02-02 PROCEDURE — 36415 COLL VENOUS BLD VENIPUNCTURE: CPT

## 2018-02-02 PROCEDURE — 99285 EMERGENCY DEPT VISIT HI MDM: CPT | Mod: 25

## 2018-02-02 PROCEDURE — 87086 URINE CULTURE/COLONY COUNT: CPT

## 2018-02-02 PROCEDURE — 84145 PROCALCITONIN (PCT): CPT

## 2018-02-02 PROCEDURE — 71045 X-RAY EXAM CHEST 1 VIEW: CPT

## 2018-02-02 PROCEDURE — 82570 ASSAY OF URINE CREATININE: CPT

## 2018-02-02 PROCEDURE — 83735 ASSAY OF MAGNESIUM: CPT

## 2018-02-02 RX ORDER — POTASSIUM CHLORIDE 20 MEQ
40 PACKET (EA) ORAL
Qty: 0 | Refills: 0 | Status: DISCONTINUED | OUTPATIENT
Start: 2018-02-02 | End: 2018-02-02

## 2018-02-02 RX ORDER — POTASSIUM CHLORIDE 20 MEQ
40 PACKET (EA) ORAL EVERY 4 HOURS
Qty: 0 | Refills: 0 | Status: DISCONTINUED | OUTPATIENT
Start: 2018-02-02 | End: 2018-02-02

## 2018-02-02 RX ORDER — POTASSIUM CHLORIDE 20 MEQ
10 PACKET (EA) ORAL
Qty: 0 | Refills: 0 | Status: COMPLETED | OUTPATIENT
Start: 2018-02-02 | End: 2018-02-02

## 2018-02-02 RX ORDER — HYDROXYUREA 500 MG/1
3 CAPSULE ORAL
Qty: 0 | Refills: 0 | COMMUNITY

## 2018-02-02 RX ADMIN — Medication 40 MILLIEQUIVALENT(S): at 14:14

## 2018-02-02 RX ADMIN — PIPERACILLIN AND TAZOBACTAM 200 GRAM(S): 4; .5 INJECTION, POWDER, LYOPHILIZED, FOR SOLUTION INTRAVENOUS at 06:36

## 2018-02-02 RX ADMIN — RIVAROXABAN 15 MILLIGRAM(S): KIT at 06:36

## 2018-02-02 RX ADMIN — PIPERACILLIN AND TAZOBACTAM 200 GRAM(S): 4; .5 INJECTION, POWDER, LYOPHILIZED, FOR SOLUTION INTRAVENOUS at 10:00

## 2018-02-02 RX ADMIN — Medication 100 MILLIEQUIVALENT(S): at 12:17

## 2018-02-02 RX ADMIN — Medication 100 MILLIEQUIVALENT(S): at 16:17

## 2018-02-02 RX ADMIN — Medication 100 MILLIEQUIVALENT(S): at 14:11

## 2018-02-02 RX ADMIN — AMLODIPINE BESYLATE 5 MILLIGRAM(S): 2.5 TABLET ORAL at 06:35

## 2018-02-02 RX ADMIN — RIVAROXABAN 15 MILLIGRAM(S): KIT at 16:21

## 2018-02-02 NOTE — PROGRESS NOTE ADULT - SUBJECTIVE AND OBJECTIVE BOX
History of Present Illness:  Chief Complaint/Reason for Admission: colitis with microperforation	  History of Present Illness: 	  76 yo F with PMH of myelodysplastic syndrome, DVT/PE on xarelto, HTN presented to ED with 1 day h/o weakness and lower abdominal pain that began. Pt reports she was having difficulty walking and was scheduled to see her PCP but came to ED due to weakness. Had some nausea yesterday but no vomiting and reports some loose stools yesterday. No fevers or chills. Now admitted for treatment of colitis with microperforation.  On hydroxyurea until recently when she had episode of pancytopenia - approved for Jakafi and will begin as outpatient.     Review of Systems:  Other Review of Systems: All other review of systems negative, except as noted in HPI 	      Allergies and Intolerances:        Allergies:  	No Known Allergies:     Home Medications:   * Patient Currently Takes Medications as of 26-Jan-2018 14:50 documented in Structured Notes  · 	Pyridium 200 mg oral tablet: 1 tab(s) orally 3 times a day   · 	Outpatient Physical Therapy: 2 week(s) treatment 2/9-2/23/18  	Diagnosis: spinal stenosis  · 	Ceftin 250 mg oral tablet: 1 tab(s) orally 2 times a day   · 	buPROPion 75 mg oral tablet: 1 tab(s) orally once a day  · 	sulfamethoxazole-trimethoprim 800 mg-160 mg oral tablet: 1 tab(s) orally 2 times a day  · 	Xarelto 15 mg oral tablet: 1 tab(s) orally twice  · 	folic acid 1 mg oral tablet: 2 tab(s) orally once a day  · 	Norvasc 5 mg oral tablet: 1 tab(s) orally once a day  · 	Aspirin Enteric Coated 81 mg oral delayed release tablet: 1 tab(s) orally once a day  · 	hydroxyurea 500 mg oral capsule: 3 cap(s) orally Monday, Wednesday, and Friday    Patient History:    Past Medical History:  Hypertension    Myeloproliferative disorder    PE (pulmonary thromboembolism)  2015  Tremor    Vestibular disequilibrium.     Past Surgical History:  S/P hysterectomy.     Tobacco Screening:  · Core Measure Site	No	    Risk Assessment:    Present on Admission:  Deep Venous Thrombosis	no 	  Pulmonary Embolus	no 	     Heart Failure:  Does this patient have a history of or has been diagnosed with heart failure? no.       Physical Exam:  Physical Exam: Vital Signs Last 24 Hrs T(C): 36.4 (02 Feb 2018 16:39), Max: 36.6 (01 Feb 2018 20:58) T(F): 97.6 (02 Feb 2018 16:39), Max: 97.9 (01 Feb 2018 20:58) HR: 90 (02 Feb 2018 16:39) (74 - 90) BP: 146/89 (02 Feb 2018 16:39) (124/79 - 149/69) RR: 17 (02 Feb 2018 16:39) (16 - 17) SpO2: 95% (02 Feb 2018 16:39) (93% - 95%)  Gen: AOx3, NAD HEENT: anicteric, pink conj Neck: supple, no JVD, no INDRA    CV: RRR, no m/r/g Pulm: CTABL, no resp distress Abd: soft, tender in lower abdomen with some guarding more in LLQ, nondistended Ext: WWP, no edema	       Labs and Results:  Labs, Radiology, Cardiology, and Other Results:                                   15.9  15.0  )-----------( 294      ( 02 Feb 2018 08:18 )            51.2              TECHNIQUE: CT of the abdomen and pelvis with intravenous and oral   contrast. Axial, sagittal, and coronal images were obtained and reviewed.   COMPARISON: CT abdomen pelvis from 9/14/2017 and CT abdomen from 7/29/2011   FINDINGS:   Lower chest: Right basilar bronchiectasis and trace atelectasis.. Mitral   annular calcification   Liver: Smooth in contour. No hypodense lesions are seen in the liver   which are too small to characterize.. Portal and hepatic veins are patent.   Biliary system: Gallbladder is normal in size. No calcified gallstones.   No biliary ductal dilatation.   Pancreas: Since 7/29/2011, there is again a 12 mm hypodense lesion in the   pancreatic body with a now a new peripheral calcification anteriorly.   Spleen: Unremarkable.   Adrenal glands: Unremarkable.   Kidneys: Symmetric parenchymal enhancement. There is a 4.3 cm right renal   cyst. Multiple hypodense lesions are seen in bilateral kidneys which are   too small to characterize.. Moderate bilateral hydronephrosis. No renal   or ureteral stone.    Urinary Bladder: Moderately distended urinary bladder with several   bladder diverticula are noted. The urinary bladder wall is thickened   which may be related to chronic bladder outlet obstruction versus   cystitis. Small bladder diverticula along its left posterior wall.   Reproductive organs: Status post hysterectomy. No adnexal masses.   Bowel/Peritoneum: There is circumferential wall thickening extending from   the mid descending colon through the distal sigmoid colon, where wall   thickening is most pronounced with associated intraluminal narrowing.   There is mild adjacent pericolic fat stranding. There are scattered   colonic diverticula within this region however the long segment   involvement is most suggestive for a colitis as oppose to a   diverticulitis. Few scattered foci of gas which appear extraluminal   location within the pelvis may indicate a small microperforation. No   large intraperitoneal free air appreciated. No evidence of bowel   obstruction. Appendix is not visualized however no inflammatory changes   appreciated at the right lower quadrant. No ascites or extraluminal gas.    Lymph nodes: No lymphadenopathy.   Aorta/IVC: There is a large amount of calcified and noncalcified plaque   involving the abdominal aorta. The abdominal aorta is aneurysmal   measuring 3.4 cm in diameter, unchanged. Abdominal wall: No hernia.   Bones/Soft tissues: Moderate degenerative changes.   IMPRESSION:   1.  Circumferential wall thickening extending from the mid descending   colon through the distal sigmoid colon, where wall thickening is most   pronounced with associated intraluminal narrowing. There is mild adjacent   pericolic fat stranding. There are scattered colonic diverticula within   this region however the long segment involvement is most suggestive for a   colitis as oppose to a diverticulitis. Few scattered foci of gas which   appear extraluminal location within the pelvis may indicate a small   microperforation.  2.    3.  Distended urinary bladder with mild bilateral hydronephrosis. Bladder   wall thickening with prominent adjacent perivesicular fat stranding.   Correlate clinically for cystitis/renal infection.  4.   5.  Unchanged abdominal aortic aneurysm measuring 3.4 cm.	    Assessment and Plan:    Assessment:  · Assessment		  76 yo F with h/o MDS, HTN, DVT/PE on xarelto with colitis with small microperforation  -NPO/IVF  -Zosyn  -PO Xarelto  -ASA on hold   -Jakafi as outpatient  -

## 2018-02-02 NOTE — DISCHARGE NOTE ADULT - PLAN OF CARE
Please follow up with your hematologist for further recommendations and repeat blood work Please continue your home dose of amlodipine Resolve infection Colitis refers to inflammation of the inner lining of the colon. There are numerous causes of colitis including infection, inflammatory bowel disease (Crohn's disease, ulcerative colitis), ischemic colitis, allergic reactions, and microscopic colitis.  Symptoms of colitis depend upon the cause and may include  abdominal pain,  cramping,  diarrhea, with or without blood in the stool (one of the hallmark symptoms of colitis). CT imaging was performed that showed sigmoid colitis with microperforation. Surgery was consulted and they felt medical management would be sufficient. You were treated with IV antibiotics and you will be discharged on oral augmentin 875/125 1 tablet every 12 hours for 10 more days starting tomorrow. Please follow up with your primary card doctor, Dr Tapia, upon discharge from rehab. If you experience worsening abdominal pain, Constant vomiting, or diarrhea, Please return to the emergency room immediately. Please continue taking your home Xarelto as directed. Please follow up with your hematologist gfor further recommendations Please continue taking your home Xarelto as directed. Please follow up with your hematologist for further recommendations. As per Dr Davila, we will discontinue your Hydroxyurea for the time being.  Please follow up with your hematologist for further recommendations and repeat blood work You were treated with Flagyl with a one time dose.

## 2018-02-02 NOTE — DISCHARGE NOTE ADULT - PATIENT PORTAL LINK FT
“You can access the [facility] Patient Portal, offered by [facility], by registering with the following website: [email or web address]”

## 2018-02-02 NOTE — CHART NOTE - NSCHARTNOTEFT_GEN_A_CORE
Admitting Diagnosis:   Patient is a 77y old  Female who presents with a chief complaint of colitis with microperforation (27 Jan 2018 03:45)      PAST MEDICAL & SURGICAL HISTORY:  Myeloproliferative disorder  Hypertension  PE (pulmonary thromboembolism): 2015  Tremor  Vestibular disequilibrium  S/P hysterectomy      Current Nutrition Order:Low sodium       PO Intake: Good (%) [x   ]  Fair (50-75%) [   ] Poor (<25%) [   ]observed    GI Issues: none reported    Pain:  none reported  Skin Integrity:intact    Labs:   02-02    139  |  101  |  10  ----------------------------<  95  2.8<LL>   |  23  |  0.65    Ca    9.2      02 Feb 2018 08:18  Phos  3.0     01-31  Mg     2.1     02-02      CAPILLARY BLOOD GLUCOSE          Medications:  MEDICATIONS  (STANDING):  amLODIPine   Tablet 5 milliGRAM(s) Oral daily  piperacillin/tazobactam IVPB. 3.375 Gram(s) IV Intermittent every 6 hours  potassium chloride   Powder 40 milliEquivalent(s) Oral every 2 hours  potassium chloride  10 mEq/100 mL IVPB 10 milliEquivalent(s) IV Intermittent every 1 hour  rivaroxaban 15 milliGRAM(s) Oral every 12 hours  tamsulosin 0.4 milliGRAM(s) Oral at bedtime    MEDICATIONS  (PRN):  ondansetron Injectable 4 milliGRAM(s) IV Push every 6 hours PRN Nausea      Weight:47.2kg  Daily     Daily no updated weights    Weight Change: no updated weights.    Estimated energy needs: Based on admitted weight of 47.2kg [id number]:1180-1416kcal and 1.2-1.4gmprotien:56.64-66gmprotein and 30-35cc fluids:1416-1652cc fluids    Subjective: Patient is a 77y old  Female who presents with a chief complaint of colitis with microperforation (27 Jan 2018 03:45).Observed eating 100% o fbreakfast tray.Patient reported she feels she is eating too much.Declined supplement suggestions    Previous Nutrition Diagnosis:Inadequate oral intake r/t decrease appetite AEB: weight loss    Active [   ]  Resolved [  x ]Need updated weights to verify,but po appears much improved     If resolved, new PES:     Goal:Meet consistent 75% intake    Recommendations:1.Updated weights    Education: completed.High calorie sources    Risk Level: High [   ] Moderate [  x ] Low [   ]

## 2018-02-02 NOTE — DISCHARGE NOTE ADULT - CARE PLAN
Principal Discharge DX:	Colitis  Goal:	Resolve infection  Assessment and plan of treatment:	Colitis refers to inflammation of the inner lining of the colon. There are numerous causes of colitis including infection, inflammatory bowel disease (Crohn's disease, ulcerative colitis), ischemic colitis, allergic reactions, and microscopic colitis.  Symptoms of colitis depend upon the cause and may include  abdominal pain,  cramping,  diarrhea, with or without blood in the stool (one of the hallmark symptoms of colitis). CT imaging was performed that showed sigmoid colitis with microperforation. Surgery was consulted and they felt medical management would be sufficient. You were treated with IV antibiotics and you will be discharged on oral augmentin 875/125 1 tablet every 12 hours for 10 more days starting tomorrow. Please follow up with your primary card doctor, Dr Tapia, upon discharge from rehab. If you experience worsening abdominal pain, Constant vomiting, or diarrhea, Please return to the emergency room immediately.  Secondary Diagnosis:	DVT (deep venous thrombosis)  Assessment and plan of treatment:	Please continue taking your home Xarelto as directed. Please follow up with your hematologist gfor further recommendations  Secondary Diagnosis:	Myeloproliferative disorder  Assessment and plan of treatment:	Please follow up with your hematologist for further recommendations and repeat blood work  Secondary Diagnosis:	Hypertension  Assessment and plan of treatment:	Please continue your home dose of amlodipine Principal Discharge DX:	Colitis  Goal:	Resolve infection  Assessment and plan of treatment:	Colitis refers to inflammation of the inner lining of the colon. There are numerous causes of colitis including infection, inflammatory bowel disease (Crohn's disease, ulcerative colitis), ischemic colitis, allergic reactions, and microscopic colitis.  Symptoms of colitis depend upon the cause and may include  abdominal pain,  cramping,  diarrhea, with or without blood in the stool (one of the hallmark symptoms of colitis). CT imaging was performed that showed sigmoid colitis with microperforation. Surgery was consulted and they felt medical management would be sufficient. You were treated with IV antibiotics and you will be discharged on oral augmentin 875/125 1 tablet every 12 hours for 10 more days starting tomorrow. Please follow up with your primary card doctor, Dr Tapia, upon discharge from rehab. If you experience worsening abdominal pain, Constant vomiting, or diarrhea, Please return to the emergency room immediately.  Secondary Diagnosis:	DVT (deep venous thrombosis)  Assessment and plan of treatment:	Please continue taking your home Xarelto as directed. Please follow up with your hematologist for further recommendations.  Secondary Diagnosis:	Myeloproliferative disorder  Assessment and plan of treatment:	As per Dr Davila, we will discontinue your Hydroxyurea for the time being.  Please follow up with your hematologist for further recommendations and repeat blood work  Secondary Diagnosis:	Hypertension  Assessment and plan of treatment:	Please continue your home dose of amlodipine  Secondary Diagnosis:	Trichomonal urethritis  Assessment and plan of treatment:	You were treated with Flagyl with a one time dose.

## 2018-02-02 NOTE — PROGRESS NOTE ADULT - ASSESSMENT
76 yo F with h/o myeloproliferative disorder, HTN, DVT/PE on xarelto with colitis with small microperforation and UTI.  -pt tolerating diet, symptoms improving  -having regular bowel function  -to be d/c'd on abx  -will sign off, re-consult as needed

## 2018-02-02 NOTE — PROGRESS NOTE ADULT - SUBJECTIVE AND OBJECTIVE BOX
INTERVAL HPI/OVERNIGHT EVENTS: Pt seen and examined at the bedside. Pt is voiding appropriately. Pt was found to be hypokalemic ( K = 2.8) this morning. Started on KCl tabs. Denies CP, palpitations, SOB, F/C, N/V, abdominal pain, loose stools and ankle pain.      VITAL SIGNS:  T(F): 97.5   HR: 74   BP: 149/69  RR: 16  SpO2: 93%     PHYSICAL EXAM:  General: Elderly frail appearing woman  HEENT: Normocephalic, Atraumtic. PEERL. MMM  Respiratory: Minimal crackles in LL lung field. CTA in R lung fields. No wheezes or rhonchi  Cardiovascular: Normal S1 and S2. RRR. No rubs, murmurs.   Gastrointestinal: Soft, non-distended, non-tender. Positive bowel sounds.   Extremities: No LE edema. Ankles are non-tender to palpation. L Ankle ROM intact  Skin: No rashes or ulcers. No cutaneous changes over the calves/ankles. No clubbing/cyanosis.   Vascular: 2+ dorsalis pedis pulses b/l  Neurological: No focal deficits. AAOx3. L ankle 5/5 motor, sensation in tact      MEDICATIONS  (STANDING):  amLODIPine   Tablet 5 milliGRAM(s) Oral daily  piperacillin/tazobactam IVPB. 3.375 Gram(s) IV Intermittent every 6 hours  potassium chloride    Tablet ER 40 milliEquivalent(s) Oral every 4 hours  potassium chloride  10 mEq/100 mL IVPB 10 milliEquivalent(s) IV Intermittent every 1 hour  rivaroxaban 15 milliGRAM(s) Oral every 12 hours  tamsulosin 0.4 milliGRAM(s) Oral at bedtime    MEDICATIONS  (PRN):  ondansetron Injectable 4 milliGRAM(s) IV Push every 6 hours PRN Nausea      Allergies    No Known Allergies    Intolerances        LABS:                        15.9   15.0  )-----------( 294      ( 02 Feb 2018 08:18 )             51.2     02-02    139  |  101  |  10  ----------------------------<  95  2.8<LL>   |  23  |  0.65    Ca    9.2      02 Feb 2018 08:18  Phos  3.0     01-31  Mg     2.1     02-02      PT/INR - ( 02 Feb 2018 08:18 )   PT: 20.1 sec;   INR: 1.79          PTT - ( 02 Feb 2018 08:18 )  PTT:38.3 sec      RADIOLOGY & ADDITIONAL TESTS:

## 2018-02-02 NOTE — PROGRESS NOTE ADULT - PROBLEM SELECTOR PROBLEM 3
Colitis
UTI (urinary tract infection)

## 2018-02-02 NOTE — DISCHARGE NOTE ADULT - MEDICATION SUMMARY - MEDICATIONS TO TAKE
I will START or STAY ON the medications listed below when I get home from the hospital:    Aspirin Enteric Coated 81 mg oral delayed release tablet  -- 1 tab(s) by mouth once a day  -- Indication: For PPX    Xarelto 15 mg oral tablet  -- 1 tab(s) by mouth twice  -- Check with your doctor before becoming pregnant.  It is very important that you take or use this exactly as directed.  Do not skip doses or discontinue unless directed by your doctor.  Obtain medical advice before taking any non-prescription drugs as some may affect the action of this medication.  Take with food.    -- Indication: For DVT (deep venous thrombosis)    Norvasc 5 mg oral tablet  -- 1 tab(s) by mouth once a day  -- Indication: For HTN    Augmentin 875 mg-125 mg oral tablet  -- 1 tab(s) by mouth every 12 hours for 10 more days starting 2/3/17  -- Indication: For Colitis    buPROPion 75 mg oral tablet  -- 1 tab(s) by mouth once a day  -- Indication: For anxiety

## 2018-02-02 NOTE — PROGRESS NOTE ADULT - SUBJECTIVE AND OBJECTIVE BOX
INTERVAL HPI/OVERNIGHT EVENTS: KAREN      SUBJECTIVE:  Flatus: [ ] YES [ ] NO             Bowel Movement: [ ] YES [ ] NO  Pain (0-10):            Pain Control Adequate: [ ] YES [ ] NO  Nausea: [ ] YES [ ] NO            Vomiting: [ ] YES [ ] NO  Diarrhea: [ ] YES [ ] NO         Constipation: [ ] YES [ ] NO     Chest Pain: [ ] YES [ ] NO    SOB:  [ ] YES [ ] NO    MEDICATIONS  (STANDING):  amLODIPine   Tablet 5 milliGRAM(s) Oral daily  piperacillin/tazobactam IVPB. 3.375 Gram(s) IV Intermittent every 6 hours  potassium chloride    Tablet ER 40 milliEquivalent(s) Oral every 4 hours  potassium chloride  10 mEq/100 mL IVPB 10 milliEquivalent(s) IV Intermittent every 1 hour  rivaroxaban 15 milliGRAM(s) Oral every 12 hours  tamsulosin 0.4 milliGRAM(s) Oral at bedtime    MEDICATIONS  (PRN):  ondansetron Injectable 4 milliGRAM(s) IV Push every 6 hours PRN Nausea      Vital Signs Last 24 Hrs  T(C): 36.4 (02 Feb 2018 05:28), Max: 36.6 (01 Feb 2018 20:58)  T(F): 97.5 (02 Feb 2018 05:28), Max: 97.9 (01 Feb 2018 20:58)  HR: 74 (02 Feb 2018 05:28) (74 - 88)  BP: 149/69 (02 Feb 2018 05:28) (117/72 - 149/69)  BP(mean): 87 (01 Feb 2018 15:16) (87 - 87)  RR: 16 (02 Feb 2018 05:28) (16 - 18)  SpO2: 93% (02 Feb 2018 05:28) (93% - 98%)    PHYSICAL EXAM:    Gen: AOx3, NAD    CV: RRR, no m/r/g    Pulm: CTABL    Abd: mildly tender in LLQ, no guarding or rebound, nondistended    I&O's Detail      LABS:                        15.9   15.0  )-----------( 294      ( 02 Feb 2018 08:18 )             51.2     02-02    139  |  101  |  10  ----------------------------<  95  2.8<LL>   |  23  |  0.65    Ca    9.2      02 Feb 2018 08:18  Phos  3.0     01-31  Mg     2.1     02-02      PT/INR - ( 02 Feb 2018 08:18 )   PT: 20.1 sec;   INR: 1.79          PTT - ( 02 Feb 2018 08:18 )  PTT:38.3 sec      RADIOLOGY & ADDITIONAL STUDIES:

## 2018-02-02 NOTE — PROGRESS NOTE ADULT - PROVIDER SPECIALTY LIST ADULT
Heme/Onc
Infectious Disease
Infectious Disease
Internal Medicine
Nephrology
Surgery
Urology
Heme/Onc
Heme/Onc
Internal Medicine
Surgery
Internal Medicine

## 2018-02-02 NOTE — PROGRESS NOTE ADULT - PROBLEM SELECTOR PLAN 5
Pt with hypertensive episodes with SBP in 180s  -Will restart home Norvasc 5 mg
Pt initially w/ hypertensive episodes with SBP in 180s. Restarted home Norvasc 5 mg. SBP now stable in 120-130s.
Pt initially w/ hypertensive episodes with SBP in 180s. Restarted home Norvasc 5 mg. SBP now stable in 120-130s.
Pt with hypertensive episodes with SBP in 180s. Restarted home Norvasc 5 mg. SBP now downtrending to 130s

## 2018-02-02 NOTE — PROGRESS NOTE ADULT - PROBLEM SELECTOR PROBLEM 6
Myeloproliferative disorder

## 2018-02-02 NOTE — DISCHARGE NOTE ADULT - CARE PROVIDER_API CALL
Wai Tapia), Medicine  [address] [place] [address]  Phone: [phone]  Fax: [phone]    Carmen Dumont), Medicine  [address] [place] [address]  Phone: [phone]  Fax: [phone]

## 2018-02-02 NOTE — DISCHARGE NOTE ADULT - HOSPITAL COURSE
Patient is a 78 yo F with PMH of myeloproliferative syndrome (followed by Dr. Dumont as outpatient), hx of DVT/PE (on xarelto), and HTN presented to ED on 1/27/2018 with 1 day h/o weakness, lower abdominal pain, nausea and loose stool. Pt reports she was having difficulty walking and was scheduled to see her PCP but came to ED due to weakness. She denied any fevers or chills. On admission she was afebrile, on exam had lower abdominal tenderness with mild voluntary guarding in the LLQ, and CBC showed leukocytosis with WBC of 19. 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 Pt was admitted to the general surgery service for colitis with small microperforation and the plan was for non-operative management with NPO, antibiotics, serial abdominal exams. She was started on zosyn (1-27-). URology was consulted for B/L hydro, no intervention pursued as bladder is filling. LE duplex showed nonocclusive DVT of the right common femoral vein, not previously identified; interval decrease in flow about the DVT in the right proximal femoral vein; no significant change in right mid and distal femoral vein and right popliteal vein DVT. UA + for trichomonas on admission, given 1 dose of Metronidazole, DCed to negative Urine cxs. Pt had multiple episodes of loose stools, c. diff negative. On HOD 3, pt was tolerating clear liquid diet, hutson was DC'd, pt failed trial of void. Pt was stable and transfered to medicine for further management. Patient is a 76 yo F with PMH of myeloproliferative syndrome (followed by Dr. Dumont as outpatient), hx of DVT/PE (on xarelto), and HTN presented to ED on 1/27/2018 with 1 day h/o weakness, lower abdominal pain, nausea and loose stool.  On admission she was afebrile, on exam had lower abdominal tenderness with mild voluntary guarding in the LLQ, and CBC showed leukocytosis with WBC of 19. 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 Pt was admitted to the general surgery service for colitis with small microperforation and the plan was for non-operative management with NPO, antibiotics, serial abdominal exams. URology was consulted for B/L hydro, no intervention pursued as bladder is filling. LE duplex showed nonocclusive DVT of the right common femoral vein, not previously identified; interval decrease in flow about the DVT in the right proximal femoral vein; no significant change in right mid and distal femoral vein and right popliteal vein DVT. UA + for trichomonas on admission, given 1 dose of Metronidazole, DCed to negative Urine cxs. Patient will be discharged to [facility] with oral Augmentin for a 10 day course.

## 2018-02-02 NOTE — PROGRESS NOTE ADULT - PROBLEM SELECTOR PLAN 4
found to have worsening nonocclusive DVT of R common femoral vein; interval decrease in flow in R. proximal femoral vein, now with minimal flow; no leg swelling, pain or cutaneous changes in the interim  - was started on Lovenox BID for possible surgical intervention for colitis; restarted home dose xarelto. c/w xeralto 15 mg Q12  - consulted Dr. Dumont regarding possible coagulopathy causing worsening DVT on full anticoagulation; and regarding leukocytosis to compare to patient's baseline. WBCs trending down.   - f/u Julia recs
found to have worsening nonocclusive DVT of R common femoral vein; interval decrease in flow in R. proximal femoral vein, now with minimal flow; no leg swelling, pain or cutaneous changes in the interim  - was started on Lovenox BID for possibl;e sirgucal intervention for colitis; restarted home dose xarelto. c/w xeralto 15 mg Q12  - consulted Dr. Dumont regarding possible coagulopathy causing worsening DVT on full anticoagulation; and regarding leukocytosis to compare to patient's baseline  - f/u Julia velas

## 2018-02-02 NOTE — DISCHARGE NOTE ADULT - MEDICATION SUMMARY - MEDICATIONS TO STOP TAKING
I will STOP taking the medications listed below when I get home from the hospital:    hydroxyurea 500 mg oral capsule  -- 3 cap(s) by mouth Monday, Wednesday, and Friday    sulfamethoxazole-trimethoprim 800 mg-160 mg oral tablet  -- 1 tab(s) by mouth 2 times a day    Ceftin 250 mg oral tablet  -- 1 tab(s) by mouth 2 times a day   -- Finish all this medication unless otherwise directed by prescriber.  Medication should be taken with plenty of water.  Take with food or milk.    Outpatient Physical Therapy  -- 2 week(s) treatment 2/9-2/23/18  Diagnosis: spinal stenosis    Pyridium 200 mg oral tablet  -- 1 tab(s) by mouth 3 times a day   -- May discolor urine or feces.  Medication should be taken with plenty of water.  Take with food or milk.

## 2018-02-02 NOTE — PROGRESS NOTE ADULT - PROBLEM SELECTOR PLAN 3
UA positive on admission with +nitrites, small leuk esterase, many WBC.   +trichomonas on admission; s/p 1 dose of Metronidazole  - GC / chlamydia negative  -Urine cxs NGTD  - C/w abx

## 2018-02-02 NOTE — PROGRESS NOTE ADULT - PROBLEM SELECTOR PLAN 7
Lytes: currently hypokalemic (K = 2.8). Repleting K+ w/ KCl tabs PO. Goal K>4 and Mg >2  Diet: Full liquid diet, advance as tolerated   IVF: DC'd due to crackles in lower lung fields and as pt tolerating PO  Pain: Hydromorphone 0.5 mg IV push PRN Q4   Nausea; Zofran 4 mg IV push PRN Q6  DVT ppx: On xeralto   Code: Full code

## 2018-02-02 NOTE — PROGRESS NOTE ADULT - ASSESSMENT
76 yo F with PMH of myelodysplastic syndrome (followed by Dr. Dumont as outpatient), hx of DVT/PE (on xarelto), and HTN presented to ED on 1/27/2018 with 1 day h/o weakness, lower abdominal pain, nausea and loose stool admitted for colitis. VSS and pt currently w/o loose or bloody stools. Exam is unremarkable. Leukocytosis is stable at 15.0.

## 2018-02-02 NOTE — PROGRESS NOTE ADULT - PROBLEM SELECTOR PLAN 1
CT scan with circumferential wall thickening extending from the mid descending   colon through the distal sigmoid colon, few scattered foci of gas which may indicate a small microperforation. Infectious colitis most likely given elevated CRP and procalcitonin. Ischemic colitis unlikely as pt with no significant abdominal pain on exam (no pain out of proportion to exam). Pt now asymptomatic and tolerating clear diet.   - Pt w/ baseline leukopenia 2/2 MDS. WBCs currently elevated and stable at 15.0. Repeat CBC w/ manual diff. C/w IV zosyn. If WBCs continue to trend down, transition to PO abx. Discussed w/ Dr. Avelar  - Initially w/ multiple episodes of loose stools: c. diff negative. No loose or bloody stools overnight. Continue to monitor BMs and avoid laxative

## 2018-02-02 NOTE — PROGRESS NOTE ADULT - PROBLEM SELECTOR PROBLEM 2
Electrolyte abnormality
Hydronephrosis, bilateral

## 2018-02-03 LAB
CULTURE RESULTS: SIGNIFICANT CHANGE UP
CULTURE RESULTS: SIGNIFICANT CHANGE UP
SPECIMEN SOURCE: SIGNIFICANT CHANGE UP
SPECIMEN SOURCE: SIGNIFICANT CHANGE UP

## 2018-02-06 DIAGNOSIS — K52.9 NONINFECTIVE GASTROENTERITIS AND COLITIS, UNSPECIFIED: ICD-10-CM

## 2018-02-06 DIAGNOSIS — Z79.82 LONG TERM (CURRENT) USE OF ASPIRIN: ICD-10-CM

## 2018-02-06 DIAGNOSIS — R63.8 OTHER SYMPTOMS AND SIGNS CONCERNING FOOD AND FLUID INTAKE: ICD-10-CM

## 2018-02-06 DIAGNOSIS — Z79.01 LONG TERM (CURRENT) USE OF ANTICOAGULANTS: ICD-10-CM

## 2018-02-06 DIAGNOSIS — D46.9 MYELODYSPLASTIC SYNDROME, UNSPECIFIED: ICD-10-CM

## 2018-02-06 DIAGNOSIS — N13.30 UNSPECIFIED HYDRONEPHROSIS: ICD-10-CM

## 2018-02-06 DIAGNOSIS — I82.411 ACUTE EMBOLISM AND THROMBOSIS OF RIGHT FEMORAL VEIN: ICD-10-CM

## 2018-02-06 DIAGNOSIS — R33.8 OTHER RETENTION OF URINE: ICD-10-CM

## 2018-02-06 DIAGNOSIS — R25.1 TREMOR, UNSPECIFIED: ICD-10-CM

## 2018-02-06 DIAGNOSIS — Z86.711 PERSONAL HISTORY OF PULMONARY EMBOLISM: ICD-10-CM

## 2018-02-06 DIAGNOSIS — A59.03 TRICHOMONAL CYSTITIS AND URETHRITIS: ICD-10-CM

## 2018-02-06 DIAGNOSIS — D72.829 ELEVATED WHITE BLOOD CELL COUNT, UNSPECIFIED: ICD-10-CM

## 2018-02-06 DIAGNOSIS — Z28.21 IMMUNIZATION NOT CARRIED OUT BECAUSE OF PATIENT REFUSAL: ICD-10-CM

## 2018-02-06 DIAGNOSIS — Z90.710 ACQUIRED ABSENCE OF BOTH CERVIX AND UTERUS: ICD-10-CM

## 2018-02-06 DIAGNOSIS — N32.0 BLADDER-NECK OBSTRUCTION: ICD-10-CM

## 2018-02-06 DIAGNOSIS — H81.90 UNSPECIFIED DISORDER OF VESTIBULAR FUNCTION, UNSPECIFIED EAR: ICD-10-CM

## 2018-02-06 DIAGNOSIS — K63.1 PERFORATION OF INTESTINE (NONTRAUMATIC): ICD-10-CM

## 2018-07-26 ENCOUNTER — INPATIENT (INPATIENT)
Facility: HOSPITAL | Age: 78
LOS: 5 days | Discharge: HOME | DRG: 871 | End: 2018-08-01
Attending: INTERNAL MEDICINE | Admitting: INTERNAL MEDICINE
Payer: MEDICARE

## 2018-07-26 VITALS
TEMPERATURE: 98 F | RESPIRATION RATE: 16 BRPM | OXYGEN SATURATION: 94 % | WEIGHT: 108.91 LBS | SYSTOLIC BLOOD PRESSURE: 113 MMHG | DIASTOLIC BLOOD PRESSURE: 79 MMHG | HEART RATE: 88 BPM

## 2018-07-26 DIAGNOSIS — Z90.710 ACQUIRED ABSENCE OF BOTH CERVIX AND UTERUS: Chronic | ICD-10-CM

## 2018-07-26 PROBLEM — I26.99 OTHER PULMONARY EMBOLISM WITHOUT ACUTE COR PULMONALE: Chronic | Status: ACTIVE | Noted: 2017-04-27

## 2018-07-26 PROBLEM — I10 ESSENTIAL (PRIMARY) HYPERTENSION: Chronic | Status: ACTIVE | Noted: 2017-04-27

## 2018-07-26 LAB
BASE EXCESS BLDA CALC-SCNC: -2.2 MMOL/L — LOW (ref -2–3)
GAS PNL BLDA: SIGNIFICANT CHANGE UP
GLUCOSE BLDC GLUCOMTR-MCNC: 127 MG/DL — HIGH (ref 70–99)
HCO3 BLDA-SCNC: 20 MMOL/L — LOW (ref 21–28)
LACTATE SERPL-SCNC: 1.9 MMOL/L — SIGNIFICANT CHANGE UP (ref 0.5–2)
LACTATE SERPL-SCNC: 3.6 MMOL/L — HIGH (ref 0.5–2)
PCO2 BLDA: 29 MMHG — LOW (ref 32–45)
PH BLDA: 7.47 — HIGH (ref 7.35–7.45)
PO2 BLDA: 153 MMHG — HIGH (ref 83–108)
SAO2 % BLDA: 99 % — SIGNIFICANT CHANGE UP (ref 95–100)

## 2018-07-26 PROCEDURE — 99223 1ST HOSP IP/OBS HIGH 75: CPT | Mod: GC

## 2018-07-26 PROCEDURE — 93010 ELECTROCARDIOGRAM REPORT: CPT

## 2018-07-26 PROCEDURE — 71045 X-RAY EXAM CHEST 1 VIEW: CPT | Mod: 26

## 2018-07-26 PROCEDURE — 99291 CRITICAL CARE FIRST HOUR: CPT

## 2018-07-26 RX ORDER — SODIUM CHLORIDE 9 MG/ML
1000 INJECTION INTRAMUSCULAR; INTRAVENOUS; SUBCUTANEOUS ONCE
Qty: 0 | Refills: 0 | Status: DISCONTINUED | OUTPATIENT
Start: 2018-07-26 | End: 2018-07-27

## 2018-07-26 RX ORDER — PIPERACILLIN AND TAZOBACTAM 4; .5 G/20ML; G/20ML
4.5 INJECTION, POWDER, LYOPHILIZED, FOR SOLUTION INTRAVENOUS ONCE
Qty: 0 | Refills: 0 | Status: COMPLETED | OUTPATIENT
Start: 2018-07-26 | End: 2018-07-26

## 2018-07-26 RX ORDER — ROSUVASTATIN CALCIUM 5 MG/1
1 TABLET ORAL
Qty: 0 | Refills: 0 | COMMUNITY

## 2018-07-26 RX ORDER — ASPIRIN/CALCIUM CARB/MAGNESIUM 324 MG
81 TABLET ORAL DAILY
Qty: 0 | Refills: 0 | Status: DISCONTINUED | OUTPATIENT
Start: 2018-07-26 | End: 2018-08-01

## 2018-07-26 RX ORDER — INFLUENZA VIRUS VACCINE 15; 15; 15; 15 UG/.5ML; UG/.5ML; UG/.5ML; UG/.5ML
0.5 SUSPENSION INTRAMUSCULAR ONCE
Qty: 0 | Refills: 0 | Status: DISCONTINUED | OUTPATIENT
Start: 2018-07-26 | End: 2018-07-26

## 2018-07-26 RX ORDER — CEFTRIAXONE 500 MG/1
1 INJECTION, POWDER, FOR SOLUTION INTRAMUSCULAR; INTRAVENOUS ONCE
Qty: 0 | Refills: 0 | Status: COMPLETED | OUTPATIENT
Start: 2018-07-26 | End: 2018-07-26

## 2018-07-26 RX ORDER — ACETAMINOPHEN 500 MG
650 TABLET ORAL ONCE
Qty: 0 | Refills: 0 | Status: COMPLETED | OUTPATIENT
Start: 2018-07-26 | End: 2018-07-26

## 2018-07-26 RX ORDER — RIVAROXABAN 15 MG-20MG
20 KIT ORAL EVERY 24 HOURS
Qty: 0 | Refills: 0 | Status: DISCONTINUED | OUTPATIENT
Start: 2018-07-26 | End: 2018-08-01

## 2018-07-26 RX ORDER — PIPERACILLIN AND TAZOBACTAM 4; .5 G/20ML; G/20ML
4.5 INJECTION, POWDER, LYOPHILIZED, FOR SOLUTION INTRAVENOUS EVERY 6 HOURS
Qty: 0 | Refills: 0 | Status: DISCONTINUED | OUTPATIENT
Start: 2018-07-27 | End: 2018-07-27

## 2018-07-26 RX ORDER — ACETAMINOPHEN 500 MG
740 TABLET ORAL ONCE
Qty: 0 | Refills: 0 | Status: COMPLETED | OUTPATIENT
Start: 2018-07-26 | End: 2018-07-26

## 2018-07-26 RX ORDER — SODIUM CHLORIDE 9 MG/ML
1000 INJECTION INTRAMUSCULAR; INTRAVENOUS; SUBCUTANEOUS
Qty: 0 | Refills: 0 | Status: DISCONTINUED | OUTPATIENT
Start: 2018-07-26 | End: 2018-07-26

## 2018-07-26 RX ORDER — SODIUM CHLORIDE 9 MG/ML
1000 INJECTION INTRAMUSCULAR; INTRAVENOUS; SUBCUTANEOUS ONCE
Qty: 0 | Refills: 0 | Status: COMPLETED | OUTPATIENT
Start: 2018-07-26 | End: 2018-07-26

## 2018-07-26 RX ORDER — ONDANSETRON 8 MG/1
4 TABLET, FILM COATED ORAL ONCE
Qty: 0 | Refills: 0 | Status: COMPLETED | OUTPATIENT
Start: 2018-07-26 | End: 2018-07-26

## 2018-07-26 RX ORDER — ACETAMINOPHEN 500 MG
650 TABLET ORAL EVERY 6 HOURS
Qty: 0 | Refills: 0 | Status: DISCONTINUED | OUTPATIENT
Start: 2018-07-26 | End: 2018-08-01

## 2018-07-26 RX ORDER — SODIUM CHLORIDE 9 MG/ML
1000 INJECTION INTRAMUSCULAR; INTRAVENOUS; SUBCUTANEOUS
Qty: 0 | Refills: 0 | Status: DISCONTINUED | OUTPATIENT
Start: 2018-07-26 | End: 2018-07-30

## 2018-07-26 RX ADMIN — CEFTRIAXONE 100 GRAM(S): 500 INJECTION, POWDER, FOR SOLUTION INTRAMUSCULAR; INTRAVENOUS at 15:06

## 2018-07-26 RX ADMIN — Medication 296 MILLIGRAM(S): at 17:05

## 2018-07-26 RX ADMIN — PIPERACILLIN AND TAZOBACTAM 200 GRAM(S): 4; .5 INJECTION, POWDER, LYOPHILIZED, FOR SOLUTION INTRAVENOUS at 17:58

## 2018-07-26 RX ADMIN — SODIUM CHLORIDE 125 MILLILITER(S): 9 INJECTION INTRAMUSCULAR; INTRAVENOUS; SUBCUTANEOUS at 12:19

## 2018-07-26 RX ADMIN — SODIUM CHLORIDE 1000 MILLILITER(S): 9 INJECTION INTRAMUSCULAR; INTRAVENOUS; SUBCUTANEOUS at 15:51

## 2018-07-26 RX ADMIN — SODIUM CHLORIDE 125 MILLILITER(S): 9 INJECTION INTRAMUSCULAR; INTRAVENOUS; SUBCUTANEOUS at 17:55

## 2018-07-26 RX ADMIN — ONDANSETRON 4 MILLIGRAM(S): 8 TABLET, FILM COATED ORAL at 17:20

## 2018-07-26 NOTE — PROVIDER CONTACT NOTE (OTHER) - SITUATION
Patient is a 78 year old female admitted for UTI. Patient arrived to unit rigoring, elevated BP, tachycardic, however oral temp was afebrile.

## 2018-07-26 NOTE — PROGRESS NOTE ADULT - SUBJECTIVE AND OBJECTIVE BOX
78 year old female with history of hypertension, myeloproliferative disorder, DVT/PE (2015), chronic UTIs and hydronephrosis who presented with 2 days of generalized fatigue and fevers.     Patient had positive UTI in the ED, was transferred to the floor.         	  MEDICATIONS:    piperacillin/tazobactam IVPB. 4.5 Gram(s) IV Intermittent once            sodium chloride 0.9% Bolus 1000 milliLiter(s) IV Bolus once  sodium chloride 0.9%. 1000 milliLiter(s) IV Continuous <Continuous>      Complaint:     Otherwise 12 point review of systems is normal.    PHYSICAL EXAM:  ICU Vital Signs Last 24 Hrs  T(C): 40.6 (26 Jul 2018 16:15), Max: 40.6 (26 Jul 2018 16:15)  T(F): 105.1 (26 Jul 2018 16:15), Max: 105.1 (26 Jul 2018 16:15)  HR: 120 (26 Jul 2018 16:15) (76 - 120)  BP: 172/92 (26 Jul 2018 16:15) (113/79 - 172/92)  BP(mean): --  ABP: --  ABP(mean): --  RR: 24 (26 Jul 2018 16:15) (16 - 24)  SpO2: 92% (26 Jul 2018 16:15) (92% - 98%)            ECG:      CHEST X RAY    CT    MRI    MRA    CT ANGIO    CAROTID DUPLEX    DUPLEX     Echocardiogram    Catheterization:    Stress Test:     LABS:	 	  CARDIAC MARKERS:  Troponin T, Serum: <0.01 ng/mL [0.00 - 0.01] (07-26 @ 12:00)                              12.1   20.1  )-----------( 259      ( 26 Jul 2018 12:00 )             37.5     07-26    138  |  98  |  26<H>  ----------------------------<  149<H>  3.5   |  22  |  1.02    Ca    9.4      26 Jul 2018 12:00    TPro  7.0  /  Alb  3.9  /  TBili  1.4<H>  /  DBili  0.2  /  AST  83<H>  /  ALT  55<H>  /  AlkPhos  80  07-26        ASSESSMENT/PLAN:

## 2018-07-26 NOTE — PATIENT PROFILE ADULT. - VISION (WITH CORRECTIVE LENSES IF THE PATIENT USUALLY WEARS THEM):
Wears glasses. "Without them, I'm pretty blind."/Severely impaired: cannot locate objects without hearing or touching them or patient nonresponsive.

## 2018-07-26 NOTE — ED PROVIDER NOTE - CARE PLAN
Principal Discharge DX:	UTI (urinary tract infection)  Secondary Diagnosis:	Dehydration Principal Discharge DX:	UTI (urinary tract infection)  Secondary Diagnosis:	Dehydration  Secondary Diagnosis:	Rhabdomyolysis

## 2018-07-26 NOTE — H&P ADULT - HISTORY OF PRESENT ILLNESS
78 year old female with history of hypertension, myeloproliferative disorder, DVT/PE (2015), chronic UTIs and hydronephrosis who presented with 2 days of generalized fatigue and fevers.     Patient had positive UTI in the ED, was transferred to the floor. The patient 78 year old female with history of hypertension, myeloproliferative disorder, DVT/PE (2015), chronic UTIs and hydronephrosis who presented with 2 days of generalized fatigue and fevers. Patient states that for the past 2 days she has had fevers, decreased appetite, increased thirst, nausea, and increased urination. She has a history of hydronephrosis and chronic UTIs with pan sensitive klebsiella. Today, her health care proxy and her friend noticed that the patient's skin felt warm to touch, and in conjunction with the patients other symptoms, brought her to the emergency department. The patient denies headache, dizziness, chest pain, SOB, vomiting, and leg pain. She does report feeling cold, having a dry mouth, and having shivers.     In the emergency department, patient was noted to have a positive UTI. She was given 1L NS bolus, started on ceftriaxone, and the patient was transferred to the floor. Once on the regional medical floor, patient's vitals were temp 105.1 (rectally), , /92, RR 24, O2 sat 92% on RA. Rapid response called. Patient was given 2L NS bolus, tylenol x2 for fever. Resendiz was placed with evacuation of cloudy appearing urine. Bedside US with bilateral hydronephrosis, liver appeared WNL. Labs notable for WBC 20.1, bands of 11, Lactate of 3.6, and mild transaminitis. ABG with resp alkalosis. Lactate cleared after NS boluses. Patient was then transferred to MICU for further management.

## 2018-07-26 NOTE — PATIENT PROFILE ADULT. - PRESSURE ULCER(S)
ED Chief Complaint/HPI





- Patient Information


Date Seen:: 02/24/18


Time Seen:: 00:36


Chief Complaint:: Fever


History of Present Illness:: 


12 yo female developed fever up to 104.5 for 2 days. Tylenol and motrin were 

given. patient had cough and fatigue with sore throat. Patient had epigastric 

pain but denied nausea or vomiting. 


Allergies:: 


 Allergies











Allergy/AdvReac Type Severity Reaction Status Date / Time


 


amoxicillin Allergy   Verified 02/23/18 23:38











Vitals:: 


 Vital Signs - 8 hr











  02/23/18





  23:20


 


Temp 100.0 F


 





 


RR 18


 


/57


 


O2 Sat % 100














ED Review of Systems





- Review of Systems


General/Constitutional: Fever


Skin: No skin lesions


Head: No headache


ENT: No earache


Neck: No neck pain


Cardio Vascular: No chest pain


Pulmonary: No SOB


GI: No nausea, No vomiting


Musculoskeletal: No bone or joint pain


Psychiatric: No prior psych history





ED Past Medical History





- Past Medical History


Past Medical History: No significant medical hx


Social History: Non Smoker, No Alcohol, No Drug Use


Surgical History: None





Family Medical History





- Family Member


  ** Mother


Hx Family Hypertension: Yes





ED Physical Exam





- Physical Examination


General/Constitutional: Awake


Head: Atraumatic


Eyes: PERRL


Skin: No skin lesions


ENMT: Nasal exam nl


Neck: No nuchal rigidity


Other Respiratory comments:: 


mild rhonchi


Cardio Vascular: RRR, No murmur, gallop, rubs, NL S1 S2


Other GI comments:: 


epigastric tenderness


Extremities: normal strength in all extremities


Neuro/Psych: No focal deficits





ED Assessment





- Assessment


General Assessment: 


Upper respiratory tract infection


Leukopenia


Assessment/Comments:: 


CBC, CMP


Tylenol


Rocephin IV (patient refused)


Pantoprazole 40mg po x 1


Ciprofloxacin 500mg po x 1


D/c home


Ciprofloxacin 500mg bid


f/u PCP or return to ER if symptoms worsen





ED Septic Shock





- .


Is Septic Shock (SBP<90, OR Lactate>4 mmol\L) present?: No





- <6hrs of presentation:


Vital Signs: 


 Vital Signs - 8 hr











  02/23/18





  23:20


 


Temp 100.0 F


 





 


RR 18


 


/57


 


O2 Sat % 100














ED Reassessment (Disposition)





- Reassessment


Reassessment Condition:: Improved





- Patient Disposition


Discharge/Transfer:: Home





ED Discharge Plan





- Patient Disposition


Admit/Discharge/Transfer: PT DISCHARGED HOME


Condition at Disposition: Stable


Instructions:  Upper Respiratory Infection, Child, Easy-to-Read


Additional Instructions: 


MAKE A FOLLOW UP WITH PRIMARY MEDICAL DOCTOR ASAP, COMPLY WITH PRESCRIBED 

MEDICATION, DRINK PLENTY OF FLUIDS. no

## 2018-07-26 NOTE — PROVIDER CONTACT NOTE (OTHER) - RECOMMENDATIONS
Rapid response called, patient receiving IV fluids. Cannot tolerate PO Tylenol at this time, rectal Tylenol given however patient had diarrhea, IV Tylenol administered.

## 2018-07-26 NOTE — H&P ADULT - NSHPLABSRESULTS_GEN_ALL_CORE
.  LABS:                         12.1   20.1  )-----------( 259      ( 2018 12:00 )             37.5         138  |  98  |  26<H>  ----------------------------<  149<H>  3.5   |  22  |  1.02    Ca    9.4      2018 12:00    TPro  7.0  /  Alb  3.9  /  TBili  1.4<H>  /  DBili  0.2  /  AST  83<H>  /  ALT  55<H>  /  AlkPhos  80      PT/INR - ( 2018 12:01 )   PT: 31.5 sec;   INR: 2.78          PTT - ( 2018 12:01 )  PTT:42.9 sec  Urinalysis Basic - ( 2018 13:33 )    Color: Yellow / Appearance: SL Cloudy / S.025 / pH: x  Gluc: x / Ketone: NEGATIVE  / Bili: Negative / Urobili: 0.2 E.U./dL   Blood: x / Protein: >=300 mg/dL / Nitrite: NEGATIVE   Leuk Esterase: Moderate / RBC: 5-10 /HPF / WBC Many /HPF   Sq Epi: x / Non Sq Epi: 5-10 /HPF / Bacteria: Present /HPF      CARDIAC MARKERS ( 2018 12:00 )  x     / <0.01 ng/mL / 3298 U/L / x     / 14.0 ng/mL        Lactate, Blood: 1.9 mmoL/L ( @ 16:52)  Lactate, Blood: 3.6 mmoL/L ( @ 15:19)      RADIOLOGY, EKG & ADDITIONAL TESTS: Reviewed.

## 2018-07-26 NOTE — H&P ADULT - NSHPSOCIALHISTORY_GEN_ALL_CORE
Patient is ,  passed 4 years ago. She lives at home with a health care aid. No children. Former smoker (quit 10 years ago, smoked 1PPD for 30 years prior), no drugs or alcohol.

## 2018-07-26 NOTE — PATIENT PROFILE ADULT. - REASON FOR ADMISSION
I just felt very, very weak. I all of a sudden needed to use the walker to get around my apartment. I hadn't been using it.

## 2018-07-26 NOTE — ED PROVIDER NOTE - CRITICAL CARE PROVIDED
documentation/interpretation of diagnostic studies/conducted a detailed discussion of DNR status/additional history taking/consult w/ pt's family directly relating to pts condition/direct patient care (not related to procedure)

## 2018-07-26 NOTE — CONSULT NOTE ADULT - SUBJECTIVE AND OBJECTIVE BOX
HPI:  78 year old female with history of hypertension, myeloproliferative disorder, DVT/PE (2015), chronic UTIs and hydronephrosis who presented with 2 days of generalized fatigue and fevers. Patient states that for the past 2 days she has had fevers, decreased appetite, increased thirst, nausea, and increased urination. She has a history of hydronephrosis and chronic UTIs with pan sensitive klebsiella. Today, her health care proxy and her friend noticed that the patient's skin felt warm to touch, and in conjunction with the patients other symptoms, brought her to the emergency department. The patient denies headache, dizziness, chest pain, SOB, vomiting, and leg pain. She does report feeling cold, having a dry mouth, and having shivers.     In the emergency department, patient was noted to have a positive UTI. She was given 1L NS bolus, started on ceftriaxone, and the patient was transferred to the floor. Once on the regional medical floor, patient's vitals were temp 105.1 (rectally), , /92, RR 24, O2 sat 92% on RA. Rapid response called. Patient was given 2L NS bolus, tylenol x2 for fever. Resendiz was placed with evacuation of cloudy appearing urine. Bedside US with bilateral hydronephrosis, liver appeared WNL. Labs notable for WBC 20.1, bands of 11, Lactate of 3.6, and mild transaminitis. ABG with resp alkalosis. Lactate cleared after NS boluses. Patient was then transferred to MICU for further management. (2018 17:13)      PAST MEDICAL & SURGICAL HISTORY:  Myeloproliferative disorder  Hypertension  PE (pulmonary thromboembolism):   Tremor  Vestibular disequilibrium  S/P hysterectomy      REVIEW OF SYSTEMS      General:	    Skin/Breast:  	  Ophthalmologic:  	  ENMT:	    Respiratory and Thorax:  	  Cardiovascular:	    Gastrointestinal:	    Genitourinary:	    Musculoskeletal:	    Neurological:	    Psychiatric:	    Hematology/Lymphatics:	    Endocrine:	    Allergic/Immunologic:	    MEDICATIONS  (STANDING):  aspirin enteric coated 81 milliGRAM(s) Oral daily  rivaroxaban 20 milliGRAM(s) Oral every 24 hours  sodium chloride 0.9% Bolus 1000 milliLiter(s) IV Bolus once  sodium chloride 0.9%. 1000 milliLiter(s) (80 mL/Hr) IV Continuous <Continuous>    MEDICATIONS  (PRN):  acetaminophen   Tablet 650 milliGRAM(s) Oral every 6 hours PRN For Temp greater than 38.5 C (101.3 F)      Allergies    No Known Allergies    Intolerances        SOCIAL HISTORY:    FAMILY HISTORY:  No pertinent family history in first degree relatives      Vital Signs Last 24 Hrs  T(C): 37.9 (2018 20:00), Max: 40.6 (2018 16:15)  T(F): 100.2 (2018 20:00), Max: 105.1 (2018 16:15)  HR: 84 (2018 20:00) (76 - 120)  BP: 110/66 (2018 20:00) (110/66 - 172/92)  BP(mean): 84 (2018 20:00) (74 - 103)  RR: 26 (2018 20:00) (16 - 26)  SpO2: 98% (2018 20:00) (92% - 99%)    PHYSICAL EXAM:      Constitutional:    Eyes:    ENMT:    Neck:    Breasts:    Back:    Respiratory:    Cardiovascular:    Gastrointestinal:    Genitourinary:    Rectal:    Extremities:    Vascular:    Neurological:    Skin:    Lymph Nodes:    Musculoskeletal:    Psychiatric:        LABS:                        12.1   20.1  )-----------( 259      ( 2018 12:00 )             37.5     07-26    138  |  98  |  26<H>  ----------------------------<  149<H>  3.5   |  22  |  1.02    Ca    9.4      2018 12:00    TPro  7.0  /  Alb  3.9  /  TBili  1.4<H>  /  DBili  0.2  /  AST  83<H>  /  ALT  55<H>  /  AlkPhos  80  07-26    PT/INR - ( 2018 12:01 )   PT: 31.5 sec;   INR: 2.78          PTT - ( 2018 12:01 )  PTT:42.9 sec  Urinalysis Basic - ( 2018 13:33 )    Color: Yellow / Appearance: SL Cloudy / S.025 / pH: x  Gluc: x / Ketone: NEGATIVE  / Bili: Negative / Urobili: 0.2 E.U./dL   Blood: x / Protein: >=300 mg/dL / Nitrite: NEGATIVE   Leuk Esterase: Moderate / RBC: 5-10 /HPF / WBC Many /HPF   Sq Epi: x / Non Sq Epi: 5-10 /HPF / Bacteria: Present /HPF        RADIOLOGY & ADDITIONAL STUDIES: HPI:  78 year old female with history of hypertension, myeloproliferative disorder, DVT/PE (2015), chronic UTIs and hydronephrosis who presented with 2 days of generalized fatigue and fevers. Patient states that for the past 2 days she has had fevers, decreased appetite, increased thirst, nausea, and increased urination. She has a history of hydronephrosis and chronic UTIs - most recent admit to [facility] with pan sensitive klebsiella. Today, her health care proxy and her friend noticed that the patient's skin felt warm to touch, and in conjunction with the patients other symptoms, brought her to the emergency department. The patient denies headache, dizziness, chest pain, SOB, vomiting, and leg pain. She does report feeling cold, having a dry mouth, and having shivers.     In the emergency department, patient was noted to have a positive UA. She was given 1L NS bolus, started on ceftriaxone, and the patient was transferred to the floor. Once on the regional medical floor, patient's vitals were temp 105.1 (rectally), , /92, RR 24, O2 sat 92% on RA. Rapid response called. Patient was given 2L NS bolus, tylenol x2 for fever. Resendiz was placed with evacuation of cloudy appearing urine. Bedside US with bilateral hydronephrosis, liver appeared WNL. Labs notable for WBC 20.1, bands of 11, Lactate of 3.6, and mild transaminitis. ABG with resp alkalosis. Lactate cleared after NS boluses. Patient was then transferred to MICU for further management. (2018 17:13)      PAST MEDICAL & SURGICAL HISTORY:  Myeloproliferative disorder  Hypertension  PE (pulmonary thromboembolism): 2015  Tremor  Vestibular disequilibrium  S/P hysterectomy      REVIEW OF SYSTEMS  Otherwise negative    MEDICATIONS  (STANDING):  aspirin enteric coated 81 milliGRAM(s) Oral daily  rivaroxaban 20 milliGRAM(s) Oral every 24 hours  sodium chloride 0.9% Bolus 1000 milliLiter(s) IV Bolus once  sodium chloride 0.9%. 1000 milliLiter(s) (80 mL/Hr) IV Continuous <Continuous>    MEDICATIONS  (PRN):  acetaminophen   Tablet 650 milliGRAM(s) Oral every 6 hours PRN For Temp greater than 38.5 C (101.3 F)      Allergies    No Known Allergies      SOCIAL HISTORY:  Lives at home    FAMILY HISTORY:  No pertinent family history in first degree relatives    EXAM  Vital Signs Last 24 Hrs  T(C): 37.9 (2018 20:00), Max: 40.6 (2018 16:15)  T(F): 100.2 (2018 20:00), Max: 105.1 (2018 16:15)  HR: 84 (2018 20:00) (76 - 120)  BP: 110/66 (2018 20:00) (110/66 - 172/92)  BP(mean): 84 (2018 20:00) (74 - 103)  RR: 26 (2018 20:00) (16 - 26)  SpO2: 98% (2018 20:00) (92% - 99%)  On NC o2  Groggy but awakens and responds appropriately  Neck supple  Pale  No rash  RRR  Chest with poor inspiratory effort  Abd sof Maggie  LE without overt edema  Resendiz in place      LABS:                        12.1   20.1  )-----------( 259      ( 2018 12:00 )             37.5       Manual Differential (18 @ 12:00)    Polychromasia: Slight    Ovalocytes: Slight    Anisocytosis: Slight    Red Cell Morphology: Abnormal    Platelet Morphology: Abnormal    Large Platelets: Present    Manual Smear Verification: Performed    Band Neutrophils %: 11.0: TYPE:(C=Critical, N=Notification, A=Abnormal) C  TESTS: _band,blast  DATE/TIME CALLED: _18 14:23  CALLED TO: RON FLYNN  READ BACK (2 Patient Identifiers)(Y/N): _Y  READ BACK VALUES (Y/N): _Y  CALLED BY: _SG %    Metamyelocytes %: 7.0 %    Myelocytes %: 1.0 %    Blasts %: 1.0 %            138  |  98  |  26<H>  ----------------------------<  149<H>  3.5   |  22  |  1.02    Ca    9.4      2018 12:00    TPro  7.0  /  Alb  3.9  /  TBili  1.4<H>  /  DBili  0.2  /  AST  83<H>  /  ALT  55<H>  /  AlkPhos  80      PT/INR - ( 2018 12:01 )   PT: 31.5 sec;   INR: 2.78          PTT - ( 2018 12:01 )  PTT:42.9 sec  Urinalysis Basic - ( 2018 13:33 )    Color: Yellow / Appearance: SL Cloudy / S.025 / pH: x  Gluc: x / Ketone: NEGATIVE  / Bili: Negative / Urobili: 0.2 E.U./dL   Blood: x / Protein: >=300 mg/dL / Nitrite: NEGATIVE   Leuk Esterase: Moderate / RBC: 5-10 /HPF / WBC Many /HPF   Sq Epi: x / Non Sq Epi: 5-10 /HPF / Bacteria: Present /HPF      Cultures pending      RADIOLOGY & ADDITIONAL STUDIES:    < from: Xray Chest 1 View-PORTABLE IMMEDIATE (18 @ 12:26) >    EXAM:  XR CHEST PORTABLE IMMED 1V                          PROCEDURE DATE:  2018          INTERPRETATION:    CLINICAL STATEMENT: Generalized weakness.     TECHNIQUE: Single portable AP chest radiograph.    COMPARISON: 2018     FINDINGS:    Lungs/Pleura: New nodular opacities at the left lung base. No focal   consolidation, pleural effusion, or pneumothorax.    Mediastinum: Cardiomediastinal silhouette is not enlarged.    Bones/Soft tissues: Levoscoliosis, unchanged.     IMPRESSION:    Nodular opacities at the left lung base which may be related to   atelectasis, infection, or inflammation.

## 2018-07-26 NOTE — CONSULT NOTE ADULT - ASSESSMENT
Sepsis most likely secondary to urinary tract infection/pyelonephritis  Suspect bacteremia  Patient immunocompromised      RECOMMEND  Would cover broadly for MDR organism  Continue Pip-Tazo but if febrile/unstable or otherwise worsened, would cover with Meropenem and give Daptomycin  Renal/Bladder imaging

## 2018-07-26 NOTE — H&P ADULT - NSHPPHYSICALEXAM_GEN_ALL_CORE
.  VITAL SIGNS:  T(C): 38.4 (07-26-18 @ 18:30), Max: 40.6 (07-26-18 @ 16:15)  T(F): 101.2 (07-26-18 @ 18:30), Max: 105.1 (07-26-18 @ 16:15)  HR: 102 (07-26-18 @ 18:00) (76 - 120)  BP: 139/87 (07-26-18 @ 17:30) (113/79 - 172/92)  BP(mean): 103 (07-26-18 @ 17:30) (103 - 103)  RR: 22 (07-26-18 @ 18:00) (16 - 24)  SpO2: 99% (07-26-18 @ 18:00) (92% - 99%)  Wt(kg): --    PHYSICAL EXAM:    Constitutional:  resting comfortably in bed; NAD, rigors, slow speaking  Head: NC/AT  Eyes: PERRL, EOMI, anicteric sclera  ENT: dry mucus membranes  Neck: supple; no JVD or thyromegaly, flat neck veins  Respiratory: CTA B/L; no W/R/R, no retractions  Cardiac: +S1/S2; RRR; 3/6 holosystolic heard best at the R second intercostal space   Gastrointestinal: abdomen soft, tender to palpation in RUQ, +BSx4  Genitourinary: hutson in place  Back: no pain to palpation in either flank  Extremities: WWP, no clubbing or cyanosis; no peripheral edema  Musculoskeletal: NROM x4; no joint swelling, tenderness or erythema  Vascular: 2+ radial and DP pulses B/L  Dermatologic: skin warm, dry and intact; no rashes, wounds, or scars  Lymphatic: no submandibular or cervical LAD  Neurologic: AAOx2 (Knew July, but thought it is 2017); CNII-XII grossly intact; no focal deficits  Psychiatric: affect and characteristics of appearance, verbalizations, behaviors are appropriate

## 2018-07-26 NOTE — PROVIDER CONTACT NOTE (OTHER) - ACTION/TREATMENT ORDERED:
Iv fluids, continue Iv antibiotics, IV Tylenol, labs drawn, rapid response called, patient transferred to ICU.

## 2018-07-26 NOTE — PATIENT PROFILE ADULT. - ABILITY TO HEAR (WITH HEARING AID OR HEARING APPLIANCE IF NORMALLY USED):
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

## 2018-07-26 NOTE — ED ADULT TRIAGE NOTE - CHIEF COMPLAINT QUOTE
pt c/o generalized weakness and feeling nauseous for the past 2 days. able to ambulate with walker. denies cp fever chills, dizziness, falls or inquiries. pt c/o generalized weakness and feeling nauseous for the past 2 days. able to ambulate with walker. denies cp fever chills, dizziness, falls or inquiries. no facial droop noted

## 2018-07-26 NOTE — ED ADULT NURSE NOTE - OBJECTIVE STATEMENT
Pt presents to ER c/o generalized weakness and lack of appetite x3-4 days. Pt reports needing to use a walker to ambulate around her home when normally she would just use the walker when going out. Pt has no other complaints. Family at bedside. Will monitor and maintain safety.

## 2018-07-26 NOTE — H&P ADULT - ASSESSMENT
79 yo F with hx of hypertension, Myeloproliferative disorder, DVT/PE (2015), previous hydronephrosis 2/2 obstruction who presents with weakness for 2 days, found to have sepsis 2/2 to UTI.     INFECTIOUS DISEASE  #Severe sepsis  Patient found to have UTI with UA positive for leuk esterase, WBCs. Patient with 4/4 SIRS and lactate of 3.6. Patient has history of bilateral hydronephrosis colonized with pan sensitive klebsiella 2/2 outflow obstruction. Likely urosepsis.  - patient given 1 dose ceftriaxone in ED  - switched to zosyn 4.5gm q6h  - Lactate cleared s/p 3L NS  - c/w NS @80 cc/hr  - tylenol PRN for fever greater than 100.4  - f/u urine cultures  - f/u blood cultures  - f/u pelvic US and RUQ US  - monitor I/Os    RENAL  #Patient with hydronephrosis, UTI. Has history of hydronephrosis with UTIs colonized with klebsiella  - bedside US with bilateral hydronephrosis  - f/u formal RUQ US and pelvic US  - Likey with urosepsis  - hutson in place    HEME  #Myelodysplastic syndrome  Patient with 12 year history of myelodysplastic syndrome. Followed by Dr. Resendiz.    #DVT/PE  History of DVT/PE in 2015  - continue home xarelto    CARDIOVASCULAR  #hx of hypertension  - patient on norvasc at home  - will hold for now given sepsis    GASTROINTESTINAL  #transaminitis  Patient with mild transaminitis on admission along with 2/10 RUQ pain on exam.  - trend transaminases    PULMONARY  #      Neurology  #    ENDOCRINE  #    FLUIDS/ELECTROLYTES/NUTRITION  -IVF: NS@80 cc/hr  -Monitor, Replete to K>4 and Mg>2  -Diet: DASH/TLC    PROPHYLAXIS  -DVT: xarelto  -GI: none    DISPO  CODE STATUS 77 yo F with hx of hypertension, Myeloproliferative disorder, DVT/PE (2015), previous hydronephrosis 2/2 obstruction who presents with weakness for 2 days, found to have sepsis 2/2 to UTI.     INFECTIOUS DISEASE  #Severe sepsis  Patient found to have UTI with UA positive for leuk esterase, WBCs. Patient with 4/4 SIRS and lactate of 3.6. Patient has history of bilateral hydronephrosis colonized with pan sensitive klebsiella 2/2 outflow obstruction. Likely urosepsis.  - patient given 1 dose ceftriaxone in ED  - switched to zosyn 4.5gm q6h as has been treated with multiple UTIs in past  - Lactate cleared s/p 3L NS  - c/w NS @80 cc/hr  - tylenol PRN for fever greater than 100.4  - f/u urine cultures  - f/u blood cultures  - f/u pelvic US and RUQ US  - monitor I/Os    RENAL  #Patient with hydronephrosis, UTI. Has history of hydronephrosis with UTIs colonized with klebsiella  - bedside US with bilateral hydronephrosis  - f/u formal RUQ US and pelvic US  - Likey with urosepsis  - hutson in place    HEME  #Myelodysplastic syndrome  Patient with 12 year history of myelodysplastic syndrome. Followed by Dr. Resendiz.    #DVT/PE  History of DVT/PE in 2015  - continue home xarelto    CARDIOVASCULAR  #hx of hypertension  - patient on norvasc at home  - will hold for now given sepsis    GASTROINTESTINAL  #transaminitis  Patient with mild transaminitis on admission along with 2/10 RUQ pain on exam.  - trend transaminases    PULMONARY  #      Neurology  #    ENDOCRINE  #    FLUIDS/ELECTROLYTES/NUTRITION  -IVF: NS@80 cc/hr  -Monitor, Replete to K>4 and Mg>2  -Diet: DASH/TLC    PROPHYLAXIS  -DVT: xarelto  -GI: none    DISPO  CODE STATUS 79 yo F with hx of hypertension, Myeloproliferative disorder, DVT/PE (2015), previous hydronephrosis 2/2 obstruction who presents with weakness for 2 days, found to have severe sepsis 2/2 to UTI.     INFECTIOUS DISEASE  #Severe sepsis  Patient found to have UTI with UA positive for leuk esterase, WBCs. Patient with 4/4 SIRS and lactate of 3.6. Patient has history of bilateral hydronephrosis colonized with pan sensitive klebsiella 2/2 outflow obstruction. Likely severe sepsis 2/2 to UTI.  - patient given 1 dose ceftriaxone in ED  - switched to zosyn 4.5gm q6h as has been treated with multiple UTIs in past  - Lactate cleared s/p 3L NS  - c/w NS @80 cc/hr  - tylenol PRN for fever greater than 100.4  - f/u urine cultures  - f/u blood cultures  - f/u pelvic US and RUQ US  - monitor I/Os    RENAL  #Patient with hydronephrosis, UTI. Has history of hydronephrosis with UTIs colonized with klebsiella  - bedside US with bilateral hydronephrosis  - f/u formal RUQ US and pelvic US  - Likey with urosepsis  - hutson in place    #Mild Rhabdo  CK 3298 on admission  - continue to follow  - Will hold home atorvastatin     HEME  #Myelodysplastic syndrome  Patient with 12 year history of myelodysplastic syndrome. Followed by Dr. Resendiz.    #DVT/PE  History of DVT/PE in 2015  - continue home xarelto    CARDIOVASCULAR  #hx of hypertension  - patient on norvasc at home  - will hold for now given sepsis    GASTROINTESTINAL  #transaminitis  Patient with mild transaminitis on admission along with 2/10 RUQ pain on exam.  - trend transaminases    PULMONARY  #Currently with good O2 saturation on 4L NC  - no intervention necessary    Neurology  #AAx2 on admission  - likely 2/2 to sepsis  - will continue to reassess      FLUIDS/ELECTROLYTES/NUTRITION  -IVF: NS@80 cc/hr  -Monitor, Replete to K>4 and Mg>2  -Diet: soft diet    PROPHYLAXIS  -DVT: xarelto  -GI: none    DISPO [place]  CODE STATUS: DNR

## 2018-07-26 NOTE — ED PROVIDER NOTE - OBJECTIVE STATEMENT
77 yo f with hx of MDS PE /DVT on ASA and Xarelto with generalized fatigue  x 2 days - slight nausea dec po intake  no vomiting or diarrhea  no cough or fevers or chills  no leg swelling or calf tenderness   no syncope or palpitations

## 2018-07-26 NOTE — ED ADULT NURSE NOTE - CHIEF COMPLAINT QUOTE
pt c/o generalized weakness and feeling nauseous for the past 2 days. able to ambulate with walker. denies cp fever chills, dizziness, falls or inquiries. no facial droop noted

## 2018-07-27 DIAGNOSIS — M62.82 RHABDOMYOLYSIS: ICD-10-CM

## 2018-07-27 DIAGNOSIS — R63.8 OTHER SYMPTOMS AND SIGNS CONCERNING FOOD AND FLUID INTAKE: ICD-10-CM

## 2018-07-27 DIAGNOSIS — D46.9 MYELODYSPLASTIC SYNDROME, UNSPECIFIED: ICD-10-CM

## 2018-07-27 DIAGNOSIS — A41.9 SEPSIS, UNSPECIFIED ORGANISM: ICD-10-CM

## 2018-07-27 DIAGNOSIS — R74.0 NONSPECIFIC ELEVATION OF LEVELS OF TRANSAMINASE AND LACTIC ACID DEHYDROGENASE [LDH]: ICD-10-CM

## 2018-07-27 DIAGNOSIS — N13.30 UNSPECIFIED HYDRONEPHROSIS: ICD-10-CM

## 2018-07-27 DIAGNOSIS — I10 ESSENTIAL (PRIMARY) HYPERTENSION: ICD-10-CM

## 2018-07-27 DIAGNOSIS — Z86.718 PERSONAL HISTORY OF OTHER VENOUS THROMBOSIS AND EMBOLISM: ICD-10-CM

## 2018-07-27 DIAGNOSIS — N39.0 URINARY TRACT INFECTION, SITE NOT SPECIFIED: ICD-10-CM

## 2018-07-27 LAB
ALT FLD-CCNC: 63 U/L — HIGH (ref 10–45)
ANION GAP SERPL CALC-SCNC: 13 MMOL/L — SIGNIFICANT CHANGE UP (ref 5–17)
AST SERPL-CCNC: 86 U/L — HIGH (ref 10–40)
BILIRUB DIRECT SERPL-MCNC: 0.3 MG/DL — HIGH (ref 0–0.2)
BILIRUB INDIRECT FLD-MCNC: 1 MG/DL — SIGNIFICANT CHANGE UP (ref 0.2–1)
BILIRUB SERPL-MCNC: 1.3 MG/DL — HIGH (ref 0.2–1.2)
BUN SERPL-MCNC: 14 MG/DL — SIGNIFICANT CHANGE UP (ref 7–23)
CALCIUM SERPL-MCNC: 9.1 MG/DL — SIGNIFICANT CHANGE UP (ref 8.4–10.5)
CHLORIDE SERPL-SCNC: 105 MMOL/L — SIGNIFICANT CHANGE UP (ref 96–108)
CK SERPL-CCNC: 1895 U/L — HIGH (ref 25–170)
CO2 SERPL-SCNC: 25 MMOL/L — SIGNIFICANT CHANGE UP (ref 22–31)
CREAT SERPL-MCNC: 0.8 MG/DL — SIGNIFICANT CHANGE UP (ref 0.5–1.3)
GLUCOSE SERPL-MCNC: 123 MG/DL — HIGH (ref 70–99)
HAPTOGLOB SERPL-MCNC: 204 MG/DL — HIGH (ref 34–200)
HCT VFR BLD CALC: 34.4 % — LOW (ref 34.5–45)
HGB BLD-MCNC: 10.9 G/DL — LOW (ref 11.5–15.5)
LDH SERPL L TO P-CCNC: 450 U/L — HIGH (ref 50–242)
MAGNESIUM SERPL-MCNC: 2.2 MG/DL — SIGNIFICANT CHANGE UP (ref 1.6–2.6)
MCHC RBC-ENTMCNC: 30.7 PG — SIGNIFICANT CHANGE UP (ref 27–34)
MCHC RBC-ENTMCNC: 31.7 G/DL — LOW (ref 32–36)
MCV RBC AUTO: 96.9 FL — SIGNIFICANT CHANGE UP (ref 80–100)
PHOSPHATE SERPL-MCNC: 3.1 MG/DL — SIGNIFICANT CHANGE UP (ref 2.5–4.5)
PLATELET # BLD AUTO: 170 K/UL — SIGNIFICANT CHANGE UP (ref 150–400)
POTASSIUM SERPL-MCNC: 3.6 MMOL/L — SIGNIFICANT CHANGE UP (ref 3.5–5.3)
POTASSIUM SERPL-SCNC: 3.6 MMOL/L — SIGNIFICANT CHANGE UP (ref 3.5–5.3)
RBC # BLD: 3.55 M/UL — LOW (ref 3.8–5.2)
RBC # FLD: 16.8 % — SIGNIFICANT CHANGE UP (ref 10.3–16.9)
SODIUM SERPL-SCNC: 143 MMOL/L — SIGNIFICANT CHANGE UP (ref 135–145)
WBC # BLD: 12.9 K/UL — HIGH (ref 3.8–10.5)
WBC # FLD AUTO: 12.9 K/UL — HIGH (ref 3.8–10.5)

## 2018-07-27 PROCEDURE — 74176 CT ABD & PELVIS W/O CONTRAST: CPT | Mod: 26

## 2018-07-27 PROCEDURE — 99233 SBSQ HOSP IP/OBS HIGH 50: CPT | Mod: GC

## 2018-07-27 PROCEDURE — 76770 US EXAM ABDO BACK WALL COMP: CPT | Mod: 26

## 2018-07-27 RX ORDER — ACETAMINOPHEN 500 MG
1000 TABLET ORAL ONCE
Qty: 0 | Refills: 0 | Status: COMPLETED | OUTPATIENT
Start: 2018-07-27 | End: 2018-07-27

## 2018-07-27 RX ORDER — PIPERACILLIN AND TAZOBACTAM 4; .5 G/20ML; G/20ML
3.38 INJECTION, POWDER, LYOPHILIZED, FOR SOLUTION INTRAVENOUS EVERY 6 HOURS
Qty: 0 | Refills: 0 | Status: DISCONTINUED | OUTPATIENT
Start: 2018-07-27 | End: 2018-07-28

## 2018-07-27 RX ADMIN — PIPERACILLIN AND TAZOBACTAM 200 GRAM(S): 4; .5 INJECTION, POWDER, LYOPHILIZED, FOR SOLUTION INTRAVENOUS at 00:11

## 2018-07-27 RX ADMIN — RIVAROXABAN 20 MILLIGRAM(S): KIT at 10:09

## 2018-07-27 RX ADMIN — PIPERACILLIN AND TAZOBACTAM 200 GRAM(S): 4; .5 INJECTION, POWDER, LYOPHILIZED, FOR SOLUTION INTRAVENOUS at 19:44

## 2018-07-27 RX ADMIN — Medication 650 MILLIGRAM(S): at 12:18

## 2018-07-27 RX ADMIN — PIPERACILLIN AND TAZOBACTAM 200 GRAM(S): 4; .5 INJECTION, POWDER, LYOPHILIZED, FOR SOLUTION INTRAVENOUS at 12:18

## 2018-07-27 RX ADMIN — SODIUM CHLORIDE 80 MILLILITER(S): 9 INJECTION INTRAMUSCULAR; INTRAVENOUS; SUBCUTANEOUS at 07:13

## 2018-07-27 RX ADMIN — PIPERACILLIN AND TAZOBACTAM 200 GRAM(S): 4; .5 INJECTION, POWDER, LYOPHILIZED, FOR SOLUTION INTRAVENOUS at 07:07

## 2018-07-27 RX ADMIN — Medication 81 MILLIGRAM(S): at 10:09

## 2018-07-27 NOTE — PROGRESS NOTE ADULT - PROBLEM SELECTOR PLAN 6
History of DVT/PE in 2015  - continue home xarelto History of DVT/PE in 2015  - Continue home xarelto

## 2018-07-27 NOTE — PROGRESS NOTE ADULT - ASSESSMENT
77 yo F with hx of hypertension, Myeloproliferative disorder, DVT/PE (2015), previous hydronephrosis 2/2 obstruction who presents with weakness for 2 days, found to have severe sepsis 2/2 to UTI.

## 2018-07-27 NOTE — PROGRESS NOTE ADULT - SUBJECTIVE AND OBJECTIVE BOX
TRANSFER [facility] TO [facility]    78 year old female with history of hypertension, myeloproliferative disorder, DVT/PE (2015), chronic UTIs and hydronephrosis who presented with 2 days of generalized fatigue and fevers. Patient states that for the past 2 days she has had fevers, decreased appetite, increased thirst, nausea, and increased urination. She has a history of hydronephrosis and chronic UTIs with pan sensitive klebsiella. Today, her health care proxy and her friend noticed that the patient's skin felt warm to touch, and in conjunction with the patients other symptoms, brought her to the emergency department. The patient denies headache, dizziness, chest pain, SOB, vomiting, and leg pain. She does report feeling cold, having a dry mouth, and having shivers.     In the emergency department, patient was noted to have a positive UTI. She was given 1L NS bolus, started on ceftriaxone, and the patient was transferred to the floor. Once on the regional medical floor, patient's vitals were temp 105.1 (rectally), , /92, RR 24, O2 sat 92% on RA. Rapid response called. Patient was given 2L NS bolus, tylenol x2 for fever. Hutson was placed with evacuation of cloudy appearing urine. Bedside US with bilateral hydronephrosis, liver appeared WNL. Labs notable for WBC 20.1, bands of 11, Lactate of 3.6, and mild transaminitis. ABG with resp alkalosis. Lactate cleared after NS boluses. Patient was then transferred to MICU for further management. In MICU patient given IVF resuscitation and srrted on Zosyn. Patient hemodynamically stable for transfer to [facility].       VITAL SIGNS:  Vital Signs Last 24 Hrs  T(C): 38.2 (2018 09:45), Max: 40.6 (2018 16:15)  T(F): 100.8 (2018 09:45), Max: 105.1 (2018 16:15)  HR: 72 (2018 10:00) (54 - 120)  BP: 123/75 (2018 10:00) (101/58 - 172/92)  BP(mean): 91 (2018 10:00) (74 - 123)  RR: 21 (2018 10:00) (15 - 52)  SpO2: 97% (2018 10:00) (92% - 99%)    PHYSICAL EXAM:    General: WDWN, rigors  HEENT: NC/AT; PERRL, anicteric sclera; MMM  Neck: supple, no jvd  Cardiovascular: +S1/S2; RRR; 3/6 holosystolic heard best at the R second intercostal space  Respiratory: CTA B/L; no W/R/R  Gastrointestinal: soft, tender to RUQ; non-distended; +BSx4  Genitourinary: hutson in place  Extremities: WWP; no edema, clubbing or cyanosis  Vascular: 2+ radial, DP/PT pulses B/L  Neurological: AAOx3; no focal deficits    MEDICATIONS:  MEDICATIONS  (STANDING):  aspirin enteric coated 81 milliGRAM(s) Oral daily  piperacillin/tazobactam IVPB. 4.5 Gram(s) IV Intermittent every 6 hours  rivaroxaban 20 milliGRAM(s) Oral every 24 hours  sodium chloride 0.9% Bolus 1000 milliLiter(s) IV Bolus once  sodium chloride 0.9%. 1000 milliLiter(s) (80 mL/Hr) IV Continuous <Continuous>    MEDICATIONS  (PRN):  acetaminophen   Tablet 650 milliGRAM(s) Oral every 6 hours PRN For Temp greater than 38.5 C (101.3 F)      ALLERGIES:  Allergies    No Known Allergies    Intolerances        LABS:                        10.9   12.9  )-----------( 170      ( 2018 07:34 )             34.4     07-27    143  |  105  |  14  ----------------------------<  123<H>  3.6   |  25  |  0.80    Ca    9.1      2018 07:34  Phos  3.1     -  Mg     2.2     -    TPro  x   /  Alb  x   /  TBili  1.3<H>  /  DBili  0.3<H>  /  AST  86<H>  /  ALT  63<H>  /  AlkPhos  x       PT/INR - ( 2018 12:01 )   PT: 31.5 sec;   INR: 2.78          PTT - ( 2018 12:01 )  PTT:42.9 sec  Urinalysis Basic - ( 2018 13:33 )    Color: Yellow / Appearance: SL Cloudy / S.025 / pH: x  Gluc: x / Ketone: NEGATIVE  / Bili: Negative / Urobili: 0.2 E.U./dL   Blood: x / Protein: >=300 mg/dL / Nitrite: NEGATIVE   Leuk Esterase: Moderate / RBC: 5-10 /HPF / WBC Many /HPF   Sq Epi: x / Non Sq Epi: 5-10 /HPF / Bacteria: Present /HPF      CAPILLARY BLOOD GLUCOSE      POCT Blood Glucose.: 127 mg/dL (2018 16:27)      RADIOLOGY & ADDITIONAL TESTS: Reviewed.    ASSESSMENT:    PLAN:

## 2018-07-27 NOTE — CONSULT NOTE ADULT - SUBJECTIVE AND OBJECTIVE BOX
CONSULT    Patient is a 78y old  Female who presents with a chief complaint of I just felt very, very weak. I all of a sudden needed to use the walker to get around my apartment. I hadn't been using it. (26 Jul 2018 19:50)    HPI:  78 year old female with history of hypertension, myeloproliferative disorder, DVT/PE (2015), chronic UTIs and hydronephrosis who presented with 2 days of generalized fatigue and fevers. Patient states that for the past 2 days she has had fevers, decreased appetite, increased thirst, nausea, and increased urination. She has a history of hydronephrosis and chronic UTIs with pan sensitive klebsiella. Today, her health care proxy and her friend noticed that the patient's skin felt warm to touch, and in conjunction with the patients other symptoms, brought her to the emergency department. The patient denies headache, dizziness, chest pain, SOB, vomiting, and leg pain. She does report feeling cold, having a dry mouth, and having shivers.     In the emergency department, patient was noted to have a positive UTI. She was given 1L NS bolus, started on ceftriaxone, and the patient was transferred to the floor. Once on the regional medical floor, patient's vitals were temp 105.1 (rectally), , /92, RR 24, O2 sat 92% on RA. Rapid response called. Patient was given 2L NS bolus, tylenol x2 for fever. Resendiz was placed with evacuation of cloudy appearing urine. Bedside US with bilateral hydronephrosis, liver appeared WNL. Labs notable for WBC 20.1, bands of 11, Lactate of 3.6, and mild transaminitis. ABG with resp alkalosis. Lactate cleared after NS boluses. Patient was then transferred to MICU for further management. (26 Jul 2018 17:13)     Addendum: Dr. Yoon consulted for evaluation of chronic UTIs and B/L hydro.  PA examined pt at bedside. Pt agreed to recent loss of appetite but denied all  hx      Vital Signs Last 24 Hrs  T(C): 37.3 (27 Jul 2018 14:38), Max: 39.4 (26 Jul 2018 17:30)  T(F): 99.1 (27 Jul 2018 14:38), Max: 103 (26 Jul 2018 17:30)  HR: 73 (27 Jul 2018 14:38) (54 - 102)  BP: 120/75 (27 Jul 2018 14:38) (101/58 - 172/76)  BP(mean): 102 (27 Jul 2018 13:00) (74 - 123)  RR: 20 (27 Jul 2018 14:38) (15 - 52)  SpO2: 93% (27 Jul 2018 14:38) (93% - 99%)  I&O's Summary    26 Jul 2018 07:01  -  27 Jul 2018 07:00  --------------------------------------------------------  IN: 1185 mL / OUT: 2120 mL / NET: -935 mL    27 Jul 2018 07:01  -  27 Jul 2018 16:44  --------------------------------------------------------  IN: 740 mL / OUT: 640 mL / NET: 100 mL        PE:  Gen:  Abd:  :  FERMIN:    LABS:                        10.9   12.9  )-----------( 170      ( 27 Jul 2018 07:34 )             34.4     07-27    143  |  105  |  14  ----------------------------<  123<H>  3.6   |  25  |  0.80    Ca    9.1      27 Jul 2018 07:34  Phos  3.1     07-27  Mg     2.2     07-27    TPro  x   /  Alb  x   /  TBili  1.3<H>  /  DBili  0.3<H>  /  AST  86<H>  /  ALT  63<H>  /  AlkPhos  x   07-27    PT/INR - ( 26 Jul 2018 12:01 )   PT: 31.5 sec;   INR: 2.78          PTT - ( 26 Jul 2018 12:01 )  PTT:42.9 sec  Cultures  Culture Results:   No growth at 12 hours (07-26 @ 16:17)  Culture Results:   No growth at 12 hours (07-26 @ 16:17)  Culture Results:   >100,000 CFU/ml Gram Negative Rods  Identification and susceptibility to follow. (07-26 @ 14:38)      A/P  CONSULT    Patient is a 78y old  Female who presents with a chief complaint of I just felt very, very weak. I all of a sudden needed to use the walker to get around my apartment. I hadn't been using it. (26 Jul 2018 19:50)    HPI:  78 year old female with history of hypertension, myeloproliferative disorder, DVT/PE (2015), chronic UTIs and hydronephrosis who presented with 2 days of generalized fatigue and fevers. Patient states that for the past 2 days she has had fevers, decreased appetite, increased thirst, nausea, and increased urination. She has a history of hydronephrosis and chronic UTIs with pan sensitive klebsiella. Today, her health care proxy and her friend noticed that the patient's skin felt warm to touch, and in conjunction with the patients other symptoms, brought her to the emergency department. The patient denies headache, dizziness, chest pain, SOB, vomiting, and leg pain. She does report feeling cold, having a dry mouth, and having shivers.     In the emergency department, patient was noted to have a positive UTI. She was given 1L NS bolus, started on ceftriaxone, and the patient was transferred to the floor. Once on the regional medical floor, patient's vitals were temp 105.1 (rectally), , /92, RR 24, O2 sat 92% on RA. Rapid response called. Patient was given 2L NS bolus, tylenol x2 for fever. Resendiz was placed with evacuation of cloudy appearing urine. Bedside US with bilateral hydronephrosis, liver appeared WNL. Labs notable for WBC 20.1, bands of 11, Lactate of 3.6, and mild transaminitis. ABG with resp alkalosis. Lactate cleared after NS boluses. Patient was then transferred to MICU for further management. (26 Jul 2018 17:13)     Addendum: Dr. Yoon consulted for evaluation of chronic UTIs and B/L hydro.  PA examined pt at bedside. Pt agreed to recent loss of appetite but denied any  history or symptoms. Denies ever having a UTI, hematuria, B/L hydro, LUTS.      Vital Signs Last 24 Hrs  T(C): 37.3 (27 Jul 2018 14:38), Max: 39.4 (26 Jul 2018 17:30)  T(F): 99.1 (27 Jul 2018 14:38), Max: 103 (26 Jul 2018 17:30)  HR: 73 (27 Jul 2018 14:38) (54 - 102)  BP: 120/75 (27 Jul 2018 14:38) (101/58 - 172/76)  BP(mean): 102 (27 Jul 2018 13:00) (74 - 123)  RR: 20 (27 Jul 2018 14:38) (15 - 52)  SpO2: 93% (27 Jul 2018 14:38) (93% - 99%)  I&O's Summary    26 Jul 2018 07:01  -  27 Jul 2018 07:00  --------------------------------------------------------  IN: 1185 mL / OUT: 2120 mL / NET: -935 mL    27 Jul 2018 07:01  -  27 Jul 2018 16:44  --------------------------------------------------------  IN: 740 mL / OUT: 640 mL / NET: 100 mL        PE:  Gen: NAD  Abd: diffusely ttp (+RUQ), no SP tenderness, soft, no guarding, no rebound  B/L CVA-ttp  : FC intact and draining well (clear, yellow)    LABS:                        10.9   12.9  )-----------( 170      ( 27 Jul 2018 07:34 )             34.4     07-27    143  |  105  |  14  ----------------------------<  123<H>  3.6   |  25  |  0.80    Ca    9.1      27 Jul 2018 07:34  Phos  3.1     07-27  Mg     2.2     07-27    TPro  x   /  Alb  x   /  TBili  1.3<H>  /  DBili  0.3<H>  /  AST  86<H>  /  ALT  63<H>  /  AlkPhos  x   07-27    PT/INR - ( 26 Jul 2018 12:01 )   PT: 31.5 sec;   INR: 2.78          PTT - ( 26 Jul 2018 12:01 )  PTT:42.9 sec  Cultures  Culture Results:   No growth at 12 hours (07-26 @ 16:17)  Culture Results:   No growth at 12 hours (07-26 @ 16:17)  Culture Results:   >100,000 CFU/ml Gram Negative Rods  Identification and susceptibility to follow. (07-26 @ 14:38)      A/P  78F presented with weakness x 2d found to have severe sepsis likely 2/2 UTI. Case d/w Dr. Yoon who recommended STAT a/p CT without contrast (ordered by resident). Pt still not brought for scan, CT called by YAN STEVE and primary team to expedite and told there is approximately an hour wait still. Dr. Yoon aware and recommends NM renal scan also be ordered.  -c/w FC  -c/w abx  -c/w monitor of UO  -c/w monitor for fever/worsening  -will continue to follow

## 2018-07-27 NOTE — PROGRESS NOTE ADULT - PROBLEM SELECTOR PLAN 3
Has history of hydronephrosis with UTIs colonized with klebsiella  - bedside US with bilateral hydronephrosis  - f/u formal RUQ US and pelvic US  - likely severe sepsis 2/2 to UTI  - hutson in place - good urine output Has history of hydronephrosis with UTIs colonized with klebsiella  - Bedside US with bilateral hydronephrosis  - F/u formal RUQ US and pelvic US  - Likely severe sepsis 2/2 to UTI  - Resendiz in place - good urine output

## 2018-07-27 NOTE — PROGRESS NOTE ADULT - ASSESSMENT
77 yo F with hx of hypertension, Myeloproliferative disorder, DVT/PE (2015), previous hydronephrosis 2/2 obstruction who presents with weakness for 2 days, found to have severe sepsis 2/2 to UTI.     INFECTIOUS DISEASE  #Severe sepsis  Patient found to have UTI with UA positive for leuk esterase, WBCs. Patient with 4/4 SIRS and lactate of 3.6. Patient has history of bilateral hydronephrosis colonized with pan sensitive klebsiella 2/2 outflow obstruction. Likely severe sepsis 2/2 to UTI.  - patient given 1 dose ceftriaxone in ED  - now on zosyn 4.5gm q6h as has been treated with multiple UTIs in past so covering for pseudomonas  - Lactate cleared s/p 3L NS  - c/w NS @80 cc/hr  - tylenol PRN for fever greater than 100.4  - f/u urine cultures  - f/u blood cultures  - f/u pelvic US and RUQ US  - monitor I/Os    RENAL  #Patient with hydronephrosis, UTI. Has history of hydronephrosis with UTIs colonized with klebsiella  - bedside US with bilateral hydronephrosis  - f/u formal RUQ US and pelvic US  - Likey with urosepsis  - hutson in place    #Mild Rhabdo  CK 3298 on admission  - continue to follow  - Will hold home atorvastatin     HEME  #Myelodysplastic syndrome  Patient with 12 year history of myelodysplastic syndrome. Followed by Dr. Resendiz.    #DVT/PE  History of DVT/PE in 2015  - continue home xarelto    CARDIOVASCULAR  #hx of hypertension  - patient on norvasc at home  - will hold for now given sepsis    GASTROINTESTINAL  #transaminitis  Patient with mild transaminitis on admission along with 2/10 RUQ pain on exam.  - trend transaminases    PULMONARY  #Currently with good O2 saturation on 4L NC  - no intervention necessary    Neurology  #AAx2 on admission  - likely 2/2 to sepsis  - will continue to reassess      FLUIDS/ELECTROLYTES/NUTRITION  -IVF: NS@80 cc/hr  -Monitor, Replete to K>4 and Mg>2  -Diet: soft diet    PROPHYLAXIS  -DVT: xarelto  -GI: none    DISPO [facility]  CODE STATUS: DNR 79 yo F with hx of hypertension, Myeloproliferative disorder, DVT/PE (2015), previous hydronephrosis 2/2 obstruction who presents with weakness for 2 days, found to have severe sepsis 2/2 to UTI.     INFECTIOUS DISEASE  #Severe sepsis  Patient found to have UTI with UA positive for leuk esterase, WBCs. Patient with 4/4 SIRS and lactate of 3.6. Patient has history of bilateral hydronephrosis colonized with pan sensitive klebsiella 2/2 outflow obstruction. Likely severe sepsis 2/2 to UTI.  - patient given 1 dose ceftriaxone in ED  - now on zosyn 4.5gm q6h as has been treated with multiple UTIs in past so covering for pseudomonas  - Lactate cleared s/p 3L NS  - c/w NS @80 cc/hr - may be able to de  - tylenol PRN for fever greater than 100.4  - f/u urine cultures - now with gram negative rods  - f/u blood cultures - NGTD  - f/u pelvic US and RUQ US  - monitor I/Os - output of  cc/hr overnight  - consider urology consult for multiple UTIs 2/2 obstruction    RENAL  #Patient with hydronephrosis, UTI. Has history of hydronephrosis with UTIs colonized with klebsiella  - bedside US with bilateral hydronephrosis  - f/u formal RUQ US and pelvic US  - likely severe sepsis 2/2 to UTI  - hutson in place - good urine output    #Mild Rhabdo  CK 3298 on admission  - continue to follow  - Will hold home atorvastatin     HEME  #Myelodysplastic syndrome  Patient with 12 year history of myelodysplastic syndrome. Followed by Dr. Resendiz.    #DVT/PE  History of DVT/PE in 2015  - continue home xarelto    CARDIOVASCULAR  #hx of hypertension  - patient on norvasc at home  - will hold for now given sepsis    GASTROINTESTINAL  #transaminitis  Patient with mild transaminitis on admission along with 2/10 RUQ pain on exam.  - trend transaminases    PULMONARY  #Currently with good O2 saturation on 4L NC  - no intervention necessary    Neurology  #AAx2 on admission  - likely 2/2 to sepsis  - will continue to reassess      FLUIDS/ELECTROLYTES/NUTRITION  -IVF: NS@80 cc/hr  -Monitor, Replete to K>4 and Mg>2  -Diet: soft diet    PROPHYLAXIS  -DVT: xarelto  -GI: none    DISPO [facility]  CODE STATUS: DNR 77 yo F with hx of hypertension, Myeloproliferative disorder, DVT/PE (2015), previous hydronephrosis 2/2 obstruction who presents with weakness for 2 days, found to have severe sepsis 2/2 to UTI.     INFECTIOUS DISEASE  #Severe sepsis  Patient found to have UTI with UA positive for leuk esterase, WBCs. Patient with 4/4 SIRS and lactate of 3.6. Patient has history of bilateral hydronephrosis colonized with pan sensitive klebsiella 2/2 outflow obstruction. Likely severe sepsis 2/2 to UTI.  - patient given 1 dose ceftriaxone in ED  - now on zosyn 4.5gm q6h as has been treated with multiple UTIs in past so covering for pseudomonas  - Lactate cleared s/p 3L NS  - c/w NS @80 cc/hr - may be able to de  - tylenol PRN for fever greater than 100.4  - f/u urine cultures - now with gram negative rods  - f/u blood cultures - NGTD  - f/u pelvic US and RUQ US  - monitor I/Os - output of  cc/hr overnight  - consider urology consult for multiple UTIs 2/2 obstruction    RENAL  #Patient with hydronephrosis, UTI. Has history of hydronephrosis with UTIs colonized with klebsiella  - bedside US with bilateral hydronephrosis  - f/u formal RUQ US and pelvic US  - likely severe sepsis 2/2 to UTI  - hutson in place - good urine output  - urology consult    #Mild Rhabdo  CK 3298 on admission  - likely 2/2 to rigors  - downtrending now about 2000  - continue to follow  - Will hold home atorvastatin     HEME  #Myelodysplastic syndrome  Patient with 12 year history of myelodysplastic syndrome. Followed by Dr. Resendiz.  - on ruxolitinib at home.   - contact Dr. Resendiz to confirm if should take or not as inpatient    #DVT/PE  History of DVT/PE in 2015  - continue home xarelto    CARDIOVASCULAR  #hx of hypertension  - normotensive currently  - patient on norvasc at home  - will hold for now given sepsis    GASTROINTESTINAL  #transaminitis with direct hyperbilirubinemia  Patient with mild transaminitis on admission along with 2/10 RUQ pain on exam.  - trend transaminases  - stable on morning labs this am  - possibly due to ruxolitinib  - f/u with Dr. Resendiz    PULMONARY  #Currently with good O2 saturation on 2L NC  - no intervention necessary    Neurology  #AAx2 on admission  - resolved  - likely 2/2 to sepsis  - will continue to reassess      FLUIDS/ELECTROLYTES/NUTRITION  -IVF: NS@80 cc/hr  -Monitor, Replete to K>4 and Mg>2  -Diet: soft diet    PROPHYLAXIS  -DVT: xarelto  -GI: none    DISPO regional medical floor  CODE STATUS: DNR - documentation in chart 77 yo F with hx of hypertension, Myeloproliferative disorder, DVT/PE (2015), previous hydronephrosis 2/2 obstruction who presents with weakness for 2 days, found to have severe sepsis 2/2 to UTI.     INFECTIOUS DISEASE  #Severe sepsis  Patient found to have UTI with UA positive for leuk esterase, WBCs. Patient with 4/4 SIRS and lactate of 3.6. Patient has history of bilateral hydronephrosis colonized with pan sensitive klebsiella 2/2 outflow obstruction. Likely severe sepsis 2/2 to UTI.  - patient given 1 dose ceftriaxone in ED  - now on zosyn 4.5gm q6h as has been treated with multiple UTIs in past so covering for pseudomonas  - Lactate cleared s/p 3L NS  - c/w NS @80 cc/hr - may be able to de  - tylenol PRN for fever greater than 100.4  - f/u urine cultures - now with gram negative rods  - f/u blood cultures - NGTD  - f/u pelvic US and RUQ US  - monitor I/Os - output of  cc/hr overnight  - consider urology consult for multiple UTIs 2/2 obstruction    RENAL  #Patient with hydronephrosis, UTI. Has history of hydronephrosis with UTIs colonized with klebsiella  - bedside US with bilateral hydronephrosis  - f/u formal RUQ US and pelvic US  - likely severe sepsis 2/2 to UTI  - hutson in place - good urine output  - urology consult    #Mild Rhabdo  CK 3298 on admission  - likely 2/2 to rigors  - downtrending now about 2000  - continue to follow  - Will hold home atorvastatin     HEME  #Myelodysplastic syndrome  Patient with 12 year history of myelodysplastic syndrome. Followed by Dr. Resendiz.  - on ruxolitinib at home.   - contact Dr. Resendiz to confirm if should take or not as inpatient    #DVT/PE  History of DVT/PE in 2015  - continue home xarelto    CARDIOVASCULAR  #hx of hypertension  - normotensive currently  - patient on norvasc at home  - will hold for now given sepsis    GASTROINTESTINAL  #transaminitis with direct hyperbilirubinemia  Patient with mild transaminitis on admission along with 2/10 RUQ pain on exam.  - trend transaminases  - stable on morning labs this am  - possibly due to ruxolitinib  - f/u with Dr. Resendiz    PULMONARY  #Currently with good O2 saturation on 2L NC  - no intervention necessary    Neurology  #Toxic metabolic encephalopathy  - AAx2 on admission  - resolved  - likely 2/2 to sepsis  - will continue to reassess      FLUIDS/ELECTROLYTES/NUTRITION  -IVF: NS@80 cc/hr  -Monitor, Replete to K>4 and Mg>2  -Diet: soft diet    PROPHYLAXIS  -DVT: xarelto  -GI: none    DISPO regional medical floor  CODE STATUS: DNR - documentation in chart

## 2018-07-27 NOTE — PROGRESS NOTE ADULT - PROBLEM SELECTOR PLAN 5
Patient with 12 year history of myelodysplastic syndrome. Followed by Dr. Resendiz. Patient with 12 year history of myelodysplastic syndrome. Followed by Dr. Resendiz  - Home meds include xaralto and ruxolitinib ([facility])

## 2018-07-27 NOTE — PROGRESS NOTE ADULT - SUBJECTIVE AND OBJECTIVE BOX
CC/ HPI Patient is a 78 year old female with hypertension, myeloproliferative disorder, pulmonary embolus, 2015, who was admitted with hydronephrosis and severe sepsis secondary to urinary tract infection, seen this morning denies respiratory complaint.    PAST MEDICAL & SURGICAL HISTORY:  Myeloproliferative disorder  Hypertension  PE/DVT (pulmonary thromboembolism): 2015  Tremor  Vestibular disequilibrium  S/P hysterectomy    SOCHX:   +tobacco,  -  alcohol    FMHX: FA/MO  - contributory     ROS reviewed below with positive findings marked (+) :  GEN:  fever, chills ENT: tracheostomy,   epistaxis,  sinusitis COR: CAD, CHF,  +HTN, dysrhythmia PUL: COPD, ILD, asthma, pneumonia GI: PEG, dysphagia, hemorrhage, other MAXIMO: kidney disease, electrolyte disorder HEM:  +anemia, +thrombus, coagulopathy, cancer ENDO:  thyroid disease, diabetes mellitus CNS:  dementia, stroke, seizure, PSY:  depression, anxiety, other      MEDICATIONS  (STANDING):  aspirin enteric coated 81 milliGRAM(s) Oral daily  piperacillin/tazobactam IVPB. 3.375 Gram(s) IV Intermittent every 6 hours  rivaroxaban 20 milliGRAM(s) Oral every 24 hours  sodium chloride 0.9%. 1000 milliLiter(s) (80 mL/Hr) IV Continuous <Continuous>    MEDICATIONS  (PRN):  acetaminophen   Tablet 650 milliGRAM(s) Oral every 6 hours PRN For Temp greater than 38.5 C (101.3 F)      Vital Signs Last 24 Hrs  T(C): 37.1 (27 Jul 2018 20:50), Max: 39 (27 Jul 2018 12:00)  T(F): 98.7 (27 Jul 2018 20:50), Max: 102.2 (27 Jul 2018 12:00)  HR: 92 (27 Jul 2018 20:50) (54 - 92)  BP: 135/78 (27 Jul 2018 20:50) (109/57 - 172/76)  BP(mean): 102 (27 Jul 2018 13:00) (78 - 123)  RR: 18 (27 Jul 2018 20:50) (15 - 52)  SpO2: 95% (27 Jul 2018 20:50) (93% - 99%)    GENERAL:         comfortable,  - distress.  HEENT:            - trauma,  - icterus,  - injection,  - nasal discharge.  NECK:              - jugular venous distention, - thyromegaly.  LYMPH:           - lymphadenopathy, - masses.  RESP:               + crackles,   - rhonchi,   - wheezes.   COR:                S1S2   - gallops,  - rubs.  ABD:                bowel sounds,   soft, - tender, - distended.  EXT/MSC:         - cyanosis,  - clubbing,  - edema.    NEURO:             alert,   responds to stimuli.    ABG - ( 26 Jul 2018 16:43 )  pH, Arterial: 7.47  pH, Blood: x     /  pCO2: 29    /  pO2: 153   / HCO3: 20    / Base Excess: -2.2  /  SaO2: 99                              10.9   12.9  )-----------( 170      ( 27 Jul 2018 07:34 )             34.4     07-27    143  |  105  |  14  ----------------------------<  123<H>  3.6   |  25  |  0.80        Xray Chest (07.26.18)  Nodular opacities at the left lung base which may be related to atelectasis, infection, or inflammation.       CT Chest w/ IV Cont (09.14.17) Several new pulmonary nodules measuring up to 0.4 cm. Follow-up chest CT in one year is recommended to ensure stability.      ASSESSMENT/PLAN    1) Pyelonephritis with sepsis  2) Myelodysplastic syndrome  3) Pulmonary nodules  4) Atelectasis    Satisfactory SpO2, oxygen as needed  Incentive spirometer  Bronchodilators:  Atrovent/ albuterol q 4 – 6 hours as needed  ID/Antibiotics: Zosyn, follow up cultures  Cardiac/HTN: Satisfactory   GI: Rx/ prophylaxis c PPI/H2B  Heme: Rx/VT receiving AC  Discussed with medical team

## 2018-07-27 NOTE — PROGRESS NOTE ADULT - PROBLEM SELECTOR PLAN 8
Patient with mild transaminitis on admission along with 2/10 RUQ pain on exam.  - trend transaminases  PULMONARY  #Currently with good O2 saturation on 4L NC  - no intervention necessary    Neurology  #AAx2 on admission  - likely 2/2 to sepsis  - will continue to reassess Patient with mild transaminitis on admission along with 2/10 RUQ pain on exam.  - Trend transaminases    #Currently with good O2 saturation on 4L NC  - No intervention necessary    Neurology  #AAx2 on admission  - Likely 2/2 to sepsis  - Will continue to reassess Patient with mild transaminitis on admission along with 2/10 RUQ pain on exam.  - Trend transaminases    #Currently with good O2 saturation on 4L NC  - No intervention necessary    #AAx2 on admission  - Likely 2/2 to sepsis  - Will continue to reassess    #CAD  - 81 mg enteric coated ASA Patient with mild transaminitis on admission along with 2/10 RUQ pain on exam.  - Trend transaminases    #Currently with good O2 saturation on 4L NC  - No intervention necessary    #AAx2 on admission  - Likely 2/2 to sepsis  - Will continue to reassess    #CAD  - 81 mg enteric coated ASA    #hyperbilirubinemia   - f/u abdominal US

## 2018-07-27 NOTE — PROGRESS NOTE ADULT - PROBLEM SELECTOR PLAN 7
- patient on norvasc at home  - will hold in the setting of sepsis - Patient on norvasc at home  - Will hold in the setting of sepsis

## 2018-07-27 NOTE — PROGRESS NOTE ADULT - SUBJECTIVE AND OBJECTIVE BOX
OVERNIGHT: Fever to 102 this am, decreased to 99.8 with cooling blanket.  SUBJECTIVE: Patient seen and examined at bedside. Rigorous. Feels better since yesterday, starting to regain some appetite, ate a plain roll with ginger ale. Otherwise denies headache, N/V, chest pain, SOB, abdominal pain.       OBJECTIVE:    VITAL SIGNS:  ICU Vital Signs Last 24 Hrs  T(C): 38.2 (2018 09:45), Max: 40.6 (2018 16:15)  T(F): 100.8 (2018 09:45), Max: 105.1 (2018 16:15)  HR: 72 (2018 10:00) (54 - 120)  BP: 123/75 (2018 10:00) (101/58 - 172/92)  BP(mean): 91 (2018 10:00) (74 - 123)  ABP: --  ABP(mean): --  RR: 21 (2018 10:00) (15 - 52)  SpO2: 97% (2018 10:00) (92% - 99%)         @ :  -   @ 07:00  --------------------------------------------------------  IN: 1185 mL / OUT: 2120 mL / NET: -935 mL     @ : @ 11:27  --------------------------------------------------------  IN: 80 mL / OUT: 155 mL / NET: -75 mL      CAPILLARY BLOOD GLUCOSE      POCT Blood Glucose.: 127 mg/dL (2018 16:27)      PHYSICAL EXAM:  General: NAD, rigorous  HEENT: NCAT, PERRL, clear conjunctiva, no scleral icterus, dry mucus membranes  Neck: supple, no JVD  Respiratory: CTA b/l, no wheezing, rhonchi, rales  Cardiovascular: RRR, normal S1S2, crescendo decrescendo murmur at R second intercostal space that radiates to the neck  Vascular: 2+ radial and post tibial pulses  Abdomen: soft, + CVA tenderness bilaterally, bowel sounds in all four quadrants, no palpable masses  Extremities: WWP, no clubbing, cyanosis, or edema  Skin: No rashes present   Neuro: AAAx3     MEDICATIONS:  MEDICATIONS  (STANDING):  aspirin enteric coated 81 milliGRAM(s) Oral daily  piperacillin/tazobactam IVPB. 4.5 Gram(s) IV Intermittent every 6 hours  rivaroxaban 20 milliGRAM(s) Oral every 24 hours  sodium chloride 0.9% Bolus 1000 milliLiter(s) IV Bolus once  sodium chloride 0.9%. 1000 milliLiter(s) (80 mL/Hr) IV Continuous <Continuous>    MEDICATIONS  (PRN):  acetaminophen   Tablet 650 milliGRAM(s) Oral every 6 hours PRN For Temp greater than 38.5 C (101.3 F)      ALLERGIES:  Allergies    No Known Allergies    Intolerances        LABS:                        10.9   12.9  )-----------( 170      ( 2018 07:34 )             34.4         143  |  105  |  14  ----------------------------<  123<H>  3.6   |  25  |  0.80    Ca    9.1      2018 07:34  Phos  3.1       Mg     2.2         TPro  x   /  Alb  x   /  TBili  1.3<H>  /  DBili  0.3<H>  /  AST  86<H>  /  ALT  63<H>  /  AlkPhos  x       PT/INR - ( 2018 12:01 )   PT: 31.5 sec;   INR: 2.78          PTT - ( 2018 12:01 )  PTT:42.9 sec  Urinalysis Basic - ( 2018 13:33 )    Color: Yellow / Appearance: SL Cloudy / S.025 / pH: x  Gluc: x / Ketone: NEGATIVE  / Bili: Negative / Urobili: 0.2 E.U./dL   Blood: x / Protein: >=300 mg/dL / Nitrite: NEGATIVE   Leuk Esterase: Moderate / RBC: 5-10 /HPF / WBC Many /HPF   Sq Epi: x / Non Sq Epi: 5-10 /HPF / Bacteria: Present /HPF        RADIOLOGY & ADDITIONAL TESTS: Reviewed. PGY1 TRANSFER FROM MICU to [facility]  HPI and Hospital Course:  78 year old female with history of hypertension, myeloproliferative disorder, DVT/PE (2015), chronic UTIs and hydronephrosis who presented with 2 days of generalized fatigue and fevers. Patient states that for the past 2 days she has had fevers, decreased appetite, increased thirst, nausea, and increased urination. She has a history of hydronephrosis and chronic UTIs with pan sensitive klebsiella. On day of admission, her health care proxy and her friend noticed that the patient's skin felt warm to touch, and in conjunction with the patients other symptoms, brought her to the emergency department. The patient denies headache, dizziness, chest pain, SOB, vomiting, and leg pain. She does report feeling cold, having a dry mouth, and having shivers.     In the emergency department, patient was noted to have a positive UTI. She was given 1L NS bolus, started on ceftriaxone, and the patient was transferred to the floor. Once on the regional medical floor, patient's vitals were temp 105.1 (rectally), , /92, RR 24, O2 sat 92% on RA. Rapid response called. Patient was given 2L NS bolus, tylenol x2 for fever. Resendiz was placed with evacuation of cloudy appearing urine. Bedside US with bilateral hydronephrosis, liver appeared WNL. Labs notable for WBC 20.1, bands of 11, Lactate of 3.6, and mild transaminitis. ABG with resp alkalosis. Lactate cleared after NS boluses. Patient was then transferred to MICU for further management.    In the MICU, patient was started on NS 80 cc/hr, continued on zosyn. Her fever curve has been downtrending, WBC improving, and patient feeling subjectively better. She has had good urine output and is regaining her appetite. She does still complain of CVA tenderness bilaterally. Spiked fever to 102 F with rigors this am that was resolved with cooling blanket. Patient is now stable to be transferred to [facility].    OVERNIGHT: Fever to 102 this am, decreased to 99.8 with cooling blanket.  SUBJECTIVE: Patient seen and examined at bedside. Rigorous. Feels better since yesterday, starting to regain some appetite, ate a plain roll with ginger ale. Otherwise denies headache, N/V, chest pain, SOB, abdominal pain.       OBJECTIVE:    VITAL SIGNS:  ICU Vital Signs Last 24 Hrs  T(C): 38.2 (2018 09:45), Max: 40.6 (2018 16:15)  T(F): 100.8 (2018 09:45), Max: 105.1 (2018 16:15)  HR: 72 (2018 10:00) (54 - 120)  BP: 123/75 (2018 10:00) (101/58 - 172/92)  BP(mean): 91 (2018 10:00) (74 - 123)  ABP: --  ABP(mean): --  RR: 21 (2018 10:00) (15 - 52)  SpO2: 97% (2018 10:00) (92% - 99%)         @ 07: @ 07:00  --------------------------------------------------------  IN: 1185 mL / OUT: 2120 mL / NET: -935 mL     @ 07: @ 11:27  --------------------------------------------------------  IN: 80 mL / OUT: 155 mL / NET: -75 mL      CAPILLARY BLOOD GLUCOSE      POCT Blood Glucose.: 127 mg/dL (2018 16:27)      PHYSICAL EXAM:  General: NAD, rigorous  HEENT: NCAT, PERRL, clear conjunctiva, no scleral icterus, dry mucus membranes  Neck: supple, no JVD  Respiratory: CTA b/l, no wheezing, rhonchi, rales  Cardiovascular: RRR, normal S1S2, crescendo decrescendo murmur at R second intercostal space that radiates to the neck  Vascular: 2+ radial and post tibial pulses  Abdomen: soft, + CVA tenderness bilaterally, bowel sounds in all four quadrants, no palpable masses  Extremities: WWP, no clubbing, cyanosis, or edema  Skin: No rashes present   Neuro: AAAx3     MEDICATIONS:  MEDICATIONS  (STANDING):  aspirin enteric coated 81 milliGRAM(s) Oral daily  piperacillin/tazobactam IVPB. 4.5 Gram(s) IV Intermittent every 6 hours  rivaroxaban 20 milliGRAM(s) Oral every 24 hours  sodium chloride 0.9% Bolus 1000 milliLiter(s) IV Bolus once  sodium chloride 0.9%. 1000 milliLiter(s) (80 mL/Hr) IV Continuous <Continuous>    MEDICATIONS  (PRN):  acetaminophen   Tablet 650 milliGRAM(s) Oral every 6 hours PRN For Temp greater than 38.5 C (101.3 F)      ALLERGIES:  Allergies    No Known Allergies    Intolerances        LABS:                        10.9   12.9  )-----------( 170      ( 2018 07:34 )             34.4         143  |  105  |  14  ----------------------------<  123<H>  3.6   |  25  |  0.80    Ca    9.1      2018 07:34  Phos  3.1       Mg     2.2         TPro  x   /  Alb  x   /  TBili  1.3<H>  /  DBili  0.3<H>  /  AST  86<H>  /  ALT  63<H>  /  AlkPhos  x       PT/INR - ( 2018 12:01 )   PT: 31.5 sec;   INR: 2.78          PTT - ( 2018 12:01 )  PTT:42.9 sec  Urinalysis Basic - ( 2018 13:33 )    Color: Yellow / Appearance: SL Cloudy / S.025 / pH: x  Gluc: x / Ketone: NEGATIVE  / Bili: Negative / Urobili: 0.2 E.U./dL   Blood: x / Protein: >=300 mg/dL / Nitrite: NEGATIVE   Leuk Esterase: Moderate / RBC: 5-10 /HPF / WBC Many /HPF   Sq Epi: x / Non Sq Epi: 5-10 /HPF / Bacteria: Present /HPF        RADIOLOGY & ADDITIONAL TESTS: Reviewed.

## 2018-07-27 NOTE — PROGRESS NOTE ADULT - PROBLEM SELECTOR PLAN 4
Mild Rhabdo - CK 3298 on admission  - continue to follow  - Will hold home atorvastatin Mild Rhabdo - CK 3298 on admission  - Continue to follow  - Will hold home atorvastatin

## 2018-07-28 LAB
-  AMPICILLIN/SULBACTAM: SIGNIFICANT CHANGE UP
-  AMPICILLIN: SIGNIFICANT CHANGE UP
-  CEFAZOLIN: SIGNIFICANT CHANGE UP
-  CEFTRIAXONE: SIGNIFICANT CHANGE UP
-  CIPROFLOXACIN: SIGNIFICANT CHANGE UP
-  GENTAMICIN: SIGNIFICANT CHANGE UP
-  NITROFURANTOIN: SIGNIFICANT CHANGE UP
-  PIPERACILLIN/TAZOBACTAM: SIGNIFICANT CHANGE UP
-  TOBRAMYCIN: SIGNIFICANT CHANGE UP
-  TRIMETHOPRIM/SULFAMETHOXAZOLE: SIGNIFICANT CHANGE UP
ALBUMIN SERPL ELPH-MCNC: 2.8 G/DL — LOW (ref 3.3–5)
ALP SERPL-CCNC: 81 U/L — SIGNIFICANT CHANGE UP (ref 40–120)
ALT FLD-CCNC: 43 U/L — SIGNIFICANT CHANGE UP (ref 10–45)
ANION GAP SERPL CALC-SCNC: 15 MMOL/L — SIGNIFICANT CHANGE UP (ref 5–17)
AST SERPL-CCNC: 48 U/L — HIGH (ref 10–40)
BASOPHILS NFR BLD AUTO: 0.3 % — SIGNIFICANT CHANGE UP (ref 0–2)
BILIRUB SERPL-MCNC: 1.3 MG/DL — HIGH (ref 0.2–1.2)
BLD GP AB SCN SERPL QL: NEGATIVE — SIGNIFICANT CHANGE UP
BUN SERPL-MCNC: 8 MG/DL — SIGNIFICANT CHANGE UP (ref 7–23)
CALCIUM SERPL-MCNC: 8.4 MG/DL — SIGNIFICANT CHANGE UP (ref 8.4–10.5)
CHLORIDE SERPL-SCNC: 101 MMOL/L — SIGNIFICANT CHANGE UP (ref 96–108)
CK SERPL-CCNC: 720 U/L — HIGH (ref 25–170)
CO2 SERPL-SCNC: 24 MMOL/L — SIGNIFICANT CHANGE UP (ref 22–31)
CREAT SERPL-MCNC: 0.74 MG/DL — SIGNIFICANT CHANGE UP (ref 0.5–1.3)
CULTURE RESULTS: SIGNIFICANT CHANGE UP
EOSINOPHIL NFR BLD AUTO: 1.1 % — SIGNIFICANT CHANGE UP (ref 0–6)
GLUCOSE SERPL-MCNC: 118 MG/DL — HIGH (ref 70–99)
HCT VFR BLD CALC: 30.4 % — LOW (ref 34.5–45)
HGB BLD-MCNC: 9.7 G/DL — LOW (ref 11.5–15.5)
LYMPHOCYTES # BLD AUTO: 7.4 % — LOW (ref 13–44)
MAGNESIUM SERPL-MCNC: 2 MG/DL — SIGNIFICANT CHANGE UP (ref 1.6–2.6)
MCHC RBC-ENTMCNC: 30.4 PG — SIGNIFICANT CHANGE UP (ref 27–34)
MCHC RBC-ENTMCNC: 31.9 G/DL — LOW (ref 32–36)
MCV RBC AUTO: 95.3 FL — SIGNIFICANT CHANGE UP (ref 80–100)
METHOD TYPE: SIGNIFICANT CHANGE UP
MONOCYTES NFR BLD AUTO: 5.2 % — SIGNIFICANT CHANGE UP (ref 2–14)
NEUTROPHILS NFR BLD AUTO: 86 % — HIGH (ref 43–77)
ORGANISM # SPEC MICROSCOPIC CNT: SIGNIFICANT CHANGE UP
ORGANISM # SPEC MICROSCOPIC CNT: SIGNIFICANT CHANGE UP
PLATELET # BLD AUTO: 149 K/UL — LOW (ref 150–400)
POTASSIUM SERPL-MCNC: 3.1 MMOL/L — LOW (ref 3.5–5.3)
POTASSIUM SERPL-SCNC: 3.1 MMOL/L — LOW (ref 3.5–5.3)
PROT SERPL-MCNC: 5.7 G/DL — LOW (ref 6–8.3)
RBC # BLD: 3.19 M/UL — LOW (ref 3.8–5.2)
RBC # FLD: 16.3 % — SIGNIFICANT CHANGE UP (ref 10.3–16.9)
RH IG SCN BLD-IMP: POSITIVE — SIGNIFICANT CHANGE UP
SODIUM SERPL-SCNC: 140 MMOL/L — SIGNIFICANT CHANGE UP (ref 135–145)
SPECIMEN SOURCE: SIGNIFICANT CHANGE UP
WBC # BLD: 10.2 K/UL — SIGNIFICANT CHANGE UP (ref 3.8–10.5)
WBC # FLD AUTO: 10.2 K/UL — SIGNIFICANT CHANGE UP (ref 3.8–10.5)

## 2018-07-28 PROCEDURE — 78707 K FLOW/FUNCT IMAGE W/O DRUG: CPT | Mod: 26

## 2018-07-28 PROCEDURE — 76705 ECHO EXAM OF ABDOMEN: CPT | Mod: 26

## 2018-07-28 RX ORDER — POTASSIUM CHLORIDE 20 MEQ
10 PACKET (EA) ORAL
Qty: 0 | Refills: 0 | Status: COMPLETED | OUTPATIENT
Start: 2018-07-28 | End: 2018-07-28

## 2018-07-28 RX ORDER — POTASSIUM CHLORIDE 20 MEQ
40 PACKET (EA) ORAL ONCE
Qty: 0 | Refills: 0 | Status: COMPLETED | OUTPATIENT
Start: 2018-07-28 | End: 2018-07-28

## 2018-07-28 RX ORDER — CEFTRIAXONE 500 MG/1
1 INJECTION, POWDER, FOR SOLUTION INTRAMUSCULAR; INTRAVENOUS EVERY 24 HOURS
Qty: 0 | Refills: 0 | Status: DISCONTINUED | OUTPATIENT
Start: 2018-07-28 | End: 2018-07-30

## 2018-07-28 RX ADMIN — PIPERACILLIN AND TAZOBACTAM 200 GRAM(S): 4; .5 INJECTION, POWDER, LYOPHILIZED, FOR SOLUTION INTRAVENOUS at 00:38

## 2018-07-28 RX ADMIN — CEFTRIAXONE 100 GRAM(S): 500 INJECTION, POWDER, FOR SOLUTION INTRAMUSCULAR; INTRAVENOUS at 21:34

## 2018-07-28 RX ADMIN — RIVAROXABAN 20 MILLIGRAM(S): KIT at 09:46

## 2018-07-28 RX ADMIN — PIPERACILLIN AND TAZOBACTAM 200 GRAM(S): 4; .5 INJECTION, POWDER, LYOPHILIZED, FOR SOLUTION INTRAVENOUS at 16:02

## 2018-07-28 RX ADMIN — SODIUM CHLORIDE 80 MILLILITER(S): 9 INJECTION INTRAMUSCULAR; INTRAVENOUS; SUBCUTANEOUS at 17:31

## 2018-07-28 RX ADMIN — Medication 40 MILLIEQUIVALENT(S): at 19:01

## 2018-07-28 RX ADMIN — Medication 81 MILLIGRAM(S): at 16:32

## 2018-07-28 RX ADMIN — Medication 100 MILLIEQUIVALENT(S): at 09:56

## 2018-07-28 RX ADMIN — Medication 100 MILLIEQUIVALENT(S): at 16:31

## 2018-07-28 RX ADMIN — PIPERACILLIN AND TAZOBACTAM 200 GRAM(S): 4; .5 INJECTION, POWDER, LYOPHILIZED, FOR SOLUTION INTRAVENOUS at 06:10

## 2018-07-28 RX ADMIN — Medication 100 MILLIEQUIVALENT(S): at 17:57

## 2018-07-28 NOTE — PROGRESS NOTE ADULT - SUBJECTIVE AND OBJECTIVE BOX
INTERVAL HPI/OVERNIGHT EVENTS:    ANTIBIOTICS/RELEVANT:    MEDICATIONS  (STANDING):  aspirin enteric coated 81 milliGRAM(s) Oral daily  piperacillin/tazobactam IVPB. 3.375 Gram(s) IV Intermittent every 6 hours  potassium chloride    Tablet ER 40 milliEquivalent(s) Oral once  potassium chloride  10 mEq/100 mL IVPB 10 milliEquivalent(s) IV Intermittent every 1 hour  rivaroxaban 20 milliGRAM(s) Oral every 24 hours  sodium chloride 0.9%. 1000 milliLiter(s) (80 mL/Hr) IV Continuous <Continuous>    MEDICATIONS  (PRN):  acetaminophen   Tablet 650 milliGRAM(s) Oral every 6 hours PRN For Temp greater than 38.5 C (101.3 F)      Allergies    No Known Allergies    Intolerances        Vital Signs Last 24 Hrs  T(C): 37.6 (28 Jul 2018 09:06), Max: 37.6 (28 Jul 2018 09:06)  T(F): 99.6 (28 Jul 2018 09:06), Max: 99.6 (28 Jul 2018 09:06)  HR: 80 (28 Jul 2018 09:40) (76 - 92)  BP: 132/83 (28 Jul 2018 09:40) (130/79 - 152/79)  BP(mean): --  RR: 16 (28 Jul 2018 09:40) (16 - 19)  SpO2: 98% (28 Jul 2018 09:40) (95% - 99%)    PHYSICAL EXAM:      Constitutional:    Eyes:    ENMT:    Neck:    Breasts:    Back:    Respiratory:    Cardiovascular:    Gastrointestinal:    Genitourinary:    Rectal:    Extremities:    Vascular:    Neurological:    Skin:    Lymph Nodes:    Musculoskeletal:    Psychiatric:        LABS:                        9.7    10.2  )-----------( 149      ( 28 Jul 2018 08:41 )             30.4     07-28    140  |  101  |  8   ----------------------------<  118<H>  3.1<L>   |  24  |  0.74    Ca    8.4      28 Jul 2018 08:41  Phos  3.1     07-27  Mg     2.0     07-28    TPro  5.7<L>  /  Alb  2.8<L>  /  TBili  1.3<H>  /  DBili  x   /  AST  48<H>  /  ALT  43  /  AlkPhos  81  07-28          MICROBIOLOGY:    RADIOLOGY & ADDITIONAL STUDIES: INTERVAL HPI/OVERNIGHT EVENTS:    Feels weak  Otherwise improved    MEDICATIONS  (STANDING):  aspirin enteric coated 81 milliGRAM(s) Oral daily  piperacillin/tazobactam IVPB. 3.375 Gram(s) IV Intermittent every 6 hours  potassium chloride    Tablet ER 40 milliEquivalent(s) Oral once  potassium chloride  10 mEq/100 mL IVPB 10 milliEquivalent(s) IV Intermittent every 1 hour  rivaroxaban 20 milliGRAM(s) Oral every 24 hours  sodium chloride 0.9%. 1000 milliLiter(s) (80 mL/Hr) IV Continuous <Continuous>    MEDICATIONS  (PRN):  acetaminophen   Tablet 650 milliGRAM(s) Oral every 6 hours PRN For Temp greater than 38.5 C (101.3 F)      Allergies    No Known Allergies    EXAM  Vital Signs Last 24 Hrs  T(C): 37.6 (28 Jul 2018 09:06), Max: 37.6 (28 Jul 2018 09:06)  T(F): 99.6 (28 Jul 2018 09:06), Max: 99.6 (28 Jul 2018 09:06)  HR: 80 (28 Jul 2018 09:40) (76 - 92)  BP: 132/83 (28 Jul 2018 09:40) (130/79 - 152/79)  BP(mean): --  RR: 16 (28 Jul 2018 09:40) (16 - 19)  SpO2: 98% (28 Jul 2018 09:40) (95% - 99%)  Frail appearing  On RA  Awake and alert but slow speaking  No rash  RRR  Abd soft NT  No CVA tenderness      LABS:                        9.7    10.2  )-----------( 149      ( 28 Jul 2018 08:41 )             30.4     07-28    140  |  101  |  8   ----------------------------<  118<H>  3.1<L>   |  24  |  0.74    Ca    8.4      28 Jul 2018 08:41  Phos  3.1     07-27  Mg     2.0     07-28    TPro  5.7<L>  /  Alb  2.8<L>  /  TBili  1.3<H>  /  DBili  x   /  AST  48<H>  /  ALT  43  /  AlkPhos  81  07-28    MICROBIOLOGY:    Culture - Blood (07.26.18 @ 16:17)    Specimen Source: .Blood Blood    Culture Results:   No growth at 2 days.    Culture - Blood (07.26.18 @ 16:17)    Specimen Source: .Blood Blood    Culture Results:   No growth at 2 days.    Culture - Urine (07.26.18 @ 14:38)    -  Nitrofurantoin: S <=32 Should not be used to treat pyelonephritis    -  Piperacillin/Tazobactam: S <=8    -  Tobramycin: S <=2    -  Trimethoprim/Sulfamethoxazole: S <=0.5/9.5    -  Ampicillin: S <=2 These ampicillin results predict results for amoxicillin    -  Ampicillin/Sulbactam: S <=4/2    -  Cefazolin: S <=2 This predicts results for oral agents cefaclor, cefdinir, cefpodoxime, cefprozil, cefuroxime axetil, cephalexin and locarbef for uncomplicated UTI. Note that some isolates may be susceptible to these agents while testing resistant to cefazolin.    -  Ceftriaxone: S <=1 Enterobacter, Citrobacter, and Serratia may develop resistance during prolonged therapy    -  Ciprofloxacin: S <=0.5    -  Gentamicin: S <=1    Specimen Source: .Urine Clean Catch (Midstream)    Culture Results:   >100,000 CFU/ml Escherichia coli    Organism Identification: Escherichia coli    Organism: Escherichia coli    Method Type: NIKITA        RADIOLOGY & ADDITIONAL STUDIES:    Renal and bladder US pending

## 2018-07-28 NOTE — PROGRESS NOTE ADULT - PROBLEM SELECTOR PLAN 1
Patient found to have UTI with UA positive for leuk esterase, WBCs. Patient with 4/4 SIRS and lactate of 3.6. Patient has history of bilateral hydronephrosis colonized with pan sensitive klebsiella 2/2 outflow obstruction. Likely severe sepsis 2/2 to UTI.  - Patient given 1 dose ceftriaxone in ED  - Now on Zosyn 4.5gm q6h as has been treated with multiple UTIs in past so covering for pseudomonas  - Lactate cleared s/p 3L NS  - C/w NS @80 cc/hr - may be able to de  - Tylenol PRN for fever greater than 100.4  - F/u urine cultures - now with gram negative rods  - F/u blood cultures - NG x1 day  - F/u retroperitoneal U/S, pending read  - F/u abdominal CT, pending read  - Monitor I/Os  - Urology consulted, recs appreciated

## 2018-07-28 NOTE — PROGRESS NOTE ADULT - SUBJECTIVE AND OBJECTIVE BOX
OVERNIGHT EVENTS: No adverse overnight events.    SUBJECTIVE / INTERVAL HPI: Patient seen and examined at bedside. Patient is resting comfortably in bed. She denies nausea, vomiting, chills, fever, shortness of breath, and chest pain. She endorses a feeling of having to urinate despite the Resendiz, but reports this sensation is diminishing.     VITAL SIGNS:  Vital Signs Last 24 Hrs  T(C): 37.1 (2018 05:37), Max: 39 (2018 12:00)  T(F): 98.8 (2018 05:37), Max: 102.2 (2018 12:00)  HR: 80 (2018 05:37) (62 - 92)  BP: 146/75 (2018 05:37) (109/57 - 163/89)  BP(mean): 102 (2018 13:00) (80 - 112)  RR: 18 (2018 05:37) (18 - 32)  SpO2: 99% (2018 05:37) (93% - 99%)    PHYSICAL EXAM:    General: WDWN, rigors  HEENT: NC/AT; PERRL, anicteric sclera; MMM  Neck: supple, no jvd  Cardiovascular: +S1/S2; RRR; 3/6 holosystolic heard best at the R second intercostal space  Respiratory: CTA B/L; no W/R/R  Gastrointestinal: soft, tender to RUQ; non-distended; +BSx4  Genitourinary: Resendiz in place  Extremities: WWP; no edema, clubbing or cyanosis  Vascular: 2+ radial, DP/PT pulses B/L  Neurological: AAOx3; no focal deficits    MEDICATIONS:  MEDICATIONS  (STANDING):  aspirin enteric coated 81 milliGRAM(s) Oral daily  piperacillin/tazobactam IVPB. 3.375 Gram(s) IV Intermittent every 6 hours  rivaroxaban 20 milliGRAM(s) Oral every 24 hours  sodium chloride 0.9%. 1000 milliLiter(s) (80 mL/Hr) IV Continuous <Continuous>    MEDICATIONS  (PRN):  acetaminophen   Tablet 650 milliGRAM(s) Oral every 6 hours PRN For Temp greater than 38.5 C (101.3 F)      ALLERGIES:  Allergies    No Known Allergies    Intolerances        LABS:                        10.9   12.9  )-----------( 170      ( 2018 07:34 )             34.4         143  |  105  |  14  ----------------------------<  123<H>  3.6   |  25  |  0.80    Ca    9.1      2018 07:34  Phos  3.1       Mg     2.2         TPro  x   /  Alb  x   /  TBili  1.3<H>  /  DBili  0.3<H>  /  AST  86<H>  /  ALT  63<H>  /  AlkPhos  x       PT/INR - ( 2018 12:01 )   PT: 31.5 sec;   INR: 2.78          PTT - ( 2018 12:01 )  PTT:42.9 sec  Urinalysis Basic - ( 2018 13:33 )    Color: Yellow / Appearance: SL Cloudy / S.025 / pH: x  Gluc: x / Ketone: NEGATIVE  / Bili: Negative / Urobili: 0.2 E.U./dL   Blood: x / Protein: >=300 mg/dL / Nitrite: NEGATIVE   Leuk Esterase: Moderate / RBC: 5-10 /HPF / WBC Many /HPF   Sq Epi: x / Non Sq Epi: 5-10 /HPF / Bacteria: Present /HPF      CAPILLARY BLOOD GLUCOSE      POCT Blood Glucose.: 127 mg/dL (2018 16:27)      RADIOLOGY & ADDITIONAL TESTS: Reviewed.    ASSESSMENT:    PLAN:

## 2018-07-28 NOTE — PROGRESS NOTE ADULT - PROBLEM SELECTOR PLAN 8
Patient with mild transaminitis on admission along with 2/10 RUQ pain on exam.  - Trend transaminases    #Currently with good O2 saturation on 2L NC  - No intervention necessary    #AAx2 on admission  - Likely 2/2 to sepsis  - Will continue to reassess    #CAD  - 81 mg enteric coated ASA    #hyperbilirubinemia   - f/u abdominal US

## 2018-07-28 NOTE — PHYSICAL THERAPY INITIAL EVALUATION ADULT - CRITERIA FOR SKILLED THERAPEUTIC INTERVENTIONS
rehab potential/risk reduction/prevention/impairments found/therapy frequency/anticipated discharge recommendation/functional limitations in following categories/predicted duration of therapy intervention/anticipated equipment needs at discharge

## 2018-07-28 NOTE — PROGRESS NOTE ADULT - SUBJECTIVE AND OBJECTIVE BOX
PUD FOR DR MOTA    CC/ HPI Patient is a 78 year old female with hypertension, myeloproliferative disorder, pulmonary embolus, 2015, who was admitted with hydronephrosis and severe sepsis secondary to urinary tract infection.  She wants a new medication "Jakafi"    All new data reviewed, including VS, lab, images, Rx.    PAST MEDICAL & SURGICAL HISTORY:  Myeloproliferative disorder  Hypertension  PE/DVT (pulmonary thromboembolism): 2015  Tremor  Vestibular disequilibrium  S/P hysterectomy    SOCHX:   +tobacco,  -  alcohol    FMHX: FA/MO  - contributory     ROS reviewed below with positive findings marked (+) :  GEN:  fever, chills ENT: tracheostomy,   epistaxis,  sinusitis COR: CAD, CHF,  +HTN, dysrhythmia PUL: COPD, ILD, asthma, pneumonia GI: PEG, dysphagia, hemorrhage, other MAXIMO: kidney disease, electrolyte disorder HEM:  +anemia, +thrombus, coagulopathy, cancer ENDO:  thyroid disease, diabetes mellitus CNS:  dementia, stroke, seizure, PSY:  depression, anxiety, other      MEDICATIONS  (STANDING):  aspirin enteric coated 81 milliGRAM(s) Oral daily  piperacillin/tazobactam IVPB. 3.375 Gram(s) IV Intermittent every 6 hours  rivaroxaban 20 milliGRAM(s) Oral every 24 hours  sodium chloride 0.9%. 1000 milliLiter(s) (80 mL/Hr) IV Continuous <Continuous>    MEDICATIONS  (PRN):  acetaminophen   Tablet 650 milliGRAM(s) Oral every 6 hours PRN For Temp greater than 38.5 C (101.3 F)      Vital Signs Last 24 Hrs  T(C): 37.1 (27 Jul 2018 20:50), Max: 39 (27 Jul 2018 12:00)  T(F): 98.7 (27 Jul 2018 20:50), Max: 102.2 (27 Jul 2018 12:00)  HR: 92 (27 Jul 2018 20:50) (54 - 92)  BP: 135/78 (27 Jul 2018 20:50) (109/57 - 172/76)  BP(mean): 102 (27 Jul 2018 13:00) (78 - 123)  RR: 18 (27 Jul 2018 20:50) (15 - 52)  SpO2: 95% (27 Jul 2018 20:50) (93% - 99%)    in bed, very weak, slowly walking to bathroom  GENERAL:        uncomfortable,  - distress.  HEENT:            - trauma,  - icterus,  - injection,  - nasal discharge.  NECK:              - jugular venous distention, - thyromegaly.  LYMPH:           - lymphadenopathy, - masses.  RESP:               + crackles,   - rhonchi,   + wheezes.   COR:                S1S2   - gallops,  - rubs.  ABD:                bowel sounds,   soft, - tender, - distended.  EXT/MSC:         - cyanosis,  - clubbing,  - edema.    NEURO:             alert,   responds to stimuli.    ABG - ( 26 Jul 2018 16:43 )  pH, Arterial: 7.47  pH, Blood: x     /  pCO2: 29    /  pO2: 153   / HCO3: 20    / Base Excess: -2.2  /  SaO2: 99                              10.9   12.9  )-----------( 170      ( 27 Jul 2018 07:34 )             34.4     07-27    143  |  105  |  14  ----------------------------<  123<H>  3.6   |  25  |  0.80        Xray Chest (07.26.18)  Nodular opacities at the left lung base which may be related to atelectasis, infection, or inflammation.       CT Chest w/ IV Cont (09.14.17) Several new pulmonary nodules measuring up to 0.4 cm. Follow-up chest CT in one year is recommended to ensure stability.      ASSESSMENT/PLAN    1) Pyelonephritis with sepsis  2) Myelodysplastic syndrome  3) Pulmonary nodules  4) Atelectasis    Satisfactory SpO2, oxygen as needed  Incentive spirometer, mobilization  Bronchodilators:  Atrovent/ albuterol q 4 – 6 hours as needed, add budesonide  ID/Antibiotics: Zosyn, follow up cultures  Cardiac/HTN: Satisfactory   GI: Rx/ prophylaxis c PPI/H2B  Heme: Rx/VT receiving AC  Discussed with medical team

## 2018-07-28 NOTE — PROGRESS NOTE ADULT - ASSESSMENT
RECOMMEND  D/C Pip-Tazo  Start Ceftriaxone 2 gm IV q 24 hours Sepsis secondary to UTI with E coli  Suspect pyelonephritis      RECOMMEND  D/C Pip-Tazo  Start Ceftriaxone 2 gm IV q 24 hours  Awaiting sonogram findings

## 2018-07-28 NOTE — PHYSICAL THERAPY INITIAL EVALUATION ADULT - ADDITIONAL COMMENTS
PT lives in apartment building without stairs. PTA pt indpt with bed mob, transfers, with recent use of RW for outdoor ambulation only. Pt reports she has 24h HHA for ~1 year assists with household cleaning, groceries, and showering. Pt owns RW, showerchair, straight cane. Baseline function mainly household amb with no to some difficulty. Denies recent falls.

## 2018-07-28 NOTE — PROGRESS NOTE ADULT - SUBJECTIVE AND OBJECTIVE BOX
coverage for Dr. Tapia    Pt seen and examined  No complaints, feels better but weak    REVIEW OF SYSTEMS:  Constitutional: No fever,   Cardiovascular: No chest pain, palpitations, dizziness or leg swelling  Gastrointestinal: No abdominal or epigastric pain. No nausea, vomiting or hematemesis; No diarrhea .  Skin: No itching, burning, rashes or lesions       MEDICATIONS:  MEDICATIONS  (STANDING):  aspirin enteric coated 81 milliGRAM(s) Oral daily  piperacillin/tazobactam IVPB. 3.375 Gram(s) IV Intermittent every 6 hours  potassium chloride    Tablet ER 40 milliEquivalent(s) Oral once  potassium chloride  10 mEq/100 mL IVPB 10 milliEquivalent(s) IV Intermittent every 1 hour  rivaroxaban 20 milliGRAM(s) Oral every 24 hours  sodium chloride 0.9%. 1000 milliLiter(s) (80 mL/Hr) IV Continuous <Continuous>    MEDICATIONS  (PRN):  acetaminophen   Tablet 650 milliGRAM(s) Oral every 6 hours PRN For Temp greater than 38.5 C (101.3 F)      Allergies    No Known Allergies    Intolerances        Vital Signs Last 24 Hrs  T(C): 37.6 (2018 09:06), Max: 38.2 (2018 13:00)  T(F): 99.6 (2018 09:06), Max: 100.7 (2018 13:00)  HR: 80 (2018 09:40) (73 - 92)  BP: 132/83 (2018 09:40) (120/75 - 152/79)  BP(mean): 102 (2018 13:00) (102 - 102)  RR: 16 (2018 09:40) (16 - 26)  SpO2: 98% (2018 09:40) (93% - 99%)     @ 07:  -   @ 07:00  --------------------------------------------------------  IN: 740 mL / OUT: 1040 mL / NET: -300 mL     @ 07: @ 12:29  --------------------------------------------------------  IN: 0 mL / OUT: 1100 mL / NET: -1100 mL        PHYSICAL EXAM:    General: thin in no acute distress  HEENT: MMM, conjunctiva and sclera clear  Lungs: clear  Heart: regular  Gastrointestinal: Soft non-tender non-distended; Normal bowel sounds; No hepatosplenomegaly  Skin: Warm and dry. No obvious rash    LABS:  ABG - ( 2018 16:43 )  pH, Arterial: 7.47  pH, Blood: x     /  pCO2: 29    /  pO2: 153   / HCO3: 20    / Base Excess: -2.2  /  SaO2: 99                  CBC Full  -  ( 2018 08:41 )  WBC Count : 10.2 K/uL  Hemoglobin : 9.7 g/dL  Hematocrit : 30.4 %  Platelet Count - Automated : 149 K/uL  Mean Cell Volume : 95.3 fL  Mean Cell Hemoglobin : 30.4 pg  Mean Cell Hemoglobin Concentration : 31.9 g/dL  Auto Neutrophil # : x  Auto Lymphocyte # : x  Auto Monocyte # : x  Auto Eosinophil # : x  Auto Basophil # : x  Auto Neutrophil % : 86.0 %  Auto Lymphocyte % : 7.4 %  Auto Monocyte % : 5.2 %  Auto Eosinophil % : 1.1 %  Auto Basophil % : 0.3 %        140  |  101  |  8   ----------------------------<  118<H>  3.1<L>   |  24  |  0.74    Ca    8.4      2018 08:41  Phos  3.1       Mg     2.0         TPro  5.7<L>  /  Alb  2.8<L>  /  TBili  1.3<H>  /  DBili  x   /  AST  48<H>  /  ALT  43  /  AlkPhos  81            Urinalysis Basic - ( 2018 13:33 )    Color: Yellow / Appearance: SL Cloudy / S.025 / pH: x  Gluc: x / Ketone: NEGATIVE  / Bili: Negative / Urobili: 0.2 E.U./dL   Blood: x / Protein: >=300 mg/dL / Nitrite: NEGATIVE   Leuk Esterase: Moderate / RBC: 5-10 /HPF / WBC Many /HPF   Sq Epi: x / Non Sq Epi: 5-10 /HPF / Bacteria: Present /HPF          Culture - Urine (18 @ 14:38)    -  Trimethoprim/Sulfamethoxazole: S <=0.5/9.5    -  Gentamicin: S <=1    -  Nitrofurantoin: S <=32 Should not be used to treat pyelonephritis    -  Piperacillin/Tazobactam: S <=8    -  Tobramycin: S <=2    -  Ampicillin: S <=2 These ampicillin results predict results for amoxicillin    -  Ampicillin/Sulbactam: S <=4/2    -  Cefazolin: S <=2 This predicts results for oral agents cefaclor, cefdinir, cefpodoxime, cefprozil, cefuroxime axetil, cephalexin and locarbef for uncomplicated UTI. Note that some isolates may be susceptible to these agents while testing resistant to cefazolin.    -  Ceftriaxone: S <=1 Enterobacter, Citrobacter, and Serratia may develop resistance during prolonged therapy    -  Ciprofloxacin: S <=0.5    Specimen Source: .Urine Clean Catch (Midstream)    Culture Results:   >100,000 CFU/ml Escherichia coli    Organism Identification: Escherichia coli    Organism: Escherichia coli    Method Type: NIKITA              RADIOLOGY & ADDITIONAL STUDIES (The following images were personally reviewed):

## 2018-07-28 NOTE — PHYSICAL THERAPY INITIAL EVALUATION ADULT - GAIT DEVIATIONS NOTED, PT EVAL
decreased enoc/limited by endurance and feelings of malaise, reports 10/10 fatigue following, nausea with BP stable, denies dizziness

## 2018-07-28 NOTE — PROGRESS NOTE ADULT - ASSESSMENT
79 yo F with hx of hypertension, Myeloproliferative disorder, DVT/PE (2015), previous hydronephrosis 2/2 obstruction who presents with weakness for 2 days, found to have severe sepsis 2/2 to UTI. (e.coli and klebsiella)    continue antibiotics per ID

## 2018-07-28 NOTE — PROGRESS NOTE ADULT - ASSESSMENT
79 yo F with hx of hypertension, Myeloproliferative disorder, DVT/PE (2015), previous hydronephrosis 2/2 obstruction who presents with weakness for 2 days, found to have severe sepsis 2/2 to UTI.

## 2018-07-28 NOTE — PROGRESS NOTE ADULT - PROBLEM SELECTOR PLAN 5
Patient with 12 year history of myelodysplastic syndrome. Followed by Dr. Resendiz  - Home meds include xaralto and ruxolitinib (Jakafi)

## 2018-07-28 NOTE — PROGRESS NOTE ADULT - PROBLEM SELECTOR PLAN 3
Has history of hydronephrosis with UTIs colonized with klebsiella  - Bedside US with bilateral hydronephrosis  - F/u retroperitoneal U/S, pending read  - F/u abdominal CT, pending read  - Likely severe sepsis 2/2 to UTI  - Resendiz in place - good urine output

## 2018-07-28 NOTE — PHYSICAL THERAPY INITIAL EVALUATION ADULT - ASR EQUIP NEEDS DISCH PT EVAL
PT owns RW, SC, showerchair however states RW left in ED upon admission and now missing. May need to order RW if not located***

## 2018-07-28 NOTE — PHYSICAL THERAPY INITIAL EVALUATION ADULT - GENERAL OBSERVATIONS, REHAB EVAL
Chart reviewed, IE completed. Admitted for sepsis 2/2 UTI, fevers, and weakness. Pt rcvd sidelying, +2L NC not in nares, +hutson reporting general malaise. Pt tolerated session fairly, with reports of nausea and 10/10 fatigue with amb 20ft x2 at RW. toelrated session well on RA satting 96%. Pt limited by fatigue, decr endurance, and feelings of malaise. Left semi supine wit RN for transport to [facility]. FIm gait=1

## 2018-07-29 RX ADMIN — CEFTRIAXONE 100 GRAM(S): 500 INJECTION, POWDER, FOR SOLUTION INTRAMUSCULAR; INTRAVENOUS at 21:40

## 2018-07-29 RX ADMIN — Medication 81 MILLIGRAM(S): at 10:44

## 2018-07-29 RX ADMIN — SODIUM CHLORIDE 80 MILLILITER(S): 9 INJECTION INTRAMUSCULAR; INTRAVENOUS; SUBCUTANEOUS at 18:15

## 2018-07-29 RX ADMIN — SODIUM CHLORIDE 80 MILLILITER(S): 9 INJECTION INTRAMUSCULAR; INTRAVENOUS; SUBCUTANEOUS at 07:48

## 2018-07-29 RX ADMIN — RIVAROXABAN 20 MILLIGRAM(S): KIT at 10:44

## 2018-07-29 NOTE — PROGRESS NOTE ADULT - SUBJECTIVE AND OBJECTIVE BOX
PUD FOR DR MOTA    CC/ HPI Patient is a 78 year old female with hypertension, myeloproliferative disorder, pulmonary embolus, 2015, who was admitted with hydronephrosis and severe sepsis secondary to urinary tract infection.  She wants a new medication "Jakafi". Her CXR shows a LLL abnormality.    All new data reviewed, including VS, lab, images, Rx.    PAST MEDICAL & SURGICAL HISTORY:  Myeloproliferative disorder  Hypertension  PE/DVT (pulmonary thromboembolism): 2015  Tremor  Vestibular disequilibrium  S/P hysterectomy    SOCHX:   +tobacco,  -  alcohol    FMHX: FA/MO  - contributory     ROS reviewed below with positive findings marked (+) :  GEN:  fever, chills ENT: tracheostomy,   epistaxis,  sinusitis COR: CAD, CHF,  +HTN, dysrhythmia PUL: COPD, ILD, asthma, pneumonia GI: PEG, dysphagia, hemorrhage, other MAXIMO: kidney disease, electrolyte disorder HEM:  +anemia, +thrombus, coagulopathy, cancer ENDO:  thyroid disease, diabetes mellitus CNS:  dementia, stroke, seizure, PSY:  depression, anxiety, other      MEDICATIONS  (STANDING):  aspirin enteric coated 81 milliGRAM(s) Oral daily  piperacillin/tazobactam IVPB. 3.375 Gram(s) IV Intermittent every 6 hours  rivaroxaban 20 milliGRAM(s) Oral every 24 hours  sodium chloride 0.9%. 1000 milliLiter(s) (80 mL/Hr) IV Continuous <Continuous>    MEDICATIONS  (PRN):  acetaminophen   Tablet 650 milliGRAM(s) Oral every 6 hours PRN For Temp greater than 38.5 C (101.3 F)      Vital Signs Last 24 Hrs  T(C): 37.1 (27 Jul 2018 20:50), Max: 39 (27 Jul 2018 12:00)  T(F): 98.7 (27 Jul 2018 20:50), Max: 102.2 (27 Jul 2018 12:00)  HR: 92 (27 Jul 2018 20:50) (54 - 92)  BP: 135/78 (27 Jul 2018 20:50) (109/57 - 172/76)  BP(mean): 102 (27 Jul 2018 13:00) (78 - 123)  RR: 18 (27 Jul 2018 20:50) (15 - 52)  SpO2: 95% (27 Jul 2018 20:50) (93% - 99%)    in bed, very weak, slowly walking to bathroom  GENERAL:        uncomfortable,  - distress.  HEENT:            - trauma,  - icterus,  - injection,  - nasal discharge.  NECK:              - jugular venous distention, - thyromegaly.  LYMPH:           - lymphadenopathy, - masses.  RESP:               + crackles,   - rhonchi,   + wheezes.   COR:                S1S2   - gallops,  - rubs.  ABD:                bowel sounds,   soft, - tender, - distended.  EXT/MSC:         - cyanosis,  - clubbing,  - edema.    NEURO:             alert,   responds to stimuli.    ABG - ( 26 Jul 2018 16:43 )  pH, Arterial: 7.47  pH, Blood: x     /  pCO2: 29    /  pO2: 153   / HCO3: 20    / Base Excess: -2.2  /  SaO2: 99                              10.9   12.9  )-----------( 170      ( 27 Jul 2018 07:34 )             34.4     07-27    143  |  105  |  14  ----------------------------<  123<H>  3.6   |  25  |  0.80        Xray Chest (07.26.18)  Nodular opacities at the left lung base which may be related to atelectasis, infection, or inflammation.       CT Chest w/ IV Cont (09.14.17) Several new pulmonary nodules measuring up to 0.4 cm. Follow-up chest CT in one year is recommended to ensure stability.      ASSESSMENT/PLAN    1) Pyelonephritis with sepsis  2) Myelodysplastic syndrome  3) Pulmonary nodules  4) Atelectasis    Satisfactory SpO2, oxygen as needed  Obtain pa/lat CXR for nodule, will review  Incentive spirometer, mobilization  Bronchodilators:  Atrovent/ albuterol q 4 – 6 hours as needed, add budesonide  ID/Antibiotics: Zosyn, follow up cultures  Cardiac/HTN: Satisfactory   GI: Rx/ prophylaxis c PPI/H2B  Heme: Rx/VT receiving AC  Discussed with medical team PUD FOR DR MOTA    CC/ HPI Patient is a 78 year old female with hypertension, myeloproliferative disorder, pulmonary embolus, 2015, who was admitted with hydronephrosis and severe sepsis secondary to urinary tract infection.  She wants a new medication "Jakafi". Her CXR shows a LLL abnormality.    All new data reviewed, including VS, lab, images, Rx.    PAST MEDICAL & SURGICAL HISTORY:  Myeloproliferative disorder  Hypertension  PE/DVT (pulmonary thromboembolism): 2015  Tremor  Vestibular disequilibrium  S/P hysterectomy    SOCHX:   +tobacco,  -  alcohol    FMHX: FA/MO  - contributory     ROS reviewed below with positive findings marked (+) :  GEN:  fever, chills ENT: tracheostomy,   epistaxis,  sinusitis COR: CAD, CHF,  +HTN, dysrhythmia PUL: COPD, ILD, asthma, pneumonia GI: PEG, dysphagia, hemorrhage, other MAXIMO: kidney disease, electrolyte disorder HEM:  +anemia, +thrombus, coagulopathy, cancer ENDO:  thyroid disease, diabetes mellitus CNS:  dementia, stroke, seizure, PSY:  depression, anxiety, other      MEDICATIONS  (STANDING):  aspirin enteric coated 81 milliGRAM(s) Oral daily  piperacillin/tazobactam IVPB. 3.375 Gram(s) IV Intermittent every 6 hours  rivaroxaban 20 milliGRAM(s) Oral every 24 hours  sodium chloride 0.9%. 1000 milliLiter(s) (80 mL/Hr) IV Continuous <Continuous>    MEDICATIONS  (PRN):  acetaminophen   Tablet 650 milliGRAM(s) Oral every 6 hours PRN For Temp greater than 38.5 C (101.3 F)      Vital Signs Last 24 Hrs  T(C): 37.1 (27 Jul 2018 20:50), Max: 39 (27 Jul 2018 12:00)  T(F): 98.7 (27 Jul 2018 20:50), Max: 102.2 (27 Jul 2018 12:00)  HR: 92 (27 Jul 2018 20:50) (54 - 92)  BP: 135/78 (27 Jul 2018 20:50) (109/57 - 172/76)  BP(mean): 102 (27 Jul 2018 13:00) (78 - 123)  RR: 18 (27 Jul 2018 20:50) (15 - 52)  SpO2: 95% (27 Jul 2018 20:50) (93% - 99%)    in bed, very weak, slowly walking to bathroom  GENERAL:        uncomfortable,  - distress.  HEENT:            - trauma,  - icterus,  - injection,  - nasal discharge.  NECK:              - jugular venous distention, - thyromegaly.  LYMPH:           - lymphadenopathy, - masses.  RESP:               + crackles,   - rhonchi,   + wheezes.   COR:                S1S2   - gallops,  - rubs.  ABD:                bowel sounds,   soft, - tender, - distended.  EXT/MSC:         - cyanosis,  - clubbing,  - edema.    NEURO:             alert,   responds to stimuli.    ABG - ( 26 Jul 2018 16:43 )  pH, Arterial: 7.47  pH, Blood: x     /  pCO2: 29    /  pO2: 153   / HCO3: 20    / Base Excess: -2.2  /  SaO2: 99                              10.9   12.9  )-----------( 170      ( 27 Jul 2018 07:34 )             34.4     07-27    143  |  105  |  14  ----------------------------<  123<H>  3.6   |  25  |  0.80        Xray Chest (07.26.18)  Nodular opacities at the left lung base which may be related to atelectasis, infection, or inflammation.       CT Chest w/ IV Cont (09.14.17) Several new pulmonary nodules measuring up to 0.4 cm. Follow-up chest CT in one year is recommended to ensure stability.      ASSESSMENT/PLAN    1) Pyelonephritis with sepsis  2) Myelodysplastic syndrome  3) Pulmonary nodules  4) Atelectasis  5) Wheezing    Start triple nebulizer with A/I q6, budesonide 0.5 q12  Satisfactory SpO2, oxygen as needed  Obtain pa/lat CXR for nodule, will review  Incentive spirometer, mobilization  Bronchodilators:  Atrovent/ albuterol q 4 – 6 hours as needed, add budesonide  ID/Antibiotics: ceftriaxone for E. coli; d/c Martín  Cardiac/HTN: Satisfactory   GI: Rx/ prophylaxis c PPI/H2B  Heme: Rx/VT receiving AC  Discussed with medical team

## 2018-07-29 NOTE — PROGRESS NOTE ADULT - ASSESSMENT
77 yo F with hx of hypertension, Myeloproliferative disorder, DVT/PE (2015), previous hydronephrosis 2/2 obstruction who presents with weakness for 2 days, found to have severe sepsis 2/2 to UTI. Pt has been afebrile for > 48 hours and appears non-toxic.     Her diagnosis of MPN is unclear as there is conflicting diagnoses written in her chart (in some notes it says she has MDS). 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 I don't have a copy of her outpatient records at this time. She takes Jakafi 15 mg BID, last time she took the dose was on Tuesday.    #Severe Sepsis  Patient met criteria for severe sepsis upon admission, lactate has trended down. No fevers and not tachycardic. She is currently on IV Zosyn for UTI 2/2 E. Coli  -Continue w/ IV antibiotics as per primary medicine team    #MPN  Pt on Jakafi 15 mg BID, tolerated well in the past. Given active infection, will continue to hold however given improvement in clinical status and not having any fevers > 48 hours, I will discuss with Dr. Resendiz about resuming it.     -Withdrawal syndrome with Jakafi has been described. Acute relapse of myelofibrosis symptoms (eg, fever, respiratory distress, hypotension, DIC, multiorgan failure), splenomegaly, worsening cytopenias, hemodynamic compensation, and septic shock-like syndrome have been reported with treatment tapering or discontinuation (Panfilo 2011). Will d/w Dr. Resendiz about resuming Jakafi at possibly lower dose and titrating up.     #DVT/PE  -c/w Xarelto 20 mg daily    Case to be discussed w/ Dr. Resendiz

## 2018-07-29 NOTE — PROGRESS NOTE ADULT - SUBJECTIVE AND OBJECTIVE BOX
Heme/Onc Progress Note (Dr. Resendiz )  Will discuss plan with Dr. Resendiz	    The patient was seen and examined.      78 year old female with history of hypertension, myeloproliferative disorder, DVT/PE (2015), chronic UTIs and hydronephrosis who presented with 2 days of generalized fatigue and fevers. Patient states that for the past 2 days she has had fevers, decreased appetite, increased thirst, nausea, and increased urination. She has a history of hydronephrosis and chronic UTIs - most recent admit to [facility] with pan sensitive klebsiella. Today, her health care proxy and her friend noticed that the patient's skin felt warm to touch, and in conjunction with the patients other symptoms, brought her to the emergency department. The patient denies headache, dizziness, chest pain, SOB, vomiting, and leg pain. She does report feeling cold, having a dry mouth, and having shivers.     In the emergency department, patient was noted to have a positive UA. She was given 1L NS bolus, started on ceftriaxone, and the patient was transferred to the floor. Once on the regional medical floor, patient's vitals were temp 105.1 (rectally), , /92, RR 24, O2 sat 92% on RA. Rapid response called. Patient was given 2L NS bolus, tylenol x2 for fever. Resendiz was placed with evacuation of cloudy appearing urine. Bedside US with bilateral hydronephrosis, liver appeared WNL. Labs notable for WBC 20.1, bands of 11, Lactate of 3.6, and mild transaminitis. ABG with resp alkalosis. Lactate cleared after NS boluses. Patient was then transferred to MICU for further management. (26 Jul 2018 17:13)    Pt being treated for UTI/pyelonephritis with IV Zosyn. No bacteremia.    It is unclear from documentation if patient had MPN or MDS as both have been documented. My suspicion is that patient has history of Polycythemia Vera and it may have progressed to myelofibrosis. Pt on Jakafi 15 mg BID and follows with Dr. Resendiz as outpatient. Her last dose was on Tuesday, 7/24.   .        Medications:  MEDICATIONS  (STANDING):  aspirin enteric coated 81 milliGRAM(s) Oral daily  cefTRIAXone   IVPB 1 Gram(s) IV Intermittent every 24 hours  rivaroxaban 20 milliGRAM(s) Oral every 24 hours  sodium chloride 0.9%. 1000 milliLiter(s) (80 mL/Hr) IV Continuous <Continuous>    MEDICATIONS  (PRN):  acetaminophen   Tablet 650 milliGRAM(s) Oral every 6 hours PRN For Temp greater than 38.5 C (101.3 F)    aspirin enteric coated 81 milliGRAM(s) Oral daily  rivaroxaban 20 milliGRAM(s) Oral every 24 hours          PHYSICAL EXAM:    T(F): 97.5 (07-29-18 @ 15:08), Max: 99.1 (07-29-18 @ 09:00)  HR: 86 (07-29-18 @ 15:08) (65 - 86)  BP: 112/74 (07-29-18 @ 15:08) (112/74 - 146/91)  RR: 18 (07-29-18 @ 15:08) (16 - 18)  SpO2: 95% (07-29-18 @ 15:08) (93% - 98%)  Wt(kg): --    Daily     Daily     Gen: AAOx3, Non-toxic appearing   HEENT: normocephalic/atraumatic, no conjunctival pallor, no scleral icterus, dry mucous membranes   Neck: supple, no masses, no JVD  Cardiovascular: RR, nl S1S2, no murmurs/rubs/gallops  Respiratory: clear air entry b/l  Gastrointestinal: BS+, soft, NT/ND, no masses, no splenomegaly, no hepatomegaly, no evidence for ascites  Extremities: no clubbing/cyanosis, no edema, no calf tenderness  Vascular:  DP/PT 2+ b/l  Skin: no rashes, bruising noted at sites of blood draws         Labs:                          9.7    10.2  )-----------( 149      ( 28 Jul 2018 08:41 )             30.4     CBC Full  -  ( 28 Jul 2018 08:41 )  WBC Count : 10.2 K/uL  Hemoglobin : 9.7 g/dL  Hematocrit : 30.4 %  Platelet Count - Automated : 149 K/uL  Mean Cell Volume : 95.3 fL  Mean Cell Hemoglobin : 30.4 pg  Mean Cell Hemoglobin Concentration : 31.9 g/dL  Auto Neutrophil # : x  Auto Lymphocyte # : x  Auto Monocyte # : x  Auto Eosinophil # : x  Auto Basophil # : x  Auto Neutrophil % : 86.0 %  Auto Lymphocyte % : 7.4 %  Auto Monocyte % : 5.2 %  Auto Eosinophil % : 1.1 %  Auto Basophil % : 0.3 %        07-28    140  |  101  |  8   ----------------------------<  118<H>  3.1<L>   |  24  |  0.74    Ca    8.4      28 Jul 2018 08:41  Mg     2.0     07-28    TPro  5.7<L>  /  Alb  2.8<L>  /  TBili  1.3<H>  /  DBili  x   /  AST  48<H>  /  ALT  43  /  AlkPhos  81  07-28

## 2018-07-29 NOTE — PROGRESS NOTE ADULT - SUBJECTIVE AND OBJECTIVE BOX
OVERNIGHT EVENTS: KAREN    SUBJECTIVE / INTERVAL HPI: Patient seen and examined at bedside. Ms. Bond denied labs this AM. She was put on O2 this morning because her sats had dropped during the nurses rounds. However, this resolved and O2 was removed. Patient currently has a catheter and no suprapubic tenderness.     REVIEW OF SYSTEMS:    CONSTITUTIONAL: No fevers or chills  EYES/ENT: No visual or hearing changes; no throat pain or difficulty swallowing  NECK: No pain or stiffness  RESPIRATORY: No cough, wheezing; No shortness of breath  CARDIOVASCULAR: No chest pain or palpitations  GASTROINTESTINAL: No abdominal pain. No nausea, vomiting; No diarrhea or constipation.  GENITOURINARY: No dysuria, frequency or hematuria  NEUROLOGICAL: No numbness or weakness  SKIN: No itching, burning, rashes, or lesions   All other review of systems is negative unless indicated above.    VITAL SIGNS:  Vital Signs Last 24 Hrs  T(C): 37.3 (29 Jul 2018 09:00), Max: 37.3 (29 Jul 2018 09:00)  T(F): 99.1 (29 Jul 2018 09:00), Max: 99.1 (29 Jul 2018 09:00)  HR: 85 (29 Jul 2018 09:00) (65 - 85)  BP: 146/91 (29 Jul 2018 09:00) (114/73 - 146/91)  BP(mean): --  RR: 17 (29 Jul 2018 09:00) (16 - 18)  SpO2: 95% (29 Jul 2018 09:00) (93% - 98%)    PHYSICAL EXAM:    General: WDWN elderly female laying comfortably in bed  HEENT: NC/AT; PERRL, anicteric sclera; MMM  Neck: supple  Cardiovascular: +S1/S2, RRR  Respiratory: CTA B/L; no W/R/R  Gastrointestinal: soft, NT/ND; +BSx4  Extremities: WWP; no edema, clubbing or cyanosis  Vascular: 2+ radial, DP/PT pulses B/L  Neurological: AAOx3; no focal deficits    MEDICATIONS:  MEDICATIONS  (STANDING):  aspirin enteric coated 81 milliGRAM(s) Oral daily  cefTRIAXone   IVPB 1 Gram(s) IV Intermittent every 24 hours  rivaroxaban 20 milliGRAM(s) Oral every 24 hours  sodium chloride 0.9%. 1000 milliLiter(s) (80 mL/Hr) IV Continuous <Continuous>    MEDICATIONS  (PRN):  acetaminophen   Tablet 650 milliGRAM(s) Oral every 6 hours PRN For Temp greater than 38.5 C (101.3 F)      ALLERGIES:  Allergies    No Known Allergies    Intolerances        LABS:                        9.7    10.2  )-----------( 149      ( 28 Jul 2018 08:41 )             30.4     07-28    140  |  101  |  8   ----------------------------<  118<H>  3.1<L>   |  24  |  0.74    Ca    8.4      28 Jul 2018 08:41  Mg     2.0     07-28    TPro  5.7<L>  /  Alb  2.8<L>  /  TBili  1.3<H>  /  DBili  x   /  AST  48<H>  /  ALT  43  /  AlkPhos  81  07-28        CAPILLARY BLOOD GLUCOSE          RADIOLOGY & ADDITIONAL TESTS: Reviewed.

## 2018-07-29 NOTE — PROGRESS NOTE ADULT - SUBJECTIVE AND OBJECTIVE BOX
coverage for Dr. Tapia    Pt seen and examined   [place] in  no appetite    REVIEW OF SYSTEMS:  Constitutional: No fever,   Cardiovascular: No chest pain, palpitations, dizziness or leg swelling  Gastrointestinal: No abdominal or epigastric pain. No nausea, vomiting or hematemesis; No diarrhea  Skin: No itching, burning, rashes or lesions       MEDICATIONS:  MEDICATIONS  (STANDING):  aspirin enteric coated 81 milliGRAM(s) Oral daily  cefTRIAXone   IVPB 1 Gram(s) IV Intermittent every 24 hours  rivaroxaban 20 milliGRAM(s) Oral every 24 hours  sodium chloride 0.9%. 1000 milliLiter(s) (80 mL/Hr) IV Continuous <Continuous>    MEDICATIONS  (PRN):  acetaminophen   Tablet 650 milliGRAM(s) Oral every 6 hours PRN For Temp greater than 38.5 C (101.3 F)      Allergies    No Known Allergies    Intolerances        Vital Signs Last 24 Hrs  T(C): 37.3 (29 Jul 2018 09:00), Max: 37.3 (29 Jul 2018 09:00)  T(F): 99.1 (29 Jul 2018 09:00), Max: 99.1 (29 Jul 2018 09:00)  HR: 85 (29 Jul 2018 09:00) (65 - 85)  BP: 146/91 (29 Jul 2018 09:00) (114/73 - 146/91)  BP(mean): --  RR: 17 (29 Jul 2018 09:00) (16 - 18)  SpO2: 95% (29 Jul 2018 09:00) (93% - 98%)    07-28 @ 07:01  -  07-29 @ 07:00  --------------------------------------------------------  IN: 980 mL / OUT: 3500 mL / NET: -2520 mL        PHYSICAL EXAM:    General: thin in no acute distress  HEENT: MMM, conjunctiva and sclera clear  Lungs: clear  Heart: regular  Gastrointestinal: Soft non-tender non-distended; Normal bowel sounds; No hepatosplenomegaly  Skin: Warm and dry. No obvious rash    LABS:      CBC Full  -  ( 28 Jul 2018 08:41 )  WBC Count : 10.2 K/uL  Hemoglobin : 9.7 g/dL  Hematocrit : 30.4 %  Platelet Count - Automated : 149 K/uL  Mean Cell Volume : 95.3 fL  Mean Cell Hemoglobin : 30.4 pg  Mean Cell Hemoglobin Concentration : 31.9 g/dL  Auto Neutrophil # : x  Auto Lymphocyte # : x  Auto Monocyte # : x  Auto Eosinophil # : x  Auto Basophil # : x  Auto Neutrophil % : 86.0 %  Auto Lymphocyte % : 7.4 %  Auto Monocyte % : 5.2 %  Auto Eosinophil % : 1.1 %  Auto Basophil % : 0.3 %    07-28    140  |  101  |  8   ----------------------------<  118<H>  3.1<L>   |  24  |  0.74    Ca    8.4      28 Jul 2018 08:41  Mg     2.0     07-28    TPro  5.7<L>  /  Alb  2.8<L>  /  TBili  1.3<H>  /  DBili  x   /  AST  48<H>  /  ALT  43  /  AlkPhos  81  07-28        Culture - Urine (07.26.18 @ 14:38)    -  Ampicillin: S <=2 These ampicillin results predict results for amoxicillin    -  Ampicillin/Sulbactam: S <=4/2    -  Cefazolin: S <=2 This predicts results for oral agents cefaclor, cefdinir, cefpodoxime, cefprozil, cefuroxime axetil, cephalexin and locarbef for uncomplicated UTI. Note that some isolates may be susceptible to these agents while testing resistant to cefazolin.    -  Ceftriaxone: S <=1 Enterobacter, Citrobacter, and Serratia may develop resistance during prolonged therapy    -  Ciprofloxacin: S <=0.5    -  Piperacillin/Tazobactam: S <=8    -  Tobramycin: S <=2    -  Trimethoprim/Sulfamethoxazole: S <=0.5/9.5    -  Gentamicin: S <=1    -  Nitrofurantoin: S <=32 Should not be used to treat pyelonephritis    Specimen Source: .Urine Clean Catch (Midstream)    Culture Results:   >100,000 CFU/ml Escherichia coli    Organism Identification: Escherichia coli    Organism: Escherichia coli    Method Type: NIKITA                  RADIOLOGY & ADDITIONAL STUDIES (The following images were personally reviewed):

## 2018-07-29 NOTE — PROGRESS NOTE ADULT - PROBLEM SELECTOR PLAN 4
Mild Rhabdo - CK 3298 on admission  - Continue to follow  - Will hold home atorvastatin until CK downtrends. 720 this AM.

## 2018-07-30 ENCOUNTER — TRANSCRIPTION ENCOUNTER (OUTPATIENT)
Age: 78
End: 2018-07-30

## 2018-07-30 LAB
ANION GAP SERPL CALC-SCNC: 13 MMOL/L — SIGNIFICANT CHANGE UP (ref 5–17)
ANION GAP SERPL CALC-SCNC: 15 MMOL/L — SIGNIFICANT CHANGE UP (ref 5–17)
BUN SERPL-MCNC: 6 MG/DL — LOW (ref 7–23)
BUN SERPL-MCNC: 6 MG/DL — LOW (ref 7–23)
CALCIUM SERPL-MCNC: 9 MG/DL — SIGNIFICANT CHANGE UP (ref 8.4–10.5)
CALCIUM SERPL-MCNC: 9.1 MG/DL — SIGNIFICANT CHANGE UP (ref 8.4–10.5)
CHLORIDE SERPL-SCNC: 96 MMOL/L — SIGNIFICANT CHANGE UP (ref 96–108)
CHLORIDE SERPL-SCNC: 98 MMOL/L — SIGNIFICANT CHANGE UP (ref 96–108)
CO2 SERPL-SCNC: 26 MMOL/L — SIGNIFICANT CHANGE UP (ref 22–31)
CO2 SERPL-SCNC: 29 MMOL/L — SIGNIFICANT CHANGE UP (ref 22–31)
CREAT SERPL-MCNC: 0.6 MG/DL — SIGNIFICANT CHANGE UP (ref 0.5–1.3)
CREAT SERPL-MCNC: 0.64 MG/DL — SIGNIFICANT CHANGE UP (ref 0.5–1.3)
GLUCOSE SERPL-MCNC: 108 MG/DL — HIGH (ref 70–99)
GLUCOSE SERPL-MCNC: 122 MG/DL — HIGH (ref 70–99)
HCT VFR BLD CALC: 31.5 % — LOW (ref 34.5–45)
HGB BLD-MCNC: 10.3 G/DL — LOW (ref 11.5–15.5)
LYMPHOCYTES # BLD AUTO: 8 % — LOW (ref 13–44)
MAGNESIUM SERPL-MCNC: 2.2 MG/DL — SIGNIFICANT CHANGE UP (ref 1.6–2.6)
MCHC RBC-ENTMCNC: 31.2 PG — SIGNIFICANT CHANGE UP (ref 27–34)
MCHC RBC-ENTMCNC: 32.7 G/DL — SIGNIFICANT CHANGE UP (ref 32–36)
MCV RBC AUTO: 95.5 FL — SIGNIFICANT CHANGE UP (ref 80–100)
MONOCYTES NFR BLD AUTO: 4 % — SIGNIFICANT CHANGE UP (ref 2–14)
NEUTROPHILS NFR BLD AUTO: 77 % — SIGNIFICANT CHANGE UP (ref 43–77)
PLATELET # BLD AUTO: 170 K/UL — SIGNIFICANT CHANGE UP (ref 150–400)
POTASSIUM SERPL-MCNC: 2.4 MMOL/L — CRITICAL LOW (ref 3.5–5.3)
POTASSIUM SERPL-MCNC: 3.4 MMOL/L — LOW (ref 3.5–5.3)
POTASSIUM SERPL-SCNC: 2.4 MMOL/L — CRITICAL LOW (ref 3.5–5.3)
POTASSIUM SERPL-SCNC: 3.4 MMOL/L — LOW (ref 3.5–5.3)
RBC # BLD: 3.3 M/UL — LOW (ref 3.8–5.2)
RBC # FLD: 15.7 % — SIGNIFICANT CHANGE UP (ref 10.3–16.9)
SODIUM SERPL-SCNC: 138 MMOL/L — SIGNIFICANT CHANGE UP (ref 135–145)
SODIUM SERPL-SCNC: 139 MMOL/L — SIGNIFICANT CHANGE UP (ref 135–145)
WBC # BLD: 11.2 K/UL — HIGH (ref 3.8–10.5)
WBC # FLD AUTO: 11.2 K/UL — HIGH (ref 3.8–10.5)

## 2018-07-30 PROCEDURE — 93010 ELECTROCARDIOGRAM REPORT: CPT

## 2018-07-30 RX ORDER — CIPROFLOXACIN LACTATE 400MG/40ML
500 VIAL (ML) INTRAVENOUS EVERY 12 HOURS
Qty: 0 | Refills: 0 | Status: DISCONTINUED | OUTPATIENT
Start: 2018-07-30 | End: 2018-08-01

## 2018-07-30 RX ORDER — POTASSIUM CHLORIDE 20 MEQ
60 PACKET (EA) ORAL ONCE
Qty: 0 | Refills: 0 | Status: COMPLETED | OUTPATIENT
Start: 2018-07-30 | End: 2018-07-30

## 2018-07-30 RX ORDER — SODIUM CHLORIDE 9 MG/ML
1000 INJECTION INTRAMUSCULAR; INTRAVENOUS; SUBCUTANEOUS
Qty: 0 | Refills: 0 | Status: DISCONTINUED | OUTPATIENT
Start: 2018-07-30 | End: 2018-07-31

## 2018-07-30 RX ORDER — POTASSIUM CHLORIDE 20 MEQ
10 PACKET (EA) ORAL
Qty: 0 | Refills: 0 | Status: DISCONTINUED | OUTPATIENT
Start: 2018-07-30 | End: 2018-07-30

## 2018-07-30 RX ORDER — ACETAMINOPHEN 500 MG
650 TABLET ORAL ONCE
Qty: 0 | Refills: 0 | Status: COMPLETED | OUTPATIENT
Start: 2018-07-30 | End: 2018-07-31

## 2018-07-30 RX ORDER — SODIUM CHLORIDE 9 MG/ML
1000 INJECTION INTRAMUSCULAR; INTRAVENOUS; SUBCUTANEOUS
Qty: 0 | Refills: 0 | Status: DISCONTINUED | OUTPATIENT
Start: 2018-07-30 | End: 2018-07-30

## 2018-07-30 RX ORDER — POTASSIUM CHLORIDE 20 MEQ
40 PACKET (EA) ORAL EVERY 4 HOURS
Qty: 0 | Refills: 0 | Status: DISCONTINUED | OUTPATIENT
Start: 2018-07-30 | End: 2018-07-30

## 2018-07-30 RX ADMIN — Medication 81 MILLIGRAM(S): at 12:15

## 2018-07-30 RX ADMIN — RIVAROXABAN 20 MILLIGRAM(S): KIT at 09:53

## 2018-07-30 RX ADMIN — Medication 60 MILLIEQUIVALENT(S): at 22:40

## 2018-07-30 RX ADMIN — Medication 60 MILLIEQUIVALENT(S): at 18:27

## 2018-07-30 RX ADMIN — Medication 60 MILLIEQUIVALENT(S): at 17:14

## 2018-07-30 RX ADMIN — SODIUM CHLORIDE 80 MILLILITER(S): 9 INJECTION INTRAMUSCULAR; INTRAVENOUS; SUBCUTANEOUS at 09:53

## 2018-07-30 RX ADMIN — Medication 500 MILLIGRAM(S): at 17:14

## 2018-07-30 NOTE — DISCHARGE NOTE ADULT - CARE PLAN
Principal Discharge DX:	Severe sepsis  Goal:	Resolution of sepsis  Assessment and plan of treatment:	Sepsis is a condition in which an infection in your body triggers a systemic reaction. This condition is treated with intravenous antibiotics and intravenous fluid hydration. The source of your infection Principal Discharge DX:	Severe sepsis  Goal:	Resolution of sepsis  Assessment and plan of treatment:	Sepsis is a condition in which an infection in your body triggers a systemic reaction. This condition is treated with intravenous antibiotics and intravenous fluid hydration. The source of your infection is thought to have originated in your urinary tract. Please take oral cefpodoxime and follow up outpatient with  Secondary Diagnosis:	UTI (urinary tract infection)  Assessment and plan of treatment:	During your hospital stay you were found to have a urinary tract infection. You were treated with intravenous antibiotics. Once discharged, you will continue taking an oral form of the antibiotic and will follow up with your primary care doctor.  Secondary Diagnosis:	Hydronephrosis  Assessment and plan of treatment:	During your hospital stay you were found to have hydronephrosis, a condition characterized by increased fluid in your kidneys due to back up urine. You were treated with intravenous hydration and the placement of a Resendiz catheter to allow for passage of urine. Please follow up with urologist for further treatment.  Secondary Diagnosis:	Myelodysplasia (myelodysplastic syndrome)  Assessment and plan of treatment:	Please follow up with Dr. Resendiz for further treatement.  Secondary Diagnosis:	History of DVT (deep vein thrombosis)  Assessment and plan of treatment:	Due to your history of deep venous thrombosis you were kept on your home medication xeralto for the duration of your hospital stay. Please follow up with your primary care provider for further treatment.  Secondary Diagnosis:	Hypertension  Assessment and plan of treatment:	You were found to have high blood pressure. This condition was monitored during your stay. Your home medication Norvasc was held due to your infection. Please resume this medication upon discharge and follow up with your primary care provider for further treatment.  Secondary Diagnosis:	Coronary artery disease  Assessment and plan of treatment:	Due to your history of coronary artery disease you were kept on your home medication aspirin. Please continue taking aspirin upon discharge and follow up with your primary care provider for further treatment. Principal Discharge DX:	Severe sepsis  Goal:	Resolution of sepsis  Assessment and plan of treatment:	Sepsis is a condition in which an infection in your body triggers a systemic reaction. This condition is treated with intravenous antibiotics and intravenous fluid hydration. The source of your infection is thought to have originated in your urinary tract. Please take oral ciprofloxacin 500 mg twice daily for 5 days and follow up outpatient with  Secondary Diagnosis:	UTI (urinary tract infection)  Assessment and plan of treatment:	During your hospital stay you were found to have a urinary tract infection. You were treated with intravenous antibiotics. Once discharged, you will continue taking an oral form of the antibiotic and will follow up with your primary care doctor.  Secondary Diagnosis:	Hydronephrosis  Assessment and plan of treatment:	During your hospital stay you were found to have hydronephrosis, a condition characterized by increased fluid in your kidneys due to back up urine. You were treated with intravenous hydration and the placement of a Resendiz catheter to allow for passage of urine. Please follow up with urologist for further treatment.  Secondary Diagnosis:	Myelodysplasia (myelodysplastic syndrome)  Assessment and plan of treatment:	Please follow up with Dr. Resendiz for further treatement.  Secondary Diagnosis:	History of DVT (deep vein thrombosis)  Assessment and plan of treatment:	Due to your history of deep venous thrombosis you were kept on your home medication xeralto for the duration of your hospital stay. Please follow up with your primary care provider for further treatment.  Secondary Diagnosis:	Hypertension  Assessment and plan of treatment:	You were found to have high blood pressure. This condition was monitored during your stay. Your home medication Norvasc was held due to your infection. Please resume this medication upon discharge and follow up with your primary care provider for further treatment.  Secondary Diagnosis:	Coronary artery disease  Assessment and plan of treatment:	Due to your history of coronary artery disease you were kept on your home medication aspirin. Please continue taking aspirin upon discharge and follow up with your primary care provider for further treatment. Principal Discharge DX:	Severe sepsis  Goal:	Resolution of sepsis  Assessment and plan of treatment:	Sepsis is a condition in which an infection in your body triggers a systemic reaction. This condition is treated with intravenous antibiotics and intravenous fluid hydration. The source of your infection is thought to have originated in your urinary tract. Please take oral ciprofloxacin 500 mg twice daily for 5 days and follow up outpatient with your primary care provider Dr. Tapia.  Secondary Diagnosis:	UTI (urinary tract infection)  Assessment and plan of treatment:	During your hospital stay you were found to have a urinary tract infection. You were treated with intravenous antibiotics. Once discharged, you will continue taking an oral form of the antibiotic and will follow up with your primary care provider Dr. Tapia and your urologist Dr. Yoon.  Secondary Diagnosis:	Hydronephrosis  Assessment and plan of treatment:	During your hospital stay you were found to have hydronephrosis, a condition characterized by increased fluid in your kidneys due to back up urine. You were treated with intravenous hydration and the placement of a Resendiz catheter to allow for passage of urine. Please follow up with your urologist Dr. Yoon for further treatment.  Secondary Diagnosis:	Myelodysplasia (myelodysplastic syndrome)  Assessment and plan of treatment:	Please follow up with Dr. Resendiz for further treatement.  Secondary Diagnosis:	History of DVT (deep vein thrombosis)  Assessment and plan of treatment:	Due to your history of deep venous thrombosis you were kept on your home medication xeralto for the duration of your hospital stay. Please follow up with your primary care provider Dr. Tapia for further treatment.  Secondary Diagnosis:	Hypertension  Assessment and plan of treatment:	You were found to have high blood pressure. This condition was monitored during your stay. Your home medication Norvasc was held due to your infection. Please resume this medication upon discharge and follow up with your primary care provider Dr. Tapia for further treatment.  Secondary Diagnosis:	Coronary artery disease  Assessment and plan of treatment:	Due to your history of coronary artery disease you were kept on your home medication aspirin. Please continue taking aspirin upon discharge and follow up with your primary care provider Dr. Tapia for further treatment. Principal Discharge DX:	Severe sepsis  Goal:	To continue antibiotics as prescribed.  Assessment and plan of treatment:	Sepsis is a condition in which an infection in your body triggers a systemic reaction. This condition is treated with intravenous antibiotics and intravenous fluid hydration. The source of your infection is thought to have originated in your urinary tract. Please take oral ciprofloxacin 500 mg twice daily for 5 days and follow up outpatient with your primary care provider Dr. Tapia.  Secondary Diagnosis:	UTI (urinary tract infection)  Assessment and plan of treatment:	During your hospital stay you were found to have a urinary tract infection. You were treated with intravenous antibiotics. Once discharged, you will continue taking an oral form of the antibiotic and will follow up with your primary care provider Dr. Tapia and your urologist Dr. Yoon.  Secondary Diagnosis:	Hydronephrosis  Assessment and plan of treatment:	You have a history of hydronephrosis, a condition characterized by increased fluid in your kidneys due to back up urine. You were treated with intravenous hydration and the placement of a Resendiz catheter to allow for passage of urine. Please follow up with your urologist Dr. Yoon for further treatment.  Secondary Diagnosis:	Myelodysplasia (myelodysplastic syndrome)  Assessment and plan of treatment:	Please follow up with Dr. Resendiz for further treatement.  Secondary Diagnosis:	History of DVT (deep vein thrombosis)  Assessment and plan of treatment:	Due to your history of deep venous thrombosis you were kept on your home medication xeralto for the duration of your hospital stay. Please follow up with your primary care provider Dr. Tapia for further treatment.  Secondary Diagnosis:	Hypertension  Assessment and plan of treatment:	You were found to have high blood pressure. This condition was monitored during your stay. Your home medication Norvasc was held due to your infection. Please resume this medication upon discharge and follow up with your primary care provider Dr. Tapia for further treatment.  Secondary Diagnosis:	Coronary artery disease  Assessment and plan of treatment:	Due to your history of coronary artery disease you were kept on your home medication aspirin. Please continue taking aspirin upon discharge and follow up with your primary care provider Dr. Tapia for further treatment. Principal Discharge DX:	Severe sepsis  Goal:	To continue antibiotics as prescribed.  Assessment and plan of treatment:	Sepsis is a condition in which an infection in your body triggers a systemic reaction. This condition is treated with intravenous antibiotics and intravenous fluid hydration. The source of your infection is thought to have originated in your urinary tract. Please take oral ciprofloxacin 500 mg twice daily for 5 days and follow up outpatient with your primary care provider Dr. Tapia.  Secondary Diagnosis:	UTI (urinary tract infection)  Assessment and plan of treatment:	During your hospital stay you were found to have a urinary tract infection. You were treated with intravenous antibiotics. Once discharged, you will continue taking an oral form of the antibiotic and will follow up with your primary care provider Dr. Tapia and your urologist Dr. Yoon.  Secondary Diagnosis:	Hydronephrosis  Assessment and plan of treatment:	You have a history of hydronephrosis, a condition characterized by increased fluid in your kidneys due to back up urine. You were treated with intravenous hydration and the placement of a Resendiz catheter to allow for passage of urine. You had some imaging done as per urology recommendations and no urgent intervention was recommended. You are being discharged with a Resendiz catheter. Please follow up with your urologist Dr. Yoon for further treatment.  Secondary Diagnosis:	Myelodysplasia (myelodysplastic syndrome)  Assessment and plan of treatment:	Please follow up with Dr. Resendiz for further treatement.  Secondary Diagnosis:	History of DVT (deep vein thrombosis)  Assessment and plan of treatment:	Due to your history of deep venous thrombosis you were kept on your home medication xeralto for the duration of your hospital stay. Please follow up with your primary care provider Dr. Tapia for further treatment.  Secondary Diagnosis:	Hypertension  Assessment and plan of treatment:	You were found to have high blood pressure. This condition was monitored during your stay. Your home medication Norvasc was held due to your infection. Please resume this medication upon discharge and follow up with your primary care provider Dr. Tapia for further treatment.  Secondary Diagnosis:	Coronary artery disease  Assessment and plan of treatment:	Due to your history of coronary artery disease you were kept on your home medication aspirin. Please continue taking aspirin upon discharge and follow up with your primary care provider Dr. Tapia for further treatment. Principal Discharge DX:	Severe sepsis  Goal:	To continue antibiotics as prescribed.  Assessment and plan of treatment:	Sepsis is a condition in which an infection in your body triggers a systemic reaction. This condition is treated with intravenous antibiotics and intravenous fluid hydration. The source of your infection is thought to have originated in your urinary tract. Please take oral ciprofloxacin 500 mg twice daily for 5 days and follow up outpatient with your primary care provider Dr. Tapia.  Secondary Diagnosis:	UTI (urinary tract infection)  Assessment and plan of treatment:	During your hospital stay you were found to have a urinary tract infection. You were treated with intravenous antibiotics. Once discharged, you will continue taking an oral form of the antibiotic and will follow up with your primary care provider Dr. Tapia and your urologist Dr. Yoon.  Secondary Diagnosis:	Hydronephrosis  Assessment and plan of treatment:	You have a history of hydronephrosis, a condition characterized by increased fluid in your kidneys due to back up urine. You were treated with intravenous hydration and the placement of a Resendiz catheter to allow for passage of urine. You had some imaging done as per urology recommendations and no urgent intervention was recommended. You are being discharged with a Resendiz catheter. Please follow up with your urologist Dr. Yoon for further treatment.  Secondary Diagnosis:	Myelodysplasia (myelodysplastic syndrome)  Assessment and plan of treatment:	Please follow up with Dr. Resendiz for further treatement.  Secondary Diagnosis:	History of DVT (deep vein thrombosis)  Assessment and plan of treatment:	Due to your history of deep venous thrombosis you were kept on your home medication xeralto for the duration of your hospital stay. Please follow up with your primary care provider Dr. Tapia for further treatment.  Secondary Diagnosis:	Hypertension  Goal:	t  Assessment and plan of treatment:	You were found to have high blood pressure. This condition was monitored during your stay. Your home medication Norvasc was held due to your infection. Please resume this medication upon discharge and follow up with your primary care provider Dr. Tapia for further treatment.  Secondary Diagnosis:	Coronary artery disease  Assessment and plan of treatment:	Due to your history of coronary artery disease you were kept on your home medication aspirin. Please continue taking aspirin upon discharge and follow up with your primary care provider Dr. Tapia for further treatment.

## 2018-07-30 NOTE — DISCHARGE NOTE ADULT - MEDICATION SUMMARY - MEDICATIONS TO TAKE
I will START or STAY ON the medications listed below when I get home from the hospital:    Aspirin Enteric Coated 81 mg oral delayed release tablet  -- 1 tab(s) by mouth once a day  -- Indication: For Coronary artery disease    Xarelto 20 mg oral tablet  -- 1 tab(s) by mouth once a day (in the evening)  -- Indication: For Myelodysplasia (myelodysplastic syndrome)    Crestor 10 mg oral tablet  -- 1 tab(s) by mouth once a day (at bedtime)  -- Indication: For Hypertension    Norvasc 5 mg oral tablet  -- 1 tab(s) by mouth once a day  -- Indication: For Hypertension    Jakafi 15 mg oral tablet  -- 1 tab(s) by mouth 2 times a day  -- Indication: For Myelodysplasia (myelodysplastic syndrome)    Cipro 500 mg oral tablet  -- 1 tab(s) by mouth every 12 hours  -- Indication: For UTI (urinary tract infection)    buPROPion 75 mg oral tablet  -- 1 tab(s) by mouth once a day  -- Indication: For Depression

## 2018-07-30 NOTE — DISCHARGE NOTE ADULT - NSFTFSERV1RD_GEN_ALL_CORE
rehabilitation services/other:/teaching and training/medication teaching and assessment/observation and assessment

## 2018-07-30 NOTE — CHART NOTE - NSCHARTNOTEFT_GEN_A_CORE
Patient has potassium level of 2.4. She refused to have IV repletion of potassium due to burning pain during administration that occurred last time. She is willing to swallow multiple pills in order to replete. She was counseled extensively of the risks of low potassium and understands it can lead to cardiac arrhythmias and death. She understands that potassium chloride tablets have a slower onset of action than IV repletion. An EKG will be performed STAT and the potassium will be repleted by a series of potassium chloride tablets. BMP will be rechecked at 10PM. 77 yo F admitted for severe sepsis 2/2 UTI which has now resolved, hospital course c/b poor PO intake. This AM patient refused labs, she was explained the importance of repeating blood work given her recent sepsis, UTI and hypokalemia on previous labs.   She agreed to have blood work done this afternoon. Her BMP had a low potassium of 2.4. I ordered IV potassium along with PO however patient refused IV repletion of potassium due to burning pain during administration that occurred last time. She is willing to swallow multiple pills in order to replete. She was counseled extensively of the risks of low potassium and understands it can lead to cardiac arrhythmias and death. She understands that potassium chloride tablets have a slower onset of action than IV repletion. An EKG was performed with showed T wave flattening in anterior leads, no GA prolongation, no U waves seen, no ST depressions, QTc 469.  Will replete with 60 KCL X3 and repeat BMP at 8 pm. Will inform attending.

## 2018-07-30 NOTE — PROGRESS NOTE ADULT - SUBJECTIVE AND OBJECTIVE BOX
CC/ HPI Patient is a 78 year old female with hypertension, myeloproliferative disorder, pulmonary embolus, 2015, who was admitted with hydronephrosis and severe sepsis secondary to urinary tract infection, new pulmonary nodule on chest radiograph, seen this morning denies respiratory complaint.    PAST MEDICAL & SURGICAL HISTORY:  Myeloproliferative disorder  Hypertension  PE/DVT (pulmonary thromboembolism): 2015  Tremor  Vestibular disequilibrium  S/P hysterectomy    SOCHX:   +tobacco,  -  alcohol    FMHX: FA/MO  - contributory     ROS reviewed below with positive findings marked (+) :  GEN:  fever, chills ENT: tracheostomy,   epistaxis,  sinusitis COR: CAD, CHF,  +HTN, dysrhythmia PUL: COPD, ILD, asthma, pneumonia GI: PEG, dysphagia, hemorrhage, other MAXIMO: kidney disease, electrolyte disorder HEM:  +anemia, +thrombus, coagulopathy, cancer ENDO:  thyroid disease, diabetes mellitus CNS:  dementia, stroke, seizure, PSY:  depression, anxiety, other       MEDICATIONS  (STANDING):  aspirin enteric coated 81 milliGRAM(s) Oral daily  cefTRIAXone   IVPB 1 Gram(s) IV Intermittent every 24 hours  rivaroxaban 20 milliGRAM(s) Oral every 24 hours    MEDICATIONS  (PRN):  acetaminophen   Tablet 650 milliGRAM(s) Oral every 6 hours PRN For Temp greater than 38.5 C (101.3 F)      Vital Signs Last 24 Hrs  T(C): 36.6 (30 Jul 2018 08:53), Max: 36.8 (29 Jul 2018 21:34)  T(F): 97.8 (30 Jul 2018 08:53), Max: 98.3 (29 Jul 2018 21:34)  HR: 77 (30 Jul 2018 08:53) (77 - 86)  BP: 134/84 (30 Jul 2018 08:53) (112/74 - 147/84)  BP(mean): --  RR: 16 (30 Jul 2018 08:53) (16 - 20)  SpO2: 93% (30 Jul 2018 08:53) (93% - 95%)    GENERAL:         comfortable,  - distress.  HEENT:            - trauma,  - icterus,  - injection,  - nasal discharge.  NECK:              - jugular venous distention, - thyromegaly.  LYMPH:           - lymphadenopathy, - masses.  RESP:               + crackles,   - rhonchi,   - wheezes.   COR:                S1S2   - gallops,  - rubs.  ABD:                bowel sounds,   soft, - tender, - distended, - organomegaly.  EXT/MSC:         - cyanosis,  - clubbing,  - edema.    NEURO:             alert,   responds to stimuli.          Xray Chest (07.26.18)  Nodular opacities at the left lung base which may be related to atelectasis, infection, or inflammation.       CT Chest w/ IV Cont (09.14.17) Several new pulmonary nodules measuring up to 0.4 cm. Follow-up chest CT in one year is recommended to ensure stability.      ASSESSMENT/PLAN    1) Status post pyelonephritis with sepsis  2) Myelodysplastic syndrome  3) Pulmonary nodules  4) Atelectasis    Recommend CT scan of the chest as an outpatient.  Satisfactory SpO2  Incentive spirometer  Bronchodilators:  Atrovent/ albuterol q 4 – 6 hours as needed  ID/Antibiotics: ceftriaxone  GI: Rx/ prophylaxis c PPI/H2B  Heme: Rx/VT receiving AC  Discussed with Dr. Tapia

## 2018-07-30 NOTE — DISCHARGE NOTE ADULT - OTHER SIGNIFICANT FINDINGS
EXAM:  NM KIDNEY IMG FLOW FUNC WO RX                        PROCEDURE DATE:  07/28/2018    Impression: Diminished renal function, somewhat worse on the right than   the left. There is no evidence of obstruction. A Resendiz catheter was in   place during thisexam.    EXAM:  CT ABDOMEN AND PELVIS                        PROCEDURE DATE:  07/27/2018    IMPRESSION:  1.  Perinephric fat stranding and minimal fluid surrounding both kidneys   that may suggest an ascending infectious or inflammatory process. No   obstructive ureteral stone is noted. The bladder again has wall   thickening and perivesicular fat stranding to suggest possible cystitis.   Resendiz catheter in situ.  2.  Region of centrilobular and tree in bud nodularity in the left lower   lobe and lingula, which is most suggestive of terminal bronchiolitis, and   likely of infectious or inflammatory etiology. Bronchial wall thickening   and linear atelectatic change in the right lower lobe is also noted.  3.  Infrarenal abdominal aneurysms.     EXAM:  XR CHEST PORTABLE IMMED 1V                        PROCEDURE DATE:  07/26/2018    IMPRESSION:  Nodular opacities at the left lung base which may be related to   atelectasis, infection, or inflammation.     EXAM:  CT ABDOMEN AND PELVIS IC                        PROCEDURE DATE:  01/27/2018    IMPRESSION:   1.  Circumferential wall thickening extending from the mid descending   colon through the distal sigmoid colon, where wall thickening is most   pronounced with associated intraluminal narrowing. There is mild adjacent   pericolic fat stranding. There are scattered colonic diverticula within   this region however the long segment involvement is most suggestive for a   colitis as oppose to a diverticulitis. Few scattered foci of gas which   appear extraluminal location within the pelvis may indicate a small   microperforation.  2.  Distended urinary bladder with mild bilateral hydronephrosis. Bladder   wall thickening with prominent adjacent perivesicular fat stranding.   Correlate clinically for cystitis/renal infection.  3.  Unchanged abdominal aortic aneurysm measuring 3.4 cm.    EXAM:  XR CHEST PORTABLE URGENT 1V                        PROCEDURE DATE:  01/26/2018    Impression: No acute infiltrates    EXAM:  US DPLX LWR EXT VEINS COMPL BI                        PROCEDURE DATE:  01/26/2018    IMPRESSION:  Nonocclusive deep vein thrombosis of the right common femoral vein, not   previously identified. Interval decrease in flow about the deep vein   thrombosis in the right proximal femoral vein, now demonstrating minimal   blood flow. No significant change in right mid and distal femoral vein   deep vein thrombosis. No significant change in deep vein thrombosis of   the right popliteal vein.

## 2018-07-30 NOTE — DISCHARGE NOTE ADULT - SECONDARY DIAGNOSIS.
UTI (urinary tract infection) Hydronephrosis Myelodysplasia (myelodysplastic syndrome) History of DVT (deep vein thrombosis) Hypertension Coronary artery disease

## 2018-07-30 NOTE — DISCHARGE NOTE ADULT - HOSPITAL COURSE
78 year old female with history of hypertension, myeloproliferative disorder, DVT/PE (2015), chronic UTIs and hydronephrosis who presented with 2 days of generalized fatigue and fevers. Patient states that for the past 2 days she has had fevers, decreased appetite, increased thirst, nausea, and increased urination. She has a history of hydronephrosis and chronic UTIs with pan sensitive klebsiella. Today, her health care proxy and her friend noticed that the patient's skin felt warm to touch, and in conjunction with the patients other symptoms, brought her to the emergency department. The patient denies headache, dizziness, chest pain, SOB, vomiting, and leg pain. She does report feeling cold, having a dry mouth, and having shivers.     In the emergency department, patient was noted to have a positive UTI. She was given 1L NS bolus, started on ceftriaxone, and the patient was transferred to the floor. Once on the regional medical floor, patient's vitals were temp 105.1 (rectally), , /92, RR 24, O2 sat 92% on RA. Rapid response called. Patient was given 2L NS bolus, tylenol x2 for fever. Resendiz was placed with evacuation of cloudy appearing urine. Bedside US with bilateral hydronephrosis, liver appeared WNL. Labs notable for WBC 20.1, bands of 11, Lactate of 3.6, and mild transaminitis. ABG with resp alkalosis. Lactate cleared after NS boluses. Patient was then transferred to MICU for further management. In MICU patient given IVF resuscitation and started on Zosyn. Patient hemodynamically stable for transfer to [facility]. On [facility], urine culture showed E. coli sensitive to ceftriaxone and she was switched from Zosyn to ceftriaxone. CT abd/pelvis with b/l hydronephrosis UNCHANGED from prior imaging, discussed with radiology and urology, no interventions for now. NM kidney showed diminished renal function, somewhat worse on the right than the left. There is no evidence of obstruction. A Resendiz catheter was in place during this exam. Patient is hemodynamically stable for discharge home with outpatient follow up. 78 year old female with history of hypertension, myeloproliferative disorder, DVT/PE (2015), chronic UTIs and hydronephrosis who presented with 2 days of generalized fatigue and fevers. Patient states that for the past 2 days she has had fevers, decreased appetite, increased thirst, nausea, and increased urination. She has a history of hydronephrosis and chronic UTIs with pan sensitive klebsiella. Today, her health care proxy and her friend noticed that the patient's skin felt warm to touch, and in conjunction with the patients other symptoms, brought her to the emergency department. The patient denies headache, dizziness, chest pain, SOB, vomiting, and leg pain. She does report feeling cold, having a dry mouth, and having shivers.     In the emergency department, patient was noted to have a positive UTI. She was given 1L NS bolus, started on ceftriaxone, and the patient was transferred to the floor. Once on the regional medical floor, patient's vitals were temp 105.1 (rectally), , /92, RR 24, O2 sat 92% on RA. Rapid response called. Patient was given 2L NS bolus, tylenol x2 for fever. Resendiz was placed with evacuation of cloudy appearing urine. Bedside US with bilateral hydronephrosis, liver appeared WNL. Labs notable for WBC 20.1, bands of 11, Lactate of 3.6, and mild transaminitis. ABG with resp alkalosis. Lactate cleared after NS boluses. Patient was then transferred to MICU for further management. In MICU patient given IVF resuscitation and started on Zosyn. Patient hemodynamically stable for transfer to [facility]. On [facility], urine culture showed E. coli sensitive to ceftriaxone and she was switched from Zosyn to ceftriaxone. CT abd/pelvis with b/l hydronephrosis UNCHANGED from prior imaging, discussed with radiology and urology, no interventions for now. NM kidney showed diminished renal function, somewhat worse on the right than the left. There is no evidence of obstruction. A Resendiz catheter was in place during this exam. Patient failed trial of void on 7/30 and was in discomfort overnight as she was retaining between 400-800 ccs of urine between being straight cathed. On 7/30 she also had episode of hypokalemia to 2.4. An EKG was performed and did not show any irregularities. She was given potassium supplements and her serum levels normalized. Patient is hemodynamically stable for discharge home with Resendiz catheter with outpatient follow up. 78 year old female with history of hypertension, myeloproliferative disorder, DVT/PE (2015), chronic UTIs and hydronephrosis who presented with 2 days of generalized fatigue and fevers. Patient states that for the past 2 days she has had fevers, decreased appetite, increased thirst, nausea, and increased urination. She has a history of hydronephrosis and chronic UTIs with pan sensitive klebsiella. Today, her health care proxy and her friend noticed that the patient's skin felt warm to touch, and in conjunction with the patients other symptoms, brought her to the emergency department. The patient denies headache, dizziness, chest pain, SOB, vomiting, and leg pain. She does report feeling cold, having a dry mouth, and having shivers.     In the emergency department, patient was noted to have a positive UTI. She was given 1L NS bolus, started on ceftriaxone, and the patient was transferred to the floor. Once on the regional medical floor, patient's vitals were temp 105.1 (rectally), , /92, RR 24, O2 sat 92% on RA. Rapid response called. Patient was given 2L NS bolus, tylenol x2 for fever. Resendiz was placed with evacuation of cloudy appearing urine. Bedside US with bilateral hydronephrosis, liver appeared WNL. Labs notable for WBC 20.1, bands of 11, Lactate of 3.6, and mild transaminitis. ABG with resp alkalosis. Lactate cleared after NS boluses. Patient was then transferred to MICU for further management. In MICU patient given IVF resuscitation and started on Zosyn. Patient hemodynamically stable for transfer to [facility]. On [facility], urine culture showed E. coli sensitive to ceftriaxone and she was switched from Zosyn to ceftriaxone. CT abd/pelvis with b/l hydronephrosis UNCHANGED from prior imaging, discussed with radiology and urology, no interventions for now. NM kidney showed diminished renal function, somewhat worse on the right than the left. There is no evidence of obstruction. A Resendiz catheter was in place during this exam. Patient failed trial of void on 7/30 and was in discomfort overnight as she was retaining between 400-800 ccs of urine between being straight cathed.  Patient is hemodynamically stable for discharge home with Resendiz catheter with outpatient follow up with urology.

## 2018-07-30 NOTE — DISCHARGE NOTE ADULT - CARE PROVIDERS DIRECT ADDRESSES
,DirectAddress_Unknown,[email or web address],DirectAddress_Unknown ,DirectAddress_Unknown,[email or web address]),DirectAddress_Unknown,DirectAddress_Unknown

## 2018-07-30 NOTE — DISCHARGE NOTE ADULT - PLAN OF CARE
Sepsis is a condition in which an infection in your body triggers a systemic reaction. This condition is treated with intravenous antibiotics and intravenous fluid hydration. The source of your infection Resolution of sepsis Sepsis is a condition in which an infection in your body triggers a systemic reaction. This condition is treated with intravenous antibiotics and intravenous fluid hydration. The source of your infection is thought to have originated in your urinary tract. Please take oral cefpodoxime and follow up outpatient with During your hospital stay you were found to have a urinary tract infection. You were treated with intravenous antibiotics. Once discharged, you will continue taking an oral form of the antibiotic and will follow up with your primary care doctor. During your hospital stay you were found to have hydronephrosis, a condition characterized by increased fluid in your kidneys due to back up urine. You were treated with intravenous hydration and the placement of a Resendiz catheter to allow for passage of urine. Please follow up with urologist for further treatment. Please follow up with Dr. Resendiz for further treatement. Due to your history of deep venous thrombosis you were kept on your home medication xeralto for the duration of your hospital stay. Please follow up with your primary care provider for further treatment. You were found to have high blood pressure. This condition was monitored during your stay. Your home medication Norvasc was held due to your infection. Please resume this medication upon discharge and follow up with your primary care provider for further treatment. Due to your history of coronary artery disease you were kept on your home medication aspirin. Please continue taking aspirin upon discharge and follow up with your primary care provider for further treatment. Sepsis is a condition in which an infection in your body triggers a systemic reaction. This condition is treated with intravenous antibiotics and intravenous fluid hydration. The source of your infection is thought to have originated in your urinary tract. Please take oral ciprofloxacin 500 mg twice daily for 5 days and follow up outpatient with Sepsis is a condition in which an infection in your body triggers a systemic reaction. This condition is treated with intravenous antibiotics and intravenous fluid hydration. The source of your infection is thought to have originated in your urinary tract. Please take oral ciprofloxacin 500 mg twice daily for 5 days and follow up outpatient with your primary care provider Dr. Tapia. During your hospital stay you were found to have a urinary tract infection. You were treated with intravenous antibiotics. Once discharged, you will continue taking an oral form of the antibiotic and will follow up with your primary care provider Dr. Tapia and your urologist Dr. Yoon. During your hospital stay you were found to have hydronephrosis, a condition characterized by increased fluid in your kidneys due to back up urine. You were treated with intravenous hydration and the placement of a Resendiz catheter to allow for passage of urine. Please follow up with your urologist Dr. Yoon for further treatment. Due to your history of deep venous thrombosis you were kept on your home medication xeralto for the duration of your hospital stay. Please follow up with your primary care provider Dr. Tapia for further treatment. You were found to have high blood pressure. This condition was monitored during your stay. Your home medication Norvasc was held due to your infection. Please resume this medication upon discharge and follow up with your primary care provider Dr. Tapia for further treatment. Due to your history of coronary artery disease you were kept on your home medication aspirin. Please continue taking aspirin upon discharge and follow up with your primary care provider Dr. Tapia for further treatment. To continue antibiotics as prescribed. You have a history of hydronephrosis, a condition characterized by increased fluid in your kidneys due to back up urine. You were treated with intravenous hydration and the placement of a Resendiz catheter to allow for passage of urine. Please follow up with your urologist Dr. Yoon for further treatment. You have a history of hydronephrosis, a condition characterized by increased fluid in your kidneys due to back up urine. You were treated with intravenous hydration and the placement of a Resendiz catheter to allow for passage of urine. You had some imaging done as per urology recommendations and no urgent intervention was recommended. You are being discharged with a Resendiz catheter. Please follow up with your urologist Dr. Yoon for further treatment. t

## 2018-07-30 NOTE — PROGRESS NOTE ADULT - SUBJECTIVE AND OBJECTIVE BOX
INTERVAL HPI/OVERNIGHT EVENTS:      MEDICATIONS  (STANDING):  aspirin enteric coated 81 milliGRAM(s) Oral daily  ciprofloxacin     Tablet 500 milliGRAM(s) Oral every 12 hours  rivaroxaban 20 milliGRAM(s) Oral every 24 hours  sodium chloride 0.9%. 1000 milliLiter(s) (80 mL/Hr) IV Continuous <Continuous>    MEDICATIONS  (PRN):  acetaminophen   Tablet 650 milliGRAM(s) Oral every 6 hours PRN For Temp greater than 38.5 C (101.3 F)      Allergies    No Known Allergies    Intolerances        Vital Signs Last 24 Hrs  T(C): 36.6 (30 Jul 2018 08:53), Max: 36.8 (29 Jul 2018 21:34)  T(F): 97.8 (30 Jul 2018 08:53), Max: 98.3 (29 Jul 2018 21:34)  HR: 77 (30 Jul 2018 08:53) (77 - 86)  BP: 134/84 (30 Jul 2018 08:53) (112/74 - 147/84)  BP(mean): --  RR: 16 (30 Jul 2018 08:53) (16 - 20)  SpO2: 93% (30 Jul 2018 08:53) (93% - 95%)        LABS:      Creatine Kinase, Serum in AM (07.28.18 @ 08:41)    Creatine Kinase, Serum: 720 U/L          MICROBIOLOGY:    Culture - Blood (07.26.18 @ 16:17)    Specimen Source: .Blood Blood    Culture Results:   No growth at 3 days.    Culture - Blood (07.26.18 @ 16:17)    Specimen Source: .Blood Blood    Culture Results:   No growth at 3 days.    Culture - Urine (07.26.18 @ 14:38)    -  Gentamicin: S <=1    -  Nitrofurantoin: S <=32 Should not be used to treat pyelonephritis    -  Piperacillin/Tazobactam: S <=8    -  Tobramycin: S <=2    -  Trimethoprim/Sulfamethoxazole: S <=0.5/9.5    -  Ampicillin: S <=2 These ampicillin results predict results for amoxicillin    -  Ampicillin/Sulbactam: S <=4/2    -  Cefazolin: S <=2 This predicts results for oral agents cefaclor, cefdinir, cefpodoxime, cefprozil, cefuroxime axetil, cephalexin and locarbef for uncomplicated UTI. Note that some isolates may be susceptible to these agents while testing resistant to cefazolin.    -  Ceftriaxone: S <=1 Enterobacter, Citrobacter, and Serratia may develop resistance during prolonged therapy    -  Ciprofloxacin: S <=0.5    Specimen Source: .Urine Clean Catch (Midstream)    Culture Results:   >100,000 CFU/ml Escherichia coli    Organism Identification: Escherichia coli    Organism: Escherichia coli    Method Type: NIKITA        RADIOLOGY & ADDITIONAL STUDIES:    < from: NM Renal Functional Study w/o Cont (07.28.18 @ 11:49) >    EXAM:  NM KIDNEY IMG FLOW FUNC WO RX                          PROCEDURE DATE:  07/28/2018          INTERPRETATION:    RENAL SCAN USING 99M-TC MAG3    Indication:  Bilateral hydronephrosis; chronic urinary tract infection.    Procedure: Following the intravenous administration of about 8 mCi of   technetium 99m labeled MAG3, dynamic imaging was performed in the   posterior projection.  For the first 60 seconds, images were acquired at   one second intervals and then images were acquired at 30 second intervals   for an additional 30 minutes.  Lastly, images of the bladder were   obtained before and after voiding.      Findings:  There is diminished blood flow to the right kidney compared to   the left, but time to peak in the vascular phase is symmetric. Images up   to 30 minutes show slightly delayed uptake and excretion, but no evidence   of obstruction. Images at 30 minutes show normal filling of the bladder,   but a Resendiz catheter was present during the examination and therefore   bladder emptying cannot be evaluated.    Renogram Curves:  Renogram curves show a significant delay in uptake and   excretion, but no evidence of obstruction. The amplitude of the left   renogram curve is significantly greater than that of the right.    Relative function from left kidney: 63%    Relative function from right kidney: 37%    Impression: Diminished renal function, somewhat worse on the right than   the left. There is no evidence of obstruction. A Resendiz catheter was in   place during thisexam. INTERVAL HPI/OVERNIGHT EVENTS:    Feels weak    MEDICATIONS  (STANDING):  aspirin enteric coated 81 milliGRAM(s) Oral daily  ciprofloxacin     Tablet 500 milliGRAM(s) Oral every 12 hours  rivaroxaban 20 milliGRAM(s) Oral every 24 hours  sodium chloride 0.9%. 1000 milliLiter(s) (80 mL/Hr) IV Continuous <Continuous>    MEDICATIONS  (PRN):  acetaminophen   Tablet 650 milliGRAM(s) Oral every 6 hours PRN For Temp greater than 38.5 C (101.3 F)      Allergies    No Known Allergies      EXAM  Vital Signs Last 24 Hrs  T(C): 36.6 (30 Jul 2018 08:53), Max: 36.8 (29 Jul 2018 21:34)  T(F): 97.8 (30 Jul 2018 08:53), Max: 98.3 (29 Jul 2018 21:34)  HR: 77 (30 Jul 2018 08:53) (77 - 86)  BP: 134/84 (30 Jul 2018 08:53) (112/74 - 147/84)  BP(mean): --  RR: 16 (30 Jul 2018 08:53) (16 - 20)  SpO2: 93% (30 Jul 2018 08:53) (93% - 95%)  On RA  Very slow and frail appearing  Nontoxic  No rash  RRR  No CVA tenderness  Abd soft NT  Resendiz was d/libia        LABS:      Creatine Kinase, Serum in AM (07.28.18 @ 08:41)    Creatine Kinase, Serum: 720 U/L          MICROBIOLOGY:    Culture - Blood (07.26.18 @ 16:17)    Specimen Source: .Blood Blood    Culture Results:   No growth at 3 days.    Culture - Blood (07.26.18 @ 16:17)    Specimen Source: .Blood Blood    Culture Results:   No growth at 3 days.    Culture - Urine (07.26.18 @ 14:38)    -  Gentamicin: S <=1    -  Nitrofurantoin: S <=32 Should not be used to treat pyelonephritis    -  Piperacillin/Tazobactam: S <=8    -  Tobramycin: S <=2    -  Trimethoprim/Sulfamethoxazole: S <=0.5/9.5    -  Ampicillin: S <=2 These ampicillin results predict results for amoxicillin    -  Ampicillin/Sulbactam: S <=4/2    -  Cefazolin: S <=2 This predicts results for oral agents cefaclor, cefdinir, cefpodoxime, cefprozil, cefuroxime axetil, cephalexin and locarbef for uncomplicated UTI. Note that some isolates may be susceptible to these agents while testing resistant to cefazolin.    -  Ceftriaxone: S <=1 Enterobacter, Citrobacter, and Serratia may develop resistance during prolonged therapy    -  Ciprofloxacin: S <=0.5    Specimen Source: .Urine Clean Catch (Midstream)    Culture Results:   >100,000 CFU/ml Escherichia coli    Organism Identification: Escherichia coli    Organism: Escherichia coli    Method Type: NIKITA        RADIOLOGY & ADDITIONAL STUDIES:    NM Renal Functional Study w/o Cont (07.28.18 @ 11:49) >    EXAM:  NM KIDNEY IMG FLOW FUNC WO RX                          PROCEDURE DATE:  07/28/2018          INTERPRETATION:    RENAL SCAN USING 99M-TC MAG3    Indication:  Bilateral hydronephrosis; chronic urinary tract infection.    Procedure: Following the intravenous administration of about 8 mCi of   technetium 99m labeled MAG3, dynamic imaging was performed in the   posterior projection.  For the first 60 seconds, images were acquired at   one second intervals and then images were acquired at 30 second intervals   for an additional 30 minutes.  Lastly, images of the bladder were   obtained before and after voiding.      Findings:  There is diminished blood flow to the right kidney compared to   the left, but time to peak in the vascular phase is symmetric. Images up   to 30 minutes show slightly delayed uptake and excretion, but no evidence   of obstruction. Images at 30 minutes show normal filling of the bladder,   but a Resendiz catheter was present during the examination and therefore   bladder emptying cannot be evaluated.    Renogram Curves:  Renogram curves show a significant delay in uptake and   excretion, but no evidence of obstruction. The amplitude of the left   renogram curve is significantly greater than that of the right.    Relative function from left kidney: 63%    Relative function from right kidney: 37%    Impression: Diminished renal function, somewhat worse on the right than   the left. There is no evidence of obstruction. A Resendiz catheter was in   place during this exam.        < from: US Abdomen Limited (07.28.18 @ 13:58) >    EXAM:  US ABDOMEN LIMITED                          PROCEDURE DATE:  07/28/2018          INTERPRETATION:  RIGHT UPPER QUADRANT ULTRASOUND dated 7/28/2018 1:58 PM    INDICATION: Elevated bilirubin    PRIOR STUDIES: CT abdomen and pelvis from the sameday. Abdominal   ultrasound from 4/29/2017.    FINDINGS:     Liver: Normal echogenicity. 0.5 cm left hepatic lobe cyst.    Intrahepatic ducts: Not dilated.    Common bile duct: Normal diameter, measuring 0.5 cm.    Gallbladder: The gallbladder is again folded. There are multiple small   gallstones. Phrygian cap. No wall thickening or pericholecystic fluid.    Pancreas: The visualized portions were normal in appearance.      Abdominal aorta: The distal abdominal aorta is aneurysmally dilated   measuring 3.9 x 3.9 cm.    Inferior vena cava: The visualized portions were normal in appearance.    Right kidney: Length of 12.1 cm. Normal echogenicity. 5.0 cm upper/mid   pole cyst (previously 3.7 cm). 2.0 cm lower pole cyst.       IMPRESSION:  The gallbladder is again folded. There are multiple small gallstones.     No wall thickening or pericholecystic fluid.    Aneurysmal dilatation of the distal abdominal aorta measuring 4.4 cm.    Since 4/29/2017, there has been increase in size in right renal upper/mid   pole cyst, now measuring 5.0 cm (previously 3.7 cm).        < from: US Retroperitoneal Complete (07.27.18 @ 18:32) >    EXAM:  US RETROPERITONEAL COMPLETE                          PROCEDURE DATE:  07/27/2018          INTERPRETATION:  RENAL and BLADDER ULTRASOUND dated 7/27/2018 6:32 PM    Indication: Recurrent UTI, hydronephrosis.    Prior studies: CT abdomen and pelvis from same day. Abdominal ultrasound   from 4/29/2017.     Findings:    RIGHT KIDNEY:  Length: 11.9 cm  Lesions: There are again cysts in the right kidney. The largest measures   6.0 x 2.7 x 4.5 cm on the midpole. There is a 1.5 cm mid pole cyst and a   1.1 cm upper pole cyst.  Hydronephrosis: None  Stones: None  Echogenicity: Normal    LEFT KIDNEY:  Length: 12.0 cm  Lesions: None  Hydronephrosis: None  Stones: None  Echogenicity: Normal    URINARY BLADDER:  A Resendiz catheter is noted in a nondistended urinary bladder, precluding   bladder evaluation. No pre or post void volumes were measured.      IMPRESSION:  No hydronephrosis. Right renal cysts as above.

## 2018-07-30 NOTE — PROGRESS NOTE ADULT - SUBJECTIVE AND OBJECTIVE BOX
OVERNIGHT EVENTS: No adverse overnight events.    SUBJECTIVE / INTERVAL HPI: Patient seen and examined at bedside. Patient is resting comfortably in bed in no acute distress. She denies nausea, vomiting, diarrhea, chills, sob, and cp. Patient is respirating comfortably without oxygen.     VITAL SIGNS:  Vital Signs Last 24 Hrs  T(C): 36.6 (30 Jul 2018 08:53), Max: 36.8 (29 Jul 2018 21:34)  T(F): 97.8 (30 Jul 2018 08:53), Max: 98.3 (29 Jul 2018 21:34)  HR: 77 (30 Jul 2018 08:53) (77 - 86)  BP: 134/84 (30 Jul 2018 08:53) (112/74 - 147/84)  BP(mean): --  RR: 16 (30 Jul 2018 08:53) (16 - 20)  SpO2: 93% (30 Jul 2018 08:53) (93% - 95%)    PHYSICAL EXAM:    General: WDWN  HEENT: NC/AT; PERRL, anicteric sclera; MMM  Neck: supple  Cardiovascular: +S1/S2; RRR  Respiratory: audible wheezes and crackles; no rhonchi  Gastrointestinal: soft, NT/ND; +BSx4  Extremities: WWP; no edema, clubbing or cyanosis  Vascular: 2+ radial, DP/PT pulses B/L  Neurological: AAOx3; no focal deficits    MEDICATIONS:  MEDICATIONS  (STANDING):  aspirin enteric coated 81 milliGRAM(s) Oral daily  cefTRIAXone   IVPB 1 Gram(s) IV Intermittent every 24 hours  rivaroxaban 20 milliGRAM(s) Oral every 24 hours  sodium chloride 0.9%. 1000 milliLiter(s) (80 mL/Hr) IV Continuous <Continuous>    MEDICATIONS  (PRN):  acetaminophen   Tablet 650 milliGRAM(s) Oral every 6 hours PRN For Temp greater than 38.5 C (101.3 F)      ALLERGIES:  Allergies    No Known Allergies    Intolerances        LABS:              CAPILLARY BLOOD GLUCOSE          RADIOLOGY & ADDITIONAL TESTS: Reviewed.    ASSESSMENT:    PLAN: OVERNIGHT EVENTS: No adverse overnight events.    SUBJECTIVE / INTERVAL HPI: Patient seen and examined at bedside. Patient is resting comfortably in bed in no acute distress. She denies nausea, vomiting, diarrhea, chills, sob, and cp. Patient is respirating comfortably without oxygen.     VITAL SIGNS:  Vital Signs Last 24 Hrs  T(C): 36.6 (30 Jul 2018 08:53), Max: 36.8 (29 Jul 2018 21:34)  T(F): 97.8 (30 Jul 2018 08:53), Max: 98.3 (29 Jul 2018 21:34)  HR: 77 (30 Jul 2018 08:53) (77 - 86)  BP: 134/84 (30 Jul 2018 08:53) (112/74 - 147/84)  BP(mean): --  RR: 16 (30 Jul 2018 08:53) (16 - 20)  SpO2: 93% (30 Jul 2018 08:53) (93% - 95%)    PHYSICAL EXAM:    General: WDWN, rigors  HEENT: NC/AT; PERRL, anicteric sclera; MMM  Neck: supple  Cardiovascular: +S1/S2; RRR  Respiratory: audible wheezes and crackles; no rhonchi  Gastrointestinal: soft, NT/ND; +BSx4  Extremities: WWP; no edema, clubbing or cyanosis  Vascular: 2+ radial, DP/PT pulses B/L  Neurological: AAOx3; no focal deficits    MEDICATIONS:  MEDICATIONS  (STANDING):  aspirin enteric coated 81 milliGRAM(s) Oral daily  cefTRIAXone   IVPB 1 Gram(s) IV Intermittent every 24 hours  rivaroxaban 20 milliGRAM(s) Oral every 24 hours  sodium chloride 0.9%. 1000 milliLiter(s) (80 mL/Hr) IV Continuous <Continuous>    MEDICATIONS  (PRN):  acetaminophen   Tablet 650 milliGRAM(s) Oral every 6 hours PRN For Temp greater than 38.5 C (101.3 F)      ALLERGIES:  Allergies    No Known Allergies    Intolerances        LABS:              CAPILLARY BLOOD GLUCOSE          RADIOLOGY & ADDITIONAL TESTS: Reviewed.    ASSESSMENT:    PLAN: OVERNIGHT EVENTS: No adverse overnight events.    SUBJECTIVE / INTERVAL HPI: Patient seen and examined at bedside. Patient is resting comfortably in bed in no acute distress. She denies nausea, vomiting, diarrhea, chills, sob, and cp. Patient is respirating comfortably without oxygen.     VITAL SIGNS:  Vital Signs Last 24 Hrs  T(C): 36.6 (30 Jul 2018 08:53), Max: 36.8 (29 Jul 2018 21:34)  T(F): 97.8 (30 Jul 2018 08:53), Max: 98.3 (29 Jul 2018 21:34)  HR: 77 (30 Jul 2018 08:53) (77 - 86)  BP: 134/84 (30 Jul 2018 08:53) (112/74 - 147/84)  BP(mean): --  RR: 16 (30 Jul 2018 08:53) (16 - 20)  SpO2: 93% (30 Jul 2018 08:53) (93% - 95%)    PHYSICAL EXAM:    General: WDWN, rigors  HEENT: NC/AT; PERRL, anicteric sclera; MMM  Neck: supple  Cardiovascular: +S1/S2; RRR  Respiratory: audible wheezes and crackles; no rhonchi  Gastrointestinal: soft, NT/ND; +BSx4  Extremities: WWP; no edema, clubbing or cyanosis  Vascular: 2+ radial, DP/PT pulses B/L  Neurological: AAOx3; no focal deficits    MEDICATIONS:  MEDICATIONS  (STANDING):  aspirin enteric coated 81 milliGRAM(s) Oral daily  cefTRIAXone   IVPB 1 Gram(s) IV Intermittent every 24 hours  rivaroxaban 20 milliGRAM(s) Oral every 24 hours  sodium chloride 0.9%. 1000 milliLiter(s) (80 mL/Hr) IV Continuous <Continuous>    MEDICATIONS  (PRN):  acetaminophen   Tablet 650 milliGRAM(s) Oral every 6 hours PRN For Temp greater than 38.5 C (101.3 F)      ALLERGIES:  Allergies    No Known Allergies    Intolerances        LABS:              CAPILLARY BLOOD GLUCOSE          RADIOLOGY & ADDITIONAL TESTS:  EXAM:  NM KIDNEY IMG FLOW FUNC WO RX                        PROCEDURE DATE:  07/28/2018      Relative function from left kidney: 63%  Relative function from right kidney: 37%  Impression: Diminished renal function, somewhat worse on the right than   the left. There is no evidence of obstruction. A Resendiz catheter was in   place during this exam.        ASSESSMENT:    PLAN:

## 2018-07-30 NOTE — CONSULT NOTE ADULT - SUBJECTIVE AND OBJECTIVE BOX
ANDREW STUART    [id number]    Patient is a 78y old  Female who presents with a chief complaint of I just felt very, very weak. I all of a sudden needed to use the walker to get around my apartment. I hadn't been using it. (26 Jul 2018 19:50)      HPI:  78 year old female with history of hypertension, myeloproliferative disorder, DVT/PE (2015), chronic UTIs and hydronephrosis who presented with 2 days of generalized fatigue and fevers. Patient states that for the past 2 days she has had fevers, decreased appetite, increased thirst, nausea, and increased urination. She has a history of hydronephrosis and chronic UTIs with pan sensitive klebsiella. Today, her health care proxy and her friend noticed that the patient's skin felt warm to touch, and in conjunction with the patients other symptoms, brought her to the emergency department. The patient denies headache, dizziness, chest pain, SOB, vomiting, and leg pain. She does report feeling cold, having a dry mouth, and having shivers.     In the emergency department, patient was noted to have a positive UTI. She was given 1L NS bolus, started on ceftriaxone, and the patient was transferred to the floor. Once on the regional medical floor, patient's vitals were temp 105.1 (rectally), , /92, RR 24, O2 sat 92% on RA. Rapid response called. Patient was given 2L NS bolus, tylenol x2 for fever. Hutson was placed with evacuation of cloudy appearing urine. Bedside US with bilateral hydronephrosis, liver appeared WNL. Labs notable for WBC 20.1, bands of 11, Lactate of 3.6, and mild transaminitis. ABG with resp alkalosis. Lactate cleared after NS boluses. Patient was then transferred to MICU for further management. (26 Jul 2018 17:13)      PAST MEDICAL & SURGICAL HISTORY:  Myeloproliferative disorder  Hypertension  PE (pulmonary thromboembolism): 2015  Tremor  Vestibular disequilibrium  S/P hysterectomy        Social History:    FAMILY HISTORY:  No pertinent family history in first degree relatives      Functional Level Prior to Admission:                Vital Signs Last 24 Hrs  T(C): 36.6 (30 Jul 2018 08:53), Max: 36.8 (29 Jul 2018 21:34)  T(F): 97.8 (30 Jul 2018 08:53), Max: 98.3 (29 Jul 2018 21:34)  HR: 77 (30 Jul 2018 08:53) (77 - 86)  BP: 134/84 (30 Jul 2018 08:53) (112/74 - 147/84)  BP(mean): --  RR: 16 (30 Jul 2018 08:53) (16 - 20)  SpO2: 93% (30 Jul 2018 08:53) (93% - 95%)      PHYSICAL EXAM:  This 78 y-o female was admitted on 07/26/18 with generalized weakness and fever due to UTI.  h/o DVT/PE, chronic UTI/hydronephrosis.  Lives alone, has HHA 24 hrs, ambulation with  rolling walker      Constitutional:  lying in bed comfortably, alert and stable.    Eyes: neg.    ENMT:  neg.    Neck:  supple, no neck pin    Breasts:    Back:  no back pain    Respiratory:  no SOB    Cardiovascular:  no chest pain, no palpitation    Gastrointestinal: no abdominal pain    Genitourinary:  on hutson catheter    Rectal:    Extremities:  full ROM 4 	ext., no joint pain, no charissa    Vascular:  no clubbing,  no cyanosis    Neurological:  Deconditioned 3-3+/5, no focal  deficits.  Bed mobility independent and able to stand with condtact guarding with rolling walker    Skin:    Lymph Nodes:    Musculoskeletal:    Psychiatric:            Impression:  1.  Deconditioned  2.  UTI/sepsis.     Recommendations:  1.  PT initiated  2.  Home PT/HHA

## 2018-07-30 NOTE — DISCHARGE NOTE ADULT - PROVIDER TOKENS
TOKEN:'[id number]:'[id number]',TOKEN:'[id number]' TOKEN:'[id number]:'[id number]:'[id number]',TOKEN:'[id number]'

## 2018-07-30 NOTE — DISCHARGE NOTE ADULT - ADDITIONAL INSTRUCTIONS
Please follow up with Dr. Yoon after discharge. Please follow up with your urologist Dr. Yoon on Monday 3/13 at 1PM

## 2018-07-30 NOTE — PROGRESS NOTE ADULT - PROBLEM SELECTOR PLAN 1
Patient found to have UTI with UA positive for leuk esterase, WBCs. Patient with 4/4 SIRS and lactate of 3.6. Patient has history of bilateral hydronephrosis colonized with pan sensitive klebsiella 2/2 outflow obstruction. Likely severe sepsis 2/2 to UTI.  - Patient given 1 dose ceftriaxone in ED, then transitioned to Zosyn 4.5gm q6h as has been treated with multiple UTIs in past so covering for pseudomonas  - Currently on ceftriaxone 1 g daily   - Lactate cleared s/p 3L NS  - Tylenol PRN for fever greater than 100.4  - Urine cultures - C&S for E. coli s to ceftriaxone   - F/u blood cultures - NG x3 day  - F/u retroperitoneal U/S, pending read  - F/u abdominal CT, pending read  - Monitor I/Os  - Urology consulted, recs appreciated  - c/w 80cc/hr NS Patient found to have UTI with UA positive for leuk esterase, WBCs. Patient with 4/4 SIRS and lactate of 3.6. Patient has history of bilateral hydronephrosis colonized with pan sensitive klebsiella 2/2 outflow obstruction. Likely severe sepsis 2/2 to UTI.  - Patient given 1 dose ceftriaxone in ED, then transitioned to Zosyn 4.5gm q6h as has been treated with multiple UTIs in past so covering for pseudomonas  - Currently on ceftriaxone 1 g daily   - Lactate cleared s/p 3L NS  - Tylenol PRN for fever greater than 100.4  - Urine cultures - C&S for E. coli s to ceftriaxone   - F/u blood cultures - NG x3 day  - F/u retroperitoneal U/S, pending read  - F/u abdominal CT, pending read  - Monitor I/Os  - Urology consulted, recs appreciated  - c/w 80cc/hr NS  - NM renal function w/out cont: Diminished renal function, somewhat worse on the right than the left.

## 2018-07-30 NOTE — PROGRESS NOTE ADULT - PROBLEM SELECTOR PLAN 5
Patient with 12 year history of myelodysplastic syndrome. Followed by Dr. Resendiz  - Home meds include xaralto and ruxolitinib (Jakafi)  - Heme consulted, fellow to discuss restarting Jakafi with Dr. Resendiz  - Otherwise restart Jakafi following 48 hrs without fever (starting 7/29 @ 17:30)

## 2018-07-30 NOTE — PROGRESS NOTE ADULT - ASSESSMENT
RECOMMEND  Work-up of weakness  Recheck labs including CPK  Work up for elevated CPK  D/C ceftriaxone  Start PO ciprofloxacin 500 mg twice a day  Needs renal/bladder US Severe sepsis due to acute pyelonephritis  Surprisingly bacteremia not detected  Weakness and elevated CPK - ? rhabdoyolysis      RECOMMEND  Work-up of weakness  Recheck labs including CPK  Work up for elevated CPK  D/C ceftriaxone  Start PO ciprofloxacin 500 mg twice a day

## 2018-07-30 NOTE — DISCHARGE NOTE ADULT - PATIENT PORTAL LINK FT
You can access the [facility] Patient Portal, offered by [facility], by registering with the following website: [email or web address]

## 2018-07-31 DIAGNOSIS — R53.1 WEAKNESS: ICD-10-CM

## 2018-07-31 LAB
ANION GAP SERPL CALC-SCNC: 16 MMOL/L — SIGNIFICANT CHANGE UP (ref 5–17)
BUN SERPL-MCNC: 6 MG/DL — LOW (ref 7–23)
CALCIUM SERPL-MCNC: 9.4 MG/DL — SIGNIFICANT CHANGE UP (ref 8.4–10.5)
CHLORIDE SERPL-SCNC: 102 MMOL/L — SIGNIFICANT CHANGE UP (ref 96–108)
CK SERPL-CCNC: 59 U/L — SIGNIFICANT CHANGE UP (ref 25–170)
CO2 SERPL-SCNC: 22 MMOL/L — SIGNIFICANT CHANGE UP (ref 22–31)
CREAT SERPL-MCNC: 0.64 MG/DL — SIGNIFICANT CHANGE UP (ref 0.5–1.3)
CULTURE RESULTS: SIGNIFICANT CHANGE UP
CULTURE RESULTS: SIGNIFICANT CHANGE UP
GLUCOSE SERPL-MCNC: 110 MG/DL — HIGH (ref 70–99)
HCT VFR BLD CALC: 33.1 % — LOW (ref 34.5–45)
HGB BLD-MCNC: 10.2 G/DL — LOW (ref 11.5–15.5)
MAGNESIUM SERPL-MCNC: 2.2 MG/DL — SIGNIFICANT CHANGE UP (ref 1.6–2.6)
MCHC RBC-ENTMCNC: 29.7 PG — SIGNIFICANT CHANGE UP (ref 27–34)
MCHC RBC-ENTMCNC: 30.8 G/DL — LOW (ref 32–36)
MCV RBC AUTO: 96.5 FL — SIGNIFICANT CHANGE UP (ref 80–100)
PLATELET # BLD AUTO: 178 K/UL — SIGNIFICANT CHANGE UP (ref 150–400)
POTASSIUM SERPL-MCNC: 4.7 MMOL/L — SIGNIFICANT CHANGE UP (ref 3.5–5.3)
POTASSIUM SERPL-SCNC: 4.7 MMOL/L — SIGNIFICANT CHANGE UP (ref 3.5–5.3)
RBC # BLD: 3.43 M/UL — LOW (ref 3.8–5.2)
RBC # FLD: 16 % — SIGNIFICANT CHANGE UP (ref 10.3–16.9)
SODIUM SERPL-SCNC: 140 MMOL/L — SIGNIFICANT CHANGE UP (ref 135–145)
SPECIMEN SOURCE: SIGNIFICANT CHANGE UP
SPECIMEN SOURCE: SIGNIFICANT CHANGE UP
TSH SERPL-MCNC: 2.33 UIU/ML — SIGNIFICANT CHANGE UP (ref 0.35–4.94)
WBC # BLD: 10.2 K/UL — SIGNIFICANT CHANGE UP (ref 3.8–10.5)
WBC # FLD AUTO: 10.2 K/UL — SIGNIFICANT CHANGE UP (ref 3.8–10.5)

## 2018-07-31 RX ORDER — MOXIFLOXACIN HYDROCHLORIDE TABLETS, 400 MG 400 MG/1
1 TABLET, FILM COATED ORAL
Qty: 0 | Refills: 0 | COMMUNITY
Start: 2018-07-31

## 2018-07-31 RX ADMIN — Medication 500 MILLIGRAM(S): at 18:24

## 2018-07-31 RX ADMIN — Medication 650 MILLIGRAM(S): at 05:28

## 2018-07-31 RX ADMIN — Medication 650 MILLIGRAM(S): at 06:28

## 2018-07-31 RX ADMIN — SODIUM CHLORIDE 80 MILLILITER(S): 9 INJECTION INTRAMUSCULAR; INTRAVENOUS; SUBCUTANEOUS at 14:22

## 2018-07-31 RX ADMIN — RIVAROXABAN 20 MILLIGRAM(S): KIT at 11:18

## 2018-07-31 RX ADMIN — Medication 500 MILLIGRAM(S): at 05:28

## 2018-07-31 RX ADMIN — Medication 81 MILLIGRAM(S): at 11:18

## 2018-07-31 NOTE — DIETITIAN INITIAL EVALUATION ADULT. - NS AS NUTRI INTERV MEALS SNACK
change to regular diet to allow more food choices/General/healthful diet/Energy - modified diet/Protein - modified diet/Specific foods/beverages or groups

## 2018-07-31 NOTE — PROGRESS NOTE ADULT - PROBLEM SELECTOR PROBLEM 6
History of DVT (deep vein thrombosis)
Myelodysplasia (myelodysplastic syndrome)

## 2018-07-31 NOTE — PROGRESS NOTE ADULT - SUBJECTIVE AND OBJECTIVE BOX
INTERVAL HPI/OVERNIGHT EVENTS:    ANTIBIOTICS/RELEVANT:    MEDICATIONS  (STANDING):  aspirin enteric coated 81 milliGRAM(s) Oral daily  ciprofloxacin     Tablet 500 milliGRAM(s) Oral every 12 hours  rivaroxaban 20 milliGRAM(s) Oral every 24 hours    MEDICATIONS  (PRN):  acetaminophen   Tablet 650 milliGRAM(s) Oral every 6 hours PRN For Temp greater than 38.5 C (101.3 F)      Allergies    No Known Allergies    Intolerances        Vital Signs Last 24 Hrs  T(C): 36.5 (31 Jul 2018 16:10), Max: 36.8 (31 Jul 2018 09:00)  T(F): 97.7 (31 Jul 2018 16:10), Max: 98.2 (31 Jul 2018 09:00)  HR: 74 (31 Jul 2018 16:10) (68 - 79)  BP: 139/85 (31 Jul 2018 16:10) (139/85 - 160/98)  BP(mean): --  RR: 18 (31 Jul 2018 16:10) (16 - 18)  SpO2: 95% (31 Jul 2018 16:10) (95% - 97%)    PHYSICAL EXAM:      Constitutional:    Eyes:    ENMT:    Neck:    Breasts:    Back:    Respiratory:    Cardiovascular:    Gastrointestinal:    Genitourinary:    Rectal:    Extremities:    Vascular:    Neurological:    Skin:    Lymph Nodes:    Musculoskeletal:    Psychiatric:        LABS:                        10.2   10.2  )-----------( 178      ( 31 Jul 2018 07:13 )             33.1     07-31    140  |  102  |  6<L>  ----------------------------<  110<H>  4.7   |  22  |  0.64    Ca    9.4      31 Jul 2018 07:13  Mg     2.2     07-31            MICROBIOLOGY:    RADIOLOGY & ADDITIONAL STUDIES: INTERVAL HPI/OVERNIGHT EVENTS:    Subjectively, profoundly weak in general  No urinary symptoms but needs Resendiz cath due to retention  New Resendiz in place now    MEDICATIONS  (STANDING):  aspirin enteric coated 81 milliGRAM(s) Oral daily  ciprofloxacin     Tablet 500 milliGRAM(s) Oral every 12 hours  rivaroxaban 20 milliGRAM(s) Oral every 24 hours    MEDICATIONS  (PRN):  acetaminophen   Tablet 650 milliGRAM(s) Oral every 6 hours PRN For Temp greater than 38.5 C (101.3 F)      Allergies    No Known Allergies    EXAM  Vital Signs Last 24 Hrs  T(C): 36.5 (31 Jul 2018 16:10), Max: 36.8 (31 Jul 2018 09:00)  T(F): 97.7 (31 Jul 2018 16:10), Max: 98.2 (31 Jul 2018 09:00)  HR: 74 (31 Jul 2018 16:10) (68 - 79)  BP: 139/85 (31 Jul 2018 16:10) (139/85 - 160/98)  BP(mean): --  RR: 18 (31 Jul 2018 16:10) (16 - 18)  SpO2: 95% (31 Jul 2018 16:10) (95% - 97%)  Awake and alert but appearing fatigued and weak with slow speech  Responding appropriately   No rash  Urine with clear urine  No CVA tenderness  Abd soft and NT  No BMs      LABS:                        10.2   10.2  )-----------( 178      ( 31 Jul 2018 07:13 )             33.1     07-31    140  |  102  |  6<L>  ----------------------------<  110<H>  4.7   |  22  |  0.64    Ca    9.4      31 Jul 2018 07:13  Mg     2.2     07-31      Sedimentation Rate, Erythrocyte: 2 mm/Hr (01.30.18 @ 06:50)    Thyroid Stimulating Hormone, Serum in AM (07.31.18 @ 07:13)    Thyroid Stimulating Hormone, Serum: 2.328 uIU/mL    Creatine Kinase, Serum in AM (07.31.18 @ 07:13)    Creatine Kinase, Serum: 59 U/L        MICROBIOLOGY:    Culture - Blood (07.26.18 @ 16:17)    Specimen Source: .Blood Blood    Culture Results:   No growth at 5 days.    Culture - Urine (07.26.18 @ 14:38)    -  Gentamicin: S <=1    -  Nitrofurantoin: S <=32 Should not be used to treat pyelonephritis    -  Piperacillin/Tazobactam: S <=8    -  Tobramycin: S <=2    -  Trimethoprim/Sulfamethoxazole: S <=0.5/9.5    -  Ampicillin: S <=2 These ampicillin results predict results for amoxicillin    -  Ampicillin/Sulbactam: S <=4/2    -  Cefazolin: S <=2 This predicts results for oral agents cefaclor, cefdinir, cefpodoxime, cefprozil, cefuroxime axetil, cephalexin and locarbef for uncomplicated UTI. Note that some isolates may be susceptible to these agents while testing resistant to cefazolin.    -  Ceftriaxone: S <=1 Enterobacter, Citrobacter, and Serratia may develop resistance during prolonged therapy    -  Ciprofloxacin: S <=0.5    Specimen Source: .Urine Clean Catch (Midstream)    Culture Results:   >100,000 CFU/ml Escherichia coli    Organism Identification: Escherichia coli    Organism: Escherichia coli    Method Type: NIKITA

## 2018-07-31 NOTE — PROGRESS NOTE ADULT - ASSESSMENT
Continue Cipro  Check ESR and CRP E coli Pyelonephritis  Sepsis  Given the severity of sepsis, suspect bacteremia/septicemia might have been present but not detected  Infectious issues resolving  Generalized weakness - seems out of proportion to acute illness  Myositis on admission?  CPK normal now    RECOMMEND  Continue Cipro for 5-6 more days, as fluoroquinolones superior here with the ability to penetrate renal parenchyma  Neurologic work-up / Further workup of weakness

## 2018-07-31 NOTE — PROGRESS NOTE ADULT - ATTENDING COMMENTS
Patient seen and examined and evaluation completed by me in person

## 2018-07-31 NOTE — PROGRESS NOTE ADULT - PROBLEM SELECTOR PLAN 10
FLUIDS/ELECTROLYTES/NUTRITION  IVF: NS@80 cc/hr  Monitor, Replete to K>4 and Mg>2  Diet: soft diet    PROPHYLAXIS  DVT: xarelto  GI: none  DISPO 7Uris  CODE STATUS: DNR

## 2018-07-31 NOTE — DIETITIAN INITIAL EVALUATION ADULT. - OTHER INFO
79 y/o female admitted with UTI/severe sepsis.Patient with observed poor po intake.As per patient,has no appetite and lacks desire to eat.Denied N/V/D or pain.Bruised skin but intact.Low BMI but not malnourished based on stated UBW.Declined supplements.

## 2018-07-31 NOTE — PROGRESS NOTE ADULT - SUBJECTIVE AND OBJECTIVE BOX
OVERNIGHT EVENTS: Overnight repeat BMP showed that her potassium had increased to 3.4. She was given and additional 60 milliequivalents PO. Will follow up AM BMP. Patient failed multiple TOVs. Resendiz reinserted this AM.    SUBJECTIVE / INTERVAL HPI: Patient seen and examined at bedside. Patient is very weak. She endorses loss of apatite and a generalized feeling of weakness and fatigue. She is A/Ox3, but reports feeling "foggy" and not like her usual self. She denies any nausea, vomiting, fever, chills, sob, and cp. She was encouraged to consume more calories today in whatever form she can tolerate. She refuses ensure as it reminds her of her late husbands end of life period.     VITAL SIGNS:  Vital Signs Last 24 Hrs  T(C): 36.8 (31 Jul 2018 09:00), Max: 37.4 (30 Jul 2018 17:00)  T(F): 98.2 (31 Jul 2018 09:00), Max: 99.3 (30 Jul 2018 17:00)  HR: 79 (31 Jul 2018 09:00) (68 - 79)  BP: 152/94 (31 Jul 2018 09:00) (152/94 - 165/77)  BP(mean): --  RR: 18 (31 Jul 2018 09:00) (16 - 18)  SpO2: 97% (31 Jul 2018 09:00) (94% - 97%)    PHYSICAL EXAM:    General: Patient is fatigued and weak. She is able to drink juice from a cup and straw independently, but will only do so with encouragement. Rigors have improved  HEENT: NC/AT; PERRL, anicteric sclera; MMM  Neck: supple, no jvd  Cardiovascular: +S1/S2; RRR  Respiratory: CTA B/L; no W/R/R  Gastrointestinal: soft, NT/ND; +BSx4  Extremities: WWP; no edema, clubbing or cyanosis  Vascular: 2+ radial, DP/PT pulses B/L  Neurological: AAOx3; no focal deficits    MEDICATIONS:  MEDICATIONS  (STANDING):  aspirin enteric coated 81 milliGRAM(s) Oral daily  ciprofloxacin     Tablet 500 milliGRAM(s) Oral every 12 hours  rivaroxaban 20 milliGRAM(s) Oral every 24 hours  sodium chloride 0.9%. 1000 milliLiter(s) (80 mL/Hr) IV Continuous <Continuous>    MEDICATIONS  (PRN):  acetaminophen   Tablet 650 milliGRAM(s) Oral every 6 hours PRN For Temp greater than 38.5 C (101.3 F)      ALLERGIES:  Allergies    No Known Allergies    Intolerances        LABS:                        10.2   10.2  )-----------( 178      ( 31 Jul 2018 07:13 )             33.1     07-30    139  |  98  |  6<L>  ----------------------------<  108<H>  3.4<L>   |  26  |  0.60    Ca    9.1      30 Jul 2018 20:12  Mg     2.2     07-31          CAPILLARY BLOOD GLUCOSE          RADIOLOGY & ADDITIONAL TESTS: Reviewed.    ASSESSMENT:    PLAN: OVERNIGHT EVENTS: Overnight repeat BMP showed that her potassium had increased to 3.4. She was given and additional 60 milliequivalents PO. Will follow up AM BMP. Patient failed multiple TOVs. Resendiz reinserted this AM.    SUBJECTIVE / INTERVAL HPI: Patient seen and examined at bedside. Patient is very weak. She endorses loss of apatite and a generalized feeling of weakness and fatigue. She is A/Ox3, but reports feeling "foggy" and not like her usual self. She denies any nausea, vomiting, fever, chills, sob, and cp. She was encouraged to consume more calories today in whatever form she can tolerate. She refuses ensure as it reminds her of her late husbands end of life period.     VITAL SIGNS:  Vital Signs Last 24 Hrs  T(C): 36.8 (31 Jul 2018 09:00), Max: 37.4 (30 Jul 2018 17:00)  T(F): 98.2 (31 Jul 2018 09:00), Max: 99.3 (30 Jul 2018 17:00)  HR: 79 (31 Jul 2018 09:00) (68 - 79)  BP: 152/94 (31 Jul 2018 09:00) (152/94 - 165/77)  BP(mean): --  RR: 18 (31 Jul 2018 09:00) (16 - 18)  SpO2: 97% (31 Jul 2018 09:00) (94% - 97%)    PHYSICAL EXAM:    General: Patient is fatigued and weak. She is able to drink juice from a cup and straw independently, but will only do so with encouragement. Rigors have improved  HEENT: NC/AT; PERRL, anicteric sclera; MMM  Neck: supple, no jvd  Cardiovascular: +S1/S2; RRR  Respiratory: CTA B/L; no W/R/R  Gastrointestinal: soft, NT/ND; +BSx4  Extremities: WWP; no edema, clubbing or cyanosis  Vascular: 2+ radial, DP/PT pulses B/L  Neurological: AAOx3; no focal deficits    MEDICATIONS:  MEDICATIONS  (STANDING):  aspirin enteric coated 81 milliGRAM(s) Oral daily  ciprofloxacin     Tablet 500 milliGRAM(s) Oral every 12 hours  rivaroxaban 20 milliGRAM(s) Oral every 24 hours  sodium chloride 0.9%. 1000 milliLiter(s) (80 mL/Hr) IV Continuous <Continuous>    MEDICATIONS  (PRN):  acetaminophen   Tablet 650 milliGRAM(s) Oral every 6 hours PRN For Temp greater than 38.5 C (101.3 F)      ALLERGIES:  Allergies    No Known Allergies    Intolerances        LABS:                        10.2   10.2  )-----------( 178      ( 31 Jul 2018 07:13 )             33.1     07-30    139  |  98  |  6<L>  ----------------------------<  108<H>  3.4<L>   |  26  |  0.60    Ca    9.1      30 Jul 2018 20:12  Mg     2.2     07-31          CAPILLARY BLOOD GLUCOSE          RADIOLOGY & ADDITIONAL TESTS:     EXAM:  US ABDOMEN LIMITED                        PROCEDURE DATE:  07/28/2018    IMPRESSION:  The gallbladder is again folded. There are multiple small gallstones.     No wall thickening or pericholecystic fluid.  Aneurysmal dilatation of the distal abdominal aorta measuring 4.4 cm.  Since 4/29/2017, there has been increase in size in right renal upper/mid   pole cyst, now measuring 5.0 cm (previously 3.7 cm).    EXAM:  NM KIDNEY IMG FLOW FUNC WO RX                        PROCEDURE DATE:  07/28/2018    Impression: Diminished renal function, somewhat worse on the right than   the left. There is no evidence of obstruction. A Resendiz catheter was in   place during thisexam.    EXAM:  CT ABDOMEN AND PELVIS                        PROCEDURE DATE:  07/27/2018    IMPRESSION:  1.  Perinephric fat stranding and minimal fluid surrounding both kidneys   that may suggest an ascending infectious or inflammatory process. No   obstructive ureteral stone is noted. The bladder again has wall   thickening and perivesicular fat stranding to suggest possible cystitis.   Resendiz catheter in situ.  2.  Region of centrilobular and tree in bud nodularity in the left lower   lobe and lingula, which is most suggestive of terminal bronchiolitis, and   likely of infectious or inflammatory etiology. Bronchial wall thickening   and linear atelectatic change in the right lower lobe is also noted.  3.  Infrarenal abdominal aneurysms.     EXAM:  US RETROPERITONEAL COMPLETE                        PROCEDURE DATE:  07/27/2018    IMPRESSION:  No hydronephrosis. Right renal cysts as above.      ASSESSMENT:    PLAN:

## 2018-07-31 NOTE — PROGRESS NOTE ADULT - PROBLEM SELECTOR PROBLEM 9
Nutrition, metabolism, and development symptoms
Transaminitis

## 2018-07-31 NOTE — PROGRESS NOTE ADULT - PROBLEM SELECTOR PLAN 1
Patient found to have UTI with UA positive for leuk esterase, WBCs. Patient with 4/4 SIRS and lactate of 3.6. Patient has history of bilateral hydronephrosis colonized with pan sensitive klebsiella 2/2 outflow obstruction. Likely severe sepsis 2/2 to UTI.  - Patient given 1 dose ceftriaxone in ED, then transitioned to Zosyn 4.5gm q6h as has been treated with multiple UTIs in past so covering for pseudomonas  - Currently on ceftriaxone 1 g daily   - Lactate cleared s/p 3L NS  - Tylenol PRN for fever greater than 100.4  - Urine cultures - C&S for E. coli s to ceftriaxone   - F/u blood cultures - NG x3 day  - F/u retroperitoneal U/S, pending read  - F/u abdominal CT, pending read  - Monitor I/Os  - Urology consulted, recs appreciated  - c/w 80cc/hr NS  - NM renal function w/out cont: Diminished renal function, somewhat worse on the right than the left. Patient found to have UTI with UA positive for leuk esterase, WBCs. Patient with 4/4 SIRS and lactate of 3.6. Patient has history of bilateral hydronephrosis colonized with pan sensitive klebsiella 2/2 outflow obstruction. Likely severe sepsis 2/2 to UTI.  - Patient given 1 dose ceftriaxone in ED, then transitioned to Zosyn 4.5gm q6h as has been treated with multiple UTIs in past so covering for pseudomonas  - Currently on ceftriaxone 1 g daily   - Lactate cleared s/p 3L NS  - Tylenol PRN for fever greater than 100.4  - Urine cultures - C&S for E. coli s to ceftriaxone   - F/u blood cultures - NG x4 day  - Monitor I/Os  - Urology consulted, recs appreciated  - c/w 80cc/hr NS

## 2018-07-31 NOTE — PROGRESS NOTE ADULT - SUBJECTIVE AND OBJECTIVE BOX
CC/ HPI Patient is a 78 year old female with hypertension, myeloproliferative disorder, pulmonary embolus, 2015, who was admitted with hydronephrosis and severe sepsis secondary to urinary tract infection, new pulmonary nodule on chest radiograph, seen this morning denies respiratory complaint.    PAST MEDICAL & SURGICAL HISTORY:  Myeloproliferative disorder  Hypertension  PE/DVT (pulmonary thromboembolism): 2015  Tremor  Vestibular disequilibrium  S/P hysterectomy    SOCHX:   +tobacco,  -  alcohol    FMHX: FA/MO  - contributory     ROS reviewed below with positive findings marked (+) :  GEN:  fever, chills ENT: tracheostomy,   epistaxis,  sinusitis COR: CAD, CHF,  +HTN, dysrhythmia PUL: COPD, ILD, asthma, pneumonia GI: PEG, dysphagia, hemorrhage, other MAXIMO: kidney disease, electrolyte disorder HEM:  +anemia, +thrombus, coagulopathy, cancer ENDO:  thyroid disease, diabetes mellitus CNS:  dementia, stroke, seizure, PSY:  depression, anxiety, other      MEDICATIONS  (STANDING):  aspirin enteric coated 81 milliGRAM(s) Oral daily  ciprofloxacin     Tablet 500 milliGRAM(s) Oral every 12 hours  rivaroxaban 20 milliGRAM(s) Oral every 24 hours    MEDICATIONS  (PRN):  acetaminophen   Tablet 650 milliGRAM(s) Oral every 6 hours PRN For Temp greater than 38.5 C (101.3 F)      Vital Signs Last 24 Hrs  T(C): 36.8 (31 Jul 2018 09:00), Max: 37.4 (30 Jul 2018 17:00)  T(F): 98.2 (31 Jul 2018 09:00), Max: 99.3 (30 Jul 2018 17:00)  HR: 79 (31 Jul 2018 09:00) (68 - 79)  BP: 152/94 (31 Jul 2018 09:00) (152/94 - 165/77)  BP(mean): --  RR: 18 (31 Jul 2018 09:00) (16 - 18)  SpO2: 97% (31 Jul 2018 09:00) (94% - 97%)    GENERAL:         comfortable,  - distress.  HEENT:            - trauma,  - icterus,  - injection,  - nasal discharge.  NECK:              - jugular venous distention, - thyromegaly.  LYMPH:           - lymphadenopathy, - masses.  RESP:               + crackles,   - rhonchi,   - wheezes.   COR:                S1S2   - gallops,  - rubs.  ABD:                bowel sounds,   soft, - tender, - distended.  EXT/MSC:         - cyanosis,  - clubbing,  - edema.    NEURO:             alert,   responds to stimuli.                          10.2   10.2  )-----------( 178      ( 31 Jul 2018 07:13 )             33.1     07-31    140  |  102  |  6<L>  ----------------------------<  110<H>  4.7   |  22  |  0.64      Xray Chest (07.26.18)  Nodular opacities at the left lung base which may be related to atelectasis, infection, or inflammation.       CT Chest w/ IV Cont (09.14.17) Several new pulmonary nodules measuring up to 0.4 cm. Follow-up chest CT in one year is recommended to ensure stability.      ASSESSMENT/PLAN    1) Status post pyelonephritis with sepsis  2) Myelodysplastic syndrome  3) Pulmonary nodules  4) Atelectasis    Recommend CT scan of the chest as an outpatient.  Satisfactory SpO2  Incentive spirometer  Bronchodilators:  Atrovent/ albuterol q 4 – 6 hours as needed  ID/Antibiotics: ciprofloxacin  GI: Rx/ prophylaxis c PPI/H2B  Heme: Rx/VT receiving AC  Discussed with Dr. Tapia

## 2018-07-31 NOTE — PROGRESS NOTE ADULT - PROBLEM SELECTOR PLAN 4
- PT to evaluate for home with pt vs [facility] - TSH wnl (3.238)  - Rhabdomyolysis, now resolved  - PT to evaluate for home with pt vs [facility]

## 2018-07-31 NOTE — PROGRESS NOTE ADULT - PROBLEM SELECTOR PLAN 3
Has history of hydronephrosis with UTIs colonized with klebsiella  - Bedside US with bilateral hydronephrosis  - F/u retroperitoneal U/S, pending read  - F/u abdominal CT, pending read  - Likely severe sepsis 2/2 to UTI  - Resendiz in place - good urine output Has history of hydronephrosis with UTIs colonized with klebsiella  - Bedside US with bilateral hydronephrosis  - F/u retroperitoneal U/S - No hydronephrosis. Right renal cysts as above.  - F/u abdominal CT - Perinephric fat stranding and minimal fluid surrounding both kidneys that may suggest an ascending infectious or inflammatory process.  - NM renal function w/out cont: Diminished renal function, somewhat worse on the right than the left.  - Likely severe sepsis 2/2 to UTI  - Failed TOV yesterday, Resendiz reinserted 7/31 AM  - Resendiz in place - good urine output

## 2018-07-31 NOTE — PROGRESS NOTE ADULT - PROBLEM SELECTOR PROBLEM 5
Myelodysplasia (myelodysplastic syndrome)
Rhabdomyolysis

## 2018-07-31 NOTE — PROGRESS NOTE ADULT - PROBLEM SELECTOR PLAN 5
Now resolved,   - CK levels downtrending from 7/26-7/31: 3298 ->1895 -> 720 -> 59  - Continue to follow  - Holding home atorvastatin in the setting of mild rhabdo

## 2018-07-31 NOTE — PROGRESS NOTE ADULT - PROBLEM SELECTOR PLAN 9
FLUIDS/ELECTROLYTES/NUTRITION  - IVF: NS@80 cc/hr  - Monitor, Replete to K>4 and Mg>2  - Diet: soft diet    PROPHYLAXIS  - DVT: xarelto  - GI: none    DISPO 7Uris  CODE STATUS: DNR
Patient with mild transaminitis on admission along with 2/10 RUQ pain on exam.  - Trend transaminases    #Currently with good O2 saturation on 2L NC  - No intervention necessary    #AAx2 on admission  - Likely 2/2 to sepsis  - Will continue to reassess    #CAD  - 81 mg enteric coated ASA    #hyperbilirubinemia   - f/u abdominal US

## 2018-07-31 NOTE — DIETITIAN INITIAL EVALUATION ADULT. - ENERGY NEEDS
Ht:5ft 5inches,IBW:125lbs+/-10%,86% of IBW.Adjusted for adult requirements as per [facility] standards.

## 2018-08-01 VITALS
SYSTOLIC BLOOD PRESSURE: 138 MMHG | HEART RATE: 79 BPM | DIASTOLIC BLOOD PRESSURE: 82 MMHG | OXYGEN SATURATION: 96 % | TEMPERATURE: 99 F | RESPIRATION RATE: 17 BRPM

## 2018-08-01 PROCEDURE — 86900 BLOOD TYPING SEROLOGIC ABO: CPT

## 2018-08-01 PROCEDURE — 82553 CREATINE MB FRACTION: CPT

## 2018-08-01 PROCEDURE — 84100 ASSAY OF PHOSPHORUS: CPT

## 2018-08-01 PROCEDURE — 86901 BLOOD TYPING SEROLOGIC RH(D): CPT

## 2018-08-01 PROCEDURE — 83010 ASSAY OF HAPTOGLOBIN QUANT: CPT

## 2018-08-01 PROCEDURE — 82803 BLOOD GASES ANY COMBINATION: CPT

## 2018-08-01 PROCEDURE — 93005 ELECTROCARDIOGRAM TRACING: CPT

## 2018-08-01 PROCEDURE — 96374 THER/PROPH/DIAG INJ IV PUSH: CPT

## 2018-08-01 PROCEDURE — 82550 ASSAY OF CK (CPK): CPT

## 2018-08-01 PROCEDURE — 83735 ASSAY OF MAGNESIUM: CPT

## 2018-08-01 PROCEDURE — 85027 COMPLETE CBC AUTOMATED: CPT

## 2018-08-01 PROCEDURE — 84443 ASSAY THYROID STIM HORMONE: CPT

## 2018-08-01 PROCEDURE — A9562: CPT

## 2018-08-01 PROCEDURE — 82247 BILIRUBIN TOTAL: CPT

## 2018-08-01 PROCEDURE — 78707 K FLOW/FUNCT IMAGE W/O DRUG: CPT

## 2018-08-01 PROCEDURE — 85610 PROTHROMBIN TIME: CPT

## 2018-08-01 PROCEDURE — 82962 GLUCOSE BLOOD TEST: CPT

## 2018-08-01 PROCEDURE — 76770 US EXAM ABDO BACK WALL COMP: CPT

## 2018-08-01 PROCEDURE — 80048 BASIC METABOLIC PNL TOTAL CA: CPT

## 2018-08-01 PROCEDURE — 97161 PT EVAL LOW COMPLEX 20 MIN: CPT

## 2018-08-01 PROCEDURE — 36415 COLL VENOUS BLD VENIPUNCTURE: CPT

## 2018-08-01 PROCEDURE — 85730 THROMBOPLASTIN TIME PARTIAL: CPT

## 2018-08-01 PROCEDURE — 81001 URINALYSIS AUTO W/SCOPE: CPT

## 2018-08-01 PROCEDURE — 99285 EMERGENCY DEPT VISIT HI MDM: CPT | Mod: 25

## 2018-08-01 PROCEDURE — 82248 BILIRUBIN DIRECT: CPT

## 2018-08-01 PROCEDURE — 86850 RBC ANTIBODY SCREEN: CPT

## 2018-08-01 PROCEDURE — 83605 ASSAY OF LACTIC ACID: CPT

## 2018-08-01 PROCEDURE — 83615 LACTATE (LD) (LDH) ENZYME: CPT

## 2018-08-01 PROCEDURE — 76705 ECHO EXAM OF ABDOMEN: CPT

## 2018-08-01 PROCEDURE — 87186 SC STD MICRODIL/AGAR DIL: CPT

## 2018-08-01 PROCEDURE — 87040 BLOOD CULTURE FOR BACTERIA: CPT

## 2018-08-01 PROCEDURE — 71045 X-RAY EXAM CHEST 1 VIEW: CPT

## 2018-08-01 PROCEDURE — 80053 COMPREHEN METABOLIC PANEL: CPT

## 2018-08-01 PROCEDURE — 85025 COMPLETE CBC W/AUTO DIFF WBC: CPT

## 2018-08-01 PROCEDURE — 87086 URINE CULTURE/COLONY COUNT: CPT

## 2018-08-01 PROCEDURE — 74176 CT ABD & PELVIS W/O CONTRAST: CPT

## 2018-08-01 PROCEDURE — 84460 ALANINE AMINO (ALT) (SGPT): CPT

## 2018-08-01 PROCEDURE — 84484 ASSAY OF TROPONIN QUANT: CPT

## 2018-08-01 PROCEDURE — 84450 TRANSFERASE (AST) (SGOT): CPT

## 2018-08-01 RX ADMIN — Medication 81 MILLIGRAM(S): at 11:13

## 2018-08-01 RX ADMIN — Medication 500 MILLIGRAM(S): at 06:20

## 2018-08-01 RX ADMIN — RIVAROXABAN 20 MILLIGRAM(S): KIT at 09:18

## 2018-08-01 NOTE — PROGRESS NOTE ADULT - SUBJECTIVE AND OBJECTIVE BOX
CC/ HPI Patient is a 78 year old female with hypertension, myeloproliferative disorder, pulmonary embolus, 2015, who was admitted with hydronephrosis and severe sepsis secondary to urinary tract infection, new pulmonary nodule on chest radiograph, seen this morning denies respiratory complaint.    PAST MEDICAL & SURGICAL HISTORY:  Myeloproliferative disorder  Hypertension  PE/DVT (pulmonary thromboembolism): 2015  Tremor  Vestibular disequilibrium  S/P hysterectomy    SOCHX:   +tobacco,  -  alcohol    FMHX: FA/MO  - contributory     ROS reviewed below with positive findings marked (+) :  GEN:  fever, chills ENT: tracheostomy,   epistaxis,  sinusitis COR: CAD, CHF,  +HTN, dysrhythmia PUL: COPD, ILD, asthma, pneumonia GI: PEG, dysphagia, hemorrhage, other MAXIMO: kidney disease, electrolyte disorder HEM:  +anemia, +thrombus, coagulopathy, cancer ENDO:  thyroid disease, diabetes mellitus CNS:  dementia, stroke, seizure, PSY:  depression, anxiety, other      MEDICATIONS  (STANDING):  aspirin enteric coated 81 milliGRAM(s) Oral daily  ciprofloxacin     Tablet 500 milliGRAM(s) Oral every 12 hours  rivaroxaban 20 milliGRAM(s) Oral every 24 hours    MEDICATIONS  (PRN):  acetaminophen   Tablet 650 milliGRAM(s) Oral every 6 hours PRN For Temp greater than 38.5 C (101.3 F)      Vital Signs Last 24 Hrs  T(C): 37.2 (01 Aug 2018 10:09), Max: 37.2 (01 Aug 2018 10:09)  T(F): 98.9 (01 Aug 2018 10:09), Max: 98.9 (01 Aug 2018 10:09)  HR: 79 (01 Aug 2018 10:09) (74 - 82)  BP: 138/82 (01 Aug 2018 10:09) (127/80 - 139/85)  BP(mean): --  RR: 17 (01 Aug 2018 10:09) (17 - 18)  SpO2: 96% (01 Aug 2018 10:09) (95% - 96%)    GENERAL:         comfortable,  - distress.  HEENT:            - trauma,  - icterus,  - injection,  - nasal discharge.  NECK:              - jugular venous distention, - thyromegaly.  LYMPH:           - lymphadenopathy, - masses.  RESP:               + clear,   - rales,   - rhonchi,   - wheezes.   COR:                S1S2   - gallops,  - rubs.  ABD:                bowel sounds,   soft, - tender, - distended.  EXT/MSC:         - cyanosis,  - clubbing,  - edema.    NEURO:             alert,   responds to stimuli.                        10.2   10.2  )-----------( 178      ( 31 Jul 2018 07:13 )             33.1     07-31    140  |  102  |  6<L>  ----------------------------<  110<H>  4.7   |  22  |  0.64        Xray Chest (07.26.18)  Nodular opacities at the left lung base which may be related to atelectasis, infection, or inflammation.       CT Chest w/ IV Cont (09.14.17) Several new pulmonary nodules measuring up to 0.4 cm. Follow-up chest CT in one year is recommended to ensure stability.      ASSESSMENT/PLAN    1) Status post pyelonephritis with sepsis  2) Myelodysplastic syndrome  3) Pulmonary nodules  4) Atelectasis    Recommend CT scan of the chest, can be done as an outpatient.  Satisfactory SpO2,  Incentive spirometer  Bronchodilators:  Atrovent/ albuterol q 4 – 6 hours as needed  ID/Antibiotics: ciprofloxacin  GI: Rx/ prophylaxis c PPI/H2B  Heme: Rx/VT receiving AC  Discussed with Dr. Tapia

## 2018-08-01 NOTE — PROGRESS NOTE ADULT - PROVIDER SPECIALTY LIST ADULT
Heme/Onc
Infectious Disease
Internal Medicine
MICU
Pulmonology

## 2018-08-07 DIAGNOSIS — Z79.01 LONG TERM (CURRENT) USE OF ANTICOAGULANTS: ICD-10-CM

## 2018-08-07 DIAGNOSIS — I25.10 ATHEROSCLEROTIC HEART DISEASE OF NATIVE CORONARY ARTERY WITHOUT ANGINA PECTORIS: ICD-10-CM

## 2018-08-07 DIAGNOSIS — A41.9 SEPSIS, UNSPECIFIED ORGANISM: ICD-10-CM

## 2018-08-07 DIAGNOSIS — M62.82 RHABDOMYOLYSIS: ICD-10-CM

## 2018-08-07 DIAGNOSIS — E44.0 MODERATE PROTEIN-CALORIE MALNUTRITION: ICD-10-CM

## 2018-08-07 DIAGNOSIS — R65.20 SEVERE SEPSIS WITHOUT SEPTIC SHOCK: ICD-10-CM

## 2018-08-07 DIAGNOSIS — E86.0 DEHYDRATION: ICD-10-CM

## 2018-08-07 DIAGNOSIS — D46.9 MYELODYSPLASTIC SYNDROME, UNSPECIFIED: ICD-10-CM

## 2018-08-07 DIAGNOSIS — E87.3 ALKALOSIS: ICD-10-CM

## 2018-08-07 DIAGNOSIS — Z87.891 PERSONAL HISTORY OF NICOTINE DEPENDENCE: ICD-10-CM

## 2018-08-07 DIAGNOSIS — Z86.718 PERSONAL HISTORY OF OTHER VENOUS THROMBOSIS AND EMBOLISM: ICD-10-CM

## 2018-08-07 DIAGNOSIS — E80.7 DISORDER OF BILIRUBIN METABOLISM, UNSPECIFIED: ICD-10-CM

## 2018-08-07 DIAGNOSIS — B96.20 UNSPECIFIED ESCHERICHIA COLI [E. COLI] AS THE CAUSE OF DISEASES CLASSIFIED ELSEWHERE: ICD-10-CM

## 2018-08-07 DIAGNOSIS — G92 TOXIC ENCEPHALOPATHY: ICD-10-CM

## 2018-08-07 DIAGNOSIS — Z79.82 LONG TERM (CURRENT) USE OF ASPIRIN: ICD-10-CM

## 2018-08-07 DIAGNOSIS — N39.0 URINARY TRACT INFECTION, SITE NOT SPECIFIED: ICD-10-CM

## 2018-08-07 DIAGNOSIS — R91.8 OTHER NONSPECIFIC ABNORMAL FINDING OF LUNG FIELD: ICD-10-CM

## 2018-08-07 DIAGNOSIS — J98.11 ATELECTASIS: ICD-10-CM

## 2018-08-07 DIAGNOSIS — N13.6 PYONEPHROSIS: ICD-10-CM

## 2018-08-07 DIAGNOSIS — I10 ESSENTIAL (PRIMARY) HYPERTENSION: ICD-10-CM

## 2018-11-30 NOTE — ED PROVIDER NOTE - PROGRESS NOTE DETAILS
gina simpson np D/w Dr Dumont. Pt was previously on Xarelto and doing well on it. Pt had recent admission for pancytopenia s/p 1 week of Augmentin for dental infection, and was taken off Xarelto at this time. No hx bleeding. If CBC normal, restart Xarelto, and she will see pt on Monday. Pt denies every using alcohol. Pt reports no falls in several years. Pt also reports no hx bleeding. Will give first dose Xarelto in the ED, send Rx, and have pt f/u Dr Dumont on Monday as already scheduled. Requested ED Rep to contact Dr Ruff as he sent pt for study, to advise of results

## 2018-12-05 NOTE — CONSULT NOTE ADULT - ASSESSMENT
Progress Notes by Jessi Scanlon at 18 01:46 PM     Author:  HILL Scanlon Service:  (none) Author Type:  Physician Assistant     Filed:  18 06:17 PM Encounter Date:  2018 Status:  Signed     :  Jessi Scanlon (Physician Assistant)            CC:[RS1.1T]  Establish care, needs referral for RA treatment[RS1.1M]       Visit:[RS1.1T] Initial[RS1.1M] for this complaint    DICK Amato is a 63year old[RS1.1T] [place]ian[RS1.2M] female who presents today[RS1.1T] with her daughter who does speak some english but interpetor service, [address] [facility], was utilized. Pt. developed bilateral hand pain and knee pain as well as joint swelling back in . She states she had a workup through her primary care physician in [place] and was referred to a rheumatologist.  She was started on methotrexate 10 mg weekly and medication that is similar to diclofenac to use as needed. Patient states she has not seen the rheumatologist in over 3 years. She has been living in [facility] [address] for the last 1 year. She is here to discuss the fact that the medication does not seem to be working for her. She feels stiffness and pain in her bilateral wrists hands and phalanges. Pain is worse at night especially with fluctuating weather temperatures. She was hoping to see a rheumatologist to discuss other possible med options her med dose increase.[RS1.2M]    Past Medical History:     Diagnosis  Date   â¢ Rheumatoid arthritis      No past surgical history on file.      Family History       Problem   Relation Age of Onset   â¢ OTHER  Mother      unknown,  in 66's     â¢ Pulmonary  Father      copd       Social History     Substance Use Topics     â¢ Smoking status: Never Smoker   â¢ Smokeless tobacco: Never Used   â¢ Alcohol use Yes[RS1.3T]       Allergies:  Review of patient's allergies indicates no known allergies.[RS1.1T]       Current Outpatient Prescriptions     Medication  Sig "  â¢ methotrexate 10 MG tablet Take 10 mg by mouth once a week. [RS1.3T]       Review of Systems:[RS1.1T]  Fever:[RS1.2M]     No elevation of temperature[RS1.4M]  General:[RS1.2M] denies fever, night sweats, weight changes, appetite changes and sleep problems[RS1.4M]  Heent:[RS1.2M] Pt denies problems with head, eyes, ears, nose, mouth, throat and neck[RS1.4M]  Respiratory:[RS1.2M] No coughing, wheezing, changes in voice,  nor shortness of breath[RS1.4M]  Cardiovascular:[RS1.2M]No chest pain, palpitations or other cardiac complaints noted[RS1.4M]  Gastrointestinal:[RS1.2M] No diarrhea, constipation, abdominal pain or other complaints noted[RS1.4M]  Genitourinary:[RS1.2M] Pt. denies urinary pain, bleeding and voiding problems[RS1.4M]  Skin:[RS1.2M] No problems with hair or nails. No rash. No new skin lesions.[RS1.4M]  All other systems reviewed are negative     OBJEC[RS1.2M]TIVE:  /82  Pulse 67  Temp 97.4 Â°F (36.3 Â°C) (Tympanic)   Ht 5' 7"" (1.702 m)  Wt 162 lb (73.5 kg)  SpO2 97%  BMI 25.37 kg/m2     Physical exam[RS1.1T]  General appearance -[RS1.2M] alert & oriented, pleasant and comfortable[RS1.4M]  Skin -[RS1.2M] Skin color, texture, turgor normal. No rashes or lesions.[RS1.4M]  Eyes -[RS1.2M] negative findings: conjunctivae and sclerae normal[RS1.4M]  Ears -[RS1.2M] External ears normal. Canals clear. Normal light reflex. TM's clear.[RS1.4M]  Oropharynx -[RS1.2M] Lips, mucosa, tongue, teeth and gums normal. Oropharynx pink and moist.[RS1.4M]  Lungs -[RS1.2M] normal respiratory rate and rhythm, lungs clear, no wheeze, no rales, no rhonchi.[RS1.4M]  Heart -[RS1.2M] regular rate and rhythm, S1 normal, S2 normal, no murmurs, clicks, or gallops. [RS1.4M]  Musculoskeletal -[RS1.2M]   she is normal upright posture and walks without the use of assistive device. She has tenderness to bilateral wrists and phalanges on palpation. Her wrist range of motion is limited due to pain specifically with extension.   " Vascular consulted for IVC filter in setting of contraindication to anticoagulation  Patient refusing IVC filter at this time  will follow There is no joint deformity[RS1.4M]    ASSES[RS1.2M]SMENT/PLAN[RS1.1T]  1. Rheumatoid arthritis involving both hands, unspecified rheumatoid factor presence[RS1.3T]  --  Referral to rheumatology placed. Recommend checking bilateral hand x-ray fine detail and prelim labs prior to her consultation.[RS1.4M]     2. Establishing care with new doctor, encounter for[RS1.3T] --[RS1.4M]  Total time spent with patient 40 minutes, with over 50 % on counseling and discussion of problems.[RS1.4T]       Instructed to call if the problem worsens or does not improve within the next 24 to 48 hours.   Schedule follow-up:[RS1.1T] prn[RS1.4M]    Signed Electronically Signed by:    HILL Rodriguez , 9[birth year]97[birth year]  Supervising Physician:[RS1.1T]  Dr. 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    Vascular consulted for IVC filter.  Patient refusing IVC filter at this time.  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 No bleeding on Xarelto, no known recurrent PE, no known. failure of anticoagulation. Questionable if she meets relative criteria for IVC filter.    Recs:  Get repeat BLE venous duplex to check for propagation vs resolution  consider rechecking status of PE with imaging  will follow  call [phone] with questions  discussed with Dr. 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    Vascular consulted for IVC filter.  Patient refusing IVC filter at this time.  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 No bleeding on Xarelto, no known recurrent PE, no known. failure of anticoagulation. Questionable if she meets relative criteria for IVC filter.    Recs:  Get repeat BLE venous duplex to check for propagation vs resolution  consider rechecking status of PE with imaging  will follow  call [phone] with questions  discussed with Dr. Garza    Addendum: 9/10/17: Patient is still refusing IVC filter placement after multiple discussions explaining risks/benefits. Signing off. Reconsult if patient agrees to procedure.

## 2019-01-08 ENCOUNTER — APPOINTMENT (OUTPATIENT)
Dept: HEART AND VASCULAR | Facility: CLINIC | Age: 79
End: 2019-01-08

## 2019-01-08 NOTE — ED PROVIDER NOTE - SKIN NEGATIVE STATEMENT, MLM
Pt cleared for d/c  Cm spoke with Jesusita Lunsford at [facility] and made aware  Pt's son-in-law to provide transport  Discharge Summary AVS to home faxed to [facility]  no abrasions, no jaundice, no lesions, no pruritis, and no rashes.

## 2019-02-03 NOTE — PATIENT PROFILE ADULT. - CAREGIVER
Immediate care office VISIT      Patient: Celestino Rhoades Date of Service: 2/3/2019   : 1988 MRN: [id number]     SUBJECTIVE:   HISTORY OF PRESENT ILLNESS:  Celestino Rhoades is a 30 year old male who presents today for     Sore throat, chills, with generalized malaise & fatigue x 2 days. Pt reports low-grade fever yesterday with body aches. Pt reports dry cough, no ear pain or nasal congestion or rhinorrhea. Pt denies any diarrhea or vomiting.    Pt denies any sick contacts. Pt reports frequent cold exposure at work this past week.    PCP: Dr. Zamorano          PAST MEDICAL HISTORY:  No past medical history on file.    MEDICATIONS:  Current Outpatient Medications   Medication Sig   • amphetamine-dextroamphetamine (ADDERALL XR) 30 MG 24 hr capsule Take 1 capsule by mouth daily. TAKE 1 CAPSULE DAILY FOR ADHD   • alprazolam (XANAX) 2 MG tablet Take 1 tablet by mouth daily as needed for Sleep.   • ranitidine (ZANTAC) 150 MG tablet daily as needed. TAKE 1 TABLET BY MOUTH TWICE DAILY    • amoxicillin (AMOXIL) 875 MG tablet Take 1 tablet by mouth 2 times daily for 10 days.   • cetirizine (ZYRTEC ALLERGY) 10 MG tablet Take 1 tablet by mouth daily for 14 days.   • triamcinolone (NASACORT ALLERGY 24HR) 55 MCG/ACT nasal inhaler Spray 2 sprays in each nostril daily for 7 days.   • amphetamine-dextroamphetamine (ADDERALL XR) 20 MG 24 hr capsule Take 1 capsule by mouth daily.   • ranitidine (ZANTAC) 150 MG capsule Take 150 mg by mouth.     No current facility-administered medications for this visit.        ALLERGIES:  ALLERGIES:  No Known Allergies    PAST SURGICAL HISTORY:  No past surgical history on file.    FAMILY HISTORY:  No family history on file.    SOCIAL HISTORY:  Social History     Tobacco Use   • Smoking status: Never Smoker   • Smokeless tobacco: Never Used   Substance Use Topics   • Alcohol use: Yes   • Drug use: Not on file       Review of Systems   Constitutional: Positive for chills and malaise/fatigue. Negative  for diaphoresis, fever and weight loss.   HENT: Positive for sore throat. Negative for congestion, ear discharge, ear pain, hearing loss, nosebleeds, sinus pain and tinnitus.    Eyes: Negative.    Respiratory: Positive for cough. Negative for stridor.    Cardiovascular: Negative.    Gastrointestinal: Negative.    Genitourinary: Negative.    Musculoskeletal: Negative.    Skin: Negative.    Neurological: Negative.  Negative for weakness.   Endo/Heme/Allergies: Negative.    Psychiatric/Behavioral: Negative.    All other systems reviewed and are negative.        OBJECTIVE:     Physical Exam   Constitutional: He is oriented to person, place, and time and well-developed, well-nourished, and in no distress.   HENT:   Head: Normocephalic and atraumatic.   Right Ear: Tympanic membrane, external ear and ear canal normal.   Left Ear: External ear and ear canal normal. Tympanic membrane is erythematous.   Nose: Mucosal edema and rhinorrhea present. Right sinus exhibits no maxillary sinus tenderness and no frontal sinus tenderness. Left sinus exhibits no maxillary sinus tenderness and no frontal sinus tenderness.   Mouth/Throat: Uvula is midline and mucous membranes are normal. Posterior oropharyngeal erythema present. No oropharyngeal exudate, posterior oropharyngeal edema or tonsillar abscesses.   Eyes: Conjunctivae and EOM are normal. Pupils are equal, round, and reactive to light.   Neck: Normal range of motion. Neck supple.   Cardiovascular: Normal rate, regular rhythm, normal heart sounds and intact distal pulses.   Pulmonary/Chest: Effort normal. No accessory muscle usage. No respiratory distress. He has no decreased breath sounds. He has no wheezes. He has no rhonchi. He has no rales.   Abdominal: Soft. Bowel sounds are normal.   Musculoskeletal: Normal range of motion.   Neurological: He is alert and oriented to person, place, and time. Gait normal. GCS score is 15.   Skin: Skin is warm and dry.   Psychiatric: Mood,  memory, affect and judgment normal.       Visit Vitals  /77 ([place] [facility] Position: Sitting)   Pulse 91   Temp 98.1 °F (36.7 °C) (Oral)   Resp 16   SpO2 97%       DIAGNOSTIC STUDIES:   LAB RESULTS:    Results for orders placed or performed in visit on 02/03/19   POCT RAPID STREP A   Result Value Ref Range    GRP A STREP Negative     Internal Procedural Controls Acceptable Yes        Assessment AND PLAN:   This is a 30 year old year-old male who presents with .    1. Acute URI    2. Sore throat    3. Acute suppurative otitis media of right ear without spontaneous rupture of tympanic membrane, recurrence not specified      -strep  +R TM erythema with early OM    +swelling to nasal mucosa + erythema to pharynx cw post-nasal drip    np will tx for r om + uri with amoxicillin + zyrtec + nasacort    np educated pt regarding home care of uri + om        Instructions provided as documented in the AVS.  Pt to follow up with PCP in 1 week if not improving  Pt educated on emergency symptoms to watch for & when to go to the Emergency Department          The patient indicated understanding of the diagnosis and agreed with the plan of care.         No

## 2019-02-04 ENCOUNTER — APPOINTMENT (OUTPATIENT)
Dept: HEART AND VASCULAR | Facility: CLINIC | Age: 79
End: 2019-02-04
Payer: MEDICARE

## 2019-02-04 VITALS
TEMPERATURE: 97.5 F | WEIGHT: 106.99 LBS | BODY MASS INDEX: 20.2 KG/M2 | OXYGEN SATURATION: 96 % | HEART RATE: 92 BPM | HEIGHT: 61 IN | DIASTOLIC BLOOD PRESSURE: 111 MMHG | SYSTOLIC BLOOD PRESSURE: 162 MMHG

## 2019-02-04 DIAGNOSIS — I10 ESSENTIAL (PRIMARY) HYPERTENSION: ICD-10-CM

## 2019-02-04 DIAGNOSIS — Z80.9 FAMILY HISTORY OF MALIGNANT NEOPLASM, UNSPECIFIED: ICD-10-CM

## 2019-02-04 DIAGNOSIS — Z87.891 PERSONAL HISTORY OF NICOTINE DEPENDENCE: ICD-10-CM

## 2019-02-04 DIAGNOSIS — R01.1 CARDIAC MURMUR, UNSPECIFIED: ICD-10-CM

## 2019-02-04 PROCEDURE — 93000 ELECTROCARDIOGRAM COMPLETE: CPT

## 2019-02-04 PROCEDURE — 99204 OFFICE O/P NEW MOD 45 MIN: CPT | Mod: 25

## 2019-02-04 RX ORDER — ROSUVASTATIN CALCIUM 10 MG/1
10 TABLET, FILM COATED ORAL DAILY
Refills: 0 | Status: ACTIVE | COMMUNITY

## 2019-02-04 RX ORDER — BUPROPION HYDROCHLORIDE 75 MG/1
75 TABLET, FILM COATED ORAL TWICE DAILY
Refills: 0 | Status: ACTIVE | COMMUNITY

## 2019-02-05 NOTE — PHYSICAL EXAM
[General Appearance - Well Developed] : well developed [Normal Appearance] : normal appearance [Well Groomed] : well groomed [General Appearance - Well Nourished] : well nourished [No Deformities] : no deformities [General Appearance - In No Acute Distress] : no acute distress [Normal Conjunctiva] : the conjunctiva exhibited no abnormalities [Eyelids - No Xanthelasma] : the eyelids demonstrated no xanthelasmas [Normal Oral Mucosa] : normal oral mucosa [No Oral Pallor] : no oral pallor [No Oral Cyanosis] : no oral cyanosis [Normal Jugular Venous A Waves Present] : normal jugular venous A waves present [Normal Jugular Venous V Waves Present] : normal jugular venous V waves present [No Jugular Venous Castañeda A Waves] : no jugular venous castañeda A waves [Heart Rate And Rhythm] : heart rate and rhythm were normal [Heart Sounds] : normal S1 and S2 [Murmurs] : no murmurs present [Respiration, Rhythm And Depth] : normal respiratory rhythm and effort [Exaggerated Use Of Accessory Muscles For Inspiration] : no accessory muscle use [Auscultation Breath Sounds / Voice Sounds] : lungs were clear to auscultation bilaterally [Abdomen Soft] : soft [Abdomen Tenderness] : non-tender [Abdomen Mass (___ Cm)] : no abdominal mass palpated [Abnormal Walk] : normal gait [Gait - Sufficient For Exercise Testing] : the gait was sufficient for exercise testing [Nail Clubbing] : no clubbing of the fingernails [Cyanosis, Localized] : no localized cyanosis [Petechial Hemorrhages (___cm)] : no petechial hemorrhages [Skin Color & Pigmentation] : normal skin color and pigmentation [] : no rash [No Venous Stasis] : no venous stasis [Skin Lesions] : no skin lesions [No Skin Ulcers] : no skin ulcer [No Xanthoma] : no  xanthoma was observed [Oriented To Time, Place, And Person] : oriented to person, place, and time [Affect] : the affect was normal [Mood] : the mood was normal [No Anxiety] : not feeling anxious

## 2019-02-05 NOTE — DISCUSSION/SUMMARY
[FreeTextEntry1] : The number of diagnostic and/or management options \par \par htn - ambi bp cuff for 2 weeks prior to augmenting bp regimen\par murmur - possible AS - will get 2d echo \par dvt pe - 2d echo for rv pressures, continue xarelto\par mds - jenifer jacinto\par \par \par Labs, radiology: ekg\par \par Aspirin therapy:yes\par \par LDL: n/a\par \par Medical team conference with interdisciplinary team of health care professionals, patient and/or family not present, 34 minutes, discussion of the case with another healthcare provider: Regina\par \par \par Overall Risk: High Complexity\par \par \par Additional detailed discussion regarding prevention including but not limiting to goal SBP<130mmHg, Mediterranean diet discussion, No salt diet, diabetes screening, and goal LDL<100. Smoking cessation, EtOH use and depression screen where applicable\par Exercise counseling and plan discussed with patient\par will readdress and reinforce prevention in subsequent visits

## 2019-02-05 NOTE — HISTORY OF PRESENT ILLNESS
[FreeTextEntry1] : 78 F with MDS followed up dr Resendiz hx DVT and PE on xarelto \par no cp sob palp +lethargy\par \par ekg remarkabel for anterior q and inferior q - looking back to 2017 ekgs this is unchanged \par 2017 pt also had normal echo preserved EF\par \par fhx nc\par \par sochx non smoker

## 2019-03-07 ENCOUNTER — INPATIENT (INPATIENT)
Facility: HOSPITAL | Age: 79
LOS: 4 days | Discharge: NURSING, REHAB OR LONG-TERM CARE | DRG: 872 | End: 2019-03-12
Attending: INTERNAL MEDICINE | Admitting: INTERNAL MEDICINE
Payer: MEDICARE

## 2019-03-07 ENCOUNTER — APPOINTMENT (OUTPATIENT)
Dept: HEART AND VASCULAR | Facility: CLINIC | Age: 79
End: 2019-03-07

## 2019-03-07 VITALS
HEART RATE: 104 BPM | OXYGEN SATURATION: 94 % | TEMPERATURE: 97 F | RESPIRATION RATE: 18 BRPM | DIASTOLIC BLOOD PRESSURE: 88 MMHG | SYSTOLIC BLOOD PRESSURE: 155 MMHG

## 2019-03-07 DIAGNOSIS — R63.8 OTHER SYMPTOMS AND SIGNS CONCERNING FOOD AND FLUID INTAKE: ICD-10-CM

## 2019-03-07 DIAGNOSIS — Z98.890 OTHER SPECIFIED POSTPROCEDURAL STATES: Chronic | ICD-10-CM

## 2019-03-07 DIAGNOSIS — D47.1 CHRONIC MYELOPROLIFERATIVE DISEASE: ICD-10-CM

## 2019-03-07 DIAGNOSIS — R53.1 WEAKNESS: ICD-10-CM

## 2019-03-07 DIAGNOSIS — E87.6 HYPOKALEMIA: ICD-10-CM

## 2019-03-07 DIAGNOSIS — N39.0 URINARY TRACT INFECTION, SITE NOT SPECIFIED: ICD-10-CM

## 2019-03-07 DIAGNOSIS — Z90.710 ACQUIRED ABSENCE OF BOTH CERVIX AND UTERUS: Chronic | ICD-10-CM

## 2019-03-07 DIAGNOSIS — A41.9 SEPSIS, UNSPECIFIED ORGANISM: ICD-10-CM

## 2019-03-07 DIAGNOSIS — R64 CACHEXIA: ICD-10-CM

## 2019-03-07 DIAGNOSIS — Z91.89 OTHER SPECIFIED PERSONAL RISK FACTORS, NOT ELSEWHERE CLASSIFIED: ICD-10-CM

## 2019-03-07 DIAGNOSIS — R25.1 TREMOR, UNSPECIFIED: ICD-10-CM

## 2019-03-07 DIAGNOSIS — I26.99 OTHER PULMONARY EMBOLISM WITHOUT ACUTE COR PULMONALE: ICD-10-CM

## 2019-03-07 DIAGNOSIS — Z29.9 ENCOUNTER FOR PROPHYLACTIC MEASURES, UNSPECIFIED: ICD-10-CM

## 2019-03-07 LAB
ALBUMIN SERPL ELPH-MCNC: 4.2 G/DL — SIGNIFICANT CHANGE UP (ref 3.3–5)
ALP SERPL-CCNC: 96 U/L — SIGNIFICANT CHANGE UP (ref 40–120)
ALT FLD-CCNC: 9 U/L — LOW (ref 10–45)
ANION GAP SERPL CALC-SCNC: 15 MMOL/L — SIGNIFICANT CHANGE UP (ref 5–17)
APPEARANCE UR: CLEAR — SIGNIFICANT CHANGE UP
APTT BLD: 36.2 SEC — SIGNIFICANT CHANGE UP (ref 27.5–36.3)
AST SERPL-CCNC: 14 U/L — SIGNIFICANT CHANGE UP (ref 10–40)
BASOPHILS # BLD AUTO: 0.25 K/UL — HIGH (ref 0–0.2)
BASOPHILS NFR BLD AUTO: 1 % — SIGNIFICANT CHANGE UP (ref 0–2)
BILIRUB SERPL-MCNC: 0.6 MG/DL — SIGNIFICANT CHANGE UP (ref 0.2–1.2)
BILIRUB UR-MCNC: NEGATIVE — SIGNIFICANT CHANGE UP
BUN SERPL-MCNC: 15 MG/DL — SIGNIFICANT CHANGE UP (ref 7–23)
CALCIUM SERPL-MCNC: 9.9 MG/DL — SIGNIFICANT CHANGE UP (ref 8.4–10.5)
CHLORIDE SERPL-SCNC: 102 MMOL/L — SIGNIFICANT CHANGE UP (ref 96–108)
CO2 SERPL-SCNC: 27 MMOL/L — SIGNIFICANT CHANGE UP (ref 22–31)
COLOR SPEC: YELLOW — SIGNIFICANT CHANGE UP
CREAT SERPL-MCNC: 0.83 MG/DL — SIGNIFICANT CHANGE UP (ref 0.5–1.3)
DIFF PNL FLD: NEGATIVE — SIGNIFICANT CHANGE UP
EOSINOPHIL # BLD AUTO: 0.25 K/UL — SIGNIFICANT CHANGE UP (ref 0–0.5)
EOSINOPHIL NFR BLD AUTO: 1 % — SIGNIFICANT CHANGE UP (ref 0–6)
GLUCOSE SERPL-MCNC: 108 MG/DL — HIGH (ref 70–99)
GLUCOSE UR QL: NEGATIVE — SIGNIFICANT CHANGE UP
HCT VFR BLD CALC: 46.8 % — HIGH (ref 34.5–45)
HGB BLD-MCNC: 14.6 G/DL — SIGNIFICANT CHANGE UP (ref 11.5–15.5)
INR BLD: 1.86 — HIGH (ref 0.88–1.16)
KETONES UR-MCNC: ABNORMAL MG/DL
LACTATE SERPL-SCNC: 1.1 MMOL/L — SIGNIFICANT CHANGE UP (ref 0.5–2)
LEUKOCYTE ESTERASE UR-ACNC: NEGATIVE — SIGNIFICANT CHANGE UP
LYMPHOCYTES # BLD AUTO: 1.25 K/UL — SIGNIFICANT CHANGE UP (ref 1–3.3)
LYMPHOCYTES # BLD AUTO: 5 % — LOW (ref 13–44)
MANUAL SMEAR VERIFICATION: SIGNIFICANT CHANGE UP
MCHC RBC-ENTMCNC: 26.6 PG — LOW (ref 27–34)
MCHC RBC-ENTMCNC: 31.2 GM/DL — LOW (ref 32–36)
MCV RBC AUTO: 85.2 FL — SIGNIFICANT CHANGE UP (ref 80–100)
MONOCYTES # BLD AUTO: 2.49 K/UL — HIGH (ref 0–0.9)
MONOCYTES NFR BLD AUTO: 10 % — SIGNIFICANT CHANGE UP (ref 2–14)
NEUTROPHILS # BLD AUTO: 20.68 K/UL — HIGH (ref 1.8–7.4)
NEUTROPHILS NFR BLD AUTO: 78 % — HIGH (ref 43–77)
NEUTS BAND # BLD: 5 % — SIGNIFICANT CHANGE UP (ref 0–8)
NITRITE UR-MCNC: POSITIVE
NRBC # BLD: 0 /100 — SIGNIFICANT CHANGE UP (ref 0–0)
NRBC # BLD: SIGNIFICANT CHANGE UP /100 WBCS (ref 0–0)
PH UR: 6 — SIGNIFICANT CHANGE UP (ref 5–8)
PLAT MORPH BLD: NORMAL — SIGNIFICANT CHANGE UP
PLATELET # BLD AUTO: 527 K/UL — HIGH (ref 150–400)
POTASSIUM SERPL-MCNC: 3.1 MMOL/L — LOW (ref 3.5–5.3)
POTASSIUM SERPL-SCNC: 3.1 MMOL/L — LOW (ref 3.5–5.3)
PROT SERPL-MCNC: 7 G/DL — SIGNIFICANT CHANGE UP (ref 6–8.3)
PROT UR-MCNC: 30 MG/DL
PROTHROM AB SERPL-ACNC: 21.4 SEC — HIGH (ref 10–12.9)
RBC # BLD: 5.49 M/UL — HIGH (ref 3.8–5.2)
RBC # FLD: 16.4 % — HIGH (ref 10.3–14.5)
RBC BLD AUTO: NORMAL — SIGNIFICANT CHANGE UP
SODIUM SERPL-SCNC: 144 MMOL/L — SIGNIFICANT CHANGE UP (ref 135–145)
SP GR SPEC: 1.02 — SIGNIFICANT CHANGE UP (ref 1–1.03)
UROBILINOGEN FLD QL: 0.2 E.U./DL — SIGNIFICANT CHANGE UP
WBC # BLD: 24.91 K/UL — HIGH (ref 3.8–10.5)
WBC # FLD AUTO: 24.91 K/UL — HIGH (ref 3.8–10.5)

## 2019-03-07 PROCEDURE — 93010 ELECTROCARDIOGRAM REPORT: CPT

## 2019-03-07 PROCEDURE — 70450 CT HEAD/BRAIN W/O DYE: CPT | Mod: 26

## 2019-03-07 PROCEDURE — 99285 EMERGENCY DEPT VISIT HI MDM: CPT | Mod: 25

## 2019-03-07 PROCEDURE — 71045 X-RAY EXAM CHEST 1 VIEW: CPT | Mod: 26

## 2019-03-07 RX ORDER — POTASSIUM CHLORIDE 20 MEQ
40 PACKET (EA) ORAL ONCE
Qty: 0 | Refills: 0 | Status: DISCONTINUED | OUTPATIENT
Start: 2019-03-07 | End: 2019-03-07

## 2019-03-07 RX ORDER — RUXOLITINIB 25 MG/1
1 TABLET ORAL
Qty: 0 | Refills: 0 | COMMUNITY

## 2019-03-07 RX ORDER — ACETAMINOPHEN 500 MG
650 TABLET ORAL EVERY 6 HOURS
Qty: 0 | Refills: 0 | Status: DISCONTINUED | OUTPATIENT
Start: 2019-03-07 | End: 2019-03-12

## 2019-03-07 RX ORDER — AMLODIPINE BESYLATE 2.5 MG/1
5 TABLET ORAL DAILY
Qty: 0 | Refills: 0 | Status: DISCONTINUED | OUTPATIENT
Start: 2019-03-07 | End: 2019-03-12

## 2019-03-07 RX ORDER — SODIUM CHLORIDE 9 MG/ML
1000 INJECTION INTRAMUSCULAR; INTRAVENOUS; SUBCUTANEOUS ONCE
Qty: 0 | Refills: 0 | Status: COMPLETED | OUTPATIENT
Start: 2019-03-07 | End: 2019-03-07

## 2019-03-07 RX ORDER — ASPIRIN/CALCIUM CARB/MAGNESIUM 324 MG
81 TABLET ORAL DAILY
Qty: 0 | Refills: 0 | Status: DISCONTINUED | OUTPATIENT
Start: 2019-03-07 | End: 2019-03-12

## 2019-03-07 RX ORDER — BUPROPION HYDROCHLORIDE 150 MG/1
75 TABLET, EXTENDED RELEASE ORAL DAILY
Qty: 0 | Refills: 0 | Status: DISCONTINUED | OUTPATIENT
Start: 2019-03-07 | End: 2019-03-07

## 2019-03-07 RX ORDER — CEFTRIAXONE 500 MG/1
1 INJECTION, POWDER, FOR SOLUTION INTRAMUSCULAR; INTRAVENOUS ONCE
Qty: 0 | Refills: 0 | Status: COMPLETED | OUTPATIENT
Start: 2019-03-07 | End: 2019-03-07

## 2019-03-07 RX ORDER — RIVAROXABAN 15 MG-20MG
20 KIT ORAL EVERY 24 HOURS
Qty: 0 | Refills: 0 | Status: DISCONTINUED | OUTPATIENT
Start: 2019-03-07 | End: 2019-03-12

## 2019-03-07 RX ORDER — SODIUM CHLORIDE 9 MG/ML
1000 INJECTION INTRAMUSCULAR; INTRAVENOUS; SUBCUTANEOUS
Qty: 0 | Refills: 0 | Status: DISCONTINUED | OUTPATIENT
Start: 2019-03-07 | End: 2019-03-08

## 2019-03-07 RX ORDER — POTASSIUM CHLORIDE 20 MEQ
40 PACKET (EA) ORAL ONCE
Qty: 0 | Refills: 0 | Status: COMPLETED | OUTPATIENT
Start: 2019-03-07 | End: 2019-03-07

## 2019-03-07 RX ORDER — BUPROPION HYDROCHLORIDE 150 MG/1
37.5 TABLET, EXTENDED RELEASE ORAL
Qty: 0 | Refills: 0 | Status: DISCONTINUED | OUTPATIENT
Start: 2019-03-07 | End: 2019-03-12

## 2019-03-07 RX ORDER — CEFTRIAXONE 500 MG/1
1 INJECTION, POWDER, FOR SOLUTION INTRAMUSCULAR; INTRAVENOUS EVERY 24 HOURS
Qty: 0 | Refills: 0 | Status: DISCONTINUED | OUTPATIENT
Start: 2019-03-08 | End: 2019-03-11

## 2019-03-07 RX ORDER — ATORVASTATIN CALCIUM 80 MG/1
40 TABLET, FILM COATED ORAL AT BEDTIME
Qty: 0 | Refills: 0 | Status: DISCONTINUED | OUTPATIENT
Start: 2019-03-07 | End: 2019-03-12

## 2019-03-07 RX ADMIN — SODIUM CHLORIDE 2000 MILLILITER(S): 9 INJECTION INTRAMUSCULAR; INTRAVENOUS; SUBCUTANEOUS at 17:43

## 2019-03-07 RX ADMIN — ATORVASTATIN CALCIUM 40 MILLIGRAM(S): 80 TABLET, FILM COATED ORAL at 21:29

## 2019-03-07 RX ADMIN — Medication 40 MILLIEQUIVALENT(S): at 17:43

## 2019-03-07 RX ADMIN — AMLODIPINE BESYLATE 5 MILLIGRAM(S): 2.5 TABLET ORAL at 21:29

## 2019-03-07 RX ADMIN — CEFTRIAXONE 100 GRAM(S): 500 INJECTION, POWDER, FOR SOLUTION INTRAMUSCULAR; INTRAVENOUS at 18:13

## 2019-03-07 RX ADMIN — RIVAROXABAN 20 MILLIGRAM(S): KIT at 21:42

## 2019-03-07 NOTE — ED ADULT TRIAGE NOTE - CHIEF COMPLAINT QUOTE
pt c/o generalized body aches and increased weakness to the legs x 3-4 days.  pt reports having a fall 4 days ago, denies head injury. pt c/o generalized body aches and increased weakness to the legs x 3-4 days.  pt reports having a fall 4 days ago, denies head injury. reports taking ASA and Xarelto.

## 2019-03-07 NOTE — H&P ADULT - PROBLEM SELECTOR PLAN 4
Follows w/ Dr. Resendiz. Previously on Jakafi but no longer 2/2 insurance/cost issues. Pt reporting Dr. Resendiz okay with pt not taking this medication. B/l WBC 15-17. Prior labs showing b/l Hb 10. No thrombocytopenia.  -f/u Dr. Resendiz in AM  -tx sepsis as above   -on a/c for DVT/PE tx indefinitely in setting of MPD

## 2019-03-07 NOTE — ED ADULT NURSE NOTE - OBJECTIVE STATEMENT
Pt presents to ED with c/o b/l leg weakness and difficulty bearing weight x2 weeks, worsening in the past 4-5 days. Pt reports mechanical fall into the bathtub approx 4 days ago, denies CP/dizziness/NV/fevers, denies LOC/head strike. Reports landing on her buttocks, denies hip/pelvic pain. No numbness/tingling in extremities, sensation equal and intact b/l, active ROM intact of LE. Reports hx spinal stenosis.

## 2019-03-07 NOTE — ED PROVIDER NOTE - PHYSICAL EXAMINATION
CONSTITUTIONAL: thin elderly fem NAD  SKIN: Normal color. Decreased turgor.    HEAD: NC/AT.  EYES: Conjunctiva clear. EOMI. PERRL.    ENT: Airway patent, MM dry; uvula midline without swelling. Nasal mucosa clear, no rhinorrhea.   RESPIRATORY:  Breathing non-labored. No retractions or accessory muscle use.  Lungs CTA bilat.  CARDIOVASCULAR:  RRR, S1S2. No M/R/G.      GI:  Abdomen soft, nontender.    MSK: Neck supple with painless ROM.  No lower extremity edema or calf tenderness.  No joint swelling or ROM limitation.  NEURO: Alert and oriented; CN II-XII grossly intact. Speech clear. 5/5 strength in all extremities. SILT all ext.  Normal perianal sensation and rectal tone.  DTR clonus bilat patella.

## 2019-03-07 NOTE — H&P ADULT - PROBLEM SELECTOR PLAN 3
Multifactorial 2/2 current UTI, electrolyte abnormalities, and progressive poor PO intake 2/2 poor appetite. POA +UA with h/o frequent UTIs. Though pt denying urinary symptoms, elderly patients may manifest UTI as weakness. Hypokalemia to 3.1 on adm. Pt reporting poor PO intake x1 mo. On exam, thin, malnourished, hypovolemic. 5/5 muscle strength in all extremities with intact sensation.  -PT eval  -nutrition consult  -CTX 1g qd for UTI tx  -replete lytes PRN; s/p K repletion in ED  -IVF @ 75 cc/hr for now as pt with poor PO intake  -check TSH, B12, folate

## 2019-03-07 NOTE — H&P ADULT - PROBLEM SELECTOR PLAN 10
1) PCP Contacted on Admission: (Y) --> Name & Phone #: Dr. Tapia  2) Date of Contact with PCP: 3/7/10  3) PCP Contacted at Discharge: (Y/N, N/A)  4) Summary of Handoff Given to PCP:   5) Post-Discharge Appointment Date and Location:

## 2019-03-07 NOTE — H&P ADULT - NSHPLABSRESULTS_GEN_ALL_CORE
.  LABS:                         14.6   24.91 )-----------( 527      ( 07 Mar 2019 15:02 )             46.8         144  |  102  |  15  ----------------------------<  108<H>  3.1<L>   |  27  |  0.83    Ca    9.9      07 Mar 2019 15:02  Mg     2.2         TPro  7.0  /  Alb  4.2  /  TBili  0.6  /  DBili  x   /  AST  14  /  ALT  9<L>  /  AlkPhos  96      PT/INR - ( 07 Mar 2019 15:02 )   PT: 21.4 sec;   INR: 1.86          PTT - ( 07 Mar 2019 15:02 )  PTT:36.2 sec  Urinalysis Basic - ( 07 Mar 2019 16:31 )    Color: Yellow / Appearance: Clear / S.025 / pH: x  Gluc: x / Ketone: Trace mg/dL  / Bili: Negative / Urobili: 0.2 E.U./dL   Blood: x / Protein: 30 mg/dL / Nitrite: POSITIVE   Leuk Esterase: NEGATIVE / RBC: < 5 /HPF / WBC > 10 /HPF   Sq Epi: x / Non Sq Epi: 0-5 /HPF / Bacteria: Present /HPF            Lactate, Blood: 1.1 mmoL/L ( @ 16:17)      RADIOLOGY, EKG & ADDITIONAL TESTS: Reviewed.    EKG: NSR, LAD  CXR: no infiltrates

## 2019-03-07 NOTE — ED PROVIDER NOTE - NS ED ROS FT
CONSTITUTIONAL: No fever/chills  NEURO: No headache, no dizziness, no syncope; No focal tingling/numbness  EYES: No visual changes  ENT: No rhinorrhea or sore throat  PULM: No cough or dyspnea  CV: No chest pain or palpitations; says she has a murmur  GI: No abdominal pain, vomiting, or diarrhea  : No dysuria, hematuria, frequency  MSK: No neck pain or back pain, no joint pain  SKIN: no rash or unusual bruising

## 2019-03-07 NOTE — H&P ADULT - PROBLEM SELECTOR PLAN 2
POA UA positive in setting of progressive weakness. Pt with h/o E. coli UTI and pyelo on prev. adm. POA UA positive in setting of progressive weakness. Pt with h/o E. coli UTI and pyelo on prev. adm.  -c/w plan as per above  -CTX 1g qd  -f/u urine cx

## 2019-03-07 NOTE — ED PROVIDER NOTE - PROGRESS NOTE DETAILS
d/w Dr Zapata, agrees with plan: check CXR, UA, panculture.  He will admit and he will contact her heme consultant Dr Resendiz.

## 2019-03-07 NOTE — H&P ADULT - PROBLEM SELECTOR PROBLEM 7
Problem: Goal Outcome Summary  Goal: Goal Outcome Summary  Outcome: No Change  VSS, afebrile. Pain controlled. Independent in room. Up to commode. Reporting fewer stools today. Hgb 6.7 1 unit PRBC given. No reaction noted. Video swallow done. Diet increased to Full liquid. Pt took few bites of meals and fluids. Tolerating well. Chest xray done. Tube feed infusing. Pt titrating to goal. Currently at 35/hr. Will continue with POC.        Tremor Cachexia

## 2019-03-07 NOTE — H&P ADULT - PROBLEM SELECTOR PLAN 1
Multifactorial 2/2 current UTI, electrolyte abnormalities, and progressive poor PO intake 2/2 poor appetite. POA +UA with h/o frequent UTIs. Though pt denying urinary symptoms, elderly patients may manifest UTI as weakness. Hypokalemia to 3.1 on adm. Pt reporting poor PO intake x1 mo. On exam, thin, malnourished, hypovolemic.   -PT eval  -nutrition consult  -CTX 1g qd for UTI tx  -replete lytes PRN; s/p K repletion in ED  -IVF @ 75 cc/hr for now as pt with poor PO intake  -check TSH, B12, folate POA tachy 104 and leukocytosis 25 (pt baseline WBC 15-17 in setting of MPD) with positive UA. s/p 1L IVF in ED. s/p CTX 1g. lactate 1.1.  -c/w CTX 1g qd (prev. had UTI E. coli pan-sensitive)  -f/u urine cx  -f/u bl cx  -tylenol PRN  -s/p 30 cc/kg IVF resuscitation  -IVF maintenance 75 cc/hr

## 2019-03-07 NOTE — H&P ADULT - PROBLEM SELECTOR PLAN 5
POA K 3.1. s/p repletion in ED. Likely contributing to generalized weakness.  -trend BMP  -replete PRN

## 2019-03-07 NOTE — ED PROVIDER NOTE - CLINICAL SUMMARY MEDICAL DECISION MAKING FREE TEXT BOX
77 yo fem clinically volume depleted c/o weakness in legs.  Hx of MDS and spinal stenosis.  On exam appears volume depleted and generally weak; has good leg strength, normal rectal tone and perianal sensation, and has no back pain.  Hypokalemia repleted, getting IV fluids.  UA c/w UTI. CXR clear.  Leukocytosis 25, normal lactate.  Pancultured.  Plan to admit.

## 2019-03-07 NOTE — H&P ADULT - NSHPSOCIALHISTORY_GEN_ALL_CORE
.  Tobacco use: former smoker, quit 15 yrs ago  EtOH use: social/occasional  Illicit drug use: denies     Living situation: lives independently in apartment

## 2019-03-07 NOTE — ED CLERICAL - NS ED CLERK NOTE PRE-ARRIVAL INFORMATION; ADDITIONAL PRE-ARRIVAL INFORMATION
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

## 2019-03-07 NOTE — H&P ADULT - HISTORY OF PRESENT ILLNESS
78F with PMHx of HTN, myeloproliferative disorder, DVT/PE (2015), chronic UTIs and hydronephrosis who presents to ED for c/o generalized weakness x4-5 days progressively worsening over the past few days. Pt reports she usually ambulates around her apt with a walker. Over the past 5 days, she noticed she has become increasingly weak where she is able to stand up but not able to walk with her walker as usual. She reports weakness all over but more notable in her b/l LE. Denies bowel/bladder incontinence, paresthesias, back pain, loss of sensation in the LE. She notes that she has had decreased appetite over the past 1 month as well with a significant weight loss since 2/2 poor PO intake. Pt lives home alone and notes being independent. She follows with Dr. Resendiz outpatient about once a month for her MPD and reports compliance with medications. Pt denies any other associated symptoms including fevers/chills, HA, chest pain, SOB, cough, sputum, abdominal pain, n/v/d/c, dysuria, hematuria, urinary retention or frequency.     Of note, pt is not a great historian, often repeating herself and unable to recall her home medications. Does remember that she is no longer on Jakafi for MPD and is currently on Xarelto for h/o DVT/PE.    ED VS:  t 97/ / /88/ RR 16/ 94% RA    ED course:  s/p CTX, potassium repletion, 1L NS bolus

## 2019-03-07 NOTE — ED PROVIDER NOTE - CARE PLAN
Principal Discharge DX:	Volume depletion, unspecified  Secondary Diagnosis:	Urinary tract infection without hematuria, site unspecified  Secondary Diagnosis:	Hypokalemia

## 2019-03-07 NOTE — ED PROVIDER NOTE - OBJECTIVE STATEMENT
79 yo fem with Hx MDS, spinal stenosis c/o weakness in legs x 2 weeks, worse in past few days.  Had a fall at home, landed on buttocks and denies hitting head.  No headache.  No fever/ chills, back pain, CP, SOB/GERARD, abdominal pain, tingling/paresthesia in LE or saddle region.  Sometimes slight dribble of urine when she cannot get to toilet in time.  Appetite poor and she admits to uncertain amt of weight loss.    PMD: Benny  Heme-Onc: Astrid  PMHx MDS, spinal stenosis, depression  PSHx hysterectomy, ORIF rt wrist  Meds xarelto aspirin, ? antidepressant  NKA 79 yo fem with Hx myelprolif disorder, spinal stenosis c/o weakness in legs x 2 weeks, worse in past few days.  Had a fall at home, landed on buttocks and denies hitting head.  No headache.  No fever/ chills, back pain, CP, SOB/GERARD, abdominal pain, tingling/paresthesia in LE or saddle region.  Sometimes slight dribble of urine when she cannot get to toilet in time.  Appetite poor and she admits to uncertain amt of weight loss.    PMD: Benny  Heme-Onc: Astrid  PMHx myeloproliferative disorder, spinal stenosis, depression  PSHx hysterectomy, ORIF rt wrist  Meds xarelto aspirin, ? antidepressant  NKA

## 2019-03-07 NOTE — H&P ADULT - ASSESSMENT
78F with PMHx of HTN, myeloproliferative disorder, DVT/PE (2015), chronic UTIs and hydronephrosis who presents to ED for c/o generalized weakness x4-5 days progressively worsening over the past few days likely 2/2 malnutrition, electrolyte abnormalities, and UTI.

## 2019-03-07 NOTE — PATIENT PROFILE ADULT - VISION (WITH CORRECTIVE LENSES IF THE PATIENT USUALLY WEARS THEM):
Severely impaired: cannot locate objects without hearing or touching them or patient nonresponsive./Wears glasses. "Without them, I'm pretty blind."

## 2019-03-07 NOTE — ED ADULT NURSE NOTE - CHIEF COMPLAINT QUOTE
pt c/o generalized body aches and increased weakness to the legs x 3-4 days.  pt reports having a fall 4 days ago, denies head injury. reports taking ASA and Xarelto.

## 2019-03-07 NOTE — H&P ADULT - NSHPPHYSICALEXAM_GEN_ALL_CORE
.  VITAL SIGNS:  T(C): 36.5 (03-07-19 @ 18:14), Max: 36.5 (03-07-19 @ 18:14)  T(F): 97.7 (03-07-19 @ 18:14), Max: 97.7 (03-07-19 @ 18:14)  HR: 78 (03-07-19 @ 18:14) (78 - 104)  BP: 132/88 (03-07-19 @ 18:14) (132/88 - 155/88)  BP(mean): --  RR: 17 (03-07-19 @ 18:14) (17 - 18)  SpO2: 94% (03-07-19 @ 18:14) (94% - 94%)  Wt(kg): --    PHYSICAL EXAM:    Constitutional: pale, thin, cachectic, NAD, no respiratory distress, nontoxic appearing  Head: NC/AT  Eyes: PERRL, EOMI, anicteric sclera  ENT: no nasal discharge; uvula midline, no oropharyngeal erythema or exudates; MM dry, poor dentition   Neck: supple; no JVD or thyromegaly  Respiratory: CTA B/L; no W/R/R, no retractions, no labored breathing  Cardiac: +S1/S2; RRR; no M/R/G; distant heart sounds  Gastrointestinal: soft, NT/ND; no rebound or guarding; +BSx4   Back: spine midline, no bony tenderness or step-offs; no CVAT B/L  Extremities: WWP, no clubbing or cyanosis; no peripheral edema; hands are very vascular with veins with underlying blue hue under pale skin   Musculoskeletal: NROM x4; no joint swelling, tenderness or erythema; L pink contracted which pt reports 2/2 h/o fall s/p wrist surgery many years ago  Vascular: 2+ radial, femoral, DP/PT pulses B/L  Dermatologic: skin warm, dry and intact; no rashes, wounds, or scars  Lymphatic: no submandibular or cervical LAD  Neurologic: AAOx3; CNII-XII grossly intact; 5/5 muscle strength b/l UE and LE when motivated and gives effort, sensation to light touch intact, mild head and b/l hand tremor at rest, sometimes forgetful and repeating herself, following all commands appropriately   Psychiatric: affect and characteristics of appearance, verbalizations, behaviors are appropriate

## 2019-03-07 NOTE — H&P ADULT - PROBLEM SELECTOR PLAN 7
Pt with BMI 18. Thin and malnourished on exam. Pt reporting poor PO intake x1 month.   -nutrition consult  -encourage PO intake

## 2019-03-07 NOTE — ED ADULT NURSE NOTE - NSIMPLEMENTINTERV_GEN_ALL_ED
Implemented All Fall with Harm Risk Interventions:  [place] to call system. Call bell, personal items and telephone within reach. Instruct patient to call for assistance. Room bathroom lighting operational. Non-slip footwear when patient is off stretcher. Physically safe environment: no spills, clutter or unnecessary equipment. Stretcher in lowest position, wheels locked, appropriate side rails in place. Provide visual cue, wrist band, yellow gown, etc. Monitor gait and stability. Monitor for mental status changes and reorient to person, place, and time. Review medications for side effects contributing to fall risk. Reinforce activity limits and safety measures with patient and family. Provide visual clues: red socks.

## 2019-03-08 LAB
ALBUMIN SERPL ELPH-MCNC: 4.1 G/DL — SIGNIFICANT CHANGE UP (ref 3.3–5)
ALP SERPL-CCNC: 110 U/L — SIGNIFICANT CHANGE UP (ref 40–120)
ALT FLD-CCNC: 9 U/L — LOW (ref 10–45)
ANION GAP SERPL CALC-SCNC: 17 MMOL/L — SIGNIFICANT CHANGE UP (ref 5–17)
AST SERPL-CCNC: 15 U/L — SIGNIFICANT CHANGE UP (ref 10–40)
BASOPHILS # BLD AUTO: 0 K/UL — SIGNIFICANT CHANGE UP (ref 0–0.2)
BASOPHILS NFR BLD AUTO: 0 % — SIGNIFICANT CHANGE UP (ref 0–2)
BILIRUB SERPL-MCNC: 0.6 MG/DL — SIGNIFICANT CHANGE UP (ref 0.2–1.2)
BUN SERPL-MCNC: 10 MG/DL — SIGNIFICANT CHANGE UP (ref 7–23)
CALCIUM SERPL-MCNC: 9.8 MG/DL — SIGNIFICANT CHANGE UP (ref 8.4–10.5)
CHLORIDE SERPL-SCNC: 103 MMOL/L — SIGNIFICANT CHANGE UP (ref 96–108)
CO2 SERPL-SCNC: 21 MMOL/L — LOW (ref 22–31)
CREAT SERPL-MCNC: 0.54 MG/DL — SIGNIFICANT CHANGE UP (ref 0.5–1.3)
CRP SERPL-MCNC: 0.45 MG/DL — HIGH (ref 0–0.4)
EOSINOPHIL # BLD AUTO: 0.3 K/UL — SIGNIFICANT CHANGE UP (ref 0–0.5)
EOSINOPHIL NFR BLD AUTO: 1 % — SIGNIFICANT CHANGE UP (ref 0–6)
ERYTHROCYTE [SEDIMENTATION RATE] IN BLOOD: 4 MM/HR — SIGNIFICANT CHANGE UP
FOLATE SERPL-MCNC: >20 NG/ML — SIGNIFICANT CHANGE UP
GLUCOSE SERPL-MCNC: 106 MG/DL — HIGH (ref 70–99)
HCT VFR BLD CALC: 48.8 % — HIGH (ref 34.5–45)
HGB BLD-MCNC: 14.9 G/DL — SIGNIFICANT CHANGE UP (ref 11.5–15.5)
LYMPHOCYTES # BLD AUTO: 1.19 K/UL — SIGNIFICANT CHANGE UP (ref 1–3.3)
LYMPHOCYTES # BLD AUTO: 4 % — LOW (ref 13–44)
MAGNESIUM SERPL-MCNC: 2.1 MG/DL — SIGNIFICANT CHANGE UP (ref 1.6–2.6)
MCHC RBC-ENTMCNC: 26.1 PG — LOW (ref 27–34)
MCHC RBC-ENTMCNC: 30.5 GM/DL — LOW (ref 32–36)
MCV RBC AUTO: 85.5 FL — SIGNIFICANT CHANGE UP (ref 80–100)
MONOCYTES # BLD AUTO: 1.49 K/UL — HIGH (ref 0–0.9)
MONOCYTES NFR BLD AUTO: 5 % — SIGNIFICANT CHANGE UP (ref 2–14)
NEUTROPHILS # BLD AUTO: 25.9 K/UL — HIGH (ref 1.8–7.4)
NEUTROPHILS NFR BLD AUTO: 83 % — HIGH (ref 43–77)
NRBC # BLD: SIGNIFICANT CHANGE UP /100 WBCS (ref 0–0)
PHOSPHATE SERPL-MCNC: 2.6 MG/DL — SIGNIFICANT CHANGE UP (ref 2.5–4.5)
PLATELET # BLD AUTO: 515 K/UL — HIGH (ref 150–400)
POTASSIUM SERPL-MCNC: 2.9 MMOL/L — CRITICAL LOW (ref 3.5–5.3)
POTASSIUM SERPL-SCNC: 2.9 MMOL/L — CRITICAL LOW (ref 3.5–5.3)
PROT SERPL-MCNC: 6.8 G/DL — SIGNIFICANT CHANGE UP (ref 6–8.3)
RBC # BLD: 5.71 M/UL — HIGH (ref 3.8–5.2)
RBC # FLD: 17 % — HIGH (ref 10.3–14.5)
SODIUM SERPL-SCNC: 141 MMOL/L — SIGNIFICANT CHANGE UP (ref 135–145)
TSH SERPL-MCNC: 1.6 UIU/ML — SIGNIFICANT CHANGE UP (ref 0.35–4.94)
VIT B12 SERPL-MCNC: 876 PG/ML — SIGNIFICANT CHANGE UP (ref 232–1245)
WBC # BLD: 29.77 K/UL — HIGH (ref 3.8–10.5)
WBC # FLD AUTO: 29.77 K/UL — HIGH (ref 3.8–10.5)

## 2019-03-08 PROCEDURE — 74178 CT ABD&PLV WO CNTR FLWD CNTR: CPT | Mod: 26

## 2019-03-08 PROCEDURE — 74018 RADEX ABDOMEN 1 VIEW: CPT | Mod: 26

## 2019-03-08 RX ORDER — POLYETHYLENE GLYCOL 3350 17 G/17G
17 POWDER, FOR SOLUTION ORAL DAILY
Qty: 0 | Refills: 0 | Status: COMPLETED | OUTPATIENT
Start: 2019-03-08 | End: 2019-03-10

## 2019-03-08 RX ORDER — DOCUSATE SODIUM 100 MG
100 CAPSULE ORAL DAILY
Qty: 0 | Refills: 0 | Status: DISCONTINUED | OUTPATIENT
Start: 2019-03-08 | End: 2019-03-12

## 2019-03-08 RX ORDER — POTASSIUM CHLORIDE 20 MEQ
40 PACKET (EA) ORAL EVERY 4 HOURS
Qty: 0 | Refills: 0 | Status: COMPLETED | OUTPATIENT
Start: 2019-03-08 | End: 2019-03-08

## 2019-03-08 RX ORDER — SENNA PLUS 8.6 MG/1
1 TABLET ORAL DAILY
Qty: 0 | Refills: 0 | Status: COMPLETED | OUTPATIENT
Start: 2019-03-08 | End: 2019-03-12

## 2019-03-08 RX ADMIN — CEFTRIAXONE 100 GRAM(S): 500 INJECTION, POWDER, FOR SOLUTION INTRAMUSCULAR; INTRAVENOUS at 09:47

## 2019-03-08 RX ADMIN — Medication 40 MILLIEQUIVALENT(S): at 09:49

## 2019-03-08 RX ADMIN — AMLODIPINE BESYLATE 5 MILLIGRAM(S): 2.5 TABLET ORAL at 05:40

## 2019-03-08 RX ADMIN — SENNA PLUS 1 TABLET(S): 8.6 TABLET ORAL at 12:27

## 2019-03-08 RX ADMIN — RIVAROXABAN 20 MILLIGRAM(S): KIT at 22:39

## 2019-03-08 RX ADMIN — Medication 81 MILLIGRAM(S): at 12:27

## 2019-03-08 RX ADMIN — ATORVASTATIN CALCIUM 40 MILLIGRAM(S): 80 TABLET, FILM COATED ORAL at 22:39

## 2019-03-08 RX ADMIN — BUPROPION HYDROCHLORIDE 37.5 MILLIGRAM(S): 150 TABLET, EXTENDED RELEASE ORAL at 05:40

## 2019-03-08 RX ADMIN — Medication 650 MILLIGRAM(S): at 03:52

## 2019-03-08 RX ADMIN — Medication 100 MILLIGRAM(S): at 12:27

## 2019-03-08 RX ADMIN — Medication 40 MILLIEQUIVALENT(S): at 14:13

## 2019-03-08 NOTE — CONSULT NOTE ADULT - ASSESSMENT
I agree with plans to control the UTI.    The patient may require Rehab services after the UTI is controlled.

## 2019-03-08 NOTE — CHART NOTE - NSCHARTNOTEFT_GEN_A_CORE
Upon Nutritional Assessment by the Registered Dietitian your patient was determined to meet criteria / has evidence of the following diagnosis/diagnoses:          [ ]  Mild Protein Calorie Malnutrition        [x ]  Moderate Protein Calorie Malnutrition        [ ] Severe Protein Calorie Malnutrition        [ ] Unspecified Protein Calorie Malnutrition        [x ] Underweight / BMI <19        [ ] Morbid Obesity / BMI > 40      Findings as based on:  •  Comprehensive nutrition assessment and consultation  •  Calorie counts (nutrient intake analysis)  •  Food acceptance and intake status from observations by staff  •  Follow up  •  Patient education  •  Intervention secondary to interdisciplinary rounds  •   concerns      Treatment:    The following diet has been recommended:encourage po      PROVIDER Section:     By signing this assessment you are acknowledging and agree with the diagnosis/diagnoses assigned by the Registered Dietitian    Comments:

## 2019-03-08 NOTE — DIETITIAN INITIAL EVALUATION ADULT. - PHYSICAL APPEARANCE
elderly female with ?15lb weight loss over last few months..Low BMI/debilitated elderly female with ?15lb weight loss over last few months..Low BMI.NFPE should loss of fat in arms /legs/scapular bone slightly protruding/debilitated

## 2019-03-08 NOTE — DIETITIAN INITIAL EVALUATION ADULT. - PROBLEM SELECTOR PLAN 2
POA UA positive in setting of progressive weakness. Pt with h/o E. coli UTI and pyelo on prev. adm.  -c/w plan as per above  -CTX 1g qd  -f/u urine cx

## 2019-03-08 NOTE — CONSULT NOTE ADULT - ATTENDING COMMENTS
Patient seen and examined.  She has a hx of chronic retention of urine which has likely caused bilateral hydronephrosis.  CT and renal scan from July 2018 showed no evidence of obstruction.  She likely has a similar issue today.  950 cc of urine was drained when hutson was placed.  Would recommend CT urogram to evaluate her urinary tract, but there will likely be hydronephrosis secondary to chronic outlet obstruction.  Would leave hutson in place for now.  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  Follow up Ucx for possible UTI.  Cont abx.

## 2019-03-08 NOTE — PROGRESS NOTE ADULT - SUBJECTIVE AND OBJECTIVE BOX
78F with PMHx of HTN, myeloproliferative disorder, DVT/PE (), chronic UTIs and hydronephrosis who presents to ED for c/o generalized weakness x4-5 days progressively worsening over the past few days. Pt reports she usually ambulates around her apt with a walker. Over the past 5 days, she noticed she has become increasingly weak where she is able to stand up but not able to walk with her walker as usual. She reports weakness all over but more notable in her b/l LE. Denies bowel/bladder incontinence, paresthesias, back pain, loss of sensation in the LE. She notes that she has had decreased appetite over the past 1 month as well with a significant weight loss since 2/2 poor PO intake. Pt lives home alone and notes being independent. She follows with Dr. Resendiz outpatient about once a month for her MPD and reports compliance with medications. Pt denies any other associated symptoms including fevers/chills, HA, chest pain, SOB, cough, sputum, abdominal pain, n/v/d/c, dysuria, hematuria, urinary retention or frequency.     Of note, pt is not a great historian, often repeating herself and unable to recall her home medications. Does remember that she is no longer on Jakafi for MPD and is currently on Xarelto for h/o DVT/PE.    ED VS:  t 97/ / /88/ RR 16/ 94% RA    ED course:  s/p CTX, potassium repletion, 1L NS bolus     Patient seen and examined at bedside.   Patient laying comfortably in bed, awake and alert with no active complaints overnight. Patient denies h/n/v/d, fever, chills, cp, palpitations, sob, leg swelling, rashes, dysuria, and changes in BM. Complains of mild lower abdominal pain. She reports loss of appetite that is not new and decreased oral intake recently.     ICU Vital Signs Last 24 Hrs  T(C): 36.8 (08 Mar 2019 14:45), Max: 36.8 (08 Mar 2019 05:05)  T(F): 98.2 (08 Mar 2019 14:45), Max: 98.3 (08 Mar 2019 05:05)  HR: 82 (08 Mar 2019 14:45) (73 - 82)  BP: 144/93 (08 Mar 2019 14:45) (132/88 - 181/92)  BP(mean): --  ABP: --  ABP(mean): --  RR: 18 (08 Mar 2019 14:45) (16 - 19)  SpO2: 94% (08 Mar 2019 14:45) (93% - 95%)        PHYSICAL EXAM:    General: cachectic appearing elderly female in NAD  HEENT: NC/AT; PERRL, anicteric sclera; MMM  Neck: supple, no JVD   Cardiovascular: +S1/S2; RRR  Respiratory: CTA B/L; no W/R/R  Gastrointestinal: soft, mild tenderness to deep palpation, decreased BS  Extremities: WWP; no edema, clubbing or cyanosis  Neurological: AAOx3;     MEDICATIONS:  MEDICATIONS  (STANDING):  amLODIPine   Tablet 5 milliGRAM(s) Oral daily  aspirin enteric coated 81 milliGRAM(s) Oral daily  atorvastatin 40 milliGRAM(s) Oral at bedtime  buPROPion . 37.5 milliGRAM(s) Oral two times a day  cefTRIAXone   IVPB 1 Gram(s) IV Intermittent every 24 hours  docusate sodium 100 milliGRAM(s) Oral daily  potassium chloride    Tablet ER 40 milliEquivalent(s) Oral every 4 hours  rivaroxaban 20 milliGRAM(s) Oral every 24 hours  senna 1 Tablet(s) Oral daily  sodium chloride 0.9%. 1000 milliLiter(s) (75 mL/Hr) IV Continuous <Continuous>    MEDICATIONS  (PRN):  acetaminophen   Tablet .. 650 milliGRAM(s) Oral every 6 hours PRN Temp greater or equal to 38C (100.4F), Moderate Pain (4 - 6)      ALLERGIES:  Allergies    No Known Allergies    Intolerances        LABS:                        14.9   29.77 )-----------( 515      ( 08 Mar 2019 06:20 )             48.8     03-08    141  |  103  |  10  ----------------------------<  106<H>  2.9<LL>   |  21<L>  |  0.54    Ca    9.8      08 Mar 2019 06:20  Phos  2.6     03-08  Mg     2.1     03-08    TPro  6.8  /  Alb  4.1  /  TBili  0.6  /  DBili  x   /  AST  15  /  ALT  9<L>  /  AlkPhos  110  03-08    PT/INR - ( 07 Mar 2019 15:02 )   PT: 21.4 sec;   INR: 1.86          PTT - ( 07 Mar 2019 15:02 )  PTT:36.2 sec  Urinalysis Basic - ( 07 Mar 2019 16:31 )    Color: Yellow / Appearance: Clear / S.025 / pH: x  Gluc: x / Ketone: Trace mg/dL  / Bili: Negative / Urobili: 0.2 E.U./dL   Blood: x / Protein: 30 mg/dL / Nitrite: POSITIVE   Leuk Esterase: NEGATIVE / RBC: < 5 /HPF / WBC > 10 /HPF   Sq Epi: x / Non Sq Epi: 0-5 /HPF / Bacteria: Present /HPF      CAPILLARY BLOOD GLUCOSE    < from: Xray Abdomen 1 View PORTABLE -Urgent (19 @ 09:01) >    Impression:  No bowel obstruction or ileus.            Assessment and Plan:   · Assessment		  78F with PMHx of HTN, myeloproliferative disorder, DVT/PE (), chronic UTIs and hydronephrosis who presents to ED for c/o generalized weakness x4-5 days progressively worsening over the past few days likely 2/2 malnutrition, electrolyte abnormalities, and UTI.    Problem/Plan - 1:  ·  Problem: Sepsis.  Plan: POA tachy 104 and leukocytosis 25 (pt baseline WBC 15-17 in setting of MPD) with positive UA. s/p 1L IVF in ED. s/p CTX 1g. lactate 1.1.  -c/w CTX 1g qd (prev. had UTI E. coli pan-sensitive)  -f/u urine cx  -f/u bl cx  -tylenol PRN  -IVF maintenance 75 cc/hr.     Problem/Plan - 2:  ·  Problem: UTI (urinary tract infection).  Plan: POA UA positive in setting of progressive weakness. Pt with h/o E. coli UTI and pyelo on prev. adm.  -c/w plan as per above  -CTX 1g qd  -f/u urine cx.     Problem/Plan - 3:  ·  Problem: Generalized weakness.  Plan: Multifactorial 2/2 current UTI, electrolyte abnormalities, and progressive poor PO intake 2/2 poor appetite. POA +UA with h/o frequent UTIs. Though pt denying urinary symptoms, elderly patients may manifest UTI as weakness. Hypokalemia to 3.1 on adm. Pt reporting poor PO intake x1 mo. On exam, thin, malnourished, hypovolemic. 5/5 muscle strength in all extremities with intact sensation.  -PT eval  -nutrition consult  -CTX 1g qd for UTI tx  -replete lytes PRN; s/p K repletion in ED  -IVF @ 75 cc/hr for now as pt with poor PO intake  -check TSH, B12, folate.     Problem/Plan - 4:  ·  Problem: Myeloproliferative disorder.  Plan: Follows w/ Dr. Resendiz. Previously on Jakafi but no longer 2/2 insurance/cost issues. Pt reporting Dr. Resendiz okay with pt not taking this medication. B/l WBC 15-17. Prior labs showing b/l Hb 10. No thrombocytopenia.  -will treat UTI prior to MDS treatment   -tx sepsis as above   -on a/c for DVT/PE tx indefinitely in setting of MPD.     Problem/Plan - 5:  ·  Problem: Hypokalemia.  Plan: POA K 3.1. s/p repletion in ED. Likely contributing to generalized weakness.  - AM 2.9 replete aggressively   - trend BMP  - replete PRN.     Problem/Plan - 6:  Problem: PE (pulmonary thromboembolism). Plan: H/o DVT/PE . Now on Xarelto 20 mg qd indefinitely in setting of MPD.  -Xarelto 20 qd.    Problem/Plan - 7:  ·  Problem: Cachexia.  Plan: Pt with BMI 18. Thin and malnourished on exam. Pt reporting poor PO intake x1 month.   -nutrition consulted   -encourage PO intake.     Problem/Plan - 8:  ·  Problem: Nutrition, metabolism, and development symptoms.  Plan: F: IVF 75cc/hr  E: replete K>4, Mg>2  N: Regular.     Problem/Plan - 9:  ·  Problem: Need for prophylactic measure.  Plan: On xarelto for a/c    Code: FULL  Dispo: [facility].

## 2019-03-08 NOTE — DIETITIAN INITIAL EVALUATION ADULT. - OTHER INFO
77 y/o female admitted with weakness and weight loss of ?15lb over 1 month due to patient report of diminished appetite.Patient reported shes has always had a poor eating habits with lack of hungry."even as a child I hardly ate",Now without N/V/D,but claims she has some lower abdominal discomfort.Skin intact.?constipated.Diet very inconsistent at home.Has HHA who purchases food but patient does the minimal cooking.Claims she does not eat breakfast and barely eats lunch and dinner.Soup/sandwich/juice mostly.Lacking in fiber/protein and nutrients suspected. Patient refused Ensure supplements.

## 2019-03-08 NOTE — PHYSICAL THERAPY INITIAL EVALUATION ADULT - ADDITIONAL COMMENTS
Pt lives at home alone states having 20 hrs /per day Sunday-Thursay and few hrs Friday, then aid leave mid day Friday and returns [place] morning. Pt states amb with RW varies on day, amb1/2 block to few blocks with aid supervision. Pt noticed generalized weakness past ~1 wk now.

## 2019-03-08 NOTE — CONSULT NOTE ADULT - ASSESSMENT
78F with PMHx of HTN, myeloproliferative disorder, DVT/PE (2015), chronic UTIs and hydronephrosis who presents to ED for c/o generalized weakness x4-5 days progressively worsening over the past few days likely 2/2 malnutrition, electrolyte abnormalities, and UTI.    Problem/Plan - 1:  ·  Problem: Sepsis. Plan: POA tachy 104 and leukocytosis 25 (pt baseline WBC 15-17 in setting of MPD) with positive UA. s/p 1L IVF in ED. s/p CTX 1g. lactate 1.1.  -c/w CTX 1g qd (prev. had UTI E. coli pan-sensitive)  -f/u urine cx  -f/u bl cx  -tylenol PRN  -s/p 30 cc/kg IVF resuscitation  -IVF maintenance 75 cc/hr.    Problem/Plan - 2:  ·  Problem: UTI (urinary tract infection).  Plan: POA UA positive in setting of progressive weakness. Pt with h/o E. coli UTI and pyelo on prev. adm.  -c/w plan as per above  -CTX 1g qd  -f/u urine cx.    Problem/Plan - 3:  ·  Problem: Generalized weakness. Plan: Multifactorial 2/2 current UTI, electrolyte abnormalities, and progressive poor PO intake 2/2 poor appetite. POA +UA with h/o frequent UTIs. Though pt denying urinary symptoms, elderly patients may manifest UTI as weakness. Hypokalemia to 3.1 on adm. Pt reporting poor PO intake x1 mo. On exam, thin, malnourished, hypovolemic. 5/5 muscle strength in all extremities with intact sensation.  -PT eval  -nutrition consult  -CTX 1g qd for UTI tx  -replete lytes PRN; s/p K repletion in ED  -IVF @ 75 cc/hr for now as pt with poor PO intake  -check TSH, B12, folate.     Problem/Plan - 4:  ·  Problem: Myeloproliferative disorder. Plan: Follows w/ Dr. Resendiz. Previously on Jakafi but no longer 2/2 insurance/cost issues. Pt reporting Dr. Astrid boyd with pt not taking this medication. B/l WBC 15-17. Prior labs showing b/l Hb 10. No thrombocytopenia.  -tx sepsis as above   -on a/c for DVT/PE tx indefinitely in setting of MPD.     Problem/Plan - 5:  ·  Problem: Hypokalemia. Plan: POA K 3.1. s/p repletion in ED. Likely contributing to generalized weakness.  -trend BMP  -replete PRN.     Problem/Plan - 6:  Problem: PE (pulmonary thromboembolism). Plan: H/o DVT/PE 2015. Now on Xarelto 20 mg qd indefinitely in setting of MPD.  -Xarelto 20 qd.    Problem/Plan - 7:  ·  Problem: Cachexia. Plan: Pt with BMI 18. Thin and malnourished on exam. Pt reporting poor PO intake x1 month.   -nutrition consult  -encourage PO intake.

## 2019-03-08 NOTE — PHYSICAL THERAPY INITIAL EVALUATION ADULT - GENERAL OBSERVATIONS, REHAB EVAL
Rec'd pt supine in bed, in no acute distress, +heplock, +prima fit, cleared for PT and OOB activity by VINH Rg.

## 2019-03-08 NOTE — PHYSICAL THERAPY INITIAL EVALUATION ADULT - PERTINENT HX OF CURRENT PROBLEM, REHAB EVAL
77 yo F as per chart presents to ED for c/o generalized weakness x4-5 days progressively worsening over the past few days. Pt reports she usually ambulates around her apt with a walker. Over the past 5 days, she noticed she has become increasingly weak where she is able to stand up but not able to walk with her walker as usual. She reports weakness all over but more notable in her b/l LE.

## 2019-03-08 NOTE — CONSULT NOTE ADULT - ASSESSMENT
RECOMMEND  Check procalcitonin  Check ESR/CRP  Check for urinary retention - bladder scan vs sonogram   OK to continue Ceftriaxone RECOMMEND  Check procalcitonin  Check ESR/CRP  Check renal and bladder sonogram   OK to continue Ceftriaxone for now Failure to thrive  Possible sepsis  Possible UTI  Urinary retention  MDS      RECOMMEND  Check procalcitonin  Check ESR/CRP  Check renal and bladder sonogram   OK to continue Ceftriaxone for now

## 2019-03-08 NOTE — CONSULT NOTE ADULT - SUBJECTIVE AND OBJECTIVE BOX
Patient is a 78y old  Female who presents with a chief complaint of weakness, UTI---PS ECOG III. (08 Mar 2019 08:21)       HPI:  78F with PMHx of HTN, myeloproliferative disorder, DVT/PE (), chronic UTIs and hydronephrosis who presents to ED for c/o generalized weakness x4-5 days progressively worsening over the past few days. Pt reports she usually ambulates around her apt with a walker. Over the past 5 days, she noticed she has become increasingly weak where she is able to stand up but not able to walk with her walker as usual. She reports weakness all over but more notable in her b/l LE. Denies bowel/bladder incontinence, paresthesias, back pain, loss of sensation in the LE. She notes that she has had decreased appetite over the past 1 month as well with a significant weight loss since 2/2 poor PO intake. Pt lives home alone and notes being independent. She follows with Dr. Resendiz outpatient about once a month for her MPD and reports compliance with medications. Pt denies any other associated symptoms including fevers/chills, HA, chest pain, SOB, cough, sputum, abdominal pain, n/v/d/c, dysuria, hematuria, urinary retention or frequency.     Of note, pt is not a great historian, often repeating herself and unable to recall her home medications. Does remember that she is no longer on Jakafi for MPD and is currently on Xarelto for h/o DVT/PE.    ED VS:  t 97/ / /88/ RR 16/ 94% RA    ED course:  s/p CTX, potassium repletion, 1L NS bolus (07 Mar 2019 18:48)      PAST MEDICAL & SURGICAL HISTORY:  Myeloproliferative disorder  Hypertension  PE (pulmonary thromboembolism):   Tremor  Vestibular disequilibrium  S/P wrist surgery  S/P hysterectomy      MEDICATIONS  (STANDING):  amLODIPine   Tablet 5 milliGRAM(s) Oral daily  aspirin enteric coated 81 milliGRAM(s) Oral daily  atorvastatin 40 milliGRAM(s) Oral at bedtime  buPROPion . 37.5 milliGRAM(s) Oral two times a day  cefTRIAXone   IVPB 1 Gram(s) IV Intermittent every 24 hours  docusate sodium 100 milliGRAM(s) Oral daily  potassium chloride    Tablet ER 40 milliEquivalent(s) Oral every 4 hours  rivaroxaban 20 milliGRAM(s) Oral every 24 hours  senna 1 Tablet(s) Oral daily  sodium chloride 0.9%. 1000 milliLiter(s) (75 mL/Hr) IV Continuous <Continuous>    MEDICATIONS  (PRN):  acetaminophen   Tablet .. 650 milliGRAM(s) Oral every 6 hours PRN Temp greater or equal to 38C (100.4F), Moderate Pain (4 - 6)      Social History: lives alone in a 3rd floor elevator accessible apartment , has HHA    Functional Level Prior to Admission: states she is ADL independent, walks with a rolling walker    FAMILY HISTORY:  No pertinent family history in first degree relatives      CBC Full  -  ( 08 Mar 2019 06:20 )  WBC Count : 29.77 K/uL  Hemoglobin : 14.9 g/dL  Hematocrit : 48.8 %  Platelet Count - Automated : 515 K/uL  Mean Cell Volume : 85.5 fl  Mean Cell Hemoglobin : 26.1 pg  Mean Cell Hemoglobin Concentration : 30.5 gm/dL  Auto Neutrophil # : x  Auto Lymphocyte # : x  Auto Monocyte # : x  Auto Eosinophil # : x  Auto Basophil # : x  Auto Neutrophil % : x  Auto Lymphocyte % : x  Auto Monocyte % : x  Auto Eosinophil % : x  Auto Basophil % : x      03-    141  |  103  |  10  ----------------------------<  106<H>  2.9<LL>   |  21<L>  |  0.54    Ca    9.8      08 Mar 2019 06:20  Phos  2.6     03-08  Mg     2.1     -08    TPro  6.8  /  Alb  4.1  /  TBili  0.6  /  DBili  x   /  AST  15  /  ALT  9<L>  /  AlkPhos  110  03-08      Urinalysis Basic - ( 07 Mar 2019 16:31 )    Color: Yellow / Appearance: Clear / S.025 / pH: x  Gluc: x / Ketone: Trace mg/dL  / Bili: Negative / Urobili: 0.2 E.U./dL   Blood: x / Protein: 30 mg/dL / Nitrite: POSITIVE   Leuk Esterase: NEGATIVE / RBC: < 5 /HPF / WBC > 10 /HPF   Sq Epi: x / Non Sq Epi: 0-5 /HPF / Bacteria: Present /HPF          Radiology:      < from: CT Head No Cont (19 @ 17:03) >  EXAM:  CT BRAIN                          PROCEDURE DATE:  2019          INTERPRETATION:  Axial images were obtained from the skull base to the   cranial vertex without intravenous contrast.  Soft tissue and bone   algorithm images were evaluated.    Clinical information: Weakness. Status post fall.    The current study is compared with previous CT of 2013.    The ventricles, sulci and cisterns are normal in caliber for patient's   age. Has been interval progression of patchy hypodensity in the cerebral   white matter, nonspecific but most commonly associated with chronic small   vessel ischemic change. There are chronic appearing lacunar infarcts in   the bilateral basal ganglia and thalami. There is no evidence of   intracranialhemorrhage, mass or acute infarction.  There is no lesion or   fracture of the skull or skull base.    IMPRESSION:    No acute findings.            Vital Signs Last 24 Hrs  T(C): 36.8 (08 Mar 2019 05:05), Max: 36.8 (08 Mar 2019 05:05)  T(F): 98.3 (08 Mar 2019 05:05), Max: 98.3 (08 Mar 2019 05:05)  HR: 79 (08 Mar 2019 05:05) (73 - 104)  BP: 181/92 (08 Mar 2019 05:05) (132/88 - 181/92)  BP(mean): --  RR: 19 (08 Mar 2019 05:05) (16 - 19)  SpO2: 93% (08 Mar 2019 05:05) (93% - 95%)    REVIEW OF SYSTEMS:    CONSTITUTIONAL: generalized weakness  EYES: No eye pain, visual disturbances, or discharge  ENMT:  No difficulty hearing, tinnitus, vertigo; No sinus or throat pain  NECK: No pain or stiffness  BREASTS: No pain, masses, or nipple discharge  RESPIRATORY: No cough, wheezing, chills or hemoptysis; No shortness of breath  CARDIOVASCULAR: No chest pain, palpitations, dizziness, or leg swelling  GASTROINTESTINAL: No abdominal or epigastric pain. No nausea, vomiting, or hematemesis; No diarrhea or constipation. No melena or hematochezia.  GENITOURINARY: No dysuria, frequency, hematuria, or incontinence  NEUROLOGICAL: No headaches, memory loss, loss of strength, numbness, or tremors  SKIN: No itching, burning, rashes, or lesions   LYMPH NODES: No enlarged glands  ENDOCRINE: No heat or cold intolerance; No hair loss  MUSCULOSKELETAL: No joint pain or swelling; No muscle, back, or extremity pain  PSYCHIATRIC: No depression, anxiety, mood swings, or difficulty sleeping  HEME/LYMPH: No easy bruising, or bleeding gums  ALLERGY AND IMMUNOLOGIC: No hives or eczema  VASCULAR: no swelling, erythema      Physical Exam: cachectic appearing 77 yo  woman lying in semi Del Real's position, c/o feeling weak    Head: normocephalic, atraumatic    Eyes: PERRLA, EOMI, no nystagmus, sclera anicteric    ENT: nasal discharge, uvula midline, no oropharyngeal erythema/exudate    Neck: supple, negative JVD, negative carotid bruits, no thyromegaly    Chest: CTA bilaterally, neg wheeze, rhonchi, rales, crackles, egophany    Cardiovascular: regular rate and rhythm, neg murmurs/rubs/gallops    Abdomen: soft, non distended, non tender, negative rebound/guarding, normal bowel sounds, neg hepatosplenomegaly    Extremities: WWP, neg cyanosis/clubbing/edema, negative calf tenderness to palpation, negative Benita's sign, L V finger ctx h/o fx    :     Neurologic Exam:    Alert and oriented to person, place, date/year, speech fluent w/o dysarthria, recent and remote memory intact, repetition intact, comprehension intact,     Cranial Nerves:     II:                       pupils equal, round and reactive to light, visual fields intact   III/ IV/VI:            extraocular movements intact, neg nystagmus, ptosis  V:                       facial sensation intact, V1-3 normal  VII:                     face symmetric, no droop, normal eye closure and smile  VIII:                    hearing intact to finger rub bilaterally  IX/ X:                 soft palate rise symmetrical  XI:                      head turning, shoulder shrug normal  XII:                     tongue midline    Motor Exam:    Upper Extremities:     RIght:   poor effort > 3+/5               negative drift    Left :     poor effort > 3+/5               negative drift    Lower Extremities:                 Right:      poor effort > 3+/5    Left:        poor effort > 3+/5      Sensory:    intact to LT/PP in all UE/LE dermatomes    DTR:            = biceps/     triceps/     brachioradialis                      = patella/   medial hamstring/ankle                      neg clonus                      neg Babinski                      neg Hoffmans    Finger to Nose:  wnl    Heel to Shin:  wnl    Rapid Alternating movements:  wnl    Joint Position Sense:  intact    Romberg:  not tested    Tandem Walking:  not tested    Gait:  not tested        PM&R Impression:    1) deconditioned  2) no focal weakness  3) sepsis/ UTI        Recommendations:    1) Physical therapy focusing on therapeutic exercises, bed mobility/transfer out of bed evaluation, progressive ambulation with assistive devices prn.    2) Anticipated Disposition Plan/Recs: pending functional progress

## 2019-03-08 NOTE — DIETITIAN INITIAL EVALUATION ADULT. - ORAL INTAKE PTA
poor/as per patient has always been a poor eater"my whole life" but recently she has noticed less intake.Claims she is never hungry as per patient has always been a poor eater"my whole life" but recently she has noticed less intake. Claims she is never hungry./poor

## 2019-03-08 NOTE — CONSULT NOTE ADULT - SUBJECTIVE AND OBJECTIVE BOX
HPI:  78F with PMHx of HTN, myeloproliferative disorder, DVT/PE (2015), chronic UTIs and hydronephrosis who presents to ED for c/o generalized weakness x4-5 days progressively worsening over the past few days. Pt reports she usually ambulates around her apt with a walker. Over the past 5 days, she noticed she has become increasingly weak where she is able to stand up but not able to walk with her walker as usual. She reports weakness all over but more notable in her b/l LE. Denies bowel/bladder incontinence, paresthesias, back pain, loss of sensation in the LE. She notes that she has had decreased appetite over the past 1 month as well with a significant weight loss since 2/2 poor PO intake. Pt lives home alone and notes being independent. She follows with Dr. Resendiz outpatient about once a month for her MPD and reports compliance with medications. Pt denies any other associated symptoms including fevers/chills, HA, chest pain, SOB, cough, sputum, abdominal pain, n/v/d/c, dysuria, hematuria, urinary retention or frequency.     Of note, pt is not a great historian, often repeating herself and unable to recall her home medications. Does remember that she is no longer on Jakafi for MPD and is currently on Xarelto for h/o DVT/PE.    ED VS:  t 97/ / /88/ RR 16/ 94% RA    ED course:  s/p CTX, potassium repletion, 1L NS bolus (07 Mar 2019 18:48)      PAST MEDICAL & SURGICAL HISTORY:  Myeloproliferative disorder  Hypertension  PE (pulmonary thromboembolism):   Tremor  Vestibular disequilibrium  S/P wrist surgery  S/P hysterectomy      REVIEW OF SYSTEMS      General:	    Skin/Breast:  	  Ophthalmologic:  	  ENMT:	    Respiratory and Thorax:  	  Cardiovascular:	    Gastrointestinal:	    Genitourinary:	    Musculoskeletal:	    Neurological:	    Psychiatric:	    Hematology/Lymphatics:	    Endocrine:	    Allergic/Immunologic:	    MEDICATIONS  (STANDING):  amLODIPine   Tablet 5 milliGRAM(s) Oral daily  aspirin enteric coated 81 milliGRAM(s) Oral daily  atorvastatin 40 milliGRAM(s) Oral at bedtime  buPROPion . 37.5 milliGRAM(s) Oral two times a day  cefTRIAXone   IVPB 1 Gram(s) IV Intermittent every 24 hours  docusate sodium 100 milliGRAM(s) Oral daily  polyethylene glycol 3350 17 Gram(s) Oral daily  potassium chloride    Tablet ER 40 milliEquivalent(s) Oral every 4 hours  rivaroxaban 20 milliGRAM(s) Oral every 24 hours  senna 1 Tablet(s) Oral daily    MEDICATIONS  (PRN):  acetaminophen   Tablet .. 650 milliGRAM(s) Oral every 6 hours PRN Temp greater or equal to 38C (100.4F), Moderate Pain (4 - 6)      Allergies    No Known Allergies    Intolerances        SOCIAL HISTORY:    FAMILY HISTORY:  No pertinent family history in first degree relatives      Vital Signs Last 24 Hrs  T(C): 36.8 (08 Mar 2019 05:05), Max: 36.8 (08 Mar 2019 05:05)  T(F): 98.3 (08 Mar 2019 05:05), Max: 98.3 (08 Mar 2019 05:05)  HR: 79 (08 Mar 2019 05:05) (73 - 104)  BP: 181/92 (08 Mar 2019 05:05) (132/88 - 181/92)  BP(mean): --  RR: 19 (08 Mar 2019 05:05) (16 - 19)  SpO2: 93% (08 Mar 2019 05:05) (93% - 95%)                LABS:                        14.9   29.77 )-----------( 515      ( 08 Mar 2019 06:20 )             48.8     03-08    141  |  103  |  10  ----------------------------<  106<H>  2.9<LL>   |  21<L>  |  0.54    Ca    9.8      08 Mar 2019 06:20  Phos  2.6     03-08  Mg     2.1     03-08    TPro  6.8  /  Alb  4.1  /  TBili  0.6  /  DBili  x   /  AST  15  /  ALT  9<L>  /  AlkPhos  110  03-08    PT/INR - ( 07 Mar 2019 15:02 )   PT: 21.4 sec;   INR: 1.86          PTT - ( 07 Mar 2019 15:02 )  PTT:36.2 sec  Urinalysis Basic - ( 07 Mar 2019 16:31 )    Color: Yellow / Appearance: Clear / S.025 / pH: x  Gluc: x / Ketone: Trace mg/dL  / Bili: Negative / Urobili: 0.2 E.U./dL   Blood: x / Protein: 30 mg/dL / Nitrite: POSITIVE   Leuk Esterase: NEGATIVE / RBC: < 5 /HPF / WBC > 10 /HPF   Sq Epi: x / Non Sq Epi: 0-5 /HPF / Bacteria: Present /HPF    Culture - Urine (19 @ 18:56)    Specimen Source: Catheterized Catheterized    Culture Results:   No growth to date    Culture - Blood (19 @ 17:49)    Specimen Source: .Blood Blood-Peripheral    Culture Results:   No growth at 12 hours    Culture - Blood (19 @ 17:49)    Specimen Source: .Blood Blood-Peripheral    Culture Results:   No growth at 12 hours        RADIOLOGY & ADDITIONAL STUDIES: HPI:  78F with PMHx of HTN, myeloproliferative disorder, DVT/PE (2015), chronic UTIs and hydronephrosis who presents to ED for c/o generalized weakness x4-5 days progressively worsening over the past few days. Pt reports she usually ambulates around her apt with a walker. Over the past 5 days, she noticed she has become increasingly weak where she is able to stand up but not able to walk with her walker as usual. She reports weakness all over but more notable in her b/l LE. Denies bowel/bladder incontinence, paresthesias, back pain, loss of sensation in the LE. She notes that she has had decreased appetite over the past 1 month as well with a significant weight loss since 2/2 poor PO intake. Pt lives home alone and notes being independent. She follows with Dr. Resendiz outpatient about once a month for her MPD and reports compliance with medications. Pt denies any other associated symptoms including fevers/chills, HA, chest pain, SOB, cough, sputum, abdominal pain, n/v/d/c, dysuria, hematuria, urinary retention or frequency    Of note, pt is not a good historian, often repeating herself and unable to recall her home medications. Does remember that she is no longer on Jakafi for MPD and is currently on Xarelto for h/o DVT/PE.  Patient thinks she lost weight    Placement of Resendiz pending by urology due to retention.  Now incontinent    ED VS:  t 97/ / /88/ RR 16/ 94% RA    ED course:  s/p CTX, potassium repletion, 1L NS bolus (07 Mar 2019 18:48)      PAST MEDICAL & SURGICAL HISTORY:  Myeloproliferative disorder  Hypertension  PE (pulmonary thromboembolism): 2015  Tremor  Vestibular disequilibrium  S/P wrist surgery  S/P hysterectomy      REVIEW OF SYSTEMS  Otherwise negative      MEDICATIONS  (STANDING):  amLODIPine   Tablet 5 milliGRAM(s) Oral daily  aspirin enteric coated 81 milliGRAM(s) Oral daily  atorvastatin 40 milliGRAM(s) Oral at bedtime  buPROPion . 37.5 milliGRAM(s) Oral two times a day  cefTRIAXone   IVPB 1 Gram(s) IV Intermittent every 24 hours  docusate sodium 100 milliGRAM(s) Oral daily  polyethylene glycol 3350 17 Gram(s) Oral daily  potassium chloride    Tablet ER 40 milliEquivalent(s) Oral every 4 hours  rivaroxaban 20 milliGRAM(s) Oral every 24 hours  senna 1 Tablet(s) Oral daily    MEDICATIONS  (PRN):  acetaminophen   Tablet .. 650 milliGRAM(s) Oral every 6 hours PRN Temp greater or equal to 38C (100.4F), Moderate Pain (4 - 6)      Allergies    No Known Allergies      SOCIAL HISTORY:  Lives at home with home aide     FAMILY HISTORY:  No pertinent family history in first degree relatives    EXAM  Vital Signs Last 24 Hrs  T(C): 36.8 (08 Mar 2019 05:05), Max: 36.8 (08 Mar 2019 05:05)  T(F): 98.3 (08 Mar 2019 05:05), Max: 98.3 (08 Mar 2019 05:05)  HR: 79 (08 Mar 2019 05:05) (73 - 104)  BP: 181/92 (08 Mar 2019 05:05) (132/88 - 181/92)  BP(mean): --  RR: 19 (08 Mar 2019 05:05) (16 - 19)  SpO2: 93% (08 Mar 2019 05:05) (93% - 95%)  Awake but very frail appearing and slow   Pale without rash  Oral mucosa slightly dry  RRR  Chest CTA  Abd soft NT  LE no edema      LABS:                        14.9   29.77 )-----------( 515      ( 08 Mar 2019 06:20 )             48.8     03-08    141  |  103  |  10  ----------------------------<  106<H>  2.9<LL>   |  21<L>  |  0.54    Ca    9.8      08 Mar 2019 06:20  Phos  2.6     03-08  Mg     2.1     03-08    TPro  6.8  /  Alb  4.1  /  TBili  0.6  /  DBili  x   /  AST  15  /  ALT  9<L>  /  AlkPhos  110  03-08    PT/INR - ( 07 Mar 2019 15:02 )   PT: 21.4 sec;   INR: 1.86          PTT - ( 07 Mar 2019 15:02 )  PTT:36.2 sec  Urinalysis Basic - ( 07 Mar 2019 16:31 )    Color: Yellow / Appearance: Clear / S.025 / pH: x  Gluc: x / Ketone: Trace mg/dL  / Bili: Negative / Urobili: 0.2 E.U./dL   Blood: x / Protein: 30 mg/dL / Nitrite: POSITIVE   Leuk Esterase: NEGATIVE / RBC: < 5 /HPF / WBC > 10 /HPF   Sq Epi: x / Non Sq Epi: 0-5 /HPF / Bacteria: Present /HPF    Culture - Urine (19 @ 18:56)    Specimen Source: Catheterized Catheterized    Culture Results:   No growth to date    Culture - Blood (19 @ 17:49)    Specimen Source: .Blood Blood-Peripheral    Culture Results:   No growth at 12 hours    Culture - Blood (19 @ 17:49)    Specimen Source: .Blood Blood-Peripheral    Culture Results:   No growth at 12 hours        RADIOLOGY & ADDITIONAL STUDIES:    Xray Chest 1 View- PORTABLE-Urgent (19 @ 15:51) >    EXAM:  XR CHEST PORTABLE URGENT 1V                          PROCEDURE DATE:  2019          INTERPRETATION:  PORTABLE CHEST X-RAY     HISTORY: weakness    PRIOR STUDIES: 2018    FINDINGS: The lungs are clear.  There are no pleural effusions.  The   cardiomediastinal silhouette is unremarkable.    IMPRESSION:  The lungs are clear.    --------------------------------------------------------------    Xray Abdomen 1 View PORTABLE -Urgent (19 @ 09:01) >    EXAM:  XR ABDOMEN PORTABLE URGENT 1V                          PROCEDURE DATE:  2019          INTERPRETATION:  ABDOMEN SUPINE:    History: r/o obstruction, abd pain    Prior studies: None.    Findings:    Unremarkable bowel gas pattern.  No evidence of intestinal   obstruction.   The colon is loaded with fecal material.    Impression:  No bowel obstruction or ileus.

## 2019-03-08 NOTE — CONSULT NOTE ADULT - SUBJECTIVE AND OBJECTIVE BOX
CONSULT NOTE:    HPI:  78F with PMHx of HTN, myeloproliferative disorder, DVT/PE (2015), chronic UTIs. Admitted to medicine for generalized weakness x4-5 days progressively worsening over the past few days. Patient states she has become increasingly weak and unable to walk with her walker as usual. She reports weakness all over but more notable in her b/l LE. Called to see patient due to recurrent UTI. Patient found to have hydronephrosis last july, had CT and then renal scan at that time which showed no obstruction. Patient denies any flank pain at this time but c/o some suprapubic tenderness. Denies any nausea or vomiting, Denies any LUTs, fever or chills. She states she is not having trouble emptying her bladder.    Vital Signs Last 24 Hrs  T(C): 36.8 (08 Mar 2019 05:05), Max: 36.8 (08 Mar 2019 05:05)  T(F): 98.3 (08 Mar 2019 05:05), Max: 98.3 (08 Mar 2019 05:05)  HR: 79 (08 Mar 2019 05:05) (73 - 104)  BP: 181/92 (08 Mar 2019 05:05) (132/88 - 181/92)  BP(mean): --  RR: 19 (08 Mar 2019 05:05) (16 - 19)  SpO2: 93% (08 Mar 2019 05:05) (93% - 95%)  I&O's Summary    07 Mar 2019 07:01  -  08 Mar 2019 07:00  --------------------------------------------------------  IN: 450 mL / OUT: 0 mL / NET: 450 mL        PE:  Gen: NAD  Abd: soft, nd,+ suprapbic TTP  : noninvasive catheter in place to suction with clear urine draining      LABS:                        14.9   29.77 )-----------( 515      ( 08 Mar 2019 06:20 )             48.8     03-08    141  |  103  |  10  ----------------------------<  106<H>  2.9<LL>   |  21<L>  |  0.54    Ca    9.8      08 Mar 2019 06:20  Phos  2.6     03-08  Mg     2.1     03-08    TPro  6.8  /  Alb  4.1  /  TBili  0.6  /  DBili  x   /  AST  15  /  ALT  9<L>  /  AlkPhos  110  03-08    PT/INR - ( 07 Mar 2019 15:02 )   PT: 21.4 sec;   INR: 1.86          PTT - ( 07 Mar 2019 15:02 )  PTT:36.2 sec  Cultures  Culture Results:   No growth to date (03-07 @ 18:56)  Culture Results:   No growth at 12 hours (03-07 @ 17:49)  Culture Results:   No growth at 12 hours (03-07 @ 17:49)      A/P 79 yo f with uti and hx hydronephrosis  1)cont abx  2) bedside pvr at this time 900cc  rec hutson  3)rec ct urogram  4) discussed with gu team

## 2019-03-08 NOTE — DIETITIAN INITIAL EVALUATION ADULT. - NS FNS REASON FOR WEIGHT CHANG
question recall as per patient used to weigh:115lbs but could not qunatify when she weighted that/decreased po intake question recall as per patient used to weigh:115lbs but could not quantify when she weighted that/decreased po intake

## 2019-03-08 NOTE — DIETITIAN INITIAL EVALUATION ADULT. - ENERGY NEEDS
Ht:5ft 2inches,IBW:110lbs +/-10%,90% of IBW.BMI:18.1, Stated UBW:115lbs,86% of stated UBW.Adjusted for age and low BMI

## 2019-03-08 NOTE — DIETITIAN INITIAL EVALUATION ADULT. - NS AS NUTRI INTERV MEALS SNACK
General/healthful diet/honor food requests/Energy - modified diet/Protein - modified diet/Specific foods/beverages or groups

## 2019-03-08 NOTE — PHYSICAL THERAPY INITIAL EVALUATION ADULT - LEVEL OF INDEPENDENCE: SIT/STAND, REHAB EVAL
attempted though pt states she feels tired and weak and would pushing into bed to return supine VINH sifuentes

## 2019-03-08 NOTE — CONSULT NOTE ADULT - SUBJECTIVE AND OBJECTIVE BOX
Pyuria, rising WBC above usual range of the MPD (JAF-2 positive).    Patient is alert and notes reviewed.     No  intervention with medication for the MPD until the UTI is controlled.      The patient had been on Jakafi before but was unable to continue.

## 2019-03-08 NOTE — DIETITIAN INITIAL EVALUATION ADULT. - PROBLEM SELECTOR PLAN 1
POA tachy 104 and leukocytosis 25 (pt baseline WBC 15-17 in setting of MPD) with positive UA. s/p 1L IVF in ED. s/p CTX 1g. lactate 1.1.  -c/w CTX 1g qd (prev. had UTI E. coli pan-sensitive)  -f/u urine cx  -f/u bl cx  -tylenol PRN  -s/p 30 cc/kg IVF resuscitation  -IVF maintenance 75 cc/hr

## 2019-03-08 NOTE — PHYSICAL THERAPY INITIAL EVALUATION ADULT - ASR WT BEARING STATUS EVAL
Reason For Visit  SOFIA RODRIGUEZ is an established patient here today for a chief complaint of abd pain .   A chaperone is not applicable. He is unaccompanied.        Quality    Adult Wellness CI height documented, discussion of regular exercise, exercising regularly, printed information given for activities, discussion of nutritional quality of diet, patient education given about proper diet, not using alcohol, not having considered quitting drinking, not getting angry when talked to about drinking, not having a drink or two in the morning to get going, not drinking alcohol regularly, and feeling guilty about it, colonoscopy not performed, colonoscopy not normal, no tobacco use and did not provide intervention and counseling in regards to tobacco use.      History of Present Illness  57M no signif pmhx presenting for abd pain.     -Has not seen  in 25 years  -no hx abnorm labs    # Abd pain  -6-7 years 1 episode q6mos with episodes sub fever, abd pain located lower abd with at times, bloating, trouble walking. Episodes last 2-3 days and have been occurring 2x a mos for last 2 mos which bringing him in. He believes nuts and kernals are associated with these events; eats a couple times a week but layed off within last couple mos.   -Today, he feels 1-2/10 and 6/7 at worst. At times lower back pain. Tried some advil for sub fevers which helped.   -wnl BM (couple times a day, ranging from thicker sausage-like to thin-finger size)  -Believes his wt is stable.   -concerned nuts and kernals causing diverticulitis  -mother with hx diverticulitis  -No hx bowel issues in family besides some diverticulitis in mother (had bowel resection). No colon Ca. No appendicitis in him or fam. Denies CP, SOB, n/v/d/dysuria, back     # HM  Shx: rev  All: rev  Imm: Flu vaccine declined  Fam hx: rev  Sex: only wife; no hx STI  Occupation:   Living Status/Background: wife; son and wife temp    Colonoscopy:  never  Smoking: rev  EtOH: rev  Illicits: rev.      Current Meds   1. No Reported Medications Recorded    Active Problems  Chronic abdominal pain (R10.9,G89.29)    Surgical History  History of Arthroscopy Knee Left   · Year 2000    Family History  Mother   Family history of congestive heart failure (Z82.49)  Father   Family history of Heart aneurysm    Social History  Alcohol use (Z78.9)   · 1 drink a week  Cigar smoker (F17.290)  No illicit drug use    Vitals  Signs   Recorded: 28Nov2018 04:44PM   Height: 5 ft 6.5 in  Weight: 217 lb   BMI Calculated: 34.5  BSA Calculated: 2.08  Systolic: 134  Diastolic: 85  Temperature: 98 F  Heart Rate: 81    Physical Exam  Constitutional: alert, in no acute distress and current vital signs reviewed.   Head and Face: atraumatic.   Eyes: no discharge, normal conjunctiva and the sclerae were normal. pupils equal, round and reactive to light and accommodation and extraocular movements were intact.   ENT: normal lips. oral mucosa pink and moist, no oral lesions, tonsils not enlarged, normal appearing pharynx, normal appearing tongue.   Neck: normal appearing neck.   Lymphatic: no lymphadenopathy.   Pulmonary: no respiratory distress, normal respiratory rate and effort and no accessory muscle use. breath sounds clear to auscultation bilaterally.   Cardiovascular: normal rate, no murmurs were heard, regular rhythm, normal S1 and normal S2. right radial 2+ edema was not present in the lower extremities.   Abdomen: soft, nontender, nondistended, normal bowel sounds and no abdominal mass. no hepatomegaly and no splenomegaly. no umbilical hernia was discovered and no inguinal hernia was discovered. the rectum was normal. stool negative for occult blood.   Musculoskeletal: normal gait. normal range of motion. muscle strength and tone were normal.   Genitourinary: the scrotum was normal, there were no testicular masses and the testicles were not swollen. the penis was normal . the penis had  been circumcised.   Psychiatric: oriented to person, oriented to place and oriented to time. alert and awake, interactive and mood/affect were appropriate.      Assessment  Encounter for preventive health examination (Z00.00)  History of Arthroscopy Knee Left   · Year 2000  Cigar smoker (F17.290)  Alcohol use (Z78.9)   · 1 drink a week  No illicit drug use  Family history of Heart aneurysm : Father  Family history of congestive heart failure (Z82.49) : Mother  Chronic abdominal pain (R10.9,G89.29)    Plan  CBC WITH AUTOMATED DIFFERENTIAL; Status:Complete;   Done: 28Nov2018 06:06PM  COMP METABOLIC PANEL WITH LIPID PANEL (CPNL,LIPDPL); Status:Complete;    Done: 28Nov2018 06:06PM  LIPASE; Status:Complete;   Done: 28Nov2018 06:06PM  Gastroenterology Referral/Consult Treatment and Evaluation  For: Chronic abdominal  pain  Status: Active  Requested for: 28Nov2018  Care Summary provided. : Yes    Discussion/Summary  57M no signif pmhx presenting for abd pain.     # Abd pain   -yet unknown cause. Poss diverticulitis but too short of course. Must r/o colorectal ca. Will r/o pancreatitis. Possible overdoing spicy foods, but would be dx of exclusion. Other dx less likely.  -ordered cbc, cmp, lipase; hemoccult negative as above  -GI referral for eval for Cscope    # HM  Imm: Flu vaccine declined  Occupation:   Living Status/Background: wife; son and wife temp  Colonoscopy: never  Smoking: never  -lipids ordered    Medical compliance with plan discussed and risks of non-compliance reviewed.   Patient education completed on disease process, etiology & prognosis.   Patient expresses understanding of the plan.   Proper usage and side effects of medications reviewed & discussed.   Return to clinic as clinically indicated (worsening or new symptoms) as discussed with patient who verbalized understanding of & agreement with the plan     RTC CPE after Cscope      Attending Note  Attending Statement: () . I  discussed the patient's case with the Resident. I agree with the Resident's findings and plan, as documented in today's note.      Signatures   Electronically signed by : YESSI RUIZ M.D.; Nov 28 2018  7:46PM CST    Electronically signed by : WILBERT AYALA M.D.; Nov 30 2018  8:49AM CST     no weight-bearing restrictions

## 2019-03-09 LAB
ANION GAP SERPL CALC-SCNC: 11 MMOL/L — SIGNIFICANT CHANGE UP (ref 5–17)
BASOPHILS # BLD AUTO: 0.27 K/UL — HIGH (ref 0–0.2)
BASOPHILS NFR BLD AUTO: 1 % — SIGNIFICANT CHANGE UP (ref 0–2)
BUN SERPL-MCNC: 10 MG/DL — SIGNIFICANT CHANGE UP (ref 7–23)
CALCIUM SERPL-MCNC: 9.8 MG/DL — SIGNIFICANT CHANGE UP (ref 8.4–10.5)
CHLORIDE SERPL-SCNC: 103 MMOL/L — SIGNIFICANT CHANGE UP (ref 96–108)
CO2 SERPL-SCNC: 25 MMOL/L — SIGNIFICANT CHANGE UP (ref 22–31)
CREAT SERPL-MCNC: 0.72 MG/DL — SIGNIFICANT CHANGE UP (ref 0.5–1.3)
CULTURE RESULTS: NO GROWTH — SIGNIFICANT CHANGE UP
EOSINOPHIL # BLD AUTO: 0.42 K/UL — SIGNIFICANT CHANGE UP (ref 0–0.5)
EOSINOPHIL NFR BLD AUTO: 1.5 % — SIGNIFICANT CHANGE UP (ref 0–6)
GLUCOSE SERPL-MCNC: 101 MG/DL — HIGH (ref 70–99)
HCT VFR BLD CALC: 47.2 % — HIGH (ref 34.5–45)
HGB BLD-MCNC: 14.3 G/DL — SIGNIFICANT CHANGE UP (ref 11.5–15.5)
IMM GRANULOCYTES NFR BLD AUTO: 1.4 % — SIGNIFICANT CHANGE UP (ref 0–1.5)
LYMPHOCYTES # BLD AUTO: 1.32 K/UL — SIGNIFICANT CHANGE UP (ref 1–3.3)
LYMPHOCYTES # BLD AUTO: 4.7 % — LOW (ref 13–44)
MAGNESIUM SERPL-MCNC: 2.3 MG/DL — SIGNIFICANT CHANGE UP (ref 1.6–2.6)
MCHC RBC-ENTMCNC: 26.2 PG — LOW (ref 27–34)
MCHC RBC-ENTMCNC: 30.3 GM/DL — LOW (ref 32–36)
MCV RBC AUTO: 86.4 FL — SIGNIFICANT CHANGE UP (ref 80–100)
MONOCYTES # BLD AUTO: 1.22 K/UL — HIGH (ref 0–0.9)
MONOCYTES NFR BLD AUTO: 4.3 % — SIGNIFICANT CHANGE UP (ref 2–14)
NEUTROPHILS # BLD AUTO: 24.7 K/UL — HIGH (ref 1.8–7.4)
NEUTROPHILS NFR BLD AUTO: 87.1 % — HIGH (ref 43–77)
NRBC # BLD: 0 /100 WBCS — SIGNIFICANT CHANGE UP (ref 0–0)
PLATELET # BLD AUTO: 496 K/UL — HIGH (ref 150–400)
POTASSIUM SERPL-MCNC: 4.2 MMOL/L — SIGNIFICANT CHANGE UP (ref 3.5–5.3)
POTASSIUM SERPL-SCNC: 4.2 MMOL/L — SIGNIFICANT CHANGE UP (ref 3.5–5.3)
PROCALCITONIN SERPL-MCNC: 0.07 NG/ML — SIGNIFICANT CHANGE UP (ref 0.02–0.1)
RBC # BLD: 5.46 M/UL — HIGH (ref 3.8–5.2)
RBC # FLD: 17.1 % — HIGH (ref 10.3–14.5)
SODIUM SERPL-SCNC: 139 MMOL/L — SIGNIFICANT CHANGE UP (ref 135–145)
SPECIMEN SOURCE: SIGNIFICANT CHANGE UP
WBC # BLD: 28.34 K/UL — HIGH (ref 3.8–10.5)
WBC # FLD AUTO: 28.34 K/UL — HIGH (ref 3.8–10.5)

## 2019-03-09 RX ADMIN — Medication 81 MILLIGRAM(S): at 13:09

## 2019-03-09 RX ADMIN — ATORVASTATIN CALCIUM 40 MILLIGRAM(S): 80 TABLET, FILM COATED ORAL at 22:36

## 2019-03-09 RX ADMIN — CEFTRIAXONE 100 GRAM(S): 500 INJECTION, POWDER, FOR SOLUTION INTRAMUSCULAR; INTRAVENOUS at 08:59

## 2019-03-09 RX ADMIN — SENNA PLUS 1 TABLET(S): 8.6 TABLET ORAL at 13:09

## 2019-03-09 RX ADMIN — POLYETHYLENE GLYCOL 3350 17 GRAM(S): 17 POWDER, FOR SOLUTION ORAL at 13:09

## 2019-03-09 RX ADMIN — BUPROPION HYDROCHLORIDE 37.5 MILLIGRAM(S): 150 TABLET, EXTENDED RELEASE ORAL at 06:31

## 2019-03-09 RX ADMIN — Medication 100 MILLIGRAM(S): at 13:09

## 2019-03-09 RX ADMIN — BUPROPION HYDROCHLORIDE 37.5 MILLIGRAM(S): 150 TABLET, EXTENDED RELEASE ORAL at 17:03

## 2019-03-09 RX ADMIN — AMLODIPINE BESYLATE 5 MILLIGRAM(S): 2.5 TABLET ORAL at 06:31

## 2019-03-09 RX ADMIN — RIVAROXABAN 20 MILLIGRAM(S): KIT at 20:56

## 2019-03-09 NOTE — PROGRESS NOTE ADULT - SUBJECTIVE AND OBJECTIVE BOX
INTERVAL HPI/OVERNIGHT EVENTS:    ANTIBIOTICS/RELEVANT:    MEDICATIONS  (STANDING):  amLODIPine   Tablet 5 milliGRAM(s) Oral daily  aspirin enteric coated 81 milliGRAM(s) Oral daily  atorvastatin 40 milliGRAM(s) Oral at bedtime  buPROPion . 37.5 milliGRAM(s) Oral two times a day  cefTRIAXone   IVPB 1 Gram(s) IV Intermittent every 24 hours  docusate sodium 100 milliGRAM(s) Oral daily  polyethylene glycol 3350 17 Gram(s) Oral daily  rivaroxaban 20 milliGRAM(s) Oral every 24 hours  senna 1 Tablet(s) Oral daily    MEDICATIONS  (PRN):  acetaminophen   Tablet .. 650 milliGRAM(s) Oral every 6 hours PRN Temp greater or equal to 38C (100.4F), Moderate Pain (4 - 6)      Allergies    No Known Allergies    Intolerances        Vital Signs Last 24 Hrs  T(C): 36.5 (09 Mar 2019 13:44), Max: 36.7 (09 Mar 2019 05:16)  T(F): 97.7 (09 Mar 2019 13:44), Max: 98.1 (09 Mar 2019 05:16)  HR: 79 (09 Mar 2019 13:44) (79 - 88)  BP: 153/82 (09 Mar 2019 13:44) (152/94 - 164/93)  BP(mean): --  RR: 16 (09 Mar 2019 13:44) (16 - 20)  SpO2: 95% (09 Mar 2019 13:44) (95% - 95%)    PHYSICAL EXAM:      Constitutional:    Eyes:    ENMT:    Neck:    Breasts:    Back:    Respiratory:    Cardiovascular:    Gastrointestinal:    Genitourinary:    Rectal:    Extremities:    Vascular:    Neurological:        LABS:                        14.3   28.34 )-----------( 496      ( 09 Mar 2019 05:30 )             47.2     03-09    139  |  103  |  10  ----------------------------<  101<H>  4.2   |  25  |  0.72    Ca    9.8      09 Mar 2019 05:29  Phos  2.6     03-08  Mg     2.3     03-09    TPro  6.8  /  Alb  4.1  /  TBili  0.6  /  DBili  x   /  AST  15  /  ALT  9<L>  /  AlkPhos  110  03-08          MICROBIOLOGY:    RADIOLOGY & ADDITIONAL STUDIES: INTERVAL HPI/OVERNIGHT EVENTS:    Events reviewed.  Patient states that she does "not feel ill but weak only."      MEDICATIONS  (STANDING):  amLODIPine   Tablet 5 milliGRAM(s) Oral daily  aspirin enteric coated 81 milliGRAM(s) Oral daily  atorvastatin 40 milliGRAM(s) Oral at bedtime  buPROPion . 37.5 milliGRAM(s) Oral two times a day  cefTRIAXone   IVPB 1 Gram(s) IV Intermittent every 24 hours  docusate sodium 100 milliGRAM(s) Oral daily  polyethylene glycol 3350 17 Gram(s) Oral daily  rivaroxaban 20 milliGRAM(s) Oral every 24 hours  senna 1 Tablet(s) Oral daily    MEDICATIONS  (PRN):  acetaminophen   Tablet .. 650 milliGRAM(s) Oral every 6 hours PRN Temp greater or equal to 38C (100.4F), Moderate Pain (4 - 6)      Allergies    No Known Allergies    EXAM  Vital Signs Last 24 Hrs  T(C): 36.5 (09 Mar 2019 13:44), Max: 36.7 (09 Mar 2019 05:16)  T(F): 97.7 (09 Mar 2019 13:44), Max: 98.1 (09 Mar 2019 05:16)  HR: 79 (09 Mar 2019 13:44) (79 - 88)  BP: 153/82 (09 Mar 2019 13:44) (152/94 - 164/93)  BP(mean): --  RR: 16 (09 Mar 2019 13:44) (16 - 20)  SpO2: 95% (09 Mar 2019 13:44) (95% - 95%)  Awake and alert but frail appearing and speaking slowly  No rash  Neck supple RR  Chest clear  Abd soft NT  Resendiz catheter in place draining urine      LABS:                        14.3   28.34 )-----------( 496      ( 09 Mar 2019 05:30 )             47.2     03-09    139  |  103  |  10  ----------------------------<  101<H>  4.2   |  25  |  0.72    Ca    9.8      09 Mar 2019 05:29  Phos  2.6     03-08  Mg     2.3     03-09    TPro  6.8  /  Alb  4.1  /  TBili  0.6  /  DBili  x   /  AST  15  /  ALT  9<L>  /  AlkPhos  110  03-08    Procalcitonin, Serum (03.08.19 @ 14:31)    Procalcitonin, Serum: 0.07: Procalcitonin (PCT) Interpretation (ng/mL) - Diagnosis of systemic  bacterial infection/sepsis  PCT < 0.5: Systemic infection (sepsis) is not likely and risk for  progression to severe systemic infection is low. Local bacterial  infection is possible. If early sepsis is suspected clinically, PCT  should be re-assessed in 6-24 hours.  PCT >/= 0.5 but < 2.0: Systemic infection (sepsis) is possible, but other  conditions are known to elevate PCT as well. Moderate risk for  progression to severe systemic infection. The patient should be closely  monitored both clinically and by re-assessing PCT within 6-24 hours.  PCT >/= 2.0 but < 10.0: Systemic infection (sepsis) is likely, unless  other causes are known. High risk of progression to severe systemic  infection (severe sepsis/septic shock).  PCT >/= 10.0: Important systemic inflammatory response, almost  exclusively due to severe bacterial sepsis or septic shock. High  likelihood of severe sepsis or septic shock. ng/mL    Thyroid Stimulating Hormone, Serum (03.08.19 @ 06:20)    Thyroid Stimulating Hormone, Serum: 1.602 uIU/mL    Lactate, Blood (03.07.19 @ 16:17)    Lactate, Blood: 1.1 mmoL/L    Sedimentation Rate, Erythrocyte (03.08.19 @ 06:20)    Sedimentation Rate, Erythrocyte: 4 mm/Hr        MICROBIOLOGY:    Culture - Urine (03.07.19 @ 18:56)    Specimen Source: Catheterized Catheterized    Culture Results:   No growth      Culture - Blood (03.07.19 @ 17:49)    Specimen Source: .Blood Blood-Peripheral    Culture Results:   No growth at 2 days.    Culture - Blood (03.07.19 @ 17:49)    Specimen Source: .Blood Blood-Peripheral    Culture Results:   No growth at 2 days.      RADIOLOGY & ADDITIONAL STUDIES:    CT Abdomen and Pelvis w/wo IV Cont (03.08.19 @ 21:47) >  EXAM:  CT ABDOMEN AND PELVIS WAW IC                          PROCEDURE DATE:  03/08/2019          INTERPRETATION:  CT of the ABDOMEN and PELVIS with and without   intravenous contrast dated 3/8/2019 9:47 PM    INDICATION:     TECHNIQUE: CT of the abdomen and pelvis was performed. Two phases were   obtained: noncontrast followed by a combined nephrographic and excretory   phase utilizing a split-bolus technique. Axial, sagittal and coronal   images were produced and reviewed.    PRIOR STUDIES: None.    FINDINGS: Images of the lower chest demonstrate a small pericardial cyst   draping around the junction of the right inferior pulmonary vein and the   left atrium. Partially imaged mitral annular and aortic valve   calcifications. Numerous tree-in-bud nodules and pulmonary micronodules   as well as mucus plugging in the lower lobes and lingula. A bilobed 1.6   cm left lower lobe pulmonary nodule versus 2 smaller nodules adjacent to   which other. Linear atelectasis in the lower lobes. Bilateral lower lobe   bronchiectasis.    A a few subcentimeter low-density liver lesions, too small to   characterize. Phrygian cap containing a gallstone and gallbladder wall   thickening at the fundus.  1.5 cm heterogeneous lesion in the body of the   pancreas. Small pancreatic calcifications.  Borderline splenomegaly   measuring 13 cm in AP diameter.    The adrenal glands are unremarkable. 4.3 cm right parapelvic renal cyst.   Multiple smaller bilateral renal cysts. Numerous bilateral subcentimeter  low-density renal lesions, too small to characterize. No renal or   ureteral stones. No hydronephrosis bilaterally. The post contrast/urogram   images reveal a 4 mm somewhat linear filling defect in the left renal   pelvis at the UPJ seen on axial image 55 of series 6, coronal image 44   series [id number]. The right pelvic ureter and left proximal ureter were not   imaged with contrast due to peristalsis. Assessment of the urinary   bladder is limited because it was collapsed with a Resendiz catheter and   air. Urinary bladder wall thickening versus underdistention.    3 x 3 cm infrarenal abdominal aortic aneurysm. More inferiorly there is a   4.4 x 4.0 cm distal infrarenal abdominal aortic aneurysm. 1.6 cm ectasia   of the left common iliac artery. No lymphadenopathy is seen.     Evaluation of the bowel reveals a distended stool-filled rectum measuring   7.5 cm in diameter suspicious for fecal rectal impaction. Colonic   diverticulosis. Appendix not visualized. Small hiatal hernia. No ascites   is seen. Small bilateral fat-containing inguinal hernias. Tiny   fat-containing umbilical hernia.     Images of the pelvis demonstrate status post hysterectomy. No adnexal   masses.    Evaluation of the osseous structures demonstrates degenerative changes.   Osteopenia. Mild scoliosis. Chondral calcinosis of the hip joints and   pubic symphysis. Slight deformity of the right pubic bone which could   represent sequelae of an old fracture, correlate clinically.    IMPRESSION:  No hydronephrosis ornephrolithiasis.    4 mm somewhat linear filling defect in the left renal pelvis at the UPJ.   Consider ureteroscopy to exclude a small urothelial neoplasm.    Assessment of the urinary bladder is limited because it was collapsed   with a Resendiz catheter and air. Urinary bladder wall thickening versus   underdistention.    Distended stool-filled rectum suspicious for fecal rectal impaction.

## 2019-03-09 NOTE — PROGRESS NOTE ADULT - SUBJECTIVE AND OBJECTIVE BOX
Couts stabele with increased WBCs.     The patient is on ABs and feels better at present but still at bed rest.    Will continue to observe the CBC results.

## 2019-03-09 NOTE — PROGRESS NOTE ADULT - SUBJECTIVE AND OBJECTIVE BOX
OVERNIGHT EVENTS:    SUBJECTIVE / INTERVAL HPI: Patient seen and examined at bedside.     VITAL SIGNS:  Vital Signs Last 24 Hrs  T(C): 36.7 (09 Mar 2019 05:16), Max: 36.8 (08 Mar 2019 14:45)  T(F): 98.1 (09 Mar 2019 05:16), Max: 98.2 (08 Mar 2019 14:45)  HR: 88 (09 Mar 2019 05:16) (82 - 88)  BP: 164/93 (09 Mar 2019 05:16) (144/93 - 164/93)  BP(mean): --  RR: 18 (09 Mar 2019 05:16) (18 - 20)  SpO2: 95% (09 Mar 2019 05:16) (94% - 95%)    PHYSICAL EXAM:    General: WDWN  HEENT: NCAT; PERRL, anicteric sclera; MMM  Neck: supple, trachea midline  Cardiovascular: S1, S2 normal; RRR, no M/G/R  Respiratory: CTABL; no W/R/R  Gastrointestinal: soft, nontender, nondistended. bowel sounds present.  Skin: no ulcerations or visible rashes appreciated  Extremities: WWP; no edema, clubbing or cyanosis  Vascular: 2+ radial, DP/PT pulses B/L  Neurological: AAOx3; CN II-XII grossly intact; no focal deficits    MEDICATIONS:  MEDICATIONS  (STANDING):  amLODIPine   Tablet 5 milliGRAM(s) Oral daily  aspirin enteric coated 81 milliGRAM(s) Oral daily  atorvastatin 40 milliGRAM(s) Oral at bedtime  buPROPion . 37.5 milliGRAM(s) Oral two times a day  cefTRIAXone   IVPB 1 Gram(s) IV Intermittent every 24 hours  docusate sodium 100 milliGRAM(s) Oral daily  polyethylene glycol 3350 17 Gram(s) Oral daily  rivaroxaban 20 milliGRAM(s) Oral every 24 hours  senna 1 Tablet(s) Oral daily    MEDICATIONS  (PRN):  acetaminophen   Tablet .. 650 milliGRAM(s) Oral every 6 hours PRN Temp greater or equal to 38C (100.4F), Moderate Pain (4 - 6)      ALLERGIES:  Allergies    No Known Allergies    Intolerances        LABS:                        14.3   28.34 )-----------( 496      ( 09 Mar 2019 05:30 )             47.2     03-09    139  |  103  |  10  ----------------------------<  101<H>  4.2   |  25  |  0.72    Ca    9.8      09 Mar 2019 05:29  Phos  2.6     03-08  Mg     2.3     -    TPro  6.8  /  Alb  4.1  /  TBili  0.6  /  DBili  x   /  AST  15  /  ALT  9<L>  /  AlkPhos  110  03-08    PT/INR - ( 07 Mar 2019 15:02 )   PT: 21.4 sec;   INR: 1.86          PTT - ( 07 Mar 2019 15:02 )  PTT:36.2 sec  Urinalysis Basic - ( 07 Mar 2019 16:31 )    Color: Yellow / Appearance: Clear / S.025 / pH: x  Gluc: x / Ketone: Trace mg/dL  / Bili: Negative / Urobili: 0.2 E.U./dL   Blood: x / Protein: 30 mg/dL / Nitrite: POSITIVE   Leuk Esterase: NEGATIVE / RBC: < 5 /HPF / WBC > 10 /HPF   Sq Epi: x / Non Sq Epi: 0-5 /HPF / Bacteria: Present /HPF      CAPILLARY BLOOD GLUCOSE          RADIOLOGY & ADDITIONAL TESTS: Reviewed. OVERNIGHT EVENTS: KAREN    SUBJECTIVE / INTERVAL HPI: Patient seen and examined at bedside. Comfortable, denies fever, chills, chest pain, SOB, n/v/c/d    VITAL SIGNS:  Vital Signs Last 24 Hrs  T(C): 36.7 (09 Mar 2019 05:16), Max: 36.8 (08 Mar 2019 14:45)  T(F): 98.1 (09 Mar 2019 05:16), Max: 98.2 (08 Mar 2019 14:45)  HR: 88 (09 Mar 2019 05:16) (82 - 88)  BP: 164/93 (09 Mar 2019 05:16) (144/93 - 164/93)  BP(mean): --  RR: 18 (09 Mar 2019 05:16) (18 - 20)  SpO2: 95% (09 Mar 2019 05:16) (94% - 95%)    PHYSICAL EXAM:    General: WDWN  HEENT: NCAT; PERRL, anicteric sclera; MMM  Neck: supple, trachea midline  Cardiovascular: S1, S2 normal; RRR, no M/G/R  Respiratory: CTABL; no W/R/R  Gastrointestinal: soft, nontender, nondistended. bowel sounds present.  Skin: no ulcerations or visible rashes appreciated  Extremities: WWP; no edema, clubbing or cyanosis  Vascular: 2+ radial, DP/PT pulses B/L  Neurological: AAOx3; CN II-XII grossly intact; no focal deficits    MEDICATIONS:  MEDICATIONS  (STANDING):  amLODIPine   Tablet 5 milliGRAM(s) Oral daily  aspirin enteric coated 81 milliGRAM(s) Oral daily  atorvastatin 40 milliGRAM(s) Oral at bedtime  buPROPion . 37.5 milliGRAM(s) Oral two times a day  cefTRIAXone   IVPB 1 Gram(s) IV Intermittent every 24 hours  docusate sodium 100 milliGRAM(s) Oral daily  polyethylene glycol 3350 17 Gram(s) Oral daily  rivaroxaban 20 milliGRAM(s) Oral every 24 hours  senna 1 Tablet(s) Oral daily    MEDICATIONS  (PRN):  acetaminophen   Tablet .. 650 milliGRAM(s) Oral every 6 hours PRN Temp greater or equal to 38C (100.4F), Moderate Pain (4 - 6)      ALLERGIES:  Allergies    No Known Allergies    Intolerances        LABS:                        14.3   28.34 )-----------( 496      ( 09 Mar 2019 05:30 )             47.2     -    139  |  103  |  10  ----------------------------<  101<H>  4.2   |  25  |  0.72    Ca    9.8      09 Mar 2019 05:29  Phos  2.6     -08  Mg     2.3     -    TPro  6.8  /  Alb  4.1  /  TBili  0.6  /  DBili  x   /  AST  15  /  ALT  9<L>  /  AlkPhos  110  03-08    PT/INR - ( 07 Mar 2019 15:02 )   PT: 21.4 sec;   INR: 1.86          PTT - ( 07 Mar 2019 15:02 )  PTT:36.2 sec  Urinalysis Basic - ( 07 Mar 2019 16:31 )    Color: Yellow / Appearance: Clear / S.025 / pH: x  Gluc: x / Ketone: Trace mg/dL  / Bili: Negative / Urobili: 0.2 E.U./dL   Blood: x / Protein: 30 mg/dL / Nitrite: POSITIVE   Leuk Esterase: NEGATIVE / RBC: < 5 /HPF / WBC > 10 /HPF   Sq Epi: x / Non Sq Epi: 0-5 /HPF / Bacteria: Present /HPF      CAPILLARY BLOOD GLUCOSE          RADIOLOGY & ADDITIONAL TESTS: Reviewed.

## 2019-03-10 LAB
ANION GAP SERPL CALC-SCNC: 14 MMOL/L — SIGNIFICANT CHANGE UP (ref 5–17)
BUN SERPL-MCNC: 21 MG/DL — SIGNIFICANT CHANGE UP (ref 7–23)
CALCIUM SERPL-MCNC: 10 MG/DL — SIGNIFICANT CHANGE UP (ref 8.4–10.5)
CHLORIDE SERPL-SCNC: 101 MMOL/L — SIGNIFICANT CHANGE UP (ref 96–108)
CO2 SERPL-SCNC: 24 MMOL/L — SIGNIFICANT CHANGE UP (ref 22–31)
CREAT SERPL-MCNC: 0.94 MG/DL — SIGNIFICANT CHANGE UP (ref 0.5–1.3)
GLUCOSE SERPL-MCNC: 107 MG/DL — HIGH (ref 70–99)
HCT VFR BLD CALC: 47.4 % — HIGH (ref 34.5–45)
HGB BLD-MCNC: 14.5 G/DL — SIGNIFICANT CHANGE UP (ref 11.5–15.5)
MAGNESIUM SERPL-MCNC: 2.3 MG/DL — SIGNIFICANT CHANGE UP (ref 1.6–2.6)
MCHC RBC-ENTMCNC: 26.3 PG — LOW (ref 27–34)
MCHC RBC-ENTMCNC: 30.6 GM/DL — LOW (ref 32–36)
MCV RBC AUTO: 86 FL — SIGNIFICANT CHANGE UP (ref 80–100)
NRBC # BLD: 0 /100 WBCS — SIGNIFICANT CHANGE UP (ref 0–0)
PLATELET # BLD AUTO: 583 K/UL — HIGH (ref 150–400)
POTASSIUM SERPL-MCNC: 4.2 MMOL/L — SIGNIFICANT CHANGE UP (ref 3.5–5.3)
POTASSIUM SERPL-SCNC: 4.2 MMOL/L — SIGNIFICANT CHANGE UP (ref 3.5–5.3)
RBC # BLD: 5.51 M/UL — HIGH (ref 3.8–5.2)
RBC # FLD: 17.1 % — HIGH (ref 10.3–14.5)
SODIUM SERPL-SCNC: 139 MMOL/L — SIGNIFICANT CHANGE UP (ref 135–145)
WBC # BLD: 28.27 K/UL — HIGH (ref 3.8–10.5)
WBC # FLD AUTO: 28.27 K/UL — HIGH (ref 3.8–10.5)

## 2019-03-10 RX ADMIN — Medication 81 MILLIGRAM(S): at 12:15

## 2019-03-10 RX ADMIN — AMLODIPINE BESYLATE 5 MILLIGRAM(S): 2.5 TABLET ORAL at 06:04

## 2019-03-10 RX ADMIN — BUPROPION HYDROCHLORIDE 37.5 MILLIGRAM(S): 150 TABLET, EXTENDED RELEASE ORAL at 06:04

## 2019-03-10 RX ADMIN — BUPROPION HYDROCHLORIDE 37.5 MILLIGRAM(S): 150 TABLET, EXTENDED RELEASE ORAL at 17:03

## 2019-03-10 RX ADMIN — ATORVASTATIN CALCIUM 40 MILLIGRAM(S): 80 TABLET, FILM COATED ORAL at 21:03

## 2019-03-10 RX ADMIN — SENNA PLUS 1 TABLET(S): 8.6 TABLET ORAL at 12:15

## 2019-03-10 RX ADMIN — Medication 100 MILLIGRAM(S): at 12:14

## 2019-03-10 RX ADMIN — CEFTRIAXONE 100 GRAM(S): 500 INJECTION, POWDER, FOR SOLUTION INTRAMUSCULAR; INTRAVENOUS at 08:55

## 2019-03-10 RX ADMIN — RIVAROXABAN 20 MILLIGRAM(S): KIT at 21:02

## 2019-03-10 NOTE — PROGRESS NOTE ADULT - SUBJECTIVE AND OBJECTIVE BOX
78F with PMHx of HTN, myeloproliferative disorder, DVT/PE (2015), chronic UTIs and hydronephrosis who presents to ED for c/o generalized weakness x4-5 days progressively worsening over the past few days. Pt reports she usually ambulates around her apt with a walker. Over the past 5 days, she noticed she has become increasingly weak where she is able to stand up but not able to walk with her walker as usual. She reports weakness all over but more notable in her b/l LE. Denies bowel/bladder incontinence, paresthesias, back pain, loss of sensation in the LE. She notes that she has had decreased appetite over the past 1 month as well with a significant weight loss since 2/2 poor PO intake. Pt lives home alone and notes being independent. She follows with Dr. Resendiz outpatient about once a month for her MPD and reports compliance with medications. Pt denies any other associated symptoms including fevers/chills, HA, chest pain, SOB, cough, sputum, abdominal pain, n/v/d/c, dysuria, hematuria, urinary retention or frequency.     Of note, pt is not a great historian, often repeating herself and unable to recall her home medications. Does remember that she is no longer on Jakafi for MPD and is currently on Xarelto for h/o DVT/PE.    ED VS:  t 97/ / /88/ RR 16/ 94% RA    ED course:  s/p CTX, potassium repletion, 1L NS bolus     Patient seen and examined at bedside.         	  MEDICATIONS:  amLODIPine   Tablet 5 milliGRAM(s) Oral daily    cefTRIAXone   IVPB 1 Gram(s) IV Intermittent every 24 hours      acetaminophen   Tablet .. 650 milliGRAM(s) Oral every 6 hours PRN  buPROPion . 37.5 milliGRAM(s) Oral two times a day    docusate sodium 100 milliGRAM(s) Oral daily  senna 1 Tablet(s) Oral daily    atorvastatin 40 milliGRAM(s) Oral at bedtime    aspirin enteric coated 81 milliGRAM(s) Oral daily  rivaroxaban 20 milliGRAM(s) Oral every 24 hours      Complaint:     Otherwise 12 point review of systems is normal.    PHYSICAL EXAM:    Constitutional: NAD  Eyes: PERRL, EOMI, sclera non-icteric  Neck: supple, no masses, no JVD  Respiratory: CTA b/l, good air entry b/l, no wheezing, rhonchi, rales, with normal respiratory effort and no intercostal retractions  Cardiovascular: RRR, normal S1S2, no M/R/G  Gastrointestinal: soft, NTND, no masses palpable, BS normal in all four quadrants,   Extremities:  no c/c/e  Neurological: AAOx3      ICU Vital Signs Last 24 Hrs  T(C): 36.5 (10 Mar 2019 13:35), Max: 36.8 (10 Mar 2019 05:26)  T(F): 97.7 (10 Mar 2019 13:35), Max: 98.3 (10 Mar 2019 05:26)  HR: 74 (10 Mar 2019 13:35) (74 - 83)  BP: 131/84 (10 Mar 2019 13:35) (125/84 - 133/86)  BP(mean): --  ABP: --  ABP(mean): --  RR: 17 (10 Mar 2019 13:35) (17 - 20)  SpO2: 94% (10 Mar 2019 13:35) (94% - 96%)        ECG:      CHEST X RAY    CT  < from: CT Abdomen and Pelvis w/wo IV Cont (03.08.19 @ 21:47) >  IMPRESSION:  No hydronephrosis ornephrolithiasis.    4 mm somewhat linear filling defect in the left renal pelvis at the UPJ.   Consider ureteroscopy to exclude a small urothelial neoplasm.    Assessment of the urinary bladder is limited because it was collapsed   with a Resendiz catheter and air. Urinary bladder wall thickening versus   underdistention.    Distended stool-filled rectum suspicious for fecal rectal impaction.      < end of copied text >    MRI    MRA    CT ANGIO    CAROTID DUPLEX    DUPLEX     Echocardiogram    Catheterization:    Stress Test:     LABS:	 	  CARDIAC MARKERS:                              14.5   28.27 )-----------( 583      ( 10 Mar 2019 07:03 )             47.4     03-10    139  |  101  |  21  ----------------------------<  107<H>  4.2   |  24  |  0.94    Ca    10.0      10 Mar 2019 07:03  Mg     2.3     03-10    TSH: Thyroid Stimulating Hormone, Serum: 1.602 uIU/mL (03-08 @ 06:20)        Assessment and Plan:   · Assessment		  78F with PMHx of HTN, myeloproliferative disorder, DVT/PE (2015), chronic UTIs and hydronephrosis who presents to ED for c/o generalized weakness x4-5 days progressively worsening over the past few days likely 2/2 malnutrition, electrolyte abnormalities, and UTI.      Problem/Plan - 1:  ·  Problem: Sepsis.  Plan: POA tachy 104 and leukocytosis 25 (pt baseline WBC 15-17 in setting of MPD) with positive UA. s/p 1L IVF in ED. s/p CTX 1g. lactate 1.1.  -c/w CTX 1g qd (prev. had UTI E. coli pan-sensitive)  -f/u urine cx  -f/u bl cx  -tylenol PRN  -IVF maintenance 75 cc/hr.     Problem/Plan - 2:  ·  Problem: UTI (urinary tract infection).  Plan: POA UA positive in setting of progressive weakness. Pt with h/o E. coli UTI and pyelo on prev. adm.  -c/w plan as per above  -CTX 1g qd  -f/u urine cx.     Problem/Plan - 3:  ·  Problem: Generalized weakness.  Plan: Multifactorial 2/2 current UTI, electrolyte abnormalities, and progressive poor PO intake 2/2 poor appetite. POA +UA with h/o frequent UTIs. Though pt denying urinary symptoms, elderly patients may manifest UTI as weakness. Hypokalemia to 3.1 on adm. Pt reporting poor PO intake x1 mo. On exam, thin, malnourished, hypovolemic. 5/5 muscle strength in all extremities with intact sensation.  -PT eval  -nutrition consult  -CTX 1g qd for UTI tx  -replete lytes PRN; s/p K repletion in ED  -IVF @ 75 cc/hr for now as pt with poor PO intake  -check TSH, B12, folate.     Problem/Plan - 4:  ·  Problem: Myeloproliferative disorder.  Plan: Follows w/ Dr. Resendiz. Previously on Jakafi but no longer 2/2 insurance/cost issues. Pt reporting Dr. Astrid boyd with pt not taking this medication. B/l WBC 15-17. Prior labs showing b/l Hb 10. No thrombocytopenia.  -will treat UTI prior to MDS treatment   -tx sepsis as above   -on a/c for DVT/PE tx indefinitely in setting of MPD.     Problem/Plan - 5:  ·  Problem: Hypokalemia.  Plan: POA K 3.1. s/p repletion in ED. Likely contributing to generalized weakness.  - AM 2.9 replete aggressively   - trend BMP  - replete PRN.     Problem/Plan - 6:  Problem: PE (pulmonary thromboembolism). Plan: H/o DVT/PE 2015. Now on Xarelto 20 mg qd indefinitely in setting of MPD.  -Xarelto 20 qd.    Problem/Plan - 7:  ·  Problem: Cachexia.  Plan: Pt with BMI 18. Thin and malnourished on exam. Pt reporting poor PO intake x1 month.   -nutrition consulted   -encourage PO intake.     Problem/Plan - 8:  ·  Problem: Nutrition, metabolism, and development symptoms.  Plan: F: IVF 75cc/hr  E: replete K>4, Mg>2  N: Regular.     Problem/Plan - 9:  ·  Problem: Need for prophylactic measure.  Plan: On xarelto for a/c    Code: FULL  Dispo: [facility].

## 2019-03-11 ENCOUNTER — TRANSCRIPTION ENCOUNTER (OUTPATIENT)
Age: 79
End: 2019-03-11

## 2019-03-11 LAB
ANION GAP SERPL CALC-SCNC: 11 MMOL/L — SIGNIFICANT CHANGE UP (ref 5–17)
BUN SERPL-MCNC: 25 MG/DL — HIGH (ref 7–23)
CALCIUM SERPL-MCNC: 9.6 MG/DL — SIGNIFICANT CHANGE UP (ref 8.4–10.5)
CHLORIDE SERPL-SCNC: 101 MMOL/L — SIGNIFICANT CHANGE UP (ref 96–108)
CO2 SERPL-SCNC: 24 MMOL/L — SIGNIFICANT CHANGE UP (ref 22–31)
CREAT SERPL-MCNC: 0.74 MG/DL — SIGNIFICANT CHANGE UP (ref 0.5–1.3)
GLUCOSE SERPL-MCNC: 114 MG/DL — HIGH (ref 70–99)
HCT VFR BLD CALC: 47.1 % — HIGH (ref 34.5–45)
HGB BLD-MCNC: 14.2 G/DL — SIGNIFICANT CHANGE UP (ref 11.5–15.5)
MAGNESIUM SERPL-MCNC: 2.3 MG/DL — SIGNIFICANT CHANGE UP (ref 1.6–2.6)
MCHC RBC-ENTMCNC: 26.2 PG — LOW (ref 27–34)
MCHC RBC-ENTMCNC: 30.1 GM/DL — LOW (ref 32–36)
MCV RBC AUTO: 87.1 FL — SIGNIFICANT CHANGE UP (ref 80–100)
NRBC # BLD: 0 /100 WBCS — SIGNIFICANT CHANGE UP (ref 0–0)
PLATELET # BLD AUTO: 516 K/UL — HIGH (ref 150–400)
POTASSIUM SERPL-MCNC: 4.1 MMOL/L — SIGNIFICANT CHANGE UP (ref 3.5–5.3)
POTASSIUM SERPL-SCNC: 4.1 MMOL/L — SIGNIFICANT CHANGE UP (ref 3.5–5.3)
RBC # BLD: 5.41 M/UL — HIGH (ref 3.8–5.2)
RBC # FLD: 16.9 % — HIGH (ref 10.3–14.5)
SODIUM SERPL-SCNC: 136 MMOL/L — SIGNIFICANT CHANGE UP (ref 135–145)
WBC # BLD: 26.83 K/UL — HIGH (ref 3.8–10.5)
WBC # FLD AUTO: 26.83 K/UL — HIGH (ref 3.8–10.5)

## 2019-03-11 RX ADMIN — BUPROPION HYDROCHLORIDE 37.5 MILLIGRAM(S): 150 TABLET, EXTENDED RELEASE ORAL at 18:30

## 2019-03-11 RX ADMIN — Medication 81 MILLIGRAM(S): at 13:27

## 2019-03-11 RX ADMIN — AMLODIPINE BESYLATE 5 MILLIGRAM(S): 2.5 TABLET ORAL at 05:58

## 2019-03-11 RX ADMIN — CEFTRIAXONE 100 GRAM(S): 500 INJECTION, POWDER, FOR SOLUTION INTRAMUSCULAR; INTRAVENOUS at 09:58

## 2019-03-11 RX ADMIN — ATORVASTATIN CALCIUM 40 MILLIGRAM(S): 80 TABLET, FILM COATED ORAL at 21:21

## 2019-03-11 RX ADMIN — RIVAROXABAN 20 MILLIGRAM(S): KIT at 21:21

## 2019-03-11 RX ADMIN — BUPROPION HYDROCHLORIDE 37.5 MILLIGRAM(S): 150 TABLET, EXTENDED RELEASE ORAL at 06:03

## 2019-03-11 NOTE — DISCHARGE NOTE PROVIDER - HOSPITAL COURSE
Hospital Course: 78F with PMHx of HTN, myeloproliferative disorder, DVT/PE (2015), chronic UTIs and hydronephrosis who presents to ED for c/o generalized weakness x4-5 days progressively worsening over the past few days likely 2/2 malnutrition, electrolyte abnormalities, and UTI. She was started on levaquin and switched to ceftriaxone. She was retaining urine and so urology was consulted for hutson placement. Given her history of hydro, a CT urogram was performed which showed a filling defect in the left ureter concerning for neoplasm with no hydro. Her hematologist noted no current inpatient treatment for her MDS in the setting of UTI. The urology service recommended discharging with a hutson and having her follow up as outpatient. She received 4 day course of ceftriaxone. Patient was medically ready for discharge to [facility].

## 2019-03-11 NOTE — PROGRESS NOTE ADULT - PROBLEM SELECTOR PLAN 3
Multifactorial 2/2 current UTI, electrolyte abnormalities, and progressive poor PO intake 2/2 poor appetite. POA +UA with h/o frequent UTIs. Though pt denying urinary symptoms, elderly patients may manifest UTI as weakness. Hypokalemia to 3.1 on adm. Pt reporting poor PO intake x1 mo. On exam, thin, malnourished, hypovolemic. 5/5 muscle strength in all extremities with intact sensation.  -PT eval  -nutrition consult  -CTX 1g qd for UTI tx  -replete lytes PRN;  -TSH, B12, folate wnl
Multifactorial 2/2 current UTI, electrolyte abnormalities, and progressive poor PO intake 2/2 poor appetite. POA +UA with h/o frequent UTIs. Though pt denying urinary symptoms, elderly patients may manifest UTI as weakness. Hypokalemia to 3.1 on adm. Pt reporting poor PO intake x1 mo. On exam, thin, malnourished, hypovolemic. 5/5 muscle strength in all extremities with intact sensation.  -PT eval  -nutrition consult  -CTX 1g qd for UTI tx  -replete lytes PRN; s/p K repletion in ED  -IVF @ 75 cc/hr for now as pt with poor PO intake  -check TSH, B12, folate
Multifactorial 2/2 current UTI, electrolyte abnormalities, and progressive poor PO intake 2/2 poor appetite. POA +UA with h/o frequent UTIs. Though pt denying urinary symptoms, elderly patients may manifest UTI as weakness. Hypokalemia to 3.1 on adm. Pt reporting poor PO intake x1 mo. On exam, thin, malnourished, hypovolemic. 5/5 muscle strength in all extremities with intact sensation.  -PT eval  -nutrition consult  -CTX 1g qd for UTI tx  -replete lytes PRN; s/p K repletion in ED  -IVF @ 75 cc/hr for now as pt with poor PO intake  -check TSH, B12, folate

## 2019-03-11 NOTE — PROGRESS NOTE ADULT - PROBLEM SELECTOR PLAN 5
POA K 3.1. s/p repletion in ED. Likely contributing to generalized weakness.  - AM 2.9 replete aggressively   - trend BMP  - replete PRN
POA K 3.1. s/p repletion in ED. Likely contributing to generalized weakness.  - resolved
POA K 3.1. s/p repletion in ED. Likely contributing to generalized weakness.  - AM 2.9 replete aggressively   - trend BMP  - replete PRN

## 2019-03-11 NOTE — DISCHARGE NOTE NURSING/CASE MANAGEMENT/SOCIAL WORK - NSDCDPATPORTLINK_GEN_ALL_CORE
You can access the [facility] Portal, offered by [facility], by registering with the following website: [email or web address]

## 2019-03-11 NOTE — PROGRESS NOTE ADULT - PROBLEM SELECTOR PLAN 9
On xarelto for a/c    Code: FULL  Dispo: [id number]

## 2019-03-11 NOTE — PROGRESS NOTE ADULT - SUBJECTIVE AND OBJECTIVE BOX
WBC 26.8, essentially unchanged, Hgb 14.2 and plts 516, trending down.    Patient offers no new complaints.     Await urology input.

## 2019-03-11 NOTE — PROGRESS NOTE ADULT - PROBLEM SELECTOR PLAN 7
Pt with BMI 18. Thin and malnourished on exam. Pt reporting poor PO intake x1 month.   -nutrition consulted   -encourage PO intake

## 2019-03-11 NOTE — DISCHARGE NOTE PROVIDER - CARE PROVIDER_API CALL
Wai Tapia)  Medicine  [address] [place] [address]  Phone: [phone]  Fax: [phone]  Follow Up Time:     Nilesh Pollack)  Urology  [address] [place] [address]  Phone: [phone]  Fax: [phone]  Follow Up Time:

## 2019-03-11 NOTE — PROGRESS NOTE ADULT - SUBJECTIVE AND OBJECTIVE BOX
Physical Medicine and Rehabilitation Progress Note:    Patient is a 78y old  Female who presents with a chief complaint of weakness, UTI (11 Mar 2019 14:32)      HPI:  78F with PMHx of HTN, myeloproliferative disorder, DVT/PE (2015), chronic UTIs and hydronephrosis who presents to ED for c/o generalized weakness x4-5 days progressively worsening over the past few days. Pt reports she usually ambulates around her apt with a walker. Over the past 5 days, she noticed she has become increasingly weak where she is able to stand up but not able to walk with her walker as usual. She reports weakness all over but more notable in her b/l LE. Denies bowel/bladder incontinence, paresthesias, back pain, loss of sensation in the LE. She notes that she has had decreased appetite over the past 1 month as well with a significant weight loss since 2/2 poor PO intake. Pt lives home alone and notes being independent. She follows with Dr. Resendiz outpatient about once a month for her MPD and reports compliance with medications. Pt denies any other associated symptoms including fevers/chills, HA, chest pain, SOB, cough, sputum, abdominal pain, n/v/d/c, dysuria, hematuria, urinary retention or frequency.     Of note, pt is not a great historian, often repeating herself and unable to recall her home medications. Does remember that she is no longer on Jakafi for MPD and is currently on Xarelto for h/o DVT/PE.    ED VS:  t 97/ / /88/ RR 16/ 94% RA    ED course:  s/p CTX, potassium repletion, 1L NS bolus (07 Mar 2019 18:48)                            14.2   26.83 )-----------( 516      ( 11 Mar 2019 06:16 )             47.1       03-11    136  |  101  |  25<H>  ----------------------------<  114<H>  4.1   |  24  |  0.74    Ca    9.6      11 Mar 2019 06:16  Mg     2.3     03-11      Vital Signs Last 24 Hrs  T(C): 36.3 (11 Mar 2019 05:50), Max: 36.5 (10 Mar 2019 20:27)  T(F): 97.3 (11 Mar 2019 05:50), Max: 97.7 (10 Mar 2019 20:27)  HR: 86 (11 Mar 2019 11:30) (73 - 86)  BP: 129/89 (11 Mar 2019 11:30) (129/89 - 143/90)  BP(mean): --  RR: 17 (11 Mar 2019 05:50) (16 - 17)  SpO2: 94% (11 Mar 2019 11:30) (93% - 95%)    MEDICATIONS  (STANDING):  amLODIPine   Tablet 5 milliGRAM(s) Oral daily  aspirin enteric coated 81 milliGRAM(s) Oral daily  atorvastatin 40 milliGRAM(s) Oral at bedtime  buPROPion . 37.5 milliGRAM(s) Oral two times a day  docusate sodium 100 milliGRAM(s) Oral daily  rivaroxaban 20 milliGRAM(s) Oral every 24 hours  senna 1 Tablet(s) Oral daily    MEDICATIONS  (PRN):  acetaminophen   Tablet .. 650 milliGRAM(s) Oral every 6 hours PRN Temp greater or equal to 38C (100.4F), Moderate Pain (4 - 6)    Currently Undergoing Physical Therapy at bedside.    Functional Status Assessment:    Pain Assessment/Number Scale (0-10) Adult  Presence of Pain: complains of pain/discomfort  Body Location: Right:   hand   wrist  Pain Rating (0-10): Rest: 2   Pain Rating (0-10): Activity: 2   Pain Precipitating/Aggravating Factors: activity;  anxiety;  emotional stress;  exertion, physical;  movement;  positioning  Pain Alleviating Factors: repositioning;  cold;  distraction;  exercises;  rest    Therapeutic Interventions      Bed Mobility  Bed Mobility Training Rehab Potential: good, to achieve stated therapy goals  Bed Mobility Training Symptoms Noted During/After Treatment: none  Bed Mobility Training Rolling/Turning: minimum assist (75% patient effort);  1 person assist  Bed Mobility Training Scooting: minimum assist (75% patient effort);  1 person assist  Bed Mobility Training Supine-to-Sit: moderate assist (50% patient effort);  1 person assist;  verbal cues;  bed rails  Bed Mobility Training Limitations: decreased strength;  impaired balance;  pain;  decreased ability to use legs for bridging/pushing;  decreased ability to use arms for pushing/pulling    Sit-Stand Transfer Training  Sit-to-Stand Transfer Training Rehab Potential: good, to achieve stated therapy goals  Sit-to-Stand Transfer Training Symptoms Noted During/After Treatment: increased pain  Transfer Training Sit-to-Stand Transfer: 1 person assist;  full weight-bearing   minimum assist (75% patient effort);  rolling walker  Transfer Training Stand-to-Sit Transfer: minimum assist (75% patient effort);  1 person assist;  verbal cues;  full weight-bearing   rolling walker  Sit-to-Stand Transfer Training Transfer Safety Analysis: decreased balance;  decreased step length;  decreased sequencing ability;  pain;  decreased strength;  impaired balance;  (+) tremors    Gait Training  Gait Training Rehab Potential: good, to achieve stated therapy goals  Gait Training Symptoms Noted During/After Treatment: fatigue;  increased pain  Gait Training: stand-by assist;  minimum assist (75% patient effort);  1 person assist;  verbal cues;  full weight-bearing   rolling walker;  bed to chair;  5 steps  Gait Analysis: 3-point gait   decreased enoc;  decreased step length;  decreased stride length;  shuffling;  (+) apprehensive;  decreased strength;  impaired balance;  pain;  rolling walker    Therapeutic Exercise  Therapeutic Exercise Detail: Supine: heel slides, SLR, ankle pumps x 8 bilateral, seated hip flexion, LAQ x 10 bilateral, sit-stands x 5             PM&R Impression: as above    Disposition Plan Recommendations: subacute rehab placement

## 2019-03-11 NOTE — DISCHARGE NOTE NURSING/CASE MANAGEMENT/SOCIAL WORK - NSDCCRNAME_GEN_ALL_CORE_FT
Discharge to [facility]  [address]  [facility] [place], [id number] [phone]  [facility] set for   Trip# Discharge to [facility]  [address]  [place], [id number] [phone]  [facility] set for 2:00 pm Trip# [id number]

## 2019-03-11 NOTE — DISCHARGE NOTE PROVIDER - NSDCFUADDAPPT_GEN_ALL_CORE_FT
Please schedule an appointment with Dr. Tapia as your primary care physician once discharged from [facility].

## 2019-03-11 NOTE — PROGRESS NOTE ADULT - PROBLEM SELECTOR PLAN 4
Follows w/ Dr. Resendiz. Previously on Jakafi but no longer 2/2 insurance/cost issues. Pt reporting Dr. Resendiz okay with pt not taking this medication. B/l WBC 15-17. Prior labs showing b/l Hb 10. No thrombocytopenia.  -will treat UTI prior to MDS treatment   -tx sepsis as above   -on a/c for DVT/PE tx indefinitely in setting of MPD

## 2019-03-11 NOTE — DISCHARGE NOTE PROVIDER - NSDCCPCAREPLAN_GEN_ALL_CORE_FT
PRINCIPAL DISCHARGE DIAGNOSIS  Problem: Urinary tract infection without hematuria, site unspecified  Assessment and Plan of Treatment: You were admitted with weakness and found to have a urinary tract infection. You were started on IV antibiotics and your symptoms improved.      SECONDARY DISCHARGE DIAGNOSES  Problem: Urinary retention  Assessment and Plan of Treatment: You were found to be retaining urine and a hutson cather was placed. You were seen by the urology team who recommended a CT imaging scan which showed a small defect in the ureter. You are being discharged with the hutson in place and should follow up with the urologist outpatient for further managment. PRINCIPAL DISCHARGE DIAGNOSIS  Problem: Urinary tract infection without hematuria, site unspecified  Assessment and Plan of Treatment: You were admitted with weakness and found to have a urinary tract infection. You were started on IV antibiotics and your symptoms improved. You do not need further antibiotics.      SECONDARY DISCHARGE DIAGNOSES  Problem: Urinary retention  Assessment and Plan of Treatment: You were found to be retaining urine and a hutson cather was placed. You were seen by the urology team who recommended a CT imaging scan which showed a small defect in the ureter. You are being discharged with the hutson in place and should follow up with the urologist outpatient for further managment.    Problem: At high risk for malnutrition  Assessment and Plan of Treatment: You were found to be very weak and fatigued. This is in part due to your decreased appetitite and poor oral intake. While hospitalized, you were seen by our dieticians who recommended increased intake and supplements. Please continue to eat and make sure to be seen by a dietician at the rehab facility.

## 2019-03-11 NOTE — PROGRESS NOTE ADULT - SUBJECTIVE AND OBJECTIVE BOX
SUBJECTIVE: Pt seen and examined at bedside. No overnight events.  Pain controlled. No f/c/n/v.     Vital Signs Last 24 Hrs  T(C): 36.3 (11 Mar 2019 05:50), Max: 36.5 (10 Mar 2019 20:27)  T(F): 97.3 (11 Mar 2019 05:50), Max: 97.7 (10 Mar 2019 20:27)  HR: 86 (11 Mar 2019 11:30) (73 - 86)  BP: 129/89 (11 Mar 2019 11:30) (129/89 - 143/90)  BP(mean): --  RR: 17 (11 Mar 2019 05:50) (16 - 17)  SpO2: 94% (11 Mar 2019 11:30) (93% - 95%)    PHYSICAL EXAM    Gen: NAD  Abd: Soft, NT/ND    I&O's Detail    11 Mar 2019 07:01  -  11 Mar 2019 14:32  --------------------------------------------------------  IN:  Total IN: 0 mL    OUT:    Indwelling Catheter - Urethral: 550 mL  Total OUT: 550 mL    Total NET: -550 mL          LABS:                        14.2   26.83 )-----------( 516      ( 11 Mar 2019 06:16 )             47.1     03-11    136  |  101  |  25<H>  ----------------------------<  114<H>  4.1   |  24  |  0.74    Ca    9.6      11 Mar 2019 06:16  Mg     2.3     03-11            MEDICATIONS  (STANDING):  amLODIPine   Tablet 5 milliGRAM(s) Oral daily  aspirin enteric coated 81 milliGRAM(s) Oral daily  atorvastatin 40 milliGRAM(s) Oral at bedtime  buPROPion . 37.5 milliGRAM(s) Oral two times a day  docusate sodium 100 milliGRAM(s) Oral daily  rivaroxaban 20 milliGRAM(s) Oral every 24 hours  senna 1 Tablet(s) Oral daily    MEDICATIONS  (PRN):  acetaminophen   Tablet .. 650 milliGRAM(s) Oral every 6 hours PRN Temp greater or equal to 38C (100.4F), Moderate Pain (4 - 6)      RADIOLOGY & ADDITIONAL STUDIES:    ASSESSMENT AND PLAN

## 2019-03-11 NOTE — PROGRESS NOTE ADULT - PROBLEM SELECTOR PLAN 1
POA tachy 104 and leukocytosis 25 (pt baseline WBC 15-17 in setting of MPD) with positive UA. s/p 1L IVF in ED. s/p CTX 1g. lactate 1.1.  -c/w CTX 1g qd (prev. had UTI E. coli pan-sensitive)  -f/u urine cx  -f/u bl cx  -tylenol PRN  -IVF maintenance 75 cc/hr
POA tachy 104 and leukocytosis 25 (pt baseline WBC 15-17 in setting of MPD) with positive UA. s/p 1L IVF in ED. s/p CTX 1g. lactate 1.1.  -resolved   -c/w CTX 1g qd (prev. had UTI E. coli pan-sensitive)  -bl cx ngtd   -tylenol PRN
POA tachy 104 and leukocytosis 25 (pt baseline WBC 15-17 in setting of MPD) with positive UA. s/p 1L IVF in ED. s/p CTX 1g. lactate 1.1.  -c/w CTX 1g qd (prev. had UTI E. coli pan-sensitive)  -f/u urine cx  -f/u bl cx  -tylenol PRN  -IVF maintenance 75 cc/hr

## 2019-03-11 NOTE — PROGRESS NOTE ADULT - PROBLEM SELECTOR PLAN 2
POA UA positive in setting of progressive weakness. Pt with h/o E. coli UTI and pyelo on prev. adm.  -c/w plan as per above  -CTX 1g qd  - Urology consulted for urinary retention and hutson placed   - ct abd pelvis w/ IV contrast showed filling defect in the left renal pelvis at the UPJ and fecal impaction  - f/u urology recs
POA UA positive in setting of progressive weakness. Pt with h/o E. coli UTI and pyelo on prev. adm.  -c/w plan as per above  -CTX 1g qd  -f/u urine cx
POA UA positive in setting of progressive weakness. Pt with h/o E. coli UTI and pyelo on prev. adm.  -c/w plan as per above  -CTX 1g qd  -f/u urine cx

## 2019-03-11 NOTE — PROGRESS NOTE ADULT - PROBLEM SELECTOR PLAN 6
H/o DVT/PE 2015. Now on Xarelto 20 mg qd indefinitely in setting of MPD.  -Xarelto 20 qd

## 2019-03-11 NOTE — PROGRESS NOTE ADULT - SUBJECTIVE AND OBJECTIVE BOX
OVERNIGHT EVENTS: KAREN    SUBJECTIVE / INTERVAL HPI: Patient seen and examined at bedside.   Patient laying comfortably in bed, awake and alert with no active complaints overnight.   Patient denies h/n/v/d, fever, chills, cp, palpitations, sob, leg swelling, rashes, and changes in BM. Resendiz is in.     VITAL SIGNS:  Vital Signs Last 24 Hrs  T(C): 36.3 (11 Mar 2019 05:50), Max: 36.5 (10 Mar 2019 13:35)  T(F): 97.3 (11 Mar 2019 05:50), Max: 97.7 (10 Mar 2019 13:35)  HR: 73 (11 Mar 2019 05:50) (73 - 81)  BP: 143/90 (11 Mar 2019 05:50) (131/73 - 143/90)  BP(mean): --  RR: 17 (11 Mar 2019 05:50) (16 - 17)  SpO2: 93% (11 Mar 2019 05:50) (93% - 95%)    PHYSICAL EXAM:    General: WDWN  HEENT: NCAT; PERRL, anicteric sclera; MMM  Neck: supple, trachea midline  Cardiovascular: S1, S2 normal; RRR, no M/G/R  Respiratory: CTABL; no W/R/R  Gastrointestinal: soft, mildly tender in lower abd, nondistended. bowel sounds present.  Skin: no ulcerations or visible rashes appreciated  Extremities: WWP; no edema, clubbing or cyanosis  Vascular: 2+ radial, DP/PT pulses B/L  Neurological: AAOx3; CN II-XII grossly intact; no focal deficits    MEDICATIONS:  MEDICATIONS  (STANDING):  amLODIPine   Tablet 5 milliGRAM(s) Oral daily  aspirin enteric coated 81 milliGRAM(s) Oral daily  atorvastatin 40 milliGRAM(s) Oral at bedtime  buPROPion . 37.5 milliGRAM(s) Oral two times a day  cefTRIAXone   IVPB 1 Gram(s) IV Intermittent every 24 hours  docusate sodium 100 milliGRAM(s) Oral daily  rivaroxaban 20 milliGRAM(s) Oral every 24 hours  senna 1 Tablet(s) Oral daily    MEDICATIONS  (PRN):  acetaminophen   Tablet .. 650 milliGRAM(s) Oral every 6 hours PRN Temp greater or equal to 38C (100.4F), Moderate Pain (4 - 6)      ALLERGIES:  Allergies    No Known Allergies    Intolerances        LABS:                        14.2   26.83 )-----------( 516      ( 11 Mar 2019 06:16 )             47.1     03-11    136  |  101  |  25<H>  ----------------------------<  114<H>  4.1   |  24  |  0.74    Ca    9.6      11 Mar 2019 06:16  Mg     2.3     03-11          CAPILLARY BLOOD GLUCOSE          RADIOLOGY & ADDITIONAL TESTS: Reviewed.

## 2019-03-11 NOTE — PROGRESS NOTE ADULT - SUBJECTIVE AND OBJECTIVE BOX
78F with PMHx of HTN, myeloproliferative disorder, DVT/PE (2015), chronic UTIs and hydronephrosis who presents to ED for c/o generalized weakness x4-5 days progressively worsening over the past few days. Pt reports she usually ambulates around her apt with a walker. Over the past 5 days, she noticed she has become increasingly weak where she is able to stand up but not able to walk with her walker as usual. She reports weakness all over but more notable in her b/l LE. Denies bowel/bladder incontinence, paresthesias, back pain, loss of sensation in the LE. She notes that she has had decreased appetite over the past 1 month as well with a significant weight loss since 2/2 poor PO intake. Pt lives home alone and notes being independent. She follows with Dr. Resendiz outpatient about once a month for her MPD and reports compliance with medications. Pt denies any other associated symptoms including fevers/chills, HA, chest pain, SOB, cough, sputum, abdominal pain, n/v/d/c, dysuria, hematuria, urinary retention or frequency.     Of note, pt is not a great historian, often repeating herself and unable to recall her home medications. Does remember that she is no longer on Jakafi for MPD and is currently on Xarelto for h/o DVT/PE.    ED VS:  t 97/ / /88/ RR 16/ 94% RA    ED course:  s/p CTX, potassium repletion, 1L NS bolus             Patient seen and examined at bedside.   Patient laying comfortably in bed, awake and alert with no active complaints overnight.   Patient denies h/n/v/d, fever, chills, cp, palpitations, sob, leg swelling, rashes, and changes in BM. Martín is in.     ICU Vital Signs Last 24 Hrs  T(C): 36.6 (11 Mar 2019 14:39), Max: 36.6 (11 Mar 2019 14:39)  T(F): 97.8 (11 Mar 2019 14:39), Max: 97.8 (11 Mar 2019 14:39)  HR: 81 (11 Mar 2019 14:39) (73 - 86)  BP: 130/85 (11 Mar 2019 14:39) (129/89 - 143/90)  BP(mean): --  ABP: --  ABP(mean): --  RR: 18 (11 Mar 2019 14:39) (16 - 18)  SpO2: 99% (11 Mar 2019 14:39) (93% - 99%)    PHYSICAL EXAM:    General: NAD  HEENT: NCAT; PERRL, anicteric sclera; MMM  Neck: supple, trachea midline  Cardiovascular: S1, S2 normal; RRR, no M/G/R  Respiratory: CTABL; no W/R/R  Gastrointestinal: soft, mildly tender in lower abd, nondistended. bowel sounds present.  Skin: no ulcerations or visible rashes appreciated  Extremities: WWP; no edema, clubbing or cyanosis  Vascular: 2+ radial, DP/PT pulses B/L  Neurological: AAOx3    MEDICATIONS:  MEDICATIONS  (STANDING):  amLODIPine   Tablet 5 milliGRAM(s) Oral daily  aspirin enteric coated 81 milliGRAM(s) Oral daily  atorvastatin 40 milliGRAM(s) Oral at bedtime  buPROPion . 37.5 milliGRAM(s) Oral two times a day  cefTRIAXone   IVPB 1 Gram(s) IV Intermittent every 24 hours  docusate sodium 100 milliGRAM(s) Oral daily  rivaroxaban 20 milliGRAM(s) Oral every 24 hours  senna 1 Tablet(s) Oral daily    MEDICATIONS  (PRN):  acetaminophen   Tablet .. 650 milliGRAM(s) Oral every 6 hours PRN Temp greater or equal to 38C (100.4F), Moderate Pain (4 - 6)      ALLERGIES:  Allergies    No Known Allergies    Intolerances        LABS:                        14.2   26.83 )-----------( 516      ( 11 Mar 2019 06:16 )             47.1     03-11    136  |  101  |  25<H>  ----------------------------<  114<H>  4.1   |  24  |  0.74    Ca    9.6      11 Mar 2019 06:16  Mg     2.3     03-11        Assessment and Plan:   · Assessment		  78F with PMHx of HTN, myeloproliferative disorder, DVT/PE (2015), chronic UTIs and hydronephrosis who presents to ED for c/o generalized weakness x4-5 days progressively worsening over the past few days likely 2/2 malnutrition, electrolyte abnormalities, and UTI.    Problem/Plan - 1:  ·  Problem: Sepsis.  Plan: POA tachy 104 and leukocytosis 25 (pt baseline WBC 15-17 in setting of MPD) with positive UA. s/p 1L IVF in ED. s/p CTX 1g. lactate 1.1.  -resolved   -c/w CTX 1g qd (prev. had UTI E. coli pan-sensitive)  -bl cx ngtd   -tylenol PRN.     Problem/Plan - 2:  ·  Problem: UTI (urinary tract infection).  Plan: POA UA positive in setting of progressive weakness. Pt with h/o E. coli UTI and pyelo on prev. adm.  -c/w plan as per above  -CTX 1g qd  - Urology consulted for urinary retention and hutson placed   - ct abd pelvis w/ IV contrast showed filling defect in the left renal pelvis at the UPJ and fecal impaction  - f/u urology recs.     Problem/Plan - 3:  ·  Problem: Generalized weakness.  Plan: Multifactorial 2/2 current UTI, electrolyte abnormalities, and progressive poor PO intake 2/2 poor appetite. POA +UA with h/o frequent UTIs. Though pt denying urinary symptoms, elderly patients may manifest UTI as weakness. Hypokalemia to 3.1 on adm. Pt reporting poor PO intake x1 mo. On exam, thin, malnourished, hypovolemic. 5/5 muscle strength in all extremities with intact sensation.  -PT eval  -nutrition consult  -CTX 1g qd for UTI tx  -replete lytes PRN;  -TSH, B12, folate wnl.     Problem/Plan - 4:  ·  Problem: Myeloproliferative disorder.  Plan: Follows w/ Dr. Resendiz. Previously on Jakafi but no longer 2/2 insurance/cost issues. Pt reporting Dr. Resendiz okay with pt not taking this medication. B/l WBC 15-17. Prior labs showing b/l Hb 10. No thrombocytopenia.  -will treat UTI prior to MDS treatment   -tx sepsis as above   -on a/c for DVT/PE tx indefinitely in setting of MPD.     Problem/Plan - 5:  ·  Problem: Hypokalemia.  Plan: POA K 3.1. s/p repletion in ED. Likely contributing to generalized weakness.  - resolved.     Problem/Plan - 6:  Problem: PE (pulmonary thromboembolism). Plan: H/o DVT/PE 2015. Now on Xarelto 20 mg qd indefinitely in setting of MPD.  -Xarelto 20 qd.    Problem/Plan - 7:  ·  Problem: Cachexia.  Plan: Pt with BMI 18. Thin and malnourished on exam. Pt reporting poor PO intake x1 month.   -nutrition consulted   -encourage PO intake.     Problem/Plan - 8:  ·  Problem: Nutrition, metabolism, and development symptoms.  Plan: F: none  E: replete K>4, Mg>2  N: Regular.     Problem/Plan - 9:  ·  Problem: Need for prophylactic measure.  Plan: On xarelto for a/c    Code: FULL  Dispo: [facility].

## 2019-03-11 NOTE — PROGRESS NOTE ADULT - PROBLEM SELECTOR PLAN 8
F: IVF 75cc/hr  E: replete K>4, Mg>2  N: Regular
F: none  E: replete K>4, Mg>2  N: Regular
F: IVF 75cc/hr  E: replete K>4, Mg>2  N: Regular

## 2019-03-12 VITALS
HEART RATE: 82 BPM | SYSTOLIC BLOOD PRESSURE: 129 MMHG | RESPIRATION RATE: 17 BRPM | DIASTOLIC BLOOD PRESSURE: 86 MMHG | TEMPERATURE: 98 F | OXYGEN SATURATION: 93 %

## 2019-03-12 LAB
CK SERPL-CCNC: 25 U/L — SIGNIFICANT CHANGE UP (ref 25–170)
CULTURE RESULTS: SIGNIFICANT CHANGE UP
CULTURE RESULTS: SIGNIFICANT CHANGE UP
ERYTHROCYTE [SEDIMENTATION RATE] IN BLOOD: 4 MM/HR — SIGNIFICANT CHANGE UP
SPECIMEN SOURCE: SIGNIFICANT CHANGE UP
SPECIMEN SOURCE: SIGNIFICANT CHANGE UP

## 2019-03-12 PROCEDURE — 86140 C-REACTIVE PROTEIN: CPT

## 2019-03-12 PROCEDURE — 87040 BLOOD CULTURE FOR BACTERIA: CPT

## 2019-03-12 PROCEDURE — 85027 COMPLETE CBC AUTOMATED: CPT

## 2019-03-12 PROCEDURE — 99285 EMERGENCY DEPT VISIT HI MDM: CPT | Mod: 25

## 2019-03-12 PROCEDURE — 85610 PROTHROMBIN TIME: CPT

## 2019-03-12 PROCEDURE — 87086 URINE CULTURE/COLONY COUNT: CPT

## 2019-03-12 PROCEDURE — 84446 ASSAY OF VITAMIN E: CPT

## 2019-03-12 PROCEDURE — 85025 COMPLETE CBC W/AUTO DIFF WBC: CPT

## 2019-03-12 PROCEDURE — 97530 THERAPEUTIC ACTIVITIES: CPT

## 2019-03-12 PROCEDURE — 84443 ASSAY THYROID STIM HORMONE: CPT

## 2019-03-12 PROCEDURE — 85652 RBC SED RATE AUTOMATED: CPT

## 2019-03-12 PROCEDURE — 85730 THROMBOPLASTIN TIME PARTIAL: CPT

## 2019-03-12 PROCEDURE — 70450 CT HEAD/BRAIN W/O DYE: CPT

## 2019-03-12 PROCEDURE — 74178 CT ABD&PLV WO CNTR FLWD CNTR: CPT

## 2019-03-12 PROCEDURE — 36415 COLL VENOUS BLD VENIPUNCTURE: CPT

## 2019-03-12 PROCEDURE — 80048 BASIC METABOLIC PNL TOTAL CA: CPT

## 2019-03-12 PROCEDURE — 71045 X-RAY EXAM CHEST 1 VIEW: CPT

## 2019-03-12 PROCEDURE — 97110 THERAPEUTIC EXERCISES: CPT

## 2019-03-12 PROCEDURE — 84425 ASSAY OF VITAMIN B-1: CPT

## 2019-03-12 PROCEDURE — 82550 ASSAY OF CK (CPK): CPT

## 2019-03-12 PROCEDURE — 82746 ASSAY OF FOLIC ACID SERUM: CPT

## 2019-03-12 PROCEDURE — 82306 VITAMIN D 25 HYDROXY: CPT

## 2019-03-12 PROCEDURE — 83605 ASSAY OF LACTIC ACID: CPT

## 2019-03-12 PROCEDURE — 93005 ELECTROCARDIOGRAM TRACING: CPT

## 2019-03-12 PROCEDURE — 82607 VITAMIN B-12: CPT

## 2019-03-12 PROCEDURE — 80053 COMPREHEN METABOLIC PANEL: CPT

## 2019-03-12 PROCEDURE — 97116 GAIT TRAINING THERAPY: CPT

## 2019-03-12 PROCEDURE — 83735 ASSAY OF MAGNESIUM: CPT

## 2019-03-12 PROCEDURE — 84100 ASSAY OF PHOSPHORUS: CPT

## 2019-03-12 PROCEDURE — 84145 PROCALCITONIN (PCT): CPT

## 2019-03-12 PROCEDURE — 81001 URINALYSIS AUTO W/SCOPE: CPT

## 2019-03-12 PROCEDURE — 74018 RADEX ABDOMEN 1 VIEW: CPT

## 2019-03-12 RX ADMIN — Medication 81 MILLIGRAM(S): at 11:34

## 2019-03-12 RX ADMIN — BUPROPION HYDROCHLORIDE 37.5 MILLIGRAM(S): 150 TABLET, EXTENDED RELEASE ORAL at 05:54

## 2019-03-12 RX ADMIN — Medication 100 MILLIGRAM(S): at 11:34

## 2019-03-12 RX ADMIN — SENNA PLUS 1 TABLET(S): 8.6 TABLET ORAL at 11:34

## 2019-03-12 RX ADMIN — AMLODIPINE BESYLATE 5 MILLIGRAM(S): 2.5 TABLET ORAL at 05:55

## 2019-03-12 NOTE — PROGRESS NOTE ADULT - REASON FOR ADMISSION
weakness, UTI
weakness, UTI, myeloproliferative syndrome.
weakness, UTI---leukocytosis
weakness, UTI----Patient with myeloproliferative disease.
weakness, UTI
weakness, UTI

## 2019-03-12 NOTE — PROGRESS NOTE ADULT - SUBJECTIVE AND OBJECTIVE BOX
Physical Medicine and Rehabilitation Progress Note:    Patient is a 78y old  Female who presents with a chief complaint of weakness, UTI (12 Mar 2019 09:27)      HPI:  78F with PMHx of HTN, myeloproliferative disorder, DVT/PE (2015), chronic UTIs and hydronephrosis who presents to ED for c/o generalized weakness x4-5 days progressively worsening over the past few days. Pt reports she usually ambulates around her apt with a walker. Over the past 5 days, she noticed she has become increasingly weak where she is able to stand up but not able to walk with her walker as usual. She reports weakness all over but more notable in her b/l LE. Denies bowel/bladder incontinence, paresthesias, back pain, loss of sensation in the LE. She notes that she has had decreased appetite over the past 1 month as well with a significant weight loss since 2/2 poor PO intake. Pt lives home alone and notes being independent. She follows with Dr. Resendiz outpatient about once a month for her MPD and reports compliance with medications. Pt denies any other associated symptoms including fevers/chills, HA, chest pain, SOB, cough, sputum, abdominal pain, n/v/d/c, dysuria, hematuria, urinary retention or frequency.     Of note, pt is not a great historian, often repeating herself and unable to recall her home medications. Does remember that she is no longer on Jakafi for MPD and is currently on Xarelto for h/o DVT/PE.    ED VS:  t 97/ / /88/ RR 16/ 94% RA    ED course:  s/p CTX, potassium repletion, 1L NS bolus (07 Mar 2019 18:48)                            14.2   26.83 )-----------( 516      ( 11 Mar 2019 06:16 )             47.1       03-11    136  |  101  |  25<H>  ----------------------------<  114<H>  4.1   |  24  |  0.74    Ca    9.6      11 Mar 2019 06:16  Mg     2.3     03-11      Vital Signs Last 24 Hrs  T(C): 36.6 (12 Mar 2019 05:32), Max: 36.7 (11 Mar 2019 20:30)  T(F): 97.8 (12 Mar 2019 05:32), Max: 98 (11 Mar 2019 20:30)  HR: 82 (12 Mar 2019 05:32) (79 - 82)  BP: 129/86 (12 Mar 2019 05:32) (129/86 - 137/87)  BP(mean): --  RR: 17 (12 Mar 2019 05:32) (17 - 18)  SpO2: 93% (12 Mar 2019 05:32) (93% - 99%)    MEDICATIONS  (STANDING):  amLODIPine   Tablet 5 milliGRAM(s) Oral daily  aspirin enteric coated 81 milliGRAM(s) Oral daily  atorvastatin 40 milliGRAM(s) Oral at bedtime  buPROPion . 37.5 milliGRAM(s) Oral two times a day  docusate sodium 100 milliGRAM(s) Oral daily  rivaroxaban 20 milliGRAM(s) Oral every 24 hours    MEDICATIONS  (PRN):  acetaminophen   Tablet .. 650 milliGRAM(s) Oral every 6 hours PRN Temp greater or equal to 38C (100.4F), Moderate Pain (4 - 6)    Currently Undergoing Physical Therapy at bedside.    Functional Status Assessment:    Therapeutic Interventions      Bed Mobility  Bed Mobility Training Symptoms Noted During/After Treatment: fear of movement  Bed Mobility Training Rolling/Turning: minimum assist (75% patient effort);  1 person assist;  verbal cues;  bed rails;  hand over hand  Bed Mobility Training Scootin person assist;  maximum assist (25% patient effort);  set-up required  Bed Mobility Training Sit-to-Supine: moderate assist (50% patient effort);  1 person assist;  verbal cues  Bed Mobility Training Supine-to-Sit: maximum assist (25% patient effort);  1 person assist;  verbal cues;  set-up required  Bed Mobility Training Limitations: decreased ability to use arms for pushing/pulling;  decreased ability to use legs for bridging/pushing;  impaired ability to control trunk for mobility;  impaired motor control;  impaired postural control;  impaired balance;  decreased strength    Sit-Stand Transfer Training  Sit-to-Stand Transfer Training Treatment not Performed: patient refused treatment    Gait Training  Gait Training Treatment not Performed: patient refused treatment    Therapeutic Exercise  Therapeutic Exercise Detail: Dangle ~6 min with supervision, performed LAQ through full range without pain x8 each, performed rolling R and L x2 with Jenna and use of bed rails           PM&R Impression: as above    Disposition Plan Recommendations: subacute rehab placement

## 2019-03-12 NOTE — CONSULT NOTE ADULT - SUBJECTIVE AND OBJECTIVE BOX
Patient is a 78y old  Female who presents with a chief complaint of weakness, UTI---leukocytosis (12 Mar 2019 07:58)        HPI:  78F with PMHx of HTN, myeloproliferative disorder, DVT/PE (2015), chronic UTIs and hydronephrosis who presents to ED for c/o generalized weakness x4-5 days progressively worsening over the past few days. Pt reports she usually ambulates around her apt with a walker. Over the past 5 days, she noticed she has become increasingly weak where she is able to stand up but not able to walk with her walker as usual. She reports weakness all over but more notable in her b/l LE. Denies bowel/bladder incontinence, paresthesias, back pain, loss of sensation in the LE. She notes that she has had decreased appetite over the past 1 month as well with a significant weight loss since 2/2 poor PO intake. Pt lives home alone and notes being independent. She follows with Dr. Resendiz outpatient about once a month for her MPD and reports compliance with medications. Pt denies any other associated symptoms including fevers/chills, HA, chest pain, SOB, cough, sputum, abdominal pain, n/v/d/c, dysuria, hematuria, urinary retention or frequency.     Of note, pt is not a great historian, often repeating herself and unable to recall her home medications. Does remember that she is no longer on Jakafi for MPD and is currently on Xarelto for h/o DVT/PE.   Generalized weakness - no new headaches or dizziness - no diplopia  ED VS:  t 97/ / /88/ RR 16/ 94% RA    ED course:  s/p CTX, potassium repletion, 1L NS bolus (07 Mar 2019 18:48)      Allergies  No Known Allergies      Health Issues  VOLUME DEPLETION  No pertinent family history in first degree relatives  Handoff  MEWS Score  Myeloproliferative disorder  Hypertension  PE (pulmonary thromboembolism)  Tremor  Vestibular disequilibrium  Myelodysplasia (myelodysplastic syndrome)  Urinary tract infection without hematuria, site unspecified  Volume depletion, unspecified  Sepsis  Transition of care performed with sharing of clinical summary  Need for prophylactic measure  Nutrition, metabolism, and development symptoms  Tremor  PE (pulmonary thromboembolism)  Cachexia  Hypokalemia  Myeloproliferative disorder  UTI (urinary tract infection)  Generalized weakness  S/P wrist surgery  S/P hysterectomy  WEAKNESS  90+  Urinary retention  Hypokalemia  Urinary tract infection without hematuria, site unspecified        FAMILY HISTORY:  No pertinent family history in first degree relatives      MEDICATIONS  (STANDING):  amLODIPine   Tablet 5 milliGRAM(s) Oral daily  aspirin enteric coated 81 milliGRAM(s) Oral daily  atorvastatin 40 milliGRAM(s) Oral at bedtime  buPROPion . 37.5 milliGRAM(s) Oral two times a day  docusate sodium 100 milliGRAM(s) Oral daily  rivaroxaban 20 milliGRAM(s) Oral every 24 hours  senna 1 Tablet(s) Oral daily    MEDICATIONS  (PRN):  acetaminophen   Tablet .. 650 milliGRAM(s) Oral every 6 hours PRN Temp greater or equal to 38C (100.4F), Moderate Pain (4 - 6)      PAST MEDICAL & SURGICAL HISTORY:  Myeloproliferative disorder  Hypertension  PE (pulmonary thromboembolism): 2015  Tremor  Vestibular disequilibrium  S/P wrist surgery  S/P hysterectomy      Labs                          14.2   26.83 )-----------( 516      ( 11 Mar 2019 06:16 )             47.1     03-11    136  |  101  |  25<H>  ----------------------------<  114<H>  4.1   |  24  |  0.74    Ca    9.6      11 Mar 2019 06:16  Mg     2.3     03-11        Radiology:    Physical Exam    MENTAL STATUS  -Level of Consciousness- awake    Orientation- person, place time  Language- aphasia/ dysarthria- nl  Memory- recent and remote -poor      Cranial Nerve 1- 12  Pupils- equal and reactive  Eye movements- full  Facial - no asymmetry   Lower CN-nl    Gait and Station- n/a    MOTOR  Upper- no drift   Lower- no foot drop    Reflexes- decreased    Sensation- no sensory level    Cerebellar- no tremors    vascular - no bruits    Assessment- Generalized weakness    Plan TSH,  B12, 25OH Vit D, CPK, MANUEL, SSA, SSB, Vit E, B1, thiamine , sed rate    Rehab

## 2019-03-12 NOTE — PROGRESS NOTE ADULT - NSHPATTENDINGPLANDISCUSS_GEN_ALL_CORE
/Deirdre/Astrid   S
Astrid Espinosa/Susi/Valentino BARKERS
Brendan/Valentino BARKERS
staff

## 2019-03-12 NOTE — PROGRESS NOTE ADULT - PROVIDER SPECIALTY LIST ADULT
Heme/Onc
Heme/Onc
Infectious Disease
Internal Medicine
Rehab Medicine
Rehab Medicine
Urology
Heme/Onc

## 2019-03-12 NOTE — PROGRESS NOTE ADULT - ATTENDING COMMENTS
no intervention as far as hematologic problems.
Will follow
Will follow the patient.
Patient seen and examined and evaluation completed by me in person

## 2019-03-12 NOTE — PROGRESS NOTE ADULT - ASSESSMENT
CBC stable.
78F with PMHx of HTN, myeloproliferative disorder, DVT/PE (2015), chronic UTIs and hydronephrosis who presents to ED for c/o generalized weakness x4-5 days progressively worsening over the past few days likely 2/2 malnutrition, electrolyte abnormalities, and UTI.
78F with PMHx of HTN, myeloproliferative disorder, DVT/PE (2015), chronic UTIs and hydronephrosis who presents to ED for c/o generalized weakness x4-5 days progressively worsening over the past few days likely 2/2 malnutrition, electrolyte abnormalities, and UTI.
78F with PMHx of HTN, myeloproliferative disorder, DVT/PE (2015), chronic UTIs and hydronephrosis who presents to ED for c/o generalized weakness x4-5 days progressively worsening over the past few days likely 2/2 malnutrition, electrolyte abnormalities, and UTI.    Problem/Plan - 1:  ·  Problem: Sepsis.  Plan: POA tachy 104 and leukocytosis 25 (pt baseline WBC 15-17 in setting of MPD) with positive UA. s/p 1L IVF in ED. s/p CTX 1g. lactate 1.1.  -resolved   -c/w CTX 1g qd (prev. had UTI E. coli pan-sensitive)  -bl cx ngtd   -tylenol PRN.     Problem/Plan - 2:  ·  Problem: UTI (urinary tract infection).  Plan: POA UA positive in setting of progressive weakness. Pt with h/o E. coli UTI and pyelo on prev. adm.  -c/w plan as per above  -CTX 1g qd  - Urology consulted for urinary retention and hutson placed   - ct abd pelvis w/ IV contrast showed filling defect in the left renal pelvis at the UPJ and fecal impaction  - f/u urology recs.     Problem/Plan - 3:  ·  Problem: Generalized weakness.  Plan: Multifactorial 2/2 current UTI, electrolyte abnormalities, and progressive poor PO intake 2/2 poor appetite. POA +UA with h/o frequent UTIs. Though pt denying urinary symptoms, elderly patients may manifest UTI as weakness. Hypokalemia to 3.1 on adm. Pt reporting poor PO intake x1 mo. On exam, thin, malnourished, hypovolemic. 5/5 muscle strength in all extremities with intact sensation.  -PT eval  -nutrition consult  -CTX 1g qd for UTI tx  -replete lytes PRN;  -TSH, B12, folate wnl.     Problem/Plan - 4:  ·  Problem: Myeloproliferative disorder.  Plan: Follows w/ Dr. Resendiz. Previously on Jakafi but no longer 2/2 insurance/cost issues. Pt reporting Dr. Astrid melendezay with pt not taking this medication. B/l WBC 15-17. Prior labs showing b/l Hb 10. No thrombocytopenia.  -will treat UTI prior to MDS treatment   -tx sepsis as above   -on a/c for DVT/PE tx indefinitely in setting of MPD.     Problem/Plan - 5:  ·  Problem: Hypokalemia.  Plan: POA K 3.1. s/p repletion in ED. Likely contributing to generalized weakness.  - resolved.     Problem/Plan - 6:  Problem: PE (pulmonary thromboembolism). Plan: H/o DVT/PE 2015. Now on Xarelto 20 mg qd indefinitely in setting of MPD.  -Xarelto 20 qd.    Problem/Plan - 7:  ·  Problem: Cachexia.  Plan: Pt with BMI 18. Thin and malnourished on exam. Pt reporting poor PO intake x1 month.   -nutrition consulted   -encourage PO intake.     Problem/Plan - 8:  ·  Problem: Nutrition, metabolism, and development symptoms.  Plan: F: none  E: replete K>4, Mg>2  N: Regular.
78F with PMHx of HTN, myeloproliferative disorder, DVT/PE (2015), chronic UTIs and hydronephrosis who presents to ED for c/o generalized weakness x4-5 days progressively worsening over the past few days likely 2/2 malnutrition, electrolyte abnormalities, and UTI.    Problem/Plan - 1:  ·  Problem: Sepsis.  Plan: POA tachy 104 and leukocytosis 25 (pt baseline WBC 15-17 in setting of MPD) with positive UA. s/p 1L IVF in ED. s/p CTX 1g. lactate 1.1.  -resolved   -c/w CTX 1g qd (prev. had UTI E. coli pan-sensitive)  -bl cx ngtd   -tylenol PRN.     Problem/Plan - 2:  ·  Problem: UTI (urinary tract infection).  Plan: POA UA positive in setting of progressive weakness. Pt with h/o E. coli UTI and pyelo on prev. adm.  -c/w plan as per above  -CTX 1g qd  - Urology consulted for urinary retention and hutson placed   - ct abd pelvis w/ IV contrast showed filling defect in the left renal pelvis at the UPJ and fecal impaction  - f/u urology recs.     Problem/Plan - 3:  ·  Problem: Generalized weakness.  Plan: Multifactorial 2/2 current UTI, electrolyte abnormalities, and progressive poor PO intake 2/2 poor appetite. POA +UA with h/o frequent UTIs. Though pt denying urinary symptoms, elderly patients may manifest UTI as weakness. Hypokalemia to 3.1 on adm. Pt reporting poor PO intake x1 mo. On exam, thin, malnourished, hypovolemic. 5/5 muscle strength in all extremities with intact sensation.  -nutrition consult  -CTX 1g qd for UTI tx  -replete lytes PRN;  -TSH, B12, folate wnl.     Problem/Plan - 4:  ·  Problem: Myeloproliferative disorder.  Plan: Follows w/ Dr. Resendiz. Previously on Jakafi but no longer 2/2 insurance/cost issues. Pt reporting Dr. Resendiz okay with pt not taking this medication. B/l WBC 15-17. Prior labs showing b/l Hb 10. No thrombocytopenia.  -will treat UTI prior to MDS treatment   -tx sepsis as above   -on a/c for DVT/PE tx indefinitely in setting of MPD.     Problem/Plan - 5:  ·  Problem: Hypokalemia.  Plan: POA K 3.1. s/p repletion in ED. Likely contributing to generalized weakness.  - resolved.     Problem/Plan - 6:  Problem: PE (pulmonary thromboembolism). Plan: H/o DVT/PE 2015. Now on Xarelto 20 mg qd indefinitely in setting of MPD.  -Xarelto 20 qd.    Problem/Plan - 7:  ·  Problem: Cachexia.  Plan: Pt with BMI 18. Thin and malnourished on exam. Pt reporting poor PO intake x1 month.   -nutrition consulted   -encourage PO intake.
79 yo f with uti and hx hydronephrosis  -can be discharged with hutson to leg bag, f/u with Dr Pollack for CIC teaching  -Ct urogram reviewed, 4mm UPJ filling defect, f/u outpatient for further evaluation
Continue to observe CBCs.
patient improved; the WBC remains elevated.
78F with PMHx of HTN, myeloproliferative disorder, DVT/PE (2015), chronic UTIs and hydronephrosis who presents to ED for c/o generalized weakness x4-5 days progressively worsening over the past few days likely 2/2 malnutrition, electrolyte abnormalities, and UTI.
Generalized weakness  Urinary retention  Hx of UTIs  Leukocytosis appears uncharacteristic of patient's MDS  Bronchiectasis - possibly infected with MAC  Infection focus / Any invasive infection not apparent      RECOMMEND  D/C Ceftriaxone  Whole body gallium scan with SPECT CT if abnormal uptake detected  Would obtain chest CT without contrast  Search for other /noninfectious etiologies

## 2019-03-13 LAB — 24R-OH-CALCIDIOL SERPL-MCNC: 20.4 NG/ML — LOW (ref 30–80)

## 2019-03-14 DIAGNOSIS — N39.0 URINARY TRACT INFECTION, SITE NOT SPECIFIED: ICD-10-CM

## 2019-03-14 DIAGNOSIS — R62.7 ADULT FAILURE TO THRIVE: ICD-10-CM

## 2019-03-14 DIAGNOSIS — Z86.718 PERSONAL HISTORY OF OTHER VENOUS THROMBOSIS AND EMBOLISM: ICD-10-CM

## 2019-03-14 DIAGNOSIS — R33.9 RETENTION OF URINE, UNSPECIFIED: ICD-10-CM

## 2019-03-14 DIAGNOSIS — C94.6 MYELODYSPLASTIC DISEASE, NOT ELSEWHERE CLASSIFIED: ICD-10-CM

## 2019-03-14 DIAGNOSIS — I10 ESSENTIAL (PRIMARY) HYPERTENSION: ICD-10-CM

## 2019-03-14 DIAGNOSIS — E44.0 MODERATE PROTEIN-CALORIE MALNUTRITION: ICD-10-CM

## 2019-03-14 DIAGNOSIS — A41.9 SEPSIS, UNSPECIFIED ORGANISM: ICD-10-CM

## 2019-03-14 DIAGNOSIS — R64 CACHEXIA: ICD-10-CM

## 2019-03-14 DIAGNOSIS — E87.6 HYPOKALEMIA: ICD-10-CM

## 2019-03-14 DIAGNOSIS — J47.9 BRONCHIECTASIS, UNCOMPLICATED: ICD-10-CM

## 2019-03-15 LAB
A-TOCOPHEROL VIT E SERPL-MCNC: 12.9 MG/L — SIGNIFICANT CHANGE UP (ref 9–29)
BETA+GAMMA TOCOPHEROL SERPL-MCNC: 0.9 MG/L — SIGNIFICANT CHANGE UP (ref 0.5–4.9)

## 2019-03-16 LAB — VIT B1 SERPL-MCNC: 183.5 NMOL/L — SIGNIFICANT CHANGE UP (ref 66.5–200)

## 2019-03-19 ENCOUNTER — APPOINTMENT (OUTPATIENT)
Dept: HEART AND VASCULAR | Facility: CLINIC | Age: 79
End: 2019-03-19

## 2019-06-12 NOTE — DIETITIAN INITIAL EVALUATION ADULT. - SIGNS/SYMPTOMS
Patient resting comfortably in chair in no distress.   AEB: low BMI:18.1/15lb weight loss AEB: low BMI:18.1/15lb weight loss/NFPE

## 2020-01-16 ENCOUNTER — INPATIENT (INPATIENT)
Facility: HOSPITAL | Age: 80
LOS: 10 days | Discharge: NURSING, REHAB OR LONG-TERM CARE | DRG: 871 | End: 2020-01-27
Attending: INTERNAL MEDICINE | Admitting: INTERNAL MEDICINE
Payer: MEDICARE

## 2020-01-16 VITALS
WEIGHT: 104.94 LBS | OXYGEN SATURATION: 95 % | DIASTOLIC BLOOD PRESSURE: 126 MMHG | HEART RATE: 100 BPM | SYSTOLIC BLOOD PRESSURE: 203 MMHG | TEMPERATURE: 97 F | HEIGHT: 64 IN | RESPIRATION RATE: 18 BRPM

## 2020-01-16 DIAGNOSIS — Z98.890 OTHER SPECIFIED POSTPROCEDURAL STATES: Chronic | ICD-10-CM

## 2020-01-16 DIAGNOSIS — Z90.710 ACQUIRED ABSENCE OF BOTH CERVIX AND UTERUS: Chronic | ICD-10-CM

## 2020-01-16 LAB
ALBUMIN SERPL ELPH-MCNC: 4.4 G/DL — SIGNIFICANT CHANGE UP (ref 3.3–5)
ALP SERPL-CCNC: 101 U/L — SIGNIFICANT CHANGE UP (ref 40–120)
ALT FLD-CCNC: 8 U/L — LOW (ref 10–45)
ANION GAP SERPL CALC-SCNC: 12 MMOL/L — SIGNIFICANT CHANGE UP (ref 5–17)
APPEARANCE UR: ABNORMAL
AST SERPL-CCNC: 14 U/L — SIGNIFICANT CHANGE UP (ref 10–40)
BACTERIA # UR AUTO: PRESENT /HPF
BASOPHILS # BLD AUTO: 0.28 K/UL — HIGH (ref 0–0.2)
BASOPHILS NFR BLD AUTO: 1.3 % — SIGNIFICANT CHANGE UP (ref 0–2)
BILIRUB SERPL-MCNC: 0.4 MG/DL — SIGNIFICANT CHANGE UP (ref 0.2–1.2)
BILIRUB UR-MCNC: NEGATIVE — SIGNIFICANT CHANGE UP
BUN SERPL-MCNC: 16 MG/DL — SIGNIFICANT CHANGE UP (ref 7–23)
CALCIUM SERPL-MCNC: 10 MG/DL — SIGNIFICANT CHANGE UP (ref 8.4–10.5)
CHLORIDE SERPL-SCNC: 107 MMOL/L — SIGNIFICANT CHANGE UP (ref 96–108)
CO2 SERPL-SCNC: 25 MMOL/L — SIGNIFICANT CHANGE UP (ref 22–31)
COLOR SPEC: YELLOW — SIGNIFICANT CHANGE UP
CREAT SERPL-MCNC: 0.64 MG/DL — SIGNIFICANT CHANGE UP (ref 0.5–1.3)
DIFF PNL FLD: ABNORMAL
EOSINOPHIL # BLD AUTO: 0.25 K/UL — SIGNIFICANT CHANGE UP (ref 0–0.5)
EOSINOPHIL NFR BLD AUTO: 1.2 % — SIGNIFICANT CHANGE UP (ref 0–6)
EPI CELLS # UR: ABNORMAL /HPF (ref 0–5)
GLUCOSE SERPL-MCNC: 117 MG/DL — HIGH (ref 70–99)
GLUCOSE UR QL: NEGATIVE — SIGNIFICANT CHANGE UP
HCT VFR BLD CALC: 49.3 % — HIGH (ref 34.5–45)
HGB BLD-MCNC: 15.3 G/DL — SIGNIFICANT CHANGE UP (ref 11.5–15.5)
IMM GRANULOCYTES NFR BLD AUTO: 0.9 % — SIGNIFICANT CHANGE UP (ref 0–1.5)
KETONES UR-MCNC: NEGATIVE — SIGNIFICANT CHANGE UP
LACTATE SERPL-SCNC: 0.9 MMOL/L — SIGNIFICANT CHANGE UP (ref 0.5–2)
LEUKOCYTE ESTERASE UR-ACNC: ABNORMAL
LYMPHOCYTES # BLD AUTO: 0.97 K/UL — LOW (ref 1–3.3)
LYMPHOCYTES # BLD AUTO: 4.6 % — LOW (ref 13–44)
MCHC RBC-ENTMCNC: 27.5 PG — SIGNIFICANT CHANGE UP (ref 27–34)
MCHC RBC-ENTMCNC: 31 GM/DL — LOW (ref 32–36)
MCV RBC AUTO: 88.7 FL — SIGNIFICANT CHANGE UP (ref 80–100)
MONOCYTES # BLD AUTO: 0.9 K/UL — SIGNIFICANT CHANGE UP (ref 0–0.9)
MONOCYTES NFR BLD AUTO: 4.2 % — SIGNIFICANT CHANGE UP (ref 2–14)
NEUTROPHILS # BLD AUTO: 18.64 K/UL — HIGH (ref 1.8–7.4)
NEUTROPHILS NFR BLD AUTO: 87.8 % — HIGH (ref 43–77)
NITRITE UR-MCNC: POSITIVE
NRBC # BLD: 0 /100 WBCS — SIGNIFICANT CHANGE UP (ref 0–0)
PH UR: 6 — SIGNIFICANT CHANGE UP (ref 5–8)
PLATELET # BLD AUTO: 406 K/UL — HIGH (ref 150–400)
POTASSIUM SERPL-MCNC: 3.9 MMOL/L — SIGNIFICANT CHANGE UP (ref 3.5–5.3)
POTASSIUM SERPL-SCNC: 3.9 MMOL/L — SIGNIFICANT CHANGE UP (ref 3.5–5.3)
PROT SERPL-MCNC: 6.6 G/DL — SIGNIFICANT CHANGE UP (ref 6–8.3)
PROT UR-MCNC: 100 MG/DL
RBC # BLD: 5.56 M/UL — HIGH (ref 3.8–5.2)
RBC # FLD: 17.4 % — HIGH (ref 10.3–14.5)
RBC CASTS # UR COMP ASSIST: > 10 /HPF
SODIUM SERPL-SCNC: 144 MMOL/L — SIGNIFICANT CHANGE UP (ref 135–145)
SP GR SPEC: >=1.03 — SIGNIFICANT CHANGE UP (ref 1–1.03)
UROBILINOGEN FLD QL: 0.2 E.U./DL — SIGNIFICANT CHANGE UP
WBC # BLD: 21.23 K/UL — HIGH (ref 3.8–10.5)
WBC # FLD AUTO: 21.23 K/UL — HIGH (ref 3.8–10.5)
WBC UR QL: ABNORMAL /HPF

## 2020-01-16 PROCEDURE — 99285 EMERGENCY DEPT VISIT HI MDM: CPT

## 2020-01-16 PROCEDURE — 93010 ELECTROCARDIOGRAM REPORT: CPT

## 2020-01-16 PROCEDURE — 71045 X-RAY EXAM CHEST 1 VIEW: CPT | Mod: 26

## 2020-01-16 RX ORDER — FOLIC ACID 0.8 MG
1 TABLET ORAL DAILY
Refills: 0 | Status: DISCONTINUED | OUTPATIENT
Start: 2020-01-16 | End: 2020-01-27

## 2020-01-16 RX ORDER — ROSUVASTATIN CALCIUM 5 MG/1
1 TABLET ORAL
Qty: 0 | Refills: 0 | DISCHARGE

## 2020-01-16 RX ORDER — ATORVASTATIN CALCIUM 80 MG/1
40 TABLET, FILM COATED ORAL DAILY
Refills: 0 | Status: DISCONTINUED | OUTPATIENT
Start: 2020-01-16 | End: 2020-01-27

## 2020-01-16 RX ORDER — CALCIUM CARBONATE 500(1250)
3 TABLET ORAL EVERY 6 HOURS
Refills: 0 | Status: DISCONTINUED | OUTPATIENT
Start: 2020-01-16 | End: 2020-01-27

## 2020-01-16 RX ORDER — SODIUM CHLORIDE 9 MG/ML
1000 INJECTION INTRAMUSCULAR; INTRAVENOUS; SUBCUTANEOUS ONCE
Refills: 0 | Status: COMPLETED | OUTPATIENT
Start: 2020-01-16 | End: 2020-01-16

## 2020-01-16 RX ORDER — BUPROPION HYDROCHLORIDE 150 MG/1
75 TABLET, EXTENDED RELEASE ORAL DAILY
Refills: 0 | Status: DISCONTINUED | OUTPATIENT
Start: 2020-01-16 | End: 2020-01-16

## 2020-01-16 RX ORDER — CEFTRIAXONE 500 MG/1
1000 INJECTION, POWDER, FOR SOLUTION INTRAMUSCULAR; INTRAVENOUS ONCE
Refills: 0 | Status: COMPLETED | OUTPATIENT
Start: 2020-01-16 | End: 2020-01-16

## 2020-01-16 RX ORDER — AMLODIPINE BESYLATE 2.5 MG/1
1 TABLET ORAL
Qty: 0 | Refills: 0 | DISCHARGE

## 2020-01-16 RX ORDER — ACETAMINOPHEN 500 MG
650 TABLET ORAL EVERY 4 HOURS
Refills: 0 | Status: DISCONTINUED | OUTPATIENT
Start: 2020-01-16 | End: 2020-01-17

## 2020-01-16 RX ORDER — RIVAROXABAN 15 MG-20MG
20 KIT ORAL DAILY
Refills: 0 | Status: DISCONTINUED | OUTPATIENT
Start: 2020-01-16 | End: 2020-01-27

## 2020-01-16 RX ORDER — LANOLIN ALCOHOL/MO/W.PET/CERES
5 CREAM (GRAM) TOPICAL AT BEDTIME
Refills: 0 | Status: DISCONTINUED | OUTPATIENT
Start: 2020-01-16 | End: 2020-01-27

## 2020-01-16 RX ADMIN — CEFTRIAXONE 1000 MILLIGRAM(S): 500 INJECTION, POWDER, FOR SOLUTION INTRAMUSCULAR; INTRAVENOUS at 17:54

## 2020-01-16 RX ADMIN — ATORVASTATIN CALCIUM 40 MILLIGRAM(S): 80 TABLET, FILM COATED ORAL at 22:16

## 2020-01-16 RX ADMIN — SODIUM CHLORIDE 1000 MILLILITER(S): 9 INJECTION INTRAMUSCULAR; INTRAVENOUS; SUBCUTANEOUS at 17:53

## 2020-01-16 RX ADMIN — CEFTRIAXONE 100 MILLIGRAM(S): 500 INJECTION, POWDER, FOR SOLUTION INTRAMUSCULAR; INTRAVENOUS at 15:25

## 2020-01-16 RX ADMIN — SODIUM CHLORIDE 1000 MILLILITER(S): 9 INJECTION INTRAMUSCULAR; INTRAVENOUS; SUBCUTANEOUS at 15:02

## 2020-01-16 RX ADMIN — Medication 1 MILLIGRAM(S): at 22:18

## 2020-01-16 NOTE — ED ADULT NURSE NOTE - CHPI ED NUR SYMPTOMS NEG
no fever/no nausea/no decreased eating/drinking/no chills/no dizziness/no pain/no tingling/no vomiting

## 2020-01-16 NOTE — H&P ADULT - PROBLEM SELECTOR PLAN 2
POA UA positive in setting of progressive weakness. Pt with h/o E. coli UTI and pyelo on prev. adm. last year.  -c/w plan as per above

## 2020-01-16 NOTE — H&P ADULT - ASSESSMENT
78F with PMHx of HTN, myeloproliferative disorder, DVT/PE (2015), chronic UTIs and hydronephrosis who is admitted for treatment of UTI.

## 2020-01-16 NOTE — ED PROVIDER NOTE - OBJECTIVE STATEMENT
here with generalized weakness, feeling "crummy."  Says her legs gave out yesterday and she fell down.  Didn't seem to hurt anything but today felt too weak to walk/do anything.  Denies fever/chills, n/v/d, abdominal pain, coughing.  Home attendant says she has uti because she is peeing more than normal and it smells.  History of uti with similar symptoms.  Has had pain in right shoulder for a while.

## 2020-01-16 NOTE — H&P ADULT - NSHPPHYSICALEXAM_GEN_ALL_CORE
VITAL SIGNS:  T(C): 37.2 (01-16-20 @ 17:36), Max: 37.2 (01-16-20 @ 17:36)  T(F): 98.9 (01-16-20 @ 17:36), Max: 98.9 (01-16-20 @ 17:36)  HR: 71 (01-16-20 @ 15:21) (71 - 100)  BP: 177/99 (01-16-20 @ 15:21) (177/99 - 203/126)  BP(mean): --  RR: 18 (01-16-20 @ 15:21) (18 - 18)  SpO2: 95% (01-16-20 @ 15:21) (95% - 95%)  Wt(kg): --    PHYSICAL EXAM:    Constitutional: Adult White female, lying comfortably on bed, no distress, pleasant and conversational   Head: NC/AT  Eyes: PERRL, EOMI, anicteric sclera  ENT: mucous membranes slightly dry  Neck: no JVD  Respiratory: CTA B/L  Cardiac: RRR  Gastrointestinal: soft, NT/ND  Extremities: WWP, no cyanosis; no peripheral edema  Musculoskeletal: no joint swelling, tenderness or erythema  Vascular: 2+ radial pulses  Dermatologic: skin warm, dry and intact; no rash  Neurologic: AAOx3; cranial nerves grossly intact; no gross focal deficits; gait deferred  Psychiatric: affect and characteristics of appearance, verbalizations, behaviors are appropriate

## 2020-01-16 NOTE — ED ADULT TRIAGE NOTE - CHIEF COMPLAINT QUOTE
patient complains of generalized weakness over the last 5 days with urinary incontinence. hx of UTI. denies fever chills

## 2020-01-16 NOTE — H&P ADULT - NSHPREVIEWOFSYSTEMS_GEN_ALL_CORE
REVIEW OF SYSTEMS:    CONSTITUTIONAL: Patient affirms fatigue; denies fevers or chills  EYES/ENT: Patient denies visual changes;  denies vertigo or throat pain   NECK: Patient denies pain or stiffness  RESPIRATORY: Patient denies cough, wheezing, hemoptysis; denies shortness of breath  CARDIOVASCULAR: Patient denies chest pain or palpitations  GASTROINTESTINAL: Patient denies abdominal or epigastric pain, nausea, vomiting, or hematemesis, diarrhea, melena or hematochezia.  GENITOURINARY: Patient denies dysuria, frequency or hematuria  NEUROLOGICAL: Patient denies numbness or weakness  SKIN: Patient denies itching, burning, rashes, or lesions   All other review of systems is negative unless indicated above.

## 2020-01-16 NOTE — H&P ADULT - PROBLEM SELECTOR PLAN 1
POA tachy 100 and leukocytosis 21.2 (pt baseline WBC 15-17 in setting of MPD) with positive UA. s/p 1L IVF in ED. s/p CTX 1g. lactate wnl.  -c/w CTX 1g qd (prev. had UTI E. coli pansensitive)  -f/u urine cx  -f/u Bcx  -s/p 30 cc/kg IVF resuscitation

## 2020-01-16 NOTE — ED PROVIDER NOTE - CLINICAL SUMMARY MEDICAL DECISION MAKING FREE TEXT BOX
generalized weakness/ not feeling well with urinary symptoms.  labs/ cultures obtained to r/o infection.  lactate wnl.  vitals stable.  wbc elevated.  ua infected.  cxr neg.  started ivf, ceftriaxone, admit for further management.  discussed with Dr. Tapia

## 2020-01-16 NOTE — H&P ADULT - NSICDXPASTMEDICALHX_GEN_ALL_CORE_FT
PAST MEDICAL HISTORY:  Hypertension     Myeloproliferative disorder     PE (pulmonary thromboembolism) 2015    Tremor     Vestibular disequilibrium

## 2020-01-16 NOTE — H&P ADULT - PROBLEM SELECTOR PLAN 8
1) PCP Contacted on Admission: (Y) --> Name & Phone #: Dr. Tapia  2) Date of Contact with PCP: 3/16/2020  3) PCP Contacted at Discharge: (Y/N, N/A)  4) Summary of Handoff Given to PCP:   5) Post-Discharge Appointment Date and Location:

## 2020-01-16 NOTE — ED ADULT NURSE NOTE - NSIMPLEMENTINTERV_GEN_ALL_ED
Implemented All Fall with Harm Risk Interventions:  [place] to call system. Call bell, personal items and telephone within reach. Instruct patient to call for assistance. Room bathroom lighting operational. Non-slip footwear when patient is off stretcher. Physically safe environment: no spills, clutter or unnecessary equipment. Stretcher in lowest position, wheels locked, appropriate side rails in place. Provide visual cue, wrist band, yellow gown, etc. Monitor gait and stability. Monitor for mental status changes and reorient to person, place, and time. Review medications for side effects contributing to fall risk. Reinforce activity limits and safety measures with patient and family. Provide visual clues: red socks.

## 2020-01-16 NOTE — ED ADULT NURSE NOTE - OBJECTIVE STATEMENT
Pt presents to ED complaining of generalized weakness x 5 days. Pt states "I have just been feeling really weak for five days. I have not been able to do anything, I feel so tired and weak". As per pt's home health aid, pt has hx of reoccurring UTI, pt has been urinating frequently and the urine is malodorous. Pt a&ox4, denies dysuria, hematuria, chest pain, sob, fevers, chills, n/v/d.

## 2020-01-16 NOTE — H&P ADULT - HISTORY OF PRESENT ILLNESS
78F with PMHx of HTN, myeloproliferative disorder, DVT/PE (2015), chronic UTIs and hydronephrosis who presents to ED for c/o generalized weakness. Patient reports she usually ambulates around her apt with a walker, and has needed more assistance from her aide than usual over the last few days. Her aide, who accompanies her to the ED today, states she also noticed the patient has had foul-smelling urine today. Dr. Zapata suggested she present to the ED today.     Vitals: 98.9 (rectal), 100 bpm, 203/126, 18, 95% on room air. Labs notable for WBC 21.2 (87.8% PMNs, no immature granulocytes), Hgb 15.3, Plt 406. Patient received CTX 1 g, NS 1 L.

## 2020-01-16 NOTE — H&P ADULT - NSHPLABSRESULTS_GEN_ALL_CORE
.  LABS:                         15.3   21.23 )-----------( 406      ( 16 Jan 2020 14:53 )             49.3     01-16    144  |  107  |  16  ----------------------------<  117<H>  3.9   |  25  |  0.64    Ca    10.0      16 Jan 2020 14:53  Mg     2.3     01-16    TPro  6.6  /  Alb  4.4  /  TBili  0.4  /  DBili  x   /  AST  14  /  ALT  8<L>  /  AlkPhos  101  01-16      Urinalysis Basic - ( 16 Jan 2020 16:12 )    Color: Yellow / Appearance: SL Cloudy / SG: >=1.030 / pH: x  Gluc: x / Ketone: NEGATIVE  / Bili: Negative / Urobili: 0.2 E.U./dL   Blood: x / Protein: 100 mg/dL / Nitrite: POSITIVE   Leuk Esterase: Trace / RBC: > 10 /HPF / WBC Many /HPF   Sq Epi: x / Non Sq Epi: 5-10 /HPF / Bacteria: Present /HPF            Lactate, Blood: 0.9 mmol/L (01-16 @ 15:49)      RADIOLOGY, EKG & ADDITIONAL TESTS: Reviewed.

## 2020-01-17 LAB
ANION GAP SERPL CALC-SCNC: 14 MMOL/L — SIGNIFICANT CHANGE UP (ref 5–17)
BASOPHILS # BLD AUTO: 0.34 K/UL — HIGH (ref 0–0.2)
BASOPHILS NFR BLD AUTO: 1.6 % — SIGNIFICANT CHANGE UP (ref 0–2)
BUN SERPL-MCNC: 10 MG/DL — SIGNIFICANT CHANGE UP (ref 7–23)
CALCIUM SERPL-MCNC: 9.4 MG/DL — SIGNIFICANT CHANGE UP (ref 8.4–10.5)
CHLORIDE SERPL-SCNC: 107 MMOL/L — SIGNIFICANT CHANGE UP (ref 96–108)
CO2 SERPL-SCNC: 23 MMOL/L — SIGNIFICANT CHANGE UP (ref 22–31)
CREAT SERPL-MCNC: 0.56 MG/DL — SIGNIFICANT CHANGE UP (ref 0.5–1.3)
EOSINOPHIL # BLD AUTO: 0.3 K/UL — SIGNIFICANT CHANGE UP (ref 0–0.5)
EOSINOPHIL NFR BLD AUTO: 1.4 % — SIGNIFICANT CHANGE UP (ref 0–6)
GLUCOSE SERPL-MCNC: 96 MG/DL — SIGNIFICANT CHANGE UP (ref 70–99)
HCT VFR BLD CALC: 45.7 % — HIGH (ref 34.5–45)
HGB BLD-MCNC: 14.4 G/DL — SIGNIFICANT CHANGE UP (ref 11.5–15.5)
IMM GRANULOCYTES NFR BLD AUTO: 1.1 % — SIGNIFICANT CHANGE UP (ref 0–1.5)
LYMPHOCYTES # BLD AUTO: 1.09 K/UL — SIGNIFICANT CHANGE UP (ref 1–3.3)
LYMPHOCYTES # BLD AUTO: 5.2 % — LOW (ref 13–44)
MAGNESIUM SERPL-MCNC: 2.1 MG/DL — SIGNIFICANT CHANGE UP (ref 1.6–2.6)
MCHC RBC-ENTMCNC: 28 PG — SIGNIFICANT CHANGE UP (ref 27–34)
MCHC RBC-ENTMCNC: 31.5 GM/DL — LOW (ref 32–36)
MCV RBC AUTO: 88.7 FL — SIGNIFICANT CHANGE UP (ref 80–100)
MONOCYTES # BLD AUTO: 0.81 K/UL — SIGNIFICANT CHANGE UP (ref 0–0.9)
MONOCYTES NFR BLD AUTO: 3.9 % — SIGNIFICANT CHANGE UP (ref 2–14)
NEUTROPHILS # BLD AUTO: 18.07 K/UL — HIGH (ref 1.8–7.4)
NEUTROPHILS NFR BLD AUTO: 86.8 % — HIGH (ref 43–77)
NRBC # BLD: 0 /100 WBCS — SIGNIFICANT CHANGE UP (ref 0–0)
PLATELET # BLD AUTO: 362 K/UL — SIGNIFICANT CHANGE UP (ref 150–400)
POTASSIUM SERPL-MCNC: 3.3 MMOL/L — LOW (ref 3.5–5.3)
POTASSIUM SERPL-SCNC: 3.3 MMOL/L — LOW (ref 3.5–5.3)
RBC # BLD: 5.15 M/UL — SIGNIFICANT CHANGE UP (ref 3.8–5.2)
RBC # FLD: 17.2 % — HIGH (ref 10.3–14.5)
SODIUM SERPL-SCNC: 144 MMOL/L — SIGNIFICANT CHANGE UP (ref 135–145)
WBC # BLD: 20.84 K/UL — HIGH (ref 3.8–10.5)
WBC # FLD AUTO: 20.84 K/UL — HIGH (ref 3.8–10.5)

## 2020-01-17 RX ORDER — POTASSIUM CHLORIDE 20 MEQ
40 PACKET (EA) ORAL EVERY 4 HOURS
Refills: 0 | Status: COMPLETED | OUTPATIENT
Start: 2020-01-17 | End: 2020-01-17

## 2020-01-17 RX ORDER — POTASSIUM CHLORIDE 20 MEQ
40 PACKET (EA) ORAL ONCE
Refills: 0 | Status: COMPLETED | OUTPATIENT
Start: 2020-01-17 | End: 2020-01-17

## 2020-01-17 RX ORDER — AMLODIPINE BESYLATE 2.5 MG/1
2.5 TABLET ORAL DAILY
Refills: 0 | Status: DISCONTINUED | OUTPATIENT
Start: 2020-01-17 | End: 2020-01-19

## 2020-01-17 RX ORDER — CEFTRIAXONE 500 MG/1
2000 INJECTION, POWDER, FOR SOLUTION INTRAMUSCULAR; INTRAVENOUS EVERY 24 HOURS
Refills: 0 | Status: DISCONTINUED | OUTPATIENT
Start: 2020-01-17 | End: 2020-01-19

## 2020-01-17 RX ORDER — ACETAMINOPHEN 500 MG
650 TABLET ORAL EVERY 6 HOURS
Refills: 0 | Status: DISCONTINUED | OUTPATIENT
Start: 2020-01-17 | End: 2020-01-27

## 2020-01-17 RX ORDER — HYDRALAZINE HCL 50 MG
10 TABLET ORAL ONCE
Refills: 0 | Status: COMPLETED | OUTPATIENT
Start: 2020-01-17 | End: 2020-01-17

## 2020-01-17 RX ADMIN — Medication 650 MILLIGRAM(S): at 20:23

## 2020-01-17 RX ADMIN — Medication 1 MILLIGRAM(S): at 12:02

## 2020-01-17 RX ADMIN — Medication 650 MILLIGRAM(S): at 11:56

## 2020-01-17 RX ADMIN — Medication 40 MILLIEQUIVALENT(S): at 14:19

## 2020-01-17 RX ADMIN — Medication 40 MILLIEQUIVALENT(S): at 09:18

## 2020-01-17 RX ADMIN — Medication 650 MILLIGRAM(S): at 19:16

## 2020-01-17 RX ADMIN — RIVAROXABAN 20 MILLIGRAM(S): KIT at 12:02

## 2020-01-17 RX ADMIN — ATORVASTATIN CALCIUM 40 MILLIGRAM(S): 80 TABLET, FILM COATED ORAL at 22:51

## 2020-01-17 RX ADMIN — CEFTRIAXONE 2000 MILLIGRAM(S): 500 INJECTION, POWDER, FOR SOLUTION INTRAMUSCULAR; INTRAVENOUS at 13:38

## 2020-01-17 RX ADMIN — Medication 650 MILLIGRAM(S): at 09:17

## 2020-01-17 RX ADMIN — Medication 10 MILLIGRAM(S): at 09:17

## 2020-01-17 NOTE — CONSULT NOTE ADULT - SUBJECTIVE AND OBJECTIVE BOX
HPI:  78F with PMHx of HTN, myeloproliferative disorder, DVT/PE (2015), chronic UTIs and hydronephrosis who presents to ED for c/o generalized weakness. Patient reports she usually ambulates around her apt with a walker, and has needed more assistance from her aide than usual over the last few days. Her aide, who accompanies her to the ED today, states she also noticed the patient has had foul-smelling urine today. Dr. Zapata suggested she present to the ED today.     Vitals: 98.9 (rectal), 100 bpm, 203/126, 18, 95% on room air. Labs notable for WBC 21.2 (87.8% PMNs, no immature granulocytes), Hgb 15.3, Plt 406. Patient received CTX 1 g, NS 1 L. (16 Jan 2020 17:54)      PAST MEDICAL & SURGICAL HISTORY:  Myeloproliferative disorder  Hypertension  PE (pulmonary thromboembolism): 2015  Tremor  Vestibular disequilibrium  S/P wrist surgery  S/P hysterectomy      REVIEW OF SYSTEMS      General:	    Skin/Breast:  	  Ophthalmologic:  	  ENMT:	    Respiratory and Thorax:  	  Cardiovascular:	    Gastrointestinal:	    Genitourinary:	    Musculoskeletal:	    Neurological:	    Psychiatric:	    Hematology/Lymphatics:	    Endocrine:	    Allergic/Immunologic:	    MEDICATIONS  (STANDING):  amLODIPine   Tablet 2.5 milliGRAM(s) Oral daily  atorvastatin 40 milliGRAM(s) Oral daily  cefTRIAXone Injectable. 2000 milliGRAM(s) IV Push every 24 hours  folic acid 1 milliGRAM(s) Oral daily  rivaroxaban 20 milliGRAM(s) Oral daily    MEDICATIONS  (PRN):  acetaminophen   Tablet .. 650 milliGRAM(s) Oral every 6 hours PRN Moderate Pain (4 - 6)  calcium carbonate    500 mG (Tums) Chewable 3 Tablet(s) Chew every 6 hours PRN Dyspepsia  melatonin 5 milliGRAM(s) Oral at bedtime PRN Sleep      Allergies    No Known Allergies    Intolerances        SOCIAL HISTORY:    FAMILY HISTORY:  No pertinent family history in first degree relatives      Vital Signs Last 24 Hrs  T(C): 36.4 (17 Jan 2020 12:39), Max: 37.1 (16 Jan 2020 20:46)  T(F): 97.5 (17 Jan 2020 12:39), Max: 98.7 (16 Jan 2020 20:46)  HR: 71 (17 Jan 2020 12:39) (71 - 83)  BP: 142/89 (17 Jan 2020 12:39) (130/83 - 194/98)  BP(mean): --  RR: 19 (17 Jan 2020 12:39) (17 - 19)  SpO2: 98% (17 Jan 2020 12:39) (95% - 98%)    PHYSICAL EXAM:      Constitutional:    Eyes:    ENMT:    Neck:    Breasts:    Back:    Respiratory:    Cardiovascular:    Gastrointestinal:    Genitourinary:    Rectal:    Extremities:    Vascular:    Neurological:    Skin:    Lymph Nodes:    Musculoskeletal:    Psychiatric:        LABS:                        14.4   20.84 )-----------( 362      ( 17 Jan 2020 05:38 )             45.7     01-17    144  |  107  |  10  ----------------------------<  96  3.3<L>   |  23  |  0.56    Ca    9.4      17 Jan 2020 05:38  Mg     2.1     01-17    TPro  6.6  /  Alb  4.4  /  TBili  0.4  /  DBili  x   /  AST  14  /  ALT  8<L>  /  AlkPhos  101  01-16      Urinalysis Basic - ( 16 Jan 2020 16:12 )    Color: Yellow / Appearance: SL Cloudy / SG: >=1.030 / pH: x  Gluc: x / Ketone: NEGATIVE  / Bili: Negative / Urobili: 0.2 E.U./dL   Blood: x / Protein: 100 mg/dL / Nitrite: POSITIVE   Leuk Esterase: Trace / RBC: > 10 /HPF / WBC Many /HPF   Sq Epi: x / Non Sq Epi: 5-10 /HPF / Bacteria: Present /HPF        RADIOLOGY & ADDITIONAL STUDIES: HPI:  78F with PMHx of HTN, myeloproliferative disorder, DVT/PE (2015), chronic UTIs and hydronephrosis who presents to ED for c/o generalized weakness. Patient reports she usually ambulates around her apt with a walker, and has needed more assistance from her aide than usual over the last few days. Her aide, who accompanies her to the ED today, states she also noticed the patient has had foul-smelling urine today. Dr. Zapata suggested she present to the ED today.     Vitals: 98.9 (rectal), 100 bpm, 203/126, 18, 95% on room air. Labs notable for WBC 21.2 (87.8% PMNs, no immature granulocytes), Hgb 15.3, Plt 406. Patient received CTX 1 g, NS 1 L. (16 Jan 2020 17:54)      PAST MEDICAL & SURGICAL HISTORY:  Myeloproliferative disorder  Hypertension  PE (pulmonary thromboembolism): 2015  Tremor  Vestibular disequilibrium  S/P wrist surgery  S/P hysterectomy      REVIEW OF SYSTEMS  Otherwise negative       MEDICATIONS  (STANDING):  amLODIPine   Tablet 2.5 milliGRAM(s) Oral daily  atorvastatin 40 milliGRAM(s) Oral daily  cefTRIAXone Injectable. 2000 milliGRAM(s) IV Push every 24 hours  folic acid 1 milliGRAM(s) Oral daily  rivaroxaban 20 milliGRAM(s) Oral daily    MEDICATIONS  (PRN):  acetaminophen   Tablet .. 650 milliGRAM(s) Oral every 6 hours PRN Moderate Pain (4 - 6)  calcium carbonate    500 mG (Tums) Chewable 3 Tablet(s) Chew every 6 hours PRN Dyspepsia  melatonin 5 milliGRAM(s) Oral at bedtime PRN Sleep      Allergies    No Known Allergies        SOCIAL HISTORY:  Lives at home    FAMILY HISTORY:  No pertinent family history in first degree relatives    EXAM  Vital Signs Last 24 Hrs  T(C): 36.4 (17 Jan 2020 12:39), Max: 37.1 (16 Jan 2020 20:46)  T(F): 97.5 (17 Jan 2020 12:39), Max: 98.7 (16 Jan 2020 20:46)  HR: 71 (17 Jan 2020 12:39) (71 - 83)  BP: 142/89 (17 Jan 2020 12:39) (130/83 - 194/98)  BP(mean): --  RR: 19 (17 Jan 2020 12:39) (17 - 19)  SpO2: 98% (17 Jan 2020 12:39) (95% - 98%)  Awake and alert  Responding appropriately  Speaks very slow - similar in the past  Pale  No rash or jaundice  Neck supple  Oral mucosa moist  RR  Chest poor inspiratory effort but clear  Abd soft ND with some suprapubic tenderness  LE no edema        LABS:                        14.4   20.84 )-----------( 362      ( 17 Jan 2020 05:38 )             45.7     01-17    144  |  107  |  10  ----------------------------<  96  3.3<L>   |  23  |  0.56    Ca    9.4      17 Jan 2020 05:38  Mg     2.1     01-17    TPro  6.6  /  Alb  4.4  /  TBili  0.4  /  DBili  x   /  AST  14  /  ALT  8<L>  /  AlkPhos  101  01-16      Urinalysis Basic - ( 16 Jan 2020 16:12 )    Color: Yellow / Appearance: SL Cloudy / SG: >=1.030 / pH: x  Gluc: x / Ketone: NEGATIVE  / Bili: Negative / Urobili: 0.2 E.U./dL   Blood: x / Protein: 100 mg/dL / Nitrite: POSITIVE   Leuk Esterase: Trace / RBC: > 10 /HPF / WBC Many /HPF   Sq Epi: x / Non Sq Epi: 5-10 /HPF / Bacteria: Present /HPF    Culture - Blood (01.16.20 @ 17:34)    Specimen Source: .Blood Blood-Peripheral    Culture Results:   No growth at 1 day.    Culture - Blood (01.16.20 @ 17:34)    Specimen Source: .Blood Blood-Peripheral    Culture Results:   No growth at 1 day.    Culture - Urine (01.16.20 @ 16:45)    Specimen Source: .Urine Clean Catch (Midstream)    Culture Results:   >100,000 CFU/ml Escherichia coli  Susceptibility to follow.  Culture in progress

## 2020-01-17 NOTE — PHYSICAL THERAPY INITIAL EVALUATION ADULT - PERTINENT HX OF CURRENT PROBLEM, REHAB EVAL
78F with PMHx of HTN, myeloproliferative disorder, DVT/PE (2015), chronic UTIs and hydronephrosis who presents to ED for c/o generalized weakness. Patient reports she usually ambulates around her apt with a walker, and has needed more assistance from her aide than usual over the last few days

## 2020-01-17 NOTE — PROGRESS NOTE ADULT - ASSESSMENT
78F with PMHx of HTN, myeloproliferative disorder, DVT/PE (2015), chronic UTIs and hydronephrosis who is admitted for treatment sepsis 2/2 UTI.

## 2020-01-17 NOTE — PROGRESS NOTE ADULT - PROBLEM SELECTOR PLAN 1
POA tachy 100 and leukocytosis 21.2 (pt baseline WBC 15-17 in setting of MPD) with positive UA. s/p 1L IVF in ED. s/p CTX 1g. lactate wnl. Bcx negative  -2g CFTX q24 as per ID   -f/u urine cx

## 2020-01-17 NOTE — PROGRESS NOTE ADULT - SUBJECTIVE AND OBJECTIVE BOX
ANDREW STUART  79y  Female    Patient is a 79y old  Female who presents with a chief complaint of UTI (16 Jan 2020 17:54)      INTERVAL HPI/OVERNIGHT EVENTS: Patient endorses improvement weakness and was able to walk with PT without major issues.        T(C): 36.4 (01-17-20 @ 12:39), Max: 37.2 (01-16-20 @ 17:36)  HR: 71 (01-17-20 @ 12:39) (71 - 100)  BP: 142/89 (01-17-20 @ 12:39) (130/83 - 203/126)  RR: 19 (01-17-20 @ 12:39) (17 - 19)  SpO2: 98% (01-17-20 @ 12:39) (95% - 98%)  Wt(kg): --Vital Signs Last 24 Hrs  T(C): 36.4 (17 Jan 2020 12:39), Max: 37.2 (16 Jan 2020 17:36)  T(F): 97.5 (17 Jan 2020 12:39), Max: 98.9 (16 Jan 2020 17:36)  HR: 71 (17 Jan 2020 12:39) (71 - 100)  BP: 142/89 (17 Jan 2020 12:39) (130/83 - 203/126)  BP(mean): --  RR: 19 (17 Jan 2020 12:39) (17 - 19)  SpO2: 98% (17 Jan 2020 12:39) (95% - 98%)    PHYSICAL EXAM:  GENERAL: NAD  ENMT: Moist mucous membranes  NECK: Supple, No JVD  NERVOUS SYSTEM: Alert & Oriented X3  CHEST/LUNG: Clear to auscultation bilaterally; No rales, rhonchi, wheezing, or rubs  HEART: Regular rate and rhythm; No murmurs, rubs, or gallops  ABDOMEN: Soft, Nontender, Nondistended; Bowel sounds present  EXTREMITIES: WWP, No clubbing, cyanosis, or edema        Consultant(s) Notes Reviewed:  [x ] YES  [ ] NO  Care Discussed with Consultants/Other Providers [ x] YES  [ ] NO    LABS:                        14.4   20.84 )-----------( 362      ( 17 Jan 2020 05:38 )             45.7     01-17    144  |  107  |  10  ----------------------------<  96  3.3<L>   |  23  |  0.56    Ca    9.4      17 Jan 2020 05:38  Mg     2.1     01-17    TPro  6.6  /  Alb  4.4  /  TBili  0.4  /  DBili  x   /  AST  14  /  ALT  8<L>  /  AlkPhos  101  01-16        Urinalysis Basic - ( 16 Jan 2020 16:12 )    Color: Yellow / Appearance: SL Cloudy / SG: >=1.030 / pH: x  Gluc: x / Ketone: NEGATIVE  / Bili: Negative / Urobili: 0.2 E.U./dL   Blood: x / Protein: 100 mg/dL / Nitrite: POSITIVE   Leuk Esterase: Trace / RBC: > 10 /HPF / WBC Many /HPF   Sq Epi: x / Non Sq Epi: 5-10 /HPF / Bacteria: Present /HPF      CAPILLARY BLOOD GLUCOSE            Urinalysis Basic - ( 16 Jan 2020 16:12 )    Color: Yellow / Appearance: SL Cloudy / SG: >=1.030 / pH: x  Gluc: x / Ketone: NEGATIVE  / Bili: Negative / Urobili: 0.2 E.U./dL   Blood: x / Protein: 100 mg/dL / Nitrite: POSITIVE   Leuk Esterase: Trace / RBC: > 10 /HPF / WBC Many /HPF   Sq Epi: x / Non Sq Epi: 5-10 /HPF / Bacteria: Present /HPF        RADIOLOGY & ADDITIONAL TESTS:    Imaging Personally Reviewed:  [ ] YES  [ ] NO    HEALTH ISSUES - PROBLEM Dx:

## 2020-01-17 NOTE — DIETITIAN INITIAL EVALUATION ADULT. - FLUIDS
Yeast Diaper Rash in Children: Care Instructions  Your Care Instructions  Any rash on the area covered by a diaper is called diaper rash. Many diaper rashes are caused when a child wears a wet diaper for too long. But diaper rashes can also be caused by candida albicans, a type of yeast. Your child may also have the two types of rashes at the same time. A yeast diaper rash is not serious, but it may need to be treated with an antifungal cream.  Follow-up care is a key part of your child's treatment and safety. Be sure to make and go to all appointments, and call your doctor if your child is having problems. It's also a good idea to know your child's test results and keep a list of the medicines your child takes. How can you care for your child at home? · Your doctor may prescribe a medicated cream, powder, or ointment, or recommend that you buy an over-the-counter one at a grocery store or drugstore. Use it as directed. · Change diapers as soon as they are wet or dirty. Before you put a new diaper on your baby, gently wash the diaper area with warm water. Rinse and pat dry. Wash your hands before and after each diaper change. · Air the diaper area for 5 to 10 minutes before you put on a new diaper. · Do not use baby wipes that contain alcohol or propylene glycol while your baby has a rash. These may burn the skin. · Do not use baby powder while your baby has a rash. The powder can build up in the skin folds and hold moisture. When should you call for help? Call your doctor now or seek immediate medical care if:  ? · Your baby has blisters, open sores, or scabs in the diaper area. ? · Your baby has signs of a more serious infection, including:  ¨ Increased pain, swelling, warmth, or redness. ¨ Red streaks leading from the rash. ¨ Pus draining from the rash. ¨ A fever. ? Watch closely for changes in your child's health, and be sure to contact your doctor if:  ? · Your baby's diaper rash looks like a rash that is on other parts of his or her body. ? · Your baby's rash is not better after 2 days of treatment. Where can you learn more? Go to http://clint-ron.info/. Enter V663 in the search box to learn more about \"Yeast Diaper Rash in Children: Care Instructions. \"  Current as of: March 20, 2017  Content Version: 11.4  © 4936-0707 [employer]. Care instructions adapted under license by [employer] (which disclaims liability or warranty for this information). If you have questions about a medical condition or this instruction, always ask your healthcare professional. [place] [address] any warranty or liability for your use of this information. Thrush in Children: Care Instructions  Your Care Instructions  Maranda Bald is a yeast infection inside the mouth. It can look like milk, formula, or cottage cheese but is hard to remove. If you scrape the thrush away, the skin underneath may bleed. Your child might get thrush after using antibiotics. Often there is not a specific cause. It sometimes occurs at the same time as a diaper rash. Maranda Bald in infants and young children isn't a serious problem. It usually goes away on its own. Some children may need antifungal medicine. Follow-up care is a key part of your child's treatment and safety. Be sure to make and go to all appointments, and call your doctor if your child is having problems. It's also a good idea to know your child's test results and keep a list of the medicines your child takes. How can you care for your child at home? · Clean bottle nipples and pacifiers regularly in boiling water. · If you are breastfeeding, use an antifungal medicine, such as nystatin (Mycostatin), on your nipples. Dry your nipples after breastfeeding. · If your child is eating solid foods, you can massage plain, unflavored yogurt around the inside of your child's mouth.  Check the label to make sure that the yogurt contains live cultures. Yogurt may help healthy bacteria grow in the mouth. These bacteria can stop yeast growth. · Be safe with medicines. Have your child take medicines exactly as prescribed. Call your doctor if you think your child is having a problem with his or her medicine. When should you call for help? Watch closely for changes in your child's health, and be sure to contact your doctor if:  ? · Your child will not eat or drink. ? · You have trouble giving or applying the medicine to your child. ? · Your child still has thrush after 7 days. ? · Your child gets a new diaper rash. ? · Your child is not acting normally. ? · Your child has a fever. Where can you learn more? Go to http://clint-ron.info/. Enter V150 in the search box to learn more about \"Thrush in Children: Care Instructions. \"  Current as of: May 12, 2017  Content Version: 11.4  © 4107-7535 [employer]. Care instructions adapted under license by [employer] (which disclaims liability or warranty for this information). If you have questions about a medical condition or this instruction, always ask your healthcare professional. [address] any warranty or liability for your use of this information. [id number]

## 2020-01-17 NOTE — CONSULT NOTE ADULT - ASSESSMENT
per Internal Medicine    78F with PMHx of HTN, myeloproliferative disorder, DVT/PE (2015), chronic UTIs and hydronephrosis who is admitted for treatment sepsis 2/2 UTI.     Problem/Plan - 1:  ·  Problem: Sepsis.  Plan: POA tachy 100 and leukocytosis 21.2 (pt baseline WBC 15-17 in setting of MPD) with positive UA. s/p 1L IVF in ED. s/p CTX 1g. lactate wnl. Bcx negative  -2g CFTX q24 as per ID   -f/u urine cx.     Problem/Plan - 2:  ·  Problem: UTI (urinary tract infection).  Plan: POA UA positive in setting of progressive weakness. Pt with h/o E. coli UTI and pyelo on prev. adm. last year.  -c/w plan as per above.     Problem/Plan - 3:  ·  Problem: Generalized weakness.  Plan: Secondary to sepsis.  -PT eval.. Rec home with no needs.     Problem/Plan - 4:  ·  Problem: Myeloproliferative disorder.  Plan: Follows w/ Dr. Resendiz. Baseline WBC 15-17. No history of thrombocytopenia.  - continue Xarelto.     Problem/Plan - 5:  ·  Problem: Cachexia.  Plan: Pt with BMI 18. Thin and malnourished on exam.  -nutrition consult  -encourage PO intake.     Problem/Plan - 6:  Problem: Nutrition, metabolism, and development symptoms. Plan: F: none  E: replete K>4, Mg>2  N: Regular diet PO.    Problem/Plan - 7:  ·  Problem: Need for prophylactic measure.  Plan: VTE: Xarelto, SCDs    Code: FULL CODE  Dispo: [facility].
UTI  Patient immunocompromised / Myelodysplastic syndrome      RECOMMEND  Continue Ceftriaxone pending susceptibility testing  Bladder scans to rule out urinary retention

## 2020-01-17 NOTE — DIETITIAN INITIAL EVALUATION ADULT. - NAME AND PHONE
Samantha Gitlin, RD, CDN, [facility], [id number] or available on [employer]

## 2020-01-17 NOTE — DIETITIAN INITIAL EVALUATION ADULT. - ENERGY NEEDS
Height 64"; #; #; 88%IBW  BMI 18  ActualBW used for calculations as pt between % of IBW. Needs estimated for age and adjusted for malnutrition, UTI/sepsis

## 2020-01-17 NOTE — DIETITIAN INITIAL EVALUATION ADULT. - OTHER INFO
80 yo/female with PMHx HTN, MPD, DVT/PE on xarelto, UTIs, hydronephrosis, presented w/generalized weakness and for treatment of sepsis 2/2 UTI. Pt seen in room, awake, alert, pleasant. She endorses having a very small appetite her whole life. Diet recall includes croissant and coffee for breakfast, no lunch, and vegetable soup for dinner. She reports snacking on ice cream and cookies sometimes, and occasionally having chicken legs or yogurt. She is very adamant against having Ensure. She reported intake of 1/2 muffin and greek yogurt this AM. NKFA. No complaints of N/V. No BM for many days; likely d/t poor intake. No difficulty chewing or swallowing. Skin intact pressure-wise. No pain. NFPE significant for severe protein-calorie malnutrition, please see chart note. Discussed protein-rich options for pt to incorporate in diet, including hard boiled eggs, greek yogurt, cheese, etc. Will continue to follow per RD protocol.

## 2020-01-17 NOTE — PHYSICAL THERAPY INITIAL EVALUATION ADULT - ADDITIONAL COMMENTS
Patient lives in elevator building with HHA 24 x 7. Ambulated with RW PTA. Household ambulator. Has her RW at bedside. reports 2 recent falls

## 2020-01-17 NOTE — DIETITIAN INITIAL EVALUATION ADULT. - ADD RECOMMEND
1. Recommend addition of appetite stimulant (ie Megace). Recommend MVI 2. Honor food preferences and encourage intake 3. Monitor lytes and replete prn. 4. Pain and bowel regimens per team

## 2020-01-17 NOTE — CHART NOTE - NSCHARTNOTEFT_GEN_A_CORE
Upon Nutritional Assessment by the Registered Dietitian your patient was determined to meet criteria / has evidence of the following diagnosis/diagnoses:          [ ]  Mild Protein Calorie Malnutrition        [ ]  Moderate Protein Calorie Malnutrition        [X ] Severe Protein Calorie Malnutrition        [ ] Unspecified Protein Calorie Malnutrition        [X ] Underweight / BMI <19        [ ] Morbid Obesity / BMI > 40      Findings as based on:  •  Comprehensive nutrition assessment and consultation    Per physical assessment: Muscle Wasting- Temporal [ X  ]  Clavicle/Pectoral [ X  ]  Shoulder/Deltoid [ X  ]  Scapula [ X  ]  Interosseous [ X  ]  Quadriceps [ X  ]  Gastrocnemius [   ]; Fat Wasting- Orbital [  X ]  Buccal [ X  ]  Triceps [ X  ]  Rib [ X  ]--> Suspect severe protein-calorie malnutrition 2/2 to physical assessment, inadequate energy intake of <75% for >1 month    Treatment:    The following diet has been recommended:    Continue w/regular diet and encourage PO intake w/focus on high protein options  Consider addition of appetite stimulant  Recommend MVI     PROVIDER Section:     By signing this assessment you are acknowledging and agree with the diagnosis/diagnoses assigned by the Registered Dietitian    Comments:

## 2020-01-17 NOTE — CONSULT NOTE ADULT - SUBJECTIVE AND OBJECTIVE BOX
Patient is a 79y old  Female who presents with a chief complaint of UTI (17 Jan 2020 13:42)       HPI:  78F with PMHx of HTN, myeloproliferative disorder, DVT/PE (2015), chronic UTIs and hydronephrosis who presents to ED for c/o generalized weakness. Patient reports she usually ambulates around her apt with a walker, and has needed more assistance from her aide than usual over the last few days. Her aide, who accompanies her to the ED today, states she also noticed the patient has had foul-smelling urine today. Dr. Zapata suggested she present to the ED today.     Vitals: 98.9 (rectal), 100 bpm, 203/126, 18, 95% on room air. Labs notable for WBC 21.2 (87.8% PMNs, no immature granulocytes), Hgb 15.3, Plt 406. Patient received CTX 1 g, NS 1 L. (16 Jan 2020 17:54)      PAST MEDICAL & SURGICAL HISTORY:  Myeloproliferative disorder  Hypertension  PE (pulmonary thromboembolism): 2015  Tremor  Vestibular disequilibrium  S/P wrist surgery  S/P hysterectomy      MEDICATIONS  (STANDING):  amLODIPine   Tablet 2.5 milliGRAM(s) Oral daily  atorvastatin 40 milliGRAM(s) Oral daily  cefTRIAXone Injectable. 2000 milliGRAM(s) IV Push every 24 hours  folic acid 1 milliGRAM(s) Oral daily  rivaroxaban 20 milliGRAM(s) Oral daily    MEDICATIONS  (PRN):  acetaminophen   Tablet .. 650 milliGRAM(s) Oral every 6 hours PRN Moderate Pain (4 - 6)  calcium carbonate    500 mG (Tums) Chewable 3 Tablet(s) Chew every 6 hours PRN Dyspepsia  melatonin 5 milliGRAM(s) Oral at bedtime PRN Sleep      Social History:           -  Occupation: X           -  Home Living Status: lives alone in an elevator accessible apartment building           -  Prior Home Care Services: has private hire live in aide    Functional Level Prior to Admission:           - ADL's: states she is independent           - ambulates with a walker    FAMILY HISTORY:  No pertinent family history in first degree relatives      CBC Full  -  ( 17 Jan 2020 05:38 )  WBC Count : 20.84 K/uL  RBC Count : 5.15 M/uL  Hemoglobin : 14.4 g/dL  Hematocrit : 45.7 %  Platelet Count - Automated : 362 K/uL  Mean Cell Volume : 88.7 fl  Mean Cell Hemoglobin : 28.0 pg  Mean Cell Hemoglobin Concentration : 31.5 gm/dL  Auto Neutrophil # : 18.07 K/uL  Auto Lymphocyte # : 1.09 K/uL  Auto Monocyte # : 0.81 K/uL  Auto Eosinophil # : 0.30 K/uL  Auto Basophil # : 0.34 K/uL  Auto Neutrophil % : 86.8 %  Auto Lymphocyte % : 5.2 %  Auto Monocyte % : 3.9 %  Auto Eosinophil % : 1.4 %  Auto Basophil % : 1.6 %      01-17    144  |  107  |  10  ----------------------------<  96  3.3<L>   |  23  |  0.56    Ca    9.4      17 Jan 2020 05:38  Mg     2.1     01-17    TPro  6.6  /  Alb  4.4  /  TBili  0.4  /  DBili  x   /  AST  14  /  ALT  8<L>  /  AlkPhos  101  01-16      Urinalysis Basic - ( 16 Jan 2020 16:12 )    Color: Yellow / Appearance: SL Cloudy / SG: >=1.030 / pH: x  Gluc: x / Ketone: NEGATIVE  / Bili: Negative / Urobili: 0.2 E.U./dL   Blood: x / Protein: 100 mg/dL / Nitrite: POSITIVE   Leuk Esterase: Trace / RBC: > 10 /HPF / WBC Many /HPF   Sq Epi: x / Non Sq Epi: 5-10 /HPF / Bacteria: Present /HPF          Radiology:    < from: Xray Chest 1 View- PORTABLE-Urgent (01.16.20 @ 16:40) >  EXAM:  XR CHEST PORTABLE URGENT 1V                          PROCEDURE DATE:  01/16/2020          INTERPRETATION:  Clinical History: Weakness    Frontal examination of the chest demonstrates the heart to be within normal limits in transverse diameter. Mild degenerative changes thoracic spine. Mild chronic interstitial changes. Calcification aortic knob.    Impression: No acute infiltrates                  Vital Signs Last 24 Hrs  T(C): 36.4 (17 Jan 2020 12:39), Max: 37.2 (16 Jan 2020 17:36)  T(F): 97.5 (17 Jan 2020 12:39), Max: 98.9 (16 Jan 2020 17:36)  HR: 71 (17 Jan 2020 12:39) (71 - 83)  BP: 142/89 (17 Jan 2020 12:39) (130/83 - 194/98)  BP(mean): --  RR: 19 (17 Jan 2020 12:39) (17 - 19)  SpO2: 98% (17 Jan 2020 12:39) (95% - 98%)    REVIEW OF SYSTEMS:    CONSTITUTIONAL:  fatigue  EYES: No eye pain, visual disturbances, or discharge  ENMT:  No difficulty hearing, tinnitus, vertigo; No sinus or throat pain  NECK: No pain or stiffness  BREASTS: No pain, masses, or nipple discharge  RESPIRATORY: No cough, wheezing, chills or hemoptysis; No shortness of breath  CARDIOVASCULAR: No chest pain, palpitations, dizziness, or leg swelling  GASTROINTESTINAL: No abdominal or epigastric pain. No nausea, vomiting, or hematemesis; No diarrhea or constipation. No melena or hematochezia.  GENITOURINARY: No dysuria, frequency, hematuria, or incontinence  NEUROLOGICAL: No headaches, memory loss, loss of strength, numbness, or tremors  SKIN: No itching, burning, rashes, or lesions   LYMPH NODES: No enlarged glands  ENDOCRINE: No heat or cold intolerance; No hair loss  MUSCULOSKELETAL: No joint pain or swelling; No muscle, back, or extremity pain  PSYCHIATRIC: No depression, anxiety, mood swings, or difficulty sleeping  HEME/LYMPH: No easy bruising, or bleeding gums  ALLERGY AND IMMUNOLOGIC: No hives or eczema  VASCULAR: no swelling, erythema        Physical Exam: pleasant WDWN 78 yo  woman lying in semi Del Real's position, c/o feeling a little tired    Head: normocephalic, atraumatic    Eyes: PERRLA, EOMI, no nystagmus, sclera anicteric    ENT: nasal discharge, uvula midline, no oropharyngeal erythema/exudate    Neck: supple, negative JVD, negative carotid bruits, no thyromegaly    Chest: CTA bilaterally, neg wheeze/ rhonchi/ rales/ crackles/ egophany    Cardiovascular: regular rate and rhythm, neg murmurs/rubs/gallops    Abdomen: soft, non distended, non tender, negative rebound/guarding, normal bowel sounds, neg hepatosplenomegaly    Extremities: WWP, neg cyanosis/clubbing/edema, negative calf tenderness to palpation, negative Benita's sign    :     Neurologic Exam:    Alert and oriented to person, place, date/year, speech fluent w/o dysarthria, recent and remote memory intact, repetition intact, comprehension intact    Cranial Nerves:     II:                       pupils equal, round and reactive to light, visual fields intact   III/ IV/VI:            extraocular movements intact, neg nystagmus, neg ptosis  V:                       facial sensation intact, V1-3 normal  VII:                     face symmetric, no droop, normal eye closure and smile  VIII:                    hearing intact to finger rub bilaterally  IX/ X:                 soft palate rise symmetrical  XI:                      head turning, shoulder shrug normal  XII:                     tongue midline    Motor Exam:    Upper Extremities:     RIght:   no focal weakness               negative drift    Left :    no focal weakness               negative drift    Lower Extremities:                 Right:   no focal weakness                 Left:      no focal weakness                   Sensory:    intact to LT/PP in all UE/LE dermatomes    DTR:            = biceps/     triceps/     brachioradialis                      = patella/   medial hamstring/ankle                      neg clonus                      neg Babinski                        Gait:  not tested        PM&R Impression:    1) deconditioned  2) no focal weakness  3) UTI        Recommendations:    1) Physical therapy focusing on therapeutic exercises, bed mobility/transfer out of bed evaluation, progressive ambulation with assistive devices prn.    2) Disposition Plan/Recs: d/c home, home physical therapy for reconditioning, resume home care services

## 2020-01-18 LAB
ANION GAP SERPL CALC-SCNC: 13 MMOL/L — SIGNIFICANT CHANGE UP (ref 5–17)
BASOPHILS # BLD AUTO: 0.36 K/UL — HIGH (ref 0–0.2)
BASOPHILS NFR BLD AUTO: 1.6 % — SIGNIFICANT CHANGE UP (ref 0–2)
BUN SERPL-MCNC: 27 MG/DL — HIGH (ref 7–23)
CALCIUM SERPL-MCNC: 9.8 MG/DL — SIGNIFICANT CHANGE UP (ref 8.4–10.5)
CHLORIDE SERPL-SCNC: 108 MMOL/L — SIGNIFICANT CHANGE UP (ref 96–108)
CO2 SERPL-SCNC: 20 MMOL/L — LOW (ref 22–31)
CREAT SERPL-MCNC: 0.67 MG/DL — SIGNIFICANT CHANGE UP (ref 0.5–1.3)
EOSINOPHIL # BLD AUTO: 0.32 K/UL — SIGNIFICANT CHANGE UP (ref 0–0.5)
EOSINOPHIL NFR BLD AUTO: 1.4 % — SIGNIFICANT CHANGE UP (ref 0–6)
GLUCOSE SERPL-MCNC: 114 MG/DL — HIGH (ref 70–99)
HCT VFR BLD CALC: 48.1 % — HIGH (ref 34.5–45)
HGB BLD-MCNC: 15 G/DL — SIGNIFICANT CHANGE UP (ref 11.5–15.5)
IMM GRANULOCYTES NFR BLD AUTO: 1.2 % — SIGNIFICANT CHANGE UP (ref 0–1.5)
LYMPHOCYTES # BLD AUTO: 1.33 K/UL — SIGNIFICANT CHANGE UP (ref 1–3.3)
LYMPHOCYTES # BLD AUTO: 5.9 % — LOW (ref 13–44)
MAGNESIUM SERPL-MCNC: 2.3 MG/DL — SIGNIFICANT CHANGE UP (ref 1.6–2.6)
MCHC RBC-ENTMCNC: 27.7 PG — SIGNIFICANT CHANGE UP (ref 27–34)
MCHC RBC-ENTMCNC: 31.2 GM/DL — LOW (ref 32–36)
MCV RBC AUTO: 88.9 FL — SIGNIFICANT CHANGE UP (ref 80–100)
MONOCYTES # BLD AUTO: 0.99 K/UL — HIGH (ref 0–0.9)
MONOCYTES NFR BLD AUTO: 4.4 % — SIGNIFICANT CHANGE UP (ref 2–14)
NEUTROPHILS # BLD AUTO: 19.29 K/UL — HIGH (ref 1.8–7.4)
NEUTROPHILS NFR BLD AUTO: 85.5 % — HIGH (ref 43–77)
NRBC # BLD: 0 /100 WBCS — SIGNIFICANT CHANGE UP (ref 0–0)
PLATELET # BLD AUTO: 370 K/UL — SIGNIFICANT CHANGE UP (ref 150–400)
POTASSIUM SERPL-MCNC: 4.5 MMOL/L — SIGNIFICANT CHANGE UP (ref 3.5–5.3)
POTASSIUM SERPL-SCNC: 4.5 MMOL/L — SIGNIFICANT CHANGE UP (ref 3.5–5.3)
RBC # BLD: 5.41 M/UL — HIGH (ref 3.8–5.2)
RBC # FLD: 17.5 % — HIGH (ref 10.3–14.5)
SODIUM SERPL-SCNC: 141 MMOL/L — SIGNIFICANT CHANGE UP (ref 135–145)
WBC # BLD: 22.55 K/UL — HIGH (ref 3.8–10.5)
WBC # FLD AUTO: 22.55 K/UL — HIGH (ref 3.8–10.5)

## 2020-01-18 RX ADMIN — AMLODIPINE BESYLATE 2.5 MILLIGRAM(S): 2.5 TABLET ORAL at 05:43

## 2020-01-18 RX ADMIN — Medication 1 MILLIGRAM(S): at 12:12

## 2020-01-18 RX ADMIN — RIVAROXABAN 20 MILLIGRAM(S): KIT at 12:12

## 2020-01-18 RX ADMIN — Medication 5 MILLIGRAM(S): at 21:39

## 2020-01-18 RX ADMIN — ATORVASTATIN CALCIUM 40 MILLIGRAM(S): 80 TABLET, FILM COATED ORAL at 21:39

## 2020-01-18 RX ADMIN — CEFTRIAXONE 2000 MILLIGRAM(S): 500 INJECTION, POWDER, FOR SOLUTION INTRAMUSCULAR; INTRAVENOUS at 12:12

## 2020-01-18 NOTE — PROGRESS NOTE ADULT - SUBJECTIVE AND OBJECTIVE BOX
Patient now on Xarelto and ABs for the UTI.    Ayshatent alert at present and is in bed, afebrile.    I will follow the patient with the staff.    In the past the patient has been on Jakafi but is not on it now.    Continue to monitor the patient at present and I understand she is slated to be discharged later next week.

## 2020-01-18 NOTE — PROGRESS NOTE ADULT - SUBJECTIVE AND OBJECTIVE BOX
coverage Dr Tapia    Pt seen and examined   denies dysuria  no complaints    REVIEW OF SYSTEMS:  Constitutional: No fever, weight loss or fatigue  Cardiovascular: No chest pain, palpitations, dizziness or leg swelling  Gastrointestinal: No abdominal or epigastric pain. No nausea, vomiting or hematemesis; No diarrhea  Skin: No itching, burning, rashes or lesions       MEDICATIONS:  MEDICATIONS  (STANDING):  amLODIPine   Tablet 2.5 milliGRAM(s) Oral daily  atorvastatin 40 milliGRAM(s) Oral daily  cefTRIAXone Injectable. 2000 milliGRAM(s) IV Push every 24 hours  folic acid 1 milliGRAM(s) Oral daily  rivaroxaban 20 milliGRAM(s) Oral daily    MEDICATIONS  (PRN):  acetaminophen   Tablet .. 650 milliGRAM(s) Oral every 6 hours PRN Moderate Pain (4 - 6)  calcium carbonate    500 mG (Tums) Chewable 3 Tablet(s) Chew every 6 hours PRN Dyspepsia  melatonin 5 milliGRAM(s) Oral at bedtime PRN Sleep      Allergies    No Known Allergies    Intolerances        Vital Signs Last 24 Hrs  T(C): 36.3 (18 Jan 2020 13:45), Max: 36.5 (18 Jan 2020 05:35)  T(F): 97.3 (18 Jan 2020 13:45), Max: 97.7 (18 Jan 2020 05:35)  HR: 77 (18 Jan 2020 13:45) (68 - 77)  BP: 159/94 (18 Jan 2020 13:45) (159/94 - 169/109)  BP(mean): --  RR: 18 (18 Jan 2020 13:45) (18 - 18)  SpO2: 95% (18 Jan 2020 13:45) (95% - 96%)      PHYSICAL EXAM:    General: thin in no acute distress  HEENT: MMM, conjunctiva and sclera clear  Lungs: clear  Heart: regular  Gastrointestinal: Soft non-tender non-distended; Normal bowel sounds;   Skin: Warm and dry. No obvious rash    LABS:      CBC Full  -  ( 18 Jan 2020 05:37 )  WBC Count : 22.55 K/uL  RBC Count : 5.41 M/uL  Hemoglobin : 15.0 g/dL  Hematocrit : 48.1 %  Platelet Count - Automated : 370 K/uL  Mean Cell Volume : 88.9 fl  Mean Cell Hemoglobin : 27.7 pg  Mean Cell Hemoglobin Concentration : 31.2 gm/dL  Auto Neutrophil # : 19.29 K/uL  Auto Lymphocyte # : 1.33 K/uL  Auto Monocyte # : 0.99 K/uL  Auto Eosinophil # : 0.32 K/uL  Auto Basophil # : 0.36 K/uL  Auto Neutrophil % : 85.5 %  Auto Lymphocyte % : 5.9 %  Auto Monocyte % : 4.4 %  Auto Eosinophil % : 1.4 %  Auto Basophil % : 1.6 %    01-18    141  |  108  |  27<H>  ----------------------------<  114<H>  4.5   |  20<L>  |  0.67    Ca    9.8      18 Jan 2020 05:37  Mg     2.3     01-18    TPro  6.6  /  Alb  4.4  /  TBili  0.4  /  DBili  x   /  AST  14  /  ALT  8<L>  /  AlkPhos  101  01-16          Urinalysis Basic - ( 16 Jan 2020 16:12 )    Color: Yellow / Appearance: SL Cloudy / SG: >=1.030 / pH: x  Gluc: x / Ketone: NEGATIVE  / Bili: Negative / Urobili: 0.2 E.U./dL   Blood: x / Protein: 100 mg/dL / Nitrite: POSITIVE   Leuk Esterase: Trace / RBC: > 10 /HPF / WBC Many /HPF   Sq Epi: x / Non Sq Epi: 5-10 /HPF / Bacteria: Present /HPF    Culture - Urine (07.26.18 @ 14:38)    -  Gentamicin: S <=1    -  Ciprofloxacin: S <=0.5    -  Nitrofurantoin: S <=32 Should not be used to treat pyelonephritis    -  Piperacillin/Tazobactam: S <=8    -  Tobramycin: S <=2    -  Trimethoprim/Sulfamethoxazole: S <=0.5/9.5    -  Ampicillin/Sulbactam: S <=4/2    -  Ampicillin: S <=2 These ampicillin results predict results for amoxicillin    -  Cefazolin: S <=2 This predicts results for oral agents cefaclor, cefdinir, cefpodoxime, cefprozil, cefuroxime axetil, cephalexin and locarbef for uncomplicated UTI. Note that some isolates may be susceptible to these agents while testing resistant to cefazolin.    -  Ceftriaxone: S <=1 Enterobacter, Citrobacter, and Serratia may develop resistance during prolonged therapy    Specimen Source: .Urine Clean Catch (Midstream)    Culture Results:   >100,000 CFU/ml Escherichia coli    Organism Identification: Escherichia coli    Organism: Escherichia coli    Method Type: NIKITA                RADIOLOGY & ADDITIONAL STUDIES (The following images were personally reviewed):

## 2020-01-18 NOTE — PROGRESS NOTE ADULT - SUBJECTIVE AND OBJECTIVE BOX
ANDREW STUART  79y  Female    Patient is a 79y old  Female who presents with a chief complaint of UTI (17 Jan 2020 18:04)      INTERVAL HPI/OVERNIGHT EVENTS: No complaints this morning. Denies any urinary symptoms.         T(C): 36.5 (01-18-20 @ 05:35), Max: 36.5 (01-18-20 @ 05:35)  HR: 76 (01-18-20 @ 05:35) (68 - 83)  BP: 169/109 (01-18-20 @ 05:35) (130/83 - 169/109)  RR: 18 (01-18-20 @ 05:35) (18 - 19)  SpO2: 95% (01-18-20 @ 05:35) (95% - 98%)  Wt(kg): --Vital Signs Last 24 Hrs  T(C): 36.5 (18 Jan 2020 05:35), Max: 36.5 (18 Jan 2020 05:35)  T(F): 97.7 (18 Jan 2020 05:35), Max: 97.7 (18 Jan 2020 05:35)  HR: 76 (18 Jan 2020 05:35) (68 - 83)  BP: 169/109 (18 Jan 2020 05:35) (130/83 - 169/109)  BP(mean): --  RR: 18 (18 Jan 2020 05:35) (18 - 19)  SpO2: 95% (18 Jan 2020 05:35) (95% - 98%)    PHYSICAL EXAM:  GENERAL: NAD  ENMT: Moist mucous membranes  NECK: Supple, No JVD  NERVOUS SYSTEM: Alert & Oriented X3  CHEST/LUNG: Clear to auscultation bilaterally; No rales, rhonchi, wheezing, or rubs  HEART: Regular rate and rhythm; No murmurs, rubs, or gallops  ABDOMEN: Soft, Nontender, Nondistended; Bowel sounds present  EXTREMITIES: WWP, No clubbing, cyanosis, or edema    Consultant(s) Notes Reviewed:  [x ] YES  [ ] NO  Care Discussed with Consultants/Other Providers [ x] YES  [ ] NO    LABS:                        15.0   22.55 )-----------( 370      ( 18 Jan 2020 05:37 )             48.1     01-18    141  |  108  |  27<H>  ----------------------------<  114<H>  4.5   |  20<L>  |  0.67    Ca    9.8      18 Jan 2020 05:37  Mg     2.3     01-18    TPro  6.6  /  Alb  4.4  /  TBili  0.4  /  DBili  x   /  AST  14  /  ALT  8<L>  /  AlkPhos  101  01-16        Urinalysis Basic - ( 16 Jan 2020 16:12 )    Color: Yellow / Appearance: SL Cloudy / SG: >=1.030 / pH: x  Gluc: x / Ketone: NEGATIVE  / Bili: Negative / Urobili: 0.2 E.U./dL   Blood: x / Protein: 100 mg/dL / Nitrite: POSITIVE   Leuk Esterase: Trace / RBC: > 10 /HPF / WBC Many /HPF   Sq Epi: x / Non Sq Epi: 5-10 /HPF / Bacteria: Present /HPF      CAPILLARY BLOOD GLUCOSE            Urinalysis Basic - ( 16 Jan 2020 16:12 )    Color: Yellow / Appearance: SL Cloudy / SG: >=1.030 / pH: x  Gluc: x / Ketone: NEGATIVE  / Bili: Negative / Urobili: 0.2 E.U./dL   Blood: x / Protein: 100 mg/dL / Nitrite: POSITIVE   Leuk Esterase: Trace / RBC: > 10 /HPF / WBC Many /HPF   Sq Epi: x / Non Sq Epi: 5-10 /HPF / Bacteria: Present /HPF        RADIOLOGY & ADDITIONAL TESTS:    Imaging Personally Reviewed:  [ ] YES  [ ] NO    HEALTH ISSUES - PROBLEM Dx:

## 2020-01-18 NOTE — PROGRESS NOTE ADULT - PROBLEM SELECTOR PLAN 1
POA tachy 100 and leukocytosis 21.2 (pt baseline WBC 15-17 in setting of MPD) with positive UA. s/p 1L IVF in ED. s/p CTX 1g. lactate wnl. Bcx negative  -2g CFTX q24 as per ID   -f/u susceptibilities

## 2020-01-19 LAB
-  AMPICILLIN/SULBACTAM: SIGNIFICANT CHANGE UP
-  AMPICILLIN: SIGNIFICANT CHANGE UP
-  CEFAZOLIN: SIGNIFICANT CHANGE UP
-  CIPROFLOXACIN: SIGNIFICANT CHANGE UP
-  GENTAMICIN: SIGNIFICANT CHANGE UP
-  MEROPENEM: SIGNIFICANT CHANGE UP
-  NITROFURANTOIN: SIGNIFICANT CHANGE UP
-  TOBRAMYCIN: SIGNIFICANT CHANGE UP
-  TRIMETHOPRIM/SULFAMETHOXAZOLE: SIGNIFICANT CHANGE UP
ANION GAP SERPL CALC-SCNC: 13 MMOL/L — SIGNIFICANT CHANGE UP (ref 5–17)
BASOPHILS # BLD AUTO: 0.35 K/UL — HIGH (ref 0–0.2)
BASOPHILS NFR BLD AUTO: 1.6 % — SIGNIFICANT CHANGE UP (ref 0–2)
BUN SERPL-MCNC: 30 MG/DL — HIGH (ref 7–23)
CALCIUM SERPL-MCNC: 9.7 MG/DL — SIGNIFICANT CHANGE UP (ref 8.4–10.5)
CHLORIDE SERPL-SCNC: 105 MMOL/L — SIGNIFICANT CHANGE UP (ref 96–108)
CO2 SERPL-SCNC: 22 MMOL/L — SIGNIFICANT CHANGE UP (ref 22–31)
CREAT SERPL-MCNC: 0.69 MG/DL — SIGNIFICANT CHANGE UP (ref 0.5–1.3)
EOSINOPHIL # BLD AUTO: 0.33 K/UL — SIGNIFICANT CHANGE UP (ref 0–0.5)
EOSINOPHIL NFR BLD AUTO: 1.5 % — SIGNIFICANT CHANGE UP (ref 0–6)
GLUCOSE SERPL-MCNC: 109 MG/DL — HIGH (ref 70–99)
HCT VFR BLD CALC: 45.7 % — HIGH (ref 34.5–45)
HGB BLD-MCNC: 14.2 G/DL — SIGNIFICANT CHANGE UP (ref 11.5–15.5)
IMM GRANULOCYTES NFR BLD AUTO: 1.2 % — SIGNIFICANT CHANGE UP (ref 0–1.5)
LYMPHOCYTES # BLD AUTO: 1.44 K/UL — SIGNIFICANT CHANGE UP (ref 1–3.3)
LYMPHOCYTES # BLD AUTO: 6.4 % — LOW (ref 13–44)
MAGNESIUM SERPL-MCNC: 2.1 MG/DL — SIGNIFICANT CHANGE UP (ref 1.6–2.6)
MCHC RBC-ENTMCNC: 27.6 PG — SIGNIFICANT CHANGE UP (ref 27–34)
MCHC RBC-ENTMCNC: 31.1 GM/DL — LOW (ref 32–36)
MCV RBC AUTO: 88.9 FL — SIGNIFICANT CHANGE UP (ref 80–100)
METHOD TYPE: SIGNIFICANT CHANGE UP
MONOCYTES # BLD AUTO: 1.06 K/UL — HIGH (ref 0–0.9)
MONOCYTES NFR BLD AUTO: 4.7 % — SIGNIFICANT CHANGE UP (ref 2–14)
NEUTROPHILS # BLD AUTO: 19.06 K/UL — HIGH (ref 1.8–7.4)
NEUTROPHILS NFR BLD AUTO: 84.6 % — HIGH (ref 43–77)
NRBC # BLD: 0 /100 WBCS — SIGNIFICANT CHANGE UP (ref 0–0)
PLATELET # BLD AUTO: 385 K/UL — SIGNIFICANT CHANGE UP (ref 150–400)
POTASSIUM SERPL-MCNC: 4 MMOL/L — SIGNIFICANT CHANGE UP (ref 3.5–5.3)
POTASSIUM SERPL-SCNC: 4 MMOL/L — SIGNIFICANT CHANGE UP (ref 3.5–5.3)
RBC # BLD: 5.14 M/UL — SIGNIFICANT CHANGE UP (ref 3.8–5.2)
RBC # FLD: 17.5 % — HIGH (ref 10.3–14.5)
SODIUM SERPL-SCNC: 140 MMOL/L — SIGNIFICANT CHANGE UP (ref 135–145)
WBC # BLD: 22.51 K/UL — HIGH (ref 3.8–10.5)
WBC # FLD AUTO: 22.51 K/UL — HIGH (ref 3.8–10.5)

## 2020-01-19 RX ORDER — AMLODIPINE BESYLATE 2.5 MG/1
2.5 TABLET ORAL ONCE
Refills: 0 | Status: COMPLETED | OUTPATIENT
Start: 2020-01-19 | End: 2020-01-19

## 2020-01-19 RX ORDER — ERTAPENEM SODIUM 1 G/1
1000 INJECTION, POWDER, LYOPHILIZED, FOR SOLUTION INTRAMUSCULAR; INTRAVENOUS EVERY 24 HOURS
Refills: 0 | Status: COMPLETED | OUTPATIENT
Start: 2020-01-19 | End: 2020-01-19

## 2020-01-19 RX ORDER — AMLODIPINE BESYLATE 2.5 MG/1
5 TABLET ORAL DAILY
Refills: 0 | Status: DISCONTINUED | OUTPATIENT
Start: 2020-01-20 | End: 2020-01-27

## 2020-01-19 RX ORDER — ERTAPENEM SODIUM 1 G/1
1000 INJECTION, POWDER, LYOPHILIZED, FOR SOLUTION INTRAMUSCULAR; INTRAVENOUS EVERY 24 HOURS
Refills: 0 | Status: DISCONTINUED | OUTPATIENT
Start: 2020-01-20 | End: 2020-01-22

## 2020-01-19 RX ADMIN — Medication 1 MILLIGRAM(S): at 12:09

## 2020-01-19 RX ADMIN — ERTAPENEM SODIUM 120 MILLIGRAM(S): 1 INJECTION, POWDER, LYOPHILIZED, FOR SOLUTION INTRAMUSCULAR; INTRAVENOUS at 14:23

## 2020-01-19 RX ADMIN — ATORVASTATIN CALCIUM 40 MILLIGRAM(S): 80 TABLET, FILM COATED ORAL at 22:16

## 2020-01-19 RX ADMIN — AMLODIPINE BESYLATE 2.5 MILLIGRAM(S): 2.5 TABLET ORAL at 05:30

## 2020-01-19 RX ADMIN — RIVAROXABAN 20 MILLIGRAM(S): KIT at 12:09

## 2020-01-19 RX ADMIN — AMLODIPINE BESYLATE 2.5 MILLIGRAM(S): 2.5 TABLET ORAL at 22:16

## 2020-01-19 RX ADMIN — CEFTRIAXONE 2000 MILLIGRAM(S): 500 INJECTION, POWDER, FOR SOLUTION INTRAMUSCULAR; INTRAVENOUS at 13:12

## 2020-01-19 RX ADMIN — AMLODIPINE BESYLATE 2.5 MILLIGRAM(S): 2.5 TABLET ORAL at 12:09

## 2020-01-19 NOTE — PROGRESS NOTE ADULT - ASSESSMENT
UTI  Suspected ESBL-producing E coli  MDS / Immunocompromised  Needs to rule out urinary retention      RECOMMEND  D/C Ceftriaxone  Start Ertapenem 1 gm now, then dose per renal function  Bladder scans to rule out retention.  If not feasible, please order sonogram of kidney and bladder UTI  Suspected ESBL-producing E coli  MDS / Immunocompromised  Needs to rule out urinary retention      RECOMMEND  D/C Ceftriaxone  Start Ertapenem 1 gm now, then dose per renal function  Bladder scans to rule out retention.  If not feasible, please order sonogram of kidney and bladder  Contact precautions / Please contact and confirm with Infection Control nurse

## 2020-01-19 NOTE — PROGRESS NOTE ADULT - SUBJECTIVE AND OBJECTIVE BOX
INTERVAL HPI/OVERNIGHT EVENTS:    ANTIBIOTICS/RELEVANT:    MEDICATIONS  (STANDING):  atorvastatin 40 milliGRAM(s) Oral daily  cefTRIAXone Injectable. 2000 milliGRAM(s) IV Push every 24 hours  folic acid 1 milliGRAM(s) Oral daily  rivaroxaban 20 milliGRAM(s) Oral daily    MEDICATIONS  (PRN):  acetaminophen   Tablet .. 650 milliGRAM(s) Oral every 6 hours PRN Moderate Pain (4 - 6)  calcium carbonate    500 mG (Tums) Chewable 3 Tablet(s) Chew every 6 hours PRN Dyspepsia  melatonin 5 milliGRAM(s) Oral at bedtime PRN Sleep      Allergies    No Known Allergies    Intolerances        Vital Signs Last 24 Hrs  T(C): 36.3 (19 Jan 2020 12:09), Max: 36.8 (19 Jan 2020 05:28)  T(F): 97.4 (19 Jan 2020 12:09), Max: 98.2 (19 Jan 2020 05:28)  HR: 86 (19 Jan 2020 12:09) (75 - 86)  BP: 150/95 (19 Jan 2020 12:09) (150/95 - 163/97)  BP(mean): --  RR: 18 (19 Jan 2020 12:09) (18 - 19)  SpO2: 93% (19 Jan 2020 12:09) (92% - 95%)    PHYSICAL EXAM:      Constitutional:    Eyes:    ENMT:    Neck:    Breasts:    Back:    Respiratory:    Cardiovascular:    Gastrointestinal:    Genitourinary:    Rectal:    Extremities:    Vascular:    Neurological:    Skin:    Lymph Nodes:    Musculoskeletal:    Psychiatric:        LABS:                        14.2   22.51 )-----------( 385      ( 19 Jan 2020 05:39 )             45.7     01-19    140  |  105  |  30<H>  ----------------------------<  109<H>  4.0   |  22  |  0.69    Ca    9.7      19 Jan 2020 05:39  Mg     2.1     01-19            MICROBIOLOGY:    RADIOLOGY & ADDITIONAL STUDIES: INTERVAL HPI/OVERNIGHT EVENTS:    Unchanged  Still weak  No diarrhea    MEDICATIONS  (STANDING):  atorvastatin 40 milliGRAM(s) Oral daily  cefTRIAXone Injectable. 2000 milliGRAM(s) IV Push every 24 hours  folic acid 1 milliGRAM(s) Oral daily  rivaroxaban 20 milliGRAM(s) Oral daily    MEDICATIONS  (PRN):  acetaminophen   Tablet .. 650 milliGRAM(s) Oral every 6 hours PRN Moderate Pain (4 - 6)  calcium carbonate    500 mG (Tums) Chewable 3 Tablet(s) Chew every 6 hours PRN Dyspepsia  melatonin 5 milliGRAM(s) Oral at bedtime PRN Sleep      Allergies    No Known Allergies    EXAM  Vital Signs Last 24 Hrs  T(C): 36.3 (19 Jan 2020 12:09), Max: 36.8 (19 Jan 2020 05:28)  T(F): 97.4 (19 Jan 2020 12:09), Max: 98.2 (19 Jan 2020 05:28)  HR: 86 (19 Jan 2020 12:09) (75 - 86)  BP: 150/95 (19 Jan 2020 12:09) (150/95 - 163/97)  BP(mean): --  RR: 18 (19 Jan 2020 12:09) (18 - 19)  SpO2: 93% (19 Jan 2020 12:09) (92% - 95%)    Unchanged    LABS:                        14.2   22.51 )-----------( 385      ( 19 Jan 2020 05:39 )             45.7     01-19    140  |  105  |  30<H>  ----------------------------<  109<H>  4.0   |  22  |  0.69    Ca    9.7      19 Jan 2020 05:39  Mg     2.1     01-19            MICROBIOLOGY:    Culture - Urine (01.16.20 @ 16:45)    Specimen Source: .Urine Clean Catch (Midstream)    Culture Results:   >100,000 CFU/ml Escherichia coli  Susceptibility to follow.  Culture in progress    Susceptibility testing still pending but [facility] suspects ESBL      Blood cultures negative

## 2020-01-19 NOTE — PROGRESS NOTE ADULT - SUBJECTIVE AND OBJECTIVE BOX
coverage Dr Tapia    Pt seen and examined   no complaints    REVIEW OF SYSTEMS:  Constitutional: No fever,   Cardiovascular: No chest pain, palpitations, dizziness or leg swelling  Gastrointestinal: No abdominal or epigastric pain. No nausea, vomiting or hematemesis; No diarrhea   Skin: No itching, burning, rashes or lesions       MEDICATIONS:  MEDICATIONS  (STANDING):  amLODIPine   Tablet 2.5 milliGRAM(s) Oral once  atorvastatin 40 milliGRAM(s) Oral daily  cefTRIAXone Injectable. 2000 milliGRAM(s) IV Push every 24 hours  folic acid 1 milliGRAM(s) Oral daily  rivaroxaban 20 milliGRAM(s) Oral daily    MEDICATIONS  (PRN):  acetaminophen   Tablet .. 650 milliGRAM(s) Oral every 6 hours PRN Moderate Pain (4 - 6)  calcium carbonate    500 mG (Tums) Chewable 3 Tablet(s) Chew every 6 hours PRN Dyspepsia  melatonin 5 milliGRAM(s) Oral at bedtime PRN Sleep      Allergies    No Known Allergies    Intolerances        Vital Signs Last 24 Hrs  T(C): 36.8 (19 Jan 2020 05:28), Max: 36.8 (19 Jan 2020 05:28)  T(F): 98.2 (19 Jan 2020 05:28), Max: 98.2 (19 Jan 2020 05:28)  HR: 75 (19 Jan 2020 05:28) (75 - 77)  BP: 163/97 (19 Jan 2020 05:28) (151/94 - 163/97)  BP(mean): --  RR: 19 (19 Jan 2020 05:28) (18 - 19)  SpO2: 95% (19 Jan 2020 05:28) (92% - 95%)      PHYSICAL EXAM:    General:  in no acute distress  HEENT: MMM, conjunctiva and sclera clear  Lungs: clear  Heart: regular  Gastrointestinal: Soft non-tender non-distended; Normal bowel sounds;   Skin: Warm and dry. No obvious rash    LABS:      CBC Full  -  ( 19 Jan 2020 05:39 )  WBC Count : 22.51 K/uL  RBC Count : 5.14 M/uL  Hemoglobin : 14.2 g/dL  Hematocrit : 45.7 %  Platelet Count - Automated : 385 K/uL  Mean Cell Volume : 88.9 fl  Mean Cell Hemoglobin : 27.6 pg  Mean Cell Hemoglobin Concentration : 31.1 gm/dL  Auto Neutrophil # : 19.06 K/uL  Auto Lymphocyte # : 1.44 K/uL  Auto Monocyte # : 1.06 K/uL  Auto Eosinophil # : 0.33 K/uL  Auto Basophil # : 0.35 K/uL  Auto Neutrophil % : 84.6 %  Auto Lymphocyte % : 6.4 %  Auto Monocyte % : 4.7 %  Auto Eosinophil % : 1.5 %  Auto Basophil % : 1.6 %    01-19    140  |  105  |  30<H>  ----------------------------<  109<H>  4.0   |  22  |  0.69    Ca    9.7      19 Jan 2020 05:39  Mg     2.1     01-19                        RADIOLOGY & ADDITIONAL STUDIES (The following images were personally reviewed):

## 2020-01-19 NOTE — PROGRESS NOTE ADULT - SUBJECTIVE AND OBJECTIVE BOX
Continue with present regimen and the patient is alert, afebrile.    the patient remains cachectic with appearance  of chronic disease.    To continue ABs---continue with the present meds.

## 2020-01-20 LAB
-  CEFTRIAXONE: SIGNIFICANT CHANGE UP
-  PIPERACILLIN/TAZOBACTAM: SIGNIFICANT CHANGE UP
ANION GAP SERPL CALC-SCNC: 15 MMOL/L — SIGNIFICANT CHANGE UP (ref 5–17)
BASOPHILS # BLD AUTO: 0.93 K/UL — HIGH (ref 0–0.2)
BASOPHILS NFR BLD AUTO: 3.4 % — HIGH (ref 0–2)
BUN SERPL-MCNC: 23 MG/DL — SIGNIFICANT CHANGE UP (ref 7–23)
CALCIUM SERPL-MCNC: 9.8 MG/DL — SIGNIFICANT CHANGE UP (ref 8.4–10.5)
CHLORIDE SERPL-SCNC: 104 MMOL/L — SIGNIFICANT CHANGE UP (ref 96–108)
CO2 SERPL-SCNC: 21 MMOL/L — LOW (ref 22–31)
CREAT SERPL-MCNC: 0.56 MG/DL — SIGNIFICANT CHANGE UP (ref 0.5–1.3)
CULTURE RESULTS: SIGNIFICANT CHANGE UP
EOSINOPHIL # BLD AUTO: 0.96 K/UL — HIGH (ref 0–0.5)
EOSINOPHIL NFR BLD AUTO: 3.5 % — SIGNIFICANT CHANGE UP (ref 0–6)
GLUCOSE SERPL-MCNC: 113 MG/DL — HIGH (ref 70–99)
HCT VFR BLD CALC: 47.2 % — HIGH (ref 34.5–45)
HGB BLD-MCNC: 14.5 G/DL — SIGNIFICANT CHANGE UP (ref 11.5–15.5)
LYMPHOCYTES # BLD AUTO: 1.42 K/UL — SIGNIFICANT CHANGE UP (ref 1–3.3)
LYMPHOCYTES # BLD AUTO: 5.2 % — LOW (ref 13–44)
MAGNESIUM SERPL-MCNC: 2.2 MG/DL — SIGNIFICANT CHANGE UP (ref 1.6–2.6)
MCHC RBC-ENTMCNC: 27.5 PG — SIGNIFICANT CHANGE UP (ref 27–34)
MCHC RBC-ENTMCNC: 30.7 GM/DL — LOW (ref 32–36)
MCV RBC AUTO: 89.4 FL — SIGNIFICANT CHANGE UP (ref 80–100)
METHOD TYPE: SIGNIFICANT CHANGE UP
MONOCYTES # BLD AUTO: 1.88 K/UL — HIGH (ref 0–0.9)
MONOCYTES NFR BLD AUTO: 6.9 % — SIGNIFICANT CHANGE UP (ref 2–14)
NEUTROPHILS # BLD AUTO: 22.12 K/UL — HIGH (ref 1.8–7.4)
NEUTROPHILS NFR BLD AUTO: 81 % — HIGH (ref 43–77)
ORGANISM # SPEC MICROSCOPIC CNT: SIGNIFICANT CHANGE UP
PLATELET # BLD AUTO: 418 K/UL — HIGH (ref 150–400)
POTASSIUM SERPL-MCNC: 3.8 MMOL/L — SIGNIFICANT CHANGE UP (ref 3.5–5.3)
POTASSIUM SERPL-SCNC: 3.8 MMOL/L — SIGNIFICANT CHANGE UP (ref 3.5–5.3)
RBC # BLD: 5.28 M/UL — HIGH (ref 3.8–5.2)
RBC # FLD: 17.7 % — HIGH (ref 10.3–14.5)
SODIUM SERPL-SCNC: 140 MMOL/L — SIGNIFICANT CHANGE UP (ref 135–145)
SPECIMEN SOURCE: SIGNIFICANT CHANGE UP
WBC # BLD: 27.31 K/UL — HIGH (ref 3.8–10.5)
WBC # FLD AUTO: 27.31 K/UL — HIGH (ref 3.8–10.5)

## 2020-01-20 RX ADMIN — Medication 650 MILLIGRAM(S): at 04:19

## 2020-01-20 RX ADMIN — Medication 1 MILLIGRAM(S): at 12:07

## 2020-01-20 RX ADMIN — ATORVASTATIN CALCIUM 40 MILLIGRAM(S): 80 TABLET, FILM COATED ORAL at 22:42

## 2020-01-20 RX ADMIN — RIVAROXABAN 20 MILLIGRAM(S): KIT at 12:07

## 2020-01-20 RX ADMIN — ERTAPENEM SODIUM 120 MILLIGRAM(S): 1 INJECTION, POWDER, LYOPHILIZED, FOR SOLUTION INTRAMUSCULAR; INTRAVENOUS at 12:07

## 2020-01-20 RX ADMIN — Medication 650 MILLIGRAM(S): at 03:19

## 2020-01-20 RX ADMIN — AMLODIPINE BESYLATE 5 MILLIGRAM(S): 2.5 TABLET ORAL at 05:52

## 2020-01-20 NOTE — PROGRESS NOTE ADULT - PROBLEM SELECTOR PLAN 1
POA tachy 100 and leukocytosis 21.2 (pt baseline WBC 15-17 in setting of MPD) with positive UA. s/p 1L IVF in ED. s/p CTX 1g. lactate wnl. Bcx negative. MDR E.coli  - Ertapenem 1g q24H (1/19- POA tachy 100 and leukocytosis 21.2 (pt baseline WBC 15-17 in setting of MPD) with positive UA. s/p 1L IVF in ED. s/p CTX 1g. lactate wnl. Bcx negative. MDR E.coli. WBC was uptrending from 1/17 to 1/20 likely due to resistant E.coli (patient did not have any risk factors for MDR like hospitalization within the last 3 months, abx treatment within the last 3 months or previous cultures showing MDR)  - Ertapenem 1g q24H (1/19-

## 2020-01-20 NOTE — PROGRESS NOTE ADULT - SUBJECTIVE AND OBJECTIVE BOX
Physical Medicine and Rehabilitation Progress Note:    Patient is a 79y old  Female who presents with a chief complaint of UTI---Dr Avelar's note appreciated. Studies outlined to be obtained. easily aroused this am and temp down. (20 Jan 2020 07:15)      HPI:  78F with PMHx of HTN, myeloproliferative disorder, DVT/PE (2015), chronic UTIs and hydronephrosis who presents to ED for c/o generalized weakness. Patient reports she usually ambulates around her apt with a walker, and has needed more assistance from her aide than usual over the last few days. Her aide, who accompanies her to the ED today, states she also noticed the patient has had foul-smelling urine today. Dr. Zapata suggested she present to the ED today.     Vitals: 98.9 (rectal), 100 bpm, 203/126, 18, 95% on room air. Labs notable for WBC 21.2 (87.8% PMNs, no immature granulocytes), Hgb 15.3, Plt 406. Patient received CTX 1 g, NS 1 L. (16 Jan 2020 17:54)                            14.5   27.31 )-----------( 418      ( 20 Jan 2020 07:08 )             47.2       01-20    140  |  104  |  23  ----------------------------<  113<H>  3.8   |  21<L>  |  0.56    Ca    9.8      20 Jan 2020 07:08  Mg     2.2     01-20      Vital Signs Last 24 Hrs  T(C): 36.7 (20 Jan 2020 05:40), Max: 36.7 (20 Jan 2020 05:40)  T(F): 98 (20 Jan 2020 05:40), Max: 98 (20 Jan 2020 05:40)  HR: 88 (20 Jan 2020 05:40) (83 - 88)  BP: 153/93 (20 Jan 2020 05:40) (150/95 - 169/116)  BP(mean): --  RR: 18 (20 Jan 2020 05:40) (17 - 18)  SpO2: 94% (20 Jan 2020 05:40) (93% - 94%)    MEDICATIONS  (STANDING):  amLODIPine   Tablet 5 milliGRAM(s) Oral daily  atorvastatin 40 milliGRAM(s) Oral daily  ertapenem  IVPB 1000 milliGRAM(s) IV Intermittent every 24 hours  folic acid 1 milliGRAM(s) Oral daily  rivaroxaban 20 milliGRAM(s) Oral daily    MEDICATIONS  (PRN):  acetaminophen   Tablet .. 650 milliGRAM(s) Oral every 6 hours PRN Moderate Pain (4 - 6)  calcium carbonate    500 mG (Tums) Chewable 3 Tablet(s) Chew every 6 hours PRN Dyspepsia  melatonin 5 milliGRAM(s) Oral at bedtime PRN Sleep    Currently Undergoing Physical Therapy at bedside.    Functional Status Assessment:      Pain Assessment/Number Scale (0-10) Adult  Presence of Pain: complains of pain/discomfort   RN Ilsa sifuentes  Body Location: Left:   shoulder  Pain Rating (0-10): Rest: 0   Pain Rating (0-10): Activity: 5   Pain Precipitating/Aggravating Factors: movement    Safety      Safety  Safety [WDL Definition: Bed in low position, wheels locked; call light in reach; upper side rails up x 2; ID band on]: WDL  Safety Factors: bed in low position;  wheels locked;  call light in reach    Safety Interventions  All Alarms: alarm(s) activated and audible    Coping/Psychosocial      Coping  Observed Emotional State: irritable    Cognitive/Neuro      Cognitive/Perceptual/Neuro  Cognitive/Neuro/Behavioral [WDL Definition: Alert; opens eyes spontaneously; arouses to voice or touch; oriented x 4; follows commands; speech spontaneous, logical; purposeful motor response; behavior appropriate to situation]: WDL except  Orientation: able to state she was in a hospital and name of hospital when given choices. Knew [facility] and year but not day    Therapeutic Interventions      Bed Mobility  Bed Mobility Training Scooting: minimum assist (75% patient effort);  moderate assist (50% patient effort);  1 person assist;  verbal cues  Bed Mobility Training Sit-to-Supine: contact guard;  1 person assist;  verbal cues  Bed Mobility Training Limitations: pain;  decreased ability to use arms for pushing/pulling;  decreased ability to use legs for bridging/pushing    Sit-Stand Transfer Training  Sit-to-Stand Transfer Training Symptoms Noted During/After Treatment: increased pain  Transfer Training Sit-to-Stand Transfer: minimum assist (75% patient effort);  1 person assist;  verbal cues;  rolling walker;  full weight-bearing  Transfer Training Stand-to-Sit Transfer: contact guard;  1 person assist;  verbal cues;  full weight-bearing   rolling walker  Sit-to-Stand Transfer Training Transfer Safety Analysis: impaired balance;  pain;  cognitive, decreased safety awareness;  rolling walker    Toilet Transfer Training  Transfer Training Toilet Transfer: minimum assist (75% patient effort);  1 person assist;  verbal cues;  full weight-bearing   rolling walker;  grab bars  Toilet Transfer Training Transfer Safety Analysis: decreased balance;  cognitive, decreased safety awareness;  pain;  impaired balance;  grab bars;  rolling walker    Gait Training  Gait Training Symptoms Noted During/After Treatment: fatigue  Gait Training: contact guard;  1 person assist;  verbal cues;  full weight-bearing   rolling walker;  20 feet  Gait Analysis: 3-point gait   decreased enoc;  decreased step length;  decreased stride length;  crouch;  impaired balance;  pain;  cognitive, decreased safety awareness;  20 feet;  rolling walker  Gait Number of Times:: x 2  Type of Rest Type of Rest: sitting    Therapeutic Exercise  Therapeutic Exercise Treatment Not Performed: patient refused treatment        PM&R Impression: as above    Current Disposition Plan Recommendations: subacute rehab placement

## 2020-01-20 NOTE — PROGRESS NOTE ADULT - SUBJECTIVE AND OBJECTIVE BOX
ANDREW STUART  79y  Female    Patient is a 79y old  Female who presents with a chief complaint of UTI (19 Jan 2020 13:19)      INTERVAL HPI/OVERNIGHT EVENTS:    REVIEW OF SYSTEMS:  CONSTITUTIONAL: No fever, weight loss, or fatigue  EYES: No eye pain, visual disturbances, or discharge  ENMT:  No difficulty hearing, tinnitus, vertigo; No sinus or throat pain  NECK: No pain or stiffness  BREASTS: No pain, masses, or nipple discharge  RESPIRATORY: No cough, wheezing, chills or hemoptysis; No shortness of breath  CARDIOVASCULAR: No chest pain, palpitations, dizziness, or leg swelling  GASTROINTESTINAL: No abdominal or epigastric pain. No nausea, vomiting, or hematemesis; No diarrhea or constipation. No melena or hematochezia.  GENITOURINARY: No dysuria, frequency, hematuria, or incontinence  NEUROLOGICAL: No headaches, memory loss, loss of strength, numbness, or tremors  SKIN: No itching, burning, rashes, or lesions   LYMPH NODES: No enlarged glands  ENDOCRINE: No heat or cold intolerance; No hair loss  MUSCULOSKELETAL: No joint pain or swelling; No muscle, back, or extremity pain  PSYCHIATRIC: No depression, anxiety, mood swings, or difficulty sleeping  HEME/LYMPH: No easy bruising, or bleeding gums  ALLERY AND IMMUNOLOGIC: No hives or eczema    T(C): 36.7 (01-20-20 @ 05:40), Max: 36.7 (01-20-20 @ 05:40)  HR: 88 (01-20-20 @ 05:40) (83 - 88)  BP: 153/93 (01-20-20 @ 05:40) (150/95 - 169/116)  RR: 18 (01-20-20 @ 05:40) (17 - 18)  SpO2: 94% (01-20-20 @ 05:40) (93% - 94%)  Wt(kg): --Vital Signs Last 24 Hrs  T(C): 36.7 (20 Jan 2020 05:40), Max: 36.7 (20 Jan 2020 05:40)  T(F): 98 (20 Jan 2020 05:40), Max: 98 (20 Jan 2020 05:40)  HR: 88 (20 Jan 2020 05:40) (83 - 88)  BP: 153/93 (20 Jan 2020 05:40) (150/95 - 169/116)  BP(mean): --  RR: 18 (20 Jan 2020 05:40) (17 - 18)  SpO2: 94% (20 Jan 2020 05:40) (93% - 94%)    PHYSICAL EXAM:  GENERAL: NAD, well-groomed, well-developed  HEAD:  Atraumatic, Normocephalic  EYES: EOMI, PERRLA, conjunctiva and sclera clear  ENMT: No tonsillar erythema, exudates, or enlargement; Moist mucous membranes, Good dentition, No lesions  NECK: Supple, No JVD, Normal thyroid  NERVOUS SYSTEM:  Alert & Oriented X3, Good concentration; Motor Strength 5/5 B/L upper and lower extremities; DTRs 2+ intact and symmetric  CHEST/LUNG: Clear to auscultation bilaterally; No rales, rhonchi, wheezing, or rubs  HEART: Regular rate and rhythm; No murmurs, rubs, or gallops  ABDOMEN: Soft, Nontender, Nondistended; Bowel sounds present  EXTREMITIES:  WWP, No clubbing, cyanosis, or edema  Vascular: 2+ peripheral pulses x4  LYMPH: No lymphadenopathy noted  SKIN: No rashes or lesions    Consultant(s) Notes Reviewed:  [x ] YES  [ ] NO  Care Discussed with Consultants/Other Providers [ x] YES  [ ] NO    LABS:                        14.2   22.51 )-----------( 385      ( 19 Jan 2020 05:39 )             45.7     01-19    140  |  105  |  30<H>  ----------------------------<  109<H>  4.0   |  22  |  0.69    Ca    9.7      19 Jan 2020 05:39  Mg     2.1     01-19            CAPILLARY BLOOD GLUCOSE                RADIOLOGY & ADDITIONAL TESTS:    Imaging Personally Reviewed:  [ ] YES  [ ] NO    HEALTH ISSUES - PROBLEM Dx: ANDREW STUART  79y  Female    Patient is a 79y old  Female who presents with a chief complaint of UTI (19 Jan 2020 13:19)      INTERVAL HPI/OVERNIGHT EVENTS: KAREN o/n. Patient has no complaints this morning. Denies dysuria, urinary frequency increase, supra pubic pressure.     T(C): 36.7 (01-20-20 @ 05:40), Max: 36.7 (01-20-20 @ 05:40)  HR: 88 (01-20-20 @ 05:40) (83 - 88)  BP: 153/93 (01-20-20 @ 05:40) (150/95 - 169/116)  RR: 18 (01-20-20 @ 05:40) (17 - 18)  SpO2: 94% (01-20-20 @ 05:40) (93% - 94%)  Wt(kg): --Vital Signs Last 24 Hrs  T(C): 36.7 (20 Jan 2020 05:40), Max: 36.7 (20 Jan 2020 05:40)  T(F): 98 (20 Jan 2020 05:40), Max: 98 (20 Jan 2020 05:40)  HR: 88 (20 Jan 2020 05:40) (83 - 88)  BP: 153/93 (20 Jan 2020 05:40) (150/95 - 169/116)  BP(mean): --  RR: 18 (20 Jan 2020 05:40) (17 - 18)  SpO2: 94% (20 Jan 2020 05:40) (93% - 94%)    PHYSICAL EXAM:  GENERAL: NAD  NERVOUS SYSTEM:  Alert & Oriented X3  CHEST/LUNG: Clear to auscultation bilaterally; No rales, rhonchi, wheezing, or rubs  HEART: Regular rate and rhythm; No murmurs, rubs, or gallops  ABDOMEN: Soft, Nontender, Nondistended; Bowel sounds present  EXTREMITIES: WWP, No clubbing, cyanosis, or edema  Vascular: 2+ peripheral pulses x4      Consultant(s) Notes Reviewed:  [x ] YES  [ ] NO  Care Discussed with Consultants/Other Providers [ x] YES  [ ] NO    LABS:                        14.2   22.51 )-----------( 385      ( 19 Jan 2020 05:39 )             45.7     01-19    140  |  105  |  30<H>  ----------------------------<  109<H>  4.0   |  22  |  0.69    Ca    9.7      19 Jan 2020 05:39  Mg     2.1     01-19            CAPILLARY BLOOD GLUCOSE                RADIOLOGY & ADDITIONAL TESTS:    Imaging Personally Reviewed:  [ ] YES  [ ] NO    HEALTH ISSUES - PROBLEM Dx:

## 2020-01-20 NOTE — PROGRESS NOTE ADULT - ASSESSMENT
per Internal Medicine    78F with PMHx of HTN, myeloproliferative disorder, DVT/PE (2015), chronic UTIs and hydronephrosis who is admitted for treatment sepsis 2/2 UTI.     Problem/Plan - 1:  ·  Problem: Sepsis.  Plan: POA tachy 100 and leukocytosis 21.2 (pt baseline WBC 15-17 in setting of MPD) with positive UA. s/p 1L IVF in ED. s/p CTX 1g. lactate wnl. Bcx negative. MDR E.coli. WBC was uptrending from 1/17 to 1/20 likely due to resistant E.coli (patient did not have any risk factors for MDR like hospitalization within the last 3 months, abx treatment within the last 3 months or previous cultures showing MDR)  - Ertapenem 1g q24H (1/19-.    Problem/Plan - 2:  ·  Problem: UTI (urinary tract infection).  Plan: POA UA positive in setting of progressive weakness. Pt with h/o E. coli UTI and pyelo on prev. adm. last year.  -c/w plan as per above.     Problem/Plan - 3:  ·  Problem: Generalized weakness.  Plan: Secondary to sepsis.  -PT eval.. Rec home with no needs.     Problem/Plan - 4:  ·  Problem: Myeloproliferative disorder.  Plan: Follows w/ Dr. Resendiz. Baseline WBC 15-17. No history of thrombocytopenia.  - continue Xarelto.     Problem/Plan - 5:  ·  Problem: Cachexia.  Plan: Pt with BMI 18. Thin and malnourished on exam.  -nutrition consult  -encourage PO intake.     Problem/Plan - 6:  Problem: Nutrition, metabolism, and development symptoms. Plan: F: none  E: replete K>4, Mg>2  N: Regular diet PO.    Problem/Plan - 7:  ·  Problem: Need for prophylactic measure.  Plan: VTE: Xarelto, SCDs    Code: FULL CODE  Dispo: F.

## 2020-01-21 ENCOUNTER — TRANSCRIPTION ENCOUNTER (OUTPATIENT)
Age: 80
End: 2020-01-21

## 2020-01-21 DIAGNOSIS — R62.7 ADULT FAILURE TO THRIVE: ICD-10-CM

## 2020-01-21 LAB
ANION GAP SERPL CALC-SCNC: 14 MMOL/L — SIGNIFICANT CHANGE UP (ref 5–17)
BASOPHILS # BLD AUTO: 0 K/UL — SIGNIFICANT CHANGE UP (ref 0–0.2)
BASOPHILS NFR BLD AUTO: 0 % — SIGNIFICANT CHANGE UP (ref 0–2)
BUN SERPL-MCNC: 22 MG/DL — SIGNIFICANT CHANGE UP (ref 7–23)
CALCIUM SERPL-MCNC: 9.9 MG/DL — SIGNIFICANT CHANGE UP (ref 8.4–10.5)
CHLORIDE SERPL-SCNC: 102 MMOL/L — SIGNIFICANT CHANGE UP (ref 96–108)
CO2 SERPL-SCNC: 22 MMOL/L — SIGNIFICANT CHANGE UP (ref 22–31)
CREAT SERPL-MCNC: 0.62 MG/DL — SIGNIFICANT CHANGE UP (ref 0.5–1.3)
CULTURE RESULTS: SIGNIFICANT CHANGE UP
CULTURE RESULTS: SIGNIFICANT CHANGE UP
EOSINOPHIL # BLD AUTO: 0.28 K/UL — SIGNIFICANT CHANGE UP (ref 0–0.5)
EOSINOPHIL NFR BLD AUTO: 0.9 % — SIGNIFICANT CHANGE UP (ref 0–6)
GLUCOSE SERPL-MCNC: 111 MG/DL — HIGH (ref 70–99)
HCT VFR BLD CALC: 50.2 % — HIGH (ref 34.5–45)
HGB BLD-MCNC: 15.5 G/DL — SIGNIFICANT CHANGE UP (ref 11.5–15.5)
LYMPHOCYTES # BLD AUTO: 1.63 K/UL — SIGNIFICANT CHANGE UP (ref 1–3.3)
LYMPHOCYTES # BLD AUTO: 5.2 % — LOW (ref 13–44)
MAGNESIUM SERPL-MCNC: 2.4 MG/DL — SIGNIFICANT CHANGE UP (ref 1.6–2.6)
MCHC RBC-ENTMCNC: 27.4 PG — SIGNIFICANT CHANGE UP (ref 27–34)
MCHC RBC-ENTMCNC: 30.9 GM/DL — LOW (ref 32–36)
MCV RBC AUTO: 88.8 FL — SIGNIFICANT CHANGE UP (ref 80–100)
MONOCYTES # BLD AUTO: 1.1 K/UL — HIGH (ref 0–0.9)
MONOCYTES NFR BLD AUTO: 3.5 % — SIGNIFICANT CHANGE UP (ref 2–14)
NEUTROPHILS # BLD AUTO: 28.31 K/UL — HIGH (ref 1.8–7.4)
NEUTROPHILS NFR BLD AUTO: 90.4 % — HIGH (ref 43–77)
PLATELET # BLD AUTO: 431 K/UL — HIGH (ref 150–400)
POTASSIUM SERPL-MCNC: 4 MMOL/L — SIGNIFICANT CHANGE UP (ref 3.5–5.3)
POTASSIUM SERPL-SCNC: 4 MMOL/L — SIGNIFICANT CHANGE UP (ref 3.5–5.3)
RBC # BLD: 5.65 M/UL — HIGH (ref 3.8–5.2)
RBC # FLD: 17.9 % — HIGH (ref 10.3–14.5)
SODIUM SERPL-SCNC: 138 MMOL/L — SIGNIFICANT CHANGE UP (ref 135–145)
SPECIMEN SOURCE: SIGNIFICANT CHANGE UP
SPECIMEN SOURCE: SIGNIFICANT CHANGE UP
WBC # BLD: 31.32 K/UL — HIGH (ref 3.8–10.5)
WBC # FLD AUTO: 31.32 K/UL — HIGH (ref 3.8–10.5)

## 2020-01-21 RX ORDER — HYDROXYUREA 500 MG/1
500 CAPSULE ORAL EVERY 24 HOURS
Refills: 0 | Status: DISCONTINUED | OUTPATIENT
Start: 2020-01-21 | End: 2020-01-22

## 2020-01-21 RX ADMIN — ERTAPENEM SODIUM 120 MILLIGRAM(S): 1 INJECTION, POWDER, LYOPHILIZED, FOR SOLUTION INTRAMUSCULAR; INTRAVENOUS at 12:01

## 2020-01-21 RX ADMIN — RIVAROXABAN 20 MILLIGRAM(S): KIT at 12:01

## 2020-01-21 RX ADMIN — ATORVASTATIN CALCIUM 40 MILLIGRAM(S): 80 TABLET, FILM COATED ORAL at 21:25

## 2020-01-21 RX ADMIN — Medication 1 MILLIGRAM(S): at 12:01

## 2020-01-21 RX ADMIN — AMLODIPINE BESYLATE 5 MILLIGRAM(S): 2.5 TABLET ORAL at 05:46

## 2020-01-21 NOTE — PROGRESS NOTE ADULT - PROBLEM SELECTOR PLAN 3
Secondary to sepsis.  -PT eval.. Rec home with no needs POA UA positive in setting of progressive weakness. Pt with h/o E. coli UTI and pyelo on prev. adm. last year.  -c/w plan as per above

## 2020-01-21 NOTE — DISCHARGE NOTE PROVIDER - NSDCCPCAREPLAN_GEN_ALL_CORE_FT
PRINCIPAL DISCHARGE DIAGNOSIS  Diagnosis: UTI (urinary tract infection)  Assessment and Plan of Treatment: Urinary tract infections, also called "UTIs," are infections that affect either the bladder or the kidneys:  Bladder infections are more common than kidney infections. They happen when bacteria get into the urethra and travel up into the bladder. The medical term for bladder infection is "cystitis."  Bladder infections are more common in women than men.  What are the symptoms of a bladder infection?  The symptoms include:  Pain or a burning feeling when you urinate  The need to urinate often  The need to urinate suddenly or in a hurry  Blood in the urine  Your urinary infection was caused by a bacteria called E. Coli that was resistant to many antiobiotics. We treated you with an antiobitoic that could kill this bacteria called Ertapenem. If you experience any of the symptoms above again please see your doctor or come back to the hospital.         SECONDARY DISCHARGE DIAGNOSES  Diagnosis: Myeloproliferative disorder  Assessment and Plan of Treatment: PRINCIPAL DISCHARGE DIAGNOSIS  Diagnosis: UTI (urinary tract infection)  Assessment and Plan of Treatment: Urinary tract infections, also called "UTIs," are infections that affect either the bladder or the kidneys:  Bladder infections are more common than kidney infections. They happen when bacteria get into the urethra and travel up into the bladder. The medical term for bladder infection is "cystitis."  Bladder infections are more common in women than men.  What are the symptoms of a bladder infection?  The symptoms include:  Pain or a burning feeling when you urinate  The need to urinate often  The need to urinate suddenly or in a hurry  Blood in the urine  Your urinary infection was caused by a bacteria called E. Coli that was resistant to many antiobiotics. We treated you with an antiobitoic that could kill this bacteria called Ertapenem. If you experience any of the symptoms above again please see your doctor or come back to the hospital. You have finished antibiotics         SECONDARY DISCHARGE DIAGNOSES  Diagnosis: Mild cognitive impairment  Assessment and Plan of Treatment: You may need help with dr's appointments and daily living because of your mild cognitive appt    Diagnosis: Myeloproliferative disorder  Assessment and Plan of Treatment: We started you on hydroxyurea. Please follow up with Dr. Resendiz

## 2020-01-21 NOTE — PROGRESS NOTE ADULT - PROBLEM SELECTOR PLAN 1
POA tachy 100 and leukocytosis 21.2 (pt baseline WBC 15-17 in setting of MPD) with positive UA. s/p 1L IVF in ED. s/p CTX 1g. lactate wnl. Bcx negative. MDR E.coli. WBC was uptrending from 1/17 to 1/20 likely due to resistant E.coli (patient did not have any risk factors for MDR like hospitalization within the last 3 months, abx treatment within the last 3 months or previous cultures showing MDR)  - Ertapenem 1g q24H (1/19- Follows w/ Dr. Resendiz. Baseline WBC 15-17. No history of thrombocytopenia.  WBC continues to uptrend despite proper abx coverage, so likely not infectious in nature.   - will start Hydrea 500mg QD for ERNA-2 medications per Dr. Resendiz on 1/22    - continue Xarelto

## 2020-01-21 NOTE — PROGRESS NOTE ADULT - SUBJECTIVE AND OBJECTIVE BOX
ANDREW STUART  79y  Female    Patient is a 79y old  Female who presents with a chief complaint of UTI---and plt ct and the Hgb are increased. New AB started as per ID (21 Jan 2020 07:17)      INTERVAL HPI/OVERNIGHT EVENTS: KAREN o/n. This morning the patient has no complaints, denies dysuria, abdominal pain, fevers or chills.       T(C): 36.5 (01-21-20 @ 06:02), Max: 36.5 (01-21-20 @ 06:02)  HR: 77 (01-21-20 @ 06:02) (66 - 77)  BP: 151/89 (01-21-20 @ 06:02) (144/90 - 151/89)  RR: 18 (01-21-20 @ 06:02) (18 - 18)  SpO2: 94% (01-21-20 @ 06:02) (94% - 94%)  Wt(kg): --Vital Signs Last 24 Hrs  T(C): 36.5 (21 Jan 2020 06:02), Max: 36.5 (21 Jan 2020 06:02)  T(F): 97.7 (21 Jan 2020 06:02), Max: 97.7 (21 Jan 2020 06:02)  HR: 77 (21 Jan 2020 06:02) (66 - 77)  BP: 151/89 (21 Jan 2020 06:02) (144/90 - 151/89)  BP(mean): --  RR: 18 (21 Jan 2020 06:02) (18 - 18)  SpO2: 94% (21 Jan 2020 06:02) (94% - 94%)    PHYSICAL EXAM:  GENERAL: NAD  NERVOUS SYSTEM:  Alert & Oriented X3  CHEST/LUNG: Clear to auscultation bilaterally; No rales, rhonchi, wheezing, or rubs  HEART: Regular rate and rhythm; No murmurs, rubs, or gallops  ABDOMEN: Soft, Nontender, Nondistended; Bowel sounds present  EXTREMITIES: WWP, No clubbing, cyanosis, or edema  Vascular: 2+ peripheral pulses x4    Consultant(s) Notes Reviewed:  [x ] YES  [ ] NO  Care Discussed with Consultants/Other Providers [ x] YES  [ ] NO    LABS:                        15.5   31.32 )-----------( 431      ( 21 Jan 2020 07:32 )             50.2     01-21    138  |  102  |  22  ----------------------------<  111<H>  4.0   |  22  |  0.62    Ca    9.9      21 Jan 2020 07:32  Mg     2.4     01-21            CAPILLARY BLOOD GLUCOSE                RADIOLOGY & ADDITIONAL TESTS:    Imaging Personally Reviewed:  [ ] YES  [ ] NO    HEALTH ISSUES - PROBLEM Dx:

## 2020-01-21 NOTE — PROGRESS NOTE ADULT - SUBJECTIVE AND OBJECTIVE BOX
INTERVAL HPI/OVERNIGHT EVENTS:    ANTIBIOTICS/RELEVANT:    MEDICATIONS  (STANDING):  amLODIPine   Tablet 5 milliGRAM(s) Oral daily  atorvastatin 40 milliGRAM(s) Oral daily  ertapenem  IVPB 1000 milliGRAM(s) IV Intermittent every 24 hours  folic acid 1 milliGRAM(s) Oral daily  hydroxyurea 500 milliGRAM(s) Oral every 24 hours  rivaroxaban 20 milliGRAM(s) Oral daily    MEDICATIONS  (PRN):  acetaminophen   Tablet .. 650 milliGRAM(s) Oral every 6 hours PRN Moderate Pain (4 - 6)  calcium carbonate    500 mG (Tums) Chewable 3 Tablet(s) Chew every 6 hours PRN Dyspepsia  melatonin 5 milliGRAM(s) Oral at bedtime PRN Sleep      Allergies    No Known Allergies    Intolerances        Vital Signs Last 24 Hrs  T(C): 36.5 (21 Jan 2020 06:02), Max: 36.5 (21 Jan 2020 06:02)  T(F): 97.7 (21 Jan 2020 06:02), Max: 97.7 (21 Jan 2020 06:02)  HR: 77 (21 Jan 2020 06:02) (66 - 77)  BP: 151/89 (21 Jan 2020 06:02) (144/90 - 151/89)  BP(mean): --  RR: 18 (21 Jan 2020 06:02) (18 - 18)  SpO2: 94% (21 Jan 2020 06:02) (94% - 94%)      LABS:                        15.5   31.32 )-----------( 431      ( 21 Jan 2020 07:32 )             50.2     01-21    138  |  102  |  22  ----------------------------<  111<H>  4.0   |  22  |  0.62    Ca    9.9      21 Jan 2020 07:32  Mg     2.4     01-21            MICROBIOLOGY:    Culture - Blood (01.16.20 @ 17:34)    Specimen Source: .Blood Blood-Peripheral    Culture Results:   No growth at 4 days.    Culture - Urine (01.16.20 @ 16:45)    -  Gentamicin: S <=1    -  Meropenem: S <=1    -  Nitrofurantoin: S <=32 Should not be used to treat pyelonephritis    -  Piperacillin/Tazobactam: S    -  Tobramycin: S <=2    -  Trimethoprim/Sulfamethoxazole: R >2/38    -  Ampicillin: R >16 These ampicillin results predict results for amoxicillin    -  Ampicillin/Sulbactam: R 16/8 Enterobacter, Citrobacter, and Serratia may develop resistance during prolonged therapy (3-4 days)    -  Cefazolin: R <=2 (MIC_CL_COM_ENTERIC_CEFAZU) For uncomplicated UTI with K. pneumoniae, E. coli, or P. mirablis: NIKITA <=16 is sensitive and NIKITA >=32 is resistant. This also predicts results for oral agents cefaclor, cefdinir, cefpodoxime, cefprozil, cefuroxime axetil, cephalexin and locarbef for uncomplicated UTI. Note that some isolates may be susceptible to these agents while testing resistant to cefazolin.    -  Ceftriaxone: S Enterobacter, Citrobacter, and Serratia may develop resistance during prolonged therapy    -  Ciprofloxacin: R >2    Specimen Source: .Urine Clean Catch (Midstream)    Culture Results:   >100,000 CFU/ml Escherichia coli    Organism Identification: Escherichia coli  Escherichia coli    Organism: Escherichia coli    Organism: Escherichia coli    Method Type: NIKITA    Method Type:         RADIOLOGY & ADDITIONAL STUDIES: INTERVAL HPI/OVERNIGHT EVENTS:    Worse  Profoundly fatigued    MEDICATIONS  (STANDING):  amLODIPine   Tablet 5 milliGRAM(s) Oral daily  atorvastatin 40 milliGRAM(s) Oral daily  ertapenem  IVPB 1000 milliGRAM(s) IV Intermittent every 24 hours  folic acid 1 milliGRAM(s) Oral daily  hydroxyurea 500 milliGRAM(s) Oral every 24 hours  rivaroxaban 20 milliGRAM(s) Oral daily    MEDICATIONS  (PRN):  acetaminophen   Tablet .. 650 milliGRAM(s) Oral every 6 hours PRN Moderate Pain (4 - 6)  calcium carbonate    500 mG (Tums) Chewable 3 Tablet(s) Chew every 6 hours PRN Dyspepsia  melatonin 5 milliGRAM(s) Oral at bedtime PRN Sleep      Allergies    No Known Allergies    Intolerances    EXAM  Vital Signs Last 24 Hrs  T(C): 36.5 (21 Jan 2020 06:02), Max: 36.5 (21 Jan 2020 06:02)  T(F): 97.7 (21 Jan 2020 06:02), Max: 97.7 (21 Jan 2020 06:02)  HR: 77 (21 Jan 2020 06:02) (66 - 77)  BP: 151/89 (21 Jan 2020 06:02) (144/90 - 151/89)  BP(mean): --  RR: 18 (21 Jan 2020 06:02) (18 - 18)  SpO2: 94% (21 Jan 2020 06:02) (94% - 94%)  On RA  No distress  Fatigued appearing  No rash  Left lat wrist with IV and swelling and tenderness at the site    LABS:                        15.5   31.32 )-----------( 431      ( 21 Jan 2020 07:32 )             50.2     01-21    138  |  102  |  22  ----------------------------<  111<H>  4.0   |  22  |  0.62    Ca    9.9      21 Jan 2020 07:32  Mg     2.4     01-21      MICROBIOLOGY:    Culture - Blood (01.16.20 @ 17:34)    Specimen Source: .Blood Blood-Peripheral    Culture Results:   No growth at 4 days.    Culture - Urine (01.16.20 @ 16:45)    -  Gentamicin: S <=1    -  Meropenem: S <=1    -  Nitrofurantoin: S <=32 Should not be used to treat pyelonephritis    -  Piperacillin/Tazobactam: S    -  Tobramycin: S <=2    -  Trimethoprim/Sulfamethoxazole: R >2/38    -  Ampicillin: R >16 These ampicillin results predict results for amoxicillin    -  Ampicillin/Sulbactam: R 16/8 Enterobacter, Citrobacter, and Serratia may develop resistance during prolonged therapy (3-4 days)    -  Cefazolin: R <=2 (MIC_CL_COM_ENTERIC_CEFAZU) For uncomplicated UTI with K. pneumoniae, E. coli, or P. mirablis: NIKITA <=16 is sensitive and NIKITA >=32 is resistant. This also predicts results for oral agents cefaclor, cefdinir, cefpodoxime, cefprozil, cefuroxime axetil, cephalexin and locarbef for uncomplicated UTI. Note that some isolates may be susceptible to these agents while testing resistant to cefazolin.    -  Ceftriaxone: S Enterobacter, Citrobacter, and Serratia may develop resistance during prolonged therapy    -  Ciprofloxacin: R >2    Specimen Source: .Urine Clean Catch (Midstream)    Culture Results:   >100,000 CFU/ml Escherichia coli    Organism Identification: Escherichia coli  Escherichia coli    Organism: Escherichia coli    Organism: Escherichia coli    Method Type: NIKITA    Method Type: KB

## 2020-01-21 NOTE — PROGRESS NOTE ADULT - ASSESSMENT
RECOMMEND  Give Ertapenem today  Renal and bladder US.  R/O urinary retention and stones  Repeat UA and Urine culture UTI with E coli.  Manual testing confirms no ESBL  MDS   Increased leukocytosis   Left wrist phlebitis / inflammation at peripheral IV site  Clinically appears worse regardless of antimicrobial therapy      RECOMMEND  Remove IV  Blood cultures x 2  Give Ertapenem today  Renal and bladder US.  R/O urinary retention and stones  Repeat UA and Urine culture   Check ESR and CRP  Check CPK

## 2020-01-21 NOTE — DISCHARGE NOTE PROVIDER - NSDCMRMEDTOKEN_GEN_ALL_CORE_FT
Aspirin Enteric Coated 81 mg oral delayed release tablet: 1 tab(s) orally once a day  buPROPion 75 mg oral tablet: 1 tab(s) orally once a day  Crestor 10 mg oral tablet: 1 tab(s) orally once a day  folic acid 1 mg oral tablet: 1 tab(s) orally once a day  Vitamin D2 50,000 intl units (1.25 mg) oral capsule: 1 cap(s) orally once a week  Xarelto 20 mg oral tablet: 1 tab(s) orally once a day (in the evening) acetaminophen 325 mg oral tablet: 2 tab(s) orally every 6 hours, As needed, Moderate Pain (4 - 6)  amLODIPine 5 mg oral tablet: 1 tab(s) orally once a day  Aspirin Enteric Coated 81 mg oral delayed release tablet: 1 tab(s) orally once a day  atorvastatin 40 mg oral tablet: 1 tab(s) orally once a day  buPROPion 75 mg oral tablet: 1 tab(s) orally once a day  Crestor 10 mg oral tablet: 1 tab(s) orally once a day  folic acid 1 mg oral tablet: 1 tab(s) orally once a day  hydroxyurea 500 mg oral capsule: 1 cap(s) orally every 12 hours  megestrol 40 mg oral tablet: 2 tab(s) orally 2 times a day  melatonin 5 mg oral tablet: 1 tab(s) orally once a day (at bedtime), As needed, Sleep  Multiple Vitamins oral tablet: 1 tab(s) orally once a day  Vitamin D2 50,000 intl units (1.25 mg) oral capsule: 1 cap(s) orally once a week  Xarelto 20 mg oral tablet: 1 tab(s) orally once a day (in the evening) acetaminophen 325 mg oral tablet: 2 tab(s) orally every 6 hours, As needed, Moderate Pain (4 - 6)  amLODIPine 5 mg oral tablet: 1 tab(s) orally once a day  Aspirin Enteric Coated 81 mg oral delayed release tablet: 1 tab(s) orally once a day  buPROPion 75 mg oral tablet: 1 tab(s) orally once a day  Crestor 10 mg oral tablet: 1 tab(s) orally once a day  folic acid 1 mg oral tablet: 1 tab(s) orally once a day  hydroxyurea 500 mg oral capsule: 1 cap(s) orally every 12 hours  megestrol 40 mg oral tablet: 2 tab(s) orally 2 times a day  melatonin 5 mg oral tablet: 1 tab(s) orally once a day (at bedtime), As needed, Sleep  Multiple Vitamins oral tablet: 1 tab(s) orally once a day  Vitamin D2 50,000 intl units (1.25 mg) oral capsule: 1 cap(s) orally once a week  Xarelto 20 mg oral tablet: 1 tab(s) orally once a day (in the evening)

## 2020-01-21 NOTE — PROGRESS NOTE ADULT - PROBLEM SELECTOR PLAN 6
F: none  E: replete K>4, Mg>2  N: Regular diet PO F: none  E: replete K>4, Mg>2  N: Regular diet PO    Severe Protein Calorie Malnutrition F: none  E: replete K>4, Mg>2  N: Regular diet PO    Severe Protein Calorie Malnutrition that was present on admission

## 2020-01-21 NOTE — DISCHARGE NOTE PROVIDER - HOSPITAL COURSE
Patient is 78yo F with past medical history of HTN, myeloproliferative disorder, DVT/PE (2015), chronic UTIs and hydronephrosis     Presented with generalized weakness, found to have a UTI, activity of her myeloproliferative disorder and FTT     Problem List/Main Diagnoses (system-based):     ID:    #UTI    - Patient came in with positive UA     - Ucx grew E.coli for which the patient was initially started on CFTX. The culture's susceptibilities came back showing multi-drug resistant E.coli. CFTX was stopped and Ertapenem was started (1g q24h)    - Repeat UA and Ucx showed...    - Bcx negative and repeat Bcx...         HEME:    #Myeloproliferative disorder     - The patient's WBC at baseline is 15-17 as per her oncologist Dr. Resendiz    - The patient presented with a WBC of 21.23    - At first it was thought this increase from baseline was due to the UTI, but as it was not decreasing with CFTX or ertapenem it was then thought that the leukocytosis is due to a her myeloproliferative disorder     - The patient has also had rising platelet count adding more to the case that this is from her myeloproliferative disorder    - Dr. Resendiz started Hydrea 500mg Qd on 1/22        CONSTITUTIONAL    #FTT    - Patient has severe protein calorie malnutrition     - Patient has generalized weakness and is not able to get up out of bed              Inpatient treatment course:     New medications:     Labs to be followed outpatient:     Exam to be followed outpatient: Patient is 80yo F with past medical history of HTN, myeloproliferative disorder, DVT/PE (2015), chronic UTIs and hydronephrosis     Presented with generalized weakness, found to have a UTI, activity of her myeloproliferative disorder and FTT     Problem List/Main Diagnoses (system-based):     ID:    #UTI    - Patient came in with positive UA     - Ucx grew E.coli for which the patient was initially started on CFTX. The culture's susceptibilities came back showing multi-drug resistant E.coli. CFTX was stopped and Ertapenem was started (1g q24h) briefly switched to ertapenem and treated for a total of 12d. BCx NGTD/             HEME:    #Myeloproliferative disorder     - The patient's WBC at baseline is 15-17 as per her oncologist Dr. Resendiz    - The patient presented with a WBC of 21.23    - At first it was thought this increase from baseline was due to the UTI, but as it was not decreasing with CFTX or ertapenem it was then thought that the leukocytosis is due to a her myeloproliferative disorder     - The patient has also had rising platelet count adding more to the case that this is from her myeloproliferative disorder    - Dr. Resendiz started Hydrea 500mg Qd on 1/22        CONSTITUTIONAL    #FTT    - Patient has severe protein calorie malnutrition     - Patient has generalized weakness and is not able to get up out of bed     -PT for [facility]             Inpatient treatment course: completed 12d treatment course for uncomplicated UTI. Failure to thrive/decreased po intake 2/2 possible worsening cognitive impairment vs dementia. DC to [facility]    New medications:     Labs to be followed outpatient: none    Exam to be followed outpatient: none

## 2020-01-21 NOTE — DISCHARGE NOTE PROVIDER - CARE PROVIDER_API CALL
Wai Tapia)  Medicine  [address] [place] [address]  Phone: [phone]  Fax: [phone]  Follow Up Time:     [place] Resendiz)  Internal Medicine; Medical Oncology  [address] [place] [address]  Phone: [phone]  Fax: [phone]  Follow Up Time:

## 2020-01-21 NOTE — PROGRESS NOTE ADULT - PROBLEM SELECTOR PLAN 4
Follows w/ Dr. Resendiz. Baseline WBC 15-17. No history of thrombocytopenia.  WBC continues to uptrend despite proper abx coverage, so likely not infectious in nature.   - will start Hydrea 500mg QD for ERNA-2 medications per Dr. Resendiz    - continue Xarelto Secondary to sepsis.  -PT eval.. Rec home with no needs

## 2020-01-21 NOTE — PROGRESS NOTE ADULT - SUBJECTIVE AND OBJECTIVE BOX
Increased values noted and therapy with Hydrea or  Jakafi (pt has the JAK2 mutation) may be required.    Patient remains in bed, alert.    Will discuss options with the staff.

## 2020-01-21 NOTE — PROGRESS NOTE ADULT - PROBLEM SELECTOR PLAN 2
POA UA positive in setting of progressive weakness. Pt with h/o E. coli UTI and pyelo on prev. adm. last year.  -c/w plan as per above POA tachy 100 and leukocytosis 21.2 (pt baseline WBC 15-17 in setting of MPD) with positive UA. s/p 1L IVF in ED. s/p CTX 1g. lactate wnl. Bcx negative. MDR E.coli. WBC was uptrending from 1/17 to 1/20 likely due to resistant E.coli (patient did not have any risk factors for MDR like hospitalization within the last 3 months, abx treatment within the last 3 months or previous cultures showing MDR)  - Ertapenem 1g q24H (1/19-

## 2020-01-22 DIAGNOSIS — R41.0 DISORIENTATION, UNSPECIFIED: ICD-10-CM

## 2020-01-22 DIAGNOSIS — R62.7 ADULT FAILURE TO THRIVE: ICD-10-CM

## 2020-01-22 LAB
ANION GAP SERPL CALC-SCNC: 16 MMOL/L — SIGNIFICANT CHANGE UP (ref 5–17)
APPEARANCE UR: ABNORMAL
BACTERIA # UR AUTO: PRESENT /HPF
BASOPHILS # BLD AUTO: 0.36 K/UL — HIGH (ref 0–0.2)
BASOPHILS NFR BLD AUTO: 1.2 % — SIGNIFICANT CHANGE UP (ref 0–2)
BILIRUB UR-MCNC: ABNORMAL
BUN SERPL-MCNC: 27 MG/DL — HIGH (ref 7–23)
CALCIUM SERPL-MCNC: 9.4 MG/DL — SIGNIFICANT CHANGE UP (ref 8.4–10.5)
CHLORIDE SERPL-SCNC: 104 MMOL/L — SIGNIFICANT CHANGE UP (ref 96–108)
CK SERPL-CCNC: 36 U/L — SIGNIFICANT CHANGE UP (ref 25–170)
CO2 SERPL-SCNC: 23 MMOL/L — SIGNIFICANT CHANGE UP (ref 22–31)
COLOR SPEC: YELLOW — SIGNIFICANT CHANGE UP
COMMENT - URINE: SIGNIFICANT CHANGE UP
CREAT SERPL-MCNC: 0.65 MG/DL — SIGNIFICANT CHANGE UP (ref 0.5–1.3)
CRP SERPL-MCNC: 0.63 MG/DL — HIGH (ref 0–0.4)
DIFF PNL FLD: ABNORMAL
EOSINOPHIL # BLD AUTO: 0.34 K/UL — SIGNIFICANT CHANGE UP (ref 0–0.5)
EOSINOPHIL NFR BLD AUTO: 1.1 % — SIGNIFICANT CHANGE UP (ref 0–6)
EPI CELLS # UR: SIGNIFICANT CHANGE UP /HPF (ref 0–5)
ERYTHROCYTE [SEDIMENTATION RATE] IN BLOOD: 9 MM/HR — SIGNIFICANT CHANGE UP
GLUCOSE SERPL-MCNC: 104 MG/DL — HIGH (ref 70–99)
GLUCOSE UR QL: NEGATIVE — SIGNIFICANT CHANGE UP
GRAN CASTS # UR COMP ASSIST: ABNORMAL /LPF
HCT VFR BLD CALC: 48.9 % — HIGH (ref 34.5–45)
HGB BLD-MCNC: 15.1 G/DL — SIGNIFICANT CHANGE UP (ref 11.5–15.5)
IMM GRANULOCYTES NFR BLD AUTO: 1.4 % — SIGNIFICANT CHANGE UP (ref 0–1.5)
KETONES UR-MCNC: ABNORMAL MG/DL
LEUKOCYTE ESTERASE UR-ACNC: NEGATIVE — SIGNIFICANT CHANGE UP
LYMPHOCYTES # BLD AUTO: 1.49 K/UL — SIGNIFICANT CHANGE UP (ref 1–3.3)
LYMPHOCYTES # BLD AUTO: 5 % — LOW (ref 13–44)
MAGNESIUM SERPL-MCNC: 2.4 MG/DL — SIGNIFICANT CHANGE UP (ref 1.6–2.6)
MCHC RBC-ENTMCNC: 27.3 PG — SIGNIFICANT CHANGE UP (ref 27–34)
MCHC RBC-ENTMCNC: 30.9 GM/DL — LOW (ref 32–36)
MCV RBC AUTO: 88.4 FL — SIGNIFICANT CHANGE UP (ref 80–100)
MONOCYTES # BLD AUTO: 1.45 K/UL — HIGH (ref 0–0.9)
MONOCYTES NFR BLD AUTO: 4.9 % — SIGNIFICANT CHANGE UP (ref 2–14)
NEUTROPHILS # BLD AUTO: 25.72 K/UL — HIGH (ref 1.8–7.4)
NEUTROPHILS NFR BLD AUTO: 86.4 % — HIGH (ref 43–77)
NITRITE UR-MCNC: NEGATIVE — SIGNIFICANT CHANGE UP
NRBC # BLD: 0 /100 WBCS — SIGNIFICANT CHANGE UP (ref 0–0)
PH UR: 5.5 — SIGNIFICANT CHANGE UP (ref 5–8)
PLATELET # BLD AUTO: 466 K/UL — HIGH (ref 150–400)
POTASSIUM SERPL-MCNC: 4.1 MMOL/L — SIGNIFICANT CHANGE UP (ref 3.5–5.3)
POTASSIUM SERPL-SCNC: 4.1 MMOL/L — SIGNIFICANT CHANGE UP (ref 3.5–5.3)
PROCALCITONIN SERPL-MCNC: 0.09 NG/ML — SIGNIFICANT CHANGE UP (ref 0.02–0.1)
PROT UR-MCNC: 100 MG/DL
RBC # BLD: 5.53 M/UL — HIGH (ref 3.8–5.2)
RBC # FLD: 17.8 % — HIGH (ref 10.3–14.5)
RBC CASTS # UR COMP ASSIST: < 5 /HPF — SIGNIFICANT CHANGE UP
SODIUM SERPL-SCNC: 143 MMOL/L — SIGNIFICANT CHANGE UP (ref 135–145)
SP GR SPEC: >=1.03 — SIGNIFICANT CHANGE UP (ref 1–1.03)
TSH SERPL-MCNC: 1.62 UIU/ML — SIGNIFICANT CHANGE UP (ref 0.35–4.94)
UROBILINOGEN FLD QL: 0.2 E.U./DL — SIGNIFICANT CHANGE UP
WBC # BLD: 29.78 K/UL — HIGH (ref 3.8–10.5)
WBC # FLD AUTO: 29.78 K/UL — HIGH (ref 3.8–10.5)
WBC UR QL: < 5 /HPF — SIGNIFICANT CHANGE UP

## 2020-01-22 PROCEDURE — 99223 1ST HOSP IP/OBS HIGH 75: CPT

## 2020-01-22 PROCEDURE — 76775 US EXAM ABDO BACK WALL LIM: CPT | Mod: 26

## 2020-01-22 RX ORDER — HYDROXYUREA 500 MG/1
500 CAPSULE ORAL EVERY 12 HOURS
Refills: 0 | Status: DISCONTINUED | OUTPATIENT
Start: 2020-01-22 | End: 2020-01-27

## 2020-01-22 RX ORDER — CEFTRIAXONE 500 MG/1
1000 INJECTION, POWDER, FOR SOLUTION INTRAMUSCULAR; INTRAVENOUS EVERY 24 HOURS
Refills: 0 | Status: DISCONTINUED | OUTPATIENT
Start: 2020-01-23 | End: 2020-01-27

## 2020-01-22 RX ORDER — MEGESTROL ACETATE 40 MG/ML
80 SUSPENSION ORAL
Refills: 0 | Status: DISCONTINUED | OUTPATIENT
Start: 2020-01-22 | End: 2020-01-27

## 2020-01-22 RX ORDER — SODIUM CHLORIDE 9 MG/ML
1000 INJECTION, SOLUTION INTRAVENOUS
Refills: 0 | Status: DISCONTINUED | OUTPATIENT
Start: 2020-01-22 | End: 2020-01-23

## 2020-01-22 RX ADMIN — SODIUM CHLORIDE 100 MILLILITER(S): 9 INJECTION, SOLUTION INTRAVENOUS at 15:16

## 2020-01-22 RX ADMIN — HYDROXYUREA 500 MILLIGRAM(S): 500 CAPSULE ORAL at 11:56

## 2020-01-22 RX ADMIN — Medication 1 MILLIGRAM(S): at 11:55

## 2020-01-22 RX ADMIN — ATORVASTATIN CALCIUM 40 MILLIGRAM(S): 80 TABLET, FILM COATED ORAL at 22:36

## 2020-01-22 RX ADMIN — AMLODIPINE BESYLATE 5 MILLIGRAM(S): 2.5 TABLET ORAL at 06:53

## 2020-01-22 RX ADMIN — Medication 1 TABLET(S): at 11:55

## 2020-01-22 RX ADMIN — HYDROXYUREA 500 MILLIGRAM(S): 500 CAPSULE ORAL at 22:36

## 2020-01-22 RX ADMIN — MEGESTROL ACETATE 80 MILLIGRAM(S): 40 SUSPENSION ORAL at 17:12

## 2020-01-22 RX ADMIN — ERTAPENEM SODIUM 120 MILLIGRAM(S): 1 INJECTION, POWDER, LYOPHILIZED, FOR SOLUTION INTRAMUSCULAR; INTRAVENOUS at 11:55

## 2020-01-22 RX ADMIN — RIVAROXABAN 20 MILLIGRAM(S): KIT at 11:55

## 2020-01-22 NOTE — PROGRESS NOTE ADULT - PROBLEM SELECTOR PLAN 1
Follows w/ Dr. Resendiz. Baseline WBC 15-17. No history of thrombocytopenia.  WBC continues to uptrend despite proper abx coverage. Likely not infectious in nature.   - Started Hydrea 500mg BID for ERNA-2 medications per Dr. Resendiz on 1/22    - continue Xarelto 20mg daily

## 2020-01-22 NOTE — PROGRESS NOTE ADULT - SUBJECTIVE AND OBJECTIVE BOX
INTERVAL HPI/OVERNIGHT EVENTS:    Profoundly weak  S/P straight cath.  300 cc urine recovered    MEDICATIONS  (STANDING):  amLODIPine   Tablet 5 milliGRAM(s) Oral daily  atorvastatin 40 milliGRAM(s) Oral daily  ertapenem  IVPB 1000 milliGRAM(s) IV Intermittent every 24 hours  folic acid 1 milliGRAM(s) Oral daily  hydroxyurea 500 milliGRAM(s) Oral every 12 hours  multivitamin 1 Tablet(s) Oral daily  rivaroxaban 20 milliGRAM(s) Oral daily    MEDICATIONS  (PRN):  acetaminophen   Tablet .. 650 milliGRAM(s) Oral every 6 hours PRN Moderate Pain (4 - 6)  calcium carbonate    500 mG (Tums) Chewable 3 Tablet(s) Chew every 6 hours PRN Dyspepsia  melatonin 5 milliGRAM(s) Oral at bedtime PRN Sleep      Allergies    No Known Allergies        EXAM  Vital Signs Last 24 Hrs  T(C): 36.6 (22 Jan 2020 05:43), Max: 36.6 (21 Jan 2020 14:45)  T(F): 97.8 (22 Jan 2020 05:43), Max: 97.8 (21 Jan 2020 14:45)  HR: 84 (22 Jan 2020 05:43) (84 - 90)  BP: 144/93 (22 Jan 2020 05:43) (134/89 - 144/93)  BP(mean): --  RR: 18 (22 Jan 2020 05:43) (18 - 18)  SpO2: 94% (22 Jan 2020 05:43) (93% - 94%)  Fatigued appearing No rash  Pale  RR  Chest with poor inspiratory effort  Unable move due to weakness      LABS:                        15.1   29.78 )-----------( 466      ( 22 Jan 2020 06:51 )             48.9     01-22    143  |  104  |  27<H>  ----------------------------<  104<H>  4.1   |  23  |  0.65    Ca    9.4      22 Jan 2020 06:51  Mg     2.4     01-22        Urinalysis Basic - ( 22 Jan 2020 10:32 )    Color: Yellow / Appearance: SL Cloudy / SG: >=1.030 / pH: x  Gluc: x / Ketone: Trace mg/dL  / Bili: Small / Urobili: 0.2 E.U./dL   Blood: x / Protein: 100 mg/dL / Nitrite: NEGATIVE   Leuk Esterase: NEGATIVE / RBC: < 5 /HPF / WBC < 5 /HPF   Sq Epi: x / Non Sq Epi: 0-5 /HPF / Bacteria: Present /HPF      Sedimentation Rate, Erythrocyte (01.22.20 @ 06:51)    Sedimentation Rate, Erythrocyte: 9 mm/Hr    C-Reactive Protein, Serum (01.22.20 @ 06:51)    C-Reactive Protein, Serum: 0.63 mg/dL    Creatine Kinase, Serum in AM (01.22.20 @ 06:51)    Creatine Kinase, Serum: 36 U/L        MICROBIOLOGY:    Culture - Blood (01.21.20 @ 19:02)    Specimen Source: .Blood Blood    Culture Results:   No growth at 12 hours

## 2020-01-22 NOTE — PROGRESS NOTE ADULT - SUBJECTIVE AND OBJECTIVE BOX
OVERNIGHT EVENTS: NAEO    SUBJECTIVE / INTERVAL HPI: Patient seen and examined at bedside. Pt observed to be awake and alert this morning, with mostly appropriate interactions with mild tangential speech. Denies f/c, n/v, HA, chest pain, SOB, abdominal pain, diarrhea, constipation, dysuria.    MEDICATIONS  (STANDING):  amLODIPine   Tablet 5 milliGRAM(s) Oral daily  atorvastatin 40 milliGRAM(s) Oral daily  dextrose 5% + lactated ringers. 1000 milliLiter(s) (100 mL/Hr) IV Continuous <Continuous>  ertapenem  IVPB 1000 milliGRAM(s) IV Intermittent every 24 hours  folic acid 1 milliGRAM(s) Oral daily  hydroxyurea 500 milliGRAM(s) Oral every 12 hours  megestrol 80 milliGRAM(s) Oral two times a day  multivitamin 1 Tablet(s) Oral daily  rivaroxaban 20 milliGRAM(s) Oral daily    MEDICATIONS  (PRN):  acetaminophen   Tablet .. 650 milliGRAM(s) Oral every 6 hours PRN Moderate Pain (4 - 6)  calcium carbonate    500 mG (Tums) Chewable 3 Tablet(s) Chew every 6 hours PRN Dyspepsia  melatonin 5 milliGRAM(s) Oral at bedtime PRN Sleep    Allergies    No Known Allergies    Intolerances        VITAL SIGNS:  Vital Signs Last 24 Hrs  T(C): 36.3 (22 Jan 2020 14:00), Max: 36.6 (22 Jan 2020 05:43)  T(F): 97.3 (22 Jan 2020 14:00), Max: 97.8 (22 Jan 2020 05:43)  HR: 89 (22 Jan 2020 14:00) (84 - 89)  BP: 121/83 (22 Jan 2020 14:00) (121/83 - 144/93)  BP(mean): --  RR: 18 (22 Jan 2020 14:00) (18 - 18)  SpO2: 94% (22 Jan 2020 14:00) (94% - 94%)      01-22-20 @ 07:01  -  01-22-20 @ 15:18  --------------------------------------------------------  IN: 0 mL / OUT: 300 mL / NET: -300 mL        PHYSICAL EXAM:  General: Elderly woman, frail-appearing, NAD, Laying comfortably in bed  HEENT: NC/AT, anicteric sclera, MMM  Neck: supple  Cardiovascular: +S1/S2, RRR, No murmurs, rubs, gallops  Respiratory: CTA B/L, no W/R/R  Gastrointestinal: soft, NT/ND, +BSx4  Extremities: WWP, no edema, clubbing or cyanosis  Vascular: 2+ radial, DP/PT pulses B/L  Neurological: AAOx3, no focal deficits      LABS:                        15.1   29.78 )-----------( 466      ( 22 Jan 2020 06:51 )             48.9     01-22    143  |  104  |  27<H>  ----------------------------<  104<H>  4.1   |  23  |  0.65    Ca    9.4      22 Jan 2020 06:51  Mg     2.4     01-22        Urinalysis Basic - ( 22 Jan 2020 10:32 )  Color: Yellow / Appearance: SL Cloudy / SG: >=1.030 / pH: x  Gluc: x / Ketone: Trace mg/dL  / Bili: Small / Urobili: 0.2 E.U./dL   Blood: x / Protein: 100 mg/dL / Nitrite: NEGATIVE   Leuk Esterase: NEGATIVE / RBC: < 5 /HPF / WBC < 5 /HPF   Sq Epi: x / Non Sq Epi: 0-5 /HPF / Bacteria: Present /HPF        Culture - Blood (collected 01-21-20 @ 19:02)  Source: .Blood Blood  Preliminary Report (01-22-20 @ 08:00):    No growth at 12 hours        RADIOLOGY & ADDITIONAL TESTS: Reviewed.

## 2020-01-22 NOTE — BEHAVIORAL HEALTH ASSESSMENT NOTE - SUMMARY
Pt is an 78 yo single, domiciled with [facility], female with no known past psychiatric history, with PMHx of HTN, myeloproliferative disorder, DVT/PE (2015), chronic UTIs and hydronephrosis who is admitted for treatment sepsis 2/2 UTI.  Pt has been noted to have FTT with poor po intake. Psychiatric consult called to evaluate for possibly depression.   Exam was limited due to pt's fatigue. Pt currently denies acute depressive symptoms Pt is an 78 yo single, domiciled with [facility], female with no known past psychiatric history, with PMHx of HTN, myeloproliferative disorder, DVT/PE (2015), chronic UTIs and hydronephrosis who is admitted for treatment sepsis 2/2 UTI.  Pt has been noted to have FTT with poor po intake. Psychiatric consult called to evaluate for possibly depression.   Exam was limited due to pt's fatigue. Pt currently denies acute depressive symptoms. She however reports poor appetite, and fatigue. Pt is oriented to 3. However she was unable to participate in full cognitive assessment at this time.   Underlying depression and or cognitive disorder can not be ruled out. Pt might benefit from Remeron 7.5 mg qhs to address poor appetite and possible depressive symptoms. Collateral information would be important to assess pt's baseline and inquire about cognitive /depressive symptoms prior to hospitalizations. Pt's presenting symptoms could be due to delirium in context of recent UTI and leukocytosis.

## 2020-01-22 NOTE — CHART NOTE - NSCHARTNOTEFT_GEN_A_CORE
Admitting Diagnosis:   Patient is a 79y old  Female who presents with a chief complaint of UTI (22 Jan 2020 13:37)      PAST MEDICAL & SURGICAL HISTORY:  Myeloproliferative disorder  Hypertension  PE (pulmonary thromboembolism): 2015  Tremor  Vestibular disequilibrium  S/P wrist surgery  S/P hysterectomy      Current Nutrition Order:   Diet, Regular:   Supplement Feeding Modality:  Oral  Ensure Enlive Cans or Servings Per Day:  2       Frequency:  Three Times a day (01-21-20 @ 15:58)      PO Intake: Good (%) [   ]  Fair (50-75%) [   ] Poor (<25%) [  x ]refuses any offerings    GI Issues: none    Pain:not noted    Skin Integrity:intact    Labs:   01-22    143  |  104  |  27<H>  ----------------------------<  104<H>  4.1   |  23  |  0.65    Ca    9.4      22 Jan 2020 06:51  Mg     2.4     01-22      CAPILLARY BLOOD GLUCOSE          Medications:  MEDICATIONS  (STANDING):  amLODIPine   Tablet 5 milliGRAM(s) Oral daily  atorvastatin 40 milliGRAM(s) Oral daily  ertapenem  IVPB 1000 milliGRAM(s) IV Intermittent every 24 hours  folic acid 1 milliGRAM(s) Oral daily  hydroxyurea 500 milliGRAM(s) Oral every 12 hours  multivitamin 1 Tablet(s) Oral daily  rivaroxaban 20 milliGRAM(s) Oral daily    MEDICATIONS  (PRN):  acetaminophen   Tablet .. 650 milliGRAM(s) Oral every 6 hours PRN Moderate Pain (4 - 6)  calcium carbonate    500 mG (Tums) Chewable 3 Tablet(s) Chew every 6 hours PRN Dyspepsia  melatonin 5 milliGRAM(s) Oral at bedtime PRN Sleep      Weight:47.6kg  Daily     Daily no updated weights    Weight Change: no updated weights    Nutrition Focused Physical Exam: Completed [ x  ]  Not Pertinent [   ]see 1/17 assessment and NFPE findings    Suspect severe [PCM] 2/2 to physical assessment, [poor intake], and [wt loss]; please see malnutrition chart note.    Estimated energy needs: Based ABW (47.6kg) due to between %of IBW.ABW:47.6kg [id number]:1190-1428kcal and 1.2-1.4gmprotein:57-67gmprotein and 30-35cc fluids:1428-1666kcal    Subjective: 80y/o female with history of HTN,MPD,DVT.PE,UTI's,Hydronephrosis presented with generalized weakness and for TX of sepsis 2/2 UTI/AOx1.Refusing po offerings.Patient might benefit from appetite stimulant. Refuses Ensure supplement.  Previous Nutrition Diagnosis:Severe protein.calorie malnutrition r/t inadequate energy intake 2/2 decreased appetite AEB: muscle/fat loss and inadequate energy intake   Active [ x  ]  Resolved [   ]    If resolved, new PES:     Goal:No further s/s of malnutrition    Recommendations:1.Please order appetite stimulant (megace) 2.Feeding assistance program 3.Repeat weights    Education: not a candidate at present    Risk Level: High [  x ] Moderate [   ] Low [   ]

## 2020-01-22 NOTE — BEHAVIORAL HEALTH ASSESSMENT NOTE - PROBLEM SELECTOR PLAN 1
Differential diagnosis includes delirium, depressive disorder, underlying cognitive disorder.   Would recommend Remeron 7.5 mg qhs if pt agrees to address poor appetite.   Please contact friend/hha for collateral info re: baseline prior to hospitalization  Address underlying causes of delirium as per primary team  Consider folate, TSH, B12, RPR, head CT if not done recently

## 2020-01-22 NOTE — BEHAVIORAL HEALTH ASSESSMENT NOTE - NSBHADMITCOUNSEL_PSY_A_CORE
instructions for management, treatment and follow up/diagnostic results/impressions and/or recommended studies/risk factor reduction

## 2020-01-22 NOTE — PROGRESS NOTE ADULT - SUBJECTIVE AND OBJECTIVE BOX
Chest clear on auscultation.    No complaints of pain noted.    Plan is to reintroduce the Hydrea 500 mg po BID although the neutrophilia may be due to the UTI.    ABs continue as per Dr Avelar.

## 2020-01-22 NOTE — SWALLOW BEDSIDE ASSESSMENT ADULT - SWALLOW EVAL: DIAGNOSIS
Limited assessment d/t pt refusal. However, based on limited trials, oropharyngeal swallow was clinically judged to be WFL. Pt reported refusal was d/t poor appetite. Cont. with a regular diet as tolerated.

## 2020-01-22 NOTE — PROGRESS NOTE ADULT - PROBLEM SELECTOR PLAN 4
Secondary to sepsis, with component of failure to thrive 2/2 chronic illness and poor PO intake.  - Nutrition supplement with Ensures, multivitamin, and megace  - PT evaluation recommending home with no needs  - Neuro and Psych evaluations per PMD Dr. Tapia

## 2020-01-22 NOTE — PROGRESS NOTE ADULT - ASSESSMENT
UTI  Apparent urinary retention  Profound generalized weakness  MDS      RECOMMEND  Continue antibiotic treatment with Ceftriaxone 1 gm daily  Neurology evaluation   Renal/bladder ultrasound   Will likely need periodical straight cath/management of urinary retention

## 2020-01-22 NOTE — SWALLOW BEDSIDE ASSESSMENT ADULT - SLP GENERAL OBSERVATIONS
Pt received sleeping in bed but was easily awoken. She is oriented x3. Voice is characterized by high pitch and reduced loudness.

## 2020-01-22 NOTE — SWALLOW BEDSIDE ASSESSMENT ADULT - NS SPL SWALLOW CLINIC TRIAL FT
Limited assessment d/t pt refusal. Bolus containment and manipulation were WFL. No clinical overt s/s of aspiration noted. Laryngeal elevation appreciated upon palpation.

## 2020-01-22 NOTE — BEHAVIORAL HEALTH ASSESSMENT NOTE - HPI (INCLUDE ILLNESS QUALITY, SEVERITY, DURATION, TIMING, CONTEXT, MODIFYING FACTORS, ASSOCIATED SIGNS AND SYMPTOMS)
Pt is an 80 yo single, domiciled with HHA, female with no known past psychiatric history, with PMHx of HTN, myeloproliferative disorder, DVT/PE (2015), chronic UTIs and hydronephrosis who is admitted for treatment sepsis 2/2 UTI.  Pt has been noted to have FTT with poor po intake. Psychiatric consult called to evaluate for possibly depression.   Pt is pleasant and cooperative on exam. She however is somewhat of a poor historian, often becoming tangential during the interview. She c/o feeling "very tired right now". Pt states that she fell two weeks ago and since then has been having difficulty going outside. She is not able to provide good history about onset of her poor po intake. Pt denies having depressive episodes in the past. When questioned about her mood now, pt responds "I am not sure, I am tired". She states she saw a psychiatrist in the past but is unable to specify what the treatment entailed. She denies past SA's SI or psychiatric hospitalizations. Pt states she has not appetite, agreeable to eat ice cream however.     Pt states that she used to enjoy reading and seeing her friend Monica until recently. She states she has no family. Pt's friend has visited her in the hospital. Pt gives this writer permission to speak with her friend Monica and SIDDHARTH. Pt is unable to provide contact numbers however.

## 2020-01-22 NOTE — PROGRESS NOTE ADULT - PROBLEM SELECTOR PLAN 2
POA tachy to 100 and leukocytosis 21.2 (pt baseline WBC 15-17 in setting of MPD) with positive UA. s/p 1L IVF in ED. s/p CTX 1g. lactate wnl. Bcx negative. MDR E.coli. WBC was uptrending from 1/17 to 1/20 likely due to resistant E.coli (patient did not have any risk factors for MDR like hospitalization within the last 3 months, abx treatment within the last 3 months or previous cultures showing MDR). Procalcitonin 0.09.  - S/p 7 days of abx (CFTX then ertapenem), switch to ceftriaxone 1000mg q24hr today per ID  - F/u renal/bladder ultrasound to r/o urinary retention and nephrolithiasis

## 2020-01-22 NOTE — SWALLOW BEDSIDE ASSESSMENT ADULT - SLP PERTINENT HISTORY OF CURRENT PROBLEM
Pt admitted with UTI, PMH PMHx of HTN, myeloproliferative disorder, DVT/PE (2015), chronic UTIs and hydronephrosis who is admitted for treatment sepsis 2/2 UTI.

## 2020-01-22 NOTE — SWALLOW BEDSIDE ASSESSMENT ADULT - COMMENTS
Pt denies dysphagia. HHA at bedside who reported a significantly decreased appetite since last Wednesday/Thursday.

## 2020-01-22 NOTE — PROGRESS NOTE ADULT - PROBLEM SELECTOR PLAN 6
F: 1.2L D5LR for poor PO intake and dehydration  E: replete K>4, Mg>2  N: Regular diet PO, Ensure Enlive 2 cans TID, megace for poor appetite    Severe Protein Calorie Malnutrition that was present on admission

## 2020-01-22 NOTE — SWALLOW BEDSIDE ASSESSMENT ADULT - ADDITIONAL RECOMMENDATIONS
If not already consulted, pt would benefit from dietician consult due to concerns regarding meeting nutritional needs.

## 2020-01-23 LAB
ANION GAP SERPL CALC-SCNC: 17 MMOL/L — SIGNIFICANT CHANGE UP (ref 5–17)
BASOPHILS # BLD AUTO: 0.42 K/UL — HIGH (ref 0–0.2)
BASOPHILS NFR BLD AUTO: 1.6 % — SIGNIFICANT CHANGE UP (ref 0–2)
BUN SERPL-MCNC: 27 MG/DL — HIGH (ref 7–23)
CALCIUM SERPL-MCNC: 9.8 MG/DL — SIGNIFICANT CHANGE UP (ref 8.4–10.5)
CHLORIDE SERPL-SCNC: 105 MMOL/L — SIGNIFICANT CHANGE UP (ref 96–108)
CO2 SERPL-SCNC: 22 MMOL/L — SIGNIFICANT CHANGE UP (ref 22–31)
CREAT SERPL-MCNC: 0.56 MG/DL — SIGNIFICANT CHANGE UP (ref 0.5–1.3)
CULTURE RESULTS: NO GROWTH — SIGNIFICANT CHANGE UP
EOSINOPHIL # BLD AUTO: 0.33 K/UL — SIGNIFICANT CHANGE UP (ref 0–0.5)
EOSINOPHIL NFR BLD AUTO: 1.2 % — SIGNIFICANT CHANGE UP (ref 0–6)
FOLATE SERPL-MCNC: >20 NG/ML — SIGNIFICANT CHANGE UP
GLUCOSE SERPL-MCNC: 108 MG/DL — HIGH (ref 70–99)
HCT VFR BLD CALC: 46.8 % — HIGH (ref 34.5–45)
HGB BLD-MCNC: 15.2 G/DL — SIGNIFICANT CHANGE UP (ref 11.5–15.5)
IMM GRANULOCYTES NFR BLD AUTO: 1 % — SIGNIFICANT CHANGE UP (ref 0–1.5)
LYMPHOCYTES # BLD AUTO: 1.31 K/UL — SIGNIFICANT CHANGE UP (ref 1–3.3)
LYMPHOCYTES # BLD AUTO: 4.9 % — LOW (ref 13–44)
MAGNESIUM SERPL-MCNC: 2.2 MG/DL — SIGNIFICANT CHANGE UP (ref 1.6–2.6)
MCHC RBC-ENTMCNC: 28.1 PG — SIGNIFICANT CHANGE UP (ref 27–34)
MCHC RBC-ENTMCNC: 32.5 GM/DL — SIGNIFICANT CHANGE UP (ref 32–36)
MCV RBC AUTO: 86.5 FL — SIGNIFICANT CHANGE UP (ref 80–100)
MONOCYTES # BLD AUTO: 1.1 K/UL — HIGH (ref 0–0.9)
MONOCYTES NFR BLD AUTO: 4.1 % — SIGNIFICANT CHANGE UP (ref 2–14)
NEUTROPHILS # BLD AUTO: 23.31 K/UL — HIGH (ref 1.8–7.4)
NEUTROPHILS NFR BLD AUTO: 87.2 % — HIGH (ref 43–77)
NRBC # BLD: 0 /100 WBCS — SIGNIFICANT CHANGE UP (ref 0–0)
PHOSPHATE SERPL-MCNC: 4 MG/DL — SIGNIFICANT CHANGE UP (ref 2.5–4.5)
PLATELET # BLD AUTO: 409 K/UL — HIGH (ref 150–400)
POTASSIUM SERPL-MCNC: 3.8 MMOL/L — SIGNIFICANT CHANGE UP (ref 3.5–5.3)
POTASSIUM SERPL-SCNC: 3.8 MMOL/L — SIGNIFICANT CHANGE UP (ref 3.5–5.3)
RBC # BLD: 5.41 M/UL — HIGH (ref 3.8–5.2)
RBC # FLD: 17.6 % — HIGH (ref 10.3–14.5)
SODIUM SERPL-SCNC: 144 MMOL/L — SIGNIFICANT CHANGE UP (ref 135–145)
SPECIMEN SOURCE: SIGNIFICANT CHANGE UP
VIT B12 SERPL-MCNC: 709 PG/ML — SIGNIFICANT CHANGE UP (ref 232–1245)
WBC # BLD: 26.73 K/UL — HIGH (ref 3.8–10.5)
WBC # FLD AUTO: 26.73 K/UL — HIGH (ref 3.8–10.5)

## 2020-01-23 PROCEDURE — 70450 CT HEAD/BRAIN W/O DYE: CPT | Mod: 26

## 2020-01-23 PROCEDURE — 99222 1ST HOSP IP/OBS MODERATE 55: CPT

## 2020-01-23 RX ORDER — SODIUM CHLORIDE 9 MG/ML
1000 INJECTION, SOLUTION INTRAVENOUS
Refills: 0 | Status: DISCONTINUED | OUTPATIENT
Start: 2020-01-23 | End: 2020-01-27

## 2020-01-23 RX ORDER — MIRTAZAPINE 45 MG/1
7.5 TABLET, ORALLY DISINTEGRATING ORAL AT BEDTIME
Refills: 0 | Status: DISCONTINUED | OUTPATIENT
Start: 2020-01-23 | End: 2020-01-23

## 2020-01-23 RX ADMIN — ATORVASTATIN CALCIUM 40 MILLIGRAM(S): 80 TABLET, FILM COATED ORAL at 22:21

## 2020-01-23 RX ADMIN — MEGESTROL ACETATE 80 MILLIGRAM(S): 40 SUSPENSION ORAL at 05:41

## 2020-01-23 RX ADMIN — Medication 1 TABLET(S): at 11:50

## 2020-01-23 RX ADMIN — HYDROXYUREA 500 MILLIGRAM(S): 500 CAPSULE ORAL at 11:50

## 2020-01-23 RX ADMIN — HYDROXYUREA 500 MILLIGRAM(S): 500 CAPSULE ORAL at 22:21

## 2020-01-23 RX ADMIN — CEFTRIAXONE 100 MILLIGRAM(S): 500 INJECTION, POWDER, FOR SOLUTION INTRAMUSCULAR; INTRAVENOUS at 11:51

## 2020-01-23 RX ADMIN — Medication 1 MILLIGRAM(S): at 11:50

## 2020-01-23 RX ADMIN — RIVAROXABAN 20 MILLIGRAM(S): KIT at 11:50

## 2020-01-23 RX ADMIN — MEGESTROL ACETATE 80 MILLIGRAM(S): 40 SUSPENSION ORAL at 17:48

## 2020-01-23 RX ADMIN — AMLODIPINE BESYLATE 5 MILLIGRAM(S): 2.5 TABLET ORAL at 05:41

## 2020-01-23 NOTE — CONSULT NOTE ADULT - SUBJECTIVE AND OBJECTIVE BOX
Patient is a 79y old  Female who presents with a chief complaint of UTI (23 Jan 2020 08:33)        HPI:  78F with PMHx of HTN, myeloproliferative disorder, DVT/PE (2015), chronic UTIs and hydronephrosis who presents to ED for c/o generalized weakness. Patient reports she usually ambulates around her apt with a walker, and has needed more assistance from her aide than usual over the last few days. Her aide, who accompanies her to the ED today, states she also noticed the patient has had foul-smelling urine today. Dr. Zapata suggested she present to the ED today.     Vitals: 98.9 (rectal), 100 bpm, 203/126, 18, 95% on room air. Labs notable for WBC 21.2 (87.8% PMNs, no immature granulocytes), Hgb 15.3, Plt 406. Patient received CTX 1 g, NS 1 L. (16 Jan 2020 17:54)    No current complaints of headaches numbness or tingling - no seizures - no syncope    Allergies  No Known Allergies      Health Issues  UTI (URINARY TRACT INFECTION)  No pertinent family history in first degree relatives  Handoff  MEWS Score  Myeloproliferative disorder  Hypertension  PE (pulmonary thromboembolism)  Tremor  Vestibular disequilibrium  Myelodysplasia (myelodysplastic syndrome)  UTI (urinary tract infection)  Delirium  FTT (failure to thrive) in adult  Failure to thrive in adult  S/P wrist surgery  S/P hysterectomy  WEAKNESS  90+  Myeloproliferative disorder        FAMILY HISTORY:  No pertinent family history in first degree relatives      MEDICATIONS  (STANDING):  amLODIPine   Tablet 5 milliGRAM(s) Oral daily  atorvastatin 40 milliGRAM(s) Oral daily  cefTRIAXone   IVPB 1000 milliGRAM(s) IV Intermittent every 24 hours  dextrose 5% + lactated ringers. 1000 milliLiter(s) (100 mL/Hr) IV Continuous <Continuous>  folic acid 1 milliGRAM(s) Oral daily  hydroxyurea 500 milliGRAM(s) Oral every 12 hours  megestrol 80 milliGRAM(s) Oral two times a day  multivitamin 1 Tablet(s) Oral daily  rivaroxaban 20 milliGRAM(s) Oral daily    MEDICATIONS  (PRN):  acetaminophen   Tablet .. 650 milliGRAM(s) Oral every 6 hours PRN Moderate Pain (4 - 6)  calcium carbonate    500 mG (Tums) Chewable 3 Tablet(s) Chew every 6 hours PRN Dyspepsia  melatonin 5 milliGRAM(s) Oral at bedtime PRN Sleep      PAST MEDICAL & SURGICAL HISTORY:  Myeloproliferative disorder  Hypertension  PE (pulmonary thromboembolism): 2015  Tremor  Vestibular disequilibrium  S/P wrist surgery  S/P hysterectomy      Labs                          15.2   26.73 )-----------( 409      ( 23 Jan 2020 07:16 )             46.8     01-23    144  |  105  |  27<H>  ----------------------------<  108<H>  3.8   |  22  |  0.56    Ca    9.8      23 Jan 2020 07:16  Phos  4.0     01-23  Mg     2.2     01-23        Radiology:    Physical Exam    MENTAL STATUS  -Level of Consciousness- awake    Orientation- person, place time  Language- aphasia/ dysarthria- nl  Memory- recent and remote- poor memory      Cranial Nerve 1- 12  Pupils- equal and reactive  Eye movements- full  Facial - no asymmetry   Lower CN-nl    Gait and Station-n/a    MOTOR  Upper- no drift  Lower- no foot drop    Reflexes- decreased    Sensation- no sensory level    Cerebellar- no tremors    vascular - no bruits    Assessment- Fraility    Plan  CT head - no contrast

## 2020-01-23 NOTE — PROGRESS NOTE ADULT - SUBJECTIVE AND OBJECTIVE BOX
INTERVAL HPI/OVERNIGHT EVENTS:    ANTIBIOTICS/RELEVANT:    MEDICATIONS  (STANDING):  amLODIPine   Tablet 5 milliGRAM(s) Oral daily  atorvastatin 40 milliGRAM(s) Oral daily  cefTRIAXone   IVPB 1000 milliGRAM(s) IV Intermittent every 24 hours  dextrose 5% + lactated ringers. 1000 milliLiter(s) (100 mL/Hr) IV Continuous <Continuous>  folic acid 1 milliGRAM(s) Oral daily  hydroxyurea 500 milliGRAM(s) Oral every 12 hours  megestrol 80 milliGRAM(s) Oral two times a day  multivitamin 1 Tablet(s) Oral daily  rivaroxaban 20 milliGRAM(s) Oral daily    MEDICATIONS  (PRN):  acetaminophen   Tablet .. 650 milliGRAM(s) Oral every 6 hours PRN Moderate Pain (4 - 6)  calcium carbonate    500 mG (Tums) Chewable 3 Tablet(s) Chew every 6 hours PRN Dyspepsia  melatonin 5 milliGRAM(s) Oral at bedtime PRN Sleep      Allergies    No Known Allergies    Intolerances        Vital Signs Last 24 Hrs  T(C): 36.2 (23 Jan 2020 12:37), Max: 36.6 (23 Jan 2020 05:44)  T(F): 97.2 (23 Jan 2020 12:37), Max: 97.8 (23 Jan 2020 05:44)  HR: 79 (23 Jan 2020 12:37) (78 - 93)  BP: 137/91 (23 Jan 2020 12:37) (123/79 - 146/99)  BP(mean): --  RR: 18 (23 Jan 2020 12:37) (18 - 18)  SpO2: 97% (23 Jan 2020 12:37) (96% - 97%)    PHYSICAL EXAM:      Constitutional:    Eyes:    ENMT:    Neck:    Breasts:    Back:    Respiratory:    Cardiovascular:    Gastrointestinal:    Genitourinary:    Rectal:    Extremities:    Vascular:    Neurological:    Skin:    Lymph Nodes:    Musculoskeletal:    Psychiatric:        LABS:                        15.2   26.73 )-----------( 409      ( 23 Jan 2020 07:16 )             46.8     01-23    144  |  105  |  27<H>  ----------------------------<  108<H>  3.8   |  22  |  0.56    Ca    9.8      23 Jan 2020 07:16  Phos  4.0     01-23  Mg     2.2     01-23        Urinalysis Basic - ( 22 Jan 2020 10:32 )    Color: Yellow / Appearance: SL Cloudy / SG: >=1.030 / pH: x  Gluc: x / Ketone: Trace mg/dL  / Bili: Small / Urobili: 0.2 E.U./dL   Blood: x / Protein: 100 mg/dL / Nitrite: NEGATIVE   Leuk Esterase: NEGATIVE / RBC: < 5 /HPF / WBC < 5 /HPF   Sq Epi: x / Non Sq Epi: 0-5 /HPF / Bacteria: Present /HPF        MICROBIOLOGY:    Culture - Urine (01.22.20 @ 11:14)    Specimen Source: .Urine Clean Catch (Midstream)    Culture Results:   No growth        RADIOLOGY & ADDITIONAL STUDIES:    < from: US Retroperitoneal B-Scan Limited (01.22.20 @ 18:15) >    EXAM:  US RETROPERITONEAL LIMITED                          PROCEDURE DATE:  01/22/2020          INTERPRETATION:  RENAL ULTRASOUND dated 1/22/2020 6:15 PM    Indication: Recurrent UTIs, evaluate for strictures or diverticula    Prior studies: Retroperitoneal ultrasound 7/27/2018    Findings:    RIGHT KIDNEY:  Length: 10.5 cm  Lesions: 1.3 x 1.4 x 1.1 cm septated cyst within the upper pole. 3.8 x 2.0 x 3.2 cm milk of calcium cyst within the interpolar region. 2.1 x 1.8 x 1.4 cm cyst within the interpolar region.  Hydronephrosis: None  Stones: None  Echogenicity: Normal    LEFT KIDNEY:  Length: 9.8 cm  Lesions: None  Hydronephrosis: None  Stones: 1.3 x 1.1 cm nonobstructing stone within the upper pole. 1.4 x 1.2 cm nonobstructing stone within the interpolar region. 0.5 x 0.6 cm nonobstructing stone within the lower pole.  Echogenicity: Normal      IMPRESSION:  Nonobstructing left-sided nephrolithiasis. Findings can be correlated with  non contrast CT. INTERVAL HPI/OVERNIGHT EVENTS:    Clinically without significant changes    MEDICATIONS  (STANDING):  amLODIPine   Tablet 5 milliGRAM(s) Oral daily  atorvastatin 40 milliGRAM(s) Oral daily  cefTRIAXone   IVPB 1000 milliGRAM(s) IV Intermittent every 24 hours  dextrose 5% + lactated ringers. 1000 milliLiter(s) (100 mL/Hr) IV Continuous <Continuous>  folic acid 1 milliGRAM(s) Oral daily  hydroxyurea 500 milliGRAM(s) Oral every 12 hours  megestrol 80 milliGRAM(s) Oral two times a day  multivitamin 1 Tablet(s) Oral daily  rivaroxaban 20 milliGRAM(s) Oral daily    MEDICATIONS  (PRN):  acetaminophen   Tablet .. 650 milliGRAM(s) Oral every 6 hours PRN Moderate Pain (4 - 6)  calcium carbonate    500 mG (Tums) Chewable 3 Tablet(s) Chew every 6 hours PRN Dyspepsia  melatonin 5 milliGRAM(s) Oral at bedtime PRN Sleep      Allergies    No Known Allergies      EXAM  Vital Signs Last 24 Hrs  T(C): 36.2 (23 Jan 2020 12:37), Max: 36.6 (23 Jan 2020 05:44)  T(F): 97.2 (23 Jan 2020 12:37), Max: 97.8 (23 Jan 2020 05:44)  HR: 79 (23 Jan 2020 12:37) (78 - 93)  BP: 137/91 (23 Jan 2020 12:37) (123/79 - 146/99)  BP(mean): --  RR: 18 (23 Jan 2020 12:37) (18 - 18)  SpO2: 97% (23 Jan 2020 12:37) (96% - 97%)  Sleepy, fatigued appearing   No rash  Otherwise unchanged        LABS:                        15.2   26.73 )-----------( 409      ( 23 Jan 2020 07:16 )             46.8     01-23    144  |  105  |  27<H>  ----------------------------<  108<H>  3.8   |  22  |  0.56    Ca    9.8      23 Jan 2020 07:16  Phos  4.0     01-23  Mg     2.2     01-23        Urinalysis Basic - ( 22 Jan 2020 10:32 )    Color: Yellow / Appearance: SL Cloudy / SG: >=1.030 / pH: x  Gluc: x / Ketone: Trace mg/dL  / Bili: Small / Urobili: 0.2 E.U./dL   Blood: x / Protein: 100 mg/dL / Nitrite: NEGATIVE   Leuk Esterase: NEGATIVE / RBC: < 5 /HPF / WBC < 5 /HPF   Sq Epi: x / Non Sq Epi: 0-5 /HPF / Bacteria: Present /HPF    Procalcitonin, Serum (01.22.20 @ 06:51)    Procalcitonin, Serum: 0.09: Procalcitonin (PCT) Interpretation (ng/mL) - Diagnosis of systemic  bacterial infection/sepsis  PCT < 0.5: Systemic infection (sepsis) is not likely and risk for  progression to severe systemic infection is low. Local bacterial  infection is possible. If early sepsis is suspected clinically, PCT  should be re-assessed in 6-24 hours.  PCT >/= 0.5 but < 2.0: Systemic infection (sepsis) is possible, but other  conditions are known to elevate PCT as well. Moderate risk for  progression to severe systemic infection. The patient should be closely  monitored both clinically and by re-assessing PCT within 6-24 hours.  PCT >/= 2.0 but < 10.0: Systemic infection (sepsis) is likely, unless  other causes are known. High risk of progression to severe systemic  infection (severe sepsis/septic shock).  PCT >/= 10.0: Important systemic inflammatory response, almost  exclusively due to severe bacterial sepsis or septic shock. High  likelihood of severe sepsis or septic shock. ng/mL        MICROBIOLOGY:    Culture - Urine (01.22.20 @ 11:14)    Specimen Source: .Urine Clean Catch (Midstream)    Culture Results:   No growth        RADIOLOGY & ADDITIONAL STUDIES:    US Retroperitoneal B-Scan Limited (01.22.20 @ 18:15) >    EXAM:  US RETROPERITONEAL LIMITED                          PROCEDURE DATE:  01/22/2020          INTERPRETATION:  RENAL ULTRASOUND dated 1/22/2020 6:15 PM    Indication: Recurrent UTIs, evaluate for strictures or diverticula    Prior studies: Retroperitoneal ultrasound 7/27/2018    Findings:    RIGHT KIDNEY:  Length: 10.5 cm  Lesions: 1.3 x 1.4 x 1.1 cm septated cyst within the upper pole. 3.8 x 2.0 x 3.2 cm milk of calcium cyst within the interpolar region. 2.1 x 1.8 x 1.4 cm cyst within the interpolar region.  Hydronephrosis: None  Stones: None  Echogenicity: Normal    LEFT KIDNEY:  Length: 9.8 cm  Lesions: None  Hydronephrosis: None  Stones: 1.3 x 1.1 cm nonobstructing stone within the upper pole. 1.4 x 1.2 cm nonobstructing stone within the interpolar region. 0.5 x 0.6 cm nonobstructing stone within the lower pole.  Echogenicity: Normal      IMPRESSION:  Nonobstructing left-sided nephrolithiasis. Findings can be correlated with  non contrast CT.

## 2020-01-23 NOTE — PROGRESS NOTE ADULT - ASSESSMENT
RECOMMEND  Complete 10 day course of antibiotics  R/O retention - serial bladder scans UTI in the setting of nephrolithiasis and possibly urinary retention  Profound failure to thrive not explained by an infectious process  Concern for a neurologic disorder      RECOMMEND  Complete 10 day course of antibiotics  R/O retention - serial bladder scans  Neuro work up

## 2020-01-23 NOTE — PROGRESS NOTE ADULT - SUBJECTIVE AND OBJECTIVE BOX
OVERNIGHT EVENTS: NAEO    SUBJECTIVE / INTERVAL HPI: Patient seen and examined at bedside. Pt still feeling weak and has no appetite. No change from yesterday. Denies f/c, n/v, HA, chest pain, SOB, abdominal pain, diarrhea, constipation, dysuria.    MEDICATIONS  (STANDING):  amLODIPine   Tablet 5 milliGRAM(s) Oral daily  atorvastatin 40 milliGRAM(s) Oral daily  cefTRIAXone   IVPB 1000 milliGRAM(s) IV Intermittent every 24 hours  dextrose 5% + lactated ringers. 1000 milliLiter(s) (100 mL/Hr) IV Continuous <Continuous>  folic acid 1 milliGRAM(s) Oral daily  hydroxyurea 500 milliGRAM(s) Oral every 12 hours  megestrol 80 milliGRAM(s) Oral two times a day  multivitamin 1 Tablet(s) Oral daily  rivaroxaban 20 milliGRAM(s) Oral daily    MEDICATIONS  (PRN):  acetaminophen   Tablet .. 650 milliGRAM(s) Oral every 6 hours PRN Moderate Pain (4 - 6)  calcium carbonate    500 mG (Tums) Chewable 3 Tablet(s) Chew every 6 hours PRN Dyspepsia  melatonin 5 milliGRAM(s) Oral at bedtime PRN Sleep    Allergies    No Known Allergies    Intolerances        VITAL SIGNS:  Vital Signs Last 24 Hrs  T(C): 36.2 (23 Jan 2020 12:37), Max: 36.6 (23 Jan 2020 05:44)  T(F): 97.2 (23 Jan 2020 12:37), Max: 97.8 (23 Jan 2020 05:44)  HR: 79 (23 Jan 2020 12:37) (78 - 93)  BP: 137/91 (23 Jan 2020 12:37) (123/79 - 146/99)  BP(mean): --  RR: 18 (23 Jan 2020 12:37) (18 - 18)  SpO2: 97% (23 Jan 2020 12:37) (96% - 97%)      01-22-20 @ 07:01  -  01-23-20 @ 07:00  --------------------------------------------------------  IN: 0 mL / OUT: 300 mL / NET: -300 mL        PHYSICAL EXAM:  General: Elderly woman, frail-appearing, NAD, Laying comfortably in bed  HEENT: NC/AT, anicteric sclera, MMM  Neck: supple  Cardiovascular: +S1/S2, RRR, No murmurs, rubs, gallops  Respiratory: CTA B/L, no W/R/R  Gastrointestinal: soft, NT/ND, +BSx4, full sensation when palpating bladder  Extremities: WWP, no edema, clubbing or cyanosis  Vascular: 2+ radial, DP/PT pulses B/L  Neurological: AAOx3, no focal deficits, general profound weakness      LABS:                        15.2   26.73 )-----------( 409      ( 23 Jan 2020 07:16 )             46.8     01-23    144  |  105  |  27<H>  ----------------------------<  108<H>  3.8   |  22  |  0.56    Ca    9.8      23 Jan 2020 07:16  Phos  4.0     01-23  Mg     2.2     01-23        Urinalysis Basic - ( 22 Jan 2020 10:32 )  Color: Yellow / Appearance: SL Cloudy / SG: >=1.030 / pH: x  Gluc: x / Ketone: Trace mg/dL  / Bili: Small / Urobili: 0.2 E.U./dL   Blood: x / Protein: 100 mg/dL / Nitrite: NEGATIVE   Leuk Esterase: NEGATIVE / RBC: < 5 /HPF / WBC < 5 /HPF   Sq Epi: x / Non Sq Epi: 0-5 /HPF / Bacteria: Present /HPF        RADIOLOGY & ADDITIONAL TESTS: Reviewed.

## 2020-01-23 NOTE — PROGRESS NOTE ADULT - ASSESSMENT
per Internal Medicine    78F with PMHx of HTN, myeloproliferative disorder, DVT/PE (2015), chronic UTIs and hydronephrosis, admitted for sepsis 2/2 UTI and failure to thrive.      Problem/Plan - 1:  ·  Problem: Myeloproliferative disorder.  Plan: Follows w/ Dr. Resendiz. Baseline WBC 15-17. No history of thrombocytopenia.  WBC continues to uptrend despite proper abx coverage. Likely not infectious in nature.   - Started Hydrea 500mg BID for ERNA-2 medications per Dr. Resendiz on 1/22    - continue Xarelto 20mg daily.     Problem/Plan - 2:  ·  Problem: Sepsis.  Plan: POA tachy to 100 and leukocytosis 21.2 (pt baseline WBC 15-17 in setting of MPD) with positive UA. s/p 1L IVF in ED. s/p CTX 1g. lactate wnl. Bcx negative. MDR E.coli. WBC was uptrending from 1/17 to 1/20 likely due to resistant E.coli (patient did not have any risk factors for MDR like hospitalization within the last 3 months, abx treatment within the last 3 months or previous cultures showing MDR). Procalcitonin 0.09.  - Renal ultrasound with R renal cysts x3 and L non obstructing stones: 1.3 x 1.1 cm nonobstructing stone within the upper pole, 1.4 x 1.2 cm nonobstructing stone within the interpolar region, 0.5 x 0.6 cm nonobstructing stone within the lower pole.  - S/p 7 days of abx (CFTX then ertapenem), switch to ceftriaxone 1000mg q24hr today per ID to complete at least 10 days per ID.     Problem/Plan - 3:  ·  Problem: UTI (urinary tract infection).  Plan: POA UA positive in setting of progressive weakness. Pt with h/o E. coli UTI and pyelo on prev. adm. last year.  - c/w plan as per above  - Bladder scan q6hr and straight cath prn for >300cc.     Problem/Plan - 4:  ·  Problem: Generalized weakness.  Plan: Secondary to sepsis, with component of failure to thrive 2/2 chronic illness and poor PO intake. Mildly improved today, pt eating ice cream and drinking ginger ale.  - Nutrition supplement with Ensures, multivitamin, and megace  - PT evaluation initially recommending home with no needs, continue daily assessment  - Neuro and Psych evaluations per PMD Dr. Tapia, appreciate reccs  - Remeron 7.5mg qHS vs increase dose of Megace if persistently poor appetite  - F/u CT head non con.     Problem/Plan - 5:  ·  Problem: Cachexia.  Plan: Pt with BMI 18. Thin and malnourished on exam.  - nutrition consulted, appreciate reccs  - Nutrition supplement with Ensure Enlive 2 cans TID, multivitamin, and megace  - Swallow evaluation - limited assessment of swallowing WFL.     Problem/Plan - 6:  Problem: Nutrition, metabolism, and development symptoms. Plan: F: D5LR for poor PO intake and dehydration  E: replete K>4, Mg>2  N: Regular diet PO, Ensure Enlive 2 cans TID, megace for poor appetite    Severe Protein Calorie Malnutrition that was present on admission.    Problem/Plan - 7:  ·  Problem: Need for prophylactic measure.  Plan: VTE: Xarelto, SCDs    Code: FULL CODE  Dispo: [facility].

## 2020-01-23 NOTE — PROGRESS NOTE ADULT - PROBLEM SELECTOR PLAN 4
Secondary to sepsis, with component of failure to thrive 2/2 chronic illness and poor PO intake. Mildly improved today, pt eating ice cream and drinking ginger ale.  - Nutrition supplement with Ensures, multivitamin, and megace  - PT evaluation initially recommending home with no needs, continue daily assessment  - Neuro and Psych evaluations per PMD Dr. Tapia, appreciate reccs  - Remeron 7.5mg qHS vs increase dose of Megace if persistently poor appetite  - F/u CT head non con

## 2020-01-23 NOTE — PROGRESS NOTE ADULT - SUBJECTIVE AND OBJECTIVE BOX
Physical Medicine and Rehabilitation Progress Note:    Patient is a 79y old  Female who presents with a chief complaint of UTI (23 Jan 2020 15:57)      HPI:  78F with PMHx of HTN, myeloproliferative disorder, DVT/PE (2015), chronic UTIs and hydronephrosis who presents to ED for c/o generalized weakness. Patient reports she usually ambulates around her apt with a walker, and has needed more assistance from her aide than usual over the last few days. Her aide, who accompanies her to the ED today, states she also noticed the patient has had foul-smelling urine today. Dr. Zapata suggested she present to the ED today.     Vitals: 98.9 (rectal), 100 bpm, 203/126, 18, 95% on room air. Labs notable for WBC 21.2 (87.8% PMNs, no immature granulocytes), Hgb 15.3, Plt 406. Patient received CTX 1 g, NS 1 L. (16 Jan 2020 17:54)                            15.2   26.73 )-----------( 409      ( 23 Jan 2020 07:16 )             46.8       01-23    144  |  105  |  27<H>  ----------------------------<  108<H>  3.8   |  22  |  0.56    Ca    9.8      23 Jan 2020 07:16  Phos  4.0     01-23  Mg     2.2     01-23      Vital Signs Last 24 Hrs  T(C): 36.2 (23 Jan 2020 12:37), Max: 36.6 (23 Jan 2020 05:44)  T(F): 97.2 (23 Jan 2020 12:37), Max: 97.8 (23 Jan 2020 05:44)  HR: 79 (23 Jan 2020 12:37) (78 - 93)  BP: 137/91 (23 Jan 2020 12:37) (123/79 - 146/99)  BP(mean): --  RR: 18 (23 Jan 2020 12:37) (18 - 18)  SpO2: 97% (23 Jan 2020 12:37) (96% - 97%)    MEDICATIONS  (STANDING):  amLODIPine   Tablet 5 milliGRAM(s) Oral daily  atorvastatin 40 milliGRAM(s) Oral daily  cefTRIAXone   IVPB 1000 milliGRAM(s) IV Intermittent every 24 hours  dextrose 5% + lactated ringers. 1000 milliLiter(s) (100 mL/Hr) IV Continuous <Continuous>  folic acid 1 milliGRAM(s) Oral daily  hydroxyurea 500 milliGRAM(s) Oral every 12 hours  megestrol 80 milliGRAM(s) Oral two times a day  multivitamin 1 Tablet(s) Oral daily  rivaroxaban 20 milliGRAM(s) Oral daily    MEDICATIONS  (PRN):  acetaminophen   Tablet .. 650 milliGRAM(s) Oral every 6 hours PRN Moderate Pain (4 - 6)  calcium carbonate    500 mG (Tums) Chewable 3 Tablet(s) Chew every 6 hours PRN Dyspepsia  melatonin 5 milliGRAM(s) Oral at bedtime PRN Sleep    Currently Undergoing Physical Therapy at bedside.    Functional Status Assessment:    Therapeutic Interventions      Bed Mobility  Bed Mobility Training Symptoms Noted During/After Treatment: none  Bed Mobility Training Rolling/Turning: minimum assist (75% patient effort);  1 person assist;  verbal cues  Bed Mobility Training Scooting: minimum assist (75% patient effort);  1 person assist;  verbal cues  Bed Mobility Training Sit-to-Supine: contact guard;  1 person assist  Bed Mobility Training Supine-to-Sit: minimum assist (75% patient effort);  1 person assist;  verbal cues;  head of bed elevated 30 degrees  Bed Mobility Training Limitations: decreased ability to use legs for bridging/pushing;  decreased ability to use arms for pushing/pulling;  decreased strength;  impaired postural control    Sit-Stand Transfer Training  Sit-to-Stand Transfer Training Symptoms Noted During/After Treatment: none  Transfer Training Sit-to-Stand Transfer: minimum assist (75% patient effort);  1 person assist;  verbal cues;  unilateral hand held assist  Transfer Training Stand-to-Sit Transfer: contact guard;  1 person assist;  unilateral hand held assist   Sit-to-Stand Transfer Training Transfer Safety Analysis: decreased balance;  decreased strength;  impaired balance    Gait Training  Gait Training Symptoms Noted During/After Treatment: deferred secondary to fatigue despite maximal encouragement    Therapeutic Exercise  Therapeutic Exercise Symptoms Noted During/After Treatment: none  Therapeutic Exercise Detail: Semi-Del Real's Position TherexAnkle pumps x 10 bilaterallyQuad sets x 10 bilaterallyGlute sets x 10 bialterallyHeel slides x 10 bilaterally             PM&R Impression: as above    Current Disposition Plan Recommendations: subacute rehab placement

## 2020-01-23 NOTE — PROGRESS NOTE ADULT - PROBLEM SELECTOR PLAN 2
POA tachy to 100 and leukocytosis 21.2 (pt baseline WBC 15-17 in setting of MPD) with positive UA. s/p 1L IVF in ED. s/p CTX 1g. lactate wnl. Bcx negative. MDR E.coli. WBC was uptrending from 1/17 to 1/20 likely due to resistant E.coli (patient did not have any risk factors for MDR like hospitalization within the last 3 months, abx treatment within the last 3 months or previous cultures showing MDR). Procalcitonin 0.09.  - Renal ultrasound with R renal cysts x3 and L non obstructing stones: 1.3 x 1.1 cm nonobstructing stone within the upper pole, 1.4 x 1.2 cm nonobstructing stone within the interpolar region, 0.5 x 0.6 cm nonobstructing stone within the lower pole.  - S/p 7 days of abx (CFTX then ertapenem), switch to ceftriaxone 1000mg q24hr today per ID to complete at least 10 days per ID

## 2020-01-23 NOTE — PROGRESS NOTE ADULT - PROBLEM SELECTOR PLAN 6
F: D5LR for poor PO intake and dehydration  E: replete K>4, Mg>2  N: Regular diet PO, Ensure Enlive 2 cans TID, megace for poor appetite    Severe Protein Calorie Malnutrition that was present on admission

## 2020-01-23 NOTE — PROGRESS NOTE ADULT - ASSESSMENT
78F with PMHx of HTN, myeloproliferative disorder, DVT/PE (2015), chronic UTIs and hydronephrosis, admitted for sepsis 2/2 UTI and failure to thrive.

## 2020-01-23 NOTE — PROGRESS NOTE ADULT - PROBLEM SELECTOR PLAN 3
POA UA positive in setting of progressive weakness. Pt with h/o E. coli UTI and pyelo on prev. adm. last year.  - c/w plan as per above  - Bladder scan q6hr and straight cath prn for >300cc

## 2020-01-24 DIAGNOSIS — F05 DELIRIUM DUE TO KNOWN PHYSIOLOGICAL CONDITION: ICD-10-CM

## 2020-01-24 LAB
ANION GAP SERPL CALC-SCNC: 12 MMOL/L — SIGNIFICANT CHANGE UP (ref 5–17)
ANISOCYTOSIS BLD QL: SLIGHT — SIGNIFICANT CHANGE UP
BASOPHILS # BLD AUTO: 0.45 K/UL — HIGH (ref 0–0.2)
BASOPHILS NFR BLD AUTO: 1.7 % — SIGNIFICANT CHANGE UP (ref 0–2)
BUN SERPL-MCNC: 21 MG/DL — SIGNIFICANT CHANGE UP (ref 7–23)
CALCIUM SERPL-MCNC: 9.2 MG/DL — SIGNIFICANT CHANGE UP (ref 8.4–10.5)
CHLORIDE SERPL-SCNC: 106 MMOL/L — SIGNIFICANT CHANGE UP (ref 96–108)
CO2 SERPL-SCNC: 24 MMOL/L — SIGNIFICANT CHANGE UP (ref 22–31)
CREAT SERPL-MCNC: 0.59 MG/DL — SIGNIFICANT CHANGE UP (ref 0.5–1.3)
EOSINOPHIL # BLD AUTO: 0.68 K/UL — HIGH (ref 0–0.5)
EOSINOPHIL NFR BLD AUTO: 2.6 % — SIGNIFICANT CHANGE UP (ref 0–6)
GIANT PLATELETS BLD QL SMEAR: PRESENT — SIGNIFICANT CHANGE UP
GLUCOSE SERPL-MCNC: 132 MG/DL — HIGH (ref 70–99)
HCT VFR BLD CALC: 44.1 % — SIGNIFICANT CHANGE UP (ref 34.5–45)
HGB BLD-MCNC: 14.2 G/DL — SIGNIFICANT CHANGE UP (ref 11.5–15.5)
LYMPHOCYTES # BLD AUTO: 1.15 K/UL — SIGNIFICANT CHANGE UP (ref 1–3.3)
LYMPHOCYTES # BLD AUTO: 4.4 % — LOW (ref 13–44)
MACROCYTES BLD QL: SLIGHT — SIGNIFICANT CHANGE UP
MAGNESIUM SERPL-MCNC: 2.2 MG/DL — SIGNIFICANT CHANGE UP (ref 1.6–2.6)
MANUAL SMEAR VERIFICATION: SIGNIFICANT CHANGE UP
MCHC RBC-ENTMCNC: 28.1 PG — SIGNIFICANT CHANGE UP (ref 27–34)
MCHC RBC-ENTMCNC: 32.2 GM/DL — SIGNIFICANT CHANGE UP (ref 32–36)
MCV RBC AUTO: 87.3 FL — SIGNIFICANT CHANGE UP (ref 80–100)
MICROCYTES BLD QL: SLIGHT — SIGNIFICANT CHANGE UP
MONOCYTES # BLD AUTO: 0.68 K/UL — SIGNIFICANT CHANGE UP (ref 0–0.9)
MONOCYTES NFR BLD AUTO: 2.6 % — SIGNIFICANT CHANGE UP (ref 2–14)
NEUTROPHILS # BLD AUTO: 23.02 K/UL — HIGH (ref 1.8–7.4)
NEUTROPHILS NFR BLD AUTO: 85.2 % — HIGH (ref 43–77)
NEUTS BAND # BLD: 2.6 % — SIGNIFICANT CHANGE UP (ref 0–8)
OVALOCYTES BLD QL SMEAR: SLIGHT — SIGNIFICANT CHANGE UP
PHOSPHATE SERPL-MCNC: 2.9 MG/DL — SIGNIFICANT CHANGE UP (ref 2.5–4.5)
PLAT MORPH BLD: ABNORMAL
PLATELET # BLD AUTO: 428 K/UL — HIGH (ref 150–400)
POTASSIUM SERPL-MCNC: 3.1 MMOL/L — LOW (ref 3.5–5.3)
POTASSIUM SERPL-SCNC: 3.1 MMOL/L — LOW (ref 3.5–5.3)
RBC # BLD: 5.05 M/UL — SIGNIFICANT CHANGE UP (ref 3.8–5.2)
RBC # FLD: 17.2 % — HIGH (ref 10.3–14.5)
RBC BLD AUTO: ABNORMAL
SODIUM SERPL-SCNC: 142 MMOL/L — SIGNIFICANT CHANGE UP (ref 135–145)
SPHEROCYTES BLD QL SMEAR: SLIGHT — SIGNIFICANT CHANGE UP
VARIANT LYMPHS # BLD: 0.9 % — SIGNIFICANT CHANGE UP (ref 0–6)
WBC # BLD: 26.22 K/UL — HIGH (ref 3.8–10.5)
WBC # FLD AUTO: 26.22 K/UL — HIGH (ref 3.8–10.5)

## 2020-01-24 PROCEDURE — 99232 SBSQ HOSP IP/OBS MODERATE 35: CPT

## 2020-01-24 RX ORDER — POTASSIUM CHLORIDE 20 MEQ
40 PACKET (EA) ORAL ONCE
Refills: 0 | Status: COMPLETED | OUTPATIENT
Start: 2020-01-24 | End: 2020-01-24

## 2020-01-24 RX ORDER — POTASSIUM CHLORIDE 20 MEQ
10 PACKET (EA) ORAL
Refills: 0 | Status: DISCONTINUED | OUTPATIENT
Start: 2020-01-24 | End: 2020-01-24

## 2020-01-24 RX ORDER — POTASSIUM CHLORIDE 20 MEQ
20 PACKET (EA) ORAL ONCE
Refills: 0 | Status: COMPLETED | OUTPATIENT
Start: 2020-01-24 | End: 2020-01-24

## 2020-01-24 RX ADMIN — RIVAROXABAN 20 MILLIGRAM(S): KIT at 13:15

## 2020-01-24 RX ADMIN — Medication 1 MILLIGRAM(S): at 13:13

## 2020-01-24 RX ADMIN — Medication 40 MILLIEQUIVALENT(S): at 13:13

## 2020-01-24 RX ADMIN — CEFTRIAXONE 100 MILLIGRAM(S): 500 INJECTION, POWDER, FOR SOLUTION INTRAMUSCULAR; INTRAVENOUS at 13:14

## 2020-01-24 RX ADMIN — HYDROXYUREA 500 MILLIGRAM(S): 500 CAPSULE ORAL at 23:17

## 2020-01-24 RX ADMIN — ATORVASTATIN CALCIUM 40 MILLIGRAM(S): 80 TABLET, FILM COATED ORAL at 21:50

## 2020-01-24 RX ADMIN — Medication 1 TABLET(S): at 13:14

## 2020-01-24 RX ADMIN — AMLODIPINE BESYLATE 5 MILLIGRAM(S): 2.5 TABLET ORAL at 07:30

## 2020-01-24 RX ADMIN — MEGESTROL ACETATE 80 MILLIGRAM(S): 40 SUSPENSION ORAL at 07:30

## 2020-01-24 RX ADMIN — HYDROXYUREA 500 MILLIGRAM(S): 500 CAPSULE ORAL at 13:14

## 2020-01-24 RX ADMIN — MEGESTROL ACETATE 80 MILLIGRAM(S): 40 SUSPENSION ORAL at 19:30

## 2020-01-24 RX ADMIN — Medication 20 MILLIEQUIVALENT(S): at 16:23

## 2020-01-24 NOTE — CHART NOTE - NSCHARTNOTEFT_GEN_A_CORE
Admitting Diagnosis:   Patient is a 79y old  Female who presents with a chief complaint of UTI (24 Jan 2020 07:54)      PAST MEDICAL & SURGICAL HISTORY:  Myeloproliferative disorder  Hypertension  PE (pulmonary thromboembolism): 2015  Tremor  Vestibular disequilibrium  S/P wrist surgery  S/P hysterectomy      Current Nutrition Order:Regular with 3 ensure Enlive(1050kcal and 60gmprotein)       PO Intake: Good (%) [   ]  Fair (50-75%) [   ] Poor (<25%) [x  ]    GI Issues: none    Pain :none    Skin Integrity :intact    Labs:   01-23    144  |  105  |  27<H>  ----------------------------<  108<H>  3.8   |  22  |  0.56    Ca    9.8      23 Jan 2020 07:16  Phos  4.0     01-23  Mg     2.2     01-23      CAPILLARY BLOOD GLUCOSE          Medications:  MEDICATIONS  (STANDING):  amLODIPine   Tablet 5 milliGRAM(s) Oral daily  atorvastatin 40 milliGRAM(s) Oral daily  cefTRIAXone   IVPB 1000 milliGRAM(s) IV Intermittent every 24 hours  dextrose 5% + lactated ringers. 1000 milliLiter(s) (100 mL/Hr) IV Continuous <Continuous>  folic acid 1 milliGRAM(s) Oral daily  hydroxyurea 500 milliGRAM(s) Oral every 12 hours  megestrol 80 milliGRAM(s) Oral two times a day  multivitamin 1 Tablet(s) Oral daily  rivaroxaban 20 milliGRAM(s) Oral daily    MEDICATIONS  (PRN):  acetaminophen   Tablet .. 650 milliGRAM(s) Oral every 6 hours PRN Moderate Pain (4 - 6)  calcium carbonate    500 mG (Tums) Chewable 3 Tablet(s) Chew every 6 hours PRN Dyspepsia  melatonin 5 milliGRAM(s) Oral at bedtime PRN Sleep      Weight:47.6kg  Daily     Daily no updated weights    Weight Change: no updated weights    Nutrition Focused Physical Exam: Completed [  x ]  Not Pertinent [   ]1/17 completed  ]  Suspect Severe [PCM] 2/2 to physical assessment, [poor intake], and [wt loss]; please see malnutrition chart note.    Estimated energy needs: Based on ABW(due to between % of IBW) ABW:47.6kg [id number]:1190-1428kcal and 1.2-1.4gmprotein:57-67gmprotein and 30-35cc fluids:1428-1666cc    Subjective: 78y/o female with history of HTN,MPD,DVT,PE and UTI's admitted with sepsis 2/2 UTI. Calorie count initiated due to poor po. Megace started 1/22.Patient continues to have poor po but some improvement. Intake consists of 1 small yogurt, cookie and small ice cream(less than 400kcal)Refuses Ensure. Dislikes the taste .Patient declines any assistance with feeding. RD encouraged patient to eat more of the foods she prefers ie ice cream, yogurt. Would benefit from frequent checks, food offerings and encouragement.     Previous Nutrition Diagnosis: Severe protein/calorie malnutrition r/t inadequate energy intake 2/2 decrease appetite AEB: muscle/fat loss and inadequate energy intake    Active [x  ]  Resolved [   ]    If resolved, new PES:     Goal :Meet 75% of needs consistently    Recommendations:1.Please re-weigh patient 2.D/C Ensure to refusal to take 3.Please encourage po with frequent po offerings.    Education: discussed some high calorie foods.Suspected poor comprehension    Risk Level: High [  x ] Moderate [   ] Low [   ]

## 2020-01-24 NOTE — PROGRESS NOTE ADULT - SUBJECTIVE AND OBJECTIVE BOX
Neurology Follow up note    Name  ANDREW STUART    HPI:  78F with PMHx of HTN, myeloproliferative disorder, DVT/PE (2015), chronic UTIs and hydronephrosis who presents to ED for c/o generalized weakness. Patient reports she usually ambulates around her apt with a walker, and has needed more assistance from her aide than usual over the last few days. Her aide, who accompanies her to the ED today, states she also noticed the patient has had foul-smelling urine today. Dr. Zapata suggested she present to the ED today.     Vitals: 98.9 (rectal), 100 bpm, 203/126, 18, 95% on room air. Labs notable for WBC 21.2 (87.8% PMNs, no immature granulocytes), Hgb 15.3, Plt 406. Patient received CTX 1 g, NS 1 L. (16 Jan 2020 17:54)      Interval History - gait unsteady - no new focal weakness- poor memory         REVIEW OF SYSTEMS    Vital Signs Last 24 Hrs  T(C): 36.4 (24 Jan 2020 06:01), Max: 36.4 (24 Jan 2020 06:01)  T(F): 97.6 (24 Jan 2020 06:01), Max: 97.6 (24 Jan 2020 06:01)  HR: 74 (24 Jan 2020 06:01) (74 - 82)  BP: 142/90 (24 Jan 2020 06:01) (129/82 - 142/90)  BP(mean): --  RR: 18 (24 Jan 2020 06:01) (18 - 18)  SpO2: 97% (24 Jan 2020 06:01) (96% - 97%)    Physical Exam-     Mental Status- awake and alert    Cranial Nerves- full EOM    Gait and station- n/a    Motor- moves all 4 extremities    Reflexes- decreased    Sensation- no sensory level    Coordination- no tremors    Vascular - no bruits    Medications  acetaminophen   Tablet .. 650 milliGRAM(s) Oral every 6 hours PRN  amLODIPine   Tablet 5 milliGRAM(s) Oral daily  atorvastatin 40 milliGRAM(s) Oral daily  calcium carbonate    500 mG (Tums) Chewable 3 Tablet(s) Chew every 6 hours PRN  cefTRIAXone   IVPB 1000 milliGRAM(s) IV Intermittent every 24 hours  dextrose 5% + lactated ringers. 1000 milliLiter(s) IV Continuous <Continuous>  folic acid 1 milliGRAM(s) Oral daily  hydroxyurea 500 milliGRAM(s) Oral every 12 hours  megestrol 80 milliGRAM(s) Oral two times a day  melatonin 5 milliGRAM(s) Oral at bedtime PRN  multivitamin 1 Tablet(s) Oral daily  rivaroxaban 20 milliGRAM(s) Oral daily      Lab      Radiology    Assessment- [facility]- generalized weakness    Plan rehab

## 2020-01-24 NOTE — PROGRESS NOTE ADULT - SUBJECTIVE AND OBJECTIVE BOX
OVERNIGHT EVENTS: NAEO    SUBJECTIVE / INTERVAL HPI: Patient seen and examined at bedside. Reports better appetite however reports seeing 2 women in robes sit on her bed overnight. Denies f/c, n/v, HA, chest pain, SOB, abdominal pain, diarrhea, constipation, dysuria.    MEDICATIONS  (STANDING):  amLODIPine   Tablet 5 milliGRAM(s) Oral daily  atorvastatin 40 milliGRAM(s) Oral daily  cefTRIAXone   IVPB 1000 milliGRAM(s) IV Intermittent every 24 hours  dextrose 5% + lactated ringers. 1000 milliLiter(s) (100 mL/Hr) IV Continuous <Continuous>  folic acid 1 milliGRAM(s) Oral daily  hydroxyurea 500 milliGRAM(s) Oral every 12 hours  megestrol 80 milliGRAM(s) Oral two times a day  multivitamin 1 Tablet(s) Oral daily  potassium chloride   Powder 40 milliEquivalent(s) Oral once  potassium chloride  10 mEq/100 mL IVPB 10 milliEquivalent(s) IV Intermittent every 1 hour  rivaroxaban 20 milliGRAM(s) Oral daily    MEDICATIONS  (PRN):  acetaminophen   Tablet .. 650 milliGRAM(s) Oral every 6 hours PRN Moderate Pain (4 - 6)  calcium carbonate    500 mG (Tums) Chewable 3 Tablet(s) Chew every 6 hours PRN Dyspepsia  melatonin 5 milliGRAM(s) Oral at bedtime PRN Sleep    Allergies    No Known Allergies    Intolerances        VITAL SIGNS:  Vital Signs Last 24 Hrs  T(C): 36.4 (24 Jan 2020 06:01), Max: 36.4 (24 Jan 2020 06:01)  T(F): 97.6 (24 Jan 2020 06:01), Max: 97.6 (24 Jan 2020 06:01)  HR: 74 (24 Jan 2020 06:01) (74 - 82)  BP: 142/90 (24 Jan 2020 06:01) (129/82 - 142/90)  BP(mean): --  RR: 18 (24 Jan 2020 06:01) (18 - 18)  SpO2: 97% (24 Jan 2020 06:01) (96% - 97%)        PHYSICAL EXAM:  General: Elderly woman, frail-appearing, NAD, Laying comfortably in bed  HEENT: NC/AT, anicteric sclera, MMM  Neck: supple  Cardiovascular: +S1/S2, RRR, No murmurs, rubs, gallops  Respiratory: CTA B/L, no W/R/R  Gastrointestinal: soft, NT/ND, +BSx4, mild full sensation when palpating bladder  Extremities: WWP, no edema, clubbing or cyanosis  Vascular: 2+ radial, DP/PT pulses B/L  Neurological: AAOx3, no focal deficits, general profound weakness slightly improved from yesterday      LABS:                        14.2   26.22 )-----------( 428      ( 24 Jan 2020 10:47 )             44.1     01-24    142  |  106  |  21  ----------------------------<  132<H>  3.1<L>   |  24  |  0.59    Ca    9.2      24 Jan 2020 10:47  Phos  2.9     01-24  Mg     2.2     01-24        RADIOLOGY & ADDITIONAL TESTS: Reviewed.

## 2020-01-24 NOTE — PROGRESS NOTE ADULT - SUBJECTIVE AND OBJECTIVE BOX
Once again c/o strange people at night---? hallucinations BUT cognitive functions and speech are improved this AM.    Await new CBC result.    Please try to get PT and get pt ambulating with a walker so that she might be a candidate for home care or Rehab soon.

## 2020-01-24 NOTE — PROGRESS NOTE ADULT - PROBLEM SELECTOR PLAN 1
Follows w/ Dr. Resendiz. Baseline WBC 15-17. No history of thrombocytopenia.  WBC continues to uptrend despite proper abx coverage. Likely not infectious in nature.   - Started Hydrea 500mg BID for ERNA-2 medications per Dr. Resendiz on 1/22    - continue Xarelto 20mg daily  - F/u daily CBC with diff

## 2020-01-25 LAB
ANION GAP SERPL CALC-SCNC: 12 MMOL/L — SIGNIFICANT CHANGE UP (ref 5–17)
BASOPHILS # BLD AUTO: 0.28 K/UL — HIGH (ref 0–0.2)
BASOPHILS NFR BLD AUTO: 1.1 % — SIGNIFICANT CHANGE UP (ref 0–2)
BUN SERPL-MCNC: 18 MG/DL — SIGNIFICANT CHANGE UP (ref 7–23)
CALCIUM SERPL-MCNC: 9.2 MG/DL — SIGNIFICANT CHANGE UP (ref 8.4–10.5)
CHLORIDE SERPL-SCNC: 107 MMOL/L — SIGNIFICANT CHANGE UP (ref 96–108)
CO2 SERPL-SCNC: 20 MMOL/L — LOW (ref 22–31)
CREAT SERPL-MCNC: 0.53 MG/DL — SIGNIFICANT CHANGE UP (ref 0.5–1.3)
EOSINOPHIL # BLD AUTO: 0.27 K/UL — SIGNIFICANT CHANGE UP (ref 0–0.5)
EOSINOPHIL NFR BLD AUTO: 1.1 % — SIGNIFICANT CHANGE UP (ref 0–6)
GLUCOSE SERPL-MCNC: 125 MG/DL — HIGH (ref 70–99)
HCT VFR BLD CALC: 45.3 % — HIGH (ref 34.5–45)
HGB BLD-MCNC: 14.2 G/DL — SIGNIFICANT CHANGE UP (ref 11.5–15.5)
IMM GRANULOCYTES NFR BLD AUTO: 1.1 % — SIGNIFICANT CHANGE UP (ref 0–1.5)
LYMPHOCYTES # BLD AUTO: 1.47 K/UL — SIGNIFICANT CHANGE UP (ref 1–3.3)
LYMPHOCYTES # BLD AUTO: 5.7 % — LOW (ref 13–44)
MAGNESIUM SERPL-MCNC: 2.5 MG/DL — SIGNIFICANT CHANGE UP (ref 1.6–2.6)
MCHC RBC-ENTMCNC: 28 PG — SIGNIFICANT CHANGE UP (ref 27–34)
MCHC RBC-ENTMCNC: 31.3 GM/DL — LOW (ref 32–36)
MCV RBC AUTO: 89.2 FL — SIGNIFICANT CHANGE UP (ref 80–100)
MONOCYTES # BLD AUTO: 0.82 K/UL — SIGNIFICANT CHANGE UP (ref 0–0.9)
MONOCYTES NFR BLD AUTO: 3.2 % — SIGNIFICANT CHANGE UP (ref 2–14)
NEUTROPHILS # BLD AUTO: 22.45 K/UL — HIGH (ref 1.8–7.4)
NEUTROPHILS NFR BLD AUTO: 87.8 % — HIGH (ref 43–77)
NRBC # BLD: 0 /100 WBCS — SIGNIFICANT CHANGE UP (ref 0–0)
PHOSPHATE SERPL-MCNC: 2.8 MG/DL — SIGNIFICANT CHANGE UP (ref 2.5–4.5)
PLATELET # BLD AUTO: 408 K/UL — HIGH (ref 150–400)
POTASSIUM SERPL-MCNC: 3.5 MMOL/L — SIGNIFICANT CHANGE UP (ref 3.5–5.3)
POTASSIUM SERPL-SCNC: 3.5 MMOL/L — SIGNIFICANT CHANGE UP (ref 3.5–5.3)
RBC # BLD: 5.08 M/UL — SIGNIFICANT CHANGE UP (ref 3.8–5.2)
RBC # FLD: 17.2 % — HIGH (ref 10.3–14.5)
SODIUM SERPL-SCNC: 139 MMOL/L — SIGNIFICANT CHANGE UP (ref 135–145)
WBC # BLD: 25.57 K/UL — HIGH (ref 3.8–10.5)
WBC # FLD AUTO: 25.57 K/UL — HIGH (ref 3.8–10.5)

## 2020-01-25 RX ADMIN — MEGESTROL ACETATE 80 MILLIGRAM(S): 40 SUSPENSION ORAL at 05:54

## 2020-01-25 RX ADMIN — AMLODIPINE BESYLATE 5 MILLIGRAM(S): 2.5 TABLET ORAL at 05:54

## 2020-01-25 RX ADMIN — HYDROXYUREA 500 MILLIGRAM(S): 500 CAPSULE ORAL at 23:21

## 2020-01-25 RX ADMIN — MEGESTROL ACETATE 80 MILLIGRAM(S): 40 SUSPENSION ORAL at 19:15

## 2020-01-25 RX ADMIN — HYDROXYUREA 500 MILLIGRAM(S): 500 CAPSULE ORAL at 12:13

## 2020-01-25 RX ADMIN — Medication 1 MILLIGRAM(S): at 12:12

## 2020-01-25 RX ADMIN — Medication 1 TABLET(S): at 12:12

## 2020-01-25 RX ADMIN — ATORVASTATIN CALCIUM 40 MILLIGRAM(S): 80 TABLET, FILM COATED ORAL at 21:57

## 2020-01-25 RX ADMIN — CEFTRIAXONE 100 MILLIGRAM(S): 500 INJECTION, POWDER, FOR SOLUTION INTRAMUSCULAR; INTRAVENOUS at 12:12

## 2020-01-25 RX ADMIN — RIVAROXABAN 20 MILLIGRAM(S): KIT at 12:13

## 2020-01-25 NOTE — PROGRESS NOTE ADULT - SUBJECTIVE AND OBJECTIVE BOX
no complaints this am  awake and pleasant  no pain  no nausea      MEDICATIONS  (STANDING):  amLODIPine   Tablet 5 milliGRAM(s) Oral daily  atorvastatin 40 milliGRAM(s) Oral daily  cefTRIAXone   IVPB 1000 milliGRAM(s) IV Intermittent every 24 hours  dextrose 5% + lactated ringers. 1000 milliLiter(s) (100 mL/Hr) IV Continuous <Continuous>  folic acid 1 milliGRAM(s) Oral daily  hydroxyurea 500 milliGRAM(s) Oral every 12 hours  megestrol 80 milliGRAM(s) Oral two times a day  multivitamin 1 Tablet(s) Oral daily  rivaroxaban 20 milliGRAM(s) Oral daily    MEDICATIONS  (PRN):  acetaminophen   Tablet .. 650 milliGRAM(s) Oral every 6 hours PRN Moderate Pain (4 - 6)  calcium carbonate    500 mG (Tums) Chewable 3 Tablet(s) Chew every 6 hours PRN Dyspepsia  melatonin 5 milliGRAM(s) Oral at bedtime PRN Sleep    ICU Vital Signs Last 24 Hrs  T(C): 36.6 (25 Jan 2020 05:37), Max: 36.8 (24 Jan 2020 20:40)  T(F): 97.8 (25 Jan 2020 05:37), Max: 98.2 (24 Jan 2020 20:40)  HR: 83 (25 Jan 2020 05:37) (79 - 94)  BP: 164/87 (25 Jan 2020 05:37) (130/86 - 164/87)  BP(mean): --  ABP: --  ABP(mean): --  RR: 18 (25 Jan 2020 05:37) (18 - 18)  SpO2: 96% (25 Jan 2020 05:37) (94% - 96%)    Gen awake and pleasant but somewhat confused, talking about being in the  office  Skin: no jaundice  HEENT: no oral lesion  Lung CTA  Heart RRR  Abd: soft NT  Ext: no c/c/e                          14.2   25.57 )-----------( 408      ( 25 Jan 2020 06:50 )             45.3   01-25    139  |  107  |  18  ----------------------------<  125<H>  3.5   |  20<L>  |  0.53    Ca    9.2      25 Jan 2020 06:50  Phos  2.8     01-25  Mg     2.5     01-25

## 2020-01-25 NOTE — PROGRESS NOTE ADULT - ASSESSMENT
78F with PMHx of HTN, myeloproliferative disorder, DVT/PE (2015), chronic UTIs and hydronephrosis, admitted for sepsis 2/2 UTI and failure to thrive.    CBC reviewed-stable  would continue hydrea 500mg BID  continues abx  continue xarelto  would consider dc megace given patients hx of GISSELL?      Kimberly Lake MD for Dr. [place] Resendiz  [phone]

## 2020-01-25 NOTE — PROGRESS NOTE ADULT - SUBJECTIVE AND OBJECTIVE BOX
Pt seen and examined   no complaints  denies pain      REVIEW OF SYSTEMS:  Constitutional: No fever, weight loss or fatigue  Cardiovascular: No chest pain, palpitations, dizziness or leg swelling  Gastrointestinal: No abdominal or epigastric pain. No nausea, vomiting or hematemesis; No diarrhea or constipation. No melena or hematochezia.  Skin: No itching, burning, rashes or lesions       MEDICATIONS:  MEDICATIONS  (STANDING):  amLODIPine   Tablet 5 milliGRAM(s) Oral daily  atorvastatin 40 milliGRAM(s) Oral daily  cefTRIAXone   IVPB 1000 milliGRAM(s) IV Intermittent every 24 hours  dextrose 5% + lactated ringers. 1000 milliLiter(s) (100 mL/Hr) IV Continuous <Continuous>  folic acid 1 milliGRAM(s) Oral daily  hydroxyurea 500 milliGRAM(s) Oral every 12 hours  megestrol 80 milliGRAM(s) Oral two times a day  multivitamin 1 Tablet(s) Oral daily  rivaroxaban 20 milliGRAM(s) Oral daily    MEDICATIONS  (PRN):  acetaminophen   Tablet .. 650 milliGRAM(s) Oral every 6 hours PRN Moderate Pain (4 - 6)  calcium carbonate    500 mG (Tums) Chewable 3 Tablet(s) Chew every 6 hours PRN Dyspepsia  melatonin 5 milliGRAM(s) Oral at bedtime PRN Sleep      Allergies    No Known Allergies    Intolerances        Vital Signs Last 24 Hrs  T(C): 36.9 (25 Jan 2020 12:25), Max: 36.9 (25 Jan 2020 12:25)  T(F): 98.4 (25 Jan 2020 12:25), Max: 98.4 (25 Jan 2020 12:25)  HR: 95 (25 Jan 2020 12:25) (83 - 95)  BP: 152/96 (25 Jan 2020 12:25) (130/86 - 164/87)  BP(mean): --  RR: 18 (25 Jan 2020 12:25) (18 - 18)  SpO2: 93% (25 Jan 2020 12:25) (93% - 96%)      PHYSICAL EXAM:    General: thin; in no acute distress, shows disinterest  HEENT: MMM, conjunctiva and sclera clear  Lungs: clear  Heart: regular  Gastrointestinal: Soft non-tender non-distended; Normal bowel sounds; No hepatosplenomegaly  Skin: Warm and dry. No obvious rash  Ext: no edema    LABS:      CBC Full  -  ( 25 Jan 2020 06:50 )  WBC Count : 25.57 K/uL  RBC Count : 5.08 M/uL  Hemoglobin : 14.2 g/dL  Hematocrit : 45.3 %  Platelet Count - Automated : 408 K/uL  Mean Cell Volume : 89.2 fl  Mean Cell Hemoglobin : 28.0 pg  Mean Cell Hemoglobin Concentration : 31.3 gm/dL  Auto Neutrophil # : 22.45 K/uL  Auto Lymphocyte # : 1.47 K/uL  Auto Monocyte # : 0.82 K/uL  Auto Eosinophil # : 0.27 K/uL  Auto Basophil # : 0.28 K/uL  Auto Neutrophil % : 87.8 %  Auto Lymphocyte % : 5.7 %  Auto Monocyte % : 3.2 %  Auto Eosinophil % : 1.1 %  Auto Basophil % : 1.1 %    01-25    139  |  107  |  18  ----------------------------<  125<H>  3.5   |  20<L>  |  0.53    Ca    9.2      25 Jan 2020 06:50  Phos  2.8     01-25  Mg     2.5     01-25                        RADIOLOGY & ADDITIONAL STUDIES (The following images were personally reviewed):

## 2020-01-26 LAB
ANION GAP SERPL CALC-SCNC: 14 MMOL/L — SIGNIFICANT CHANGE UP (ref 5–17)
BUN SERPL-MCNC: 19 MG/DL — SIGNIFICANT CHANGE UP (ref 7–23)
CALCIUM SERPL-MCNC: 9.8 MG/DL — SIGNIFICANT CHANGE UP (ref 8.4–10.5)
CHLORIDE SERPL-SCNC: 107 MMOL/L — SIGNIFICANT CHANGE UP (ref 96–108)
CO2 SERPL-SCNC: 20 MMOL/L — LOW (ref 22–31)
CREAT SERPL-MCNC: 0.63 MG/DL — SIGNIFICANT CHANGE UP (ref 0.5–1.3)
CULTURE RESULTS: SIGNIFICANT CHANGE UP
GLUCOSE SERPL-MCNC: 103 MG/DL — HIGH (ref 70–99)
HCT VFR BLD CALC: 46.3 % — HIGH (ref 34.5–45)
HGB BLD-MCNC: 14.8 G/DL — SIGNIFICANT CHANGE UP (ref 11.5–15.5)
MAGNESIUM SERPL-MCNC: 2.3 MG/DL — SIGNIFICANT CHANGE UP (ref 1.6–2.6)
MCHC RBC-ENTMCNC: 28.1 PG — SIGNIFICANT CHANGE UP (ref 27–34)
MCHC RBC-ENTMCNC: 32 GM/DL — SIGNIFICANT CHANGE UP (ref 32–36)
MCV RBC AUTO: 88 FL — SIGNIFICANT CHANGE UP (ref 80–100)
NRBC # BLD: 0 /100 WBCS — SIGNIFICANT CHANGE UP (ref 0–0)
PLATELET # BLD AUTO: 461 K/UL — HIGH (ref 150–400)
POTASSIUM SERPL-MCNC: 3.9 MMOL/L — SIGNIFICANT CHANGE UP (ref 3.5–5.3)
POTASSIUM SERPL-SCNC: 3.9 MMOL/L — SIGNIFICANT CHANGE UP (ref 3.5–5.3)
RBC # BLD: 5.26 M/UL — HIGH (ref 3.8–5.2)
RBC # FLD: 17.4 % — HIGH (ref 10.3–14.5)
SODIUM SERPL-SCNC: 141 MMOL/L — SIGNIFICANT CHANGE UP (ref 135–145)
SPECIMEN SOURCE: SIGNIFICANT CHANGE UP
WBC # BLD: 23.82 K/UL — HIGH (ref 3.8–10.5)
WBC # FLD AUTO: 23.82 K/UL — HIGH (ref 3.8–10.5)

## 2020-01-26 RX ADMIN — MEGESTROL ACETATE 80 MILLIGRAM(S): 40 SUSPENSION ORAL at 06:49

## 2020-01-26 RX ADMIN — MEGESTROL ACETATE 80 MILLIGRAM(S): 40 SUSPENSION ORAL at 17:13

## 2020-01-26 RX ADMIN — ATORVASTATIN CALCIUM 40 MILLIGRAM(S): 80 TABLET, FILM COATED ORAL at 21:11

## 2020-01-26 RX ADMIN — Medication 1 MILLIGRAM(S): at 11:06

## 2020-01-26 RX ADMIN — HYDROXYUREA 500 MILLIGRAM(S): 500 CAPSULE ORAL at 11:06

## 2020-01-26 RX ADMIN — CEFTRIAXONE 100 MILLIGRAM(S): 500 INJECTION, POWDER, FOR SOLUTION INTRAMUSCULAR; INTRAVENOUS at 11:07

## 2020-01-26 RX ADMIN — RIVAROXABAN 20 MILLIGRAM(S): KIT at 11:06

## 2020-01-26 RX ADMIN — Medication 1 TABLET(S): at 11:06

## 2020-01-26 RX ADMIN — HYDROXYUREA 500 MILLIGRAM(S): 500 CAPSULE ORAL at 23:51

## 2020-01-26 RX ADMIN — AMLODIPINE BESYLATE 5 MILLIGRAM(S): 2.5 TABLET ORAL at 06:48

## 2020-01-26 NOTE — PROGRESS NOTE ADULT - PROBLEM SELECTOR PLAN 5
Pt with BMI 18. Thin and malnourished on exam.  - nutrition consulted, appreciate reccs  - Nutrition supplement with Ensure Enlive 2 cans TID, multivitamin, and megace  - Swallow evaluation - limited assessment of swallowing WFL
Pt with BMI 18. Thin and malnourished on exam.  - nutrition consulted, appreciate reccs  - Nutrition supplement with Ensure Enlive 2 cans TID, multivitamin, and megace  - Swallow evaluation - limited assessment of swallowing WFL
Pt with BMI 18. Thin and malnourished on exam.  -nutrition consult  -encourage PO intake
Pt with BMI 18. Thin and malnourished on exam.  - nutrition consulted, appreciate reccs  - Nutrition supplement with Ensure Enlive 2 cans TID, multivitamin, and megace  - Swallow evaluation - limited assessment of swallowing WFL
Pt with BMI 18. Thin and malnourished on exam.  - nutrition consulted, appreciate reccs  - Nutrition supplement with Ensure Enlive 2 cans TID, multivitamin, and megace  - Swallow evaluation for reported dysphagia

## 2020-01-26 NOTE — PROGRESS NOTE ADULT - ASSESSMENT
78F with PMHx of HTN, myeloproliferative disorder, DVT/PE (2015), chronic UTIs and hydronephrosis, admitted for sepsis 2/2 UTI and failure to thrive.    CBC reviewed-stable  would continue hydrea 500mg BID  continues ceftriaxone  continue xarelto  would consider dc megace given patients hx of GISSELL?      Kimberly Lake MD for Dr. Floriad Resendiz  [phone]

## 2020-01-26 NOTE — PROGRESS NOTE ADULT - SUBJECTIVE AND OBJECTIVE BOX
OVERNIGHT EVENTS:    SUBJECTIVE / INTERVAL HPI: Patient seen and examined at bedside.     VITAL SIGNS:  Vital Signs Last 24 Hrs  T(C): 37.1 (26 Jan 2020 05:29), Max: 37.1 (26 Jan 2020 05:29)  T(F): 98.8 (26 Jan 2020 05:29), Max: 98.8 (26 Jan 2020 05:29)  HR: 86 (26 Jan 2020 05:29) (76 - 95)  BP: 144/94 (26 Jan 2020 05:29) (144/94 - 152/96)  BP(mean): --  RR: 18 (26 Jan 2020 05:29) (18 - 18)  SpO2: 96% (26 Jan 2020 05:29) (93% - 96%)    PHYSICAL EXAM:    General: Elderly woman, frail-appearing, NAD, Laying comfortably in bed  HEENT: NC/AT, anicteric sclera, MMM  Neck: supple  Cardiovascular: +S1/S2, RRR, No murmurs, rubs, gallops  Respiratory: CTA B/L, no W/R/R  Gastrointestinal: soft, NT/ND, +BSx4, mild full sensation when palpating bladder  Extremities: WWP, no edema, clubbing or cyanosis  Vascular: 2+ radial, DP/PT pulses B/L  Neurological: AAOx3, no focal deficits, general profound weakness slightly improved from yesterday    MEDICATIONS:  MEDICATIONS  (STANDING):  amLODIPine   Tablet 5 milliGRAM(s) Oral daily  atorvastatin 40 milliGRAM(s) Oral daily  cefTRIAXone   IVPB 1000 milliGRAM(s) IV Intermittent every 24 hours  dextrose 5% + lactated ringers. 1000 milliLiter(s) (100 mL/Hr) IV Continuous <Continuous>  folic acid 1 milliGRAM(s) Oral daily  hydroxyurea 500 milliGRAM(s) Oral every 12 hours  megestrol 80 milliGRAM(s) Oral two times a day  multivitamin 1 Tablet(s) Oral daily  rivaroxaban 20 milliGRAM(s) Oral daily    MEDICATIONS  (PRN):  acetaminophen   Tablet .. 650 milliGRAM(s) Oral every 6 hours PRN Moderate Pain (4 - 6)  calcium carbonate    500 mG (Tums) Chewable 3 Tablet(s) Chew every 6 hours PRN Dyspepsia  melatonin 5 milliGRAM(s) Oral at bedtime PRN Sleep      ALLERGIES:  Allergies    No Known Allergies    Intolerances        LABS:                        14.8   23.82 )-----------( 461      ( 26 Jan 2020 07:35 )             46.3     01-26    141  |  107  |  19  ----------------------------<  103<H>  3.9   |  20<L>  |  0.63    Ca    9.8      26 Jan 2020 07:35  Phos  2.8     01-25  Mg     2.3     01-26          CAPILLARY BLOOD GLUCOSE          RADIOLOGY & ADDITIONAL TESTS: Reviewed.

## 2020-01-26 NOTE — PROGRESS NOTE ADULT - PROBLEM SELECTOR PLAN 7
VTE: Xarelto, SCDs    Code: FULL CODE  Dispo: [facility]
VTE: Xarelto, SCDs    Code: FULL CODE  Dispo: [facility]
VTE: Xarelto, SCDs    Code: FULL CODE  Dispo: [facility]
VTE: Xarelto, SCDs    Code: FULL CODE  Dispo: [facility]
VTE: Xarelto, SCDs  GI ppx: none  Code: FULL CODE  Dispo: [facility]
VTE: Xarelto, SCDs  GI ppx: none  Code: FULL CODE  Dispo: [facility]
VTE: Xarelto, SCDs    Code: FULL CODE  Dispo: [facility]
VTE: Xarelto, SCDs    Code: FULL CODE  Dispo: [facility]

## 2020-01-26 NOTE — PROGRESS NOTE ADULT - SUBJECTIVE AND OBJECTIVE BOX
no complaints this am  resting comfortably  no pain  no nausea      MEDICATIONS  (STANDING):  amLODIPine   Tablet 5 milliGRAM(s) Oral daily  atorvastatin 40 milliGRAM(s) Oral daily  cefTRIAXone   IVPB 1000 milliGRAM(s) IV Intermittent every 24 hours  dextrose 5% + lactated ringers. 1000 milliLiter(s) (100 mL/Hr) IV Continuous <Continuous>  folic acid 1 milliGRAM(s) Oral daily  hydroxyurea 500 milliGRAM(s) Oral every 12 hours  megestrol 80 milliGRAM(s) Oral two times a day  multivitamin 1 Tablet(s) Oral daily  rivaroxaban 20 milliGRAM(s) Oral daily    MEDICATIONS  (PRN):  acetaminophen   Tablet .. 650 milliGRAM(s) Oral every 6 hours PRN Moderate Pain (4 - 6)  calcium carbonate    500 mG (Tums) Chewable 3 Tablet(s) Chew every 6 hours PRN Dyspepsia  melatonin 5 milliGRAM(s) Oral at bedtime PRN Sleep      ICU Vital Signs Last 24 Hrs  T(C): 37.1 (26 Jan 2020 05:29), Max: 37.1 (26 Jan 2020 05:29)  T(F): 98.8 (26 Jan 2020 05:29), Max: 98.8 (26 Jan 2020 05:29)  HR: 86 (26 Jan 2020 05:29) (76 - 95)  BP: 144/94 (26 Jan 2020 05:29) (144/94 - 152/96)  BP(mean): --  ABP: --  ABP(mean): --  RR: 18 (26 Jan 2020 05:29) (18 - 18)  SpO2: 96% (26 Jan 2020 05:29) (93% - 96%)      Gen awake   Skin: no jaundice  HEENT: no oral lesion  Lung CTA  Heart RRR  Abd: soft NT  Ext: no c/c/e                          14.8   23.82 )-----------( 461      ( 26 Jan 2020 07:35 )             46.3   01-26    141  |  107  |  19  ----------------------------<  103<H>  3.9   |  20<L>  |  0.63    Ca    9.8      26 Jan 2020 07:35  Phos  2.8     01-25  Mg     2.3     01-26

## 2020-01-26 NOTE — PROGRESS NOTE ADULT - PROBLEM SELECTOR PLAN 4
Secondary to sepsis, with component of failure to thrive 2/2 chronic illness and poor PO intake. Mildly improved, pt eating cookies, ice cream and drinking ginger ale.  - CT head non con negative for acute pathology  - Nutrition supplement with Ensures, multivitamin, and megace  - PT evaluation initially recommending home with no needs, continue daily assessment  - Neuro and Psych evaluations per PMD Dr. Tapia, appreciate reccs  - Remeron 7.5mg qHS vs increase dose of Megace if persistently poor appetite

## 2020-01-26 NOTE — PROGRESS NOTE ADULT - PROBLEM SELECTOR PLAN 2
POA tachy to 100 and leukocytosis 21.2 (pt baseline WBC 15-17 in setting of MPD) with positive UA. s/p 1L IVF in ED. s/p CTX 1g. lactate wnl. Bcx negative. MDR E.coli. WBC was uptrending from 1/17 to 1/20 likely due to resistant E.coli (patient did not have any risk factors for MDR like hospitalization within the last 3 months, abx treatment within the last 3 months or previous cultures showing MDR). Procalcitonin 0.09.  - Renal ultrasound with R renal cysts x3 and L non obstructing stones: 1.3 x 1.1 cm nonobstructing stone within the upper pole, 1.4 x 1.2 cm nonobstructing stone within the interpolar region, 0.5 x 0.6 cm nonobstructing stone within the lower pole.  - S/p 7 days of abx (CFTX then ertapenem), switch to ceftriaxone 1000mg q24hr per ID to complete at least 10 days per ID

## 2020-01-27 ENCOUNTER — TRANSCRIPTION ENCOUNTER (OUTPATIENT)
Age: 80
End: 2020-01-27

## 2020-01-27 VITALS
HEART RATE: 80 BPM | TEMPERATURE: 98 F | DIASTOLIC BLOOD PRESSURE: 85 MMHG | SYSTOLIC BLOOD PRESSURE: 122 MMHG | OXYGEN SATURATION: 94 % | RESPIRATION RATE: 18 BRPM

## 2020-01-27 LAB
ANION GAP SERPL CALC-SCNC: 13 MMOL/L — SIGNIFICANT CHANGE UP (ref 5–17)
BUN SERPL-MCNC: 22 MG/DL — SIGNIFICANT CHANGE UP (ref 7–23)
CALCIUM SERPL-MCNC: 9.6 MG/DL — SIGNIFICANT CHANGE UP (ref 8.4–10.5)
CHLORIDE SERPL-SCNC: 106 MMOL/L — SIGNIFICANT CHANGE UP (ref 96–108)
CO2 SERPL-SCNC: 20 MMOL/L — LOW (ref 22–31)
CREAT SERPL-MCNC: 0.59 MG/DL — SIGNIFICANT CHANGE UP (ref 0.5–1.3)
GLUCOSE SERPL-MCNC: 134 MG/DL — HIGH (ref 70–99)
HCT VFR BLD CALC: 48.1 % — HIGH (ref 34.5–45)
HGB BLD-MCNC: 14.8 G/DL — SIGNIFICANT CHANGE UP (ref 11.5–15.5)
MAGNESIUM SERPL-MCNC: 2.3 MG/DL — SIGNIFICANT CHANGE UP (ref 1.6–2.6)
MCHC RBC-ENTMCNC: 27.4 PG — SIGNIFICANT CHANGE UP (ref 27–34)
MCHC RBC-ENTMCNC: 30.8 GM/DL — LOW (ref 32–36)
MCV RBC AUTO: 88.9 FL — SIGNIFICANT CHANGE UP (ref 80–100)
NRBC # BLD: 0 /100 WBCS — SIGNIFICANT CHANGE UP (ref 0–0)
PLATELET # BLD AUTO: 487 K/UL — HIGH (ref 150–400)
POTASSIUM SERPL-MCNC: 3.8 MMOL/L — SIGNIFICANT CHANGE UP (ref 3.5–5.3)
POTASSIUM SERPL-SCNC: 3.8 MMOL/L — SIGNIFICANT CHANGE UP (ref 3.5–5.3)
RBC # BLD: 5.41 M/UL — HIGH (ref 3.8–5.2)
RBC # FLD: 17.4 % — HIGH (ref 10.3–14.5)
SODIUM SERPL-SCNC: 139 MMOL/L — SIGNIFICANT CHANGE UP (ref 135–145)
WBC # BLD: 21.42 K/UL — HIGH (ref 3.8–10.5)
WBC # FLD AUTO: 21.42 K/UL — HIGH (ref 3.8–10.5)

## 2020-01-27 PROCEDURE — 85025 COMPLETE CBC W/AUTO DIFF WBC: CPT

## 2020-01-27 PROCEDURE — 71045 X-RAY EXAM CHEST 1 VIEW: CPT

## 2020-01-27 PROCEDURE — 97116 GAIT TRAINING THERAPY: CPT

## 2020-01-27 PROCEDURE — 82550 ASSAY OF CK (CPK): CPT

## 2020-01-27 PROCEDURE — 99285 EMERGENCY DEPT VISIT HI MDM: CPT | Mod: 25

## 2020-01-27 PROCEDURE — 85027 COMPLETE CBC AUTOMATED: CPT

## 2020-01-27 PROCEDURE — 93005 ELECTROCARDIOGRAM TRACING: CPT

## 2020-01-27 PROCEDURE — 97110 THERAPEUTIC EXERCISES: CPT

## 2020-01-27 PROCEDURE — 82746 ASSAY OF FOLIC ACID SERUM: CPT

## 2020-01-27 PROCEDURE — 96374 THER/PROPH/DIAG INJ IV PUSH: CPT

## 2020-01-27 PROCEDURE — 76775 US EXAM ABDO BACK WALL LIM: CPT

## 2020-01-27 PROCEDURE — 97161 PT EVAL LOW COMPLEX 20 MIN: CPT

## 2020-01-27 PROCEDURE — 99232 SBSQ HOSP IP/OBS MODERATE 35: CPT

## 2020-01-27 PROCEDURE — 84443 ASSAY THYROID STIM HORMONE: CPT

## 2020-01-27 PROCEDURE — 87186 SC STD MICRODIL/AGAR DIL: CPT

## 2020-01-27 PROCEDURE — 70450 CT HEAD/BRAIN W/O DYE: CPT

## 2020-01-27 PROCEDURE — 84145 PROCALCITONIN (PCT): CPT

## 2020-01-27 PROCEDURE — 87086 URINE CULTURE/COLONY COUNT: CPT

## 2020-01-27 PROCEDURE — 97530 THERAPEUTIC ACTIVITIES: CPT

## 2020-01-27 PROCEDURE — 86140 C-REACTIVE PROTEIN: CPT

## 2020-01-27 PROCEDURE — 85652 RBC SED RATE AUTOMATED: CPT

## 2020-01-27 PROCEDURE — 82607 VITAMIN B-12: CPT

## 2020-01-27 PROCEDURE — 81001 URINALYSIS AUTO W/SCOPE: CPT

## 2020-01-27 PROCEDURE — 83605 ASSAY OF LACTIC ACID: CPT

## 2020-01-27 PROCEDURE — 84100 ASSAY OF PHOSPHORUS: CPT

## 2020-01-27 PROCEDURE — 80053 COMPREHEN METABOLIC PANEL: CPT

## 2020-01-27 PROCEDURE — 36415 COLL VENOUS BLD VENIPUNCTURE: CPT

## 2020-01-27 PROCEDURE — 87184 SC STD DISK METHOD PER PLATE: CPT

## 2020-01-27 PROCEDURE — 92610 EVALUATE SWALLOWING FUNCTION: CPT

## 2020-01-27 PROCEDURE — 87040 BLOOD CULTURE FOR BACTERIA: CPT

## 2020-01-27 PROCEDURE — 80048 BASIC METABOLIC PNL TOTAL CA: CPT

## 2020-01-27 PROCEDURE — 83735 ASSAY OF MAGNESIUM: CPT

## 2020-01-27 RX ORDER — AMLODIPINE BESYLATE 2.5 MG/1
1 TABLET ORAL
Qty: 0 | Refills: 0 | DISCHARGE
Start: 2020-01-27

## 2020-01-27 RX ORDER — LANOLIN ALCOHOL/MO/W.PET/CERES
1 CREAM (GRAM) TOPICAL
Qty: 0 | Refills: 0 | DISCHARGE
Start: 2020-01-27

## 2020-01-27 RX ORDER — MEGESTROL ACETATE 40 MG/ML
2 SUSPENSION ORAL
Qty: 0 | Refills: 0 | DISCHARGE
Start: 2020-01-27

## 2020-01-27 RX ORDER — HYDROXYUREA 500 MG/1
1 CAPSULE ORAL
Qty: 0 | Refills: 0 | DISCHARGE
Start: 2020-01-27

## 2020-01-27 RX ORDER — ACETAMINOPHEN 500 MG
2 TABLET ORAL
Qty: 0 | Refills: 0 | DISCHARGE
Start: 2020-01-27

## 2020-01-27 RX ORDER — ATORVASTATIN CALCIUM 80 MG/1
1 TABLET, FILM COATED ORAL
Qty: 0 | Refills: 0 | DISCHARGE
Start: 2020-01-27

## 2020-01-27 RX ADMIN — HYDROXYUREA 500 MILLIGRAM(S): 500 CAPSULE ORAL at 11:07

## 2020-01-27 RX ADMIN — Medication 1 MILLIGRAM(S): at 11:07

## 2020-01-27 RX ADMIN — Medication 1 TABLET(S): at 11:07

## 2020-01-27 RX ADMIN — MEGESTROL ACETATE 80 MILLIGRAM(S): 40 SUSPENSION ORAL at 05:55

## 2020-01-27 RX ADMIN — RIVAROXABAN 20 MILLIGRAM(S): KIT at 11:07

## 2020-01-27 RX ADMIN — CEFTRIAXONE 100 MILLIGRAM(S): 500 INJECTION, POWDER, FOR SOLUTION INTRAMUSCULAR; INTRAVENOUS at 11:07

## 2020-01-27 RX ADMIN — AMLODIPINE BESYLATE 5 MILLIGRAM(S): 2.5 TABLET ORAL at 05:55

## 2020-01-27 NOTE — DISCHARGE NOTE NURSING/CASE MANAGEMENT/SOCIAL WORK - PATIENT PORTAL LINK FT
You can access the FollowMyHealth Patient Portal offered by [facility] by registering at the following website: [email or web address]. By joining [facility]’s FollowMyHealth portal, you will also be able to view your health information using other applications (apps) compatible with our system.

## 2020-01-27 NOTE — DISCHARGE NOTE NURSING/CASE MANAGEMENT/SOCIAL WORK - NSDCPEXARELTO_GEN_ALL_CORE
Rivaroxaban/Xarelto - Follow up monitoring/Rivaroxaban/Xarelto - Potential for adverse drug reactions and interactions/Rivaroxaban/Xarelto - Dietary Advice/Rivaroxaban/Xarelto - Compliance

## 2020-01-27 NOTE — PROGRESS NOTE ADULT - SUBJECTIVE AND OBJECTIVE BOX
Neurology Follow up note    Name  ANDREW STUART    HPI:  78F with PMHx of HTN, myeloproliferative disorder, DVT/PE (2015), chronic UTIs and hydronephrosis who presents to ED for c/o generalized weakness. Patient reports she usually ambulates around her apt with a walker, and has needed more assistance from her aide than usual over the last few days. Her aide, who accompanies her to the ED today, states she also noticed the patient has had foul-smelling urine today. Dr. Zapata suggested she present to the ED today.     Vitals: 98.9 (rectal), 100 bpm, 203/126, 18, 95% on room air. Labs notable for WBC 21.2 (87.8% PMNs, no immature granulocytes), Hgb 15.3, Plt 406. Patient received CTX 1 g, NS 1 L. (16 Jan 2020 17:54)      Interval History - generalized weakness- no new focal deficits         REVIEW OF SYSTEMS    Vital Signs Last 24 Hrs  T(C): 36.4 (27 Jan 2020 05:15), Max: 36.6 (26 Jan 2020 21:08)  T(F): 97.5 (27 Jan 2020 05:15), Max: 97.8 (26 Jan 2020 21:08)  HR: 76 (27 Jan 2020 05:15) (64 - 86)  BP: 149/94 (27 Jan 2020 05:15) (144/83 - 149/94)  BP(mean): --  RR: 18 (27 Jan 2020 05:15) (16 - 18)  SpO2: 94% (27 Jan 2020 05:15) (94% - 95%)    Physical Exam-     Mental Status- awake and alert     Cranial Nerves- full EOM    Gait and station- unsteady    Motor- moves all 4 extremities    Reflexes- decreased    Sensation- no sensory level    Coordination- no tremors    Vascular - no bruits    Medications  acetaminophen   Tablet .. 650 milliGRAM(s) Oral every 6 hours PRN  amLODIPine   Tablet 5 milliGRAM(s) Oral daily  atorvastatin 40 milliGRAM(s) Oral daily  calcium carbonate    500 mG (Tums) Chewable 3 Tablet(s) Chew every 6 hours PRN  cefTRIAXone   IVPB 1000 milliGRAM(s) IV Intermittent every 24 hours  dextrose 5% + lactated ringers. 1000 milliLiter(s) IV Continuous <Continuous>  folic acid 1 milliGRAM(s) Oral daily  hydroxyurea 500 milliGRAM(s) Oral every 12 hours  megestrol 80 milliGRAM(s) Oral two times a day  melatonin 5 milliGRAM(s) Oral at bedtime PRN  multivitamin 1 Tablet(s) Oral daily  rivaroxaban 20 milliGRAM(s) Oral daily      Lab      Radiology    Assessment- Generalized weakness - no acute neuro deficit      Plan rehab

## 2020-01-27 NOTE — PROGRESS NOTE ADULT - MINUTES
Free fetal DNA testing through [facility] is NEGATIVE for Trisomies 13, 18 and 21. No Y chromosomal material was detected, consistent with a female fetus. Fetal fraction was 6%.     Pinky voiced good understanding of these screening results and did not have any questions at this time. A copy of the result has been sent to scanned records.     The lab quotes the follow performance:    Intended use [employer]   Trisomy 21 Sensitivity: 99.1%, specificity: 99.9%   Trisomy 18 Sensitivity: >99.9%, specificity: 99.6%   Trisomy 13 Sensitivity: 91.7%, specificity: 99.7%       
30
25
30
30
35
40
40
45
60

## 2020-01-27 NOTE — PROGRESS NOTE ADULT - ATTENDING COMMENTS
Will follow.
Await CBC results. AB status as per ID.
Await CBC today.
Continue Hydrea therapy.
Hematologic status stable.
Improved status
Patient seen and examined and evaluation completed by me in person
Will follow.
will follow
Patient seen and examined and evaluation completed by me in person
Patient seen and examined and evaluation completed by em in person
Patient seen and examined and evaluation completed by me in person
Above noted

## 2020-01-27 NOTE — PROGRESS NOTE ADULT - SUBJECTIVE AND OBJECTIVE BOX
Overall improved with less discomfort and appetitie improved.    To continue the present dosing of Hydrea.    Cognitive functions improved.

## 2020-01-27 NOTE — PROGRESS NOTE ADULT - NSHPATTENDINGPLANDISCUSS_GEN_ALL_CORE
House staff
staff
the resident
Dr Tapia and house staff
the Intern

## 2020-01-27 NOTE — PROGRESS NOTE ADULT - REASON FOR ADMISSION
UTI
UTI---On ceftriaxone ---E. coli.  WBC unchanged with 22,500 for second day.
UTI in setting of known JAK2 myeloproiferative disease. The  22,000 WBC is close to range and platelet count is stable.
UTI---Dr Avelar's note appreciated. Studies outlined to be obtained. easily aroused this am and temp down.
UTI---WBC 23.8 and Plt ct 461 yesterday.  await today's results.  Patient feels better and is hungry.
UTI---and plt ct and the Hgb are increased. New AB started as per ID
UTI---on second AB.   WBC and plt ct are trending down on the Hydrea
UTI---rising WBC--32.31 on last blood draw. Patient alert, roused but quite frail. On ABs. Awaiting further CBC.
UTI--Improving, More coherent and last CBC improved on 2 Hydrea tabs per day.
UTI

## 2020-01-31 DIAGNOSIS — F05 DELIRIUM DUE TO KNOWN PHYSIOLOGICAL CONDITION: ICD-10-CM

## 2020-01-31 DIAGNOSIS — Z86.711 PERSONAL HISTORY OF PULMONARY EMBOLISM: ICD-10-CM

## 2020-01-31 DIAGNOSIS — R53.1 WEAKNESS: ICD-10-CM

## 2020-01-31 DIAGNOSIS — I10 ESSENTIAL (PRIMARY) HYPERTENSION: ICD-10-CM

## 2020-01-31 DIAGNOSIS — N39.0 URINARY TRACT INFECTION, SITE NOT SPECIFIED: ICD-10-CM

## 2020-01-31 DIAGNOSIS — Z90.710 ACQUIRED ABSENCE OF BOTH CERVIX AND UTERUS: ICD-10-CM

## 2020-01-31 DIAGNOSIS — D46.9 MYELODYSPLASTIC SYNDROME, UNSPECIFIED: ICD-10-CM

## 2020-01-31 DIAGNOSIS — R62.7 ADULT FAILURE TO THRIVE: ICD-10-CM

## 2020-01-31 DIAGNOSIS — A41.51 SEPSIS DUE TO ESCHERICHIA COLI [E. COLI]: ICD-10-CM

## 2020-01-31 DIAGNOSIS — Z79.82 LONG TERM (CURRENT) USE OF ASPIRIN: ICD-10-CM

## 2020-01-31 DIAGNOSIS — R64 CACHEXIA: ICD-10-CM

## 2020-01-31 DIAGNOSIS — E43 UNSPECIFIED SEVERE PROTEIN-CALORIE MALNUTRITION: ICD-10-CM

## 2020-03-11 NOTE — ED CLERICAL - NS ED CLERK NOTE PRE-ARRIVAL INFOMATION; PCP NAME
Patients GI provider:   new patient    Number to return call: (  [phone]    Reason for call: Pt needs an urgent  appt for blood in stool per referral---please assist    Scheduled procedure/appointment date if applicable: Apt/procedure na
DR GARIBAY

## 2020-04-05 NOTE — H&P ADULT - NO PERTINENT FAMILY HISTORY IN FIRST DEGREE RELATIVES OF:

## 2020-07-02 ENCOUNTER — INPATIENT (INPATIENT)
Facility: HOSPITAL | Age: 80
LOS: 3 days | Discharge: NURSING, REHAB OR LONG-TERM CARE | DRG: 695 | End: 2020-07-06
Attending: INTERNAL MEDICINE | Admitting: INTERNAL MEDICINE
Payer: MEDICARE

## 2020-07-02 VITALS
DIASTOLIC BLOOD PRESSURE: 110 MMHG | OXYGEN SATURATION: 99 % | TEMPERATURE: 98 F | HEART RATE: 99 BPM | RESPIRATION RATE: 17 BRPM | SYSTOLIC BLOOD PRESSURE: 159 MMHG

## 2020-07-02 DIAGNOSIS — Z98.890 OTHER SPECIFIED POSTPROCEDURAL STATES: Chronic | ICD-10-CM

## 2020-07-02 DIAGNOSIS — Z90.710 ACQUIRED ABSENCE OF BOTH CERVIX AND UTERUS: Chronic | ICD-10-CM

## 2020-07-02 DIAGNOSIS — R31.9 HEMATURIA, UNSPECIFIED: ICD-10-CM

## 2020-07-02 DIAGNOSIS — R55 SYNCOPE AND COLLAPSE: ICD-10-CM

## 2020-07-02 DIAGNOSIS — D47.1 CHRONIC MYELOPROLIFERATIVE DISEASE: ICD-10-CM

## 2020-07-02 DIAGNOSIS — I26.99 OTHER PULMONARY EMBOLISM WITHOUT ACUTE COR PULMONALE: ICD-10-CM

## 2020-07-02 DIAGNOSIS — R62.7 ADULT FAILURE TO THRIVE: ICD-10-CM

## 2020-07-02 DIAGNOSIS — R63.8 OTHER SYMPTOMS AND SIGNS CONCERNING FOOD AND FLUID INTAKE: ICD-10-CM

## 2020-07-02 DIAGNOSIS — I10 ESSENTIAL (PRIMARY) HYPERTENSION: ICD-10-CM

## 2020-07-02 DIAGNOSIS — R94.31 ABNORMAL ELECTROCARDIOGRAM [ECG] [EKG]: ICD-10-CM

## 2020-07-02 LAB
ALBUMIN SERPL ELPH-MCNC: 4.1 G/DL — SIGNIFICANT CHANGE UP (ref 3.3–5)
ALP SERPL-CCNC: 94 U/L — SIGNIFICANT CHANGE UP (ref 40–120)
ALT FLD-CCNC: 9 U/L — LOW (ref 10–45)
ANION GAP SERPL CALC-SCNC: 14 MMOL/L — SIGNIFICANT CHANGE UP (ref 5–17)
ANISOCYTOSIS BLD QL: SLIGHT — SIGNIFICANT CHANGE UP
APPEARANCE UR: ABNORMAL
APTT BLD: 37.8 SEC — HIGH (ref 27.5–35.5)
AST SERPL-CCNC: 19 U/L — SIGNIFICANT CHANGE UP (ref 10–40)
BACTERIA # UR AUTO: SIGNIFICANT CHANGE UP /HPF
BASOPHILS # BLD AUTO: 0.84 K/UL — HIGH (ref 0–0.2)
BASOPHILS NFR BLD AUTO: 3.5 % — HIGH (ref 0–2)
BILIRUB DIRECT SERPL-MCNC: <0.2 MG/DL — SIGNIFICANT CHANGE UP (ref 0–0.2)
BILIRUB INDIRECT FLD-MCNC: >0.7 MG/DL — SIGNIFICANT CHANGE UP (ref 0.2–1)
BILIRUB SERPL-MCNC: 0.9 MG/DL — SIGNIFICANT CHANGE UP (ref 0.2–1.2)
BILIRUB UR-MCNC: ABNORMAL
BLD GP AB SCN SERPL QL: NEGATIVE — SIGNIFICANT CHANGE UP
BUN SERPL-MCNC: 8 MG/DL — SIGNIFICANT CHANGE UP (ref 7–23)
BURR CELLS BLD QL SMEAR: PRESENT — SIGNIFICANT CHANGE UP
CALCIUM SERPL-MCNC: 10 MG/DL — SIGNIFICANT CHANGE UP (ref 8.4–10.5)
CHLORIDE SERPL-SCNC: 101 MMOL/L — SIGNIFICANT CHANGE UP (ref 96–108)
CO2 SERPL-SCNC: 27 MMOL/L — SIGNIFICANT CHANGE UP (ref 22–31)
COLOR SPEC: SIGNIFICANT CHANGE UP
COMMENT - URINE: SIGNIFICANT CHANGE UP
CREAT SERPL-MCNC: 0.49 MG/DL — LOW (ref 0.5–1.3)
DACRYOCYTES BLD QL SMEAR: SLIGHT — SIGNIFICANT CHANGE UP
DIFF PNL FLD: ABNORMAL
EOSINOPHIL # BLD AUTO: 0 K/UL — SIGNIFICANT CHANGE UP (ref 0–0.5)
EOSINOPHIL NFR BLD AUTO: 0 % — SIGNIFICANT CHANGE UP (ref 0–6)
EPI CELLS # UR: SIGNIFICANT CHANGE UP /HPF (ref 0–5)
GIANT PLATELETS BLD QL SMEAR: PRESENT — SIGNIFICANT CHANGE UP
GLUCOSE SERPL-MCNC: 113 MG/DL — HIGH (ref 70–99)
GLUCOSE UR QL: NEGATIVE — SIGNIFICANT CHANGE UP
HCT VFR BLD CALC: 53.4 % — HIGH (ref 34.5–45)
HGB BLD-MCNC: 17.2 G/DL — HIGH (ref 11.5–15.5)
INR BLD: 1.3 — HIGH (ref 0.88–1.16)
KETONES UR-MCNC: NEGATIVE — SIGNIFICANT CHANGE UP
LEUKOCYTE ESTERASE UR-ACNC: ABNORMAL
LYMPHOCYTES # BLD AUTO: 1.25 K/UL — SIGNIFICANT CHANGE UP (ref 1–3.3)
LYMPHOCYTES # BLD AUTO: 5.2 % — LOW (ref 13–44)
MACROCYTES BLD QL: SLIGHT — SIGNIFICANT CHANGE UP
MANUAL SMEAR VERIFICATION: SIGNIFICANT CHANGE UP
MCHC RBC-ENTMCNC: 26.5 PG — LOW (ref 27–34)
MCHC RBC-ENTMCNC: 32.2 GM/DL — SIGNIFICANT CHANGE UP (ref 32–36)
MCV RBC AUTO: 82.4 FL — SIGNIFICANT CHANGE UP (ref 80–100)
MICROCYTES BLD QL: SLIGHT — SIGNIFICANT CHANGE UP
MONOCYTES # BLD AUTO: 0.41 K/UL — SIGNIFICANT CHANGE UP (ref 0–0.9)
MONOCYTES NFR BLD AUTO: 1.7 % — LOW (ref 2–14)
NEUTROPHILS # BLD AUTO: 20.26 K/UL — HIGH (ref 1.8–7.4)
NEUTROPHILS NFR BLD AUTO: 84.4 % — HIGH (ref 43–77)
NITRITE UR-MCNC: POSITIVE
OVALOCYTES BLD QL SMEAR: SLIGHT — SIGNIFICANT CHANGE UP
PH UR: 6.5 — SIGNIFICANT CHANGE UP (ref 5–8)
PLAT MORPH BLD: ABNORMAL
PLATELET # BLD AUTO: 405 K/UL — HIGH (ref 150–400)
POLYCHROMASIA BLD QL SMEAR: SLIGHT — SIGNIFICANT CHANGE UP
POTASSIUM SERPL-MCNC: 3.2 MMOL/L — LOW (ref 3.5–5.3)
POTASSIUM SERPL-SCNC: 3.2 MMOL/L — LOW (ref 3.5–5.3)
PROT SERPL-MCNC: 6.5 G/DL — SIGNIFICANT CHANGE UP (ref 6–8.3)
PROT UR-MCNC: >=300 MG/DL
PROTHROM AB SERPL-ACNC: 15.4 SEC — HIGH (ref 10.6–13.6)
RBC # BLD: 6.48 M/UL — HIGH (ref 3.8–5.2)
RBC # FLD: 17.4 % — HIGH (ref 10.3–14.5)
RBC BLD AUTO: ABNORMAL
RBC CASTS # UR COMP ASSIST: ABNORMAL /HPF
RH IG SCN BLD-IMP: POSITIVE — SIGNIFICANT CHANGE UP
SARS-COV-2 RNA SPEC QL NAA+PROBE: SIGNIFICANT CHANGE UP
SCHISTOCYTES BLD QL AUTO: SLIGHT — SIGNIFICANT CHANGE UP
SODIUM SERPL-SCNC: 142 MMOL/L — SIGNIFICANT CHANGE UP (ref 135–145)
SP GR SPEC: 1.02 — SIGNIFICANT CHANGE UP (ref 1–1.03)
SPHEROCYTES BLD QL SMEAR: SLIGHT — SIGNIFICANT CHANGE UP
UROBILINOGEN FLD QL: 1 E.U./DL — SIGNIFICANT CHANGE UP
VARIANT LYMPHS # BLD: 5.2 % — SIGNIFICANT CHANGE UP (ref 0–6)
WBC # BLD: 24.01 K/UL — HIGH (ref 3.8–10.5)
WBC # FLD AUTO: 24.01 K/UL — HIGH (ref 3.8–10.5)
WBC UR QL: < 5 /HPF — SIGNIFICANT CHANGE UP

## 2020-07-02 PROCEDURE — 99222 1ST HOSP IP/OBS MODERATE 55: CPT

## 2020-07-02 PROCEDURE — 99285 EMERGENCY DEPT VISIT HI MDM: CPT | Mod: CS

## 2020-07-02 PROCEDURE — 93010 ELECTROCARDIOGRAM REPORT: CPT

## 2020-07-02 RX ORDER — RIVAROXABAN 15 MG-20MG
20 KIT ORAL
Refills: 0 | Status: DISCONTINUED | OUTPATIENT
Start: 2020-07-02 | End: 2020-07-06

## 2020-07-02 RX ORDER — AMLODIPINE BESYLATE 2.5 MG/1
5 TABLET ORAL EVERY 24 HOURS
Refills: 0 | Status: DISCONTINUED | OUTPATIENT
Start: 2020-07-02 | End: 2020-07-06

## 2020-07-02 RX ORDER — POTASSIUM CHLORIDE 20 MEQ
40 PACKET (EA) ORAL ONCE
Refills: 0 | Status: COMPLETED | OUTPATIENT
Start: 2020-07-02 | End: 2020-07-02

## 2020-07-02 RX ORDER — RIVAROXABAN 15 MG-20MG
1 KIT ORAL
Qty: 0 | Refills: 0 | DISCHARGE

## 2020-07-02 RX ORDER — FOLIC ACID 0.8 MG
1 TABLET ORAL
Qty: 0 | Refills: 0 | DISCHARGE

## 2020-07-02 RX ORDER — ROSUVASTATIN CALCIUM 5 MG/1
1 TABLET ORAL
Qty: 0 | Refills: 0 | DISCHARGE

## 2020-07-02 RX ORDER — ATORVASTATIN CALCIUM 80 MG/1
40 TABLET, FILM COATED ORAL AT BEDTIME
Refills: 0 | Status: DISCONTINUED | OUTPATIENT
Start: 2020-07-02 | End: 2020-07-06

## 2020-07-02 RX ORDER — ERGOCALCIFEROL 1.25 MG/1
1 CAPSULE ORAL
Qty: 0 | Refills: 0 | DISCHARGE

## 2020-07-02 RX ORDER — ASPIRIN/CALCIUM CARB/MAGNESIUM 324 MG
81 TABLET ORAL DAILY
Refills: 0 | Status: DISCONTINUED | OUTPATIENT
Start: 2020-07-02 | End: 2020-07-06

## 2020-07-02 RX ORDER — FOLIC ACID 0.8 MG
1 TABLET ORAL DAILY
Refills: 0 | Status: DISCONTINUED | OUTPATIENT
Start: 2020-07-02 | End: 2020-07-06

## 2020-07-02 RX ORDER — BUPROPION HYDROCHLORIDE 150 MG/1
75 TABLET, EXTENDED RELEASE ORAL DAILY
Refills: 0 | Status: DISCONTINUED | OUTPATIENT
Start: 2020-07-02 | End: 2020-07-06

## 2020-07-02 RX ORDER — ASPIRIN/CALCIUM CARB/MAGNESIUM 324 MG
1 TABLET ORAL
Qty: 0 | Refills: 0 | DISCHARGE

## 2020-07-02 RX ADMIN — Medication 81 MILLIGRAM(S): at 13:08

## 2020-07-02 RX ADMIN — Medication 1 TABLET(S): at 13:08

## 2020-07-02 RX ADMIN — AMLODIPINE BESYLATE 5 MILLIGRAM(S): 2.5 TABLET ORAL at 13:08

## 2020-07-02 RX ADMIN — ATORVASTATIN CALCIUM 40 MILLIGRAM(S): 80 TABLET, FILM COATED ORAL at 21:53

## 2020-07-02 RX ADMIN — Medication 1 MILLIGRAM(S): at 13:08

## 2020-07-02 RX ADMIN — BUPROPION HYDROCHLORIDE 75 MILLIGRAM(S): 150 TABLET, EXTENDED RELEASE ORAL at 15:12

## 2020-07-02 RX ADMIN — RIVAROXABAN 20 MILLIGRAM(S): KIT at 18:36

## 2020-07-02 RX ADMIN — Medication 40 MILLIEQUIVALENT(S): at 08:48

## 2020-07-02 NOTE — H&P ADULT - NSHPLABSRESULTS_GEN_ALL_CORE
.  LABS:                         17.2   24.01 )-----------( 405      ( 2020 07:40 )             53.4     07-02    142  |  101  |  8   ----------------------------<  113<H>  3.2<L>   |  27  |  0.49<L>    Ca    10.0      2020 07:40      PT/INR - ( 2020 07:40 )   PT: 15.4 sec;   INR: 1.30          PTT - ( 2020 07:40 )  PTT:37.8 sec  Urinalysis Basic - ( 2020 08:00 )    Color: DARK YELLOW / Appearance: SL Cloudy / S.020 / pH: x  Gluc: x / Ketone: NEGATIVE  / Bili: Small / Urobili: 1.0 E.U./dL   Blood: x / Protein: >=300 mg/dL / Nitrite: POSITIVE   Leuk Esterase: Trace / RBC: Many /HPF / WBC < 5 /HPF   Sq Epi: x / Non Sq Epi: 0-5 /HPF / Bacteria: None /HPF                RADIOLOGY, EKG & ADDITIONAL TESTS: Reviewed.

## 2020-07-02 NOTE — ED PROVIDER NOTE - CONSTITUTIONAL, MLM
normal... frail, awake, alert, oriented to person, place, time/situation and in no apparent distress.

## 2020-07-02 NOTE — ED PROVIDER NOTE - CLINICAL SUMMARY MEDICAL DECISION MAKING FREE TEXT BOX
Pt c/o dark urine, ftt, inability to care for self at home even w hha.  Plan labs, ua/cx, admit to Dr Tapia. Pt c/o dark urine - ? uti vs hematuria possibly 2/2 xarelto (no other bleeding), ftt, inability to care for self at home even w hha.  Plan labs, ua/cx, admit to Dr Tapia.

## 2020-07-02 NOTE — CONSULT NOTE ADULT - ASSESSMENT
81 yo female with hematuria 81 yo female with hematuria; r/o UTI; h/o UTI's, FTT, h/o myeloproliferative disorder, h/o dvt/pe on xarelto

## 2020-07-02 NOTE — ED ADULT TRIAGE NOTE - OTHER COMPLAINTS
hx of UTI, no dysuria, incontinent of bladder, HHA reported dark colred urine in diaper, PMD Dr Tapia notified and was told to come to ED. No fever/chills, no CP, no SOB.

## 2020-07-02 NOTE — H&P ADULT - PROBLEM SELECTOR PLAN 5
Fluids: no IVF   Electrolytes: replete lytes as needed  Nutrition: DASH  Code status: FULL CODE   Dispo: RMF   DVT ppx: patient anticoagulated with xarelto 20mg -c/w home amlodipine 5mg

## 2020-07-02 NOTE — H&P ADULT - ASSESSMENT
Patient is an 80-year-old F with a PMH of HTN, myeloproliferative disorder, DVT/PE (2015, on xarelto), chronic UTIs and hydronephrosis admitted in Jan for multi-drug resistant UTI, worsening of her myeloproliferative d/o, and FTT, discharged to rehab at that time, who presents with four days of "dark colored urine," admitted for urological workup and SASHA placement.

## 2020-07-02 NOTE — PHYSICAL THERAPY INITIAL EVALUATION ADULT - ADDITIONAL COMMENTS
Pt reports living with a home health aide full time and does not walk. She reports staying in bed all day.

## 2020-07-02 NOTE — PHYSICAL THERAPY INITIAL EVALUATION ADULT - PERTINENT HX OF CURRENT PROBLEM, REHAB EVAL
80-year-old F with a PMH of HTN, myeloproliferative disorder, DVT/PE (2015, on xarelto), chronic UTIs and hydronephrosis, presents with four days of "dark colored urine." As per H&P, the patient had bloodwork drawn yesterday for Dr. Tapia, who prompted her to come to the ED today.

## 2020-07-02 NOTE — H&P ADULT - NSHPPHYSICALEXAM_GEN_ALL_CORE
.  VITAL SIGNS:  T(C): 36.6 (07-02-20 @ 09:24), Max: 36.6 (07-02-20 @ 09:24)  T(F): 97.8 (07-02-20 @ 09:24), Max: 97.8 (07-02-20 @ 09:24)  HR: 82 (07-02-20 @ 09:24) (82 - 99)  BP: 166/104 (07-02-20 @ 09:24) (129/69 - 166/104)  BP(mean): --  RR: 18 (07-02-20 @ 09:24) (17 - 18)  SpO2: 94% (07-02-20 @ 09:24) (94% - 99%)  Wt(kg): --    PHYSICAL EXAM:    Constitutional: WDWN resting comfortably in bed; NAD  Head: NC/AT  Eyes: PERRL, EOMI, clear conjunctiva  ENT: no nasal discharge; uvula midline, no oropharyngeal erythema or exudates; MMM  Neck: supple; no JVD or thyromegaly  Respiratory: CTA B/L; no W/R/R, no retractions  Cardiac: +S1/S2; RRR; no M/R/G; PMI non-displaced  Gastrointestinal: soft, NT/ND; no rebound or guarding  Back: spine midline, no bony tenderness or step-offs; no CVAT B/L  Extremities: WWP, no clubbing or cyanosis; no peripheral edema  Musculoskeletal: NROM x4; no joint swelling, tenderness or erythema  Vascular: 2+ radial, DP/PT pulses B/L  Dermatologic: skin warm, dry and intact; no rashes, wounds, or scars  Lymphatic: no submandibular or cervical LAD  Neurologic: AAOx3; CNII-XII grossly intact; no focal deficits, strength 5/5 in upper extremities, 4/5 in lower extremities bilaterally   Psychiatric: affect and characteristics of appearance, verbalizations, behaviors are appropriate

## 2020-07-02 NOTE — H&P ADULT - NSICDXPASTMEDICALHX_GEN_ALL_CORE_FT
PAST MEDICAL HISTORY:  Hypertension     Myeloproliferative disorder     PE (pulmonary thromboembolism) 2015    Tremor     Urinary tract infection     Vestibular disequilibrium

## 2020-07-02 NOTE — CONSULT NOTE ADULT - SUBJECTIVE AND OBJECTIVE BOX
HPI:  Patient is an 80-year-old F with a PMH of HTN, myeloproliferative disorder, DVT/PE (, on xarelto), chronic UTIs and hydronephrosis admitted in  for multi-drug resistant UTI, worsening of her myeloproliferative d/o, and FTT, discharged to rehab at that time, who presents with four days of "dark colored urine." She is incontinent at baseline but denies any dysuria, abdominal pain, flank pain, fever/chills, chest pain, N/V/D, cough, or SOB. She states that she "feels lousy" but that this is her baseline. Per her hcp/poa, she has been easily confusable since February and bedbound since January of this year. The patient's hcp/poa also notes that her current symptoms are similar to those that prompted admission in January. The patient had bloodwork drawn yesterday for Dr. Tapia, who prompted her to come to the ED today. Per Dr. Tapia, her hha is unable to care for her adequately and she needs placement.     ED course:   Vitals: T: 98.7, HR: 82, RR: 18, BP: 166/104, SpO2: 94%  Patient received 40 mEq potassium chloride in ED. (2020 09:24)    : above noted. Called to evaluate for hematuria. Seen on prior admission for chronic UTI's and bilat hydro.  Had a CT Urogram 3/2019: no hydro or stones, 4mm filling defect L renal pelvis at [facility].   Had retrop US 2020: non obstructing  L sided nephrolithiasis.        PAST MEDICAL & SURGICAL HISTORY:  Urinary tract infection  Myeloproliferative disorder  Hypertension  PE (pulmonary thromboembolism):   Tremor  Vestibular disequilibrium  S/P wrist surgery  S/P hysterectomy      MEDICATIONS  (STANDING):  amLODIPine   Tablet 5 milliGRAM(s) Oral every 24 hours  aspirin enteric coated 81 milliGRAM(s) Oral daily  atorvastatin 40 milliGRAM(s) Oral at bedtime  buPROPion . 75 milliGRAM(s) Oral daily  folic acid 1 milliGRAM(s) Oral daily  multivitamin 1 Tablet(s) Oral daily  rivaroxaban 20 milliGRAM(s) Oral with dinner    MEDICATIONS  (PRN):      Allergies    No Known Allergies    Intolerances        SOCIAL HISTORY:    FAMILY HISTORY:  No pertinent family history in first degree relatives      Vital Signs Last 24 Hrs  T(C): 36.6 (2020 09:24), Max: 36.6 (2020 09:24)  T(F): 97.8 (2020 09:24), Max: 97.8 (2020 09:24)  HR: 82 (2020 09:24) (82 - 99)  BP: 166/104 (2020 09:24) (129/69 - 166/104)  BP(mean): --  RR: 18 (2020 09:24) (17 - 18)  SpO2: 94% (2020 09:24) (94% - 99%)    On PE:  General:  Abdomen:    :    EXT:    LABS:                        17.2   24.01 )-----------( 405      ( 2020 07:40 )             53.4     07-02    142  |  101  |  8   ----------------------------<  113<H>  3.2<L>   |  27  |  0.49<L>    Ca    10.0      2020 07:40    TPro  6.5  /  Alb  4.1  /  TBili  0.9  /  DBili  <0.2  /  AST  19  /  ALT  9<L>  /  AlkPhos  94  07-02    PT/INR - ( 2020 07:40 )   PT: 15.4 sec;   INR: 1.30          PTT - ( 2020 07:40 )  PTT:37.8 sec  Urinalysis Basic - ( 2020 08:00 )    Color: DARK YELLOW / Appearance: SL Cloudy / S.020 / pH: x  Gluc: x / Ketone: NEGATIVE  / Bili: Small / Urobili: 1.0 E.U./dL   Blood: x / Protein: >=300 mg/dL / Nitrite: POSITIVE   Leuk Esterase: Trace / RBC: Many /HPF / WBC < 5 /HPF   Sq Epi: x / Non Sq Epi: 0-5 /HPF / Bacteria: None /HPF        RADIOLOGY & ADDITIONAL STUDIES: HPI:  Patient is an 80-year-old F with a PMH of HTN, myeloproliferative disorder, DVT/PE (, on xarelto), chronic UTIs and hydronephrosis admitted in  for multi-drug resistant UTI, worsening of her myeloproliferative d/o, and FTT, discharged to rehab at that time, who presents with four days of "dark colored urine." She is incontinent at baseline but denies any dysuria, abdominal pain, flank pain, fever/chills, chest pain, N/V/D, cough, or SOB. She states that she "feels lousy" but that this is her baseline. Per her hcp/poa, she has been easily confusable since February and bedbound since January of this year. The patient's hcp/poa also notes that her current symptoms are similar to those that prompted admission in January. The patient had bloodwork drawn yesterday for Dr. Tapia, who prompted her to come to the ED today. Per Dr. Tapia, her hha is unable to care for her adequately and she needs placement.     ED course:   Vitals: T: 98.7, HR: 82, RR: 18, BP: 166/104, SpO2: 94%  Patient received 40 mEq potassium chloride in ED. (2020 09:24)    : above noted. Called to evaluate for hematuria. Seen on prior admission for chronic UTI's and bilat hydro.  Had a CT Urogram 3/2019: no hydro or stones, 4mm filling defect L renal pelvis at [facility].   Had retrop US 2020: non obstructing  L sided nephrolithiasis.  Patient incontinent urine prior to admission. Patient is bedridden. She says her legs cannot hold her up properly. Does not recall following up for abnormal CT urogram results from 3/2019. No abdominal or back pain. No dysuria. Does not recall having hematuria previously.         PAST MEDICAL & SURGICAL HISTORY:  Urinary tract infection  Myeloproliferative disorder  Hypertension  PE (pulmonary thromboembolism):   Tremor  Vestibular disequilibrium  S/P wrist surgery  S/P hysterectomy      MEDICATIONS  (STANDING):  amLODIPine   Tablet 5 milliGRAM(s) Oral every 24 hours  aspirin enteric coated 81 milliGRAM(s) Oral daily  atorvastatin 40 milliGRAM(s) Oral at bedtime  buPROPion . 75 milliGRAM(s) Oral daily  folic acid 1 milliGRAM(s) Oral daily  multivitamin 1 Tablet(s) Oral daily  rivaroxaban 20 milliGRAM(s) Oral with dinner    MEDICATIONS  (PRN):      Allergies    No Known Allergies    Intolerances        SOCIAL HISTORY:    FAMILY HISTORY:  No pertinent family history in first degree relatives      Vital Signs Last 24 Hrs  T(C): 36.6 (2020 09:24), Max: 36.6 (2020 09:24)  T(F): 97.8 (2020 09:24), Max: 97.8 (2020 09:24)  HR: 82 (2020 09:24) (82 - 99)  BP: 166/104 (2020 09:24) (129/69 - 166/104)  BP(mean): --  RR: 18 (2020 09:24) (17 - 18)  SpO2: 94% (2020 09:24) (94% - 99%)    On PE:  General: alert and awake  Abdomen: soft, NT, ND  : no SP discomfort, no CVAT  EXT: no c/c/e    LABS:                        17.2   24.01 )-----------( 405      ( 2020 07:40 )             53.4     07-02    142  |  101  |  8   ----------------------------<  113<H>  3.2<L>   |  27  |  0.49<L>    Ca    10.0      2020 07:40    TPro  6.5  /  Alb  4.1  /  TBili  0.9  /  DBili  <0.2  /  AST  19  /  ALT  9<L>  /  AlkPhos  94  07-02    PT/INR - ( 2020 07:40 )   PT: 15.4 sec;   INR: 1.30          PTT - ( 2020 07:40 )  PTT:37.8 sec  Urinalysis Basic - ( 2020 08:00 )    Color: DARK YELLOW / Appearance: SL Cloudy / S.020 / pH: x  Gluc: x / Ketone: NEGATIVE  / Bili: Small / Urobili: 1.0 E.U./dL   Blood: x / Protein: >=300 mg/dL / Nitrite: POSITIVE   Leuk Esterase: Trace / RBC: Many /HPF / WBC < 5 /HPF   Sq Epi: x / Non Sq Epi: 0-5 /HPF / Bacteria: None /HPF        RADIOLOGY & ADDITIONAL STUDIES:

## 2020-07-02 NOTE — H&P ADULT - HISTORY OF PRESENT ILLNESS
Patient is an 80-year-old F with a PMH of HTN, myeloproliferative disorder, DVT/PE (2015, on xarelto), chronic UTIs and hydronephrosis admitted in Jan for multi-drug resistant UTI, worsening of her myeloproliferative d/o, and FTT, discharged to rehab at that time, who presents with four days of "dark colored urine." She is incontinent at baseline but denies any dysuria, abdominal pain, flank pain, fever/chills, chest pain, N/V/D, cough, or SOB. She states that she "feels lousy" but that this is her baseline. Per her hcp/poa, she has been easily confusable since February and bedbound since January of this year. The patient's hcp/poa also notes that her current symptoms are similar to those that prompted admission in January. The patient had bloodwork drawn yesterday for Dr. Tapia, who prompted her to come to the ED today. Per Dr. Tapia, her hha is unable to care for her adequately and she needs placement. Patient is an 80-year-old F with a PMH of HTN, myeloproliferative disorder, DVT/PE (2015, on xarelto), chronic UTIs and hydronephrosis admitted in Jan for multi-drug resistant UTI, worsening of her myeloproliferative d/o, and FTT, discharged to rehab at that time, who presents with four days of "dark colored urine." She is incontinent at baseline but denies any dysuria, abdominal pain, flank pain, fever/chills, chest pain, N/V/D, cough, or SOB. She states that she "feels lousy" but that this is her baseline. Per her hcp/poa, she has been easily confusable since February and bedbound since January of this year. The patient's hcp/poa also notes that her current symptoms are similar to those that prompted admission in January. The patient had bloodwork drawn yesterday for Dr. Tapia, who prompted her to come to the ED today. Per Dr. Tapia, her hha is unable to care for her adequately and she needs placement.     ED course:   Vitals: T: 98.7, HR: 82, RR: 18, BP: 166/104, SpO2: 94%  Patient received 40 mEq potassium chloride in ED.

## 2020-07-02 NOTE — H&P ADULT - PROBLEM SELECTOR PLAN 1
Patient with large blood on UA and history of hydronephrosis. Currently no concern for UTI.   -consult urology, appreciate recs   -strict I/O Patient with large blood on UA and history of hydronephrosis. Currently no concern for UTI. Patient seen by urology in 2019 during admission, CT urogram at that time revealed a 4mm [facility] filling defect with recommendation for outpatient f/u. Patient was discharged with a hutson at that time. Etiology most likely related to patient's known hydronephrosis, though given additional symptom of failure to thrive bladder cancer cannot be excluded.  -consult urology, appreciate recs

## 2020-07-02 NOTE — ED PROVIDER NOTE - PROGRESS NOTE DETAILS
Pt discussed w pmd, Dr Tapia - tba to him for ftt, placement and poss uti. CBC c/w prior.  Chem sig for low K - K ordered.  UA + blood, nitrite, trace leuks, no bacteria; will hold on abx, u cx pending.  Will admit.

## 2020-07-02 NOTE — H&P ADULT - NSHPREVIEWOFSYSTEMS_GEN_ALL_CORE
REVIEW OF SYSTEMS:    CONSTITUTIONAL: Positive for general weakness and "feeling crummy"   EYES/ENT: No visual changes;  No vertigo or throat pain   NECK: No pain or stiffness  RESPIRATORY: No cough, wheezing, hemoptysis; No shortness of breath  CARDIOVASCULAR: No chest pain or palpitations  GASTROINTESTINAL: No abdominal or epigastric pain. No nausea, vomiting, or hematemesis; No diarrhea or constipation. No melena or hematochezia.  GENITOURINARY: per HPI   NEUROLOGICAL: Positive for generalized weakness; per patient she is at her baseline   SKIN: No itching, burning, rashes, or lesions   All other review of systems is negative unless indicated above.

## 2020-07-02 NOTE — H&P ADULT - PROBLEM SELECTOR PLAN 7
Fluids: no IVF   Electrolytes: replete lytes as needed  Nutrition: DASH  Code status: FULL CODE   Dispo: [facility]   DVT ppx: patient anticoagulated with xarelto 20mg

## 2020-07-02 NOTE — ED PROVIDER NOTE - PMH
Hypertension    Myeloproliferative disorder    PE (pulmonary thromboembolism)  2015  Tremor    UTI (urinary tract infection)    Vestibular disequilibrium

## 2020-07-02 NOTE — H&P ADULT - PROBLEM SELECTOR PLAN 3
Baseline WBC 15-17. No history of thrombocytopenia.  -c/w Xarelto 20mg   -c/w ASA 81mg Baseline WBC 15-17. No history of thrombocytopenia. Follows w/ Dr. Resendiz, to evaluate patient.

## 2020-07-02 NOTE — H&P ADULT - PROBLEM SELECTOR PLAN 2
Patient previously discharged to [facility] for FTT following UTI  -SW for [facility] placement  -PT  -encourage PO intake  -folic acid 1mg PO

## 2020-07-02 NOTE — H&P ADULT - PROBLEM SELECTOR PLAN 4
-c/w home amlodipine 5mg Patient has history of PE/DVT, currently anticoagulated with xarelto 20mg. Not currently complaining of chest pain, SOB, or calf tenderness.   -c/w xarelto 20mg   -c/w ASA 81mg

## 2020-07-02 NOTE — H&P ADULT - PROBLEM SELECTOR PLAN 6
Patient has Q waves on current EKG; also present on previous EKG, no concern for acute ischemia at this time.   -c/w home ASA 81mg   -patient seen by Dr. Love, recs appreciated

## 2020-07-02 NOTE — ED PROVIDER NOTE - OBJECTIVE STATEMENT
79 yo female h/o HTN, myeloproliferative disorder, DVT/PE (2015, on xarelto), chronic UTIs and hydronephrosis admitted in Jan for multi-drug resistant UTI, worsening of her myeloproliferative d/o, and FTT.  Pt returns today c/o dark colored urine. 81 yo female h/o HTN, myeloproliferative disorder, DVT/PE (2015, on xarelto), chronic UTIs and hydronephrosis admitted in Jan for multi-drug resistant UTI, worsening of her myeloproliferative d/o, and FTT.  Pt returns today c/o dark colored urine x 4 d.  Pt denies dysuria, abd pain, flank pain; pt incontinent at baseline.  Pt c/o "feeling lousy."  Pt denies fan, uri sx, cp, sob, cough, n/v/d.  Nl po's.  Pt's hcp/poa notes pt has been easily confused since Feb and has been bed bound since Jan.  Pt had blood work drawn yest for Dr Tapia who instructed pt to come to ed today for eval.  Per hcp/poa, hha told her that pt's sx are similar to her sx that prompted her admission in Jan.  Per Dr Tapia, pt's hha's unable to care for her adequately at home and pt needs placement. 81 yo female h/o HTN, myeloproliferative disorder, DVT/PE (2015, on xarelto), chronic UTIs and hydronephrosis admitted in Jan for multi-drug resistant UTI, worsening of her myeloproliferative d/o, and FTT.  Pt returns today c/o dark colored urine x 4 d.  Pt denies dysuria, abd pain, flank pain; pt incontinent at baseline.  Pt c/o "feeling lousy" but reports this is chronic  Pt denies ha, uri sx, cp, sob, cough, n/v/d.  Nl po's.  Pt's hcp/poa notes pt has been easily confused since Feb and has been bed bound since Jan.  Pt had blood work drawn yest for Dr Tapia who instructed pt to come to ed today for eval.  Per hcp/poa, hha told her that pt's sx are similar to her sx that prompted her admission in Jan.  Per Dr Tapia, pt's hha's unable to care for her adequately at home and pt needs placement.

## 2020-07-02 NOTE — CONSULT NOTE ADULT - SUBJECTIVE AND OBJECTIVE BOX
CC: HTN  HPI:  Patient is an 80-year-old F with a PMH of HTN, myeloproliferative disorder, DVT/PE (2015, on xarelto), chronic UTIs and hydronephrosis admitted in Jan for multi-drug resistant UTI, worsening of her myeloproliferative d/o, and FTT, discharged to rehab at that time, who presents with four days of "dark colored urine." She is incontinent at baseline but denies any dysuria, abdominal pain, flank pain, fever/chills, chest pain, N/V/D, cough, or SOB. She states that she "feels lousy" but that this is her baseline. Per her hcp/poa, she has been easily confusable since February and bedbound since January of this year. The patient's hcp/poa also notes that her current symptoms are similar to those that prompted admission in January. The patient had bloodwork drawn yesterday for Dr. Tapia, who prompted her to come to the ED today. Per Dr. Tapia, her hha is unable to care for her adequately and she needs placement. (02 Jul 2020 09:24)    07-02-20 Interval HPI below, otherwise per above HPI  mild cp and sob  Location: chest  Duration: spontaneous  Severity: 4/10  Timing: intermittent  Modifing factors: none      PAST MEDICAL & SURGICAL HISTORY:  UTI (urinary tract infection)  Myeloproliferative disorder  Hypertension  PE (pulmonary thromboembolism): 2015  Tremor  Vestibular disequilibrium  S/P wrist surgery  S/P hysterectomy        MEDICATIONS:                      SOCIAL HISTORY:    [ x] Non-smoker  [ ] Smoker  [ ] Alcohol    FAMILY HX: FAMILY HISTORY:  No pertinent family history in first degree relatives    no cad in first degree relatives    Allergies    No Known Allergies    Intolerances    	    REVIEW OF SYSTEMS:    [x] as per HPI  [x] All others negative	  [ ] Unable to obtain    PHYSICAL EXAM:  T(C): 36.6 (07-02-20 @ 09:24), Max: 36.6 (07-02-20 @ 09:24)  HR: 82 (07-02-20 @ 09:24) (82 - 99)  BP: 166/104 (07-02-20 @ 09:24) (129/69 - 166/104)  RR: 18 (07-02-20 @ 09:24) (17 - 18)  SpO2: 94% (07-02-20 @ 09:24) (94% - 99%)  Wt(kg): --  I&O's Summary      Appearance: Normal	  HEENT:   Normal oral mucosa, PERRL, EOMI	  Lymphatic: No lymphadenopathy  Cardiovascular: Normal S1 S2, No JVD, No murmurs, No edema  Respiratory: Lungs clear to auscultation	  Psychiatry: A & O x 3, Mood & affect appropriate  Gastrointestinal:  Soft, Non-tender, + BS	  Skin: No rashes, No ecchymoses, No cyanosis	  Neurologic: Non-focal  Extremities: Normal range of motion, No clubbing, cyanosis or edema  Vascular: Peripheral pulses palpable 2+ bilaterally    TELEMETRY: 	    ECG:  < from: 12 Lead ECG (01.16.20 @ 14:12) >  Ventricular Rate 91 BPM    Atrial Rate 91 BPM    P-R Interval 176 ms    QRS Duration 82 ms    Q-T Interval 376 ms    QTC Calculation(Bezet) 462 ms    P Axis 79 degrees    R Axis -25 degrees    T Axis 77 degrees    Diagnosis Line Normal sinus rhythm  Anteroseptal infarct , age undetermined    Confirmed by Sarah Ortiz ([id number]) on 1/22/2020 3:47:23 PM    < end of copied text >    ECHO: < from: Echocardiogram (01.27.17 @ 13:21) >  Interpretation Summary  Left ventricular hypertrophy present. The left ventricular wall motion is   normal.  The left ventricular ejection fraction is normal. The left   ventricular ejection fraction is 70%.  The mitral inflow pattern is   consistent   with impaired left ventricular relaxation with mildly elevated left   atrial   pressure (8-14mmHg).  The left atrial size is normal. Right atrial size   is   normal.The right ventricle is normal in size and function.No evidence   for any   hemodynamically significant valvular disease.There was insufficient TR   detected from which to calculate pulmonary artery systolic pressure.  The   inferior vena cava is normal in size (<2.1 cm) with normal inspiratory   collapse (>50%) consistent with normal right atrial pressure. No aortic   root   dilatation. Normal size aortic arch with no hemodynamic evidence for   coarctation  There is no pericardial effusion.Patient tachycardic   throughout   the exam (>105 / min)    < end of copied text >    STRESS:  CATH:  	  RADIOLOGY:  CXR:  CT:  US:   	  	  LABS:	 	  CBC Full  -  ( 02 Jul 2020 07:40 )  WBC Count : 24.01 K/uL  RBC Count : 6.48 M/uL  Hemoglobin : 17.2 g/dL  Hematocrit : 53.4 %  Platelet Count - Automated : 405 K/uL  Mean Cell Volume : 82.4 fl  Mean Cell Hemoglobin : 26.5 pg  Mean Cell Hemoglobin Concentration : 32.2 gm/dL  Auto Neutrophil # : 20.26 K/uL  Auto Lymphocyte # : 1.25 K/uL  Auto Monocyte # : 0.41 K/uL  Auto Eosinophil # : 0.00 K/uL  Auto Basophil # : 0.84 K/uL  Auto Neutrophil % : 84.4 %  Auto Lymphocyte % : 5.2 %  Auto Monocyte % : 1.7 %  Auto Eosinophil % : 0.0 %  Auto Basophil % : 3.5 %      CARDIAC MARKERS:            07-02    142  |  101  |  8   ----------------------------<  113<H>  3.2<L>   |  27  |  0.49<L>    Ca    10.0      02 Jul 2020 07:40      proBNP:   Lipid Profile:   HgA1c:   TSH:

## 2020-07-03 ENCOUNTER — TRANSCRIPTION ENCOUNTER (OUTPATIENT)
Age: 80
End: 2020-07-03

## 2020-07-03 LAB
ALBUMIN SERPL ELPH-MCNC: 3.7 G/DL — SIGNIFICANT CHANGE UP (ref 3.3–5)
ALP SERPL-CCNC: 89 U/L — SIGNIFICANT CHANGE UP (ref 40–120)
ALT FLD-CCNC: 7 U/L — LOW (ref 10–45)
ANION GAP SERPL CALC-SCNC: 11 MMOL/L — SIGNIFICANT CHANGE UP (ref 5–17)
APTT BLD: 31.8 SEC — SIGNIFICANT CHANGE UP (ref 27.5–35.5)
AST SERPL-CCNC: 12 U/L — SIGNIFICANT CHANGE UP (ref 10–40)
BASOPHILS # BLD AUTO: 0.34 K/UL — HIGH (ref 0–0.2)
BASOPHILS NFR BLD AUTO: 1.3 % — SIGNIFICANT CHANGE UP (ref 0–2)
BILIRUB SERPL-MCNC: 0.9 MG/DL — SIGNIFICANT CHANGE UP (ref 0.2–1.2)
BUN SERPL-MCNC: 14 MG/DL — SIGNIFICANT CHANGE UP (ref 7–23)
CALCIUM SERPL-MCNC: 9.9 MG/DL — SIGNIFICANT CHANGE UP (ref 8.4–10.5)
CHLORIDE SERPL-SCNC: 104 MMOL/L — SIGNIFICANT CHANGE UP (ref 96–108)
CO2 SERPL-SCNC: 27 MMOL/L — SIGNIFICANT CHANGE UP (ref 22–31)
CREAT SERPL-MCNC: 0.46 MG/DL — LOW (ref 0.5–1.3)
EOSINOPHIL # BLD AUTO: 0.37 K/UL — SIGNIFICANT CHANGE UP (ref 0–0.5)
EOSINOPHIL NFR BLD AUTO: 1.4 % — SIGNIFICANT CHANGE UP (ref 0–6)
GLUCOSE SERPL-MCNC: 103 MG/DL — HIGH (ref 70–99)
HCT VFR BLD CALC: 50.2 % — HIGH (ref 34.5–45)
HGB BLD-MCNC: 15.7 G/DL — HIGH (ref 11.5–15.5)
IMM GRANULOCYTES NFR BLD AUTO: 1.4 % — SIGNIFICANT CHANGE UP (ref 0–1.5)
INR BLD: 2.53 — HIGH (ref 0.88–1.16)
LYMPHOCYTES # BLD AUTO: 1.53 K/UL — SIGNIFICANT CHANGE UP (ref 1–3.3)
LYMPHOCYTES # BLD AUTO: 6 % — LOW (ref 13–44)
MAGNESIUM SERPL-MCNC: 2.1 MG/DL — SIGNIFICANT CHANGE UP (ref 1.6–2.6)
MCHC RBC-ENTMCNC: 26.2 PG — LOW (ref 27–34)
MCHC RBC-ENTMCNC: 31.3 GM/DL — LOW (ref 32–36)
MCV RBC AUTO: 83.7 FL — SIGNIFICANT CHANGE UP (ref 80–100)
MONOCYTES # BLD AUTO: 0.91 K/UL — HIGH (ref 0–0.9)
MONOCYTES NFR BLD AUTO: 3.5 % — SIGNIFICANT CHANGE UP (ref 2–14)
NEUTROPHILS # BLD AUTO: 22.21 K/UL — HIGH (ref 1.8–7.4)
NEUTROPHILS NFR BLD AUTO: 86.4 % — HIGH (ref 43–77)
NRBC # BLD: 0 /100 WBCS — SIGNIFICANT CHANGE UP (ref 0–0)
PLATELET # BLD AUTO: 404 K/UL — HIGH (ref 150–400)
POTASSIUM SERPL-MCNC: 3.8 MMOL/L — SIGNIFICANT CHANGE UP (ref 3.5–5.3)
POTASSIUM SERPL-SCNC: 3.8 MMOL/L — SIGNIFICANT CHANGE UP (ref 3.5–5.3)
PROT SERPL-MCNC: 5.9 G/DL — LOW (ref 6–8.3)
PROTHROM AB SERPL-ACNC: 29 SEC — HIGH (ref 10.6–13.6)
RBC # BLD: 6 M/UL — HIGH (ref 3.8–5.2)
RBC # FLD: 17.2 % — HIGH (ref 10.3–14.5)
SODIUM SERPL-SCNC: 142 MMOL/L — SIGNIFICANT CHANGE UP (ref 135–145)
WBC # BLD: 25.09 K/UL — HIGH (ref 3.8–10.5)
WBC # FLD AUTO: 25.09 K/UL — HIGH (ref 3.8–10.5)

## 2020-07-03 PROCEDURE — 74176 CT ABD & PELVIS W/O CONTRAST: CPT | Mod: 26

## 2020-07-03 RX ORDER — SENNA PLUS 8.6 MG/1
2 TABLET ORAL AT BEDTIME
Refills: 0 | Status: DISCONTINUED | OUTPATIENT
Start: 2020-07-03 | End: 2020-07-06

## 2020-07-03 RX ORDER — POLYETHYLENE GLYCOL 3350 17 G/17G
17 POWDER, FOR SOLUTION ORAL DAILY
Refills: 0 | Status: DISCONTINUED | OUTPATIENT
Start: 2020-07-03 | End: 2020-07-06

## 2020-07-03 RX ADMIN — RIVAROXABAN 20 MILLIGRAM(S): KIT at 16:35

## 2020-07-03 RX ADMIN — SENNA PLUS 2 TABLET(S): 8.6 TABLET ORAL at 21:41

## 2020-07-03 RX ADMIN — AMLODIPINE BESYLATE 5 MILLIGRAM(S): 2.5 TABLET ORAL at 09:08

## 2020-07-03 RX ADMIN — Medication 81 MILLIGRAM(S): at 12:33

## 2020-07-03 RX ADMIN — ATORVASTATIN CALCIUM 40 MILLIGRAM(S): 80 TABLET, FILM COATED ORAL at 21:41

## 2020-07-03 RX ADMIN — POLYETHYLENE GLYCOL 3350 17 GRAM(S): 17 POWDER, FOR SOLUTION ORAL at 21:41

## 2020-07-03 RX ADMIN — Medication 1 MILLIGRAM(S): at 12:33

## 2020-07-03 RX ADMIN — Medication 1 TABLET(S): at 12:33

## 2020-07-03 RX ADMIN — BUPROPION HYDROCHLORIDE 75 MILLIGRAM(S): 150 TABLET, EXTENDED RELEASE ORAL at 12:33

## 2020-07-03 NOTE — PROGRESS NOTE ADULT - SUBJECTIVE AND OBJECTIVE BOX
SUBJECTIVE: Denies any suprapubic/flank pain, fevers/chills      OBJECTIVE:    Vital Signs:  Vital Signs Last 24 Hrs  T(C): 37.1 (2020 08:18), Max: 37.1 (2020 08:18)  T(F): 98.8 (2020 08:18), Max: 98.8 (2020 08:18)  HR: 79 (2020 08:18) (74 - 84)  BP: 161/92 (2020 08:18) (105/75 - 166/104)  BP(mean): --  RR: 18 (2020 08:18) (18 - 20)  SpO2: 95% (2020 08:18) (93% - 95%)    Physical Exam:  General: NAD  Pulmonary: Nonlabored breathing, no respiratory distress  Abdominal: No suprapubic tenderness  : Primafit with fruit punch colored urine; No CVA tenderness bilaterally     Ins and Outs:  I&O's Summary    2020 07:01  -  2020 07:00  --------------------------------------------------------  IN: 0 mL / OUT: 225 mL / NET: -225 mL        Labs:                        15.7   25.09 )-----------( 404      ( 2020 06:35 )             50.2     07-03    142  |  104  |  14  ----------------------------<  103<H>  3.8   |  27  |  0.46<L>    Ca    9.9      2020 06:35  Mg     2.1     07-03    TPro  5.9<L>  /  Alb  3.7  /  TBili  0.9  /  DBili  x   /  AST  12  /  ALT  7<L>  /  AlkPhos  89  07-03    PT/INR - ( 2020 06:35 )   PT: 29.0 sec;   INR: 2.53          PTT - ( 2020 06:35 )  PTT:31.8 sec  Urinalysis Basic - ( 2020 08:00 )    Color: DARK YELLOW / Appearance: SL Cloudy / S.020 / pH: x  Gluc: x / Ketone: NEGATIVE  / Bili: Small / Urobili: 1.0 E.U./dL   Blood: x / Protein: >=300 mg/dL / Nitrite: POSITIVE   Leuk Esterase: Trace / RBC: Many /HPF / WBC < 5 /HPF   Sq Epi: x / Non Sq Epi: 0-5 /HPF / Bacteria: None /HPF      CAPILLARY BLOOD GLUCOSE        LIVER FUNCTIONS - ( 2020 06:35 )  Alb: 3.7 g/dL / Pro: 5.9 g/dL / ALK PHOS: 89 U/L / ALT: 7 U/L / AST: 12 U/L / GGT: x

## 2020-07-03 NOTE — PROGRESS NOTE ADULT - SUBJECTIVE AND OBJECTIVE BOX
Pt seen and examined   upset that she did not have her scan yet    REVIEW OF SYSTEMS:  Constitutional: No fever  Cardiovascular: No chest pain, palpitations,   Gastrointestinal: No abdominal or epigastric pain. No nausea, vomiting or hematemesis;   Skin: No itching, burning, rashes or lesions       MEDICATIONS:  MEDICATIONS  (STANDING):  amLODIPine   Tablet 5 milliGRAM(s) Oral every 24 hours  aspirin enteric coated 81 milliGRAM(s) Oral daily  atorvastatin 40 milliGRAM(s) Oral at bedtime  buPROPion . 75 milliGRAM(s) Oral daily  folic acid 1 milliGRAM(s) Oral daily  multivitamin 1 Tablet(s) Oral daily  rivaroxaban 20 milliGRAM(s) Oral with dinner    MEDICATIONS  (PRN):      Allergies    No Known Allergies    Intolerances        Vital Signs Last 24 Hrs  T(C): 37.1 (2020 08:18), Max: 37.1 (2020 08:18)  T(F): 98.8 (2020 08:18), Max: 98.8 (2020 08:18)  HR: 79 (2020 08:18) (79 - 84)  BP: 161/92 (2020 08:18) (105/75 - 161/92)  BP(mean): --  RR: 18 (2020 08:18) (18 - 19)  SpO2: 95% (2020 08:18) (93% - 95%)    07-02 @ 07:01  -  -03 @ 07:00  --------------------------------------------------------  IN: 0 mL / OUT: 225 mL / NET: -225 mL        PHYSICAL EXAM:    General: ; in no acute distress  HEENT: MMM, conjunctiva and sclera clear  Lungs: clear  Heart: regular  Gastrointestinal: Soft non-tender non-distended; Normal bowel sounds;   Skin: Warm and dry. No obvious rash    LABS:      CBC Full  -  ( 2020 06:35 )  WBC Count : 25.09 K/uL  RBC Count : 6.00 M/uL  Hemoglobin : 15.7 g/dL  Hematocrit : 50.2 %  Platelet Count - Automated : 404 K/uL  Mean Cell Volume : 83.7 fl  Mean Cell Hemoglobin : 26.2 pg  Mean Cell Hemoglobin Concentration : 31.3 gm/dL  Auto Neutrophil # : 22.21 K/uL  Auto Lymphocyte # : 1.53 K/uL  Auto Monocyte # : 0.91 K/uL  Auto Eosinophil # : 0.37 K/uL  Auto Basophil # : 0.34 K/uL  Auto Neutrophil % : 86.4 %  Auto Lymphocyte % : 6.0 %  Auto Monocyte % : 3.5 %  Auto Eosinophil % : 1.4 %  Auto Basophil % : 1.3 %        142  |  104  |  14  ----------------------------<  103<H>  3.8   |  27  |  0.46<L>    Ca    9.9      2020 06:35  Mg     2.1     07-03    TPro  5.9<L>  /  Alb  3.7  /  TBili  0.9  /  DBili  x   /  AST  12  /  ALT  7<L>  /  AlkPhos  89  07-03    PT/INR - ( 2020 06:35 )   PT: 29.0 sec;   INR: 2.53          PTT - ( 2020 06:35 )  PTT:31.8 sec      Urinalysis Basic - ( 2020 08:00 )    Color: DARK YELLOW / Appearance: SL Cloudy / S.020 / pH: x  Gluc: x / Ketone: NEGATIVE  / Bili: Small / Urobili: 1.0 E.U./dL   Blood: x / Protein: >=300 mg/dL / Nitrite: POSITIVE   Leuk Esterase: Trace / RBC: Many /HPF / WBC < 5 /HPF   Sq Epi: x / Non Sq Epi: 0-5 /HPF / Bacteria: None /HPF                RADIOLOGY & ADDITIONAL STUDIES (The following images were personally reviewed):

## 2020-07-03 NOTE — CONSULT NOTE ADULT - ASSESSMENT
per Internal Medicine     80-year-old F with a PMH of HTN, myeloproliferative disorder, DVT/PE (2015, on xarelto), chronic UTIs and hydronephrosis admitted in Jan for multi-drug resistant UTI, worsening of her myeloproliferative d/o, and FTT, discharged to rehab at that time, who presents with four days of "dark colored urine," admitted for urological workup and [facility] placement.     Problem/Plan - 1:  ·  Problem: Hematuria.  Plan: Patient with large blood on UA and history of hydronephrosis. Currently no concern for UTI. Patient seen by urology in 2019 during admission, CT urogram at that time revealed a 4mm UPJ filling defect with recommendation for outpatient f/u. Patient was discharged with a hutson at that time. Etiology most likely related to patient's known hydronephrosis, though given additional symptom of failure to thrive bladder cancer cannot be excluded.  -consult urology, appreciate recs.    Problem/Plan - 2:  ·  Problem: Failure to thrive in adult.  Plan: Patient previously discharged to [facility] for FTT following UTI  -SW for [facility] placement  -PT  -encourage PO intake  -folic acid 1mg PO.     Problem/Plan - 3:  ·  Problem: Myeloproliferative disorder.  Plan: Baseline WBC 15-17. No history of thrombocytopenia. Follows w/ Dr. Resendiz, to evaluate patient.    Problem/Plan - 4:  ·  Problem: PE (pulmonary thromboembolism). Plan: Patient has history of PE/DVT, currently anticoagulated with xarelto 20mg. Not currently complaining of chest pain, SOB, or calf tenderness.   -c/w xarelto 20mg   -c/w ASA 81mg.    Problem/Plan - 5:  ·  Problem: Hypertension. Plan: -c/w home amlodipine 5mg.    Problem/Plan - 6:  Problem: Q waves suggestive of previous myocardial infarction. Plan: Patient has Q waves on current EKG; also present on previous EKG, no concern for acute ischemia at this time.   -c/w home ASA 81mg   -patient seen by Dr. Love, recs appreciated.    Problem/Plan - 7:  ·  Problem: Nutrition, metabolism, and development symptoms.  Plan: Fluids: no IVF   Electrolytes: replete lytes as needed  Nutrition: DASH  Code status: FULL CODE   Dispo: [facility]   DVT ppx: patient anticoagulated with xarelto 20mg.

## 2020-07-03 NOTE — CHART NOTE - NSCHARTNOTEFT_GEN_A_CORE
Upon Nutritional Assessment by the Registered Dietitian your patient was determined to meet criteria / has evidence of the following diagnosis/diagnoses:          [ ]  Mild Protein Calorie Malnutrition        [ ]  Moderate Protein Calorie Malnutrition        [ x ] Severe Protein Calorie Malnutrition        [ ] Unspecified Protein Calorie Malnutrition        [ x ] Underweight / BMI <19        [ ] Morbid Obesity / BMI > 40      Findings as based on:  •  Comprehensive nutrition assessment and consultation  •  Calorie counts (nutrient intake analysis)  •  Food acceptance and intake status from observations by staff  •  Follow up  •  Patient education  •  Intervention secondary to interdisciplinary rounds  •   concerns    Per physical assessment: Muscle Wasting- Temporal [ x-Mild  ]  Clavicle/Pectoral [ x-Moderate  ]  Shoulder/Deltoid [ x-Moderate  ]  Scapula [   ]  Interosseous [ X-Moderate to severe  ]  Quadriceps [ x-Severe  ]  Gastrocnemius [ X-Moderate  ]; Fat Wasting- Orbital [   ]  Buccal [   ]  Triceps [   ]  Rib [ x-Moderate   ]--> Suspect severe [PCM] 2/2 to physical assessment, suspect meeting <50% est needs for >3 months and -24.6lb/ 23% weight loss over 7 months (significant); please see malnutrition chart note.     Treatment:    The following diet has been recommended:  1. Cont with DASH/ TLC diet   2. Recommend add Ensure Enlive BID (700 kcal, 40g protein, 360 mL free H2O)  3. Cont with MVI and FA supplementations  4. COnt to encourage adequate PO intake when feasible  5. RD diet education reenforcement prn  6. Monitor lytes and replete prn    PROVIDER Section:     By signing this assessment you are acknowledging and agree with the diagnosis/diagnoses assigned by the Registered Dietitian    Comments:

## 2020-07-03 NOTE — DISCHARGE NOTE PROVIDER - PROVIDER TOKENS
PROVIDER:[TOKEN:[[id number]]] PROVIDER:[TOKEN:[[id number]]],PROVIDER:[TOKEN:[[id number]]],PROVIDER:[TOKEN:[[id number]]]

## 2020-07-03 NOTE — DISCHARGE NOTE PROVIDER - NSDCFUADDAPPT_GEN_ALL_CORE_FT
You would need to make an You will need to follow up with Urology, Dr. Nava within 2 weeks of discharge.

## 2020-07-03 NOTE — DISCHARGE NOTE PROVIDER - HOSPITAL COURSE
#Discharge: do not delete        Patient is an 80-year-old F with a PMH of HTN, myeloproliferative disorder, DVT/PE (2015, on xarelto), chronic UTIs and hydronephrosis admitted in Jan for multi-drug resistant UTI, worsening of her myeloproliferative d/o, and FTT, discharged to rehab at that time, who presented with four days of "dark colored urine."         Problem List/Main Diagnoses (system-based):         #Hematuria    -            Inpatient treatment course:     New medications:     Labs to be followed outpatient:     Exam to be followed outpatient: #Discharge: do not delete        Patient is an 80-year-old F with a PMH of HTN, myeloproliferative disorder, DVT/PE (2015, on xarelto), chronic UTIs and hydronephrosis admitted in Jan for multi-drug resistant UTI, worsening of her myeloproliferative d/o, and FTT, discharged to rehab at that time, who presented with four days of "dark colored urine."         Problem List/Main Diagnoses (system-based):         #Hematuria    -Pt with a history of multiple chronic UTIs, now found to have             Inpatient treatment course:     New medications:     Labs to be followed outpatient:     Exam to be followed outpatient: #Discharge: do not delete        Patient is an 80-year-old F with a PMH of HTN, myeloproliferative disorder, DVT/PE (2015, on xarelto), chronic UTIs and hydronephrosis admitted in Jan for multi-drug resistant UTI, worsening of her myeloproliferative d/o, and FTT, discharged to rehab at that time, who presented with four days of "dark colored urine." UA was found to be positive on admission with urine cx positive for enterococcus, E. coli. CT performed 7/3 revealed L renal calculus and mild hydronephrosis. During the course of her admission, patient was managed for her UTI initially with cefepime 1g Q12 with eventual change to ampicillin 2g Q8 and discharged with PO regimen augmentin 500mg BID to complete a 7 day course.        Problem List/Main Diagnoses (system-based):         #Hematuria    - Pt with a history of multiple chronic UTIs, with recent admission for MDR UTI and hydronephrosis    - Positive UA on this admission with urine cx positive for Enterococcus and E. Coli    - To be discharged with A            Inpatient treatment course:         New medications: Augmentin 500 mg BID        Labs to be followed outpatient:         Exam to be followed outpatient: #Discharge: do not delete        Patient is an 80-year-old F with a PMH of HTN, myeloproliferative disorder, DVT/PE (2015, on xarelto), chronic UTIs and hydronephrosis admitted in Jan for multi-drug resistant UTI, worsening of her myeloproliferative d/o, and FTT, discharged to rehab at that time, who presented with four days of "dark colored urine." UA was found to be positive on admission with urine cx positive for enterococcus, E. coli. CT performed 7/3 revealed L renal calculus and mild hydronephrosis. During the course of her admission, patient was managed for her UTI initially with cefepime 1g Q12 with eventual change to ampicillin 2g Q8 and discharged with PO regimen augmentin 500mg BID to complete a 7 day course.        Problem List/Main Diagnoses (system-based):         #Hematuria    - Pt with a history of multiple chronic UTIs, with recent admission for MDR UTI and hydronephrosis    - Positive UA on this admission with urine cx positive for Enterococcus and E. Coli    - To be discharged with Augmentin 500 mg BID to complete 7 day course of abx (7/10)        Inpatient treatment course:         New medications: Augmentin 500 mg BID        Labs to be followed outpatient:         Exam to be followed outpatient: #Discharge: do not delete        Patient is an 80-year-old F with a PMH of HTN, myeloproliferative disorder, DVT/PE (2015, on xarelto), chronic UTIs and hydronephrosis admitted in Jan for multi-drug resistant UTI, worsening of her myeloproliferative d/o, and FTT, discharged to rehab at that time, who presented with four days of "dark colored urine." UA was found to be positive on admission with urine cx positive for enterococcus, E. coli. CT performed 7/3 revealed L renal calculus and mild hydronephrosis. During the course of her admission, patient was managed for her UTI initially with cefepime 1g Q12 with eventual change to ampicillin 2g Q8 and discharged with PO regimen augmentin 500mg BID to complete a 7 day course.        Problem List/Main Diagnoses (system-based):         #Hematuria    - Pt with a history of multiple chronic UTIs, with recent admission for MDR UTI and hydronephrosis    - Positive UA on this admission with urine cx positive for Enterococcus and E. Coli    - To be discharged with Augmentin 500 mg BID to complete 7 day course of abx (7/10)        New medications: Augmentin 500 mg BID        Labs to be followed outpatient:         Exam to be followed outpatient: #Discharge: do not delete        Patient is an 80-year-old F with a PMH of HTN, myeloproliferative disorder, DVT/PE (2015, on xarelto), chronic UTIs and hydronephrosis admitted in Jan for multi-drug resistant UTI, worsening of her myeloproliferative d/o, and FTT, discharged to rehab at that time, who presented with four days of "dark colored urine." UA was found to be positive on admission with urine cx positive for enterococcus, E. coli. CT performed 7/3 revealed L renal calculus, staghorn calculus mild hydronephrosis, . During the course of her admission, patient was managed for her UTI initially with cefepime 1g Q12 with eventual change to ampicillin 2g Q8 and discharged with PO regimen augmentin 500mg BID to complete a 7 day course.        Problem List/Main Diagnoses (system-based):         #Hematuria    - Pt with a history of multiple chronic UTIs, with recent admission for MDR UTI and hydronephrosis    - Positive UA on this admission with urine cx positive for Enterococcus and E. Coli    - To be discharged with Augmentin 500 mg BID to complete 7 day course of abx (7/10)        New medications: Augmentin 500 mg BID        Needs to follow up with Urology regarding workup for the staghorn calculi        Labs to be followed outpatient: repeat UA to check for clearance of the UTI        Exam to be followed outpatient:

## 2020-07-03 NOTE — PROGRESS NOTE ADULT - ASSESSMENT
80-year-old F with a PMH of HTN, myeloproliferative disorder, DVT/PE (2015, on xarelto), chronic UTIs and hydronephrosis admitted in Jan for multi-drug resistant UTI, worsening of her myeloproliferative d/o, and FTT, discharged to rehab at that time, who presents with four days of "dark colored urine" admitted for urological workup and [facility] placement.

## 2020-07-03 NOTE — DISCHARGE NOTE PROVIDER - CARE PROVIDER_API CALL
[facility]  INTERNAL MEDICINE  [address] [address]  Phone: [phone]  Fax: [phone]  Follow Up Time: [facility]  INTERNAL MEDICINE  [address] [address]  Phone: [phone]  Fax: [phone]  Follow Up Time:     Wai Tapia  MEDICINE  [address] [place] [address]  Phone: [phone]  Fax: [phone]  Follow Up Time:     Kofi Nava  UROLOGY  [address] [address]  Phone: [phone]  Fax: [phone]  Follow Up Time:

## 2020-07-03 NOTE — CONSULT NOTE ADULT - SUBJECTIVE AND OBJECTIVE BOX
Patient is a 80y old  Female who presents with a chief complaint of      HPI:  Patient is an 80-year-old F with a PMH of HTN, myeloproliferative disorder, DVT/PE (, on xarelto), chronic UTIs and hydronephrosis admitted in  for multi-drug resistant UTI, worsening of her myeloproliferative d/o, and FTT, discharged to rehab at that time, who presents with four days of "dark colored urine." She is incontinent at baseline but denies any dysuria, abdominal pain, flank pain, fever/chills, chest pain, N/V/D, cough, or SOB. She states that she "feels lousy" but that this is her baseline. Per her hcp/poa, she has been easily confusable since February and bedbound since January of this year. The patient's hcp/poa also notes that her current symptoms are similar to those that prompted admission in January. The patient had bloodwork drawn yesterday for Dr. Tapia, who prompted her to come to the ED today. Per Dr. Tapia, her hha is unable to care for her adequately and she needs placement.     ED course:   Vitals: T: 98.7, HR: 82, RR: 18, BP: 166/104, SpO2: 94%  Patient received 40 mEq potassium chloride in ED. (2020 09:24)      PAST MEDICAL & SURGICAL HISTORY:  Urinary tract infection  Myeloproliferative disorder  Hypertension  PE (pulmonary thromboembolism):   Tremor  Vestibular disequilibrium  S/P wrist surgery  S/P hysterectomy      MEDICATIONS  (STANDING):  amLODIPine   Tablet 5 milliGRAM(s) Oral every 24 hours  aspirin enteric coated 81 milliGRAM(s) Oral daily  atorvastatin 40 milliGRAM(s) Oral at bedtime  buPROPion . 75 milliGRAM(s) Oral daily  folic acid 1 milliGRAM(s) Oral daily  multivitamin 1 Tablet(s) Oral daily  rivaroxaban 20 milliGRAM(s) Oral with dinner    MEDICATIONS  (PRN):      Social History:           -  Home Living Status: lives alone in an elevator accessible apartment building           -  Prior Home Care Services:  24 hr x 7 days    Baseline Functional Level Prior to Admission:           - ADL's:  requires assistance with bathing/ dressing/ toileting           - states she lays in bed all day    FAMILY HISTORY:  No pertinent family history in first degree relatives      CBC Full  -  ( 2020 06:35 )  WBC Count : 25.09 K/uL  RBC Count : 6.00 M/uL  Hemoglobin : 15.7 g/dL  Hematocrit : 50.2 %  Platelet Count - Automated : 404 K/uL  Mean Cell Volume : 83.7 fl  Mean Cell Hemoglobin : 26.2 pg  Mean Cell Hemoglobin Concentration : 31.3 gm/dL  Auto Neutrophil # : 22.21 K/uL  Auto Lymphocyte # : 1.53 K/uL  Auto Monocyte # : 0.91 K/uL  Auto Eosinophil # : 0.37 K/uL  Auto Basophil # : 0.34 K/uL  Auto Neutrophil % : 86.4 %  Auto Lymphocyte % : 6.0 %  Auto Monocyte % : 3.5 %  Auto Eosinophil % : 1.4 %  Auto Basophil % : 1.3 %      07-03    142  |  104  |  14  ----------------------------<  103<H>  3.8   |  27  |  0.46<L>    Ca    9.9      2020 06:35  Mg     2.1     07-03    TPro  5.9<L>  /  Alb  3.7  /  TBili  0.9  /  DBili  x   /  AST  12  /  ALT  7<L>  /  AlkPhos  89  07-03      Urinalysis Basic - ( 2020 08:00 )    Color: DARK YELLOW / Appearance: SL Cloudy / S.020 / pH: x  Gluc: x / Ketone: NEGATIVE  / Bili: Small / Urobili: 1.0 E.U./dL   Blood: x / Protein: >=300 mg/dL / Nitrite: POSITIVE   Leuk Esterase: Trace / RBC: Many /HPF / WBC < 5 /HPF   Sq Epi: x / Non Sq Epi: 0-5 /HPF / Bacteria: None /HPF          Radiology:              Vital Signs Last 24 Hrs  T(C): 37.1 (2020 08:18), Max: 37.1 (2020 08:18)  T(F): 98.8 (2020 08:18), Max: 98.8 (2020 08:18)  HR: 79 (2020 08:18) (74 - 84)  BP: 161/92 (2020 08:18) (105/75 - 166/104)  BP(mean): --  RR: 18 (2020 08:18) (18 - 20)  SpO2: 95% (2020 08:18) (93% - 95%)    REVIEW OF SYSTEMS:    CONSTITUTIONAL: No fever, weight loss, or fatigue  EYES: No eye pain, visual disturbances, or discharge  ENMT:  No difficulty hearing, tinnitus, vertigo; No sinus or throat pain  NECK: No pain or stiffness  BREASTS: No pain, masses, or nipple discharge  RESPIRATORY: No cough, wheezing, chills or hemoptysis; No shortness of breath  CARDIOVASCULAR: No chest pain, palpitations, dizziness, or leg swelling  GASTROINTESTINAL: No abdominal or epigastric pain. No nausea, vomiting, or hematemesis; No diarrhea or constipation. No melena or hematochezia.  GENITOURINARY: No dysuria, frequency, hematuria, or incontinence  NEUROLOGICAL: No headaches, memory loss, loss of strength, numbness, or tremors  SKIN: No itching, burning, rashes, or lesions   LYMPH NODES: No enlarged glands  ENDOCRINE: No heat or cold intolerance; No hair loss  MUSCULOSKELETAL: No joint pain or swelling; No muscle, back, or extremity pain  PSYCHIATRIC: No depression, anxiety, mood swings, or difficulty sleeping  HEME/LYMPH: No easy bruising, or bleeding gums  ALLERGY AND IMMUNOLOGIC: No hives or eczema  VASCULAR: no swelling, erythema  : hematuria      Physical Exam:  frail 81 yo  woman lying in semi Del Real's position, "I feel lousy"    Head: normocephalic, atraumatic    Eyes: PERRLA, EOMI, no nystagmus, sclera anicteric    ENT: nasal discharge, uvula midline, no oropharyngeal erythema/exudate    Neck: supple, negative JVD, negative carotid bruits, no thyromegaly    Chest: CTA bilaterally, neg wheeze/ rhonchi/ rales/ crackles/ egophany    Cardiovascular: regular rate and rhythm, neg murmurs/rubs/gallops    Abdomen: soft, non distended, non tender to palpation in all 4 quadrants, negative rebound/guarding, normal bowel sounds    Extremities: WWP, neg cyanosis/clubbing/edema, negative calf tenderness to palpation, negative Benita's sign    Musculoskeletal:      :     Neurologic Exam:    Alert and oriented to person, place, date/year, speech fluent w/o dysarthria, recent and remote memory intact, repetition intact, comprehension intact    Cranial Nerves:     II:                       pupils equal, round and reactive to light, visual fields intact   III/ IV/VI:            extraocular movements intact, neg nystagmus, neg ptosis  V:                       facial sensation intact, V1-3 normal  VII:                     face symmetric, no droop, normal eye closure and smile  VIII:                    hearing intact to finger rub bilaterally  IX/ X:                 soft palate rise symmetrical  XI:                      head turning, shoulder shrug normal  XII:                     tongue midline    Motor Exam:    Upper Extremities:     Right: no focal weakness    Left:   no focal weakness      Lower Extremities:    Right:   no focal weakness    Left :    no focal weakness                 Sensory:    intact to LT/PP in all UE/LE dermatomes                     DTR:            = biceps/     triceps/     brachioradialis                      = patella/   medial hamstring/ankle                      neg clonus                      neg Babinski                      Gait:  not tested        PM&R Impression:    1) deconditioned  2) no focal weakness    Plan:    1) Physical therapy focusing on therapeutic exercises, bed mobility/transfer out of bed evaluation, progressive ambulation with assistive devices prn.    2) Anticipated Disposition Plan/Recs:    subacute rehab placement

## 2020-07-03 NOTE — DISCHARGE NOTE PROVIDER - NSDCCPCAREPLAN_GEN_ALL_CORE_FT
PRINCIPAL DISCHARGE DIAGNOSIS  Diagnosis: Hematuria  Assessment and Plan of Treatment:       SECONDARY DISCHARGE DIAGNOSES  Diagnosis: Myeloproliferative disorder  Assessment and Plan of Treatment: PRINCIPAL DISCHARGE DIAGNOSIS  Diagnosis: Hematuria  Assessment and Plan of Treatment: You were admitted to the hospital when you were found to have blood in your urine. We also tested you for a urinary tract infection (UTI) which came back positive. Your urine cultures grew a type of bacteria called Enterococcus. While you were in the hospital, you were treated with      SECONDARY DISCHARGE DIAGNOSES  Diagnosis: Myeloproliferative disorder  Assessment and Plan of Treatment: PRINCIPAL DISCHARGE DIAGNOSIS  Diagnosis: Hematuria  Assessment and Plan of Treatment: You were admitted to the hospital when you were found to have blood in your urine. We also tested you for a urinary tract infection (UTI) which came back positive. Your urine cultures grew a type of bacteria called Enterococcus. While you were in the hospital, you were treated with IV antibiotics. We monitored your vitals and your labs every day to make sure you were getting better. Once you were ready for discharge, we changed your treatment to oral antibiotics for you to go home with and complete. Please follow up with your PCP.      SECONDARY DISCHARGE DIAGNOSES  Diagnosis: Myeloproliferative disorder  Assessment and Plan of Treatment: While you were in the hospital, we monitored your myeloproliferative disorder by following your labs closely everyday and making sure that your white blood cells stayed in their usual range. Please follow up with your hematologist/oncologist when you leave the hospital.

## 2020-07-03 NOTE — DISCHARGE NOTE PROVIDER - CARE PROVIDERS DIRECT ADDRESSES
[email or web address]

## 2020-07-03 NOTE — PROGRESS NOTE ADULT - ASSESSMENT
Assessment: 80F PMH HTN, myeloproliferative disorder, DVT/PE (2015, on xarelto), recurrent E. coli UTIs, hematuria, admitted with four days of "dark colored urine." The patient does not have gross hematuria at this time, but has microscopic hematuria on UA. Her CTU from 2019 shows a possible filling defect within the left upper tract. The patient has a leukocytosis and UA suggestive of infection. The patient has nonobstructive left nephrolithiasis seen on ultrasound from 01/2020.    Recommendations:  - CT urogram to rule out obstructing nephrolithiasis and to assess for upper tract disease  - Send urine for cytology  - Treatment of UTI per primary team  - Follow up urine culture  - Continue care per primary team  - No urologic surgery intervention at this time

## 2020-07-03 NOTE — CHART NOTE - NSCHARTNOTEFT_GEN_A_CORE
Admitting Diagnosis:   Patient is a 80y old  Female who presents with a chief complaint of     Consult: Yes [   ]  No [   ]    Reason for Initial Nutrition Assessment: LOS Nutrition Assessment    PAST MEDICAL & SURGICAL HISTORY:  Urinary tract infection  Myeloproliferative disorder  Hypertension  PE (pulmonary thromboembolism): 2015  Tremor  Vestibular disequilibrium  S/P wrist surgery  S/P hysterectomy    Current Nutrition Order:     PO Intake: Good (%) [   ]  Fair (50-75%) [   ] Poor (<25%) [   ]- Per pt, she refused breakfast this am.    GI Issues:   WDL, no n/v/d/c (monitor need for bowel regime as last BM was PTA)   No chewing or swallowing difficulties noted  No abd distention    Pain:  No pain noted at this time    Skin Integrity:  WDL, ecchymotic  Arya score 14  No edema present  No pressure ulcers noted    Labs:   07-03    142  |  104  |  14  ----------------------------<  103<H>  3.8   |  27  |  0.46<L>    Ca    9.9      03 Jul 2020 06:35  Mg     2.1     07-03    TPro  5.9<L>  /  Alb  3.7  /  TBili  0.9  /  DBili  x   /  AST  12  /  ALT  7<L>  /  AlkPhos  89  07-03    CAPILLARY BLOOD GLUCOSE    Nutritionally Pertinent Lab Values:    Medications:  MEDICATIONS  (STANDING):  amLODIPine   Tablet 5 milliGRAM(s) Oral every 24 hours  aspirin enteric coated 81 milliGRAM(s) Oral daily  atorvastatin 40 milliGRAM(s) Oral at bedtime  buPROPion . 75 milliGRAM(s) Oral daily  folic acid 1 milliGRAM(s) Oral daily  multivitamin 1 Tablet(s) Oral daily  rivaroxaban 20 milliGRAM(s) Oral with dinner    MEDICATIONS  (PRN):    Admitted Anthropometrics:    Weight:  Daily Height in cm: 162.56 (02 Jul 2020 13:06)    Daily     Weight Change:     Nutrition Focused Physical Exam: Completed [   ]  Unable to complete [   ]  Muscle Wasting:  Subcutaneous Fat Wasting:    Estimated energy needs:     Subjective:     Nutrition Diagnosis:    [  ] No active nutrition diagnosis at this time  [  ] Current medical condition precludes nutrition intervention    Goal:    Recommendations:    Education:     Risk Level: High [   ] Moderate [   ] Low [   ] Admitting Diagnosis:   Patient is a 80y old  Female who presents with a chief complaint of     Consult: Yes [   ]  No [ x  ]    Reason for Initial Nutrition Assessment Low BMI assessment    PAST MEDICAL & SURGICAL HISTORY:  Urinary tract infection  Myeloproliferative disorder  Hypertension  PE (pulmonary thromboembolism): 2015  Tremor  Vestibular disequilibrium  S/P wrist surgery  S/P hysterectomy    Current Nutrition Order: DASH/ TLC diet    PO Intake: Good (%) [   ]  Fair (50-75%) [   ] Poor (<25%) [   ]- Per pt, she refused breakfast this am.    GI Issues:   WDL, no n/v/d/c (monitor need for bowel regime as last BM was PTA)   No chewing or swallowing difficulties noted  No abd distention    Pain:  No pain noted at this time    Skin Integrity:  WDL, ecchymotic  Arya score 14  No edema present  No pressure ulcers noted    Labs:   07-03    142  |  104  |  14  ----------------------------<  103<H>  3.8   |  27  |  0.46<L>    Ca    9.9      03 Jul 2020 06:35  Mg     2.1     07-03    TPro  5.9<L>  /  Alb  3.7  /  TBili  0.9  /  DBili  x   /  AST  12  /  ALT  7<L>  /  AlkPhos  89  07-03    CAPILLARY BLOOD GLUCOSE    Nutritionally Pertinent Lab Values:    Medications:  MEDICATIONS  (STANDING):  amLODIPine   Tablet 5 milliGRAM(s) Oral every 24 hours  aspirin enteric coated 81 milliGRAM(s) Oral daily  atorvastatin 40 milliGRAM(s) Oral at bedtime  buPROPion . 75 milliGRAM(s) Oral daily  folic acid 1 milliGRAM(s) Oral daily  multivitamin 1 Tablet(s) Oral daily  rivaroxaban 20 milliGRAM(s) Oral with dinner    MEDICATIONS  (PRN):    Admitted Anthropometrics:  Height: 64" Weight: 80.4lbs, IBW 120lbs+/-10%, %IBW 67%, BMI 13.8 kg/m2    Weight:  1/16 105lbs (previous admission)  3/7 98lbs (previous admission)  7/2 80.4lbs  7/3 81.4lbs (complete with RN at bedside)    Weight Change: Per previous admissions, pt with a -24.6lb/ 23% weight loss over 7 months (significant). Please cont to trend weights biweekly.     Nutrition Focused Physical Exam: Completed [ x 7/3  ]  Unable to complete [   ]  Per physical assessment: Muscle Wasting- Temporal [ x-Mild  ]  Clavicle/Pectoral [ x-Moderate  ]  Shoulder/Deltoid [ x-Moderate  ]  Scapula [   ]  Interosseous [ X-Moderate to severe  ]  Quadriceps [ x-Severe  ]  Gastrocnemius [ X-Moderate  ]; Fat Wasting- Orbital [   ]  Buccal [   ]  Triceps [   ]  Rib [ x-Moderate   ]--> Suspect severe [PCM] 2/2 to physical assessment, suspect meeting <50% est needs for >3 months and -24.6lb/ 23% weight loss over 7 months (significant); please see malnutrition chart note.     Estimated energy needs:       Subjective:     Nutrition Diagnosis:    [  ] No active nutrition diagnosis at this time  [  ] Current medical condition precludes nutrition intervention    Goal:    Recommendations:    Education:     Risk Level: High [   ] Moderate [   ] Low [   ] Admitting Diagnosis:   Patient is a 80y old  Female who presents with a chief complaint of     Consult: Yes [   ]  No [ x  ]    Reason for Initial Nutrition Assessment Low BMI assessment    PAST MEDICAL & SURGICAL HISTORY:  Urinary tract infection  Myeloproliferative disorder  Hypertension  PE (pulmonary thromboembolism): 2015  Tremor  Vestibular disequilibrium  S/P wrist surgery  S/P hysterectomy    Current Nutrition Order: DASH/ TLC diet    PO Intake: Good (%) [   ]  Fair (50-75%) [   ] Poor (<25%) [   ]- Per pt, she refused breakfast this am.    GI Issues:   WDL, no n/v/d/c (monitor need for bowel regime as last BM was PTA)   No chewing or swallowing difficulties noted  No abd distention    Pain:  No pain noted at this time    Skin Integrity:  WDL, ecchymotic  Arya score 14  No edema present  No pressure ulcers noted    Labs:   07-03    142  |  104  |  14  ----------------------------<  103<H>  3.8   |  27  |  0.46<L>    Ca    9.9      03 Jul 2020 06:35  Mg     2.1     07-03    TPro  5.9<L>  /  Alb  3.7  /  TBili  0.9  /  DBili  x   /  AST  12  /  ALT  7<L>  /  AlkPhos  89  07-03    CAPILLARY BLOOD GLUCOSE    Nutritionally Pertinent Lab Values:    Medications:  MEDICATIONS  (STANDING):  amLODIPine   Tablet 5 milliGRAM(s) Oral every 24 hours  aspirin enteric coated 81 milliGRAM(s) Oral daily  atorvastatin 40 milliGRAM(s) Oral at bedtime  buPROPion . 75 milliGRAM(s) Oral daily  folic acid 1 milliGRAM(s) Oral daily  multivitamin 1 Tablet(s) Oral daily  rivaroxaban 20 milliGRAM(s) Oral with dinner    MEDICATIONS  (PRN):    Admitted Anthropometrics:  Height: 64" Weight: 80.4lbs, IBW 120lbs+/-10%, %IBW 67%, BMI 13.8 kg/m2    Weight:  1/16 105lbs (previous admission)  3/7 98lbs (previous admission)  7/2 80.4lbs  7/3 81.4lbs (complete with RN at bedside)    Weight Change: Per previous admissions, pt with a -24.6lb/ 23% weight loss over 7 months (significant). Please cont to trend weights biweekly.     Nutrition Focused Physical Exam: Completed [ x 7/3  ]  Unable to complete [   ]  Per physical assessment: Muscle Wasting- Temporal [ x-Mild  ]  Clavicle/Pectoral [ x-Moderate  ]  Shoulder/Deltoid [ x-Moderate  ]  Scapula [   ]  Interosseous [ X-Moderate to severe  ]  Quadriceps [ x-Severe  ]  Gastrocnemius [ X-Moderate  ]; Fat Wasting- Orbital [   ]  Buccal [   ]  Triceps [   ]  Rib [ x-Moderate   ]--> Suspect severe [PCM] 2/2 to physical assessment, suspect meeting <50% est needs for >3 months and -24.6lb/ 23% weight loss over 7 months (significant); please see malnutrition chart note.     Estimated energy needs:   IBW (54kg) used for calculations as pt <80% of IBW. Needs adjusted for advanced age and healthy weight gain.   Kcal (25-30 kcal/kg): 5473-5859 kcal  Pro (1.2-1.4 g/kg pro): 62-76  Fluid (30-35 ml/kg): 5606-5674 ml    Subjective:   80F PMH HTN, myeloproliferative disorder, DVT/PE (2015, on xarelto), recurrent E. coli UTIs, hematuria, admitted with four days of "dark colored urine." The patient does not have gross hematuria at this time, but has microscopic hematuria on UA. Her CTU from 2019 shows a possible filling defect within the left upper tract. The patient has a leukocytosis and UA suggestive of infection. Pt has been easily confusable since February and bedbound since January of this year. On assessment, pt is resting in bed comfortable. Currently on DASH/ TLC diet but refused to eat breakfast this am. Pt denies n/v/d/c. Pt noted that she has been bedbound since january but has had HHA to help provide food. Pt unable to provide a full 24 hour diet recall, but foods she mentions she consumes frequently are low energy dense foods (soups, salads, etc) and consuming only a few bites at each meal. Suspect pt is meeting <50% est needs for >3months. Per previous admissions, noted weights reveal a -24.6lb/ 23% weight loss over 7 months. NFPE was remarkable for mild to severe muscle and fat wasting. Per ASPEN guidelines, pt meets criteria for severe malnutrition- disc with team. Education provided on importance of adequate PO intake to prevent further weight loss and gain strength back- pt receptive. Please see nutrition recs below. RD to follow up per protocol.     Nutrition Diagnosis: Severe Malnutrition RT decreased appetite likely 2/2 decreased energy expentiure (bed bound) AEB physical assessment, suspect meeting <50% est needs for >3 months and -24.6lb/ 23% weight loss over 7 months (significant)    [  ] No active nutrition diagnosis at this time  [  ] Current medical condition precludes nutrition intervention    Goal: Consistently meet >75% est needs    Recommendations:  1. Cont with DASH/ TLC diet   2. Recommend add Ensure Enlive BID (700 kcal, 40g protein, 360 mL free H2O)  3. Cont with MVI and FA supplementations  4. COnt to encourage adequate PO intake when feasible  5. RD diet education reenforcement prn  6. Monitor lytes and replete prn    Education:  Education provided on importance of adequate PO intake to prevent further weight loss and gain strength back- pt receptive.    Risk Level: High [ x  ] Moderate [   ] Low [   ]

## 2020-07-03 NOTE — DISCHARGE NOTE PROVIDER - NSDCMRMEDTOKEN_GEN_ALL_CORE_FT
amLODIPine 5 mg oral tablet: 1 tab(s) orally once a day  Aspirin Enteric Coated 81 mg oral delayed release tablet: 1 tab(s) orally once a day  buPROPion 75 mg oral tablet: 1 tab(s) orally once a day  Crestor 10 mg oral tablet: 1 tab(s) orally once a day  folic acid 1 mg oral tablet: 1 tab(s) orally once a day  Multiple Vitamins oral tablet: 1 tab(s) orally once a day  Xarelto 20 mg oral tablet: 1 tab(s) orally once a day (in the evening) amLODIPine 5 mg oral tablet: 1 tab(s) orally once a day  amoxicillin-clavulanate 500 mg-125 mg oral tablet: 1 tab(s) orally 2 times a day  Aspirin Enteric Coated 81 mg oral delayed release tablet: 1 tab(s) orally once a day  atorvastatin 40 mg oral tablet: 1 tab(s) orally once a day (at bedtime)  Crestor 10 mg oral tablet: 1 tab(s) orally once a day  folic acid 1 mg oral tablet: 1 tab(s) orally once a day  Multiple Vitamins oral tablet: 1 tab(s) orally once a day  Xarelto 20 mg oral tablet: 1 tab(s) orally once a day (in the evening)

## 2020-07-03 NOTE — PROGRESS NOTE ADULT - SUBJECTIVE AND OBJECTIVE BOX
**** INCOMPLETE NOTE ********    INTERVAL HPI/OVERNIGHT EVENTS:    SUBJECTIVE:   Patient seen and examined at bedside.    OBJECTIVE:    VITAL SIGNS:  ICU Vital Signs Last 24 Hrs  T(C): 37.1 (2020 08:18), Max: 37.1 (2020 08:18)  T(F): 98.8 (2020 08:18), Max: 98.8 (2020 08:18)  HR: 79 (2020 08:18) (74 - 84)  BP: 161/92 (2020 08:18) (105/75 - 161/92)  BP(mean): --  ABP: --  ABP(mean): --  RR: 18 (2020 08:18) (18 - 20)  SpO2: 95% (2020 08:18) (93% - 95%)        07-02 @ 07:01  -  07-03 @ 07:00  --------------------------------------------------------  IN: 0 mL / OUT: 225 mL / NET: -225 mL      CAPILLARY BLOOD GLUCOSE          PHYSICAL EXAM:    General: NAD  HEENT: NC/AT; PERRL, clear conjunctiva  Neck: Supple.  Respiratory: CTA b/l. No wheezes, rhonchi, rales.  Cardiovascular: +S1/S2; RRR  Abdomen: soft, NT/ND; +BS x4  Extremities: WWP, 2+ peripheral pulses b/l; no LE edema  Skin: normal color and turgor; no rash  Neurological:     MEDICATIONS:  MEDICATIONS  (STANDING):  amLODIPine   Tablet 5 milliGRAM(s) Oral every 24 hours  aspirin enteric coated 81 milliGRAM(s) Oral daily  atorvastatin 40 milliGRAM(s) Oral at bedtime  buPROPion . 75 milliGRAM(s) Oral daily  folic acid 1 milliGRAM(s) Oral daily  multivitamin 1 Tablet(s) Oral daily  rivaroxaban 20 milliGRAM(s) Oral with dinner    MEDICATIONS  (PRN):      ALLERGIES:  Allergies    No Known Allergies    Intolerances        LABS:                        15.7   25.09 )-----------( 404      ( 2020 06:35 )             50.2     07-03    142  |  104  |  14  ----------------------------<  103<H>  3.8   |  27  |  0.46<L>    Ca    9.9      2020 06:35  Mg     2.1     -03    TPro  5.9<L>  /  Alb  3.7  /  TBili  0.9  /  DBili  x   /  AST  12  /  ALT  7<L>  /  AlkPhos  89  07-03    PT/INR - ( 2020 06:35 )   PT: 29.0 sec;   INR: 2.53          PTT - ( 2020 06:35 )  PTT:31.8 sec  Urinalysis Basic - ( 2020 08:00 )    Color: DARK YELLOW / Appearance: SL Cloudy / S.020 / pH: x  Gluc: x / Ketone: NEGATIVE  / Bili: Small / Urobili: 1.0 E.U./dL   Blood: x / Protein: >=300 mg/dL / Nitrite: POSITIVE   Leuk Esterase: Trace / RBC: Many /HPF / WBC < 5 /HPF   Sq Epi: x / Non Sq Epi: 0-5 /HPF / Bacteria: None /HPF        RADIOLOGY & ADDITIONAL TESTS: Reviewed. INTERVAL HPI/OVERNIGHT EVENTS:  No acute overnight events.     SUBJECTIVE:   Patient seen and examined at bedside. Patient not complaining of any pain at this time. She states some constipation but otherwise feels well. She denies fevers, chills. She denies burning or pain on urination.     OBJECTIVE:    VITAL SIGNS:  T(F): 98.8 (2020 08:18), Max: 98.8 (2020 08:18)  HR: 79 (2020 08:18) (74 - 84)  BP: 161/92 (2020 08:18) (105/75 - 161/92)  RR: 18 (2020 08:18) (18 - 20)  SpO2: 95% (2020 08:18) (93% - 95%)        07-02 @ 07:01  -  -03 @ 07:00  --------------------------------------------------------  IN: 0 mL / OUT: 225 mL / NET: -225 mL      CAPILLARY BLOOD GLUCOSE          PHYSICAL EXAM:    Constitutional: WDWN resting comfortably in bed; NAD  Head: NC/AT  Eyes: PERRL, EOMI, clear conjunctiva  ENT: no nasal discharge; uvula midline, no oropharyngeal erythema or exudates; MMM  Neck: supple; no JVD or thyromegaly  Respiratory: CTA B/L; no W/R/R  Cardiac: +S1/S2; RRR; no M/R/G  Gastrointestinal: soft, NT/ND; no rebound or guarding  Extremities: WWP, no clubbing or cyanosis; no peripheral edema  Neurologic: AAOx3; CNII-XII grossly intact    MEDICATIONS:  MEDICATIONS  (STANDING):  amLODIPine   Tablet 5 milliGRAM(s) Oral every 24 hours  aspirin enteric coated 81 milliGRAM(s) Oral daily  atorvastatin 40 milliGRAM(s) Oral at bedtime  buPROPion . 75 milliGRAM(s) Oral daily  folic acid 1 milliGRAM(s) Oral daily  multivitamin 1 Tablet(s) Oral daily  rivaroxaban 20 milliGRAM(s) Oral with dinner    MEDICATIONS  (PRN):      ALLERGIES:  Allergies    No Known Allergies    Intolerances        LABS:                        15.7   25.09 )-----------( 404      ( 2020 06:35 )             50.2     07-03    142  |  104  |  14  ----------------------------<  103<H>  3.8   |  27  |  0.46<L>    Ca    9.9      2020 06:35  Mg     2.1     07-03    TPro  5.9<L>  /  Alb  3.7  /  TBili  0.9  /  DBili  x   /  AST  12  /  ALT  7<L>  /  AlkPhos  89  07-03    PT/INR - ( 2020 06:35 )   PT: 29.0 sec;   INR: 2.53          PTT - ( 2020 06:35 )  PTT:31.8 sec  Urinalysis Basic - ( 2020 08:00 )    Color: DARK YELLOW / Appearance: SL Cloudy / S.020 / pH: x  Gluc: x / Ketone: NEGATIVE  / Bili: Small / Urobili: 1.0 E.U./dL   Blood: x / Protein: >=300 mg/dL / Nitrite: POSITIVE   Leuk Esterase: Trace / RBC: Many /HPF / WBC < 5 /HPF   Sq Epi: x / Non Sq Epi: 0-5 /HPF / Bacteria: None /HPF        RADIOLOGY & ADDITIONAL TESTS: Reviewed.

## 2020-07-04 LAB
CULTURE RESULTS: NO GROWTH — SIGNIFICANT CHANGE UP
SPECIMEN SOURCE: SIGNIFICANT CHANGE UP

## 2020-07-04 RX ORDER — CEFEPIME 1 G/1
1000 INJECTION, POWDER, FOR SOLUTION INTRAMUSCULAR; INTRAVENOUS EVERY 12 HOURS
Refills: 0 | Status: DISCONTINUED | OUTPATIENT
Start: 2020-07-04 | End: 2020-07-04

## 2020-07-04 RX ORDER — DAPTOMYCIN 500 MG/10ML
300 INJECTION, POWDER, LYOPHILIZED, FOR SOLUTION INTRAVENOUS ONCE
Refills: 0 | Status: COMPLETED | OUTPATIENT
Start: 2020-07-04 | End: 2020-07-04

## 2020-07-04 RX ORDER — CEFEPIME 1 G/1
1000 INJECTION, POWDER, FOR SOLUTION INTRAMUSCULAR; INTRAVENOUS EVERY 12 HOURS
Refills: 0 | Status: COMPLETED | OUTPATIENT
Start: 2020-07-04 | End: 2020-07-04

## 2020-07-04 RX ORDER — CEFEPIME 1 G/1
1000 INJECTION, POWDER, FOR SOLUTION INTRAMUSCULAR; INTRAVENOUS EVERY 12 HOURS
Refills: 0 | Status: DISCONTINUED | OUTPATIENT
Start: 2020-07-05 | End: 2020-07-05

## 2020-07-04 RX ORDER — CEFEPIME 1 G/1
INJECTION, POWDER, FOR SOLUTION INTRAMUSCULAR; INTRAVENOUS
Refills: 0 | Status: DISCONTINUED | OUTPATIENT
Start: 2020-07-04 | End: 2020-07-04

## 2020-07-04 RX ADMIN — BUPROPION HYDROCHLORIDE 75 MILLIGRAM(S): 150 TABLET, EXTENDED RELEASE ORAL at 12:32

## 2020-07-04 RX ADMIN — RIVAROXABAN 20 MILLIGRAM(S): KIT at 18:26

## 2020-07-04 RX ADMIN — CEFEPIME 100 MILLIGRAM(S): 1 INJECTION, POWDER, FOR SOLUTION INTRAMUSCULAR; INTRAVENOUS at 12:32

## 2020-07-04 RX ADMIN — AMLODIPINE BESYLATE 5 MILLIGRAM(S): 2.5 TABLET ORAL at 12:32

## 2020-07-04 RX ADMIN — ATORVASTATIN CALCIUM 40 MILLIGRAM(S): 80 TABLET, FILM COATED ORAL at 21:23

## 2020-07-04 RX ADMIN — Medication 81 MILLIGRAM(S): at 12:32

## 2020-07-04 RX ADMIN — SENNA PLUS 2 TABLET(S): 8.6 TABLET ORAL at 21:23

## 2020-07-04 RX ADMIN — Medication 1 MILLIGRAM(S): at 12:32

## 2020-07-04 RX ADMIN — POLYETHYLENE GLYCOL 3350 17 GRAM(S): 17 POWDER, FOR SOLUTION ORAL at 12:32

## 2020-07-04 RX ADMIN — DAPTOMYCIN 112 MILLIGRAM(S): 500 INJECTION, POWDER, LYOPHILIZED, FOR SOLUTION INTRAVENOUS at 21:23

## 2020-07-04 RX ADMIN — CEFEPIME 100 MILLIGRAM(S): 1 INJECTION, POWDER, FOR SOLUTION INTRAMUSCULAR; INTRAVENOUS at 23:51

## 2020-07-04 RX ADMIN — Medication 1 TABLET(S): at 12:32

## 2020-07-04 NOTE — PROGRESS NOTE ADULT - SUBJECTIVE AND OBJECTIVE BOX
Pt seen and examined   urine cxs positive  CT noted aneursysm and cakculi  denies duysuria    REVIEW OF SYSTEMS:  Constitutional: No fever,   Cardiovascular: No chest pain, palpitations, dizziness or leg swelling  Gastrointestinal: No abdominal or epigastric pain. No nausea, vomiting or hematemesis; No diarrhea   Skin: No itching, burning, rashes or lesions       MEDICATIONS:  MEDICATIONS  (STANDING):  amLODIPine   Tablet 5 milliGRAM(s) Oral every 24 hours  aspirin enteric coated 81 milliGRAM(s) Oral daily  atorvastatin 40 milliGRAM(s) Oral at bedtime  buPROPion . 75 milliGRAM(s) Oral daily  cefepime   IVPB 1000 milliGRAM(s) IV Intermittent every 12 hours  cefepime   IVPB 1000 milliGRAM(s) IV Intermittent every 12 hours  folic acid 1 milliGRAM(s) Oral daily  multivitamin 1 Tablet(s) Oral daily  polyethylene glycol 3350 17 Gram(s) Oral daily  rivaroxaban 20 milliGRAM(s) Oral with dinner  senna 2 Tablet(s) Oral at bedtime    MEDICATIONS  (PRN):      Allergies    No Known Allergies    Intolerances        Vital Signs Last 24 Hrs  T(C): 36.7 (04 Jul 2020 08:56), Max: 37.1 (03 Jul 2020 20:50)  T(F): 98 (04 Jul 2020 08:56), Max: 98.7 (03 Jul 2020 20:50)  HR: 84 (04 Jul 2020 08:56) (73 - 88)  BP: 132/88 (04 Jul 2020 08:56) (117/79 - 147/81)  BP(mean): --  RR: 17 (04 Jul 2020 08:56) (16 - 18)  SpO2: 93% (04 Jul 2020 08:56) (93% - 95%)      PHYSICAL EXAM:    General: thin in no acute distress  HEENT: MMM, conjunctiva and sclera clear  Lungs: clear  Heart: regular  Gastrointestinal: Soft non-tender non-distended; Normal bowel sounds;   Skin: Warm and dry. No obvious rash    LABS:      CBC Full  -  ( 03 Jul 2020 06:35 )  WBC Count : 25.09 K/uL  RBC Count : 6.00 M/uL  Hemoglobin : 15.7 g/dL  Hematocrit : 50.2 %  Platelet Count - Automated : 404 K/uL  Mean Cell Volume : 83.7 fl  Mean Cell Hemoglobin : 26.2 pg  Mean Cell Hemoglobin Concentration : 31.3 gm/dL  Auto Neutrophil # : 22.21 K/uL  Auto Lymphocyte # : 1.53 K/uL  Auto Monocyte # : 0.91 K/uL  Auto Eosinophil # : 0.37 K/uL  Auto Basophil # : 0.34 K/uL  Auto Neutrophil % : 86.4 %  Auto Lymphocyte % : 6.0 %  Auto Monocyte % : 3.5 %  Auto Eosinophil % : 1.4 %  Auto Basophil % : 1.3 %    07-03    142  |  104  |  14  ----------------------------<  103<H>  3.8   |  27  |  0.46<L>    Ca    9.9      03 Jul 2020 06:35  Mg     2.1     07-03    TPro  5.9<L>  /  Alb  3.7  /  TBili  0.9  /  DBili  x   /  AST  12  /  ALT  7<L>  /  AlkPhos  89  07-03    PT/INR - ( 03 Jul 2020 06:35 )   PT: 29.0 sec;   INR: 2.53          PTT - ( 03 Jul 2020 06:35 )  PTT:31.8 sec                  RADIOLOGY & ADDITIONAL STUDIES (The following images were personally reviewed):

## 2020-07-04 NOTE — CONSULT NOTE ADULT - ASSESSMENT
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  As in the meantime, Urine culture growing not only GNR but also Enterococci, please give 1 dose of Daptomycin 300 mg   Nutritional support  Goals of care discussions

## 2020-07-04 NOTE — PROGRESS NOTE ADULT - SUBJECTIVE AND OBJECTIVE BOX
INTERVAL HPI/OVERNIGHT EVENTS:      SUBJECTIVE:   Patient seen and examined at bedside.    OBJECTIVE:    VITAL SIGNS:  ICU Vital Signs Last 24 Hrs  T(C): 36.7 (04 Jul 2020 08:56), Max: 37.1 (03 Jul 2020 20:50)  T(F): 98 (04 Jul 2020 08:56), Max: 98.7 (03 Jul 2020 20:50)  HR: 84 (04 Jul 2020 08:56) (73 - 88)  BP: 132/88 (04 Jul 2020 08:56) (117/79 - 147/81)  BP(mean): --  ABP: --  ABP(mean): --  RR: 17 (04 Jul 2020 08:56) (16 - 18)  SpO2: 93% (04 Jul 2020 08:56) (93% - 95%)        CAPILLARY BLOOD GLUCOSE          PHYSICAL EXAM:    General: NAD  HEENT: NC/AT; PERRL, clear conjunctiva  Neck: Supple.  Respiratory: CTA b/l. No wheezes, rhonchi, rales.  Cardiovascular: +S1/S2; RRR  Abdomen: soft, NT/ND; +BS x4  Extremities: WWP, 2+ peripheral pulses b/l; no LE edema  Skin: normal color and turgor; no rash  Neurological:     MEDICATIONS:  MEDICATIONS  (STANDING):  amLODIPine   Tablet 5 milliGRAM(s) Oral every 24 hours  aspirin enteric coated 81 milliGRAM(s) Oral daily  atorvastatin 40 milliGRAM(s) Oral at bedtime  buPROPion . 75 milliGRAM(s) Oral daily  cefepime   IVPB 1000 milliGRAM(s) IV Intermittent every 12 hours  folic acid 1 milliGRAM(s) Oral daily  multivitamin 1 Tablet(s) Oral daily  polyethylene glycol 3350 17 Gram(s) Oral daily  rivaroxaban 20 milliGRAM(s) Oral with dinner  senna 2 Tablet(s) Oral at bedtime    MEDICATIONS  (PRN):      ALLERGIES:  Allergies    No Known Allergies    Intolerances        LABS:                        15.7   25.09 )-----------( 404      ( 03 Jul 2020 06:35 )             50.2     07-03    142  |  104  |  14  ----------------------------<  103<H>  3.8   |  27  |  0.46<L>    Ca    9.9      03 Jul 2020 06:35  Mg     2.1     07-03    TPro  5.9<L>  /  Alb  3.7  /  TBili  0.9  /  DBili  x   /  AST  12  /  ALT  7<L>  /  AlkPhos  89  07-03    PT/INR - ( 03 Jul 2020 06:35 )   PT: 29.0 sec;   INR: 2.53          PTT - ( 03 Jul 2020 06:35 )  PTT:31.8 sec      RADIOLOGY & ADDITIONAL TESTS: Reviewed. INTERVAL HPI/OVERNIGHT EVENTS:  No acute overnight overnight events.    SUBJECTIVE:   Patient seen and examined at bedside. Patient not com    OBJECTIVE:    VITAL SIGNS:  ICU Vital Signs Last 24 Hrs  T(C): 36.7 (04 Jul 2020 08:56), Max: 37.1 (03 Jul 2020 20:50)  T(F): 98 (04 Jul 2020 08:56), Max: 98.7 (03 Jul 2020 20:50)  HR: 84 (04 Jul 2020 08:56) (73 - 88)  BP: 132/88 (04 Jul 2020 08:56) (117/79 - 147/81)  BP(mean): --  ABP: --  ABP(mean): --  RR: 17 (04 Jul 2020 08:56) (16 - 18)  SpO2: 93% (04 Jul 2020 08:56) (93% - 95%)        CAPILLARY BLOOD GLUCOSE          PHYSICAL EXAM:    General: NAD  HEENT: NC/AT; PERRL, clear conjunctiva  Neck: Supple.  Respiratory: CTA b/l. No wheezes, rhonchi, rales.  Cardiovascular: +S1/S2; RRR  Abdomen: soft, NT/ND; +BS x4  Extremities: WWP, 2+ peripheral pulses b/l; no LE edema  Skin: normal color and turgor; no rash  Neurological:     MEDICATIONS:  MEDICATIONS  (STANDING):  amLODIPine   Tablet 5 milliGRAM(s) Oral every 24 hours  aspirin enteric coated 81 milliGRAM(s) Oral daily  atorvastatin 40 milliGRAM(s) Oral at bedtime  buPROPion . 75 milliGRAM(s) Oral daily  cefepime   IVPB 1000 milliGRAM(s) IV Intermittent every 12 hours  folic acid 1 milliGRAM(s) Oral daily  multivitamin 1 Tablet(s) Oral daily  polyethylene glycol 3350 17 Gram(s) Oral daily  rivaroxaban 20 milliGRAM(s) Oral with dinner  senna 2 Tablet(s) Oral at bedtime    MEDICATIONS  (PRN):      ALLERGIES:  Allergies    No Known Allergies    Intolerances        LABS:                        15.7   25.09 )-----------( 404      ( 03 Jul 2020 06:35 )             50.2     07-03    142  |  104  |  14  ----------------------------<  103<H>  3.8   |  27  |  0.46<L>    Ca    9.9      03 Jul 2020 06:35  Mg     2.1     07-03    TPro  5.9<L>  /  Alb  3.7  /  TBili  0.9  /  DBili  x   /  AST  12  /  ALT  7<L>  /  AlkPhos  89  07-03    PT/INR - ( 03 Jul 2020 06:35 )   PT: 29.0 sec;   INR: 2.53          PTT - ( 03 Jul 2020 06:35 )  PTT:31.8 sec      RADIOLOGY & ADDITIONAL TESTS: Reviewed. INTERVAL HPI/OVERNIGHT EVENTS:  No acute overnight overnight events.    SUBJECTIVE:   Patient seen and examined at bedside. Patient not complaining of any new symptoms. No fevers, chills, SOB, nausea, vomiting, dysuria, diarrhea. ROS otherwise negative.    Of note, urine cx from 7/3 growing >100k gram neg rods and >100k enterococcus.    OBJECTIVE:    VITAL SIGNS:  T(F): 98 (04 Jul 2020 08:56), Max: 98.7 (03 Jul 2020 20:50)  HR: 84 (04 Jul 2020 08:56) (73 - 88)  BP: 132/88 (04 Jul 2020 08:56) (117/79 - 147/81)  RR: 17 (04 Jul 2020 08:56) (16 - 18)  SpO2: 93% (04 Jul 2020 08:56) (93% - 95%)        CAPILLARY BLOOD GLUCOSE          PHYSICAL EXAM:    General: NAD  HEENT: NC/AT; PERRL, clear conjunctiva  Neck: Supple.  Respiratory: CTA b/l. No wheezes, rhonchi, rales.  Cardiovascular: +S1/S2; RRR  Abdomen: soft, NT/ND; +BS x4  Extremities: WWP, 2+ peripheral pulses b/l; no LE edema  Skin: normal color and turgor; no rash    MEDICATIONS:  MEDICATIONS  (STANDING):  amLODIPine   Tablet 5 milliGRAM(s) Oral every 24 hours  aspirin enteric coated 81 milliGRAM(s) Oral daily  atorvastatin 40 milliGRAM(s) Oral at bedtime  buPROPion . 75 milliGRAM(s) Oral daily  cefepime   IVPB 1000 milliGRAM(s) IV Intermittent every 12 hours  folic acid 1 milliGRAM(s) Oral daily  multivitamin 1 Tablet(s) Oral daily  polyethylene glycol 3350 17 Gram(s) Oral daily  rivaroxaban 20 milliGRAM(s) Oral with dinner  senna 2 Tablet(s) Oral at bedtime    MEDICATIONS  (PRN):      ALLERGIES:  Allergies    No Known Allergies    Intolerances        LABS:                        15.7   25.09 )-----------( 404      ( 03 Jul 2020 06:35 )             50.2     07-03    142  |  104  |  14  ----------------------------<  103<H>  3.8   |  27  |  0.46<L>    Ca    9.9      03 Jul 2020 06:35  Mg     2.1     07-03    TPro  5.9<L>  /  Alb  3.7  /  TBili  0.9  /  DBili  x   /  AST  12  /  ALT  7<L>  /  AlkPhos  89  07-03    PT/INR - ( 03 Jul 2020 06:35 )   PT: 29.0 sec;   INR: 2.53          PTT - ( 03 Jul 2020 06:35 )  PTT:31.8 sec      RADIOLOGY & ADDITIONAL TESTS: Reviewed.

## 2020-07-04 NOTE — PROGRESS NOTE ADULT - SUBJECTIVE AND OBJECTIVE BOX
SUBJECTIVE: SUBJECTIVE: Denies any flank pain, fevers/chills      OBJECTIVE:    Vital Signs:  Vital Signs Last 24 Hrs  T(C): 36.7 (2020 05:00), Max: 37.1 (2020 08:18)  T(F): 98 (2020 05:00), Max: 98.8 (2020 08:18)  HR: 73 (2020 05:00) (73 - 88)  BP: 117/79 (2020 05:00) (117/79 - 161/92)  BP(mean): --  RR: 16 (2020 05:00) (16 - 18)  SpO2: 95% (2020 05:00) (94% - 95%)    Physical Exam:  General: NAD  Pulmonary: Nonlabored breathing, no respiratory distress  Abdominal: No suprapubic tenderness  : Primafit with fruit punch colored urine; No CVA tenderness bilaterally    Ins and Outs:  I&O's Summary      Labs:                        15.7   25.09 )-----------( 404      ( 2020 06:35 )             50.2     07-03    142  |  104  |  14  ----------------------------<  103<H>  3.8   |  27  |  0.46<L>    Ca    9.9      2020 06:35  Mg     2.1     07-03    TPro  5.9<L>  /  Alb  3.7  /  TBili  0.9  /  DBili  x   /  AST  12  /  ALT  7<L>  /  AlkPhos  89  07-03    PT/INR - ( 2020 06:35 )   PT: 29.0 sec;   INR: 2.53          PTT - ( 2020 06:35 )  PTT:31.8 sec  Urinalysis Basic - ( 2020 08:00 )    Color: DARK YELLOW / Appearance: SL Cloudy / S.020 / pH: x  Gluc: x / Ketone: NEGATIVE  / Bili: Small / Urobili: 1.0 E.U./dL   Blood: x / Protein: >=300 mg/dL / Nitrite: POSITIVE   Leuk Esterase: Trace / RBC: Many /HPF / WBC < 5 /HPF   Sq Epi: x / Non Sq Epi: 0-5 /HPF / Bacteria: None /HPF      CAPILLARY BLOOD GLUCOSE        LIVER FUNCTIONS - ( 2020 06:35 )  Alb: 3.7 g/dL / Pro: 5.9 g/dL / ALK PHOS: 89 U/L / ALT: 7 U/L / AST: 12 U/L / GGT: x

## 2020-07-04 NOTE — PROGRESS NOTE ADULT - ASSESSMENT
Assessment: 80F PMH HTN, myeloproliferative disorder, DVT/PE (2015, on xarelto), recurrent E. coli UTIs, hematuria, admitted with four days of "dark colored urine." The patient does not have gross hematuria at this time, but has microscopic hematuria on UA. Her CTU from 2019 shows a possible filling defect within the left upper tract. The patient has a leukocytosis and UA suggestive of infection, although her urine culture has no growth. The patient has nonobstructive left nephrolithiasis seen on ultrasound from 01/2020, and a nonobstructive L staghorn calculus is seen on her NCCT 7/03/2020.    Recommendations:  - CT urogram to rule out obstructing nephrolithiasis and to assess for upper tract disease  - Send urine for cytology  - Treatment of UTI per primary team  - Follow up urine culture  - Continue care per primary team  - No urologic surgery intervention at this time  - Follow up with Dr. Nava within 2 weeks of discharge to discuss management of staghorn calculus

## 2020-07-04 NOTE — CONSULT NOTE ADULT - SUBJECTIVE AND OBJECTIVE BOX
HPI:  Patient is an 80-year-old F with a PMH of HTN, myeloproliferative disorder, DVT/PE (2015, on xarelto), chronic UTIs and hydronephrosis admitted in Jan for multi-drug resistant UTI, worsening of her myeloproliferative d/o, and FTT, discharged to rehab at that time, who presents with four days of "dark colored urine." She is incontinent at baseline but denies any dysuria, abdominal pain, flank pain, fever/chills, chest pain, N/V/D, cough, or SOB. She states that she "feels lousy" but that this is her baseline. Per her hcp/poa, she has been easily confusable since February and bedbound since January of this year. The patient's hcp/poa also notes that her current symptoms are similar to those that prompted admission in January. The patient had bloodwork drawn yesterday for Dr. Tapia, who prompted her to come to the ED today. Per Dr. Tapia, her hha is unable to care for her adequately and she needs placement.     ED course:   Vitals: T: 98.7, HR: 82, RR: 18, BP: 166/104, SpO2: 94%  Patient received 40 mEq potassium chloride in ED. (02 Jul 2020 09:24)      PAST MEDICAL & SURGICAL HISTORY:  Urinary tract infection  Myeloproliferative disorder  Hypertension  PE (pulmonary thromboembolism): 2015  Tremor  Vestibular disequilibrium  S/P wrist surgery  S/P hysterectomy      REVIEW OF SYSTEMS      General:	    Skin/Breast:  	  Ophthalmologic:  	  ENMT:	    Respiratory and Thorax:  	  Cardiovascular:	    Gastrointestinal:	    Genitourinary:	    Musculoskeletal:	    Neurological:	    Psychiatric:	    Hematology/Lymphatics:	    Endocrine:	    Allergic/Immunologic:	    MEDICATIONS  (STANDING):  amLODIPine   Tablet 5 milliGRAM(s) Oral every 24 hours  aspirin enteric coated 81 milliGRAM(s) Oral daily  atorvastatin 40 milliGRAM(s) Oral at bedtime  buPROPion . 75 milliGRAM(s) Oral daily  cefepime   IVPB 1000 milliGRAM(s) IV Intermittent every 12 hours  folic acid 1 milliGRAM(s) Oral daily  multivitamin 1 Tablet(s) Oral daily  polyethylene glycol 3350 17 Gram(s) Oral daily  rivaroxaban 20 milliGRAM(s) Oral with dinner  senna 2 Tablet(s) Oral at bedtime    MEDICATIONS  (PRN):      Allergies    No Known Allergies    Intolerances        SOCIAL HISTORY:    FAMILY HISTORY:  No pertinent family history in first degree relatives      Vital Signs Last 24 Hrs  T(C): 36.3 (04 Jul 2020 16:01), Max: 37.1 (03 Jul 2020 20:50)  T(F): 97.3 (04 Jul 2020 16:01), Max: 98.7 (03 Jul 2020 20:50)  HR: 91 (04 Jul 2020 16:01) (73 - 92)  BP: 124/86 (04 Jul 2020 16:01) (117/79 - 147/81)  BP(mean): --  RR: 18 (04 Jul 2020 16:01) (16 - 18)  SpO2: 94% (04 Jul 2020 16:01) (93% - 95%)    PHYSICAL EXAM:      Constitutional:    Eyes:    ENMT:    Neck:    Breasts:    Back:    Respiratory:    Cardiovascular:    Gastrointestinal:    Genitourinary:    Rectal:    Extremities:    Vascular:    Neurological:    Skin:    Lymph Nodes:    Musculoskeletal:    Psychiatric:        LABS:                        15.7   25.09 )-----------( 404      ( 03 Jul 2020 06:35 )             50.2   Culture - Urine (07.02.20 @ 10:45)    Specimen Source: .Urine Catheterized    Culture Results:   No growth      07-03    142  |  104  |  14  ----------------------------<  103<H>  3.8   |  27  |  0.46<L>    Ca    9.9      03 Jul 2020 06:35  Mg     2.1     07-03    TPro  5.9<L>  /  Alb  3.7  /  TBili  0.9  /  DBili  x   /  AST  12  /  ALT  7<L>  /  AlkPhos  89  07-03    PT/INR - ( 03 Jul 2020 06:35 )   PT: 29.0 sec;   INR: 2.53          PTT - ( 03 Jul 2020 06:35 )  PTT:31.8 sec        Culture - Urine (07.03.20 @ 05:28)    Specimen Source: .Urine Clean Catch (Midstream)    Culture Results:   >100,000 CFU/ml Gram Negative Rods  >100,000 CFU/ml Enterococcus species  Identification and susceptibility to follow.          RADIOLOGY & ADDITIONAL STUDIES:  CT Abdomen and Pelvis No Cont (07.03.20 @ 18:44) >    EXAM:  CT ABDOMEN AND PELVIS                          PROCEDURE DATE:  07/03/2020          INTERPRETATION:  CT SCAN OF ABDOMEN AND PELVIS    HISTORY: Rule out obstructing nephrolithiasis. Concern for malignancy. Hematuria.    TECHNIQUE: CT scan of the abdomen and pelvis performed from lung bases through symphysis pubis. No contrast administered as per patient request, renal stone protocol utilized.  Axial, coronal, and sagittal reformatted images and coronal MIPS were produced.    Comparison: CT of the abdomen and pelvis dated 3/8/2019 and additional studies dating back to 7/29/2011    Findings: As on the previous examination there are nodular opacities in the lower lobes linear atelectasis and bronchiectasis, which has increased in the right lung base on the prior study. Bilobed nodule in the left lung base is again observed.    Hypoattenuating foci in the liver are again noted. There is a layering hyperdense material within the gallbladder which may represent calculi or sludge. No gallbladder wall thickening or pericholecystic fluid is identified. No biliary ductal dilatation is present.    Calcifications are again noted in the pancreas. Previously reported lesion in the body of the pancreas is less conspicuous in the absence of intravenous contrast.    Mild left hydronephrosis secondary to staghorn calculus in the left renal collecting system. No hydroureter. Hypoattenuating foci are present in the right kidney, compatible with cysts, better appreciated on the previous examination. No right-sided hydronephrosis or hydroureter is present.    Layering density in the posterior bladder which may represent sediment from renal stone. There is mild bladder wall thickening, correlation with urinalysis is recommended. There are small bilateral diverticuli which may be secondary to chronic bladder outlet obstruction.     Small hiatal hernia is unchanged. Increased fecal distention of the rectum now measuring 8.0 cm. There is mild rectal wall thickening and surrounding inflammatory change which may represent relates to stercoral colitis.     There are 2 abdominal aortic aneurysms, the 3.0 cm aneurysm just inferior to the renal arteries is unchanged since prior study dated 3/20/2019. The 4.8 cm aneurysm just superior to the iliac bifurcations is increased in size since previous study (previously 4.1 cm).    Multilevel degenerative disease is present in the spine. There is osteoarthritis of the hips..    Impression: Findings consistent with left renal calculus and mild hydronephrosis. A layering density in the bladder may represent sediment from renal calculi.    Tree-in-bud nodules in left lower lobe nodule similar to the prior study.            < end of copied text > HPI:  Patient is an 80-year-old F with a PMH of HTN, myeloproliferative disorder, DVT/PE (2015, on xarelto), chronic UTIs and hydronephrosis admitted in Jan for multi-drug resistant UTI, worsening of her myeloproliferative d/o, and FTT, discharged to rehab at that time, who presents with four days of "dark colored urine." She is incontinent at baseline but denies any dysuria, abdominal pain, flank pain, fever/chills, chest pain, N/V/D, cough, or SOB. She states that she "feels lousy" but that this is her baseline. Per her hcp/poa, she has been easily confusable since February and bedbound since January of this year. The patient's hcp/poa also notes that her current symptoms are similar to those that prompted admission in January. The patient had bloodwork drawn yesterday for Dr. Tapia, who prompted her to come to the ED today. Per Dr. Tapia, her hha is unable to care for her adequately and she needs placement.     ED course:   Vitals: T: 98.7, HR: 82, RR: 18, BP: 166/104, SpO2: 94%  Patient received 40 mEq potassium chloride in ED. (02 Jul 2020 09:24)      PAST MEDICAL & SURGICAL HISTORY:  Urinary tract infection  Myeloproliferative disorder  Hypertension  PE (pulmonary thromboembolism): 2015  Tremor  Vestibular disequilibrium  S/P wrist surgery  S/P hysterectomy      REVIEW OF SYSTEMS  Otherwise negative        MEDICATIONS  (STANDING):  amLODIPine   Tablet 5 milliGRAM(s) Oral every 24 hours  aspirin enteric coated 81 milliGRAM(s) Oral daily  atorvastatin 40 milliGRAM(s) Oral at bedtime  buPROPion . 75 milliGRAM(s) Oral daily  cefepime   IVPB 1000 milliGRAM(s) IV Intermittent every 12 hours  folic acid 1 milliGRAM(s) Oral daily  multivitamin 1 Tablet(s) Oral daily  polyethylene glycol 3350 17 Gram(s) Oral daily  rivaroxaban 20 milliGRAM(s) Oral with dinner  senna 2 Tablet(s) Oral at bedtime      Allergies    No Known Allergies      SOCIAL HISTORY:  Lives at home  Largely bedbound now    FAMILY HISTORY:  No pertinent family history in first degree relatives      Vital Signs Last 24 Hrs  T(C): 36.3 (04 Jul 2020 16:01), Max: 37.1 (03 Jul 2020 20:50)  T(F): 97.3 (04 Jul 2020 16:01), Max: 98.7 (03 Jul 2020 20:50)  HR: 91 (04 Jul 2020 16:01) (73 - 92)  BP: 124/86 (04 Jul 2020 16:01) (117/79 - 147/81)  BP(mean): --  RR: 18 (04 Jul 2020 16:01) (16 - 18)  SpO2: 94% (04 Jul 2020 16:01) (93% - 95%)  Very frail appearing  Responding to questions slowly  Pale  No rash  RR  Chest with poor inspiratory effort  Abd soft NT  No edema        LABS:                        15.7   25.09 )-----------( 404      ( 03 Jul 2020 06:35 )             50.2       07-03    142  |  104  |  14  ----------------------------<  103<H>  3.8   |  27  |  0.46<L>    Ca    9.9      03 Jul 2020 06:35  Mg     2.1     07-03    TPro  5.9<L>  /  Alb  3.7  /  TBili  0.9  /  DBili  x   /  AST  12  /  ALT  7<L>  /  AlkPhos  89  07-03    PT/INR - ( 03 Jul 2020 06:35 )   PT: 29.0 sec;   INR: 2.53          PTT - ( 03 Jul 2020 06:35 )  PTT:31.8 sec        Culture - Urine (07.03.20 @ 05:28)    Specimen Source: .Urine Clean Catch (Midstream)    Culture Results:   >100,000 CFU/ml Gram Negative Rods  >100,000 CFU/ml Enterococcus species  Identification and susceptibility to follow.          RADIOLOGY & ADDITIONAL STUDIES:  CT Abdomen and Pelvis No Cont (07.03.20 @ 18:44) >    EXAM:  CT ABDOMEN AND PELVIS                          PROCEDURE DATE:  07/03/2020          INTERPRETATION:  CT SCAN OF ABDOMEN AND PELVIS    HISTORY: Rule out obstructing nephrolithiasis. Concern for malignancy. Hematuria.    TECHNIQUE: CT scan of the abdomen and pelvis performed from lung bases through symphysis pubis. No contrast administered as per patient request, renal stone protocol utilized.  Axial, coronal, and sagittal reformatted images and coronal MIPS were produced.    Comparison: CT of the abdomen and pelvis dated 3/8/2019 and additional studies dating back to 7/29/2011    Findings: As on the previous examination there are nodular opacities in the lower lobes linear atelectasis and bronchiectasis, which has increased in the right lung base on the prior study. Bilobed nodule in the left lung base is again observed.    Hypoattenuating foci in the liver are again noted. There is a layering hyperdense material within the gallbladder which may represent calculi or sludge. No gallbladder wall thickening or pericholecystic fluid is identified. No biliary ductal dilatation is present.    Calcifications are again noted in the pancreas. Previously reported lesion in the body of the pancreas is less conspicuous in the absence of intravenous contrast.    Mild left hydronephrosis secondary to staghorn calculus in the left renal collecting system. No hydroureter. Hypoattenuating foci are present in the right kidney, compatible with cysts, better appreciated on the previous examination. No right-sided hydronephrosis or hydroureter is present.    Layering density in the posterior bladder which may represent sediment from renal stone. There is mild bladder wall thickening, correlation with urinalysis is recommended. There are small bilateral diverticuli which may be secondary to chronic bladder outlet obstruction.     Small hiatal hernia is unchanged. Increased fecal distention of the rectum now measuring 8.0 cm. There is mild rectal wall thickening and surrounding inflammatory change which may represent relates to stercoral colitis.     There are 2 abdominal aortic aneurysms, the 3.0 cm aneurysm just inferior to the renal arteries is unchanged since prior study dated 3/20/2019. The 4.8 cm aneurysm just superior to the iliac bifurcations is increased in size since previous study (previously 4.1 cm).    Multilevel degenerative disease is present in the spine. There is osteoarthritis of the hips..    Impression: Findings consistent with left renal calculus and mild hydronephrosis. A layering density in the bladder may represent sediment from renal calculi.    Tree-in-bud nodules in left lower lobe nodule similar to the prior study.

## 2020-07-05 LAB
-  AMPICILLIN/SULBACTAM: SIGNIFICANT CHANGE UP
-  AMPICILLIN: SIGNIFICANT CHANGE UP
-  AMPICILLIN: SIGNIFICANT CHANGE UP
-  CEFAZOLIN: SIGNIFICANT CHANGE UP
-  CEFTRIAXONE: SIGNIFICANT CHANGE UP
-  CIPROFLOXACIN: SIGNIFICANT CHANGE UP
-  CIPROFLOXACIN: SIGNIFICANT CHANGE UP
-  GENTAMICIN: SIGNIFICANT CHANGE UP
-  LEVOFLOXACIN: SIGNIFICANT CHANGE UP
-  NITROFURANTOIN: SIGNIFICANT CHANGE UP
-  NITROFURANTOIN: SIGNIFICANT CHANGE UP
-  PIPERACILLIN/TAZOBACTAM: SIGNIFICANT CHANGE UP
-  TETRACYCLINE: SIGNIFICANT CHANGE UP
-  TOBRAMYCIN: SIGNIFICANT CHANGE UP
-  TRIMETHOPRIM/SULFAMETHOXAZOLE: SIGNIFICANT CHANGE UP
-  VANCOMYCIN: SIGNIFICANT CHANGE UP
CULTURE RESULTS: SIGNIFICANT CHANGE UP
METHOD TYPE: SIGNIFICANT CHANGE UP
METHOD TYPE: SIGNIFICANT CHANGE UP
ORGANISM # SPEC MICROSCOPIC CNT: SIGNIFICANT CHANGE UP
SPECIMEN SOURCE: SIGNIFICANT CHANGE UP

## 2020-07-05 RX ORDER — AMPICILLIN TRIHYDRATE 250 MG
2000 CAPSULE ORAL EVERY 8 HOURS
Refills: 0 | Status: DISCONTINUED | OUTPATIENT
Start: 2020-07-05 | End: 2020-07-06

## 2020-07-05 RX ADMIN — Medication 1 TABLET(S): at 11:03

## 2020-07-05 RX ADMIN — BUPROPION HYDROCHLORIDE 75 MILLIGRAM(S): 150 TABLET, EXTENDED RELEASE ORAL at 11:03

## 2020-07-05 RX ADMIN — CEFEPIME 100 MILLIGRAM(S): 1 INJECTION, POWDER, FOR SOLUTION INTRAMUSCULAR; INTRAVENOUS at 11:02

## 2020-07-05 RX ADMIN — AMLODIPINE BESYLATE 5 MILLIGRAM(S): 2.5 TABLET ORAL at 11:05

## 2020-07-05 RX ADMIN — Medication 1 MILLIGRAM(S): at 11:03

## 2020-07-05 RX ADMIN — Medication 81 MILLIGRAM(S): at 11:03

## 2020-07-05 RX ADMIN — SENNA PLUS 2 TABLET(S): 8.6 TABLET ORAL at 21:45

## 2020-07-05 RX ADMIN — Medication 216 MILLIGRAM(S): at 21:45

## 2020-07-05 RX ADMIN — RIVAROXABAN 20 MILLIGRAM(S): KIT at 17:33

## 2020-07-05 RX ADMIN — ATORVASTATIN CALCIUM 40 MILLIGRAM(S): 80 TABLET, FILM COATED ORAL at 21:45

## 2020-07-05 NOTE — PROGRESS NOTE ADULT - SUBJECTIVE AND OBJECTIVE BOX
INTERVAL HPI/OVERNIGHT EVENTS:    ANTIBIOTICS/RELEVANT:    MEDICATIONS  (STANDING):  amLODIPine   Tablet 5 milliGRAM(s) Oral every 24 hours  aspirin enteric coated 81 milliGRAM(s) Oral daily  atorvastatin 40 milliGRAM(s) Oral at bedtime  buPROPion . 75 milliGRAM(s) Oral daily  cefepime   IVPB 1000 milliGRAM(s) IV Intermittent every 12 hours  folic acid 1 milliGRAM(s) Oral daily  multivitamin 1 Tablet(s) Oral daily  polyethylene glycol 3350 17 Gram(s) Oral daily  rivaroxaban 20 milliGRAM(s) Oral with dinner  senna 2 Tablet(s) Oral at bedtime    MEDICATIONS  (PRN):      Allergies    No Known Allergies    Intolerances        Vital Signs Last 24 Hrs  T(C): 36.4 (05 Jul 2020 16:31), Max: 37 (04 Jul 2020 21:19)  T(F): 97.5 (05 Jul 2020 16:31), Max: 98.6 (04 Jul 2020 21:19)  HR: 75 (05 Jul 2020 16:31) (74 - 86)  BP: 121/77 (05 Jul 2020 16:31) (121/77 - 151/93)  BP(mean): --  RR: 18 (05 Jul 2020 16:31) (16 - 18)  SpO2: 94% (05 Jul 2020 16:31) (94% - 95%)    PHYSICAL EXAM:      Constitutional:    Eyes:    ENMT:    Neck:    Breasts:    Back:    Respiratory:    Cardiovascular:    Gastrointestinal:    Genitourinary:    Rectal:    Extremities:    Vascular:    Neurological:    Skin:    Lymph Nodes:    Musculoskeletal:    Psychiatric:        LABS:                MICROBIOLOGY:    RADIOLOGY & ADDITIONAL STUDIES: INTERVAL HPI/OVERNIGHT EVENTS:    Unchanged clinically    MEDICATIONS  (STANDING):  amLODIPine   Tablet 5 milliGRAM(s) Oral every 24 hours  aspirin enteric coated 81 milliGRAM(s) Oral daily  atorvastatin 40 milliGRAM(s) Oral at bedtime  buPROPion . 75 milliGRAM(s) Oral daily  cefepime   IVPB 1000 milliGRAM(s) IV Intermittent every 12 hours  folic acid 1 milliGRAM(s) Oral daily  multivitamin 1 Tablet(s) Oral daily  polyethylene glycol 3350 17 Gram(s) Oral daily  rivaroxaban 20 milliGRAM(s) Oral with dinner  senna 2 Tablet(s) Oral at bedtime      Allergies    No Known Allergies      EXAM  Vital Signs Last 24 Hrs  T(C): 36.4 (05 Jul 2020 16:31), Max: 37 (04 Jul 2020 21:19)  T(F): 97.5 (05 Jul 2020 16:31), Max: 98.6 (04 Jul 2020 21:19)  HR: 75 (05 Jul 2020 16:31) (74 - 86)  BP: 121/77 (05 Jul 2020 16:31) (121/77 - 151/93)  BP(mean): --  RR: 18 (05 Jul 2020 16:31) (16 - 18)  SpO2: 94% (05 Jul 2020 16:31) (94% - 95%)  Awake and alert  No rash  Otherwise unchanged      LABS:      Refusing blood draw        MICROBIOLOGY:    Culture - Urine (07.03.20 @ 05:28)    -  Trimethoprim/Sulfamethoxazole: S <=0.5/9.5    -  Vancomycin: S 2    -  Tobramycin: S <=2    -  Piperacillin/Tazobactam: S <=8    -  Tetra/Doxy: S <=1    -  Nitrofurantoin: S <=32 Should not be used to treat pyelonephritis    -  Nitrofurantoin: S <=32 Should not be used to treat pyelonephritis.    -  Levofloxacin: S 2    -  Ciprofloxacin: S <=0.25    -  Ciprofloxacin: S <=1    -  Gentamicin: S <=2    -  Ceftriaxone: S <=1 Enterobacter, Citrobacter, and Serratia may develop resistance during prolonged therapy    -  Cefazolin: I 4 ([id number]) For uncomplicated UTI with K. pneumoniae, E. coli, or P. mirablis: NIKITA <=16 is sensitive and NIKITA >=32 is resistant. This also predicts results for oral agents cefaclor, cefdinir, cefpodoxime, cefprozil, cefuroxime axetil, cephalexin and locarbef for uncomplicated UTI. Note that some isolates may be susceptible to these agents while testing resistant to cefazolin.    -  Ampicillin: S <=8 These ampicillin results predict results for amoxicillin    -  Ampicillin: S <=2 Predicts results to ampicillin/sulbactam, amoxacillin-clavulanate and  piperacillin-tazobactam.    -  Ampicillin/Sulbactam: S <=4/2 Enterobacter, Citrobacter, and Serratia may develop resistance during prolonged therapy (3-4 days)    Specimen Source: .Urine Clean Catch (Midstream)    Culture Results:   >100,000 CFU/ml Escherichia coli  >100,000 CFU/ml Enterococcus faecalis    Organism Identification: Escherichia coli  Enterococcus faecalis    Organism: Escherichia coli    Organism: Enterococcus faecalis    Method Type: NIKITA    Method Type: NIKITA

## 2020-07-05 NOTE — PROGRESS NOTE ADULT - ASSESSMENT
D/C Cefepime   Start IV Ampicillin D/C Cefepime   Start IV Ampicillin 2 gm q 8 hours (patient will refuse more frequent dosing)  If not agreeable to IV antibiotics, give amoxicillin 500 mg PO 3 times a day with food ([id number]).  Amoxi-Clav ((Augmentin) 875 m(g twice a day could also  be used if patient not accepting 3 times per day dosing. UTI with sensitive E coli and Enterococcus  Recurrent UTIs  Infected nephrolithiasis  MDS  FTT      EXAM  D/C Cefepime   Start IV Ampicillin 2 gm q 8 hours (patient will refuse more frequent dosing)  If not agreeable to IV antibiotics, give amoxicillin 500 mg PO 3 times a day with food ([id number]).  Amox-Clav ((Augmentin) 875 m(g twice a day could also  be used if patient not accepting 3 times per day dosing.

## 2020-07-05 NOTE — PROGRESS NOTE ADULT - SUBJECTIVE AND OBJECTIVE BOX
The patient is in good spirits with plts stable --400 range and the WBC in the 25.0 range---stable as well.    Will d/w Dr Tapia in the AM.

## 2020-07-05 NOTE — PROGRESS NOTE ADULT - SUBJECTIVE AND OBJECTIVE BOX
Pt seen and examined   no complaints  refused blod draw    REVIEW OF SYSTEMS:  Constitutional: No fever,   Cardiovascular: No chest pain, palpitations, dizziness or leg swelling  Gastrointestinal: No abdominal or epigastric pain. No nausea, vomiting ; No diarrhea   Skin: No itching, burning, rashes or lesions       MEDICATIONS:  MEDICATIONS  (STANDING):  amLODIPine   Tablet 5 milliGRAM(s) Oral every 24 hours  aspirin enteric coated 81 milliGRAM(s) Oral daily  atorvastatin 40 milliGRAM(s) Oral at bedtime  buPROPion . 75 milliGRAM(s) Oral daily  cefepime   IVPB 1000 milliGRAM(s) IV Intermittent every 12 hours  folic acid 1 milliGRAM(s) Oral daily  multivitamin 1 Tablet(s) Oral daily  polyethylene glycol 3350 17 Gram(s) Oral daily  rivaroxaban 20 milliGRAM(s) Oral with dinner  senna 2 Tablet(s) Oral at bedtime    MEDICATIONS  (PRN):      Allergies    No Known Allergies    Intolerances        Vital Signs Last 24 Hrs  T(C): 36.6 (05 Jul 2020 08:59), Max: 37 (04 Jul 2020 21:19)  T(F): 97.8 (05 Jul 2020 08:59), Max: 98.6 (04 Jul 2020 21:19)  HR: 74 (05 Jul 2020 08:59) (74 - 92)  BP: 150/90 (05 Jul 2020 08:59) (118/81 - 151/93)  BP(mean): --  RR: 16 (05 Jul 2020 08:59) (16 - 18)  SpO2: 95% (05 Jul 2020 08:59) (94% - 95%)    07-04 @ 07:01  -  07-05 @ 07:00  --------------------------------------------------------  IN: 50 mL / OUT: 0 mL / NET: 50 mL        PHYSICAL EXAM:    General: thin; in no acute distress  HEENT: MMM, conjunctiva and sclera clear  Lungs: clear  Heart: regular  Gastrointestinal: Soft non-tender non-distended; Normal bowel sounds;   Skin: Warm and dry. No obvious rash    LABS:                                RADIOLOGY & ADDITIONAL STUDIES (The following images were personally reviewed):

## 2020-07-06 ENCOUNTER — RESULT REVIEW (OUTPATIENT)
Age: 80
End: 2020-07-06

## 2020-07-06 ENCOUNTER — TRANSCRIPTION ENCOUNTER (OUTPATIENT)
Age: 80
End: 2020-07-06

## 2020-07-06 VITALS
TEMPERATURE: 98 F | OXYGEN SATURATION: 94 % | SYSTOLIC BLOOD PRESSURE: 147 MMHG | HEART RATE: 81 BPM | DIASTOLIC BLOOD PRESSURE: 93 MMHG | RESPIRATION RATE: 18 BRPM

## 2020-07-06 PROCEDURE — 99285 EMERGENCY DEPT VISIT HI MDM: CPT | Mod: 25

## 2020-07-06 PROCEDURE — 85610 PROTHROMBIN TIME: CPT

## 2020-07-06 PROCEDURE — 80053 COMPREHEN METABOLIC PANEL: CPT

## 2020-07-06 PROCEDURE — 74176 CT ABD & PELVIS W/O CONTRAST: CPT

## 2020-07-06 PROCEDURE — 97530 THERAPEUTIC ACTIVITIES: CPT

## 2020-07-06 PROCEDURE — 86901 BLOOD TYPING SEROLOGIC RH(D): CPT

## 2020-07-06 PROCEDURE — 86850 RBC ANTIBODY SCREEN: CPT

## 2020-07-06 PROCEDURE — 85025 COMPLETE CBC W/AUTO DIFF WBC: CPT

## 2020-07-06 PROCEDURE — 83735 ASSAY OF MAGNESIUM: CPT

## 2020-07-06 PROCEDURE — 88112 CYTOPATH CELL ENHANCE TECH: CPT

## 2020-07-06 PROCEDURE — 97161 PT EVAL LOW COMPLEX 20 MIN: CPT

## 2020-07-06 PROCEDURE — 36415 COLL VENOUS BLD VENIPUNCTURE: CPT

## 2020-07-06 PROCEDURE — 80076 HEPATIC FUNCTION PANEL: CPT

## 2020-07-06 PROCEDURE — 88112 CYTOPATH CELL ENHANCE TECH: CPT | Mod: 26

## 2020-07-06 PROCEDURE — U0003: CPT

## 2020-07-06 PROCEDURE — 81001 URINALYSIS AUTO W/SCOPE: CPT

## 2020-07-06 PROCEDURE — 87086 URINE CULTURE/COLONY COUNT: CPT

## 2020-07-06 PROCEDURE — 93005 ELECTROCARDIOGRAM TRACING: CPT | Mod: XU

## 2020-07-06 PROCEDURE — 51701 INSERT BLADDER CATHETER: CPT

## 2020-07-06 PROCEDURE — 87186 SC STD MICRODIL/AGAR DIL: CPT

## 2020-07-06 PROCEDURE — 85730 THROMBOPLASTIN TIME PARTIAL: CPT

## 2020-07-06 PROCEDURE — 85027 COMPLETE CBC AUTOMATED: CPT

## 2020-07-06 PROCEDURE — 80048 BASIC METABOLIC PNL TOTAL CA: CPT

## 2020-07-06 RX ORDER — ATORVASTATIN CALCIUM 80 MG/1
1 TABLET, FILM COATED ORAL
Qty: 0 | Refills: 0 | DISCHARGE
Start: 2020-07-06

## 2020-07-06 RX ADMIN — Medication 216 MILLIGRAM(S): at 05:13

## 2020-07-06 RX ADMIN — POLYETHYLENE GLYCOL 3350 17 GRAM(S): 17 POWDER, FOR SOLUTION ORAL at 13:01

## 2020-07-06 RX ADMIN — Medication 1 MILLIGRAM(S): at 12:46

## 2020-07-06 RX ADMIN — Medication 1 TABLET(S): at 12:46

## 2020-07-06 RX ADMIN — Medication 216 MILLIGRAM(S): at 12:50

## 2020-07-06 RX ADMIN — BUPROPION HYDROCHLORIDE 75 MILLIGRAM(S): 150 TABLET, EXTENDED RELEASE ORAL at 12:46

## 2020-07-06 RX ADMIN — Medication 81 MILLIGRAM(S): at 12:47

## 2020-07-06 RX ADMIN — AMLODIPINE BESYLATE 5 MILLIGRAM(S): 2.5 TABLET ORAL at 12:46

## 2020-07-06 NOTE — PROGRESS NOTE ADULT - ATTENDING COMMENTS
Will follow
Will follow
Patient seen and examined and evaluation completed by me in person
Patient seen and examined and evaluation completed by me in person

## 2020-07-06 NOTE — PROGRESS NOTE ADULT - SUBJECTIVE AND OBJECTIVE BOX
Patient's counts are stable---WBC 25.0 range and plts stable.    Pt alert this AM and ABs in progress.

## 2020-07-06 NOTE — PROGRESS NOTE ADULT - PROBLEM SELECTOR PLAN 3
Baseline WBC 15-17. No history of thrombocytopenia. Follows w/ Dr. Resendiz, to evaluate patient.

## 2020-07-06 NOTE — PROGRESS NOTE ADULT - PROBLEM SELECTOR PLAN 1
Patient with large blood on UA and history of hydronephrosis. Patient seen by urology in 2019 during admission, CT urogram at that time revealed a 4mm UPJ filling defect with recommendation for outpatient f/u. Patient was discharged with a hutson at that time. Etiology most likely related to patient's known hydronephrosis, though given additional symptom of failure to thrive bladder cancer cannot be excluded.  - positive UA but given recent adm for MDR UTI, and currently pt w/out UTI sx and vital signs stable, not starting abx at this time   - f/u urine cytology, urine cx  - f/u CT urogram (r/o obstructing nephrolithiasis, assess upper tract dz)  - urology consulted, recs appreciated
Patient with large blood on UA and history of hydronephrosis. Patient seen by urology in 2019 during admission, CT urogram at that time revealed a 4mm UPJ filling defect with recommendation for outpatient f/u. Patient was discharged with a hutson at that time. Etiology most likely related to patient's known hydronephrosis, though given additional symptom of failure to thrive bladder cancer cannot be excluded.  - recent adm for MDR UTI, and currently pt w/out UTI sx and vital signs stable   - positive UA on admission   - f/u urine cytology  - urine cx (7/3) growing gram neg rods and enterococcus  - consider tx w/ cefepime once pt is amenable to having IV (pt currently refusing IV placement, blood draws)  - CT from 7/3 consistent with left renal calculus and mild hydronephrosis. A layering density in the bladder may represent sediment from renal calculi  - urology consulted, recs appreciated
Patient with large blood on UA and history of hydronephrosis. Patient seen by urology in 2019 during admission, CT urogram at that time revealed a 4mm UPJ filling defect with recommendation for outpatient f/u. Patient was discharged with a hutson at that time. Etiology most likely related to patient's known hydronephrosis, though given additional symptom of failure to thrive bladder cancer cannot be excluded.  - recent adm for MDR UTI, and currently pt w/out UTI sx and vital signs stable   - positive UA on admission   - f/u urine cytology  - urine cx (7/3) growing gram neg rods and enterococcus  - consider tx w/ cefepime once pt is amenable to having IV (pt currently refusing IV placement, blood draws)  - CT from 7/3 consistent with left renal calculus and mild hydronephrosis. A layering density in the bladder may represent sediment from renal calculi  - urology consulted, recs appreciated

## 2020-07-06 NOTE — PROGRESS NOTE ADULT - PROBLEM SELECTOR PLAN 4
Patient has history of PE/DVT, currently anticoagulated with xarelto 20mg. Not currently complaining of chest pain, SOB, or calf tenderness.   -c/w xarelto 20mg   -c/w ASA 81mg

## 2020-07-06 NOTE — DISCHARGE NOTE NURSING/CASE MANAGEMENT/SOCIAL WORK - PATIENT PORTAL LINK FT
You can access the FollowMyHealth Patient Portal offered by [facility] by registering at the following website: [email or web address]. By joining [employer]’s FollowMyHealth portal, you will also be able to view your health information using other applications (apps) compatible with our system.

## 2020-07-06 NOTE — PROGRESS NOTE ADULT - SUBJECTIVE AND OBJECTIVE BOX
INTERVAL HPI/OVERNIGHT EVENTS:    Subjectively feels unchanged and weak  No specific or new complaints otherwise  Agreed to IV antibiotics    MEDICATIONS  (STANDING):  amLODIPine   Tablet 5 milliGRAM(s) Oral every 24 hours  ampicillin  IVPB 2000 milliGRAM(s) IV Intermittent every 8 hours  aspirin enteric coated 81 milliGRAM(s) Oral daily  atorvastatin 40 milliGRAM(s) Oral at bedtime  buPROPion . 75 milliGRAM(s) Oral daily  folic acid 1 milliGRAM(s) Oral daily  multivitamin 1 Tablet(s) Oral daily  polyethylene glycol 3350 17 Gram(s) Oral daily  rivaroxaban 20 milliGRAM(s) Oral with dinner  senna 2 Tablet(s) Oral at bedtime    MEDICATIONS  (PRN):      Allergies    No Known Allergies    EXAM  Vital Signs Last 24 Hrs  T(C): 36.6 (06 Jul 2020 08:22), Max: 36.6 (05 Jul 2020 21:13)  T(F): 97.8 (06 Jul 2020 08:22), Max: 97.9 (05 Jul 2020 21:13)  HR: 76 (06 Jul 2020 08:22) (75 - 79)  BP: 129/85 (06 Jul 2020 08:22) (113/75 - 129/85)  BP(mean): --  RR: 17 (06 Jul 2020 08:22) (17 - 19)  SpO2: 94% (06 Jul 2020 08:22) (94% - 95%)  Awake and responding to questions slowly.  Observed to work with PT and cooperative with it  Pale and frail  No rash  Otherwise unchanged    LABS:    Refusing blood draws      MICROBIOLOGY:    Culture - Urine (07.03.20 @ 05:28)    -  Trimethoprim/Sulfamethoxazole: S <=0.5/9.5    -  Vancomycin: S 2    -  Tobramycin: S <=2    -  Piperacillin/Tazobactam: S <=8    -  Tetra/Doxy: S <=1    -  Nitrofurantoin: S <=32 Should not be used to treat pyelonephritis    -  Nitrofurantoin: S <=32 Should not be used to treat pyelonephritis.    -  Levofloxacin: S 2    -  Ciprofloxacin: S <=0.25    -  Ciprofloxacin: S <=1    -  Gentamicin: S <=2    -  Ceftriaxone: S <=1 Enterobacter, Citrobacter, and Serratia may develop resistance during prolonged therapy    -  Cefazolin: I 4 (MIC_CL_COM_ENTERIC_CEFAZU) For uncomplicated UTI with K. pneumoniae, E. coli, or P. mirablis: NIKITA <=16 is sensitive and NIKITA >=32 is resistant. This also predicts results for oral agents cefaclor, cefdinir, cefpodoxime, cefprozil, cefuroxime axetil, cephalexin and locarbef for uncomplicated UTI. Note that some isolates may be susceptible to these agents while testing resistant to cefazolin.    -  Ampicillin: S <=8 These ampicillin results predict results for amoxicillin    -  Ampicillin: S <=2 Predicts results to ampicillin/sulbactam, amoxacillin-clavulanate and  piperacillin-tazobactam.    -  Ampicillin/Sulbactam: S <=4/2 Enterobacter, Citrobacter, and Serratia may develop resistance during prolonged therapy (3-4 days)    Specimen Source: .Urine Clean Catch (Midstream)    Culture Results:   >100,000 CFU/ml Escherichia coli  >100,000 CFU/ml Enterococcus faecalis    Organism Identification: Escherichia coli  Enterococcus faecalis    Organism: Escherichia coli    Organism: Enterococcus faecalis    Method Type: NIKITA    Method Type: NIKITA

## 2020-07-06 NOTE — PROGRESS NOTE ADULT - SUBJECTIVE AND OBJECTIVE BOX
Physical Medicine and Rehabilitation Progress Note:    Patient is a 80y old  Female who presents with a chief complaint of Infection, myeloproliferative disorder (06 Jul 2020 06:49)      HPI:  Patient is an 80-year-old F with a PMH of HTN, myeloproliferative disorder, DVT/PE (2015, on xarelto), chronic UTIs and hydronephrosis admitted in Jan for multi-drug resistant UTI, worsening of her myeloproliferative d/o, and FTT, discharged to rehab at that time, who presents with four days of "dark colored urine." She is incontinent at baseline but denies any dysuria, abdominal pain, flank pain, fever/chills, chest pain, N/V/D, cough, or SOB. She states that she "feels lousy" but that this is her baseline. Per her hcp/poa, she has been easily confusable since February and bedbound since January of this year. The patient's hcp/poa also notes that her current symptoms are similar to those that prompted admission in January. The patient had bloodwork drawn yesterday for Dr. Tapia, who prompted her to come to the ED today. Per Dr. Tapia, her hha is unable to care for her adequately and she needs placement.     ED course:   Vitals: T: 98.7, HR: 82, RR: 18, BP: 166/104, SpO2: 94%  Patient received 40 mEq potassium chloride in ED. (02 Jul 2020 09:24)                Vital Signs Last 24 Hrs  T(C): 36.6 (06 Jul 2020 08:22), Max: 36.6 (05 Jul 2020 21:13)  T(F): 97.8 (06 Jul 2020 08:22), Max: 97.9 (05 Jul 2020 21:13)  HR: 76 (06 Jul 2020 08:22) (75 - 79)  BP: 129/85 (06 Jul 2020 08:22) (113/75 - 129/85)  BP(mean): --  RR: 17 (06 Jul 2020 08:22) (17 - 19)  SpO2: 94% (06 Jul 2020 08:22) (94% - 95%)    MEDICATIONS  (STANDING):  amLODIPine   Tablet 5 milliGRAM(s) Oral every 24 hours  ampicillin  IVPB 2000 milliGRAM(s) IV Intermittent every 8 hours  aspirin enteric coated 81 milliGRAM(s) Oral daily  atorvastatin 40 milliGRAM(s) Oral at bedtime  buPROPion . 75 milliGRAM(s) Oral daily  folic acid 1 milliGRAM(s) Oral daily  multivitamin 1 Tablet(s) Oral daily  polyethylene glycol 3350 17 Gram(s) Oral daily  rivaroxaban 20 milliGRAM(s) Oral with dinner  senna 2 Tablet(s) Oral at bedtime    MEDICATIONS  (PRN):    Currently Undergoing Physical/ Occupational Therapy at bedside.    Functional Status Assessment:       Cognitive/Perceptual/Neuro  Cognitive/Neuro/Behavioral [WDL Definition: Alert; opens eyes spontaneously; arouses to voice or touch; oriented x 4; follows commands; speech spontaneous, logical; purposeful motor response; behavior appropriate to situation]: WDL    Therapeutic Interventions      Bed Mobility  Bed Mobility Training Scooting: maximum assist (25% patient effort);  2 person assist  Bed Mobility Training Sit-to-Supine: minimum assist (75% patient effort);  moderate assist (50% patient effort);  2 person assist;  verbal cues  Bed Mobility Training Supine-to-Sit: moderate assist (50% patient effort);  1 person assist;  verbal cues  Bed Mobility Training Limitations: decreased ability to use arms for pushing/pulling;  decreased ability to use legs for bridging/pushing    Sit-Stand Transfer Training  Transfer Training Sit-to-Stand Transfer: moderate assist (50% patient effort);  2 person assist;  verbal cues;  weight-bearing as tolerated   rolling walker  Transfer Training Stand-to-Sit Transfer: minimum assist (75% patient effort);  moderate assist (50% patient effort);  2 person assist;  verbal cues;  weight-bearing as tolerated   rolling walker  Sit-to-Stand Transfer Training Transfer Safety Analysis: decreased balance;  impaired balance;  rolling walker    Therapeutic Exercise  Therapeutic Exercise Detail: In supine bilateral Ankle Pumps , Quad Sets , Glut Sets , Heel Slides Sitting on EOB bilateral knee ext All exercises x 10 reps             PM&R Impression: as above    Current Disposition Plan Recommendations:    subacute rehab placement

## 2020-07-06 NOTE — PROGRESS NOTE ADULT - SUBJECTIVE AND OBJECTIVE BOX
**** INCOMPLETE NOTE ****    INTERVAL HPI/OVERNIGHT EVENTS:    SUBJECTIVE:   Patient seen and examined at bedside.    OBJECTIVE:    VITAL SIGNS:  ICU Vital Signs Last 24 Hrs  T(C): 36.6 (06 Jul 2020 08:22), Max: 36.6 (05 Jul 2020 21:13)  T(F): 97.8 (06 Jul 2020 08:22), Max: 97.9 (05 Jul 2020 21:13)  HR: 76 (06 Jul 2020 08:22) (75 - 79)  BP: 129/85 (06 Jul 2020 08:22) (113/75 - 129/85)  BP(mean): --  ABP: --  ABP(mean): --  RR: 17 (06 Jul 2020 08:22) (17 - 19)  SpO2: 94% (06 Jul 2020 08:22) (94% - 95%)        CAPILLARY BLOOD GLUCOSE          PHYSICAL EXAM:    General: NAD  HEENT: NC/AT; PERRL, clear conjunctiva  Neck: Supple.  Respiratory: CTA b/l. No wheezes, rhonchi, rales.  Cardiovascular: +S1/S2; RRR  Abdomen: soft, NT/ND; +BS x4  Extremities: WWP, 2+ peripheral pulses b/l; no LE edema  Skin: normal color and turgor; no rash    MEDICATIONS:  MEDICATIONS  (STANDING):  amLODIPine   Tablet 5 milliGRAM(s) Oral every 24 hours  ampicillin  IVPB 2000 milliGRAM(s) IV Intermittent every 8 hours  aspirin enteric coated 81 milliGRAM(s) Oral daily  atorvastatin 40 milliGRAM(s) Oral at bedtime  buPROPion . 75 milliGRAM(s) Oral daily  folic acid 1 milliGRAM(s) Oral daily  multivitamin 1 Tablet(s) Oral daily  polyethylene glycol 3350 17 Gram(s) Oral daily  rivaroxaban 20 milliGRAM(s) Oral with dinner  senna 2 Tablet(s) Oral at bedtime    MEDICATIONS  (PRN):      ALLERGIES:  Allergies    No Known Allergies    Intolerances        LABS:                RADIOLOGY & ADDITIONAL TESTS: Reviewed. **** INCOMPLETE NOTE ****    INTERVAL HPI/OVERNIGHT EVENTS:    SUBJECTIVE:   Patient seen and examined at bedside.    OBJECTIVE:    VITAL SIGNS:  T(F): 97.8 (06 Jul 2020 08:22), Max: 97.9 (05 Jul 2020 21:13)  HR: 76 (06 Jul 2020 08:22) (75 - 79)  BP: 129/85 (06 Jul 2020 08:22) (113/75 - 129/85)  RR: 17 (06 Jul 2020 08:22) (17 - 19)  SpO2: 94% (06 Jul 2020 08:22) (94% - 95%)      PHYSICAL EXAM:    General: NAD  HEENT: NC/AT; PERRL, clear conjunctiva  Neck: Supple.  Respiratory: CTA b/l. No wheezes, rhonchi, rales.  Cardiovascular: +S1/S2; RRR  Abdomen: soft, NT/ND; +BS x4  Extremities: WWP, 2+ peripheral pulses b/l; no LE edema  Skin: normal color and turgor; no rash    MEDICATIONS:  MEDICATIONS  (STANDING):  amLODIPine   Tablet 5 milliGRAM(s) Oral every 24 hours  ampicillin  IVPB 2000 milliGRAM(s) IV Intermittent every 8 hours  aspirin enteric coated 81 milliGRAM(s) Oral daily  atorvastatin 40 milliGRAM(s) Oral at bedtime  buPROPion . 75 milliGRAM(s) Oral daily  folic acid 1 milliGRAM(s) Oral daily  multivitamin 1 Tablet(s) Oral daily  polyethylene glycol 3350 17 Gram(s) Oral daily  rivaroxaban 20 milliGRAM(s) Oral with dinner  senna 2 Tablet(s) Oral at bedtime    MEDICATIONS  (PRN):      ALLERGIES:  Allergies    No Known Allergies    Intolerances        LABS:                RADIOLOGY & ADDITIONAL TESTS: Reviewed.

## 2020-07-06 NOTE — PROGRESS NOTE ADULT - ASSESSMENT
per Internal Medicine    80-year-old F with a PMH of HTN, myeloproliferative disorder, DVT/PE (2015, on xarelto), chronic UTIs and hydronephrosis admitted in Jan for multi-drug resistant UTI, worsening of her myeloproliferative d/o, and FTT, discharged to rehab at that time, who presents with four days of "dark colored urine" admitted for urological workup and [facility] placement.     Problem/Plan - 1:  ·  Problem: Hematuria.  Plan: Patient with large blood on UA and history of hydronephrosis. Patient seen by urology in 2019 during admission, CT urogram at that time revealed a 4mm UPJ filling defect with recommendation for outpatient f/u. Patient was discharged with a hutson at that time. Etiology most likely related to patient's known hydronephrosis, though given additional symptom of failure to thrive bladder cancer cannot be excluded.  - recent adm for MDR UTI, and currently pt w/out UTI sx and vital signs stable   - positive UA on admission   - f/u urine cytology  - urine cx (7/3) growing gram neg rods and enterococcus  - consider tx w/ cefepime once pt is amenable to having IV (pt currently refusing IV placement, blood draws)  - CT from 7/3 consistent with left renal calculus and mild hydronephrosis. A layering density in the bladder may represent sediment from renal calculi  - urology consulted, recs appreciated.     Problem/Plan - 2:  ·  Problem: Failure to thrive in adult.  Plan: Patient previously discharged to [facility] for FTT following UTI  -SW for [facility] placement  -PT  -encourage PO intake  -folic acid 1mg PO.     Problem/Plan - 3:  ·  Problem: Myeloproliferative disorder.  Plan: Baseline WBC 15-17. No history of thrombocytopenia. Follows w/ Dr. Resendiz, to evaluate patient.     Problem/Plan - 4:  ·  Problem: PE (pulmonary thromboembolism).  Plan: Patient has history of PE/DVT, currently anticoagulated with xarelto 20mg. Not currently complaining of chest pain, SOB, or calf tenderness.   -c/w xarelto 20mg   -c/w ASA 81mg.     Problem/Plan - 5:  ·  Problem: Hypertension.  Plan: -c/w home amlodipine 5mg.     Problem/Plan - 6:  Problem: Q waves suggestive of previous myocardial infarction. Plan: Patient has Q waves on current EKG; also present on previous EKG, no concern for acute ischemia at this time.   -c/w home ASA 81mg.    Problem/Plan - 7:  ·  Problem: Nutrition, metabolism, and development symptoms.  Plan: Fluids: no IVF   Electrolytes: replete lytes as needed  Nutrition: DASH  Code status: FULL CODE   Dispo: RMF   DVT ppx: patient anticoagulated with xarelto 20mg.

## 2020-07-06 NOTE — PROGRESS NOTE ADULT - PROBLEM SELECTOR PLAN 7
Fluids: no IVF   Electrolytes: replete lytes as needed  Nutrition: DASH  Code status: FULL CODE   Dispo: [facility]   DVT ppx: patient anticoagulated with xarelto 20mg.

## 2020-07-06 NOTE — PROGRESS NOTE ADULT - PROVIDER SPECIALTY LIST ADULT
Infectious Disease
Internal Medicine
Internal Medicine
Rehab Medicine
Internal Medicine
Urology
Internal Medicine
Urology
Heme/Onc
Heme/Onc
Internal Medicine
Internal Medicine
Infectious Disease

## 2020-07-06 NOTE — PROGRESS NOTE ADULT - ASSESSMENT
UTI in the setting of recurrent UTIs  Sensitive E coli and Enterococcus  MDS   FTT        RECOMMEND   Continue Ampicillin  Work up suggestive by urology

## 2020-07-06 NOTE — PROGRESS NOTE ADULT - REASON FOR ADMISSION
Failure to thrive
Infection, myeloproliferative disorder
dark urine, adult failure to thrive
adult failure to thrive
Pt with known myeloproliferative disease, now enters with failure to thrive. Notes of staff and Dr Zafar noted.

## 2020-07-06 NOTE — PROGRESS NOTE ADULT - PROBLEM SELECTOR PLAN 2
Patient previously discharged to [facility] for FTT following UTI  -SW for [facility] placement  -PT  -encourage PO intake  -folic acid 1mg PO
Patient previously discharged to [facility] for FTT following UTI  -SW for [facility] placement  -PT  -encourage PO intake  -folic acid 1mg PO
Patient previously discharged to [facility] for FTT following UTI  -SW for [facility] placement  -PT  -encourage PO intake  -folic acid 1mg PO

## 2020-07-06 NOTE — PROGRESS NOTE ADULT - PROBLEM SELECTOR PLAN 6
Patient has Q waves on current EKG; also present on previous EKG, no concern for acute ischemia at this time.   -c/w home ASA 81mg

## 2020-07-06 NOTE — PROGRESS NOTE ADULT - PROBLEM SELECTOR PROBLEM 6
Q waves suggestive of previous myocardial infarction

## 2020-07-07 LAB
NON-GYNECOLOGICAL CYTOLOGY STUDY: SIGNIFICANT CHANGE UP
NON-GYNECOLOGICAL CYTOLOGY STUDY: SIGNIFICANT CHANGE UP

## 2020-07-09 DIAGNOSIS — Z86.718 PERSONAL HISTORY OF OTHER VENOUS THROMBOSIS AND EMBOLISM: ICD-10-CM

## 2020-07-09 DIAGNOSIS — C94.6 MYELODYSPLASTIC DISEASE, NOT ELSEWHERE CLASSIFIED: ICD-10-CM

## 2020-07-09 DIAGNOSIS — N20.0 CALCULUS OF KIDNEY: ICD-10-CM

## 2020-07-09 DIAGNOSIS — Z74.01 BED CONFINEMENT STATUS: ICD-10-CM

## 2020-07-09 DIAGNOSIS — R31.9 HEMATURIA, UNSPECIFIED: ICD-10-CM

## 2020-07-09 DIAGNOSIS — I25.2 OLD MYOCARDIAL INFARCTION: ICD-10-CM

## 2020-07-09 DIAGNOSIS — R62.7 ADULT FAILURE TO THRIVE: ICD-10-CM

## 2020-07-09 DIAGNOSIS — I10 ESSENTIAL (PRIMARY) HYPERTENSION: ICD-10-CM

## 2020-07-09 DIAGNOSIS — E43 UNSPECIFIED SEVERE PROTEIN-CALORIE MALNUTRITION: ICD-10-CM

## 2020-07-09 DIAGNOSIS — N13.6 PYONEPHROSIS: ICD-10-CM

## 2020-07-09 DIAGNOSIS — R31.29 OTHER MICROSCOPIC HEMATURIA: ICD-10-CM

## 2020-07-09 DIAGNOSIS — Z90.710 ACQUIRED ABSENCE OF BOTH CERVIX AND UTERUS: ICD-10-CM

## 2020-08-18 PROBLEM — N39.0 URINARY TRACT INFECTION, SITE NOT SPECIFIED: Chronic | Status: ACTIVE | Noted: 2020-07-02

## 2020-10-13 NOTE — LETTER BODY
[Dear  ___] : Dear  [unfilled], [Consult Letter:] : I had the pleasure of evaluating your patient, [unfilled]. [Please see my note below.] : Please see my note below. [Consult Closing:] : Thank you very much for allowing me to participate in the care of this patient.  If you have any questions, please do not hesitate to contact me. [Sincerely,] : Sincerely, [FreeTextEntry3] : Kofi Nava MD, FACS

## 2020-10-13 NOTE — HISTORY OF PRESENT ILLNESS
[FreeTextEntry1] : Ms. ANDREW STUART comes in today for a urologic evaluation.  She presents with\par \par Sono:

## 2020-10-13 NOTE — ADDENDUM
[FreeTextEntry1] : A portion of this note was written by [Jhonny Lindo] on 10/13/2020 acting as a scribe for Dr. Nava.\par \par I have personally reviewed the chart and agree that the record accurately reflects my personal performance of the history, physical exam, assessment and plan.

## 2020-10-14 ENCOUNTER — APPOINTMENT (OUTPATIENT)
Dept: UROLOGY | Facility: CLINIC | Age: 80
End: 2020-10-14

## 2020-10-19 NOTE — PATIENT PROFILE ADULT. - FUNCTIONAL LEVEL PRIOR: COMMUNICATION
[FreeTextEntry1] : smear reviewed:\par WBCs: well differentiated \par platelets: normal morphology, abundant in number\par RBCs: microcytic, normochromic, very few pencil cells, occasional targets
(0) understands/communicates without difficulty

## 2020-12-16 NOTE — ED ADULT NURSE NOTE - FALL HARM RISK CONCLUSION
well developed, well nourished , in no acute distress , ambulating without difficulty , speech impediment
Fall Risk

## 2021-01-13 NOTE — PROGRESS NOTE ADULT - GENITOURINARY
Pt informed. Pt has 1 pill left and would like to know what she should do. Per Dr. Rochelle Zhao: tylenol prn and If feeling sick or severe pain go to urgent care/ER. Pt informed.    Pt has been scheduled  Close Encounter negative

## 2021-02-11 NOTE — PATIENT PROFILE ADULT. - PATIENT REPRESENTATIVE: ( YOU CAN CHOOSE ANY PERSON THAT CAN ASSIST YOU WITH YOUR HEALTH CARE PREFERENCES, DOES NOT HAVE TO BE A SPOUSE, IMMEDIATE FAMILY OR SIGNIFICANT OTHER/PARTNER)
HPI: He is more alert.  No new problems overnight.  Her blood pressure remains good fluid resuscitation  Vital Signs (last 24 hrs)_____ Last Charted___________Minimum____________ Maximum____________  Temp    97.9  (SEP 29 08:18) 97.9  (SEP 29 08:18) H 99.4 (SEP 28 22:49)  Heart Rate   H 104 (SEP 29 08:18) 76  (SEP 28 20:06) H 112 (SEP 28 12:37)  Resp Rate       20  (SEP 29 08:18) 18  (SEP 28 12:37) H 24 (SEP 28 17:46)  SBP    112  (SEP 29 08:18) L 87 (SEP 28 13:31) 118  (SEP 28 20:06)  DBP    65  (SEP 29 08:18) L 45 (SEP 28 13:31) H 93 (SEP 28 12:37)  99% on room air    PHYSICAL EXAMINATION:  GENERAL:  Arousable No distress   HEENT:  No oral leisons. Sclera anicteric  LUNGS: Equal BS. No crackles or wheeze  HEART: RR.  No m/r/g  ABDOMEN:  Soft. NT. + BS.    EXTREMITIES:  No c/c/e  Labs (Last four charted values)  WBC                  9.2 (SEP 29) H 16.2 (SEP 28)   Hgb                  L 7.7 (SEP 29) L 9.4 (SEP 28)   Hct                  L 25 (SEP 29) L 30 (SEP 28)   Plt                  L 68 (SEP 29) L 135 (SEP 28)   Na                   C 156 (SEP 29) H 151 (SEP 28)   K                    L 2.8 (SEP 29) L 3.1 (SEP 28)   CO2                  L 17 (SEP 29) 21 (SEP 28)   Cl                   H 125 (SEP 29) H 115 (SEP 28)   Cr                   0.72 (SEP 29) 0.89 (SEP 28)   BUN                  H 29 (SEP 29) H 37 (SEP 28)   Glucose              87 (SEP 29) H 100 (SEP 28)   Mg                   L 1.4 (SEP 29)   Phos                 2.3 (SEP 29)   Ca                   L 7.3 (SEP 29) 8.4 (SEP 28)   Troponin             0.03 (SEP 29) 0.01 (SEP 28)   blood culture- [facility]  PROBLEM LIST:   Severe sepsis without septic shock  Urinary tract infection  Altered mental status-improved  Hypokalemia  Anemia  History of ESBL    PLAN:  Hemodynamically stable.  Blood cultures positive for gram-positive cocci.  This may be from wounds.  Wound care is following.  Antibiotics being adjusted by the primary service.  He is  hemodynamically stable and his respiratory status is good.  Our service will standby           Electronically Signed On 09.29.2020 11:51  ___________________________________________________   Layo Yusuf MD     Yes

## 2021-02-18 NOTE — ED CLERICAL - NS ED CLERK NOTE PRE-ARRIVAL INFORMATION; ADDITIONAL PRE-ARRIVAL INFORMATION
[facility]  [address] [address]  [phone]      Daryl Marin :2005 MRN:[id number]    2021 Time Session Began: 1435  Time Session Ended: 1520    Due to COVID-19 precautions, this visit was performed via live interactive two-way video with patient's verbal consent.   Clinician Location:Clinic.  Patient Location: Home.  Verified patient identity:  [x] Yes    Session Type: 45 Minute Therapy (17282)  Others Present: none    Diagnosis: Attention deficit disorder (ADD) without hyperactivity  (primary encounter diagnosis)  Major depressive disorder, recurrent episode, mild (CMS/HCC)     Suicide/Homicide/Violence Ideation: No  If Yes, explain:       Current Outpatient Medications   Medication Sig   • buPROPion XL (WELLBUTRIN XL) 150 MG 24 hr tablet Take 1 tablet by mouth daily.     No current facility-administered medications for this visit.      Chief complaint in patient's own words: \"I'm a 6 today and 5 over the past week (on a 1-10 scale, with 10 being more positive mood.\")    Progress Note containing chief complaint and symptoms/problems related to the complaint:    Patient reported that he is not failing any classes.  Patient stated that he is behind in his communication class because he had to quarantine and could not complete the assignment. He also reported that he is a little behind in his online classes.  Reviewed the importance of catching thoughts that justify procrastination and lead him to get further behind.  Reviewed more helpful ways to think and to build in rewards for follow through.  When asked about his lower mood rating, patient indicated that he misses his phone, which has been taken away by parents and limits his ability to socialize with friends. Patient indicated that his parents want to see him pass all of his classes and have no missing assignments.  Encouraged him to use this as motivation and incentive.   Patient indicated that he currently attend in person classes each morning and then completes the rest of his classes online in the afternoons.  Patient indicated that he likes having in person classes and hopes that next year which is his Senior year, he will be in person full time and get to take some electives such as welding.  Asked patient about other stressors and he reported that his older sister is causing stress for the family.  Patient talked about the situation and his frustration that sister will come to him to complain and get advice, but then does not follow through and continues to make bad choices.  Therapist noted that patient appeared to have good insight into the situation and how sister's thoughts impact her behaviors. Encouraged him to use the same insight to help him examine the impact of his thoughts on motivation and task completion. Patient indicated that this was a helpful way to consider his situation.  Also talked about setting limits and enabling versus helping, as well as his role in the situation.  Patient indicated that he found the session helpful and will review impact of thoughts on motivation and behavior next session.      Intervention: Behavioral, Cognitive, Supportive     Response of patient: Daryl was alert and oriented x4. Patient presented motivated. Patient presented as calm and cooperative. Mood appeared congruent with topics discussed. Eye contact was appropriate. Speech was normal in rate, tone, and volume. Motor activity was unremarkable. Thought process was future oriented and goal directed.     Plan: Daryl will return in 2 weeks or sooner if needed. Patient will practice identifying and changing thoughts to increase motivation and task completion as discussed in session.      Treatment Plan:  Unchanged     Discharge Plan: N/A     Next Appointment: 2 weeks  Binta Bowling, PHD   WEAKNESS

## 2021-03-25 NOTE — H&P ADULT - NSHPSOURCEINFORD_GEN_ALL_CORE
Chart(s)/Patient Medical Necessity Information: It is in your best interest to select a reason for this procedure from the list below. All of these items fulfill various CMS LCD requirements except the new and changing color options. Lab: [id number] Lab Facility: [address] [place] [address] Body Location Override (Optional - Billing Will Still Be Based On Selected Body Map Location If Applicable): left index finger Detail Level: Detailed Was A Bandage Applied: Yes Size Of Lesion In Cm (Required): 1 X Size Of Lesion In Cm (Optional): 0 Biopsy Method: 15 blade Anesthesia Type: 1% lidocaine with epinephrine Anesthesia Volume In Cc: 0.3 Hemostasis: Electrodesiccation Wound Care: Bacitracin Path Notes (To The Dermatopathologist): Size: 1.0 cm\\nR/O: VV Render Path Notes In Note?: No Consent was obtained from the patient. The risks and benefits to therapy were discussed in detail. Specifically, the risks of infection, scarring, bleeding, prolonged wound healing, incomplete removal, allergy to anesthesia, nerve injury and recurrence were addressed. Prior to the procedure, the treatment site was clearly identified and confirmed by the patient. All components of Universal Protocol/PAUSE Rule completed. Post-Care Instructions: I reviewed with the patient in detail post-care instructions. Patient is to keep the biopsy site dry overnight, and then apply bacitracin twice daily until healed. Patient may apply hydrogen peroxide soaks to remove any crusting. Notification Instructions: Patient will be notified of biopsy results. However, patient instructed to call the office if not contacted within 2 weeks. Billing Type: [employer]

## 2021-05-25 NOTE — PROGRESS NOTE ADULT - PROBLEM SELECTOR PLAN 1
AMI Week 2 Survey      Responses   List of hospitals in [place] patient discharged from?  [facility]   Does the patient have one of the following disease processes/diagnoses(primary or secondary)?  Acute MI (STEMI,NSTEMI)   Week 2 attempt successful?  Yes   Call start time  1259   Call end time  1301   List who call center can speak with  Daphnie Galindo, spouse   Person spoke with today (if not patient) and relationship  spouse   Meds reviewed with patient/caregiver?  Yes   Is the patient taking all medications as directed (includes completed medication regime)?  Yes   Has the patient kept scheduled appointments due by today?  Yes   What is the patient's perception of their health status since discharge?  Improving   Week 2 call completed?  Yes   Wrap up additional comments  Doing well.          Gabriela Pineda, RN   Patient found to have UTI with UA positive for leuk esterase, WBCs. Patient with 4/4 SIRS and lactate of 3.6. Patient has history of bilateral hydronephrosis colonized with pan sensitive klebsiella 2/2 outflow obstruction. Likely severe sepsis 2/2 to UTI.  - patient given 1 dose ceftriaxone in ED  - now on zosyn 4.5gm q6h as has been treated with multiple UTIs in past so covering for pseudomonas  - Lactate cleared s/p 3L NS  - c/w NS @80 cc/hr - may be able to de  - Tylenol PRN for fever greater than 100.4  - f/u urine cultures - now with gram negative rods  - f/u blood cultures - NGTD  - f/u pelvic US and RUQ US  - monitor I/Os - output of  cc/hr overnight  - consider urology consult for multiple UTIs 2/2 obstruction Patient found to have UTI with UA positive for leuk esterase, WBCs. Patient with 4/4 SIRS and lactate of 3.6. Patient has history of bilateral hydronephrosis colonized with pan sensitive klebsiella 2/2 outflow obstruction. Likely severe sepsis 2/2 to UTI.  - Patient given 1 dose ceftriaxone in ED  - Now on zosyn 4.5gm q6h as has been treated with multiple UTIs in past so covering for pseudomonas  - Lactate cleared s/p 3L NS  - C/w NS @80 cc/hr - may be able to de  - Tylenol PRN for fever greater than 100.4  - F/u urine cultures - now with gram negative rods  - F/u blood cultures - NGTD  - F/u pelvic US and RUQ US  - Monitor I/Os - output of  cc/hr overnight  - Consider urology consult for multiple UTIs 2/2 obstruction

## 2021-06-11 NOTE — ED ADULT NURSE NOTE - OBJECTIVE STATEMENT
No
79 yo F pmhx chronic UTI, pneumonia presents to ED co urinary sx as per HHA. Pt is incontinent of urine and stool and denies and burning with urination/ sense of urinary frequency. Pt is afebrile upon arrival and states, "I just feel lousy." Pt is AOx3, speaking coherently. abdomen nondistended and nontender to touch. PIV inserted, labs collected, patient st catheterized for urine. HCP present at bedside. Denies fevers, chills, N/V/D, pain in general, CP, SOB.

## 2021-06-22 NOTE — ED PROVIDER NOTE - CROS ED ROS STATEMENT
Patient here after a CT scan for labs, provider visit for lung cancer.     all other ROS negative except as per HPI

## 2021-08-25 NOTE — ED PROVIDER NOTE - GENITOURINARY, MLM
Detail Level: Detailed Add 91271 Cpt? (Important Note: In 2017 The Use Of 53912 Is Being Tracked By Cms To Determine Future Global Period Reimbursement For Global Periods): yes No discharge, lesions.

## 2021-12-13 NOTE — PROGRESS NOTE ADULT - PROBLEM SELECTOR PROBLEM 7
Need for prophylactic measure
pt has remained calm during evaluation

## 2022-01-27 NOTE — PATIENT PROFILE ADULT. - AS SC BRADEN NUTRITION
(1) very poor
69 yo wDF, lives with ex , one adult son in 50'2, disabled, PPH Bipolar with remote psych admission in her 40's at [facility], no prior suicide attempt, SIB, no drug or alcohol use abuse,  PMH HLD,  Fibromyalgia,  THR, bib EMS for   syncopal vs near syncopal episode  referred to psych for depression.  Attempted to contact PA who placed consult for details of request.  Pt reports she was seen crying and explained how her good friend  2 days ago from ovarian cancer.  Pt reports she is in tx at [facility] and has been stable on current meds and did not want them changed, Seroquel, Klonopin and Cymbalta.  Pt in therapy weekly and  recently changed providers when her former therapist retired.  Denies SI/HI and no h/o suicide attempt or aggression.  Pt denies AH/VH but claims she has poor sleep without Seroquel.  Pt is fearful however she is too sedated on Seroquel at hs and is afraid of falling.  Discussed lowering but did not want to change.  Pt advised to discussed with out pt provider for meds adjustment.  Would consider lowering dose  of Seroquel to 300hs if Pt agreeable.  No other changes are recommended.  Pt to follow with out Pt provider after discharge.

## 2022-03-10 NOTE — CONSULT NOTE ADULT - SUBJECTIVE AND OBJECTIVE BOX
1 week refill sent
Patient would like a med refill, he had to cancel appt because something else came up and he  Only has 3 pills left, please advise.
Pt aware
Pt r/s to 03/14 but will be out of meds by then can you call enough in to last having trouble with rides here
78 yo female admitted one day ago complaining of weakness and  abdominal pain for 1 day.  Upon admission a CT Abd/ Pelvis was performed.  showing bilateral hydronephrosis and urine in the bladder.    A Resendiz catheter was placed after the CT was done and 700 cc drained.  Since insertion only 150cc has drained.  Urology was consulted for evaluation.      Vital Signs Last 24 Hrs  T(C): 37.6 (27 Jan 2018 17:21), Max: 37.6 (27 Jan 2018 17:21)  T(F): 99.7 (27 Jan 2018 17:21), Max: 99.7 (27 Jan 2018 17:21)  HR: 86 (27 Jan 2018 17:15) (80 - 89)  BP: 146/85 (27 Jan 2018 17:15) (146/85 - 180/88)  BP(mean): 110 (27 Jan 2018 17:15) (107 - 110)  RR: 18 (27 Jan 2018 17:15) (16 - 20)  SpO2: 92% (27 Jan 2018 17:15) (92% - 98%)                        17.9   21.0  )-----------( 353      ( 26 Jan 2018 20:43 )             54.9     01-26    139  |  102  |  13  ----------------------------<  97  4.4   |  24  |  0.74    Ca    9.3      26 Jan 2018 15:28    TPro  6.7  /  Alb  3.9  /  TBili  0.4  /  DBili  x   /  AST  23  /  ALT  8<L>  /  AlkPhos  99  01-26      Physical Exam:  General:  Alert and awake  Abdomen:  Soft ND NT   : Resendiz in place. Draining urine. Concentrated         Bladder scan:  19-33 cc of urine recorded
ANDREW STUART    [id number]    Patient is a 77y old  Female who presents with a chief complaint of colitis with microperforation (27 Jan 2018 03:45)      HPI:  76 yo F with PMH of myelodysplastic syndrome, DVT/PE on xarelto, HTN presented to ED with 1 day h/o weakness and lower abdominal pain that began. Pt reports she was having difficulty walking and was scheduled to see her PCP but came to ED due to weakness. Had some nausea yesterday but no vomiting and reports some loose stools yesterday. No fevers or chills. Cannot recall last colonoscopy. (27 Jan 2018 03:45)      PAST MEDICAL & SURGICAL HISTORY:  Myeloproliferative disorder  Hypertension  PE (pulmonary thromboembolism): 2015  Tremor  Vestibular disequilibrium  S/P hysterectomy        Social History:    FAMILY HISTORY:  No pertinent family history in first degree relatives      Functional Level Prior to Admission:                          16.5   16.1  )-----------( 305      ( 30 Jan 2018 06:50 )             53.4       01-30    137  |  98  |  3<L>  ----------------------------<  115<H>  3.4<L>   |  28  |  0.60    Ca    9.1      30 Jan 2018 06:50  Phos  1.9     01-30  Mg     2.4     01-30        Vital Signs Last 24 Hrs  T(C): 36.3 (30 Jan 2018 09:18), Max: 37.3 (29 Jan 2018 14:04)  T(F): 97.4 (30 Jan 2018 09:18), Max: 99.2 (29 Jan 2018 14:04)  HR: 84 (30 Jan 2018 12:31) (74 - 84)  BP: 166/95 (30 Jan 2018 12:31) (130/90 - 198/102)  BP(mean): 121 (30 Jan 2018 12:31) (119 - 139)  RR: 16 (30 Jan 2018 12:31) (12 - 18)  SpO2: 93% (30 Jan 2018 12:31) (91% - 95%)      PHYSICAL EXAM: This 77 y-o female was admitted on 01/27/18  with weakness, diff. in walking and lower abdominal pain.  CT abdomen; colitis with microperforation.  Lives alone, has HHA ? 24 hrs. Ambulation with walker.  h/o DVT/PE. mds. Spinal stenosis.      Constitutional: lying in bed. Alert, oriented and cooperative. Stable.    Eyes: neg.    ENMT: neg.    Neck: supple, no neck pain    Breasts:    Back:  no back pain    Respiratory:  no sob    Cardiovascular:  no chest pain, no palpitation    Gastrointestinal: no  abdominal pain at this time.    Genitourinary:    Rectal:    Extremities: full ROM, 4 ext., no joint pain, no edema    Vascular:    Neurological: deconditioned, 4/5, no focal deficits.  Bed mobility independent. Stand/transfer with walker. Started PT, ambulation with walker with supervision , poor endurance balance impaired.    Skin:    Lymph Nodes:    Musculoskeletal:    Psychiatric:            Impression:  1. Deconditioned.  2. Colitis with microperforation.    Recommendations:  1. Continue PT for therapeutic ex. and  gait training with device.  2.  ? home PT
HPI:  76 yo F with PMH of myelodysplastic syndrome, DVT/PE on xarelto, HTN presented to ED with 1 day h/o weakness and lower abdominal pain that began. Pt reports she was having difficulty walking and was scheduled to see her PCP but came to ED due to weakness. Had some nausea yesterday but no vomiting and reports some loose stools yesterday. No fevers or chills. Cannot recall last colonoscopy. (2018 03:45)      Further hx obtained today (18) reveals  Weak for several weeks and poor appetite resulting in "some" weight loss  Some loose stools  Had a dental infections (still no dental work) "few weeks ago" and was on antibiotics prescribed by a dentist  No rashes  Abdominal pain transient and feels like "menstrual pain"  No F/C  No respiratory symptoms  Otherwise ROS negative      PAST MEDICAL & SURGICAL HISTORY:  Myeloproliferative disorder  Hypertension  PE (pulmonary thromboembolism):   Tremor  Vestibular disequilibrium  S/P hysterectomy  Urinary retention      MEDICATIONS  (STANDING):  dextrose 5% + sodium chloride 0.45% 1000 milliLiter(s) (90 mL/Hr) IV Continuous <Continuous>  enoxaparin Injectable 50 milliGRAM(s) SubCutaneous two times a day  piperacillin/tazobactam IVPB. 3.375 Gram(s) IV Intermittent every 6 hours    MEDICATIONS  (PRN):  HYDROmorphone  Injectable 0.5 milliGRAM(s) IV Push every 4 hours PRN Moderate Pain  ondansetron Injectable 4 milliGRAM(s) IV Push every 6 hours PRN Nausea      Allergies    No Known Allergies      SOCIAL HISTORY:  Lives at home but was in rehab after her hospitalization last fall  No smoking or IDU    FAMILY HISTORY:  No pertinent family history in first degree relatives    EXAM  Vital Signs Last 24 Hrs  T(C): 36 (2018 22:06), Max: 37.3 (2018 14:04)  T(F): 96.8 (2018 22:06), Max: 99.2 (2018 14:04)  HR: 74 (2018 17:00) (74 - 76)  BP: 130/90 (2018 21:00) (130/90 - 164/84)  BP(mean): 117 (2018 04:41) (102 - 117)  RR: 12 (2018 21:00) (12 - 17)  SpO2: 93% (2018 21:00) (92% - 95%)  Awake and alert  On RA  Frail and thin  No rash or jaundice  Oral mucosa dry  No ulcerations  No mucocutaneous lesions suggestive of embolization  No obvious teeth decay but difficult to visualize  No cervical adenopathy  RRR  Chest clear  Abd soft ND and minimally tender in the lower quadrants  LE no edema  Resendiz draining clear urine    LABS:                        15.3   17.8  )-----------( 292      ( 2018 06:16 )             50.2         141  |  102  |  6<L>  ----------------------------<  91  3.3<L>   |  25  |  0.64    Ca    8.9      2018 06:16  Phos  3.0       Mg     1.8             Urinalysis Basic - ( 2018 20:01 )    Color: Yellow / Appearance: SL Cloudy / S.020 / pH: x  Gluc: x / Ketone: Trace mg/dL  / Bili: Negative / Urobili: 0.2 E.U./dL   Blood: x / Protein: NEGATIVE mg/dL / Nitrite: POSITIVE   Leuk Esterase: Small / RBC: 5-10 /HPF / WBC Many /HPF   Sq Epi: x / Non Sq Epi: 0-5 /HPF / Bacteria: Present /HPF    Culture - Urine (18 @ 20:15)    Specimen Source: .Urine Clean Catch (Midstream)    Culture Results:   Insignificant amount of mixed growth.    No blood cultures - patient refusing     RADIOLOGY & ADDITIONAL STUDIES:    CT Abdomen and Pelvis w/ IV Cont (18 @ 00:56) >    EXAM:  CT ABDOMEN AND PELVIS IC                          PROCEDURE DATE:  2018    Quantity of Contrast in Vial in ml: 100 Contrast Used: Optiray 350  Quantity of Contrast Wasted in ml: 25         INTERPRETATION:  CLINICAL STATEMENT: Lower abdominal pain. Evaluate for   colitis.    TECHNIQUE: CT of the abdomen and pelvis with intravenous and oral   contrast. Axial, sagittal, and coronal images were obtained and reviewed.    COMPARISON: CT abdomen pelvis from 2017 and CT abdomen from2011    FINDINGS:    Lower chest: Right basilar bronchiectasis and trace atelectasis.. Mitral   annular calcification    Liver: Smooth in contour. No hypodense lesions are seen in the liver   which are too small to characterize.. Portal and hepatic veins are patent.    Biliary system: Gallbladder is normal in size. No calcified gallstones.   No biliary ductal dilatation.    Pancreas: Since 2011, there is again a 12 mm hypodense lesion in the   pancreatic body with a now a new peripheralcalcification anteriorly.    Spleen: Unremarkable.    Adrenal glands: Unremarkable.    Kidneys: Symmetric parenchymal enhancement. There is a 4.3 cm right renal   cyst. Multiple hypodense lesions are seen in bilateral kidneys which are   too small tocharacterize.. Moderate bilateral hydronephrosis. No renal   or ureteral stone.     Urinary Bladder: Moderately distended urinary bladder with several   bladder diverticula are noted. The urinary bladder wall is thickened   which may be related to chronic bladder outlet obstruction versus   cystitis. Small bladder diverticula along its left posterior wall.    Reproductive organs: Status post hysterectomy. No adnexal masses.    Bowel/Peritoneum: There is circumferential wall thickening extending from   the mid descending colon through the distal sigmoid colon, where wall   thickening is most pronounced with associated intraluminal narrowing.   There is mild adjacent pericolic fat stranding. There are scattered   colonic diverticula within thisregion however the long segment   involvement is most suggestive for a colitis as oppose to a   diverticulitis. Few scattered foci of gas which appear extraluminal   location within the pelvis may indicate a small microperforation. No   large intraperitoneal free air appreciated. No evidence of bowel   obstruction. Appendix is not visualized however no inflammatory changes   appreciated at the right lower quadrant. No ascites or extraluminal gas.     Lymph nodes: No lymphadenopathy.    Aorta/IVC:There is a large amount of calcified and noncalcified plaque   involving the abdominal aorta. The abdominal aorta is aneurysmal   measuring 3.4 cm in diameter, unchanged. Abdominal wall: No hernia.    Bones/Soft tissues: Moderate degenerative changes.      IMPRESSION:     1.  Circumferential wall thickening extending from the mid descending   colon through the distal sigmoid colon, where wall thickening is most   pronounced with associated intraluminal narrowing. There is mild adjacent   pericolic fat stranding. There are scattered colonic diverticula within   this region however the long segment involvement is most suggestive for a   colitis as oppose to a diverticulitis. Few scattered foci of gas which   appear extraluminal location within the pelvis may indicate a small   microperforation.  2.   Distended urinary bladder with mild bilateral hydronephrosis. Bladder   wall thickening with prominent adjacent perivesicular fat stranding.   Correlate clinically for cystitis/renal infection.  3 .  Unchanged abdominal aortic aneurysm measuring 3.4 cm.
History of Present Illness:  Chief Complaint/Reason for Admission: colitis with microperforation	  History of Present Illness: 	  76 yo F with PMH of myelodysplastic syndrome, DVT/PE on xarelto, HTN presented to ED with 1 day h/o weakness and lower abdominal pain that began. Pt reports she was having difficulty walking and was scheduled to see her PCP but came to ED due to weakness. Had some nausea yesterday but no vomiting and reports some loose stools yesterday. No fevers or chills. Now admitted for treatment of colitis with microperforation.  On hydroxyurea until recently when she had episode of pancytopenia - awaiting approval for Jakafi.     Review of Systems:  Other Review of Systems: All other review of systems negative, except as noted in HPI 	      Allergies and Intolerances:        Allergies:  	No Known Allergies:     Home Medications:   * Patient Currently Takes Medications as of 26-Jan-2018 14:50 documented in Structured Notes  · 	Pyridium 200 mg oral tablet: 1 tab(s) orally 3 times a day   · 	Outpatient Physical Therapy: 2 week(s) treatment 2/9-2/23/18  	Diagnosis: spinal stenosis  · 	Ceftin 250 mg oral tablet: 1 tab(s) orally 2 times a day   · 	buPROPion 75 mg oral tablet: 1 tab(s) orally once a day  · 	sulfamethoxazole-trimethoprim 800 mg-160 mg oral tablet: 1 tab(s) orally 2 times a day  · 	Xarelto 15 mg oral tablet: 1 tab(s) orally twice  · 	folic acid 1 mg oral tablet: 2 tab(s) orally once a day  · 	Norvasc 5 mg oral tablet: 1 tab(s) orally once a day  · 	Aspirin Enteric Coated 81 mg oral delayed release tablet: 1 tab(s) orally once a day  · 	hydroxyurea 500 mg oral capsule: 3 cap(s) orally Monday, Wednesday, and Friday    .    Patient History:    Past Medical History:  Hypertension    Myeloproliferative disorder    PE (pulmonary thromboembolism)  2015  Tremor    Vestibular disequilibrium.     Past Surgical History:  S/P hysterectomy.     Tobacco Screening:  · Core Measure Site	No	    Risk Assessment:    Present on Admission:  Deep Venous Thrombosis	no 	  Pulmonary Embolus	no 	     Heart Failure:  Does this patient have a history of or has been diagnosed with heart failure? no.       Physical Exam:  Physical Exam: Gen: AOx3, NAD HEENT: anicteric, pink conj Neck: supple, no JVD, no INDRA CV: RRR, no m/r/g Pulm: CTABL, no resp distress Abd: soft, tender in lower abdomen with some guarding more in LLQ, nondistended Ext: WWP, no edema	       Labs and Results:  Labs, Radiology, Cardiology, and Other Results:                      15.4  22.1  )-----------( 300      ( 28 Jan 2018 07:21 )            50.2    TECHNIQUE: CT of the abdomen and pelvis with intravenous and oral   contrast. Axial, sagittal, and coronal images were obtained and reviewed.   COMPARISON: CT abdomen pelvis from 9/14/2017 and CT abdomen from 7/29/2011   FINDINGS:   Lower chest: Right basilar bronchiectasis and trace atelectasis.. Mitral   annular calcification   Liver: Smooth in contour. No hypodense lesions are seen in the liver   which are too small to characterize.. Portal and hepatic veins are patent.   Biliary system: Gallbladder is normal in size. No calcified gallstones.   No biliary ductal dilatation.   Pancreas: Since 7/29/2011, there is again a 12 mm hypodense lesion in the   pancreatic body with a now a new peripheral calcification anteriorly.   Spleen: Unremarkable.   Adrenal glands: Unremarkable.   Kidneys: Symmetric parenchymal enhancement. There is a 4.3 cm right renal   cyst. Multiple hypodense lesions are seen in bilateral kidneys which are   too small to characterize.. Moderate bilateral hydronephrosis. No renal   or ureteral stone.    Urinary Bladder: Moderately distended urinary bladder with several   bladder diverticula are noted. The urinary bladder wall is thickened   which may be related to chronic bladder outlet obstruction versus   cystitis. Small bladder diverticula along its left posterior wall.   Reproductive organs: Status post hysterectomy. No adnexal masses.   Bowel/Peritoneum: There is circumferential wall thickening extending from   the mid descending colon through the distal sigmoid colon, where wall   thickening is most pronounced with associated intraluminal narrowing.   There is mild adjacent pericolic fat stranding. There are scattered   colonic diverticula within this region however the long segment   involvement is most suggestive for a colitis as oppose to a   diverticulitis. Few scattered foci of gas which appear extraluminal   location within the pelvis may indicate a small microperforation. No   large intraperitoneal free air appreciated. No evidence of bowel   obstruction. Appendix is not visualized however no inflammatory changes   appreciated at the right lower quadrant. No ascites or extraluminal gas.    Lymph nodes: No lymphadenopathy.   Aorta/IVC: There is a large amount of calcified and noncalcified plaque   involving the abdominal aorta. The abdominal aorta is aneurysmal   measuring 3.4 cm in diameter, unchanged. Abdominal wall: No hernia.   Bones/Soft tissues: Moderate degenerative changes.    IMPRESSION:    1.  Circumferential wall thickening extending from the mid descending   colon through the distal sigmoid colon, where wall thickening is most   pronounced with associated intraluminal narrowing. There is mild adjacent   pericolic fat stranding. There are scattered colonic diverticula within   this region however the long segment involvement is most suggestive for a   colitis as oppose to a diverticulitis. Few scattered foci of gas which   appear extraluminal location within the pelvis may indicate a small   microperforation.  2.    3.  Distended urinary bladder with mild bilateral hydronephrosis. Bladder   wall thickening with prominent adjacent perivesicular fat stranding.   Correlate clinically for cystitis/renal infection.  4.   5.  Unchanged abdominal aortic aneurysm measuring 3.4 cm.	    Assessment and Plan:    Assessment:  · Assessment		  76 yo F with h/o MDS, HTN, DVT/PE on xarelto with colitis with small microperforation  -NPO/IVF  -Zosyn  -sq Loveno  -ASA on hold   -HSQ/SCDs  -
Patient is a 77y old  Female who presents with a chief complaint of colitis with microperforation (27 Jan 2018 03:45)       HPI:  78 yo F with PMH of myelodysplastic syndrome, DVT/PE on xarelto, HTN presented to ED with 1 day h/o weakness and lower abdominal pain that began. Pt reports she was having difficulty walking and was scheduled to see her PCP but came to ED due to weakness. Had some nausea yesterday but no vomiting and reports some loose stools yesterday. No fevers or chills. Cannot recall last colonoscopy. (27 Jan 2018 03:45)    On 2/1/18 podiatry was consulted because patient reported that she felt her left ankle to be weak.  Patient describes her left ankle to "give away".  Patient attributes these problems as occurring prior to her hospital admission and although she is not currently walking, would like to have it addressed during her stay.  Patient denies pain or tenderness to the area.  Patient denies ever having trauma to the left lower extremity.       ROS: Const:  _-__febrile   Eyes:___ENT:___CV: _-__chest pain  Resp: _-___sob  GI:_-__nausea ___vomiting ____abd pain ___npo ___clears ___full diet __bm  :___ Musk: __-_pain _-__spasm  Skin:__-_ Neuro:  __+_sedation___confusion____ numbness _+__weakness ___paresthesia  Psych:___anxiety  Endo:___ Heme:___Allergy:___    PAST MEDICAL & SURGICAL HISTORY:  Myeloproliferative disorder  Hypertension  PE (pulmonary thromboembolism): 2015  Tremor  Vestibular disequilibrium  S/P hysterectomy      MEDICATIONS  (STANDING):  amLODIPine   Tablet 5 milliGRAM(s) Oral daily  piperacillin/tazobactam IVPB. 3.375 Gram(s) IV Intermittent every 6 hours  rivaroxaban 15 milliGRAM(s) Oral every 12 hours  tamsulosin 0.4 milliGRAM(s) Oral at bedtime    MEDICATIONS  (PRN):  ondansetron Injectable 4 milliGRAM(s) IV Push every 6 hours PRN Nausea      Allergies  No Known Allergies  Intolerances        FAMILY HISTORY:  No pertinent family history in first degree relatives                            16.7   15.3  )-----------( 289      ( 31 Jan 2018 13:07 )             53.1                                                  01-31    137  |  100  |  5<L>  ----------------------------<  101<H>  3.6   |  25  |  0.68    Ca    9.5      31 Jan 2018 13:07  Phos  3.0     01-31  Mg     2.2     01-31            Medical Imaging: Venous duplex: nonocclusive DVT of the right common femoral vein.       PE:   GEN: Patient is a 77y well developed, well nourished Female, alert, awake and oriented to person, place and time in no acute distress.     Extremities   Vascular:    Left:   DP- 2/4    PT-2/4          Right: DP-2/4           PT-2/4   Derm: No erythema, edema, open lesions or other clinical signs of infection.  Neuro: Epicritic sensation and general sensorium are intact bilateral.   MSK: Right: muscle strength 5/5; left muscle strength 5/5 except evertors: 4/5 Of note the left subtalar joint range of motion has a aberrant increase in ratio of inversion to eversion.  No anterior drawer sign is appreciated, even when compared to contralateral limb.
This is 77 year old female with past medical history for MDS, DVT on Xarelto, cervical myelopathy, Now admitted and treated for colitis with mild perforation. from the work up found to have bilateral hydronephrosis, and from the bladder scan was found to have retention of 700 ml of urine. according to the note from urology - 700 ml were drained after the inseration of the hutson.  service recommended also evaluation from renal service in this regard. When I saw the patient she was sleeping in her bed, easily to be arouse, does not have any complais, no shortness of breath, no chest pain, no fever, Never been told to have a renal problem, once maybe treated for UTI, denies any burning sensation with urination, no frequent urination, no dysuria, no sensation for bladder fullness       Patient is a 77y Female admitted for     PAST MEDICAL & SURGICAL HISTORY:  Myeloproliferative disorder  Hypertension  PE (pulmonary thromboembolism):   Tremor  Vestibular disequilibrium  S/P hysterectomy      MEDICATIONS  (STANDING):  enoxaparin Injectable 50 milliGRAM(s) SubCutaneous two times a day  lactated ringers. 1000 milliLiter(s) (110 mL/Hr) IV Continuous <Continuous>  metroNIDAZOLE    Tablet 1000 milliGRAM(s) Oral every 12 hours  piperacillin/tazobactam IVPB. 3.375 Gram(s) IV Intermittent every 6 hours  potassium chloride  10 mEq/100 mL IVPB 10 milliEquivalent(s) IV Intermittent every 1 hour    MEDICATIONS  (PRN):  HYDROmorphone  Injectable 0.5 milliGRAM(s) IV Push every 4 hours PRN Moderate Pain  ondansetron Injectable 4 milliGRAM(s) IV Push every 6 hours PRN Nausea      Allergies    No Known Allergies    Intolerances        SOCIAL HISTORY:    FAMILY HISTORY:  No pertinent family history in first degree relatives      T(C): , Max: 37.6 (18 @ 17:21)  T(F): , Max: 99.7 (18 @ 17:21)  HR: 66 (18 @ 12:37)  BP: 139/71 (18 @ 12:37)  BP(mean): 99 (18 @ 12:37)  RR: 17 (18 @ 12:37)  SpO2: 94% (18 @ 12:37)  Wt(kg): --     @ 07: @ 07:00  --------------------------------------------------------  IN: 2840 mL / OUT: 1325 mL / NET: 1515 mL     @ 07: @ 14:03  --------------------------------------------------------  IN: 1842 mL / OUT: 750 mL / NET: 1092 mL    Physical exam;   Alert and oriented  NO JVD   Normal air entry into the lungs, no wheezing, no crackles   RRR, normal s1/s2, no murmurs, rubs or gallops   Abdomen - soft, non-tender, not distended   Extremities: no edema   Has hutson in place   No CVA tenderness       LABS:                        15.4   22.1  )-----------( 300      ( 2018 07:21 )             50.2         143  |  108  |  10  ----------------------------<  94  3.6   |  18<L>  |  0.76    Ca    8.9      2018 07:21  Phos  4.9       Mg     2.0         TPro  6.7  /  Alb  3.9  /  TBili  0.4  /  DBili  x   /  AST  23  /  ALT  8<L>  /  AlkPhos  99          Urinalysis Basic - ( 2018 18:41 )    Color: Yellow / Appearance: Cloudy / S.020 / pH: x  Gluc: x / Ketone: NEGATIVE  / Bili: Negative / Urobili: 0.2 E.U./dL   Blood: x / Protein: 30 mg/dL / Nitrite: NEGATIVE   Leuk Esterase: Small / RBC: < 5 /HPF / WBC Many /HPF   Sq Epi: x / Non Sq Epi: 0-5 /HPF / Bacteria: Many /HPF      Osmolality, Random Urine: 514 mosmol/kg ( @ 00:35)  Sodium, Random Urine: 94 mmol/L ( @ 00:35)  Creatinine, Random Urine: 126 mg/dL ( @ 00:35)  Chloride, Random Urine: 77 mmol/L ( @ 00:35)        RADIOLOGY & ADDITIONAL STUDIES:

## 2022-04-14 NOTE — H&P ADULT - PROBLEM SELECTOR PROBLEM 1
Requested medication(s) are due for refill today: Yes  Patient has already received a courtesy refill: NO  Other reason request has been forwarded to provider:   Cardiovascular:  Antilipid - Statins Failed 04/13/2022 06:17 PM   Protocol Details  Total Cholesterol within 360 days    LDL within 360 days    HDL within 360 days    Triglycerides within 360 days Myelodysplasia (myelodysplastic syndrome) Weakness generalized

## 2022-04-21 NOTE — CONSULT NOTE ADULT - PROVIDER SPECIALTY LIST ADULT
Rehab Medicine Additional Notes: Onset 5 weeks ago, improving with time\\nNo treatment necessary at this time Detail Level: Simple Render Risk Assessment In Note?: no

## 2022-05-10 NOTE — PATIENT PROFILE ADULT - ARRIVAL FROM
Please read the healthy family handout that you were given and share it with your family. Please compare this printed medication list with your medications at home to be sure they are the same. If you have any medications that are different please contact us immediately at [phone]. Also review your allergies that we have listed, these may also include medications that you have not been able to tolerate, make sure everything listed is correct. If you have any allergies that are different please contact us immediately at [phone]. Patient Education        Urinary Tract Infection (UTI) in Women: Care Instructions  Overview     A urinary tract infection, or UTI, is a general term for an infection anywhere between the kidneys and the urethra (where urine comes out). Most UTIs arebladder infections. They often cause pain or burning when you urinate. UTIs are caused by bacteria and can be cured with antibiotics. Be sure tocomplete your treatment so that the infection does not get worse. Follow-up care is a key part of your treatment and safety. Be sure to make and go to all appointments, and call your doctor if you are having problems. It's also a good idea to know your test results and keep alist of the medicines you take. How can you care for yourself at home?  Take your antibiotics as directed. Do not stop taking them just because you feel better. You need to take the full course of antibiotics.  Drink extra water and other fluids for the next day or two. This will help make the urine less concentrated and help wash out the bacteria that are causing the infection. (If you have kidney, heart, or liver disease and have to limit fluids, talk with your doctor before you increase the amount of fluids you drink.)   Avoid drinks that are carbonated or have caffeine. They can irritate the bladder.  Urinate often. Try to empty your bladder each time.    To relieve pain, take a hot bath or lay a heating pad set on low over your lower belly or genital area. Never go to sleep with a heating pad in place. To prevent UTIs   Drink plenty of water each day. This helps you urinate often, which clears bacteria from your system. (If you have kidney, heart, or liver disease and have to limit fluids, talk with your doctor before you increase the amount of fluids you drink.)   Urinate when you need to.  If you are sexually active, urinate right after you have sex.  Change sanitary pads often.  Avoid douches, bubble baths, feminine hygiene sprays, and other feminine hygiene products that have deodorants.  After going to the bathroom, wipe from front to back. When should you call for help? Call your doctor now or seek immediate medical care if:     Symptoms such as fever, chills, nausea, or vomiting get worse or appear for the first time.      You have new pain in your back just below your rib cage. This is called flank pain.      There is new blood or pus in your urine.      You have any problems with your antibiotic medicine. Watch closely for changes in your health, and be sure to contact your doctor if:     You are not getting better after taking an antibiotic for 2 days.      Your symptoms go away but then come back. Where can you learn more? Go to [email or web address]://[email or web address]. org and sign in to your [employer] account. Enter B082 in the [facility] box to learn more about \"Urinary Tract Infection (UTI) in Women: Care Instructions. \"     If you do not have an account, please click on the \"Sign Up Now\" link. Current as of: October 18, 2021               Content Version: 13.2  © 0980-5667 Healthwise, Incorporated. Care instructions adapted under license by [facility] ([facility]). If you have questions about a medical condition or this instruction, always ask your healthcare professional. [place] [address] any warranty or liability for your use of this information. Patient Education        nitrofurantoin  Pronunciation: AUGUSTO COOK toin  Brand: Macrobid, Macrodantin  What is the most important information I should know about nitrofurantoin? You should not take nitrofurantoin if you have severe kidney disease, urinationproblems, or a history of jaundice or liver problems caused by nitrofurantoin. Do not take nitrofurantoin during late pregnancy (from 38 weeks through delivery). What is nitrofurantoin? Nitrofurantoin is an antibiotic that is used to treat urinary tract infectionscaused by bacteria. Nitrofurantoin may also be used for purposes not listed in this medicationguide. What should I discuss with my healthcare provider before taking nitrofurantoin? You should not take nitrofurantoin if you are allergic to it, or if you have:   severe kidney disease;   urination problems (little or no urination); or   a history of jaundice or liver problems caused by taking nitrofurantoin. Do not take nitrofurantoin during late pregnancy (from 38 weeks through delivery). Tell your doctor if you have ever had:   kidney disease;   anemia;   diabetes;   an electrolyte imbalance or vitamin B deficiency;   glucose-6-phosphate dehydrogenase (G6PD) deficiency; or   any type of debilitating disease. You should not breastfeed a baby younger than 2 month old while you are takingnitrofurantoin. Nitrofurantoin should not be given to a child younger than 2 month old. How should I take nitrofurantoin? Follow all directions on your prescription label and read all medication guidesor instruction sheets. Use the medicine exactly as directed. Take nitrofurantoin with food, even if you take it at bedtime. Shake the oral suspension (liquid) before you measure a dose. Use the dosing syringe provided, or use amedicine dose-measuring device (not a kitchen spoon). You may need to keep taking nitrofurantoin for up to 7 days after lab testsshow that the infection has cleared. Follow your doctor's instructions. Use this medicine for the full prescribed length of time, even if your symptoms quickly improve. Skipping doses can increase your risk of infection that is resistant to medication. Nitrofurantoin will not treat a viral infection suchas the flu or a common cold. This medicine can affect the results of certain medical tests. Tell any doctorwho treats you that you are using nitrofurantoin. If you use this medicine long-term, you may need frequent medical tests. Store at room temperature away from moisture, heat, and light. Do not freeze the liquid medicine, and keep the bottle tightly closed when not in use. Throwaway any nitrofurantoin liquid that has not been used within 30 days. What happens if I miss a dose? Take the medicine as soon as you can, but skip the missed dose if it is almost time for your next dose. Do not take two doses at one time. What happens if I overdose? Seek emergency medical attention or call the Poison Help line at 1-712.590.6997. Overdose can cause vomiting. What should I avoid while taking nitrofurantoin? Antibiotic medicines can cause diarrhea, which may be a sign of a new infection. If you have diarrhea that is watery or bloody, call your doctor before using anti-diarrhea medicine. Avoid taking an antacid that contains magnesium trisilicate, which could makeit harder for your body to absorb nitrofurantoin. What are the possible side effects of nitrofurantoin? Get emergency medical help if you have signs of an allergic reaction (hives, difficult breathing, swelling in your face or throat) or a severe skin reaction (fever, sore throat, burning eyes, skin pain, red or purple skin rash withblistering and peeling).   Call your doctor at once if you have:   severe stomach pain, diarrhea that is watery or bloody (even if it occurs months after your last dose);   vision problems;   fever, chills, cough, chest pain, trouble breathing;   numbness, tingling, or burning pain in your hands or feet;   severe pain behind your eyes;   pale skin, weakness;   joint pain or swelling with fever, swollen glands, and muscle aches;   pain, redness, or swelling in your lower jaw;   increased pressure inside the skull --severe headaches, ringing in your ears, dizziness, nausea, vision problems, pain behind your eyes; or   signs of liver or pancreas problems --upper stomach pain (that may spread to your back), nausea or vomiting, dark urine, yellowing of the skin or eyes. Side effects may be more likely in older adults. Common side effects may include:   headache, dizziness, drowsiness, weakness;   gas, indigestion, loss of appetite;   nausea, vomiting;   muscle or joint pain;   rash, itching; or   temporary hair loss. This is not a complete list of side effects and others may occur. Call your doctor for medical advice about side effects. You may report side effects toFDA at 4-860-TCF-1315. What other drugs will affect nitrofurantoin? Other drugs may affect nitrofurantoin, including prescription and over-the-counter medicines, vitamins, and herbal products. Tell your doctorabout all your current medicines and any medicine you start or stop using. Where can I get more information? Your pharmacist can provide more information about nitrofurantoin. Remember, keep this and all other medicines out of the reach of children, never share your medicines with others, and use this medication only for the indication prescribed. Every effort has been made to ensure that the information provided by [address] is accurate, up-to-date, and complete, but no guarantee is made to that effect. Drug information contained herein may be time sensitive.  Mult information has been compiled for use by healthcare practitioners and consumers in the [place] and therefore [employer] does not warrant that uses outside of the [place] are appropriate, unless specifically indicated otherwise. [facility] drug information does not endorse drugs, diagnose patients or recommend therapy. [facility] drug information is an informational resource designed to assist licensed healthcare practitioners in caring for their patients and/or to serve consumers viewing this service as a supplement to, and not a substitute for, the expertise, skill, knowledge and judgment of healthcare practitioners. The absence of a warning for a given drug or drug combination in no way should be construed to indicate that the drug or drug combination is safe, effective or appropriate for any given patient. [facility] does not assume any responsibility for any aspect of healthcare administered with the aid of information [facility] provides. The information contained herein is not intended to cover all possible uses, directions, precautions, warnings, drug interactions, allergic reactions, or adverse effects. If you have questions about the drugs you are taking, check with yourdoctor, nurse or pharmacist.  Copyright 0540-7778 [address]. Version: 9.02. Revision date: 2/24/2020. Care instructions adapted under license by [facility] ([facility]). If you have questions about a medical condition or this instruction, always ask your healthcare professional. [place] [address] any warranty or liability for your use of this information. Home

## 2022-06-15 NOTE — DIETITIAN INITIAL EVALUATION ADULT. - +GENDER
4/12/2022 Jaylyn is a 19-year-old female referred to me by Dr. Emmanuelle Odom MD for consultation of rectal bleeding and constipation.  Over the past year she has had increased episodes of constipation with gas bloating and tenesmus.  She denies any nocturnal symptoms.  Last month she developed an acute episode of bright red blood per rectum that almost filled the toilet.  She went to the health center and was found to have a small external hemorrhoid.  She did have a positive fecal immunochemical test as well.  Her mother has a history of colon polyps diagnosed at age 35.  She has never had a colonoscopy.  She does not take anything regularly for her bowels.  She does eat a high-fiber diet and drinks plenty of water.  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  MB  6/15/2022 Jaylyn presents for follow up of IBS-C. She is doing much better since she has been home. Her labs and colonoscopy are all normal. She is doing better by eating a more clean diet. She is not on miralax. Today she is doing much better. MB  This telehealth visit was provided at the patient's home.
Statement Selected

## 2022-07-22 NOTE — BEHAVIORAL HEALTH ASSESSMENT NOTE - NSBHCONSORIP_PSY_A_CORE
Reviewed faxed med list from [facility]. They have pt is taking one magnesium tablet daily. Our EMR stated 2 tabs. PCP had decreased this to one tablet daily on 6/28. EMR updated. New Rx sent to pharmacy. Pt is to be on Amiodarone 200 mg QD starting on 7/20, their med list was still at 200 mg BID. Called Lazara Connolly and she did get updated sig. [N] : Patient is not sexually active [Y] : Positive pregnancy history [Menarche Age: ____] : age at menarche was [unfilled] [No] : Patient does not have concerns regarding sex [Previously active] : previously active [TextBox_4] : Pt presents for 4 weeks incision check. , 20, Male 'DONAVON", 4 lbs 4 oz [GonorrheaDate] : 04/24/21 [TextBox_63] : neg [ChlamydiaDate] : 04/24/21 [TextBox_68] : neg [LMPDate] : 03/11/21 [PGxTotal] : 1 [[facility]] : 1 [[facility]] : 1 [FreeTextEntry1] : 03/11/21 Consult...

## 2022-08-24 NOTE — PATIENT PROFILE ADULT. - PATIENT REPRESENTATIVE NAME
Detail Level: Detailed Size Of Lesion: 2mm Size Of Lesion: 1mm Instructions (Optional): Less then 1 mm Cathy Martinez

## 2022-09-26 NOTE — PROGRESS NOTE ADULT - PROBLEM SELECTOR PLAN 6
Pt on hydroxyurea until recently when she had episode of pancytopenia, will begin as outpatient.  -f/u [facility]
Detail Level: Simple
Initiate Treatment: Start 5 fluorouracil nightly x 7-10 on forehead, temples and cheeks
Pt declined assistance X3
Pt on hydroxyurea until recently when she had episode of pancytopenia, will begin as outpatient.  -f/u [facility]
Pt on hydroxyurea until recently when she had episode of pancytopenia, will begin as outpatient.  -f/u [facility]
Pt on hydroxyurea until recently when she had episode of pancytopenia, will begin as outpatient.  [facility]

## 2023-03-13 NOTE — DISCHARGE NOTE ADULT - MEDICATION SUMMARY - MEDICATIONS TO TAKE
left upper arm I will START or STAY ON the medications listed below when I get home from the hospital:    Norvasc 5 mg oral tablet  -- 1 tab(s) by mouth once a day  -- Indication: For Hypertension

## 2023-03-17 NOTE — ED ADULT TRIAGE NOTE - CHIEF COMPLAINT QUOTE
c/o of right lower leg pain, swelling and warmth since monday.  denies any injury Passive ROM exercises daily.  Turn and position every 2 hours  Strict aspiration precautions.  Keep head of bed elevated at all times.

## 2023-07-03 NOTE — ED PROVIDER NOTE - CPE EDP SKIN NORM
[facility] Note  Staff: Oxana Davila FNP-C    PCP: MD Nessa    Chief Complaint: Microscopic Hematuria    Subjective:        HPI: Joyce Mcintosh is a 55 y.o. female presents today for evaluation of microscopic hematuria. Her most recent urine tests from 6/13/2023 show trace of blood and micro UA shows 2 RBCs per HPF. Per AUA guidelines, this is a normal and benign finding and does not require further evaluation. She states last week she saw blood when wiping but she is unsure if this was in her urine or vaginal bleeding. She states she is due to see her GYN but not for another month. She does have known uterine fibroids. She had a recent CT RSS on 6/15/2023 which showed Kidneys, ureters, and bladder: There is a 9 mm fat density angiomyolipoma present within the anterior cortex of the right renal midpole on series 2, image 63.  No stones, hydronephrosis, or solid mass appreciated.  The ureters are normal in caliber and course without stones.  The urinary bladder is decompressed.  No bladder stones appreciated. I reviewed imaging with her today. She denies a history of kidney stones. She is currently not taking any bladder medications.     Questions asked pt during office visit today:  Urgency:No, incontinence with urgency? No;   DysuriaNo  Gross HematuriaNo  History of UTI: No    History of Kidney Stones?:  No    Constipation issues?:  No    REVIEW OF SYSTEMS:  Review of Systems   Constitutional: Negative.  Negative for chills and fever.   HENT: Negative.     Eyes: Negative.    Respiratory: Negative.     Cardiovascular: Negative.    Gastrointestinal: Negative.  Negative for abdominal pain, constipation, diarrhea, nausea and vomiting.   Genitourinary:  Positive for hematuria (micro). Negative for dysuria, flank pain, frequency and urgency.   Musculoskeletal: Negative.  Negative for back pain.   Skin: Negative.    Neurological: Negative.    Endo/Heme/Allergies: Negative.     Psychiatric/Behavioral: Negative.       PMHx:  Past Medical History:   Diagnosis Date    Migraine headache        PSHx:  Past Surgical History:   Procedure Laterality Date     SECTION      COLONOSCOPY         Fam Hx:   malignancies: No , gyn malignancies: No   kidney stones: No     Soc Hx:  Lives in [place]    Allergies:  Patient has no known allergies.    Medications: reviewed     Objective:   There were no vitals filed for this visit.    Physical Exam  Constitutional:       Appearance: Normal appearance.   HENT:      Head: Normocephalic.      Mouth/Throat:      Mouth: Mucous membranes are moist.   Eyes:      Conjunctiva/sclera: Conjunctivae normal.   Pulmonary:      Effort: Pulmonary effort is normal.   Abdominal:      General: There is no distension.      Palpations: Abdomen is soft.      Tenderness: There is no abdominal tenderness. There is no right CVA tenderness or left CVA tenderness.   Musculoskeletal:         General: Normal range of motion.      Cervical back: Normal range of motion.   Skin:     General: Skin is warm.   Neurological:      Mental Status: She is alert and oriented to person, place, and time.   Psychiatric:         Mood and Affect: Mood normal.         Behavior: Behavior normal.       LABS REVIEW:  UA today:   Color:Clear, Yellow  Spec. Grav.  >1.030  PH  6.0  Negative for leukocytes, nitrates, glucose, urobili, bili  Trace ketones  Positive protein  Small blood    Assessment:       1. Microscopic hematuria          Plan:      Pt reassured, normal and benign finding- follow up with PCP for annually UA- recommend micro UA for evaluation  Urine sent for micro UA, urine culture, and urine cytology  May proceed with cystoscopy depending on urine results  Advised to f/u with GYN for further eval    ?Gross Hematuria?    F/u As Needed    [facility]: Active    GRETA Bustos       normal...

## 2023-07-27 NOTE — ED PROVIDER NOTE - TOBACCO USE
[facility] Pregnancy And Lactation Text: This medication is Pregnancy Category X and is absolutely contraindicated during pregnancy. It is unknown if it is excreted in breast milk. Never smoker

## 2023-07-28 NOTE — PROGRESS NOTE ADULT - PROBLEM SELECTOR PLAN 1
Refill Request     CONFIRM preferred pharmacy with the patient. If Mail Order Rx - Pend for 90 day refill. Last Seen: Last Seen Department: 5/31/2023  Last Seen by PCP: 5/31/2023    Last Written: 12/16/22 120 tabs 0 refills    If no future appointment scheduled:  Review the last OV with PCP and review information for follow-up visit,  Route STAFF MESSAGE with patient name to the [facility] for scheduling with the following information:            -  Timing of next visit           -  Visit type ie Physical, OV, etc           -  Diagnoses/Reason ie. COPD, HTN - Do not use MEDICATION, Follow-up or CHECK UP - Give reason for visit      Next Appointment:   Future Appointments   Date Time Provider [facility] [address]   8/1/2023  4:00 PM MD Birdie Forbes [facility]   8/10/2023  8:00 AM A ECHO ROOM [facility] AND CAR Bryce [facility]   9/19/2023 12:30 PM Stephane Triplett MD [address] [facility]   12/5/2023  7:30 AM MD Eren Ibarra [facility]       Message sent to [address] [place] [address] to schedule appt with patient?   NO      Requested Prescriptions     Pending Prescriptions Disp Refills    Omega-3-Acid Eth Est, Dietary, 1 g CAPS 120 capsule 0     Sig: Take 2 capsules by mouth twice a day CT scan with circumferential wall thickening extending from the mid descending   colon through the distal sigmoid colon, few scattered foci of gas which may indicate a small microperforation. Infectious colitis most likely given elevated CRP and procalcitonin. Ischemic colitis unlikely as pt with no significant abdominal pain on exam (no pain out of proportion to exam). Pt now asymptomatic and tolerating clear diet.   - WBCs trending down. C/w IV zosyn and discuss transition to PO abx w/ ID (Dr. Avelar).   -multiple episodes of loose stools: c. diff negative. No loose or bloody stools overnight. Continue to monitor BMs and avoid laxative CT scan with circumferential wall thickening extending from the mid descending   colon through the distal sigmoid colon, few scattered foci of gas which may indicate a small microperforation. Infectious colitis most likely given elevated CRP and procalcitonin. Ischemic colitis unlikely as pt with no significant abdominal pain on exam (no pain out of proportion to exam). Pt now asymptomatic and tolerating clear diet.   - Pt w/ baseline leukopenia 2/2 MDS. WBCs currently elevated, but trending down. Repeat CBC w/ manual diff. C/w IV zosyn. If WBCs continue to trend down, transition to PO abx. Discussed w/ Dr. Avelar  - Initially w/ multiple episodes of loose stools: c. diff negative. No loose or bloody stools overnight. Continue to monitor BMs and avoid laxative

## 2023-09-01 NOTE — PROGRESS NOTE ADULT - PROBLEM SELECTOR PROBLEM 1
LOV: 10/10/22  RTC: 6 months  F/U:      Medication pended.     Writer called patient's daughter Jessica to schedule f/u appt. Jessica did not answer. Writer left a message to give us a call at [phone].                       Myeloproliferative disorder

## 2023-09-20 NOTE — PROGRESS NOTE ADULT - PROBLEM SELECTOR PLAN 2
Has history of UTIs colonized with klebsiella  - Plan as per above
Has history of UTIs colonized with klebsiella. Patient is improved. Vitals WNL.  - Plan as per above
Has history of UTIs colonized with klebsiella. Patient is improved. Vitals WNL.  - Plan as per above
Has history of UTIs colonized with klebsiella  - Plan as per above
Has history of UTIs colonized with klebsiella. Patient is improved. Vitals WNL.  - Plan as per above

## 2024-01-05 NOTE — PROGRESS NOTE ADULT - PROVIDER SPECIALTY LIST ADULT
Heme/Onc Spoke to pt and reminded her of her appointment. Pt voiced understanding and call ended.

## 2024-05-09 NOTE — PATIENT PROFILE ADULT - DISASTER - TOBACCO USE
Never smoker Detail Level: Detailed Quality 226: Preventive Care And Screening: Tobacco Use: Screening And Cessation Intervention: Patient screened for tobacco use and is an ex/non-smoker Quality 130: Documentation Of Current Medications In The Medical Record: Current Medications Documented

## 2024-05-17 NOTE — CONSULT NOTE ADULT - PROBLEM SELECTOR RECOMMENDATION 9
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 Patient is on anticoagulation and compliant with it.  Repeat  VQ scan.  there is no clinical evidence of failure of therapy

## 2024-10-01 NOTE — PHYSICAL THERAPY INITIAL EVALUATION ADULT - GAIT DEVIATIONS NOTED, PT EVAL
Detail Level: Detailed
Quality 226: Preventive Care And Screening: Tobacco Use: Screening And Cessation Intervention: Patient screened for tobacco use and is an ex/non-smoker
decreased enoc/decreased step length/decreased stride length

## 2024-10-09 NOTE — ED PROVIDER NOTE - PRINCIPAL DIAGNOSIS
LVM for pt on 10/9/2024 to reschedule appt from 12/27/2024  to 12/19 for Savita Potts. NK  
UTI (urinary tract infection)

## 2024-10-28 NOTE — PHYSICAL THERAPY INITIAL EVALUATION ADULT - BED MOBILITY LIMITATIONS, REHAB EVAL
Forms printed and taken to dept for physician to sign    decreased ability to use legs for bridging/pushing - - -

## 2025-03-05 NOTE — DISCHARGE NOTE ADULT - ADMISSION DATE +STARTOFVISITDATE
Continued Stay SW/CM Assessment/Plan of Care Note     Progress note:  [facility]  accepted pt for PT/OT/RN - AVS updated.     See SW/CM flowsheets for other objective data.    Disposition Recommendations:  Preliminary discharge destination: Planned Discharge Destination: Home with services/support  SW/CM recommendation for discharge: Home, Home therapy    Discharge Plan/Needs:     Continued Care and Services - Admitted Since 2/28/2025       Home Medical Care Coordination complete.      Service Provider Request Status Selected Services Address Phone Fax    [facility]  Selected Home Health Services [address] [place] [address] [phone]                      Devices/ Equipment that need to be arranged for discharge: None at this time       Prior To Hospitalization:    Living Situation: Alone and residing at Apartment    .  Support Systems: Family members   Home Devices/Equipment: None            Mobility Assist Devices: None   Type of Service Prior to Hospitalization: Homemaker               Patient/Family discharge goal (s):  Home Care, Home, Home therapy     Resources provided:           Prior Function  Bed Mobility: Modified Independent (03/01/25 1247 : Shelby Bates, OTR/L)  Transfers: Modified Independent (03/01/25 1247 : Shelby Bates, OTR/L)  Ambulation in the Home: Modified  Independent (03/01/25 1247 : Shelby Bates, OTR/L)  Ambulation in the Community: Modified Independent (03/01/25 1247 : Shelby Bates, OTR/L)    Current Function  Last Filed Values         Value Time User    Current Function  slightly below baseline level of function 3/1/2025  1:19 PM Sunday Henry, PT    Therapy Impairments  balance; activity tolerance; strength 3/1/2025  1:19 PM Sunday Henry, PT    ADLs Requiring Support  bed mobility; transfers; ambulation; stairs 3/1/2025  1:19 PM Sunday Henry, PT            Therapy Recommendations for Discharge:   PT:      Last Filed  Values         Value Time User    PT Discharge Needs  therapy 1-3 times per week 3/1/2025  1:19 PM Sunday Henry, PT          OT:       Last Filed Values         Value Time User    OT Discharge Needs  therapy 1-3 times per week 3/1/2025  1:00 PM Shelby Bates, OTR/L          SLP:    Last Filed Values       None            Mobility Equipment Recommended for Discharge: None      Barriers to Discharge  Identified Barriers to Discharge/Transition Planning:   Medical clearance               Jesica Puentes, LCSW     Statement Selected

## 2025-07-11 NOTE — H&P ADULT - NSHPPHYSICALEXAM_GEN_ALL_CORE
An angiography was performed of the Pulmonary Arteries. Gen: AOx3, NAD    CV: RRR, no m/r/g    Pulm: CTABL, no resp distress    Abd: soft, tender in lower abdomen with some guarding more in LLQ, nondistended    Ext: WWP, no edema

## 2025-07-21 NOTE — PROGRESS NOTE ADULT - SUBJECTIVE AND OBJECTIVE BOX
GYN Annual Exam     CC- Here for annual exam. (Naomi is here for her annual today, pap 2024 neg, mammo today, dexa 2024, colon 2021. Patient states she may be in [facility])     Nevaeh Ortiz is a 50 y.o. female who presents for annual well woman exam. Periods are regular every 28-30 days, lasting a few days. Dysmenorrhea:none. Cyclic symptoms include none. No intermenstrual bleeding, spotting, or discharge.  She is reporting more hot flashes over the past 3 to 4 months.  There is no significant HRT.  She is still taking Kelnor for contraception.  She does rely on the oral contraceptives for birth control.  She noted that cutting out soft drinks and tea has greatly helped some of her bladder urgency problems.    OB History          2    Para   2    Term   2            AB        Living   2         SAB        IAB        Ectopic        Molar        Multiple        Live Births                    Current contraception: OCP (estrogen/progesterone)  History of abnormal Pap smear: no  Family history of uterine, colon or ovarian cancer: no  History of abnormal mammogram: no  Family history of breast cancer: yes - maternal grandmother  Last Pap :     Past Medical History:   Diagnosis Date    Allergic rhinitis 2012    Breast asymmetry     Colon polyps     FOLLOWED BY DR. NICOLE GAGE    Environmental allergies     Goiter     Hematuria 2017    Hemorrhoids     Influenza A 2019    Onychomycosis 2016    Pruritus genitalia 2020    RAD (reactive airway disease) 2013    MILD, INTERMITTENT    Radial scar of breast 2019    RIGHT BREAST    Varicose vein of leg     S/P ABLATION       Past Surgical History:   Procedure Laterality Date    BREAST BIOPSY Right 2019    Procedure: Right breast needle-localized excisional biopsy;  Surgeon: Reyna Carmen MD;  [place] [facility] [place] [facility] [place]  Service: General    BREAST BIOPSY Right 2019    US GUIDED, (+) RADIAL SCAR, DONE AT  "[facility]    COLONOSCOPY N/A 12/16/2021    2 BENIGN POLYPS IN RECTUM, RESCOPE IN 5 YRS, DR. NICOLE GAGE AT [facility]    ENDOVENOUS ABLATION SAPHENOUS VEIN W/ LASER Right 2018    DR. NICOLA POLO    THYROIDECTOMY N/A 01/05/1999    DR. PAUL AVILA AT [facility]    THYROIDECTOMY, PARTIAL N/A 03/07/2005    DR. URIEL GIPSON AT [facility]         Current Outpatient Medications:     albuterol sulfate  (90 Base) MCG/ACT inhaler, Inhale 2 puffs Every 6 (Six) Hours As Needed., Disp: , Rfl:     fluticasone (CUTIVATE) 0.005 % ointment, Apply 1 Application topically to the appropriate area as directed 2 (Two) Times a Day., Disp: 30 g, Rfl: 3    Hydrocortisone, Perianal, (Anusol-HC) 2.5 % rectal cream, Apply rectally 3 times daily.  Include applicator., Disp: 30 g, Rfl: 1    Kelnor 1/35 1-35 MG-MCG per tablet, TAKE 1 TABLET BY MOUTH EVERY DAY, Disp: 84 tablet, Rfl: 3    levothyroxine (SYNTHROID, LEVOTHROID) 200 MCG tablet, Take 1 tablet by mouth Daily., Disp: , Rfl:     montelukast (SINGULAIR) 10 MG tablet, Take 1 tablet by mouth Every Night., Disp: , Rfl:     Allergies   Allergen Reactions    Penicillins Hives    Amoxicillin Hives    Erythromycin Hives       Social History     Tobacco Use    Smoking status: Never    Smokeless tobacco: Never   Vaping Use    Vaping status: Never Used   Substance Use Topics    Alcohol use: Yes     Comment: rare    Drug use: No       Family History   Problem Relation Age of Onset    Breast cancer Maternal Grandmother 70    Cancer Maternal Grandmother     Pancreatic cancer Paternal Uncle 65    Cancer Paternal Uncle     Malig Hyperthermia Neg Hx        Review of Systems   Constitutional:  Positive for diaphoresis. Negative for fatigue and fever.   Genitourinary:  Negative for menstrual problem and urinary incontinence.       /88   Ht 172.7 cm (68\")   Wt 94 kg (207 lb 3.2 oz)   LMP 06/30/2025 (Exact Date)   BMI 31.50 kg/m²     Physical Exam  Genitourinary:      Bladder and urethral " OVERNIGHT EVENTS:  KAREN   SUBJECTIVE / INTERVAL HPI: Patient seen and examined at bedside.   Patient laying comfortably in bed, awake and alert with no active complaints overnight. Patient denies h/n/v/d, fever, chills, cp, palpitations, sob, leg swelling, rashes, dysuria, and changes in BM. Complains of mild lower abdominal pain. She reports loss of appetite that is not new and decreased oral intake recently.       VITAL SIGNS:  Vital Signs Last 24 Hrs  T(C): 36.8 (08 Mar 2019 05:05), Max: 36.8 (08 Mar 2019 05:05)  T(F): 98.3 (08 Mar 2019 05:05), Max: 98.3 (08 Mar 2019 05:05)  HR: 79 (08 Mar 2019 05:05) (73 - 104)  BP: 181/92 (08 Mar 2019 05:05) (132/88 - 181/92)  BP(mean): --  RR: 19 (08 Mar 2019 05:05) (16 - 19)  SpO2: 93% (08 Mar 2019 05:05) (93% - 95%)    PHYSICAL EXAM:    General: cachectic appearing elderly female in NAD  HEENT: NC/AT; PERRL, anicteric sclera; MMM  Neck: supple, no JVD   Cardiovascular: +S1/S2; RRR  Respiratory: CTA B/L; no W/R/R  Gastrointestinal: soft, mild tenderness to deep palpation, decreased BS  Extremities: WWP; no edema, clubbing or cyanosis  Vascular: 2+ radial, DP/PT pulses B/L  Neurological: AAOx3; no focal deficits    MEDICATIONS:  MEDICATIONS  (STANDING):  amLODIPine   Tablet 5 milliGRAM(s) Oral daily  aspirin enteric coated 81 milliGRAM(s) Oral daily  atorvastatin 40 milliGRAM(s) Oral at bedtime  buPROPion . 37.5 milliGRAM(s) Oral two times a day  cefTRIAXone   IVPB 1 Gram(s) IV Intermittent every 24 hours  docusate sodium 100 milliGRAM(s) Oral daily  potassium chloride    Tablet ER 40 milliEquivalent(s) Oral every 4 hours  rivaroxaban 20 milliGRAM(s) Oral every 24 hours  senna 1 Tablet(s) Oral daily  sodium chloride 0.9%. 1000 milliLiter(s) (75 mL/Hr) IV Continuous <Continuous>    MEDICATIONS  (PRN):  acetaminophen   Tablet .. 650 milliGRAM(s) Oral every 6 hours PRN Temp greater or equal to 38C (100.4F), Moderate Pain (4 - 6)      ALLERGIES:  Allergies    No Known Allergies    Intolerances        LABS:                        14.9   29.77 )-----------( 515      ( 08 Mar 2019 06:20 )             48.8     03-08    141  |  103  |  10  ----------------------------<  106<H>  2.9<LL>   |  21<L>  |  0.54    Ca    9.8      08 Mar 2019 06:20  Phos  2.6     03-08  Mg     2.1     03-08    TPro  6.8  /  Alb  4.1  /  TBili  0.6  /  DBili  x   /  AST  15  /  ALT  9<L>  /  AlkPhos  110  03-08    PT/INR - ( 07 Mar 2019 15:02 )   PT: 21.4 sec;   INR: 1.86          PTT - ( 07 Mar 2019 15:02 )  PTT:36.2 sec  Urinalysis Basic - ( 07 Mar 2019 16:31 )    Color: Yellow / Appearance: Clear / S.025 / pH: x  Gluc: x / Ketone: Trace mg/dL  / Bili: Negative / Urobili: 0.2 E.U./dL   Blood: x / Protein: 30 mg/dL / Nitrite: POSITIVE   Leuk Esterase: NEGATIVE / RBC: < 5 /HPF / WBC > 10 /HPF   Sq Epi: x / Non Sq Epi: 0-5 /HPF / Bacteria: Present /HPF      CAPILLARY BLOOD GLUCOSE          RADIOLOGY & ADDITIONAL TESTS: Reviewed. meatus normal.      No lesions in the vagina.      Right Labia: No lesions.     Left Labia: No lesions.     No vaginal discharge, tenderness or bleeding.      Anterior vaginal prolapse present.     No vaginal atrophy present.       Right Adnexa: not tender, not full and no mass present.     Left Adnexa: not tender, not full and no mass present.     No cervical motion tenderness, discharge, friability or lesion.      Uterus is not enlarged, fixed or tender.      No uterine mass detected.     Uterus is midaxial.   Breasts:     Right: No mass, nipple discharge, skin change or tenderness.      Left: No mass, nipple discharge, skin change or tenderness.   Abdominal:      General: Abdomen is flat.      Palpations: Abdomen is soft. There is no mass.      Tenderness: There is no abdominal tenderness.   Neurological:      Mental Status: She is alert.   Vitals reviewed.               Assessment     1) GYN annual well woman exam.   2) menopausal symptoms.  Will get baseline FSH and estradiol at the end of her next placebo week of pills.     Plan     1) Breast Health - Clinical breast exam yearly, Discussed [employer] cancer society recommendations for breast cancer screening, and Self breast awareness monthly  2) Pap -done today  3) Smoking status-negative  4) Encouraged to be wary of information obtained via social media and internet based on source and search.  5) Follow up prn and one year.       Jhon Parson MD   7/21/2025  11:24 EDT